# Patient Record
Sex: MALE | Race: WHITE | Employment: PART TIME | URBAN - METROPOLITAN AREA
[De-identification: names, ages, dates, MRNs, and addresses within clinical notes are randomized per-mention and may not be internally consistent; named-entity substitution may affect disease eponyms.]

---

## 2017-01-05 ENCOUNTER — TELEPHONE (OUTPATIENT)
Dept: FAMILY MEDICINE CLINIC | Facility: CLINIC | Age: 52
End: 2017-01-05

## 2017-01-11 ENCOUNTER — TELEPHONE (OUTPATIENT)
Dept: FAMILY MEDICINE CLINIC | Facility: CLINIC | Age: 52
End: 2017-01-11

## 2017-01-11 NOTE — TELEPHONE ENCOUNTER
Patient dropped off form \"Report to Attending Physicain\" that needs to be completed by Dr. Jose Bowen. When signed please fax to [phone]. Put form in doctors inbox.

## 2017-01-19 ENCOUNTER — TELEPHONE (OUTPATIENT)
Dept: FAMILY MEDICINE CLINIC | Facility: CLINIC | Age: 52
End: 2017-01-19

## 2017-01-19 NOTE — TELEPHONE ENCOUNTER
[facility] would like to discharge pt from home health services. All goals have been met. No need for skilled nurse anymore. Pt is ready to go back to work.

## 2017-01-26 ENCOUNTER — OFFICE VISIT (OUTPATIENT)
Dept: FAMILY MEDICINE CLINIC | Facility: CLINIC | Age: 52
End: 2017-01-26

## 2017-01-26 ENCOUNTER — TELEPHONE (OUTPATIENT)
Dept: FAMILY MEDICINE CLINIC | Facility: CLINIC | Age: 52
End: 2017-01-26

## 2017-01-26 VITALS
HEART RATE: 110 BPM | HEIGHT: 67 IN | RESPIRATION RATE: 18 BRPM | WEIGHT: 241 LBS | DIASTOLIC BLOOD PRESSURE: 90 MMHG | TEMPERATURE: 97 F | BODY MASS INDEX: 37.83 KG/M2 | SYSTOLIC BLOOD PRESSURE: 130 MMHG

## 2017-01-26 DIAGNOSIS — I87.8 VENOUS STASIS: ICD-10-CM

## 2017-01-26 DIAGNOSIS — L03.116 LEFT LEG CELLULITIS: Primary | ICD-10-CM

## 2017-01-26 PROCEDURE — 99214 OFFICE O/P EST MOD 30 MIN: CPT | Performed by: FAMILY MEDICINE

## 2017-01-26 RX ORDER — FUROSEMIDE 20 MG/1
TABLET ORAL
Qty: 60 TABLET | Refills: 0 | Status: SHIPPED | OUTPATIENT
Start: 2017-01-26 | End: 2019-03-29 | Stop reason: ALTCHOICE

## 2017-01-26 RX ORDER — POTASSIUM CHLORIDE 1500 MG/1
TABLET, FILM COATED, EXTENDED RELEASE ORAL
Qty: 30 TABLET | Refills: 0 | Status: SHIPPED | OUTPATIENT
Start: 2017-01-26 | End: 2019-03-29 | Stop reason: ALTCHOICE

## 2017-01-26 NOTE — TELEPHONE ENCOUNTER
Given verbal to [address] to change qty to 42 in reference to your med instructions today   Pls advise if otherwise. Thanks.      Meds & Refills for this Visit:                   Potassium Chloride ER 20 MEQ Oral Tab CR  30 tablet  0       Si tab PO

## 2017-01-26 NOTE — PROGRESS NOTES
[facility] Family Medicine Office Note  Chief Complaint:   Patient presents with:  Edema: f/u feet swelling      HPI:   This is a 46year old male coming in for follow-up of cellulitis.   Patient was seen recently by orthopedics who believed that HYDROcodone-acetaminophen  MG Oral Tab Take 2 tablets by mouth every 6 (six) hours as needed.  Disp: 30 tablet Rfl: 0      Counseling given: Not Answered       REVIEW OF SYSTEMS:   ROS:  CONSTITUTIONAL:  Denies any unusual weight gain/loss, fever, c stockings  -  Follow up with lymphedema clinic  -  Patient ok to return to work  - furosemide (LASIX) 20 MG Oral Tab; 1 tab PO daily x 2 weeks then 1 tab PO BID x 2 weeks  Dispense: 60 tablet; Refill: 0  - Comp Metabolic Panel (14) [E];  Future  - Potassium

## 2017-01-27 ENCOUNTER — TELEPHONE (OUTPATIENT)
Dept: FAMILY MEDICINE CLINIC | Facility: CLINIC | Age: 52
End: 2017-01-27

## 2017-01-27 NOTE — TELEPHONE ENCOUNTER
Note written/reviewed and signed by dr belcher and faxed to attention: [employer] as pt requested below. Copy placed in triage faxed info bin.

## 2017-01-27 NOTE — TELEPHONE ENCOUNTER
Please write him a note allowing him to return to work effective immediately. Only restriction I would say would be to allow for 1 or 2 extra 15 minute breaks through the day to allow for alternating sitting and standing. Please type and I will sign.

## 2017-01-27 NOTE — TELEPHONE ENCOUNTER
Pt was seen 1/26/17 by Dr. Jose Bowen. He needs a note for work to release him back to work and also if there are any restrictions for him. Vijay fax information to Chanel Middleton [phone] his HR Department. He is asking if this can be done today. Thank you.

## 2017-02-01 NOTE — TELEPHONE ENCOUNTER
Pt called  our office this morning for an additional request.  Pt needed his recent office visit note from 1/26/17 and doctor's note from 1/27/17 faxed to [address] Dept; re:  Claim # [id number]. Yasmani fax # [phone].   Request faxed successf

## 2018-05-15 ENCOUNTER — PATIENT OUTREACH (OUTPATIENT)
Dept: FAMILY MEDICINE CLINIC | Facility: CLINIC | Age: 53
End: 2018-05-15

## 2018-05-15 NOTE — PROGRESS NOTES
Please call pt to inquire if pt has had a colonoscopy in the last 10 years and if so who performed it (name and phone #). Please let Maur Roberson know so I can obtain the report.     If pt did not have a colonoscopy please advise them we will leave the FIT stoo

## 2018-05-15 NOTE — PROGRESS NOTES
Patient has not had a colonoscopy in the last 10 years.  Would like FIT stool card sent to home address:    [address] [place] [address]  [address] [place] [address]

## 2019-01-18 ENCOUNTER — PATIENT OUTREACH (OUTPATIENT)
Dept: FAMILY MEDICINE CLINIC | Facility: CLINIC | Age: 54
End: 2019-01-18

## 2019-01-19 NOTE — PROGRESS NOTES
Please call pt to inquire if pt has had a colonoscopy in the last 10 years and if so who performed it (name and phone #). Please let Nargis Burton know so I can obtain the report.     If pt did not have a colonoscopy please advise them we will leave the FIT stoo

## 2019-03-29 ENCOUNTER — HOSPITAL ENCOUNTER (OUTPATIENT)
Facility: HOSPITAL | Age: 54
Setting detail: OBSERVATION
Discharge: HOME | End: 2019-03-31
Attending: EMERGENCY MEDICINE | Admitting: INTERNAL MEDICINE
Payer: COMMERCIAL

## 2019-03-29 ENCOUNTER — APPOINTMENT (OUTPATIENT)
Dept: GENERAL RADIOLOGY | Facility: HOSPITAL | Age: 54
End: 2019-03-29
Attending: HOSPITALIST
Payer: COMMERCIAL

## 2019-03-29 DIAGNOSIS — L03.115 CELLULITIS OF RIGHT LEG: Primary | ICD-10-CM

## 2019-03-29 PROCEDURE — 71045 X-RAY EXAM CHEST 1 VIEW: CPT | Performed by: HOSPITALIST

## 2019-03-29 PROCEDURE — 99220 INITIAL OBSERVATION CARE,LEVL III: CPT | Performed by: HOSPITALIST

## 2019-03-29 RX ORDER — MORPHINE SULFATE 4 MG/ML
2 INJECTION, SOLUTION INTRAMUSCULAR; INTRAVENOUS EVERY 2 HOUR PRN
Status: DISCONTINUED | OUTPATIENT
Start: 2019-03-29 | End: 2019-03-31

## 2019-03-29 RX ORDER — ONDANSETRON 2 MG/ML
4 INJECTION INTRAMUSCULAR; INTRAVENOUS EVERY 6 HOURS PRN
Status: DISCONTINUED | OUTPATIENT
Start: 2019-03-29 | End: 2019-03-31

## 2019-03-29 RX ORDER — HYDROCODONE BITARTRATE AND ACETAMINOPHEN 5; 325 MG/1; MG/1
1 TABLET ORAL EVERY 4 HOURS PRN
Status: DISCONTINUED | OUTPATIENT
Start: 2019-03-29 | End: 2019-03-31

## 2019-03-29 RX ORDER — CEFAZOLIN SODIUM/WATER 2 G/20 ML
2 SYRINGE (ML) INTRAVENOUS EVERY 6 HOURS
Status: DISCONTINUED | OUTPATIENT
Start: 2019-03-29 | End: 2019-03-31

## 2019-03-29 RX ORDER — MORPHINE SULFATE 4 MG/ML
1 INJECTION, SOLUTION INTRAMUSCULAR; INTRAVENOUS EVERY 2 HOUR PRN
Status: DISCONTINUED | OUTPATIENT
Start: 2019-03-29 | End: 2019-03-31

## 2019-03-29 RX ORDER — METOCLOPRAMIDE HYDROCHLORIDE 5 MG/ML
10 INJECTION INTRAMUSCULAR; INTRAVENOUS EVERY 8 HOURS PRN
Status: DISCONTINUED | OUTPATIENT
Start: 2019-03-29 | End: 2019-03-31

## 2019-03-29 RX ORDER — ENOXAPARIN SODIUM 100 MG/ML
40 INJECTION SUBCUTANEOUS DAILY
Status: DISCONTINUED | OUTPATIENT
Start: 2019-03-29 | End: 2019-03-31

## 2019-03-29 RX ORDER — HYDROCODONE BITARTRATE AND ACETAMINOPHEN 5; 325 MG/1; MG/1
2 TABLET ORAL EVERY 4 HOURS PRN
Status: DISCONTINUED | OUTPATIENT
Start: 2019-03-29 | End: 2019-03-31

## 2019-03-29 RX ORDER — SODIUM CHLORIDE 9 MG/ML
125 INJECTION, SOLUTION INTRAVENOUS CONTINUOUS
Status: DISCONTINUED | OUTPATIENT
Start: 2019-03-29 | End: 2019-03-29

## 2019-03-29 RX ORDER — FUROSEMIDE 10 MG/ML
20 INJECTION INTRAMUSCULAR; INTRAVENOUS ONCE
Status: COMPLETED | OUTPATIENT
Start: 2019-03-29 | End: 2019-03-29

## 2019-03-29 RX ORDER — MORPHINE SULFATE 4 MG/ML
4 INJECTION, SOLUTION INTRAMUSCULAR; INTRAVENOUS EVERY 2 HOUR PRN
Status: DISCONTINUED | OUTPATIENT
Start: 2019-03-29 | End: 2019-03-31

## 2019-03-29 RX ORDER — ACETAMINOPHEN 325 MG/1
650 TABLET ORAL EVERY 4 HOURS PRN
Status: DISCONTINUED | OUTPATIENT
Start: 2019-03-29 | End: 2019-03-31

## 2019-03-29 NOTE — ED PROVIDER NOTES
Patient Seen in: [facility] Emergency Department    History   Patient presents with:  Cellulitis (integumentary, infectious)    Stated Complaint: right leg cellulitis    IVON Durham is a pleasant 59-year-old male presenting to the emergency depar of proportion to exam no subcutis emphysema or crepitance per       ED Course     Labs Reviewed   COMP METABOLIC PANEL (14) - Abnormal; Notable for the following components:       Result Value    BUN 22 (*)     BUN/CREA Ratio 22.2 (*)     Albumin 2.8 (*)

## 2019-03-29 NOTE — PROGRESS NOTES
03/29/19 1508   Clinical Encounter Type   Visited With Patient   Routine Visit (Responded to the consult)   Continue Visiting (Encouraged to call  as needed through his RN)   Gnosticist Encounters   Gnosticist Needs Prayer  (Per request, prayed an

## 2019-03-29 NOTE — CONSULTS
INFECTIOUS DISEASE CONSULTATION    Victorino Palacios Patient Status:  Observation    3/25/1965 MRN [id number]   [facility] 0SW-A Attending Melissa Mota MD   [facility], 10 mg, Intravenous, Q8H PRN  •  ceFAZolin (ANCEF) IVPB 1g/100ml in 0.9% NaCl minibag/add-van, 1 g, Intravenous, Q8H    No current facility-administered medications on file prior to encounter.    Current Outpatient Medications on File Prior insufficiency of leg     Decreased pulses in feet     Obesity     Cellulitis of left leg     Leukocytosis     Fever     Hyponatremia     Sepsis (HCC)     Constipation     left ankle sx  global exp 3/1/17     YUVAL (acute kidney injury) ([facility] [address]     Cellulitis o

## 2019-03-29 NOTE — H&P
[facility]  History and Physical     Raynold Bump Patient Status:  Emergency    3/25/1965 MRN [id number]   Location [facility] Attending Tatianna Santos MD   Hosp Day # 0  [facility],      Chief Com Pulse 83   Temp 98.1 °F (36.7 °C) (Temporal)   Resp 17   Ht 5' 7\" (1.702 m)   Wt 240 lb (108.9 kg)   SpO2 100%   BMI 37.59 kg/m²   General: No acute distress. Alert and oriented x 3. HEENT: Normocephalic atraumatic.   Respiratory: Crackles noted at base

## 2019-03-29 NOTE — PLAN OF CARE
NURSING ADMISSION NOTE      Patient admitted via Cart  Oriented to room. Safety precautions initiated. Bed in low position. Call light in reach. Admission navigator done and report given to rn.

## 2019-03-30 PROCEDURE — 99226 SUBSEQUENT OBSERVATION CARE: CPT | Performed by: HOSPITALIST

## 2019-03-30 RX ORDER — FUROSEMIDE 10 MG/ML
20 INJECTION INTRAMUSCULAR; INTRAVENOUS DAILY
Status: DISCONTINUED | OUTPATIENT
Start: 2019-03-30 | End: 2019-03-31

## 2019-03-30 RX ORDER — PENICILLIN V POTASSIUM 500 MG/1
TABLET ORAL
Qty: 60 TABLET | Refills: 3 | Status: SHIPPED | OUTPATIENT
Start: 2019-03-30 | End: 2019-06-13

## 2019-03-30 NOTE — PROGRESS NOTES
Alert and oriented x 4, v.s.s., cellulitis to bilateral lower extremities, right greater than left. Right leg is red, swollen peeling and weeping, medicated for c/o pain as per MAR.  Continues on antibiotics, ambulatory in room independently, no signs of di

## 2019-03-30 NOTE — PLAN OF CARE
SKIN/TISSUE INTEGRITY - ADULT    • Incision(s), wounds(s) or drain site(s) healing without S/S of infection Progressing              Assumed pt care at 0730. Aa/ox4. Breathing unlabored. Denies pain. Up ad zoya.

## 2019-03-30 NOTE — PROGRESS NOTES
[facility]  Progress Note     Leslie Wade Patient Status:  Observation    3/25/1965 MRN [id number]   [facility] 4NW-A Attending Prudence Marcano MD   Hosp Day # 0 PCP Cecelia Love DO     Chief Complaint: RLE Cellulitis Epic.    Medications:   • furosemide  20 mg Intravenous Daily   • enoxaparin  40 mg Subcutaneous Daily   • ceFAZolin  2 g Intravenous Q6H       ASSESSMENT / PLAN:     1. Acute RLE cellulitis, improving  2. Lymphedema  1. IV abx  2. Follow-up cultures  3.  I

## 2019-03-31 VITALS
SYSTOLIC BLOOD PRESSURE: 119 MMHG | RESPIRATION RATE: 16 BRPM | HEART RATE: 84 BPM | WEIGHT: 246.63 LBS | HEIGHT: 68 IN | BODY MASS INDEX: 37.38 KG/M2 | DIASTOLIC BLOOD PRESSURE: 61 MMHG | OXYGEN SATURATION: 95 % | TEMPERATURE: 98 F

## 2019-03-31 PROCEDURE — 99217 OBSERVATION CARE DISCHARGE: CPT | Performed by: HOSPITALIST

## 2019-03-31 RX ORDER — FUROSEMIDE 20 MG/1
20 TABLET ORAL 2 TIMES DAILY
Qty: 30 TABLET | Refills: 0 | Status: SHIPPED | OUTPATIENT
Start: 2019-03-31 | End: 2019-08-28

## 2019-03-31 NOTE — PROGRESS NOTES
[facility] [address]  [place], [address]  ?  03/31/19  ? Re: Navarro Palencia  ? To Whom It May Concern:    Navarro Palencia was admitted to [facility] from 3/29/2019 to 03/31/19.     Please excuse Navarro Palencia from [employer]

## 2019-03-31 NOTE — PROGRESS NOTES
[facility]                INFECTIOUS DISEASE PROGRESS NOTE    Katelynn Ucon Patient Status:  Observation    3/25/1965 MRN [id number]   [facility] 4NW-A Attending Job Dial, MD   Hosp Day # 0 PCP SILVER GARCIA, DO Stasis dermatitis of both legs     Cellulitis     Fungal dermatitis     Venous insufficiency of leg     Decreased pulses in feet     Obesity     Cellulitis of left leg     Leukocytosis     Fever     Hyponatremia     Sepsis (HCC)     Constipation     left a

## 2019-03-31 NOTE — DISCHARGE SUMMARY
[facility]  DISCHARGE SUMMARY     Geanie Cooks Patient Status:  Observation    3/25/1965 MRN [id number]   [facility] 4NW-A Attending Matilde Ascencio MD   Hosp Day # 0 PCP SILVER GARCIA DO     Date of Admission: 3/29/2019 Please  your prescriptions at the location directed by your doctor or nurse    Bring a paper prescription for each of these medications  · furosemide 20 MG Tabs  · penicillin v potassium 500 MG Tabs         [place]    Follow-up appointm

## 2019-03-31 NOTE — PLAN OF CARE
NURSING DISCHARGE NOTE    Discharged Home via Wheelchair. Accompanied by Support staff  Belongings Taken by patient/family. DC instructions and rx given and explained.  RX for lasix and PCN called into pt's pharmacy, [facility] in [place] per pt request. Pt r

## 2019-03-31 NOTE — PROGRESS NOTES
[facility]                INFECTIOUS DISEASE PROGRESS NOTE    Idalmis Flatness Patient Status:  Observation    3/25/1965 MRN [id number]   [facility] 4NW-A Attending Álvaro Cabrera MD   Hosp Day # 0 PCP SILVER GARCIA, DO Growth 1 Day N/A           Problem list reviewed:  Patient Active Problem List:     Stasis dermatitis of both legs     Cellulitis     Fungal dermatitis     Venous insufficiency of leg     Decreased pulses in feet     Obesity     Cellulitis of left leg

## 2019-04-03 ENCOUNTER — TELEPHONE (OUTPATIENT)
Dept: FAMILY MEDICINE CLINIC | Facility: CLINIC | Age: 54
End: 2019-04-03

## 2019-04-03 NOTE — TELEPHONE ENCOUNTER
Message   Received: 3 days ago   Message Contents   Damaso IbrahimDO  [address] [facility]             Please schedule TCM. Aleena Rosales.      Dr. Sera Casey    Previous Messages      ----- Message -----   From: Luciano Butt MD   Sent: 3/31/2019  10:13 AM

## 2019-04-05 ENCOUNTER — OFFICE VISIT (OUTPATIENT)
Dept: FAMILY MEDICINE CLINIC | Facility: CLINIC | Age: 54
End: 2019-04-05
Payer: COMMERCIAL

## 2019-04-05 VITALS
TEMPERATURE: 98 F | HEIGHT: 67 IN | WEIGHT: 249 LBS | DIASTOLIC BLOOD PRESSURE: 80 MMHG | HEART RATE: 100 BPM | SYSTOLIC BLOOD PRESSURE: 130 MMHG | BODY MASS INDEX: 39.08 KG/M2 | RESPIRATION RATE: 18 BRPM

## 2019-04-05 DIAGNOSIS — R60.9 PERIPHERAL EDEMA: ICD-10-CM

## 2019-04-05 DIAGNOSIS — L03.115 CELLULITIS OF RIGHT LEG: Primary | ICD-10-CM

## 2019-04-05 PROCEDURE — 99214 OFFICE O/P EST MOD 30 MIN: CPT | Performed by: FAMILY MEDICINE

## 2019-04-06 NOTE — PROGRESS NOTES
[facility] Office Note  Chief Complaint:   Patient presents with:  Swelling: hosp f/u swelling right leg      HPI:   This is a 47year old male coming in for recent hospitalization for right leg cellulitis.   Patient was recently d exertion or at rest  RESPIRATORY:  Denies shortness of breath, cough  GASTROINTESTINAL:  Denies any abdominal pain  NEUROLOGICAL:  Denies headache, dizziness, syncope, numbness or tingling in the extremities.   MUSCULOSKELETAL:  Denies muscle, back pain, olga to learning. Medical education done. Outcome: Patient verbalizes understanding. Patient is notified to call with any questions, complications, allergies, or worsening or changing symptoms.   Patient is to call with any side effects or complications from t

## 2019-05-29 NOTE — PROGRESS NOTES
Please call pt to inquire if pt has had a colonoscopy in the last 10 years and if so who performed it (name and phone #). Please let Migdalia Wu know so I can obtain the report.     If pt did not have a colonoscopy please advise them we will leave the FIT stoo

## 2019-06-05 NOTE — PROGRESS NOTES
lvm to an identified vm to call back regarding the colonoscopy outreach. \"unable tor each\" letter sent via [address]

## 2019-07-12 ENCOUNTER — APPOINTMENT (OUTPATIENT)
Dept: ULTRASOUND IMAGING | Facility: HOSPITAL | Age: 54
End: 2019-07-12
Attending: EMERGENCY MEDICINE
Payer: COMMERCIAL

## 2019-07-12 ENCOUNTER — HOSPITAL ENCOUNTER (OUTPATIENT)
Facility: HOSPITAL | Age: 54
Setting detail: OBSERVATION
Discharge: HOME | End: 2019-07-14
Attending: EMERGENCY MEDICINE | Admitting: HOSPITALIST
Payer: COMMERCIAL

## 2019-07-12 DIAGNOSIS — L03.115 CELLULITIS OF RIGHT LEG: Primary | ICD-10-CM

## 2019-07-12 LAB
ALBUMIN SERPL-MCNC: 3.2 G/DL (ref 3.4–5)
ALBUMIN/GLOB SERPL: 0.6 {RATIO} (ref 1–2)
ALP LIVER SERPL-CCNC: 111 U/L (ref 45–117)
ALT SERPL-CCNC: 49 U/L (ref 16–61)
ANION GAP SERPL CALC-SCNC: 6 MMOL/L (ref 0–18)
AST SERPL-CCNC: 42 U/L (ref 15–37)
BASOPHILS # BLD AUTO: 0.07 X10(3) UL (ref 0–0.2)
BASOPHILS NFR BLD AUTO: 1 %
BILIRUB SERPL-MCNC: 1.1 MG/DL (ref 0.1–2)
BUN BLD-MCNC: 21 MG/DL (ref 7–18)
BUN/CREAT SERPL: 23.6 (ref 10–20)
CALCIUM BLD-MCNC: 9.5 MG/DL (ref 8.5–10.1)
CHLORIDE SERPL-SCNC: 106 MMOL/L (ref 98–112)
CO2 SERPL-SCNC: 25 MMOL/L (ref 21–32)
CREAT BLD-MCNC: 0.89 MG/DL (ref 0.7–1.3)
DEPRECATED RDW RBC AUTO: 45.5 FL (ref 35.1–46.3)
EOSINOPHIL # BLD AUTO: 0.07 X10(3) UL (ref 0–0.7)
EOSINOPHIL NFR BLD AUTO: 1 %
ERYTHROCYTE [DISTWIDTH] IN BLOOD BY AUTOMATED COUNT: 13.3 % (ref 11–15)
GLOBULIN PLAS-MCNC: 5.6 G/DL (ref 2.8–4.4)
GLUCOSE BLD-MCNC: 99 MG/DL (ref 70–99)
HCT VFR BLD AUTO: 47 % (ref 39–53)
HGB BLD-MCNC: 16.1 G/DL (ref 13–17.5)
IMM GRANULOCYTES # BLD AUTO: 0.08 X10(3) UL (ref 0–1)
IMM GRANULOCYTES NFR BLD: 1.1 %
LYMPHOCYTES # BLD AUTO: 0.95 X10(3) UL (ref 1–4)
LYMPHOCYTES NFR BLD AUTO: 13.4 %
M PROTEIN MFR SERPL ELPH: 8.8 G/DL (ref 6.4–8.2)
MCH RBC QN AUTO: 32.3 PG (ref 26–34)
MCHC RBC AUTO-ENTMCNC: 34.3 G/DL (ref 31–37)
MCV RBC AUTO: 94.2 FL (ref 80–100)
MONOCYTES # BLD AUTO: 0.5 X10(3) UL (ref 0.1–1)
MONOCYTES NFR BLD AUTO: 7.1 %
NEUTROPHILS # BLD AUTO: 5.4 X10 (3) UL (ref 1.5–7.7)
NEUTROPHILS # BLD AUTO: 5.4 X10(3) UL (ref 1.5–7.7)
NEUTROPHILS NFR BLD AUTO: 76.4 %
OSMOLALITY SERPL CALC.SUM OF ELEC: 287 MOSM/KG (ref 275–295)
PLATELET # BLD AUTO: 250 10(3)UL (ref 150–450)
POTASSIUM SERPL-SCNC: 3.5 MMOL/L (ref 3.5–5.1)
RBC # BLD AUTO: 4.99 X10(6)UL (ref 4.3–5.7)
SODIUM SERPL-SCNC: 137 MMOL/L (ref 136–145)
WBC # BLD AUTO: 7.1 X10(3) UL (ref 4–11)

## 2019-07-12 PROCEDURE — 99223 1ST HOSP IP/OBS HIGH 75: CPT | Performed by: HOSPITALIST

## 2019-07-12 PROCEDURE — 93971 EXTREMITY STUDY: CPT | Performed by: EMERGENCY MEDICINE

## 2019-07-12 RX ORDER — ENOXAPARIN SODIUM 100 MG/ML
0.5 INJECTION SUBCUTANEOUS DAILY
Status: DISCONTINUED | OUTPATIENT
Start: 2019-07-12 | End: 2019-07-14

## 2019-07-12 RX ORDER — POTASSIUM CHLORIDE 20 MEQ/1
40 TABLET, EXTENDED RELEASE ORAL EVERY 4 HOURS
Status: COMPLETED | OUTPATIENT
Start: 2019-07-12 | End: 2019-07-12

## 2019-07-12 RX ORDER — ONDANSETRON 2 MG/ML
4 INJECTION INTRAMUSCULAR; INTRAVENOUS EVERY 6 HOURS PRN
Status: DISCONTINUED | OUTPATIENT
Start: 2019-07-12 | End: 2019-07-14

## 2019-07-12 RX ORDER — METOCLOPRAMIDE HYDROCHLORIDE 5 MG/ML
10 INJECTION INTRAMUSCULAR; INTRAVENOUS EVERY 8 HOURS PRN
Status: DISCONTINUED | OUTPATIENT
Start: 2019-07-12 | End: 2019-07-14

## 2019-07-12 RX ORDER — KETOROLAC TROMETHAMINE 30 MG/ML
30 INJECTION, SOLUTION INTRAMUSCULAR; INTRAVENOUS EVERY 6 HOURS PRN
Status: DISCONTINUED | OUTPATIENT
Start: 2019-07-12 | End: 2019-07-14

## 2019-07-12 RX ORDER — ASPIRIN 81 MG/1
81 TABLET, CHEWABLE ORAL DAILY
Status: DISCONTINUED | OUTPATIENT
Start: 2019-07-12 | End: 2019-07-14

## 2019-07-12 RX ORDER — POTASSIUM CHLORIDE 20 MEQ/1
40 TABLET, EXTENDED RELEASE ORAL EVERY 4 HOURS
Status: DISCONTINUED | OUTPATIENT
Start: 2019-07-12 | End: 2019-07-12

## 2019-07-12 NOTE — PLAN OF CARE
Patient received from ER via cart, alert and oriented x 4. Lungs clear, on room air, abdomen soft, bowel sounds present, passing flatus.  Voiding adequate amounts, urinal. Right and left lower extremity edema, 2+, right leg extremely red, warm to touch, are

## 2019-07-12 NOTE — PROGRESS NOTES
[facility] Progress Note:  Anticoagulation Weight Dose Adjustment for enoxaparin (LOVENOX)    King Curly is a 47year old male who has been prescribed enoxaparin (LOVENOX) for VTE prophylaxis.       Estimated Creatinine Clearance: 88.7 mL/min (base

## 2019-07-12 NOTE — CONSULTS
INFECTIOUS DISEASE CONSULT NOTE    Santiago Hernandez Patient Status:  Inpatient    3/25/1965 MRN [id number]   [facility] 4NW-A Attending Eugenio Carrillo DO   Hosp Day # 0 PCP Darrel KULKARNI Physical Exam:    General: No acute distress. Alert and oriented x 3. Vital signs: Blood pressure 131/68, pulse 81, temperature 98.1 °F (36.7 °C), temperature source Oral, resp. rate 18, height 5' 7\" (1.702 m), weight 256 lb 8 oz (116.3 kg), SpO2 98 %. PROCEDURE:  US VENOUS DOPPLER LEG RIGHT - DIAG [facility] ([id number])  COMPARISON:  None. INDICATIONS:  Cellulitis  TECHNIQUE:  Real time, grey scale, and duplex ultrasound was used to evaluate the lower extremity venous system.  B-mode two-dimensional images of

## 2019-07-12 NOTE — H&P
[facility]  History and Physical     Fransico Stewart Patient Status:  Inpatient    3/25/1965 MRN [id number]   [facility] 4NW-A Attending Farhan Andrade DO   Hosp Day # 0 [address]     Chief Complaint: Cellulitis kg)   SpO2 96%   BMI 36.02 kg/m²   General: No acute distress. Alert and oriented x 3. HEENT: Normocephalic, atraumatic. EOM-I. Anicteric. Neck: No lymphadenopathy. No JVD. No carotid bruits. Respiratory: Good inspiratory effort.   Clear to auscultation

## 2019-07-12 NOTE — PROGRESS NOTES
[facility] Pharmacy Note: Antimicrobial Weight Dose Adjustment for: ceftriaxone    Navarro Palencia is a 47year old male who has been prescribed ceftriaxone (ROCEPHIN) 1 g once.   CrCl is estimated creatinine clearance is 88.7 mL/min (based on SCr of 0.89 mg/dL)

## 2019-07-12 NOTE — ED PROVIDER NOTES
Patient Seen in: [facility] Emergency Department    History   Patient presents with:  Cellulitis (integumentary, infectious)    Stated Complaint: Cellulitis    HPI    49-year-old male with history of chronic swelling in the lower extremities present signs   Extremities: 3+ pitting edema to bilateral lower extremities. The right lower extremity has peeling skin with weeping of serous fluid.   There is erythema and warmth from the mid thigh to the toes that is circumferential and concerning for cellulit posterior tibial veins, and the contralateral common femoral vein. PATIENT STATED HISTORY: (As transcribed by Technologist)     FINDINGS:  EXTREMITY EXAMINED:  Right lower limb SAPHENOFEMORAL JUNCTION:  No reflux. THROMBI:  None visible.  COMPRESSION:  Nor

## 2019-07-13 LAB — POTASSIUM SERPL-SCNC: 4.1 MMOL/L (ref 3.5–5.1)

## 2019-07-13 PROCEDURE — 99232 SBSQ HOSP IP/OBS MODERATE 35: CPT | Performed by: HOSPITALIST

## 2019-07-13 RX ORDER — DIPHENHYDRAMINE HCL 25 MG
25 CAPSULE ORAL EVERY 6 HOURS PRN
Status: DISCONTINUED | OUTPATIENT
Start: 2019-07-13 | End: 2019-07-14

## 2019-07-13 RX ORDER — PENICILLIN V POTASSIUM 500 MG/1
TABLET ORAL
Qty: 90 TABLET | Refills: 11 | Status: SHIPPED | OUTPATIENT
Start: 2019-07-13 | End: 2019-08-14 | Stop reason: ALTCHOICE

## 2019-07-13 NOTE — PLAN OF CARE
Afebrile, swelling and redness to both lower extremities is improved, right leg remains bigger than the left leg, color is jeanne and skin peeling off from the right leg, both legs are shiny, pulses are present with doppler, good capillary refill noted, has

## 2019-07-13 NOTE — PROGRESS NOTES
[facility]  Progress Note     Brandy Read Patient Status:  Inpatient    3/25/1965 MRN [id number]   [facility] 4NW-A Attending Jennifer Silva, [address] [facility] Day # 1 PCP Pratik Gage DO     Chief Complaint: cellulitis   S: referred to TCC on discharge?: yes  Estimated date of discharge: ?24 hours   Discharge is dependent on: progress  At this point Mr. Avelino Man is expected to be discharge to: home     Ronnie Hopkins MD

## 2019-07-13 NOTE — PROGRESS NOTES
[facility]                INFECTIOUS DISEASE PROGRESS NOTE    David Gravely Patient Status:  Inpatient    3/25/1965 MRN [id number]   [facility] 4NW-A Attending Rikki Damon DO   Hosp Day # 1 PCP SILVER GARCIA DO     An Sepsis ([facility]     Constipation     left ankle sx  global exp 3/1/17     YUVAL (acute kidney injury) ([facility]     Cellulitis of left foot         Global exp 2-24-17 / LEFT FOOT / [facility]      Cellulitis of right leg     Lymphedema     Hydrocephalus ([facility]      ASSESS

## 2019-07-13 NOTE — PLAN OF CARE
Problem: SKIN/TISSUE INTEGRITY - ADULT  Goal: Incision(s), wounds(s) or drain site(s) healing without S/S of infection  Description  INTERVENTIONS:  - Assess and document risk factors for pressure ulcer development  - Assess and document skin integrity period  Description  INTERVENTIONS  - Monitor WBC  - Administer growth factors as ordered  - Implement neutropenic guidelines  Outcome: Progressing   AAOx4,both lower extremeties red,swollen,weeping,more on the right leg. IV antibiotic continued.

## 2019-07-14 VITALS
HEIGHT: 67 IN | RESPIRATION RATE: 18 BRPM | BODY MASS INDEX: 40.26 KG/M2 | SYSTOLIC BLOOD PRESSURE: 123 MMHG | HEART RATE: 83 BPM | WEIGHT: 256.5 LBS | OXYGEN SATURATION: 99 % | TEMPERATURE: 98 F | DIASTOLIC BLOOD PRESSURE: 85 MMHG

## 2019-07-14 PROCEDURE — 99239 HOSP IP/OBS DSCHRG MGMT >30: CPT | Performed by: HOSPITALIST

## 2019-07-14 NOTE — PLAN OF CARE
Problem: METABOLIC/FLUID AND ELECTROLYTES - ADULT  Goal: Electrolytes maintained within normal limits  Description  INTERVENTIONS:  - Monitor labs and rhythm and assess patient for signs and symptoms of electrolyte imbalances  - Administer electrolyte re behaviors that affect risk of falls.   - [place] fall precautions as indicated by assessment.  - Educate pt/family on patient safety including physical limitations  - Instruct pt to call for assistance with activity based on assessment  - Modify environme

## 2019-07-14 NOTE — PROGRESS NOTES
7/14/2019    Patient Name: Tyrone Heath      To Whom It May Concern: This letter has been written at the patient's request. The above patient was seen at [facility] for treatment of a medical condition from 7/12/2019 - 7/14/2019.   Please excus

## 2019-07-14 NOTE — PROGRESS NOTES
[facility]  Progress Note     King Curly Patient Status:  Inpatient    3/25/1965 MRN [id number]   [facility] 4NW-A Attending Maria De Jesus Tai, [address] [facility] Day # 2 PCP [address] [place] [address],      Chief Complaint: cellulitis   S:

## 2019-07-14 NOTE — PROGRESS NOTES
[facility]                INFECTIOUS DISEASE PROGRESS NOTE    Sony Ramos Patient Status:  Inpatient    3/25/1965 MRN [id number]   [facility] 4NW-A Attending Saman Infante DO   Hosp Day # 2 PCP SILVER GARCIA DO     An Leukocytosis     Fever     Hyponatremia     Sepsis ([facility] [address]     Constipation     left ankle sx  global exp 3/1/17     YUVAL (acute kidney injury) ([facility]     Cellulitis of left foot         Global exp 2-24-17 / LEFT FOOT / [facility]      Cellulitis of right leg     Lym

## 2019-07-14 NOTE — PLAN OF CARE
Pt discharged to home alert and oriented script was sent to pharmacy pt verbalizes a understanding in regards to d/c instructions.  Left unit with belongings

## 2019-07-15 ENCOUNTER — PATIENT OUTREACH (OUTPATIENT)
Dept: CASE MANAGEMENT | Age: 54
End: 2019-07-15

## 2019-07-15 DIAGNOSIS — Z02.9 ENCOUNTERS FOR UNSPECIFIED ADMINISTRATIVE PURPOSE: ICD-10-CM

## 2019-07-15 NOTE — PAYOR COMM NOTE
--------------  ADMISSION REVIEW     Payor: [address]  Subscriber #:  [id number]  Authorization Number: Pend    Admit date: 7/12/19  Admit time: 1252       Admitting Physician: Basilia Zhang DO  Primary Care Physician: ONESIMO Tejeda Negative for DVT. Nonvisualization of the proximal right calf veins. Otherwise unremarkable. MDM   Ultrasound ordered to rule out DVT. This appears consistent with a large cellulitis. Vancomycin ordered. Anticipate admission for IV antibiotics. patient       ID CONSULT  Reason for Consultation:     RLE cellulitis    ASSESSMENT/ PLAN:     1. RLE cellulitis - marked erythema in the setting of lymphedema. Usually infection due to strep sp  - cover with ancef.  Will use a higher dose since pt is obese None (Room air) — KD       ID -     Antibiotics: cefazolin#2    Improved on cefaozlin  Plan chronic supressive PCN VK after discharge  ----PCN 1g tid x 2 weeks, then 500mg bid  ----advised on recommendation to limit standing at work - will see Dr. Aleksandar Bradshaw

## 2019-07-15 NOTE — DISCHARGE SUMMARY
[facility]  DISCHARGE SUMMARY     Sony Rachel Patient Status:  Inpatient    3/25/1965 MRN [id number]   [facility] 4NW-A Attending No att. providers found   Hosp Day # 2 PCP SILVER GARCIA,      Date of Admission: 20 these medications      Instructions Prescription details   penicillin v potassium 500 MG Tabs  Commonly known as:  VEETID  Start taking on:  7/13/2019      Take 2 tablets (1,000 mg total) by mouth 3 (three) times daily for 15 days, THEN 1 tablet (500 mg to

## 2019-07-16 ENCOUNTER — OFFICE VISIT (OUTPATIENT)
Dept: FAMILY MEDICINE CLINIC | Facility: CLINIC | Age: 54
End: 2019-07-16
Payer: COMMERCIAL

## 2019-07-16 VITALS
TEMPERATURE: 98 F | HEIGHT: 67 IN | DIASTOLIC BLOOD PRESSURE: 78 MMHG | HEART RATE: 94 BPM | WEIGHT: 252 LBS | RESPIRATION RATE: 22 BRPM | BODY MASS INDEX: 39.55 KG/M2 | SYSTOLIC BLOOD PRESSURE: 126 MMHG

## 2019-07-16 DIAGNOSIS — I87.2 CHRONIC VENOUS STASIS DERMATITIS OF RIGHT LOWER EXTREMITY: ICD-10-CM

## 2019-07-16 DIAGNOSIS — L03.115 CELLULITIS OF RIGHT LEG: Primary | ICD-10-CM

## 2019-07-16 PROCEDURE — 99495 TRANSJ CARE MGMT MOD F2F 14D: CPT | Performed by: FAMILY MEDICINE

## 2019-07-16 NOTE — PROGRESS NOTES
HPI:    Elan Bullock is a 47year old male here today for hospital follow up.    He was discharged from Inpatient hospital, [facility] to Home   Admission Date: 7/12/19   Discharge Date: 7/14/19  Hospital Discharge Diagnoses (since 6/16/2019) Meds:    Current Outpatient Medications on File Prior to Visit:  penicillin v potassium 500 MG Oral Tab Take 2 tablets (1,000 mg total) by mouth 3 (three) times daily for 15 days, THEN 1 tablet (500 mg total) 2 (two) times daily.    furosemide 20 MG Oral Ta no r/r/w  CARDIO: RRR without murmur  GI: good BS's, no masses, HSM or tenderness  MUSCULOSKELETAL: back is not tender, FROM of the extremities  EXTREMITIES: no cyanosis, clubbing, + 2 pitting edema bilateral LE, + improving erythema of right leg, + excori

## 2019-07-17 ENCOUNTER — TELEPHONE (OUTPATIENT)
Dept: FAMILY MEDICINE CLINIC | Facility: CLINIC | Age: 54
End: 2019-07-17

## 2019-07-17 NOTE — TELEPHONE ENCOUNTER
LM for wound care to cb. See below. I also discussed with pt. He is aware we are awaiting cb but says he will try to schedule tomorrow if he remembers.

## 2019-07-17 NOTE — TELEPHONE ENCOUNTER
He has sloughing of skin and a few eschars. I was hoping wound care could take care of the venous stasis ulcerations.

## 2019-07-17 NOTE — TELEPHONE ENCOUNTER
S/w pt. I asked what he told scheduling when he called. He reports when he called he told wound care that he was in hosp recently with cellulitis, had \"some bad skin on leg peeling and scraping and was referred for wound care. \"  He was then told they f

## 2019-07-17 NOTE — TELEPHONE ENCOUNTER
He has wounds on the back of his leg that he likely cannot see. For the last time, please have wound care see patient one time and if I'm wrong, no big deal and send him on his way. I want him evaluated for venous stasis ulcers.

## 2019-07-17 NOTE — TELEPHONE ENCOUNTER
Karly Sotomayor from wound care called me back. I discussed below. She will relay to front staff tomorrow to make apt for pt.

## 2019-07-17 NOTE — TELEPHONE ENCOUNTER
Khushbu Duque now called back. Reports since pt denies any drainage or open wound perse' they cannot really see pt. They only see if open wound or drainage. Not sure how to proceed. Please advise thanks.

## 2019-07-17 NOTE — TELEPHONE ENCOUNTER
Pt states he tried to make an appt with wound care and they told him they think he should go to derm not wound care. He would like to know what Dr Luke Arguello thinks and who he should go to. Please advise. Thank you.

## 2019-07-17 NOTE — TELEPHONE ENCOUNTER
Sasha YORK called back. I discussed below. She reports front staff asked pt if he had an open wound and he said no. She reports pt's call for skin rashes and such and sometimes do not warrant actual wound care vs derm consult.   I explained below and advis

## 2019-07-18 NOTE — TELEPHONE ENCOUNTER
Alva Locke  from [address] called back. She was advised pt does need to be seen for wound care eval and treat for the right lower leg issues. Dr. Tiffanie Kate would like the patient evaluated for venous statsis ulcers.  Pt did not  think he needed to be

## 2019-07-24 ENCOUNTER — OFFICE VISIT (OUTPATIENT)
Dept: WOUND CARE | Facility: HOSPITAL | Age: 54
End: 2019-07-24
Attending: NURSE PRACTITIONER
Payer: COMMERCIAL

## 2019-08-14 ENCOUNTER — APPOINTMENT (OUTPATIENT)
Dept: LAB | Age: 54
End: 2019-08-14
Attending: FAMILY MEDICINE
Payer: COMMERCIAL

## 2019-08-14 ENCOUNTER — OFFICE VISIT (OUTPATIENT)
Dept: FAMILY MEDICINE CLINIC | Facility: CLINIC | Age: 54
End: 2019-08-14
Payer: COMMERCIAL

## 2019-08-14 VITALS
HEIGHT: 67 IN | HEART RATE: 88 BPM | RESPIRATION RATE: 18 BRPM | SYSTOLIC BLOOD PRESSURE: 128 MMHG | BODY MASS INDEX: 39.39 KG/M2 | DIASTOLIC BLOOD PRESSURE: 78 MMHG | WEIGHT: 251 LBS

## 2019-08-14 DIAGNOSIS — Z00.00 ROUTINE GENERAL MEDICAL EXAMINATION AT A HEALTH CARE FACILITY: Primary | ICD-10-CM

## 2019-08-14 DIAGNOSIS — Z12.11 COLON CANCER SCREENING: ICD-10-CM

## 2019-08-14 DIAGNOSIS — Z12.5 PROSTATE CANCER SCREENING: ICD-10-CM

## 2019-08-14 DIAGNOSIS — Z00.00 ROUTINE GENERAL MEDICAL EXAMINATION AT A HEALTH CARE FACILITY: ICD-10-CM

## 2019-08-14 LAB
BILIRUB UR QL STRIP.AUTO: NEGATIVE
CHOLEST SMN-MCNC: 183 MG/DL (ref ?–200)
COLOR UR AUTO: YELLOW
COMPLEXED PSA SERPL-MCNC: 2.21 NG/ML (ref ?–4)
GLUCOSE UR STRIP.AUTO-MCNC: NEGATIVE MG/DL
HDLC SERPL-MCNC: 48 MG/DL (ref 40–59)
KETONES UR STRIP.AUTO-MCNC: NEGATIVE MG/DL
LDLC SERPL CALC-MCNC: 100 MG/DL (ref ?–100)
LEUKOCYTE ESTERASE UR QL STRIP.AUTO: NEGATIVE
NITRITE UR QL STRIP.AUTO: NEGATIVE
NONHDLC SERPL-MCNC: 135 MG/DL (ref ?–130)
PH UR STRIP.AUTO: 8 [PH] (ref 4.5–8)
PROT UR STRIP.AUTO-MCNC: NEGATIVE MG/DL
RBC UR QL AUTO: NEGATIVE
SP GR UR STRIP.AUTO: 1.02 (ref 1–1.03)
TRIGL SERPL-MCNC: 173 MG/DL (ref 30–149)
TSI SER-ACNC: 1.57 MIU/ML (ref 0.36–3.74)
UROBILINOGEN UR STRIP.AUTO-MCNC: <2 MG/DL
VLDLC SERPL CALC-MCNC: 35 MG/DL (ref 0–30)

## 2019-08-14 PROCEDURE — 99396 PREV VISIT EST AGE 40-64: CPT | Performed by: FAMILY MEDICINE

## 2019-08-14 PROCEDURE — 81003 URINALYSIS AUTO W/O SCOPE: CPT | Performed by: FAMILY MEDICINE

## 2019-08-14 PROCEDURE — 84443 ASSAY THYROID STIM HORMONE: CPT | Performed by: FAMILY MEDICINE

## 2019-08-14 PROCEDURE — 80061 LIPID PANEL: CPT | Performed by: FAMILY MEDICINE

## 2019-08-14 PROCEDURE — 36415 COLL VENOUS BLD VENIPUNCTURE: CPT | Performed by: FAMILY MEDICINE

## 2019-08-14 PROCEDURE — 84153 ASSAY OF PSA TOTAL: CPT | Performed by: FAMILY MEDICINE

## 2019-08-14 NOTE — PROGRESS NOTES
Norman Albert is a 47year old male who presents for a complete physical exam.   HPI:   Pt complains of nothing today. Denies any recent illnesses or hospitalizations. He is overdue for his colonoscopy.   He has no family history of colon or prostate RBC 4.99 4.30 - 5.70 x10(6)uL    HGB 16.1 13.0 - 17.5 g/dL    HCT 47.0 39.0 - 53.0 %    .0 150.0 - 450.0 10(3)uL    MCV 94.2 80.0 - 100.0 fL    MCH 32.3 26.0 - 34.0 pg    MCHC 34.3 31.0 - 37.0 g/dL    RDW 13.3 11.0 - 15.0 %    RDW-SD 45.5 35.1 - 46. rest, denies palpitations  GI: denies abdominal pain,denies heartburn, denies n/v/d/c/blood in stool  : denies nocturia or changes in stream  MUSCULOSKELETAL: denies back pain  NEURO: denies headaches, denies LH/dizziness/syncope  PSYCHE: denies depressi understanding of these issues and agrees to the plan. The patient is asked to return in 6 months pending lab results.

## 2019-08-16 PROCEDURE — 82274 ASSAY TEST FOR BLOOD FECAL: CPT | Performed by: FAMILY MEDICINE

## 2019-08-26 ENCOUNTER — TELEPHONE (OUTPATIENT)
Dept: FAMILY MEDICINE CLINIC | Facility: CLINIC | Age: 54
End: 2019-08-26

## 2019-08-26 NOTE — TELEPHONE ENCOUNTER
Pt hasn't been taking the   furosemide 20 MG Oral Tab 30 tablet 0     He keeps forgetting to mention when med list is gone over at visits. He wants to know if Dr. Magi Hawthorne wants him to still take this med. If so please send to pharmacy.  [id number] - [place]

## 2019-08-27 NOTE — TELEPHONE ENCOUNTER
Call to pt's cell reaches identified voice mail. Left m req call back to triage nurse tomorrow for dr belcher's response to his call from yesterday. Provided ofc phone hours.

## 2019-08-28 RX ORDER — POTASSIUM CHLORIDE 750 MG/1
TABLET, FILM COATED, EXTENDED RELEASE ORAL
Qty: 90 TABLET | Refills: 0 | Status: SHIPPED | OUTPATIENT
Start: 2019-08-28 | End: 2019-10-08

## 2019-08-28 RX ORDER — FUROSEMIDE 20 MG/1
20 TABLET ORAL 2 TIMES DAILY
Qty: 60 TABLET | Refills: 1 | Status: SHIPPED | OUTPATIENT
Start: 2019-08-28 | End: 2019-10-22

## 2019-08-28 NOTE — TELEPHONE ENCOUNTER
Call back from pt-advised of dr belcher's instructions to use lasix (fursemide) as needed for lower extremity swelling. Pt sts only had orders for one month of lasix from March 2019-currently has NO lasix. Requests med order to [facility] in record.   Record shows

## 2019-08-28 NOTE — TELEPHONE ENCOUNTER
Record shows new lasix order-still 20 mg bid #60 w RFx1. Pt already advised to use as needed per dr belcher's instructions noted below from 8/26. New order also sent today for potassium chloride 10 mEQ bid prn only on days taking lasix.      Will need to rein

## 2019-08-28 NOTE — TELEPHONE ENCOUNTER
Call back from pt-advised of dr belcher's orders noted below for furosemide and potassium supplement. Reinforced-per dr belcher's earlier instructions-that both meds are only as needed if LE swelling noted.    Patient voices understanding/agrees with plan/no fu

## 2019-09-20 ENCOUNTER — OFFICE VISIT (OUTPATIENT)
Dept: SURGERY | Facility: CLINIC | Age: 54
End: 2019-09-20
Payer: COMMERCIAL

## 2019-09-20 VITALS
HEIGHT: 67 IN | HEART RATE: 71 BPM | WEIGHT: 252 LBS | TEMPERATURE: 98 F | DIASTOLIC BLOOD PRESSURE: 89 MMHG | SYSTOLIC BLOOD PRESSURE: 149 MMHG | BODY MASS INDEX: 39.55 KG/M2

## 2019-09-20 DIAGNOSIS — Z12.11 SCREENING FOR MALIGNANT NEOPLASM OF COLON: Primary | ICD-10-CM

## 2019-09-20 DIAGNOSIS — G91.9 HYDROCEPHALUS, UNSPECIFIED TYPE (HCC): ICD-10-CM

## 2019-09-20 PROCEDURE — S0285 CNSLT BEFORE SCREEN COLONOSC: HCPCS | Performed by: SURGERY

## 2019-09-20 NOTE — H&P
New Patient Visit Note       Active Problems      1. Screening for malignant neoplasm of colon    2. Hydrocephalus, unspecified type [facility])        Chief Complaint   Patient presents with:  Colonoscopy Screening: [facility] ref by Dr. Dread Chapa for cscope.  Pt. overall 10) 10 MEQ Oral Tab CR, 1 tab PO BID PRN - to be taken on days when taking lasix, Disp: 90 tablet, Rfl: 0  •  aspirin 81 MG Oral Tab, Take 81 mg by mouth daily. , Disp: , Rfl:       Review of Systems  The Review of Systems has been reviewed by me during tod cancer. Plan   · [facility]. · Procedure discussed with risks, benefits, alternatives. · Risks include but are not exclusive to infection, bleeding, perforation, and missed lesion.   · Bowel prep discuss

## 2019-09-25 ENCOUNTER — MED REC SCAN ONLY (OUTPATIENT)
Dept: FAMILY MEDICINE CLINIC | Facility: CLINIC | Age: 54
End: 2019-09-25

## 2019-10-08 RX ORDER — POTASSIUM CHLORIDE 750 MG/1
TABLET, FILM COATED, EXTENDED RELEASE ORAL
Qty: 90 TABLET | Refills: 0 | Status: SHIPPED | OUTPATIENT
Start: 2019-10-08 | End: 2019-11-20

## 2019-10-22 RX ORDER — FUROSEMIDE 20 MG/1
20 TABLET ORAL 2 TIMES DAILY
Qty: 60 TABLET | Refills: 0 | Status: SHIPPED | OUTPATIENT
Start: 2019-10-22 | End: 2020-01-15

## 2019-10-25 ENCOUNTER — PATIENT OUTREACH (OUTPATIENT)
Dept: SURGERY | Facility: CLINIC | Age: 54
End: 2019-10-25

## 2019-11-22 RX ORDER — POTASSIUM CHLORIDE 750 MG/1
TABLET, FILM COATED, EXTENDED RELEASE ORAL
Qty: 90 TABLET | Refills: 0 | Status: SHIPPED | OUTPATIENT
Start: 2019-11-22 | End: 2020-01-03

## 2020-01-03 RX ORDER — POTASSIUM CHLORIDE 750 MG/1
TABLET, FILM COATED, EXTENDED RELEASE ORAL
Qty: 90 TABLET | Refills: 0 | Status: SHIPPED | OUTPATIENT
Start: 2020-01-03 | End: 2020-03-12

## 2020-01-15 RX ORDER — FUROSEMIDE 20 MG/1
20 TABLET ORAL 2 TIMES DAILY
Qty: 180 TABLET | Refills: 0 | Status: SHIPPED | OUTPATIENT
Start: 2020-01-15 | End: 2020-03-17

## 2020-03-12 NOTE — TELEPHONE ENCOUNTER
Pt requesting 30 day refill of POTASSIUM CHLORIDE ER 10 MEQ Oral Tab CR    Would like this sent to the [facility] in chart.

## 2020-03-16 ENCOUNTER — OFFICE VISIT (OUTPATIENT)
Dept: FAMILY MEDICINE CLINIC | Facility: CLINIC | Age: 55
End: 2020-03-16
Payer: COMMERCIAL

## 2020-03-16 ENCOUNTER — APPOINTMENT (OUTPATIENT)
Dept: LAB | Age: 55
End: 2020-03-16
Attending: FAMILY MEDICINE
Payer: COMMERCIAL

## 2020-03-16 VITALS
TEMPERATURE: 100 F | HEART RATE: 81 BPM | SYSTOLIC BLOOD PRESSURE: 130 MMHG | DIASTOLIC BLOOD PRESSURE: 82 MMHG | HEIGHT: 67 IN | RESPIRATION RATE: 20 BRPM | OXYGEN SATURATION: 99 % | WEIGHT: 255.75 LBS | BODY MASS INDEX: 40.14 KG/M2

## 2020-03-16 DIAGNOSIS — I87.2 CHRONIC VENOUS STASIS DERMATITIS OF RIGHT LOWER EXTREMITY: Primary | ICD-10-CM

## 2020-03-16 DIAGNOSIS — I87.2 CHRONIC VENOUS STASIS DERMATITIS OF RIGHT LOWER EXTREMITY: ICD-10-CM

## 2020-03-16 LAB
ALBUMIN SERPL-MCNC: 3.2 G/DL (ref 3.4–5)
ALBUMIN/GLOB SERPL: 0.7 {RATIO} (ref 1–2)
ALP LIVER SERPL-CCNC: 62 U/L (ref 45–117)
ALT SERPL-CCNC: 27 U/L (ref 16–61)
ANION GAP SERPL CALC-SCNC: 3 MMOL/L (ref 0–18)
AST SERPL-CCNC: 22 U/L (ref 15–37)
BILIRUB SERPL-MCNC: 0.5 MG/DL (ref 0.1–2)
BUN BLD-MCNC: 18 MG/DL (ref 7–18)
BUN/CREAT SERPL: 18 (ref 10–20)
CALCIUM BLD-MCNC: 8.8 MG/DL (ref 8.5–10.1)
CHLORIDE SERPL-SCNC: 109 MMOL/L (ref 98–112)
CO2 SERPL-SCNC: 27 MMOL/L (ref 21–32)
CREAT BLD-MCNC: 1 MG/DL (ref 0.7–1.3)
GLOBULIN PLAS-MCNC: 4.6 G/DL (ref 2.8–4.4)
GLUCOSE BLD-MCNC: 91 MG/DL (ref 70–99)
M PROTEIN MFR SERPL ELPH: 7.8 G/DL (ref 6.4–8.2)
OSMOLALITY SERPL CALC.SUM OF ELEC: 289 MOSM/KG (ref 275–295)
PATIENT FASTING Y/N/NP: YES
POTASSIUM SERPL-SCNC: 4.1 MMOL/L (ref 3.5–5.1)
SODIUM SERPL-SCNC: 139 MMOL/L (ref 136–145)

## 2020-03-16 PROCEDURE — 80053 COMPREHEN METABOLIC PANEL: CPT | Performed by: FAMILY MEDICINE

## 2020-03-16 PROCEDURE — 99214 OFFICE O/P EST MOD 30 MIN: CPT | Performed by: FAMILY MEDICINE

## 2020-03-16 PROCEDURE — 36415 COLL VENOUS BLD VENIPUNCTURE: CPT | Performed by: FAMILY MEDICINE

## 2020-03-16 RX ORDER — CEPHALEXIN 500 MG/1
500 CAPSULE ORAL 3 TIMES DAILY
Qty: 30 CAPSULE | Refills: 0 | Status: SHIPPED | OUTPATIENT
Start: 2020-03-16 | End: 2020-08-27

## 2020-03-16 NOTE — PROGRESS NOTES
[address] [facility] Office Note  Chief Complaint:   Patient presents with:  Cellulitis: pt c/o swelling right foot, onset last night.    Cough: \"tickle in my throat\" temp today 99.9      HPI:   This is a 47year old male coming in for swe chest pain, chest pressure or chest discomfort. No palpitations, + edema.   Denies any dyspnea on exertion or at rest  RESPIRATORY:  Denies shortness of breath, cough  GASTROINTESTINAL:  Denies any abdominal pain, nausea, vomiting  NEUROLOGICAL:  Denies hea Prescriptions     Signed Prescriptions Disp Refills   • cephALEXin 500 MG Oral Cap 30 capsule 0     Sig: Take 1 capsule (500 mg total) by mouth 3 (three) times daily.        Health Maintenance:  Influenza Vaccine(1) due on 09/01/2019    Patient/Caregiver Ed

## 2020-03-17 DIAGNOSIS — E87.6 HYPOKALEMIA: Primary | ICD-10-CM

## 2020-03-17 DIAGNOSIS — R60.9 PERIPHERAL EDEMA: ICD-10-CM

## 2020-03-17 RX ORDER — FUROSEMIDE 20 MG/1
20 TABLET ORAL DAILY
COMMUNITY
Start: 2020-03-17 | End: 2020-04-23

## 2020-03-19 RX ORDER — POTASSIUM CHLORIDE 750 MG/1
10 TABLET, FILM COATED, EXTENDED RELEASE ORAL 2 TIMES DAILY
Qty: 180 TABLET | Refills: 0 | Status: SHIPPED | OUTPATIENT
Start: 2020-03-19 | End: 2020-04-23

## 2020-04-23 DIAGNOSIS — R60.9 PERIPHERAL EDEMA: ICD-10-CM

## 2020-04-23 RX ORDER — POTASSIUM CHLORIDE 750 MG/1
10 TABLET, FILM COATED, EXTENDED RELEASE ORAL 2 TIMES DAILY
Qty: 180 TABLET | Refills: 0 | Status: SHIPPED | OUTPATIENT
Start: 2020-04-23 | End: 2020-06-05

## 2020-04-23 RX ORDER — FUROSEMIDE 20 MG/1
20 TABLET ORAL DAILY
Qty: 60 TABLET | Refills: 0 | Status: SHIPPED | OUTPATIENT
Start: 2020-04-23 | End: 2020-05-28

## 2020-04-23 NOTE — TELEPHONE ENCOUNTER
Pt last seen 3/16/2020 sick visit, last CPE, 8/14/19, last refill Potassium 3/19/20 #30 no refill, SEE historical note for Furosemide.  Pt did not do BMP yet

## 2020-04-23 NOTE — TELEPHONE ENCOUNTER
Pt would like refill of   Potassium Chloride ER 10 MEQ Oral Tab CR  furosemide 20 MG Oral Tab sent to [address] [place] [address], [phone] thank you

## 2020-05-27 DIAGNOSIS — R60.9 PERIPHERAL EDEMA: ICD-10-CM

## 2020-05-27 NOTE — TELEPHONE ENCOUNTER
Pt given Furosemide for swelling in legs, pt given 20 mg daily, had 40 mg for 5 days then pt was to do BMP in one week, pt did not do blood work, pt has 3 pills left and wants to wait for  on Fri,

## 2020-05-28 RX ORDER — FUROSEMIDE 20 MG/1
TABLET ORAL
Qty: 30 TABLET | Refills: 0 | Status: SHIPPED | OUTPATIENT
Start: 2020-05-28 | End: 2020-06-05

## 2020-05-28 NOTE — TELEPHONE ENCOUNTER
He has to do labs. He cannot continue to take lasix without knowing if his kidneys are failing. I sent the refill but please ask to do the lab asap.

## 2020-06-05 ENCOUNTER — VIRTUAL PHONE E/M (OUTPATIENT)
Dept: FAMILY MEDICINE CLINIC | Facility: CLINIC | Age: 55
End: 2020-06-05
Payer: COMMERCIAL

## 2020-06-05 DIAGNOSIS — I87.2 CHRONIC VENOUS STASIS DERMATITIS OF RIGHT LOWER EXTREMITY: Primary | ICD-10-CM

## 2020-06-05 PROCEDURE — 99213 OFFICE O/P EST LOW 20 MIN: CPT | Performed by: FAMILY MEDICINE

## 2020-06-05 RX ORDER — POTASSIUM CHLORIDE 750 MG/1
10 TABLET, FILM COATED, EXTENDED RELEASE ORAL 2 TIMES DAILY
Qty: 180 TABLET | Refills: 0 | Status: SHIPPED | OUTPATIENT
Start: 2020-06-05 | End: 2021-04-16

## 2020-06-05 RX ORDER — FUROSEMIDE 20 MG/1
20 TABLET ORAL DAILY
Qty: 90 TABLET | Refills: 0 | Status: SHIPPED | OUTPATIENT
Start: 2020-06-05 | End: 2020-08-27

## 2020-06-07 NOTE — PROGRESS NOTES
Virtual/Telephone Check-In    Hannah Arevalo verbally consents to a Virtual/Telephone Check-In service on 06/07/20. Patient understands and accepts financial responsibility for any deductible, co-insurance and/or co-pays associated with this service.  Lilia Baptiste sentences  No increased work of breathing      Diagnosis:  1. Chronic venous stasis dermatitis of right lower extremity  - furosemide 20 MG Oral Tab; Take 1 tablet (20 mg total) by mouth daily. Taking one daily now  Dispense: 90 tablet;  Refill: 0  - [place]

## 2020-06-09 ENCOUNTER — TELEPHONE (OUTPATIENT)
Dept: FAMILY MEDICINE CLINIC | Facility: CLINIC | Age: 55
End: 2020-06-09

## 2020-06-09 NOTE — TELEPHONE ENCOUNTER
Pt requested refill of   furosemide (LASIX) 20 MG Oral Tab (Discontinued) 60 tablet      To Be sent to:[id number] - [address], [phone]. Thank you.

## 2020-07-17 RX ORDER — POTASSIUM CHLORIDE 750 MG/1
TABLET, EXTENDED RELEASE ORAL
Qty: 180 TABLET | Refills: 0 | OUTPATIENT
Start: 2020-07-17

## 2020-08-17 ENCOUNTER — TELEPHONE (OUTPATIENT)
Dept: FAMILY MEDICINE CLINIC | Facility: CLINIC | Age: 55
End: 2020-08-17

## 2020-08-17 NOTE — TELEPHONE ENCOUNTER
Pt had virtual visit 6/5/20 and meds were lowered. Pt noticed more swelling 8/14. Saturday 8/15 he went back to normal dose. The swelling has gone down. furosemide 20 MG Oral Tab    Potassium Chloride ER 10 MEQ Oral Tab CR    Please advise.  Thank you

## 2020-08-17 NOTE — TELEPHONE ENCOUNTER
Call back from pt-advised of dr belcher's comments/recommendations. Pt voices understanding. Offered to schedule follow up appt. Pt sts he does not drive and will need to try and get off of work. sts he will call back to schedule appt.

## 2020-08-27 ENCOUNTER — TELEPHONE (OUTPATIENT)
Dept: FAMILY MEDICINE CLINIC | Facility: CLINIC | Age: 55
End: 2020-08-27

## 2020-08-27 ENCOUNTER — OFFICE VISIT (OUTPATIENT)
Dept: FAMILY MEDICINE CLINIC | Facility: CLINIC | Age: 55
End: 2020-08-27
Payer: COMMERCIAL

## 2020-08-27 VITALS
HEIGHT: 67 IN | SYSTOLIC BLOOD PRESSURE: 118 MMHG | DIASTOLIC BLOOD PRESSURE: 72 MMHG | WEIGHT: 267 LBS | BODY MASS INDEX: 41.91 KG/M2 | HEART RATE: 80 BPM | RESPIRATION RATE: 20 BRPM | TEMPERATURE: 99 F

## 2020-08-27 DIAGNOSIS — I87.2 CHRONIC VENOUS STASIS DERMATITIS OF RIGHT LOWER EXTREMITY: ICD-10-CM

## 2020-08-27 DIAGNOSIS — I87.2 CHRONIC VENOUS STASIS DERMATITIS OF RIGHT LOWER EXTREMITY: Primary | ICD-10-CM

## 2020-08-27 PROCEDURE — 99214 OFFICE O/P EST MOD 30 MIN: CPT | Performed by: FAMILY MEDICINE

## 2020-08-27 PROCEDURE — 3074F SYST BP LT 130 MM HG: CPT | Performed by: FAMILY MEDICINE

## 2020-08-27 PROCEDURE — 3078F DIAST BP <80 MM HG: CPT | Performed by: FAMILY MEDICINE

## 2020-08-27 PROCEDURE — 3008F BODY MASS INDEX DOCD: CPT | Performed by: FAMILY MEDICINE

## 2020-08-27 RX ORDER — FUROSEMIDE 20 MG/1
20 TABLET ORAL 2 TIMES DAILY
Qty: 180 TABLET | Refills: 0 | Status: SHIPPED | OUTPATIENT
Start: 2020-08-27 | End: 2020-08-27

## 2020-08-27 RX ORDER — FUROSEMIDE 20 MG/1
20 TABLET ORAL 2 TIMES DAILY
Qty: 180 TABLET | Refills: 0 | OUTPATIENT
Start: 2020-08-27 | End: 2020-09-04

## 2020-08-27 NOTE — PROGRESS NOTES
[address] [facility] Office Note  Chief Complaint:   Patient presents with:   Follow - Up  Cellulitis: right leg      HPI:   This is a 54year old male coming in for swelling of the right foot that has worsened over the last few dayst.  He d cough  GASTROINTESTINAL:  Denies any abdominal pain, nausea, vomiting  NEUROLOGICAL:  Denies headache, dizziness, syncope, numbness or tingling in the extremities. MUSCULOSKELETAL:  Denies muscle, back pain, joint pain or stiffness.     EXAM:   /78 questions, complications, allergies, or worsening or changing symptoms. Patient is to call with any side effects or complications from the treatments as a result of today.      Problem List:  Patient Active Problem List:     Stasis dermatitis of both legs

## 2020-08-27 NOTE — TELEPHONE ENCOUNTER
Call from [facility]/pharmacy intern/[employer]-sts furosemide 20 mg order received from dr belcher today has conflicting sig. Sig states \"Take 1 tablet (20 mg total) by mouth 2 (two) times daily.  Taking one daily now\"  Review of today's dr belcher OV note shows pt cam

## 2020-09-03 DIAGNOSIS — I87.2 CHRONIC VENOUS STASIS DERMATITIS OF RIGHT LOWER EXTREMITY: ICD-10-CM

## 2020-09-04 RX ORDER — FUROSEMIDE 20 MG/1
TABLET ORAL
Qty: 90 TABLET | Refills: 0 | Status: SHIPPED | OUTPATIENT
Start: 2020-09-04 | End: 2021-04-16

## 2020-09-14 NOTE — TELEPHONE ENCOUNTER
Pt states he does not drive and can not get lab done this weekend, he is a  at a grocery store and working all weekend. Detail Level: Zone Hide Cerave Products: No Action 1: Continue Discontinue Regimen: Minocycline Start Regimen: Ximino 90mg ER once daily-[facility] [phone] Other Instructions: Instructed mom to monitor for smaller bumps on forehead, or chest and back. They are to call for diflucan if she develops them Initiate Treatment: Ximino 90mg ER 1 capsule daily, with foor Discontinue Regimen: Minocycline bid Continue Regimen: Triamcinolone cream-Apply topically to legs twice daily for 2 weeks, then twice weekly for maintenance Initiate Treatment: Triamcinolone cream apply twice daily for 2 weeks

## 2021-02-23 DIAGNOSIS — I87.2 CHRONIC VENOUS STASIS DERMATITIS OF RIGHT LOWER EXTREMITY: ICD-10-CM

## 2021-03-17 RX ORDER — FUROSEMIDE 20 MG/1
TABLET ORAL
Qty: 90 TABLET | Refills: 0 | OUTPATIENT
Start: 2021-03-17

## 2021-03-17 RX ORDER — POTASSIUM CHLORIDE 750 MG/1
10 TABLET, FILM COATED, EXTENDED RELEASE ORAL 2 TIMES DAILY
Qty: 180 TABLET | Refills: 0 | OUTPATIENT
Start: 2021-03-17

## 2021-04-16 ENCOUNTER — OFFICE VISIT (OUTPATIENT)
Dept: FAMILY MEDICINE CLINIC | Facility: CLINIC | Age: 56
End: 2021-04-16
Payer: COMMERCIAL

## 2021-04-16 VITALS
WEIGHT: 267 LBS | BODY MASS INDEX: 41.91 KG/M2 | DIASTOLIC BLOOD PRESSURE: 80 MMHG | HEART RATE: 96 BPM | HEIGHT: 67 IN | SYSTOLIC BLOOD PRESSURE: 138 MMHG | RESPIRATION RATE: 16 BRPM

## 2021-04-16 DIAGNOSIS — I87.2 CHRONIC VENOUS STASIS DERMATITIS OF RIGHT LOWER EXTREMITY: ICD-10-CM

## 2021-04-16 DIAGNOSIS — Z12.5 SCREENING FOR PROSTATE CANCER: ICD-10-CM

## 2021-04-16 DIAGNOSIS — Z00.00 ROUTINE GENERAL MEDICAL EXAMINATION AT A HEALTH CARE FACILITY: Primary | ICD-10-CM

## 2021-04-16 PROCEDURE — 99396 PREV VISIT EST AGE 40-64: CPT | Performed by: FAMILY MEDICINE

## 2021-04-16 PROCEDURE — 3079F DIAST BP 80-89 MM HG: CPT | Performed by: FAMILY MEDICINE

## 2021-04-16 PROCEDURE — 3075F SYST BP GE 130 - 139MM HG: CPT | Performed by: FAMILY MEDICINE

## 2021-04-16 PROCEDURE — 3008F BODY MASS INDEX DOCD: CPT | Performed by: FAMILY MEDICINE

## 2021-04-16 RX ORDER — FUROSEMIDE 20 MG/1
20 TABLET ORAL DAILY
Qty: 90 TABLET | Refills: 0 | Status: SHIPPED | OUTPATIENT
Start: 2021-04-16 | End: 2021-06-02

## 2021-04-16 RX ORDER — POTASSIUM CHLORIDE 750 MG/1
10 TABLET, FILM COATED, EXTENDED RELEASE ORAL 2 TIMES DAILY
Qty: 180 TABLET | Refills: 0 | Status: SHIPPED | OUTPATIENT
Start: 2021-04-16

## 2021-04-16 NOTE — PROGRESS NOTES
Sony Ramos is a 64year old male who presents for a complete physical exam.   HPI:   Pt complains of nothing today. Denies any recent illnesses or hospitalizations. Colonoscopy is up to date. He has no family history of colon or prostate cancer. Smoking status: Never Smoker      Smokeless tobacco: Never Used    Vaping Use      Vaping Use: Never used    Alcohol use: Yes      Alcohol/week: 0.0 standard drinks      Comment: OCCAS BEER    Drug use: No     Occ: [employer]. : single.  Children: n dorsalis pedal pulses bilaterally    ASSESSMENT AND PLAN:   Hannah Arevalo is a 64year old male who presents for a complete physical exam.    Pt's weight is Body mass index is 41.82 kg/m². , recommended low fat diet and aerobic exercise 30 minutes three

## 2021-06-01 ENCOUNTER — TELEPHONE (OUTPATIENT)
Dept: FAMILY MEDICINE CLINIC | Facility: CLINIC | Age: 56
End: 2021-06-01

## 2021-06-01 DIAGNOSIS — I87.2 CHRONIC VENOUS STASIS DERMATITIS OF RIGHT LOWER EXTREMITY: ICD-10-CM

## 2021-06-01 NOTE — TELEPHONE ENCOUNTER
LM for pt to cb. Dr Aruna Phan did send 90 4.16, his insurance may be only giving 30 at a time. Has he reached out to pharmacy for the remaining pills as they should hold on file for him?

## 2021-06-01 NOTE — TELEPHONE ENCOUNTER
Pt requesting refill of:    furosemide 20 MG Oral Tab    Pt states his last refill, he was not given 90 days. States he did accidentally throw out his last 5 pills.      Would like this sent to:  [facility] [address] [place] [address],

## 2021-06-02 RX ORDER — FUROSEMIDE 20 MG/1
20 TABLET ORAL DAILY
Qty: 90 TABLET | Refills: 0 | Status: SHIPPED | OUTPATIENT
Start: 2021-06-02 | End: 2021-09-01

## 2021-06-02 NOTE — TELEPHONE ENCOUNTER
Pt called back. Voiced frustration in process of obtaining refills from this pharmacy. sts he has issue every time. Reports he is out pills. He does admit to accidentally tossing his last 5 pills. I advised sometimes insurance gives 30 when  sends [address]  H

## 2021-08-28 DIAGNOSIS — I87.2 CHRONIC VENOUS STASIS DERMATITIS OF RIGHT LOWER EXTREMITY: ICD-10-CM

## 2021-08-31 NOTE — TELEPHONE ENCOUNTER
PT STATES HE CAN NOT COME IN RIGHT NOW HE HAS BEEN WORKING FOR 12 DAYS STRAIGHT AND DOES NOT KNOW WHEN HE CAN COME IN. HE ALSO DOES NOT DRIVE SO ITS HARD FOR HIM TO COME.  HE NEEDS MEDS BECAUSE HIS FEET SWELL UP THANK YOU

## 2021-09-01 RX ORDER — FUROSEMIDE 20 MG/1
20 TABLET ORAL DAILY
Qty: 90 TABLET | Refills: 0 | Status: SHIPPED | OUTPATIENT
Start: 2021-09-01 | End: 2021-11-26

## 2021-10-05 NOTE — ED INITIAL ASSESSMENT (HPI)
Patient to ED for BLE edema and right leg weeping since yesterday. Denies fevers. States has had cellulitis and edema in the past, but usually walke 1-1.5 miles to work daily.
[Negative] : Heme/Lymph

## 2021-11-26 DIAGNOSIS — I87.2 CHRONIC VENOUS STASIS DERMATITIS OF RIGHT LOWER EXTREMITY: ICD-10-CM

## 2021-11-26 RX ORDER — FUROSEMIDE 20 MG/1
TABLET ORAL
Qty: 30 TABLET | Refills: 0 | Status: SHIPPED | OUTPATIENT
Start: 2021-11-26

## 2022-01-13 ENCOUNTER — TELEMEDICINE (OUTPATIENT)
Dept: FAMILY MEDICINE CLINIC | Facility: CLINIC | Age: 57
End: 2022-01-13
Payer: COMMERCIAL

## 2022-01-13 DIAGNOSIS — J02.9 SORE THROAT: ICD-10-CM

## 2022-01-13 DIAGNOSIS — R09.89 RUNNY NOSE: Primary | ICD-10-CM

## 2022-01-13 PROCEDURE — 99213 OFFICE O/P EST LOW 20 MIN: CPT | Performed by: FAMILY MEDICINE

## 2022-01-13 NOTE — PROGRESS NOTES
Visit for Respiratory Illness - Potential COVID-19 Infection  Subjective    This visit is conducted using Telemedicine with live, interactive audio.     Chief Complaint:  Concern for respiratory illness (including COVID-19 and influenza)    HPI:   Rolando

## 2022-09-28 NOTE — LETTER
Date: 2/22/2024  Patient name: Luis M Estrada  YOB: 1965  Medical Record Number: [id number]  Primary Coverage: Payor: BCBS OUT OF STATE / Plan: BCBS OUT OF STATE PPO / Product Type: *No Product type* /   Secondary Coverage:   Insurance ID: [id number]  Patient Address: [address] [place] [address] [address] [facility] [address]  Telephone Information:   Home Phone [phone]   Mobile [phone]       Encounter Date: 2/22/2024  Provider: Nicholas Gould MD  Diagnosis:     ICD-10-CM   1. Open wound of left lower leg, subsequent encounter  S81.802D   2. Chronic venous insufficiency  I87.2   3. Leg swelling  M79.89   4. Class 3 severe obesity due to excess calories with serious comorbidity and body mass index (BMI) of 45.0 to 49.9 in adult ([facility])  E66.01    Z68.42         Wound 01/11/24 #1 Left lateral lower leg Leg Left;Lateral (Active)   Date First Assessed/Time First Assessed: 01/11/24 1406    Wound Number (Wound Clinic Only): #1 Left lateral lower leg  Primary Wound Type: Traumatic  Location: Leg  Wound Location Orientation: Left;Lateral      Assessments 1/11/2024  2:10 PM 2/22/2024  4:12 PM   Wound Image       Drainage Amount Large --   Drainage Description Yellow;Serous --   Treatments Compression --   Wound Length (cm) 10.6 cm --   Wound Width (cm) 9.5 cm --   Wound Surface Area (cm^2) 100.7 cm^2 --   Wound Depth (cm) 0.2 cm --   Wound Volume (cm^3) 20.14 cm^3 --   Margins Well-defined edges --   Non-staged Wound Description Full thickness --   Leslie-wound Assessment Edema;Hemosiderin staining --   Wound Granulation Tissue Firm;Pink --   Wound Bed Granulation (%) 40 % --   Wound Bed Slough (%) 60 % --   Wound Odor None --   Tunneling? No --   Undermining? No --   Sinus Tracts? No --       Inactive Orders   Date Order Priority Status Authorizing Provider   02/15/24 1154 Debridement Traumatic Left;Lateral Leg Routine Completed Nicholas Gould MD   01/18/24 1511 Debridement Traumatic Left;Lateral Leg Routine  Completed Nicholas Gould MD   01/11/24 1710 Debridement Traumatic Left;Lateral Leg Routine Completed Nicholas Gould MD       Compression Wrap 01/11/24 Leg Anterior;Left (Active)   Placement Date/Time: 01/11/24 1523   Location: Leg  Wound Location Orientation: Anterior;Left      Assessments 1/11/2024  2:11 PM 2/22/2024  3:48 PM   Response to Treatment Well tolerated Well tolerated   Compression Layers Multilayer Multilayer   Compression Product Type Coflex Coflex   Dressing Applied Yes Yes   Compression Wrap Location Toes to Knee Toes to Knee   Compression Wrap Status Clean;Dry;Intact Clean;Dry;Intact       No associated orders.             Wound Number: All wounds    Wound Cleaning and Dressings:  Showering directions: May shower with protection  Wound cleansing:  Cleanse with normal saline or wound cleanser  Wound cleaning frequency:  Monday and Thursday   Wound product: Other Cleanse wound with normal saline, apply zinc to periwound, honey alginate to wound bed (x3), covered with hydrofera transfer (x1), kerramax (x2), baby diaper, kerlix, and tape. Applied coflex 2 layer wrap to LLE & spandagrip (F) from toes to below the knee  Dressing change frequency:  Change dressing 2x per week  Enzymatic agent:  Honey    Anesthetic:  Anesthetic: 4% topical lidocaine     Compression Therapy:   Spandagrip and Coflex 2 layers    Miscellaneous/Additional Orders:  Miscellaneous orders: Home health care nurse for wound care    Care Summary:  Care Summary: Discussed Plan of Care at beside with patient. Patient verbally acknowledges understanding of all instructions and all questions were answered.        Follow Up:  Return in about 1 week (around 2/29/2024).      Additional Notes:  Home Health RN to assess patient once weekly for dressing changes     Patient will be seen in the clinic on Thursdays for provider visits    Patient is homebound and has issues making provider appointment visits as well as a lot of drainage from  wound bed and would benefit from RN seeing him twice weekly to control drainage    Patient does not drive and is unable to complete his own dressing changes at this time    Home Health RN to provided patient with extra dressing supplies if needed.    Additional home health DME: No           bilateral upper extremity Active ROM was WFL (within functional limits)/bilateral  lower extremity Active ROM was WFL (within functional limits)

## 2024-01-02 ENCOUNTER — HOSPITAL ENCOUNTER (INPATIENT)
Facility: HOSPITAL | Age: 59
LOS: 2 days | Discharge: HOME | End: 2024-01-05
Attending: EMERGENCY MEDICINE | Admitting: STUDENT IN AN ORGANIZED HEALTH CARE EDUCATION/TRAINING PROGRAM
Payer: COMMERCIAL

## 2024-01-02 DIAGNOSIS — T14.8XXA WOUND INFECTION: ICD-10-CM

## 2024-01-02 DIAGNOSIS — L03.116 LEFT LEG CELLULITIS: Primary | ICD-10-CM

## 2024-01-02 DIAGNOSIS — L08.9 WOUND INFECTION: ICD-10-CM

## 2024-01-02 LAB
ALBUMIN SERPL-MCNC: 3 G/DL (ref 3.4–5)
ALBUMIN/GLOB SERPL: 0.6 {RATIO} (ref 1–2)
ALP LIVER SERPL-CCNC: 85 U/L
ALT SERPL-CCNC: 22 U/L
ANION GAP SERPL CALC-SCNC: 6 MMOL/L (ref 0–18)
AST SERPL-CCNC: 46 U/L (ref 15–37)
BASOPHILS # BLD AUTO: 0.09 X10(3) UL (ref 0–0.2)
BASOPHILS NFR BLD AUTO: 0.4 %
BILIRUB SERPL-MCNC: 1.6 MG/DL (ref 0.1–2)
BUN BLD-MCNC: 18 MG/DL (ref 9–23)
CALCIUM BLD-MCNC: 9.2 MG/DL (ref 8.5–10.1)
CHLORIDE SERPL-SCNC: 104 MMOL/L (ref 98–112)
CO2 SERPL-SCNC: 26 MMOL/L (ref 21–32)
CREAT BLD-MCNC: 1.07 MG/DL
EGFRCR SERPLBLD CKD-EPI 2021: 80 ML/MIN/1.73M2 (ref 60–?)
EOSINOPHIL # BLD AUTO: 0 X10(3) UL (ref 0–0.7)
EOSINOPHIL NFR BLD AUTO: 0 %
ERYTHROCYTE [DISTWIDTH] IN BLOOD BY AUTOMATED COUNT: 14 %
FLUAV + FLUBV RNA SPEC NAA+PROBE: NEGATIVE
FLUAV + FLUBV RNA SPEC NAA+PROBE: NEGATIVE
GLOBULIN PLAS-MCNC: 4.9 G/DL (ref 2.8–4.4)
GLUCOSE BLD-MCNC: 92 MG/DL (ref 70–99)
HCT VFR BLD AUTO: 41.3 %
HGB BLD-MCNC: 14.1 G/DL
IMM GRANULOCYTES # BLD AUTO: 0.08 X10(3) UL (ref 0–1)
IMM GRANULOCYTES NFR BLD: 0.4 %
LACTATE SERPL-SCNC: 1.7 MMOL/L (ref 0.4–2)
LYMPHOCYTES # BLD AUTO: 0.61 X10(3) UL (ref 1–4)
LYMPHOCYTES NFR BLD AUTO: 2.8 %
MCH RBC QN AUTO: 31.1 PG (ref 26–34)
MCHC RBC AUTO-ENTMCNC: 34.1 G/DL (ref 31–37)
MCV RBC AUTO: 91 FL
MONOCYTES # BLD AUTO: 0.49 X10(3) UL (ref 0.1–1)
MONOCYTES NFR BLD AUTO: 2.2 %
NEUTROPHILS # BLD AUTO: 20.87 X10 (3) UL (ref 1.5–7.7)
NEUTROPHILS # BLD AUTO: 20.87 X10(3) UL (ref 1.5–7.7)
NEUTROPHILS NFR BLD AUTO: 94.2 %
OSMOLALITY SERPL CALC.SUM OF ELEC: 284 MOSM/KG (ref 275–295)
PLATELET # BLD AUTO: 379 10(3)UL (ref 150–450)
POTASSIUM SERPL-SCNC: 3.7 MMOL/L (ref 3.5–5.1)
PROCALCITONIN SERPL-MCNC: 32.59 NG/ML (ref ?–0.16)
PROT SERPL-MCNC: 7.9 G/DL (ref 6.4–8.2)
RBC # BLD AUTO: 4.54 X10(6)UL
RSV RNA SPEC NAA+PROBE: NEGATIVE
SARS-COV-2 RNA RESP QL NAA+PROBE: DETECTED
SODIUM SERPL-SCNC: 136 MMOL/L (ref 136–145)
WBC # BLD AUTO: 22.1 X10(3) UL (ref 4–11)

## 2024-01-02 PROCEDURE — 99222 1ST HOSP IP/OBS MODERATE 55: CPT | Performed by: STUDENT IN AN ORGANIZED HEALTH CARE EDUCATION/TRAINING PROGRAM

## 2024-01-02 RX ORDER — BENZONATATE 200 MG/1
200 CAPSULE ORAL 3 TIMES DAILY PRN
Status: DISCONTINUED | OUTPATIENT
Start: 2024-01-02 | End: 2024-01-05

## 2024-01-02 RX ORDER — MELATONIN
3 NIGHTLY PRN
Status: DISCONTINUED | OUTPATIENT
Start: 2024-01-02 | End: 2024-01-05

## 2024-01-02 RX ORDER — ECHINACEA PURPUREA EXTRACT 125 MG
1 TABLET ORAL
Status: DISCONTINUED | OUTPATIENT
Start: 2024-01-02 | End: 2024-01-05

## 2024-01-02 RX ORDER — CLINDAMYCIN PHOSPHATE 600 MG/50ML
600 INJECTION, SOLUTION INTRAVENOUS EVERY 8 HOURS
Status: DISCONTINUED | OUTPATIENT
Start: 2024-01-02 | End: 2024-01-03

## 2024-01-02 RX ORDER — ONDANSETRON 2 MG/ML
4 INJECTION INTRAMUSCULAR; INTRAVENOUS EVERY 6 HOURS PRN
Status: DISCONTINUED | OUTPATIENT
Start: 2024-01-02 | End: 2024-01-05

## 2024-01-02 RX ORDER — ENEMA 19; 7 G/133ML; G/133ML
1 ENEMA RECTAL ONCE AS NEEDED
Status: DISCONTINUED | OUTPATIENT
Start: 2024-01-02 | End: 2024-01-05

## 2024-01-02 RX ORDER — ACETAMINOPHEN 500 MG
500 TABLET ORAL EVERY 4 HOURS PRN
Status: DISCONTINUED | OUTPATIENT
Start: 2024-01-02 | End: 2024-01-05

## 2024-01-02 RX ORDER — PROCHLORPERAZINE EDISYLATE 5 MG/ML
5 INJECTION INTRAMUSCULAR; INTRAVENOUS EVERY 8 HOURS PRN
Status: DISCONTINUED | OUTPATIENT
Start: 2024-01-02 | End: 2024-01-05

## 2024-01-02 RX ORDER — SENNOSIDES 8.6 MG
17.2 TABLET ORAL NIGHTLY PRN
Status: DISCONTINUED | OUTPATIENT
Start: 2024-01-02 | End: 2024-01-05

## 2024-01-02 RX ORDER — POLYETHYLENE GLYCOL 3350 17 G/17G
17 POWDER, FOR SOLUTION ORAL DAILY PRN
Status: DISCONTINUED | OUTPATIENT
Start: 2024-01-02 | End: 2024-01-05

## 2024-01-02 RX ORDER — BISACODYL 10 MG
10 SUPPOSITORY, RECTAL RECTAL
Status: DISCONTINUED | OUTPATIENT
Start: 2024-01-02 | End: 2024-01-05

## 2024-01-02 RX ORDER — ENOXAPARIN SODIUM 100 MG/ML
0.5 INJECTION SUBCUTANEOUS DAILY
Status: DISCONTINUED | OUTPATIENT
Start: 2024-01-02 | End: 2024-01-05

## 2024-01-02 NOTE — ED PROVIDER NOTES
Patient Seen in: [facility] Emergency Department      History   No chief complaint on file.    Stated Complaint: leg lac    Subjective:   HPI    58-year-old male comes to the hospital complaint of having difficulty with a large wound to his left lateral leg.  He states 3 days ago he was struck by a shopping cart at the store creating a large laceration.  He says that he does not feel it is looking very good.  Does have history of having some chronic cellulitis of his lower extremities and is having some increased discomfort to the area.  Denies any fevers or chills.  Is denying any other complaints.    Objective:   Past Medical History:   Diagnosis Date    Hydrocephalus (HCC)     dx'd as an adult-no shunt              History reviewed. No pertinent surgical history.             Social History     Socioeconomic History    Marital status: Single   Tobacco Use    Smoking status: Never    Smokeless tobacco: Never   Vaping Use    Vaping Use: Never used   Substance and Sexual Activity    Alcohol use: Yes     Alcohol/week: 0.0 standard drinks of alcohol     Comment: OCCAS BEER    Drug use: No   Other Topics Concern    Caffeine Concern No     Comment: 2 COFFEE Q AM    Exercise Yes     Comment: WALKING OCCAS   Social History Narrative    ** Merged History Encounter **                   Review of Systems    Positive for stated complaint: leg lac  Other systems are as noted in HPI.  Constitutional and vital signs reviewed.      All other systems reviewed and negative except as noted above.    Physical Exam     ED Triage Vitals [01/02/24 0935]   /77   Pulse 87   Resp 18   Temp 98.1 °F (36.7 °C)   Temp src    SpO2 98 %   O2 Device None (Room air)       Current:/77   Pulse 87   Temp 98.1 °F (36.7 °C)   Resp 18   Ht 170.2 cm (5' 7\")   Wt 131.5 kg   SpO2 98%   BMI 45.42 kg/m²         Physical Exam    HEENT; NCAT, EOMI, throat clear, neck supple, no LAD, no JVD  Heart S1S2 RRR  lungs: CTAB  abd: Soft NT,  ND,  NABS without rebound or guarding  Ext bilateral lower extremity edema with large circular subcentimeter wound noted with surrounding erythema and some discharge noted    ED Course     Labs Reviewed   COMP METABOLIC PANEL (14) - Abnormal; Notable for the following components:       Result Value    AST 46 (*)     Albumin 3.0 (*)     Globulin  4.9 (*)     A/G Ratio 0.6 (*)     All other components within normal limits   CBC W/ DIFFERENTIAL - Abnormal; Notable for the following components:    WBC 22.1 (*)     Neutrophil Absolute Prelim 20.87 (*)     All other components within normal limits   LACTIC ACID, PLASMA - Normal   CBC WITH DIFFERENTIAL WITH PLATELET    Narrative:     The following orders were created for panel order CBC With Differential With Platelet.  Procedure                               Abnormality         Status                     ---------                               -----------         ------                     CBC W/ DIFFERENTIAL[[id number]]          Abnormal            Preliminary result           Please view results for these tests on the individual orders.   PROCALCITONIN   SCAN SLIDE   BLOOD CULTURE   BLOOD CULTURE          ED Course as of 01/02/24 1126  ------------------------------------------------------------  Time: 01/02 1116  Comment: While here the patient's WBC was 22.1 thousand.  Electrolytes were normal.  His glucose was 92.  His lactic acid level was negative.  Blood cultures x 2 were taken and Rocephin was given.  At this time the patient be admitted for further care for his wound infection was associated cellulitis     Medications   cefTRIAXone (Rocephin) 2 g in D5W 100 mL IVPB-ADD (has no administration in time range)              MDM      Differential diagnosis did include sepsis but not limited to such.  I do believe the patient is septic this time but patient does have a wound infection with surrounding cellulitis with a chronically edematous leg has had chronic  cellulitis in the past.  This time the patient be admitted for IV antibiotics and further management.  The hospitalist been notified.    This note was prepared using Dragon Medical voice recognition dictation software.  As a result errors may occur.  When identified to these areas have been corrected.  While every attempt is made to correct errors during dictation discrepancies may still exist.  Please contact if there are any errors.  Admission disposition: 1/2/2024 11:24 AM                                        Medical Decision Making      Disposition and Plan     Clinical Impression:  1. Left leg cellulitis    2. Wound infection         Disposition:  Admit  1/2/2024 11:24 am    Follow-up:  No follow-up provider specified.        Medications Prescribed:  Current Discharge Medication List                            Hospital Problems       Present on Admission  Date Reviewed: 1/13/2022            ICD-10-CM Noted POA    * (Principal) Left leg cellulitis L03.116 1/2/2024 Unknown

## 2024-01-02 NOTE — PLAN OF CARE
NURSING ADMISSION NOTE    Assumed care at 1330  Alert and oriented x4  RA. No tele. VSS  Admission navigator complete with pt  IV abx  Up 1 assist  Ble legs and feet red/swollen, with weeping open wound on left leg. Right leg redness worse. Marked in purple pen by MD and image uploaded to media  Pt updated on POC  Safety precautions in place    Patient admitted via Cart  Oriented to room.  Safety precautions initiated.  Bed in low position.  Call light in reach.

## 2024-01-02 NOTE — PROGRESS NOTES
[facility] Note:  Anticoagulation Weight Dose Adjustment for enoxaparin    Luis M Estrada is a 58 year old patient who has been prescribed enoxaparin 40 mg every 24 hours.      Estimated Creatinine Clearance: 70.4 mL/min (based on SCr of 1.07 mg/dL). Body mass index is 45.42 kg/m². Wt Readings from Last 1 Encounters:   01/02/24 131.5 kg (290 lb)        Per P[employer] approved dose adjustment protocol for weight and renal function, the dose will be adjusted to enoxaparin 70 mg every 24 hours.    Thank you,    Lillie Georges, PharmD  1/2/2024  1:34 PM

## 2024-01-02 NOTE — PROGRESS NOTES
Pharmacy Note:   Antimicrobial Dose Adjustment for: Ceftriaxone     Luis M Estrada has been prescribed Ceftriaxone 1 g IV x 1 dose for the ER.       CrCl cannot be calculated (Patient's most recent lab result is older than the maximum 7 days allowed.). Body mass index is 45.42 kg/m². Wt Readings from Last 1 Encounters:   01/02/24 131.5 kg (290 lb)        Per P[employer]T approved dose adjustment protocol for weight and renal function, the dose will be adjusted to Ceftriaxone 2 g IV x 1 dose for the ER.    Thank you,    Madelyn Booth, PharmD  1/2/2024  10:05 AM

## 2024-01-02 NOTE — H&P
[facility]     Luis M Estrada Patient Status:  Observation    3/25/1965 MRN [id number]   Location [facility] 0SW-A Attending Rica Pearson MD   Hosp Day # 0 PCP SILVER GARCIA,      Chief Complaint: Leg wound     Subjective:    History of Present Illness:     Luis M Estrada is a 58 year old male whop presents to the hospital after getting injured at grocery store- pt notes a cart hit his left leg few days ago and since then an ulcer has formed . Pt notes minimal pain. He denies fever, chills, malaise. Upon being examined both legs are erythematous and it is actually his RLE that is warm, swollen with erythema extending posteriorly and above the knee.     History/Other:    Past Medical History:  Past Medical History:   Diagnosis Date    Hydrocephalus (HCC)     dx'd as an adult-no shunt     Past Surgical History:   History reviewed. No pertinent surgical history.   Family History:   No family history on file.  Social History:    reports that he has never smoked. He has never used smokeless tobacco. He reports current alcohol use. He reports that he does not use drugs.     Allergies: No Known Allergies    Medications:    No current facility-administered medications on file prior to encounter.     Current Outpatient Medications on File Prior to Encounter   Medication Sig Dispense Refill    aspirin 81 MG Oral Tab Take 1 tablet (81 mg total) by mouth daily.         Review of Systems:   A comprehensive review of systems was completed.    Pertinent positives and negatives noted in the HPI.    Objective:   Physical Exam:    /86 (BP Location: Left arm)   Pulse 101   Temp 98.4 °F (36.9 °C) (Oral)   Resp 17   Ht 5' 7\" (1.702 m)   Wt 290 lb (131.5 kg)   SpO2 98%   BMI 45.42 kg/m²   General: No acute distress, Alert  Respiratory: No rhonchi, no wheezes  Cardiovascular: S1, S2. Regular rate and rhythm  Abdomen: Soft, Non-tender, non-distended, positive bowel  sounds  Neuro: No new focal deficits  Extremities: RLe erythema ,swelling, erythema posterior and extends to above knee , LLE erythema large LLE lateral wound with pus.     Results:    Labs:      Labs Last 24 Hours:    Recent Labs   Lab 01/02/24  1012   RBC 4.54   HGB 14.1   HCT 41.3   MCV 91.0   MCH 31.1   MCHC 34.1   RDW 14.0   NEPRELIM 20.87*   WBC 22.1*   .0       Recent Labs   Lab 01/02/24  1012   GLU 92   BUN 18   CREATSERUM 1.07   EGFRCR 80   CA 9.2   ALB 3.0*      K 3.7      CO2 26.0   ALKPHO 85   AST 46*   ALT 22   BILT 1.6   TP 7.9       No results found for: \"PT\", \"INR\"    No results for input(s): \"TROP\", \"TROPHS\", \"CK\" in the last 168 hours.    No results for input(s): \"TROP\", \"PBNP\" in the last 168 hours.    Recent Labs   Lab 01/02/24  1012   PCT 32.59*       Imaging: Imaging data reviewed in Epic.    Assessment & Plan:      #LLE cellulitis   IV clinda  ID eval  Pain control  Wound care  Doppler LE  # h/o hydrocephalus       Plan of care discussed with pt, RN     Rica Pearson MD    Supplementary Documentation:     The 21st Century Cures Act makes medical notes like these available to patients in the interest of transparency. Please be advised this is a medical document. Medical documents are intended to carry relevant information, facts as evident, and the clinical opinion of the practitioner. The medical note is intended as peer to peer communication and may appear blunt or direct. It is written in medical language and may contain abbreviations or verbiage that are unfamiliar.

## 2024-01-02 NOTE — ED INITIAL ASSESSMENT (HPI)
Patient states he was struck by a shopping cart 3-4 days ago while at work for [employer] in [place].Patient does not feel like wound is healing well.

## 2024-01-03 ENCOUNTER — APPOINTMENT (OUTPATIENT)
Dept: ULTRASOUND IMAGING | Facility: HOSPITAL | Age: 59
End: 2024-01-03
Attending: STUDENT IN AN ORGANIZED HEALTH CARE EDUCATION/TRAINING PROGRAM
Payer: COMMERCIAL

## 2024-01-03 ENCOUNTER — APPOINTMENT (OUTPATIENT)
Dept: GENERAL RADIOLOGY | Facility: HOSPITAL | Age: 59
End: 2024-01-03
Attending: INTERNAL MEDICINE
Payer: COMMERCIAL

## 2024-01-03 LAB
ANION GAP SERPL CALC-SCNC: 7 MMOL/L (ref 0–18)
BASOPHILS # BLD AUTO: 0.05 X10(3) UL (ref 0–0.2)
BASOPHILS NFR BLD AUTO: 0.4 %
BUN BLD-MCNC: 18 MG/DL (ref 9–23)
CALCIUM BLD-MCNC: 8.5 MG/DL (ref 8.5–10.1)
CHLORIDE SERPL-SCNC: 107 MMOL/L (ref 98–112)
CO2 SERPL-SCNC: 24 MMOL/L (ref 21–32)
CREAT BLD-MCNC: 0.88 MG/DL
EGFRCR SERPLBLD CKD-EPI 2021: 100 ML/MIN/1.73M2 (ref 60–?)
EOSINOPHIL # BLD AUTO: 0.04 X10(3) UL (ref 0–0.7)
EOSINOPHIL NFR BLD AUTO: 0.3 %
ERYTHROCYTE [DISTWIDTH] IN BLOOD BY AUTOMATED COUNT: 14.4 %
GLUCOSE BLD-MCNC: 108 MG/DL (ref 70–99)
HCT VFR BLD AUTO: 33.3 %
HGB BLD-MCNC: 11.2 G/DL
IMM GRANULOCYTES # BLD AUTO: 0.04 X10(3) UL (ref 0–1)
IMM GRANULOCYTES NFR BLD: 0.3 %
LYMPHOCYTES # BLD AUTO: 1.29 X10(3) UL (ref 1–4)
LYMPHOCYTES NFR BLD AUTO: 10.8 %
MCH RBC QN AUTO: 30.5 PG (ref 26–34)
MCHC RBC AUTO-ENTMCNC: 33.6 G/DL (ref 31–37)
MCV RBC AUTO: 90.7 FL
MONOCYTES # BLD AUTO: 0.65 X10(3) UL (ref 0.1–1)
MONOCYTES NFR BLD AUTO: 5.5 %
NEUTROPHILS # BLD AUTO: 9.85 X10 (3) UL (ref 1.5–7.7)
NEUTROPHILS # BLD AUTO: 9.85 X10(3) UL (ref 1.5–7.7)
NEUTROPHILS NFR BLD AUTO: 82.7 %
OSMOLALITY SERPL CALC.SUM OF ELEC: 288 MOSM/KG (ref 275–295)
PLATELET # BLD AUTO: 296 10(3)UL (ref 150–450)
POTASSIUM SERPL-SCNC: 3.5 MMOL/L (ref 3.5–5.1)
RBC # BLD AUTO: 3.67 X10(6)UL
SODIUM SERPL-SCNC: 138 MMOL/L (ref 136–145)
WBC # BLD AUTO: 11.9 X10(3) UL (ref 4–11)

## 2024-01-03 PROCEDURE — 99232 SBSQ HOSP IP/OBS MODERATE 35: CPT | Performed by: STUDENT IN AN ORGANIZED HEALTH CARE EDUCATION/TRAINING PROGRAM

## 2024-01-03 PROCEDURE — 71045 X-RAY EXAM CHEST 1 VIEW: CPT | Performed by: INTERNAL MEDICINE

## 2024-01-03 PROCEDURE — 93970 EXTREMITY STUDY: CPT | Performed by: STUDENT IN AN ORGANIZED HEALTH CARE EDUCATION/TRAINING PROGRAM

## 2024-01-03 RX ORDER — CEFAZOLIN SODIUM/WATER 2 G/20 ML
2 SYRINGE (ML) INTRAVENOUS EVERY 12 HOURS
Status: DISCONTINUED | OUTPATIENT
Start: 2024-01-03 | End: 2024-01-03 | Stop reason: DRUGHIGH

## 2024-01-03 RX ORDER — CEFAZOLIN SODIUM/WATER 2 G/20 ML
2 SYRINGE (ML) INTRAVENOUS EVERY 8 HOURS
Status: DISCONTINUED | OUTPATIENT
Start: 2024-01-03 | End: 2024-01-05
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
## 2024-01-03 NOTE — PROGRESS NOTES
[facility]     Hospitalist Progress Note     Luis M Estrada Patient Status:  Observation    3/25/1965 MRN [id number]   Location [facility] 0SW-A Attending Rica Pearson MD   Hosp Day # 0 PCP SILVER GARCIA DO     Chief Complaint: Cellulitis     Subjective:     Patient  coughign less today.     Objective:    Review of Systems:   A comprehensive review of systems was completed; pertinent positive and negatives stated in subjective.    Vital signs:  Temp:  [97.8 °F (36.6 °C)-99.3 °F (37.4 °C)] 97.8 °F (36.6 °C)  Pulse:  [] 91  Resp:  [16-18] 18  BP: (105-143)/(55-86) 116/56  SpO2:  [95 %-100 %] 95 %    Physical Exam:    General: No acute distress  Respiratory: no wheezes, no rhonchi  Cardiovascular: S1, S2, regular rate and rhythm  Abdomen: Soft, Non-tender, non-distended, positive bowel sounds  Neuro: No new focal deficits.   Extremities: Bilateral LE erythema, swelling, RLE is warm above knee with lacy erythema, LLE wound     Diagnostic Data:    Labs:  Recent Labs   Lab 24  1012 24  0607   WBC 22.1* 11.9*   HGB 14.1 11.2*   MCV 91.0 90.7   .0 296.0       Recent Labs   Lab 24  1012 24  0612   GLU 92 108*   BUN 18 18   CREATSERUM 1.07 0.88   CA 9.2 8.5   ALB 3.0*  --     138   K 3.7 3.5    107   CO2 26.0 24.0   ALKPHO 85  --    AST 46*  --    ALT 22  --    BILT 1.6  --    TP 7.9  --        Estimated Creatinine Clearance: 85.5 mL/min (based on SCr of 0.88 mg/dL).    No results for input(s): \"TROP\", \"TROPHS\", \"CK\" in the last 168 hours.    No results for input(s): \"PTP\", \"INR\" in the last 168 hours.               Microbiology    No results found for this visit on 24.      Imaging: Reviewed in Epic.    Medications:    ceFAZolin  2 g Intravenous Q8H    enoxaparin  0.5 mg/kg Subcutaneous Daily       Assessment & Plan:      #LLE cellulitis s/p trauma with shopping cart  IV clinda--> IV ancef per ID  Pain control  Wound care  Doppler LE  # h/o  hydrocephalus   #COVID +  Supportive  care       Rica Pearson MD    Supplementary Documentation:     Quality:  DVT Mechanical Prophylaxis:   SCDs,    DVT Pharmacologic Prophylaxis   Medication    enoxaparin (Lovenox) 80 MG/0.8ML SUBQ injection 70 mg                Code Status: Full Code  Cooper: No urinary catheter in place  Cooper Duration (in days):   Central line:    YAA:     Discharge is dependent on: clinical   At this point Mr. Estrada is expected to be discharge to: home     The 21st Century Cures Act makes medical notes like these available to patients in the interest of transparency. Please be advised this is a medical document. Medical documents are intended to carry relevant information, facts as evident, and the clinical opinion of the practitioner. The medical note is intended as peer to peer communication and may appear blunt or direct. It is written in medical language and may contain abbreviations or verbiage that are unfamiliar.

## 2024-01-03 NOTE — CONSULTS
[facility]  Inpatient Wound Care Contact Note    Luis M Estrada Patient Status:  Inpatient    3/25/1965 MRN [id number]   Location [facility] 0SW-A Attending Rica Pearson MD   Hosp Day # 0 PCP SILVER GARCIA, DO     Attempted to see patient for follow up wound care assessment/session.  Patient is currently unavailable secondary to gone for ultrasound. RN aware, will see in am.     Thank you,    Sabina Kirkpatrick, PT, MPT  [facility]  [address] [address]  [phone]

## 2024-01-03 NOTE — CONSULTS
[facility] Pharmacy Note:  Renal Adjustment for cefazolin (ANCEF)    Luis M Estrada is a 58 year old patient who has been prescribed cefazolin (ANCEF) 2 g every 12 hrs.  The estimated creatinine clearance is 85.5 mL/min (based on SCr of 0.88 mg/dL). The dose has been adjusted to cefazolin (ANCEF) 2 g every 8 hrs per hospital renal dose adjustment protocol for treatment of cellulitis.  Pharmacy will follow and adjust dose as warranted for additional renal function changes.    Thank you,    Molly Dolan, PharmD  1/3/2024  8:01 AM

## 2024-01-04 LAB — PLATELET # BLD AUTO: 293 10(3)UL (ref 150–450)

## 2024-01-04 PROCEDURE — 99232 SBSQ HOSP IP/OBS MODERATE 35: CPT | Performed by: STUDENT IN AN ORGANIZED HEALTH CARE EDUCATION/TRAINING PROGRAM

## 2024-01-04 RX ORDER — CEPHALEXIN 500 MG/1
500 CAPSULE ORAL 3 TIMES DAILY
Qty: 30 CAPSULE | Refills: 0 | Status: SHIPPED | OUTPATIENT
Start: 2024-01-04 | End: 2024-01-14

## 2024-01-04 NOTE — PLAN OF CARE
Received pt at 1930. Pt is A&O x 4; follows commands, in isolation d/t COVID. Pt is on RA, VSS, reg diet. Pt is continent of bowel and bladder. Pt's pain is confined to complaints throat pain from coughing; PRN medication brought to pt. Pt ambulates w/ 1 assist & walker.New IV access was required after pt dislodged catheter during repositioning from rt AC. 22# currently in rt hand and now infusing IVF & ABTS. Shift assessment performed, HS meds given. NOC safety plan in place; bed in low position, call light and personal items within reach, pt encouraged to call staff for assistance.    POC:  IVF & ABTs  ID to see  Wound care to see

## 2024-01-04 NOTE — CONSULTS
[facility]  Report of Inpatient Wound Care Consultation    Luis M Estrada Patient Status:  Inpatient    3/25/1965 MRN [id number]   Location [facility] 0SW-A Attending Rica Pearson MD   Hosp Day # 1 PCP SILVER GARCIA DO     Reason for Consultation:  L LE chronic wound    History of Present Illness:  Luis M Estrada is a a(n) 58 year old male.  Patient with multiple comorbidities, with present on admission skin breakdown described below.     SUBJECTIVE:  I have had this for some time, I have chronic lymphedema and wear compression stockings.       History:  Past Medical History:   Diagnosis Date    Hydrocephalus (HCC)     dx'd as an adult-no shunt     History reviewed. No pertinent surgical history.   reports that he has never smoked. He has never used smokeless tobacco. He reports current alcohol use. He reports that he does not use drugs.      Allergies:  @ALLERGY    Laboratory Data:    Recent Labs   Lab 24  1012 24  0607 24  0612 24  0750   WBC 22.1* 11.9*  --   --    HGB 14.1 11.2*  --   --    HCT 41.3 33.3*  --   --    .0 296.0  --  293.0   CREATSERUM 1.07  --  0.88  --    BUN 18  --  18  --    GLU 92  --  108*  --    CA 9.2  --  8.5  --    ALB 3.0*  --   --   --    TP 7.9  --   --   --          ASSESSMENT:  Wound 24 Tibial Left (Active)   Date First Assessed/Time First Assessed: 24 0915   Location: Tibial  Wound Location Orientation: Left      Assessments 2024  9:15 AM   Wound Image     Wound Length (cm) 113 cm   Wound Width (cm) 6.7 cm   Wound Surface Area (cm^2) 757.1 cm^2   Wound Depth (cm) 0.5 cm   Wound Volume (cm^3) 378.55 cm^3   Margins Epibole (Rolled edges)   Leslie-wound Assessment Edema   Wound Granulation Tissue Red   Wound Bed Granulation (%) 85 %   Wound Bed Epithelium (%) 0 %   Wound Bed Slough (%) 15 %   Wound Bed Eschar (%) 0 %   State of Healing Non-healing   Wound Odor None      Wound measures (11.3cm x 6.7cm x  0.5)      Wound Cleaning and Dressings:  Showering directions: May shower and/or cleanse wound with mild soap and water  Wound cleansing:  Cleanse with normal saline or wound cleanser  Wound cleaning frequency: Every 48 hours  Wound product: Non-adherent/adaptic, Dry gauze, ABD pad, Kerlix, and Paper tape  Dressing change frequency:  Change dressing every other day and/or as needed  Enzymatic agent:  Not applicable    Compression Therapy:   Ace wrap    Negative Pressure Wound Therapy:  NPWT: No negative pressure device therapy needed      Care Summary:  Care Summary: Discussed Plan of Care at beside with patient. Patient verbally acknowledges understanding of all instructions and all questions were answered.      Additional Notes:  Discharge planning in progress          Thank you for this consultation and for allowing me to participate in the care of your patient.  Please page me at #[phone] if you have any questions about this consultation and plan of care.     Time Spent 45 Minutes.    Thank you,  Sabina Kirkpatrick, PT, MPT  Wound Care Clinician  [facility]    1/4/2024  11:42 AM
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
## 2024-01-04 NOTE — PROGRESS NOTES
[facility]     Hospitalist Progress Note     Luis M Estrada Patient Status:  Observation    3/25/1965 MRN [id number]   Location [facility] 0SW-A Attending Rica Pearson MD   Hosp Day # 1 PCP SILVER GARCIA DO     Chief Complaint: Cellulitis     Subjective:     Patient  feels better, afebrile     Objective:    Review of Systems:   A comprehensive review of systems was completed; pertinent positive and negatives stated in subjective.    Vital signs:  Temp:  [98.2 °F (36.8 °C)-98.9 °F (37.2 °C)] 98.4 °F (36.9 °C)  Pulse:  [75-90] 75  Resp:  [18-22] 18  BP: (125-132)/(70-81) 125/77  SpO2:  [97 %-99 %] 97 %    Physical Exam:    General: No acute distress  Respiratory: no wheezes, no rhonchi  Cardiovascular: S1, S2, regular rate and rhythm  Abdomen: Soft, Non-tender, non-distended, positive bowel sounds  Neuro: No new focal deficits.   Extremities: Bilateral LE erythema, swelling, RLE is warm above knee with lacy erythema, LLE wound     Diagnostic Data:    Labs:  Recent Labs   Lab 24  1012 24  0607 24  0750   WBC 22.1* 11.9*  --    HGB 14.1 11.2*  --    MCV 91.0 90.7  --    .0 296.0 293.0       Recent Labs   Lab 24  1012 24  0612   GLU 92 108*   BUN 18 18   CREATSERUM 1.07 0.88   CA 9.2 8.5   ALB 3.0*  --     138   K 3.7 3.5    107   CO2 26.0 24.0   ALKPHO 85  --    AST 46*  --    ALT 22  --    BILT 1.6  --    TP 7.9  --        Estimated Creatinine Clearance: 85.5 mL/min (based on SCr of 0.88 mg/dL).    No results for input(s): \"TROP\", \"TROPHS\", \"CK\" in the last 168 hours.    No results for input(s): \"PTP\", \"INR\" in the last 168 hours.               Microbiology    Hospital Encounter on 24   1. Blood Culture     Status: None (Preliminary result)    Collection Time: 24 10:12 AM    Specimen: Blood,peripheral   Result Value Ref Range    Blood Culture Result No Growth 2 Days N/A         Imaging: Reviewed in Epic.    Medications:    ceFAZolin  2  g Intravenous Q8H    enoxaparin  0.5 mg/kg Subcutaneous Daily       Assessment & Plan:      #LLE cellulitis s/p trauma with shopping cart  IV clinda--> IV ancef per ID  Pain control  Wound care  Doppler LE NEg for VTW  # h/o hydrocephalus   #COVID +  Supportive  care       Rica Pearson MD    Supplementary Documentation:     Quality:  DVT Mechanical Prophylaxis:   SCDs,    DVT Pharmacologic Prophylaxis   Medication    enoxaparin (Lovenox) 80 MG/0.8ML SUBQ injection 70 mg                Code Status: Full Code  Cooper: No urinary catheter in place  Cooper Duration (in days):   Central line:    YAA:     Discharge is dependent on: clinical   At this point Mr. Estrada is expected to be discharge to: home     The 21st Century Cures Act makes medical notes like these available to patients in the interest of transparency. Please be advised this is a medical document. Medical documents are intended to carry relevant information, facts as evident, and the clinical opinion of the practitioner. The medical note is intended as peer to peer communication and may appear blunt or direct. It is written in medical language and may contain abbreviations or verbiage that are unfamiliar.

## 2024-01-04 NOTE — PLAN OF CARE
Patient alert and oriented x4.  On RA.  NSR on tele.  Covid isolation.  Denies pain.  IV ancef.  WC to see.  Resting comfortably in bed.

## 2024-01-04 NOTE — PAYOR COMM NOTE
--------------  ADMISSION REVIEW     Payor: PAM OUT OF STATE PPO  Subscriber #:  [id number]  Authorization Number: [id number]    ADMIT TO INPT STATUS 1/3/24  ADMIT TO OBSERVATION 1/2/24 1/2 Patient Seen in: [facility] Emergency Department    History     Stated Complaint: leg lac    58-year-old male comes to the hospital complaint of having difficulty with a large wound to his left lateral leg.  He states 3 days ago he was struck by a shopping cart at the store creating a large laceration.  He says that he does not feel it is looking very good.  Does have history of having some chronic cellulitis of his lower extremities and is having some increased discomfort to the area.  Denies any fevers or chills.  Is denying any other complaints.    Objective:   Past Medical History:   Diagnosis Date    Hydrocephalus (HCC)     dx'd as an adult-no shunt     History reviewed. No pertinent surgical history.    Physical Exam     ED Triage Vitals [01/02/24 0935]   /77   Pulse 87   Resp 18   Temp 98.1 °F (36.7 °C)   Temp src    SpO2 98 %   O2 Device None (Room air)     Current:/77   Pulse 87   Temp 98.1 °F (36.7 °C)   Resp 18   Ht 170.2 cm (5' 7\")   Wt 131.5 kg   SpO2 98%   BMI 45.42 kg/m²     Physical Exam    Ext bilateral lower extremity edema with large circular subcentimeter wound noted with surrounding erythema and some discharge noted    Labs Reviewed   COMP METABOLIC PANEL (14) - Abnormal; Notable for the following components:       Result Value    AST 46 (*)     Albumin 3.0 (*)     Globulin  4.9 (*)     A/G Ratio 0.6 (*)     All other components within normal limits   CBC W/ DIFFERENTIAL - Abnormal; Notable for the following components:    WBC 22.1 (*)     Neutrophil Absolute Prelim 20.87 (*)     All other components within normal limits   PROCALCITONIN   SCAN SLIDE   BLOOD CULTURE   BLOOD CULTURE     Time: 01/02 1116  Comment: While here the patient's WBC was 22.1 thousand.  Electrolytes were  normal.  His glucose was 92.  His lactic acid level was negative.  Blood cultures x 2 were taken and Rocephin was given.  At this time the patient be admitted for further care for his wound infection was associated cellulitis     MDM      Differential diagnosis did include sepsis but not limited to such.  I do believe the patient is septic this time but patient does have a wound infection with surrounding cellulitis with a chronically edematous leg has had chronic cellulitis in the past.  This time the patient be admitted for IV antibiotics and further management.  The hospitalist been notified.    Disposition and Plan     Clinical Impression:  1. Left leg cellulitis    2. Wound infection       Hospital Problems       Present on Admission  Date Reviewed: 1/13/2022            ICD-10-CM Noted POA    * (Principal) Left leg cellulitis L03.116 1/2/2024 Unknown             [facility]  History and Physical     Luis M Estrada is a 58 year old male whop presents to the hospital after getting injured at grocery store- pt notes a cart hit his left leg few days ago and since then an ulcer has formed . Pt notes minimal pain. He denies fever, chills, malaise. Upon being examined both legs are erythematous and it is actually his RLE that is warm, swollen with erythema extending posteriorly and above the knee.     Objective:   Physical Exam:    /86 (BP Location: Left arm)   Pulse 101   Temp 98.4 °F (36.9 °C) (Oral)   Resp 17   Ht 5' 7\" (1.702 m)   Wt 290 lb (131.5 kg)   SpO2 98%   BMI 45.42 kg/m²   General: No acute distress, Alert  Respiratory: No rhonchi, no wheezes  Cardiovascular: S1, S2. Regular rate and rhythm  Abdomen: Soft, Non-tender, non-distended, positive bowel sounds  Neuro: No new focal deficits  Extremities: RLe erythema ,swelling, erythema posterior and extends to above knee , LLE erythema large LLE lateral wound with pus.     Lab 01/02/24  1012   RBC 4.54   HGB 14.1   HCT 41.3   MCV 91.0    MCH 31.1   MCHC 34.1   RDW 14.0   NEPRELIM 20.87*   WBC 22.1*   .0     GLU 92   BUN 18   CREATSERUM 1.07   EGFRCR 80   CA 9.2   ALB 3.0*      K 3.7      CO2 26.0   ALKPHO 85   AST 46*   ALT 22   BILT 1.6   TP 7.9     PCT 32.59*     Assessment & Plan:      #LLE cellulitis   IV clinda  ID eval  Pain control  Wound care  Doppler LE  # h/o hydrocephalus       1/3:    ID:        Reason for Consultation:  Right leg ulceration  COVID     History of Present Illness:  Luis M Estrada is a a(n) 58 year old male presented to ED on 1/2 due to worsening ulceration of the right leg, after being struck on the leg by a grocery cart 2 weeks ago.   He also notes a cough that started around 12/31, but is improving.  He tested positive for COVID.  The last recorded COVID vaccine 2/25/21, almost 3 years ago.   He believes he had covid in 2020, while working at [employer], but was not tested.     General: No acute distress. Alert and oriented x 3.  Vital signs: Temp:  [97.8 °F (36.6 °C)-99.3 °F (37.4 °C)] 97.8 °F (36.6 °C)  Pulse:  [] 86  Resp:  [16-18] 18  BP: (105-143)/(55-86) 109/60  SpO2:  [95 %-100 %] 95 %  Body mass index is 45.42 kg/m².  HEENT: Moist mucous membranes. Extraocular muscles are intact.  Neck: No swelling, no masses  Respiratory: Non labored, symmetric exursion  Cardiovascular: No irregularities in rhythm  Abdomen: Soft, nontender, nondistended.    Musculoskeletal: large right leg ulceration, erythema surrounding skin       Lab 01/03/24  0607   RBC 3.67*   HGB 11.2*   HCT 33.3*   MCV 90.7   MCH 30.5   MCHC 33.6   RDW 14.4   NEPRELIM 9.85*   WBC 11.9*   .0      Lab 01/02/24  1012 01/03/24  0612   GLU 92 108*   BUN 18 18   CREATSERUM 1.07 0.88   CA 9.2 8.5   ALB 3.0*  --     138   K 3.7 3.5    107   CO2 26.0 24.0   ALKPHO 85  --    AST 46*  --    ALT 22  --    BILT 1.6  --    TP 7.9  --        ASSESSMENT/PLAN:  1. Left leg ulceration, large open wound  After being struck  grocery cart 2 weeks ago  ---underlying lymphedema, venous stasis  Vascular workup in progress  Continue cefazolin for cellulitis  Wound care     2. COVID  Last vaccinated almost 3 years ago  Symptom onset 3-4 days ago, mild, improving cough  Unlikely benefit for antivirals at this stage, based on current history but would have a low threshold for treatment if any new worsening symptoms      1/4:    Wound measures (11.3cm x 6.7cm x 0.5)     ID:      Antibiotics: Cefazolin #2     Subjective:  : comfortabe     Objective:  Temp:  [98.2 °F (36.8 °C)-98.9 °F (37.2 °C)] 98.4 °F (36.9 °C)  Pulse:  [75-90] 75  Resp:  [18-22] 18  BP: (125-132)/(70-81) 125/77  SpO2:  [97 %-99 %] 97 %     Integument: left leg dressed       ASSESSMENT/PLAN:  1. Left leg ulceration, large open wound  After being struck grocery cart 2 weeks ago  ---underlying lymphedema, venous stasis  Wound care following      HOSPITALIST:    Patient  feels better, afebrile       Extremities: Bilateral LE erythema, swelling, RLE is warm above knee with lacy erythema, LLE wound      Lab 01/02/24  1012 01/03/24  0607 01/04/24  0750   WBC 22.1* 11.9*  --    HGB 14.1 11.2*  --    MCV 91.0 90.7  --    .0 296.0 293.0        Medications:    ceFAZolin  2 g Intravenous Q8H    enoxaparin  0.5 mg/kg Subcutaneous Daily       Assessment & Plan:   #LLE cellulitis s/p trauma with shopping cart  IV clinda--> IV ancef per ID  Pain control  Wound care  Doppler LE NEg for VTW  # h/o hydrocephalus   #COVID +  Supportive  care         MEDICATIONS ADMINISTERED IN LAST 1 DAY:  benzocaine-menthol (Cepacol) lozenge 1 lozenge       Date Action Dose Route User    1/4/2024 1426 Given 1 lozenge Oral Lary Riley RN    1/3/2024 2038 Given 1 lozenge Oral Maddison Mc, JAYLEN          ceFAZolin (Ancef) 2 g in 20mL IV syringe premix       Date Action Dose Route User    1/4/2024 0842 Given 2 g Intravenous Lary Riley RN    1/4/2024 0030 Given 2 g Intravenous Maddison Mc, JAYLEN    1/3/2024  1719 Given 2 g Intravenous Kelsy Chacko RN          enoxaparin (Lovenox) 80 MG/0.8ML SUBQ injection 70 mg       Date Action Dose Route User    1/4/2024 1428 Given 70 mg Subcutaneous (Left Lower Abdomen) Lary Riley RN    1/3/2024 1719 Given 70 mg Subcutaneous (Left Lower Abdomen) Kelsy Chacko, JAYLEN          guaiFENesin (Robitussin) 100 MG/5 ML oral liquid 100 mg       Date Action Dose Route User    1/3/2024 2058 Given 100 mg Oral Maddison Mc RN

## 2024-01-05 VITALS
SYSTOLIC BLOOD PRESSURE: 128 MMHG | HEART RATE: 76 BPM | TEMPERATURE: 98 F | HEIGHT: 67 IN | DIASTOLIC BLOOD PRESSURE: 82 MMHG | BODY MASS INDEX: 45.52 KG/M2 | RESPIRATION RATE: 18 BRPM | OXYGEN SATURATION: 97 % | WEIGHT: 290 LBS

## 2024-01-05 PROCEDURE — 99239 HOSP IP/OBS DSCHRG MGMT >30: CPT | Performed by: STUDENT IN AN ORGANIZED HEALTH CARE EDUCATION/TRAINING PROGRAM

## 2024-01-05 NOTE — PLAN OF CARE
Assumed care for pt at 23:30 on 1/4    A&Ox4. Able to sleep. Denies pain. VSS on RA. Ancef infused. Voiding in urinal. Up with 1 and walker. Lovenox for VTE prophylaxis. LLE dressing c/d/I, changed by wound care 1/4, due next 1/6    Plan: Potential dc with PO Abx    Bed alarm on, call light in reach, covid isolation in place, able to make needs known.

## 2024-01-05 NOTE — PLAN OF CARE
Discharged home via wheelchair  Accompanied by support staff  Belongings taken by patient/family  PIV removed  Tele box removed & placed in the drawer  Discharge Navigator complete  Discharge instructions reviwed with patient at bedside  All questions and concerns adressed at this time         Problem: RESPIRATORY - ADULT  Goal: Achieves optimal ventilation and oxygenation  Description: INTERVENTIONS:  - Assess for changes in respiratory status  - Assess for changes in mentation and behavior  - Position to facilitate oxygenation and minimize respiratory effort  - Oxygen supplementation based on oxygen saturation or ABGs  - Provide Smoking Cessation handout, if applicable  - Encourage broncho-pulmonary hygiene including cough, deep breathe, Incentive Spirometry  - Assess the need for suctioning and perform as needed  - Assess and instruct to report SOB or any respiratory difficulty  - Respiratory Therapy support as indicated  - Manage/alleviate anxiety  - Monitor for signs/symptoms of CO2 retention  Outcome: Progressing     Problem: SKIN/TISSUE INTEGRITY - ADULT  Goal: Skin integrity remains intact  Description: INTERVENTIONS  - Assess and document risk factors for pressure ulcer development  - Assess and document skin integrity  - Monitor for areas of redness and/or skin breakdown  - Initiate interventions, skin care algorithm/standards of care as needed  Outcome: Progressing

## 2024-01-05 NOTE — DISCHARGE SUMMARY
[facility]PITAL[facility]  DISCHARGE SUMMARY     Luis M Estrada Patient Status:  Inpatient    3/25/1965 MRN [id number]   Location [facility] 0SW-A Attending Rica Pearson MD   Hosp Day # 2 PCP SILVER GARCIA DO     Date of Admission: 2024  Date of Discharge:   24    Discharge Disposition: Home or Self Care    Discharge Diagnosis:  #LLE cellulitis s/p trauma with shopping cart  IV clinda--> IV ancef per ID  Pain control  Wound care  Doppler LE NEg for VTW  # h/o hydrocephalus   #COVID +  Supportive  care    History of Present Illness:   Luis M Estrada is a 58 year old male whop presents to the hospital after getting injured at grocery store- pt notes a cart hit his left leg few days ago and since then an ulcer has formed . Pt notes minimal pain. He denies fever, chills, malaise. Upon being examined both legs are erythematous and it is actually his RLE that is warm, swollen with erythema extending posteriorly and above the knee.      Brief Synopsis:   Pt admitted treated with IV Ancef for cellulitis with clinical improvement. Incidentally found to have COVID. DC home in stable condition.     Lace+ Score: 29  59-90 High Risk  29-58 Medium Risk  0-28   Low Risk  Patient was referred to the [facility].    TCM Follow-Up Recommendation:  LACE > 58: High Risk of readmission after discharge from the hospital.      Procedures during hospitalization:   Doppler, no DVT    Incidental or significant findings and recommendations (brief descriptions):  COVID     Lab/Test results pending at Discharge:   no    Consultants:  ID     Discharge Medication List:     Discharge Medications        START taking these medications        Instructions Prescription details   cephalexin 500 MG Caps  Commonly known as: Keflex      Take 1 capsule (500 mg total) by mouth 3 (three) times daily for 10 days.   Stop taking on: 2024  Quantity: 30 capsule  Refills: 0            ASK your doctor about  these medications        Instructions Prescription details   aspirin 81 MG Tabs      Take 1 tablet (81 mg total) by mouth daily.   Refills: 0               Where to Get Your Medications        These medications were sent to [id number] - [place] - [address] [phone], [phone] [address] [place] [address]      Phone: [phone]   cephalexin 500 MG Caps         [place] n[place]     Follow-up appointment:   No follow-up provider specified.  Appointments for Next 30 Days 2024 - 2024      None            Vital signs:  Temp:  [97 °F (36.1 °C)-98.4 °F (36.9 °C)] 97.5 °F (36.4 °C)  Pulse:  [74-81] 76  Resp:  [16-18] 18  BP: (113-134)/(70-82) 128/82  SpO2:  [97 %-98 %] 97 %    Physical Exam:    General: No acute distress   Lungs: clear to auscultation  Cardiovascular: S1, S2  Abdomen: Soft      -----------------------------------------------------------------------------------------------  PATIENT DISCHARGE INSTRUCTIONS: See electronic chart    Rica Pearson MD    Total time spent on discharge plannin minutes     The  [employer] Cures Act makes medical notes like these available to patients in the interest of transparency. Please be advised this is a medical document. Medical documents are intended to carry relevant information, facts as evident, and the clinical opinion of the practitioner. The medical note is intended as peer to peer communication and may appear blunt or direct. It is written in medical language and may contain abbreviations or verbiage that are unfamiliar.

## 2024-01-08 ENCOUNTER — PATIENT OUTREACH (OUTPATIENT)
Dept: CASE MANAGEMENT | Age: 59
End: 2024-01-08

## 2024-01-08 DIAGNOSIS — L03.116 CELLULITIS OF LEFT FOOT: ICD-10-CM

## 2024-01-08 DIAGNOSIS — Z02.9 ENCOUNTERS FOR UNSPECIFIED ADMINISTRATIVE PURPOSE: Primary | ICD-10-CM

## 2024-01-08 NOTE — PROGRESS NOTES
Initial Post Discharge Follow Up   Discharge Date: 1/5/24  Contact Date: 1/8/2024    Consent Verification:  Assessment Completed With: Patient  HIPAA Verified?  Yes    Discharge Dx:   LLE cellulitis s/p trauma with shopping cart   COVID +     General:   How have you been since your discharge from the hospital? Pt reports everything is good so far. Pt stated he has been up and moving around more, taking his time. He makes sure to rest as needed. Pt denies n/v/d, fevers, CP, SOB, dizziness, weakness, redness to the leg, wound increasing in size, s/s of worsening cellulitis, confusion and pain. Pt report the site to the LLE looks a little better. Pt feels the cough is improving as well.  Pt lives alone but he does have friends that help him if needed. Pt has a friend that drives him to appointments as he is not driving right now. Pt is eating and drinking well. Reviewed proper hydration and completing abx.   Do you have any pain since discharge?  No    How well was your pain managed while in the hospital?   On a scale of 1-5   1- Very Poor and 5- Very well   Very Well  When you were leaving the hospital were your discharge instructions reviewed with you? Yes  How well were your discharge instructions explained to you?   On a scale of 1-5   1- Very Poor and 5- Very well   Very Well  Do you have any questions about your discharge instructions?  No  Before leaving the hospital was your diagnoses explained to you? Yes  Do you have any questions about your diagnoses? No  Are you able to perform normal daily activities of living as you have prior to your hospital stay (dressing, bathing, ambulating to the bathroom, etc)? no  If No, What are some barriers or concerns?  Pt is taking his time.   (NCM) Was patient given a different diet per AVS? no    Medications: Reviewed medication list with the patient. Medications are up to date.   Current Outpatient Medications   Medication Sig Dispense Refill    cephalexin 500 MG Oral Cap  Take 1 capsule (500 mg total) by mouth 3 (three) times daily for 10 days. 30 capsule 0    aspirin 81 MG Oral Tab Take 1 tablet (81 mg total) by mouth daily.       Were there any changes to your current medication(s) noted on the AVS? Yes  If so, were these medication changes discussed with you prior to leaving the hospital? Yes  If a new medication was prescribed:    Was the new medication's purpose & side effects reviewed? Yes  Do you have any questions about your new medication? No  Did you  your discharge medications when you left the hospital? Yes  Let's go over your medications together to make sure we are not missing anything. Medications Reviewed  Are there any reasons that keep you from taking your medication as prescribed? No  Are you having any concerns with constipation? No  Did patient receive their flu shot (Sept-March)? Unknown     Discharge medications reviewed/discussed/and reconciled against outpatient medications with patient.  Any changes or updates to medications sent to PCP.  Patient Acknowledged     Referrals/orders at D/C:  Referrals/orders placed at D/C? no  DME ordered at D/C? No    Discharge orders, AVS reviewed and discussed with patient. Any changes or updates to orders sent to PCP.  Patient Acknowledged      SDOH:     Transportation Needs: No Transportation Needs (1/8/2024)    Transportation Needs     Lack of Transportation: No       Financial Resource Strain: Low Risk  (1/8/2024)    Financial Resource Strain     Difficulty of Paying Living Expenses: Not very hard     Med Affordability: No       Follow up appointments:  Reviewed recommended follow up appointment. See below. Pt denies any barriers to keeping/getting to HFU appts.     Your appointments       Date & Time Appointment Department (Center)    Terry 10, 2024 10:30 AM [facility] Follow Up with Nader Ibrahim DO [facility], [address] [place] [address] ([facility] [address])               [id number] [place] [id number]  [phone]            TCC  Was TCC ordered: Yes  Was TCC scheduled: No, Explain: Pt prefers to see PCP   If yes: []  Advised patient to bring all medications and blood glucose meter/supplies if applicable.    PCP (If no TCC appointment)  Does patient already have a PCP appointment scheduled? Yes  NCM Confirmed PCP office TCM appointment with patient    Specialist    Does the patient have any other follow up appointment(s) needing to be scheduled? No    Is there any reason as to why you cannot make your appointment(s)?  No     Needs post D/C:   Now that you are home, are there any needs or concerns you need addressed before your next visit with your PCP?  (DME, meds, questions, etc.): No    Interventions by NCM:   All d/c instructions reviewed with the pt. Reviewed when to call MD vs when to call 911 or go the ED.  Discussed s/s of cellulitis and COVID. Educated pt on the importance of taking all meds as prescribed as well as close f/u with PCP/specialists. Pt verbalized understanding and will contact the office with any further questions or concerns. Pt is aware he is to wear a mask for 5 days past the 5 days of isolation when around others.       Overall Rating:   How would you rate the care you received while in the hospital? good    CCM referral placed:    Not Applicable

## 2024-01-10 ENCOUNTER — OFFICE VISIT (OUTPATIENT)
Dept: FAMILY MEDICINE CLINIC | Facility: CLINIC | Age: 59
End: 2024-01-10
Payer: COMMERCIAL

## 2024-01-10 VITALS
DIASTOLIC BLOOD PRESSURE: 80 MMHG | TEMPERATURE: 97 F | HEART RATE: 76 BPM | BODY MASS INDEX: 45.52 KG/M2 | RESPIRATION RATE: 16 BRPM | WEIGHT: 290 LBS | OXYGEN SATURATION: 98 % | SYSTOLIC BLOOD PRESSURE: 100 MMHG | HEIGHT: 67 IN

## 2024-01-10 DIAGNOSIS — L03.116 LEFT LEG CELLULITIS: Primary | ICD-10-CM

## 2024-01-10 PROCEDURE — 3008F BODY MASS INDEX DOCD: CPT | Performed by: FAMILY MEDICINE

## 2024-01-10 PROCEDURE — 3079F DIAST BP 80-89 MM HG: CPT | Performed by: FAMILY MEDICINE

## 2024-01-10 PROCEDURE — 99495 TRANSJ CARE MGMT MOD F2F 14D: CPT | Performed by: FAMILY MEDICINE

## 2024-01-10 PROCEDURE — 3074F SYST BP LT 130 MM HG: CPT | Performed by: FAMILY MEDICINE

## 2024-01-10 NOTE — PROGRESS NOTES
Subjective:   Luis M Estrada is a 58 year old male who presents for hospital follow up.   He was discharged from [facility] to Home or Self Care  Admission Date: 1/2/24   Discharge Date: 1/5/24  Hospital Discharge Diagnosis: Left leg cellulitis    Interactive contact within 2 business days post discharge first initiated on Date: 1/8/2024    During the visit, the following was completed:  Obtained and reviewed discharge summary, continuity of care documents, and Hospitalist notes  Reviewed Labs (CBC, CMP)    HPI: This is a 58-year-old gentleman who presents for hospital follow-up for recently being diagnosed with left leg cellulitis.  Patient was hit by a shopping cart while at work prior to ER visit.  He developed a wound that started to look infected and became more painful so he went to the ER.  He was diagnosed with left leg cellulitis.  He was given IV Ancef and then eventually discharged home on 10 days of Keflex 500 mg 3 times a day.  States he is feeling much better.  He still has a significant wound on the left lower leg.  He was told that he would be called by wound care to set up an appointment but he has not received a phone call yet.  Denies any fever.  Denies any worsening pain.    History/Other:   Current Medications:  Medication Reconciliation:  I am aware of an inpatient discharge within the last 30 days.  The discharge medication list has been reconciled with the patient's current medication list and reviewed by me.  See medication list for additions of new medication, and changes to current doses of medications and discontinued medications.  Outpatient Medications Marked as Taking for the 1/10/24 encounter (Office Visit) with Nader Ibrahim, DO   Medication Sig    cephalexin 500 MG Oral Cap Take 1 capsule (500 mg total) by mouth 3 (three) times daily for 10 days.    aspirin 81 MG Oral Tab Take 1 tablet (81 mg total) by mouth daily.       Review of Systems:  GENERAL: weight stable, energy  stable, no sweating  SKIN: denies any unusual skin lesions  EYES: denies blurred vision or double vision  HEENT: denies nasal congestion, sinus pain or ST  LUNGS: denies shortness of breath with exertion  CARDIOVASCULAR: denies chest pain on exertion or palpitations  GI: denies abdominal pain, denies heartburn, denies diarrhea  MUSCULOSKELETAL: denies pain, normal range of motion of extremities  NEURO: denies headaches, denies dizziness, denies weakness  PSYCHE: denies depression or anxiety  HEMATOLOGIC: denies hx of anemia, or bruising, denies bleeding  ENDOCRINE: denies thyroid history  ALL/ASTHMA: denies hx of allergy or asthma    Objective:   No LMP for male patient.  Estimated body mass index is 45.42 kg/m² as calculated from the following:    Height as of this encounter: 5' 7\" (1.702 m).    Weight as of this encounter: 290 lb (131.5 kg).   /80   Pulse 76   Temp 97 °F (36.1 °C) (Temporal)   Resp 16   Ht 5' 7\" (1.702 m)   Wt 290 lb (131.5 kg)   SpO2 98%   BMI 45.42 kg/m²    GENERAL: well developed, well nourished, in no apparent distress  SKIN: + large open wound with granulation tissue and rolled edging noted on left lateral leg  HEENT: atraumatic, normocephalic, ears and throat are clear  EYES: PERRLA, EOMI, conjunctiva are clear  NECK: supple, no adenopathy  LUNGS: clear to auscultation,no r/r/w  CARDIO: RRR without murmur  GI: good BS's, no masses, HSM or tenderness  MUSCULOSKELETAL: back is not tender, FROM of the extremities  EXTREMITIES: no cyanosis, clubbing, + 2 pitting edema BLE  NEURO: Oriented times three, cranial nerves are intact, motor and sensory are grossly intact    Assessment & Plan:   1. Left leg cellulitis (Primary)  -  healing but wound remains open  -  some debridement done in office  -  refer to wound care  -  finish 10 day course of keflex  -  further recs per wound care      No follow-ups on file.

## 2024-01-11 ENCOUNTER — OFFICE VISIT (OUTPATIENT)
Dept: WOUND CARE | Facility: HOSPITAL | Age: 59
End: 2024-01-11
Attending: FAMILY MEDICINE
Payer: COMMERCIAL

## 2024-01-11 VITALS
RESPIRATION RATE: 16 BRPM | TEMPERATURE: 99 F | HEART RATE: 78 BPM | SYSTOLIC BLOOD PRESSURE: 126 MMHG | DIASTOLIC BLOOD PRESSURE: 84 MMHG

## 2024-01-11 DIAGNOSIS — S81.802A OPEN WOUND OF LEFT LOWER LEG, INITIAL ENCOUNTER: Primary | ICD-10-CM

## 2024-01-11 DIAGNOSIS — I87.2 CHRONIC VENOUS INSUFFICIENCY: ICD-10-CM

## 2024-01-11 DIAGNOSIS — I87.2 STASIS DERMATITIS OF BOTH LEGS: ICD-10-CM

## 2024-01-11 DIAGNOSIS — L03.116 CELLULITIS OF LEFT LOWER EXTREMITY: ICD-10-CM

## 2024-01-11 DIAGNOSIS — S81.801A OPEN WOUND OF RIGHT LOWER LEG, INITIAL ENCOUNTER: ICD-10-CM

## 2024-01-11 PROCEDURE — 97597 DBRDMT OPN WND 1ST 20 CM/<: CPT | Performed by: FAMILY MEDICINE

## 2024-01-11 PROCEDURE — 99214 OFFICE O/P EST MOD 30 MIN: CPT | Performed by: FAMILY MEDICINE

## 2024-01-11 PROCEDURE — 97598 DBRDMT OPN WND ADDL 20CM/<: CPT | Performed by: FAMILY MEDICINE

## 2024-01-11 NOTE — PROGRESS NOTES
Patient ID: Luis M Estrada is a 58 year old male.    Debridement Traumatic Left;Lateral Leg   Wound 01/11/24 #1 Left lateral lower leg Leg Left;Lateral    Performed by: Nicholas Gould MD  Authorized by: Nicholas Gould MD      Consent   Consent obtained? verbal  Consent given by: patient  Risks discussed? procedural risks discussed    Debridement Details  Performed by: physician  Debridement type: conservative sharp  Pain control: lidocaine 2%  Pain control administration type: topical    Pre-debridement measurements  Length (cm): 10.6  Width (cm): 9.5  Depth (cm): 0.2  Surface Area (cm^2): 100.7    Post-debridement measurements  Length (cm): 10.7  Width (cm): 9.6  Depth (cm): 0.2  Percent debrided: 100%  Surface Area (cm^2): 102.72  Area Debrided (cm^2): 102.72  Volume (cm^3): 20.54    Tissue and other material debrided: subcutaneous tissue  Devitalized tissue debrided: biofilm and slough  Instrument(s) utilized: curette  Bleeding: none  Hemostasis obtained with: not applicable  Response to treatment: procedure was tolerated well

## 2024-01-11 NOTE — PATIENT INSTRUCTIONS
PATIENT INSTRUCTIONS      FOR Luis M RODRIGUEZ Voss , RICK 3/25/1965    DATE OF SERVICE: 2024      Managing your edema:    Avoid prolonged standing in one place. It is better to have your calf muscles moving to pump fluid out of the legs.  Elevate leg(s) above the level of the heart when sitting or as much as possible.  Take you diuretics as directed by your physician. Do not skip doses or change doses unless instructed to do so by your physician.  Decrease salt intake, follow recommended 2 grams daily.  Do not get leg(s) with compression wrap wet. If wraps are too tight as indicated by pain, numbness/tingling or discoloration of toes remove wrap completely and call the wound center @ [phone].       The treatment plan has been discussed at length between you and your provider. Follow all instructions carefully, it is very important. If you do not follow all instructions you are at risk of your wound not healing, infection, possible loss of limb and even loss of life.    COMPRESSION WRAP PATIENT CARE INSTRUCTIONS  DO NOT get compression wrap wet. When showering, use a shower boot.   Please contact wound clinic and if not able to reach, then go to emergency room if ANY of the following occur:   Tingling or numbness in the foot or toes on leg with compression wrap.  Severe pain that cannot be relieved with elevation and any medication instructed per your doctor.  A fever of 100.4°F (38°C) or higher.  Swelling, coldness, or blue-gray discoloration of the toes.  If the compression wrap feels too tight or too loose.  If the compression wrap is damaged or has rough edges that hurt.  If the compression wrap gets wet, you must cut wrap off.   Drainage from compression wrap that smells different than usual.      Nurse visit on Monday 1/15/24    Follow up with Dr. Gould 1 WEEK

## 2024-01-11 NOTE — PROGRESS NOTES
Chief Complaint   Patient presents with    Wound Care     Patient is here for an initial consult. He presents with a traumatic wound to his left lower leg from earlier this month. He does have some intermittent pain to the wound that he rates at 4/10. He did have IV antibiotics while inpatient for covid as well as an infection.        HPI:     58-year-old new patient here for evaluation of wound on the left lateral leg and also the right anterior leg.  He states he had a had bumped his leg into a shopping cart around [employer] at work at [employer].  Someone had accidentally bumped the card into his leg.  He had developed a blister there which led to an open wound.  He was hospitalized for this recently and discharged here for follow-up.  He does have compression garments that he uses at home for chronic venous insufficiency.  Patient is not diabetic.    Lab Results   Component Value Date    BUN 18 01/03/2024    CREATSERUM 0.88 01/03/2024    ALB 3.0 (L) 01/02/2024    TP 7.9 01/02/2024    A1C 5.2 12/12/2016         Current Outpatient Medications:     cephalexin 500 MG Oral Cap, Take 1 capsule (500 mg total) by mouth 3 (three) times daily for 10 days., Disp: 30 capsule, Rfl: 0    aspirin 81 MG Oral Tab, Take 1 tablet (81 mg total) by mouth daily., Disp: , Rfl:     No Known Allergies         REVIEW OF SYSTEMS:     Review of Systems (ROS)  This information was obtained from the patient, chart    A comprehensive 10 point review of systems was completed.  Pertinent positives and negatives noted in the the HPI.     Past medical, surgical, family and social history updated where appropriate.    PHYSICAL EXAM:   /84   Pulse 78   Temp 98.7 °F (37.1 °C)   Resp 16    Estimated body mass index is 45.42 kg/m² as calculated from the following:    Height as of 1/10/24: 67\".    Weight as of 1/10/24: 290 lb (131.5 kg).   Vital signs reviewed.Appears stated age, well groomed.      Awake, alert, in no acute distress  HENT:   normocephalic, atraumatic, external ear canals clear bilaterally, tympanic membranes appear opaque, non-bulging, non-erythematous, nasal turbinates are non-inflamed and not swollen, oropharynx without erythema, swelling, or exudate, gingiva and lips without any apparent lesions.   Cardiac:  S1 S2 regular rate and rhythm, no murmur, gallop, or rub  Respiratory:  Bilaterally clear to auscultation, no chest tenderness to palpation, no wheezing, no rhonchi, no rales, air entry is adequate, no accessory respiratory muscle use, no chest asymmetry, normal excursion.  GI:  bowel sound positive in all four quadrants, abdomen is soft, non-tender, non-distended, no hepatosplenomegaly, no abnormal aortic pulsation.     Calf  Point of Measurement - Left Calf: 37  Point of Measurement - Right Calf: 37  Left Calf from:: Heel  Calf Left cm:: 49.5  Right Calf from:: Heel  Right Calf cm:: 48.5    Ankle  Point of Measurement - Left Ankle: 10  Point of Measurement - Right Ankle: 10  Left Ankle from:: Heel  Left Ankle cm:: 31     Right Ankle from:: Heel  Right Ankle cm:: 31.5       Foot           Left Heel to Knee: 41           Right Heel to Knee: 41    Wound 01/11/24 #1 Left lateral lower leg Leg Left;Lateral (Active)   Date First Assessed/Time First Assessed: 01/11/24 1406    Wound Number (Wound Clinic Only): #1 Left lateral lower leg  Primary Wound Type: Traumatic  Location: Leg  Wound Location Orientation: Left;Lateral      Assessments 1/11/2024  2:10 PM   Wound Image     Drainage Amount Large   Drainage Description Yellow;Serous   Treatments Compression   Wound Length (cm) 10.6 cm   Wound Width (cm) 9.5 cm   Wound Surface Area (cm^2) 100.7 cm^2   Wound Depth (cm) 0.2 cm   Wound Volume (cm^3) 20.14 cm^3   Margins Well-defined edges   Non-staged Wound Description Full thickness   Leslie-wound Assessment Edema;Hemosiderin staining   Wound Granulation Tissue Firm;Pink   Wound Bed Granulation (%) 40 %   Wound Bed Slough (%) 60 %   Wound  Odor None   Tunneling? No   Undermining? No   Sinus Tracts? No       No associated orders.       Wound 01/11/24 #2 Right anterior lower leg Leg Right;Lower;Anterior (Active)   Date First Assessed/Time First Assessed: 01/11/24 1406    Wound Number (Wound Clinic Only): #2 Right anterior lower leg  Primary Wound Type: Venous Ulcer  Location: Leg  Wound Location Orientation: Right;Lower;Anterior      Assessments 1/11/2024  2:11 PM   Wound Image     Drainage Amount Unable to assess   Treatments Compression   Wound Length (cm) 0.5 cm   Wound Width (cm) 0.5 cm   Wound Surface Area (cm^2) 0.25 cm^2   Wound Depth (cm) 0.1 cm   Wound Volume (cm^3) 0.025 cm^3   Margins Well-defined edges   Non-staged Wound Description Full thickness   Leslie-wound Assessment Edema;Hemosiderin staining;Dry   Wound Granulation Tissue Firm;Pink   Wound Bed Granulation (%) 100 %   Wound Odor None   Tunneling? No   Undermining? No   Sinus Tracts? No       No associated orders.       Compression Wrap 01/11/24 Leg Anterior;Left (Active)   Placement Date/Time: 01/11/24 1523   Location: Leg  Wound Location Orientation: Anterior;Left      Assessments 1/11/2024  2:11 PM   Response to Treatment Well tolerated   Compression Layers Multilayer   Compression Product Type Coflex   Dressing Applied Yes   Compression Wrap Location Toes to Knee   Compression Wrap Status Clean;Dry;Intact       No associated orders.       Compression Wrap 01/11/24 Right (Active)   Placement Date/Time: 01/11/24 1523   Wound Location Orientation: Right      Assessments 1/11/2024  2:11 PM   Response to Treatment Well tolerated   Compression Layers Multilayer   Compression Product Type Coflex   Dressing Applied Yes   Compression Wrap Location Toes to Knee   Compression Wrap Status Clean;Dry;Intact       No associated orders.          ASSESSMENT AND PLAN:      1. Open wound of left lower leg, initial encounter  - Aerobic Bacterial Culture    2. Open wound of right lower leg, initial  encounter    3. Cellulitis of left lower extremity    4. Chronic venous insufficiency    5. Stasis dermatitis of both legs    There is a large wound with a mixture of slough and granulation on the left lateral leg.  Well-circumscribed.  He does have Doppler monophasic to biphasic pulses on the left lower extremity.  He does have moderate edema to both lower extremities there is a very small wound about 1 to 2 mm on the left anterior leg.  Will apply Hydrofera Blue ready and Coflex 2 layer compression wrap to the right leg as he does have positive Doppler pulses in that leg monophasic to biphasic as well.  To the left leg wound this was debrided with a curette selective debridement was performed.  Patient tolerated well topical lidocaine used.  Some of the slough was removed.  Will start with Enluxtra dressing to the leg wound and Kerramax and Coflex 2 layer compression wrap.  We did discuss symptoms to watch out for with compression garment at great length and written instructions given.    He should have a nurse visit in 3 to 4 days and follow-up with me in 1 week.  Also encouraged weight reduction which can be very helpful.  Will also order Velcro compression garments for both lower extremities.    Risks, benefits, and alternatives of current treatment plan discussed in detail.  Questions and concerns addressed. Red flags to RTC or ED reviewed.  Patient (or parent) agrees to plan.      Return in about 4 days (around 1/15/2024) for nurse visit .    Also refer to patient instructions.     I spent a total of 50 minutes with this patient's care.  This time included preparing to see the patient (eg, review notes and recent diagnostics), seeing the patient, performing a medically appropriate examination and/or evaluation, counseling and educating the patient, and documenting in the record.          Nicholas Gould M.D.   [facility]   1/11/2024    Patient Instructions       PATIENT  INSTRUCTIONS      FOR RICK Blandon 3/25/1965    DATE OF SERVICE: 2024      Managing your edema:    Avoid prolonged standing in one place. It is better to have your calf muscles moving to pump fluid out of the legs.  Elevate leg(s) above the level of the heart when sitting or as much as possible.  Take you diuretics as directed by your physician. Do not skip doses or change doses unless instructed to do so by your physician.  Decrease salt intake, follow recommended 2 grams daily.  Do not get leg(s) with compression wrap wet. If wraps are too tight as indicated by pain, numbness/tingling or discoloration of toes remove wrap completely and call the wound center @ [phone].       The treatment plan has been discussed at length between you and your provider. Follow all instructions carefully, it is very important. If you do not follow all instructions you are at risk of your wound not healing, infection, possible loss of limb and even loss of life.    COMPRESSION WRAP PATIENT CARE INSTRUCTIONS  DO NOT get compression wrap wet. When showering, use a shower boot.   Please contact wound clinic and if not able to reach, then go to emergency room if ANY of the following occur:   Tingling or numbness in the foot or toes on leg with compression wrap.  Severe pain that cannot be relieved with elevation and any medication instructed per your doctor.  A fever of 100.4°F (38°C) or higher.  Swelling, coldness, or blue-gray discoloration of the toes.  If the compression wrap feels too tight or too loose.  If the compression wrap is damaged or has rough edges that hurt.  If the compression wrap gets wet, you must cut wrap off.   Drainage from compression wrap that smells different than usual.      Nurse visit on Monday 1/15/24    Follow up with Dr. Gould 1 WEEK                      MISCELLANEOUS INSTRUCTIONS  Supplement with a daily multivitamin   Low salt diet  Intense blood sugar control - Goal Blood sugar  below 180 at all times recommended.  Increase protein intake / consider protein supplements - see below  Elevate extremities at all times when sitting / laying down.  No tobacco use     DIETARY MODIFICATIONS TO HELP WITH WOUND HEALING:          Please follow any restrictions to diet given by your other doctors     Protein: meats, beans, eggs, milk and yogurt particularly Greek yogurt), tofu, soy nuts, soy protein products     Vitamin C: citrus fruits and juices, strawberries, tomatoes, tomato juice, peppers, baked potatoes, spinach, broccoli, cauliflower, Kersey sprouts, cabbage     Vitamin A: dark greens, leafy vegetables, orange or yellow vegetables, cantaloupe, fortified dairy products, liver, fortified cereals     Zinc: fortified cereals, red meats, seafood     Consider Bob by [employer]qu[employer] (These are essential branch chain amino acids that help with tissue building and wound healing) and take 2 packets/day. You can order online at [employer] or [employer]     ADDITIONAL REMINDERS:     The treatment plan has been discussed at length with you and your provider. Follow all instructions carefully, it is very important. If you do not follow all instructions, you are at risk of your wound not healing, infection, possible loss of limb and even loss of life.  Please call the clinic at [phone] during regular business hours ( 7:30 AM - 5:30 PM) if you notice increased redness, warmth, pain or pus like drainage or start running a fever greater than 100.3.    For after hour emergencies, please call your primary physician or go to the nearest emergency room.  If your blood pressure is elevated, follow up with your primary care doctor and/or cardiologist as soon as possible.     FOR LEG SWELLING,  LOWER EXTREMITY WOUNDS AND IF USING COMPRESSION:   Managing your edema:    Avoid prolonged standing in one place. It is better to have your calf muscles moving to pump fluid out of the legs.  Elevate leg(s) above the level of the  heart when sitting or as much as possible.  Take you diuretics as directed by your physician. Do not skip doses or change doses unless instructed to do so by your physician.  Decrease salt intake, follow recommended 2 grams daily.  Do not get leg(s) with compression wrap wet. If wraps are too tight as indicated by pain, numbness/tingling or discoloration of toes remove wrap completely and call the wound center @ [phone].       The treatment plan has been discussed at length between you and your provider. Follow all instructions carefully, it is very important. If you do not follow all instructions you are at risk of your wound not healing, infection, possible loss of limb and even loss of life.    COMPRESSION WRAP PATIENT CARE INSTRUCTIONS  DO NOT get compression wrap wet. When showering, use a shower boot.   Please contact wound clinic and if not able to reach, then go to emergency room if ANY of the following occur:   Tingling or numbness in the foot or toes on leg with compression wrap.  Severe pain that cannot be relieved with elevation and any medication instructed per your doctor.  A fever of 100.4°F (38°C) or higher.  Swelling, coldness, or blue-gray discoloration of the toes.  If the compression wrap feels too tight or too loose.  If the compression wrap is damaged or has rough edges that hurt.  If the compression wrap gets wet, you must cut wrap off.   Drainage from compression wrap that smells different than usual.

## 2024-01-11 NOTE — PROGRESS NOTES
.Weekly Wound Education Note    Teaching Provided To: Patient  Training Topics: Discharge instructions;Cleasing and general instructions;Dressing;Edema control;Compression  Training Method: Demonstration;Explain/Verbal  Training Response: Reinforcement needed        Notes: Pt here for inital consult to lower leg wounds. Pt states he got hit by a shopping cart. Provider debrided wound at visit, silver nitrate applied to anterior right lower leg. Provider recommending hydrofera ready to right anterior lower leg, left lower lateral leg wound - enluxtra (x2) with back off, covered with kerramax, conforming gauze and tape. Wrapped RLE and LLE in coflex 2 layer wrap. Pt to follow up on Monday for nurse visit and Thursday for provider visit. RN to place order for bilateral lower leg compression garments at todays visit. Educated patient on wrap care, monitoring toes/feet, dont get wrap wet. Provided hand out information on care for wraps, patient states understanding.

## 2024-01-15 ENCOUNTER — OFFICE VISIT (OUTPATIENT)
Dept: WOUND CARE | Facility: HOSPITAL | Age: 59
End: 2024-01-15
Attending: FAMILY MEDICINE
Payer: COMMERCIAL

## 2024-01-15 VITALS
HEART RATE: 82 BPM | TEMPERATURE: 99 F | SYSTOLIC BLOOD PRESSURE: 135 MMHG | DIASTOLIC BLOOD PRESSURE: 73 MMHG | RESPIRATION RATE: 16 BRPM

## 2024-01-15 DIAGNOSIS — S81.801A OPEN WOUND OF RIGHT LOWER LEG, INITIAL ENCOUNTER: ICD-10-CM

## 2024-01-15 DIAGNOSIS — S81.802A OPEN WOUND OF LEFT LOWER LEG, INITIAL ENCOUNTER: Primary | ICD-10-CM

## 2024-01-15 PROCEDURE — 29581 APPL MULTLAYER CMPRN SYS LEG: CPT

## 2024-01-15 NOTE — PROGRESS NOTES
Chief Complaint   Patient presents with    Wound Care     Here for RN visit. Wraps tolerated well. Denies pain or concerns at this time. Currently on oral abx, tolerating well.            Current Outpatient Medications:     aspirin 81 MG Oral Tab, Take 1 tablet (81 mg total) by mouth daily., Disp: , Rfl:     No Known Allergies       HISTORY:     Past medical, surgical, family and social history updated where appropriate.    PHYSICAL EXAM:   /73   Pulse 82   Temp 99 °F (37.2 °C)   Resp 16        Vital signs reviewed.      Calf  Point of Measurement - Left Calf: 32  Point of Measurement - Right Calf: 32  Left Calf from:: Heel  Calf Left cm:: 44.1  Right Calf from:: Heel  Right Calf cm:: 44    Ankle  Point of Measurement - Left Ankle: 10  Point of Measurement - Right Ankle: 10  Left Ankle from:: Heel  Left Ankle cm:: 30     Right Ankle from:: Heel  Right Ankle cm:: 29       Wound 01/11/24 #1 Left lateral lower leg Leg Left;Lateral (Active)   Date First Assessed/Time First Assessed: 01/11/24 1406    Wound Number (Wound Clinic Only): #1 Left lateral lower leg  Primary Wound Type: Traumatic  Location: Leg  Wound Location Orientation: Left;Lateral      Assessments 1/11/2024  2:10 PM 1/15/2024  3:53 PM   Wound Image       Drainage Amount Large Copious   Drainage Description Yellow;Serous Tan;Serous   Treatments Compression Compression;Vashe   Wound Length (cm) 10.6 cm 10.7 cm   Wound Width (cm) 9.5 cm 8.5 cm   Wound Surface Area (cm^2) 100.7 cm^2 90.95 cm^2   Wound Depth (cm) 0.2 cm 0.1 cm   Wound Volume (cm^3) 20.14 cm^3 9.095 cm^3   Wound Healing % -- 55   Margins Well-defined edges Well-defined edges   Non-staged Wound Description Full thickness Full thickness   Leslie-wound Assessment Edema;Hemosiderin staining Edema;Hemosiderin staining   Wound Granulation Tissue Firm;Pink Pink;Spongy   Wound Bed Granulation (%) 40 % 20 %   Wound Bed Epithelium (%) -- 5 %   Wound Bed Slough (%) 60 % 75 %   Wound Odor None None    Tunneling? No No   Undermining? No No   Sinus Tracts? No No       Inactive Orders   Date Order Priority Status Authorizing Provider   01/11/24 1710 Debridement Traumatic Left;Lateral Leg Routine Completed Nicholas Gould MD       Wound 01/11/24 #2 Right anterior lower leg Leg Right;Lower;Anterior (Active)   Date First Assessed/Time First Assessed: 01/11/24 1406    Wound Number (Wound Clinic Only): #2 Right anterior lower leg  Primary Wound Type: Venous Ulcer  Location: Leg  Wound Location Orientation: Right;Lower;Anterior      Assessments 1/11/2024  2:11 PM 1/15/2024  3:52 PM   Wound Image       Drainage Amount Unable to assess None   Treatments Compression Compression   Wound Length (cm) 0.5 cm 0.1 cm   Wound Width (cm) 0.5 cm 0.1 cm   Wound Surface Area (cm^2) 0.25 cm^2 0.01 cm^2   Wound Depth (cm) 0.1 cm 0 cm   Wound Volume (cm^3) 0.025 cm^3 0 cm^3   Wound Healing % -- 100   Margins Well-defined edges Well-defined edges   Non-staged Wound Description Full thickness Full thickness   Leslie-wound Assessment Edema;Hemosiderin staining;Dry Edema;Hemosiderin staining;Dry   Wound Granulation Tissue Firm;Pink --   Wound Bed Granulation (%) 100 % --   Wound Odor None None   Shape -- scabbed   Tunneling? No No   Undermining? No No   Sinus Tracts? No No       No associated orders.       Compression Wrap 01/11/24 Leg Anterior;Left (Active)   Placement Date/Time: 01/11/24 1523   Location: Leg  Wound Location Orientation: Anterior;Left      Assessments 1/11/2024  2:11 PM 1/15/2024  4:25 PM   Response to Treatment Well tolerated Well tolerated   Compression Layers Multilayer Multilayer   Compression Product Type Coflex Coflex;Tubigrip/Spanda   Dressing Applied Yes Yes   Compression Wrap Location Toes to Knee Toes to Knee   Compression Wrap Status Clean;Dry;Intact Clean;Dry;Intact       No associated orders.       Compression Wrap 01/11/24 Right (Active)   Placement Date/Time: 01/11/24 1523   Wound Location Orientation: Right       Assessments 1/11/2024  2:11 PM 1/15/2024  4:25 PM   Response to Treatment Well tolerated Well tolerated   Compression Layers Multilayer Multilayer   Compression Product Type Coflex Coflex   Dressing Applied Yes Yes   Compression Wrap Location Toes to Knee Toes to Knee   Compression Wrap Status Clean;Dry;Intact Clean;Dry;Intact       No associated orders.          ASSESSMENT AND PLAN:        Risks, benefits, and alternatives of current treatment plan discussed in detail.  Questions and concerns addressed. Red flags to RTC or ED reviewed.  Patient (or parent) agrees to plan.      No follow-ups on file.  Weekly Wound Education Note    Teaching Provided To: Patient  Training Topics: Cleasing and general instructions;Compression;Discharge instructions;Dressing;Edema control  Training Method: Explain/Verbal  Training Response: Patient responds and understands;Reinforcement needed        Notes: Here for RN visit.    Here for RN visit. Stable. Edema measurements slightly decreased. Right leg: scabbed over, no drainage - coflex 2 applied. Left leg: honey alginate, kerramax, conforming gauze, tape, coflex 2 and spandagrip E. Pt states he is scheduled to see the provider on Thursday this week but is unsure if he will be able to make it due to transportation issues. Provided with ABD pads, gauze, roll, and spandagrip E x2. Educated on the the dressing change IF he were unable to come later this week. Advised to call to confirm appointment.       Tamica ZAFAR RN   1/15/2024  4:27 PM

## 2024-01-15 NOTE — PAYOR COMM NOTE
--------------  CONTINUED STAY REVIEW    Payor: [facility] OUT OF STATE PPO  Subscriber #:  [id number]  Authorization Number: [id number]    Admit date: 1/3/24  Admit time: 12:46 PM    Admitting Physician: Rica Pearson MD  Attending Physician:  Kaye Gonzalez MD  Primary Care Physician: Nader Ibrahim DO    REVIEW DOCUMENTATION:                    MEDICATIONS ADMINISTERED IN LAST 1 DAY:  AdminDate of Admission: 1/2/2024  Date of Discharge:   1/5/24     Discharge Disposition: Home or Self Care     Discharge Diagnosis:  #LLE cellulitis s/p trauma with shopping cart  IV clinda--> IV ancef per ID  Pain control  Wound care  Doppler LE NEg for VTW  # h/o hydrocephalus   #COVID +  Supportive  care     History of Present Illness:   Luis M Estrada is a 58 year old male whop presents to the hospital after getting injured at grocery store- pt notes a cart hit his left leg few days ago and since then an ulcer has formed . Pt notes minimal pain. He denies fever, chills, malaise. Upon being examined both legs are erythematous and it is actually his RLE that is warm, swollen with erythema extending posteriorly and above the knee.       Brief Synopsis:   Pt admitted treated with IV Ancef for cellulitis with clinical improvement. Incidentally found to have COVID. DC home in stable condition.            Vitals (last day) before discharge       Date/Time Temp Pulse Resp BP SpO2 Weight O2 Device O2 Flow Rate (L/min) [facility]    01/05/24 1115 97.5 °F (36.4 °C) 76 18 128/82 97 % -- None (Room air) -- [place]    01/05/24 0416 97.8 °F (36.6 °C) 74 18 134/82 97 % -- None (Room air) -- [place]    01/04/24 2102 97 °F (36.1 °C) 81 16 113/70 98 % -- None (Room air) -- [place]    01/04/24 1200 98.4 °F (36.9 °C) 75 18 125/77 97 % -- None (Room air) -- [place]    01/04/24 0509 98.2 °F (36.8 °C) 79 18 132/81 97 % -- None (Room air) -- [place]

## 2024-01-16 ENCOUNTER — TELEPHONE (OUTPATIENT)
Dept: FAMILY MEDICINE CLINIC | Facility: CLINIC | Age: 59
End: 2024-01-16

## 2024-01-18 ENCOUNTER — OFFICE VISIT (OUTPATIENT)
Dept: WOUND CARE | Facility: HOSPITAL | Age: 59
End: 2024-01-18
Attending: FAMILY MEDICINE
Payer: COMMERCIAL

## 2024-01-18 VITALS
DIASTOLIC BLOOD PRESSURE: 75 MMHG | TEMPERATURE: 99 F | RESPIRATION RATE: 16 BRPM | HEART RATE: 80 BPM | SYSTOLIC BLOOD PRESSURE: 135 MMHG

## 2024-01-18 DIAGNOSIS — S81.801D OPEN WOUND OF RIGHT LOWER LEG, SUBSEQUENT ENCOUNTER: ICD-10-CM

## 2024-01-18 DIAGNOSIS — S81.802D OPEN WOUND OF LEFT LOWER LEG, SUBSEQUENT ENCOUNTER: Primary | ICD-10-CM

## 2024-01-18 DIAGNOSIS — I87.2 CHRONIC VENOUS INSUFFICIENCY: ICD-10-CM

## 2024-01-18 DIAGNOSIS — L03.116 CELLULITIS OF LEFT LOWER EXTREMITY: ICD-10-CM

## 2024-01-18 DIAGNOSIS — I87.2 STASIS DERMATITIS OF BOTH LEGS: ICD-10-CM

## 2024-01-18 PROCEDURE — 97597 DBRDMT OPN WND 1ST 20 CM/<: CPT | Performed by: FAMILY MEDICINE

## 2024-01-18 PROCEDURE — 97598 DBRDMT OPN WND ADDL 20CM/<: CPT | Performed by: FAMILY MEDICINE

## 2024-01-18 PROCEDURE — 99214 OFFICE O/P EST MOD 30 MIN: CPT | Performed by: FAMILY MEDICINE

## 2024-01-18 NOTE — PATIENT INSTRUCTIONS
PATIENT INSTRUCTIONS      FOR Luis M Lomasd ,  3/25/1965    DATE OF SERVICE: 2024      Apply Enluxtra to the left lateral leg wound  Kerramax  Coflex 2 layer compression wrap to left lower extremity Wear your Velcro compression wrap    To the right leg and you may remove at bedtime when leg is elevated    Follow-up with Dr. Gould in 1 week      Managing your edema:    Avoid prolonged standing in one place. It is better to have your calf muscles moving to pump fluid out of the legs.  Elevate leg(s) above the level of the heart when sitting or as much as possible.  Take you diuretics as directed by your physician. Do not skip doses or change doses unless instructed to do so by your physician.  Decrease salt intake, follow recommended 2 grams daily.  Do not get leg(s) with compression wrap wet. If wraps are too tight as indicated by pain, numbness/tingling or discoloration of toes remove wrap completely and call the wound center @ [phone].       The treatment plan has been discussed at length between you and your provider. Follow all instructions carefully, it is very important. If you do not follow all instructions you are at risk of your wound not healing, infection, possible loss of limb and even loss of life.    COMPRESSION WRAP PATIENT CARE INSTRUCTIONS  DO NOT get compression wrap wet. When showering, use a shower boot.   Please contact wound clinic and if not able to reach, then go to emergency room if ANY of the following occur:   Tingling or numbness in the foot or toes on leg with compression wrap.  Severe pain that cannot be relieved with elevation and any medication instructed per your doctor.  A fever of 100.4°F (38°C) or higher.  Swelling, coldness, or blue-gray discoloration of the toes.  If the compression wrap feels too tight or too loose.  If the compression wrap is damaged or has rough edges that hurt.  If the compression wrap gets wet, you must cut wrap off.   Drainage from  compression wrap that smells different than usual.      Nurse visit on Monday 1/15/24    Follow up with Dr. Gould 1 WEEK

## 2024-01-18 NOTE — PROGRESS NOTES
Chief Complaint   Patient presents with    Wound Care     Patient is here for a wound care follow up. He denies any current pain to the wound.        HPI:     Patient is here for follow-up of bilateral leg wounds.  He is doing fine and tolerating compression wrap that was applied last visit.  He did have a nurse visit and Enluxtra was switched to honey alginate dressing.  He also received his Velcro compression wraps in the mail for both legs and has brought them to the clinic.    Lab Results   Component Value Date    BUN 18 01/03/2024    CREATSERUM 0.88 01/03/2024    ALB 3.0 (L) 01/02/2024    TP 7.9 01/02/2024    A1C 5.2 12/12/2016         Current Outpatient Medications:     aspirin 81 MG Oral Tab, Take 1 tablet (81 mg total) by mouth daily., Disp: , Rfl:     No Known Allergies         REVIEW OF SYSTEMS:     Review of Systems (ROS)  This information was obtained from the patient, chart    A comprehensive 10 point review of systems was completed.  Pertinent positives and negatives noted in the the HPI.     Past medical, surgical, family and social history updated where appropriate.    PHYSICAL EXAM:   /75   Pulse 80   Temp 98.7 °F (37.1 °C)   Resp 16    Estimated body mass index is 45.42 kg/m² as calculated from the following:    Height as of 1/10/24: 67\".    Weight as of 1/10/24: 290 lb (131.5 kg).   Vital signs reviewed.Appears stated age, well groomed.      Awake, alert, in no acute distress  HENT:  normocephalic, atraumatic, external ear canals clear bilaterally, tympanic membranes appear opaque, non-bulging, non-erythematous, nasal turbinates are non-inflamed and not swollen, oropharynx without erythema, swelling, or exudate, gingiva and lips without any apparent lesions.   Cardiac:  S1 S2 regular rate and rhythm, no murmur, gallop, or rub  Respiratory:  Bilaterally clear to auscultation, no chest tenderness to palpation, no wheezing, no rhonchi, no rales, air entry is adequate, no accessory  respiratory muscle use, no chest asymmetry, normal excursion.  GI:  bowel sound positive in all four quadrants, abdomen is soft, non-tender, non-distended, no hepatosplenomegaly, no abnormal aortic pulsation.     Calf  Point of Measurement - Left Calf: 32  Point of Measurement - Right Calf: 32  Left Calf from:: Heel  Calf Left cm:: 41.9  Right Calf from:: Heel  Right Calf cm:: 41.4    Ankle  Point of Measurement - Left Ankle: 10  Point of Measurement - Right Ankle: 10  Left Ankle from:: Heel  Left Ankle cm:: 30     Right Ankle from:: Heel  Right Ankle cm:: 28.8       Foot                            Wound 01/11/24 #1 Left lateral lower leg Leg Left;Lateral (Active)   Date First Assessed/Time First Assessed: 01/11/24 1406    Wound Number (Wound Clinic Only): #1 Left lateral lower leg  Primary Wound Type: Traumatic  Location: Leg  Wound Location Orientation: Left;Lateral      Assessments 1/11/2024  2:10 PM 1/18/2024  2:27 PM   Wound Image       Drainage Amount Large --   Drainage Description Yellow;Serous --   Treatments Compression --   Wound Length (cm) 10.6 cm 10.9 cm   Wound Width (cm) 9.5 cm 8.5 cm   Wound Surface Area (cm^2) 100.7 cm^2 92.65 cm^2   Wound Depth (cm) 0.2 cm 0.1 cm   Wound Volume (cm^3) 20.14 cm^3 9.265 cm^3   Wound Healing % -- 54   Margins Well-defined edges --   Non-staged Wound Description Full thickness --   Leslie-wound Assessment Edema;Hemosiderin staining --   Wound Granulation Tissue Firm;Pink --   Wound Bed Granulation (%) 40 % --   Wound Bed Slough (%) 60 % --   Wound Odor None --   Tunneling? No --   Undermining? No --   Sinus Tracts? No --       Inactive Orders   Date Order Priority Status Authorizing Provider   01/18/24 1511 Debridement Traumatic Left;Lateral Leg Routine Completed Nicholas Gould MD   01/11/24 1710 Debridement Traumatic Left;Lateral Leg Routine Completed Nicholas Gould MD       Wound 01/11/24 #2 Right anterior lower leg Leg Right;Lower;Anterior (Active)   Date First  Assessed/Time First Assessed: 01/11/24 1406    Wound Number (Wound Clinic Only): #2 Right anterior lower leg  Primary Wound Type: Venous Ulcer  Location: Leg  Wound Location Orientation: Right;Lower;Anterior  Wound Outcome: Healed      Assessments 1/11/2024  2:11 PM 1/18/2024  2:28 PM   Wound Image       Drainage Amount Unable to assess None   Treatments Compression --   Wound Length (cm) 0.5 cm 0 cm   Wound Width (cm) 0.5 cm 0 cm   Wound Surface Area (cm^2) 0.25 cm^2 0 cm^2   Wound Depth (cm) 0.1 cm 0 cm   Wound Volume (cm^3) 0.025 cm^3 0 cm^3   Wound Healing % -- 100   Margins Well-defined edges Flat and Intact   Non-staged Wound Description Full thickness Full thickness   Leslie-wound Assessment Edema;Hemosiderin staining;Dry Edema;Hemosiderin staining   Wound Granulation Tissue Firm;Pink --   Wound Bed Granulation (%) 100 % --   Wound Bed Epithelium (%) -- 100 %   Wound Odor None None   Tunneling? No No   Undermining? No No   Sinus Tracts? No No       No associated orders.       Compression Wrap 01/11/24 Leg Anterior;Left (Active)   Placement Date/Time: 01/11/24 1523   Location: Leg  Wound Location Orientation: Anterior;Left      Assessments 1/11/2024  2:11 PM 1/15/2024  4:25 PM   Response to Treatment Well tolerated Well tolerated   Compression Layers Multilayer Multilayer   Compression Product Type Coflex Coflex;Tubigrip/Spanda   Dressing Applied Yes Yes   Compression Wrap Location Toes to Knee Toes to Knee   Compression Wrap Status Clean;Dry;Intact Clean;Dry;Intact       No associated orders.          ASSESSMENT AND PLAN:      1. Open wound of left lower leg, subsequent encounter    2. Open wound of right lower leg, subsequent encounter    3. Cellulitis of left lower extremity    4. Chronic venous insufficiency    5. Stasis dermatitis of both legs    Clinically the left lateral leg wound is doing better and there is less slough noted in the wound base.  More granulation tissue noted the wound was debrided with  a curette and some of the slough was removed.  Selective debridement was done.  Silver nitrate was used to control bleeding and achieve hemostasis in various areas at the edges of the wound.  Will apply Enluxtra to the wound base and remove packing and apply Kerramax over that.  Continue Coflex 2 layer compression wrap to left lower extremity.  Compression wrap and symptoms to watch out for were discussed.  Written instructions given.  Patient will follow-up now in 1 week.        The right anterior leg wound seems to be completely healed now no further wound opening noted no drainage.  We did apply a Velcro compression garment and instructed patient on how to do that on the right lower extremity.  He should wear that in the daytime and remove at bedtime when leg is elevated.      Risks, benefits, and alternatives of current treatment plan discussed in detail.  Questions and concerns addressed. Red flags to RTC or ED reviewed.  Patient (or parent) agrees to plan.      No follow-ups on file.    Also refer to patient instructions.     I spent a total of 30 minutes with this patient's care.  This time included preparing to see the patient (eg, review notes and recent diagnostics), seeing the patient, performing a medically appropriate examination and/or evaluation, counseling and educating the patient, and documenting in the record.          Nicholas Gould M.D.   [facility]   2024    Patient Instructions       PATIENT INSTRUCTIONS      FOR RICK Blandon 3/25/1965    DATE OF SERVICE: 2024      Apply Enluxtra to the left lateral leg wound  Kerramax  Coflex 2 layer compression wrap to left lower extremity Wear your Velcro compression wrap    To the right leg and you may remove at bedtime when leg is elevated    Follow-up with Dr. Gould in 1 week      Managing your edema:    Avoid prolonged standing in one place. It is better to have your calf muscles moving to pump fluid out  of the legs.  Elevate leg(s) above the level of the heart when sitting or as much as possible.  Take you diuretics as directed by your physician. Do not skip doses or change doses unless instructed to do so by your physician.  Decrease salt intake, follow recommended 2 grams daily.  Do not get leg(s) with compression wrap wet. If wraps are too tight as indicated by pain, numbness/tingling or discoloration of toes remove wrap completely and call the wound center @ [phone].       The treatment plan has been discussed at length between you and your provider. Follow all instructions carefully, it is very important. If you do not follow all instructions you are at risk of your wound not healing, infection, possible loss of limb and even loss of life.    COMPRESSION WRAP PATIENT CARE INSTRUCTIONS  DO NOT get compression wrap wet. When showering, use a shower boot.   Please contact wound clinic and if not able to reach, then go to emergency room if ANY of the following occur:   Tingling or numbness in the foot or toes on leg with compression wrap.  Severe pain that cannot be relieved with elevation and any medication instructed per your doctor.  A fever of 100.4°F (38°C) or higher.  Swelling, coldness, or blue-gray discoloration of the toes.  If the compression wrap feels too tight or too loose.  If the compression wrap is damaged or has rough edges that hurt.  If the compression wrap gets wet, you must cut wrap off.   Drainage from compression wrap that smells different than usual.      Nurse visit on Monday 1/15/24    Follow up with Dr. Gould 1 WEEK                    MISCELLANEOUS INSTRUCTIONS  Supplement with a daily multivitamin   Low salt diet  Intense blood sugar control - Goal Blood sugar below 180 at all times recommended.  Increase protein intake / consider protein supplements - see below  Elevate extremities at all times when sitting / laying down.  No tobacco use     DIETARY MODIFICATIONS TO HELP WITH  WOUND HEALING:          Please follow any restrictions to diet given by your other doctors     Protein: meats, beans, eggs, milk and yogurt particularly Greek yogurt), tofu, soy nuts, soy protein products     Vitamin C: citrus fruits and juices, strawberries, tomatoes, tomato juice, peppers, baked potatoes, spinach, broccoli, cauliflower, Boyers sprouts, cabbage     Vitamin A: dark greens, leafy vegetables, orange or yellow vegetables, cantaloupe, fortified dairy products, liver, fortified cereals     Zinc: fortified cereals, red meats, seafood     Consider Bob [employer] (These are essential branch chain amino acids that help with tissue building and wound healing) and take 2 packets/day. You can order online at [employer] or [email or web address]     ADDITIONAL REMINDERS:     The treatment plan has been discussed at length with you and your provider. Follow all instructions carefully, it is very important. If you do not follow all instructions, you are at risk of your wound not healing, infection, possible loss of limb and even loss of life.  Please call the clinic at [phone] during regular business hours ( 7:30 AM - 5:30 PM) if you notice increased redness, warmth, pain or pus like drainage or start running a fever greater than 100.3.    For after hour emergencies, please call your primary physician or go to the nearest emergency room.  If your blood pressure is elevated, follow up with your primary care doctor and/or cardiologist as soon as possible.     FOR LEG SWELLING,  LOWER EXTREMITY WOUNDS AND IF USING COMPRESSION:   Managing your edema:    Avoid prolonged standing in one place. It is better to have your calf muscles moving to pump fluid out of the legs.  Elevate leg(s) above the level of the heart when sitting or as much as possible.  Take you diuretics as directed by your physician. Do not skip doses or change doses unless instructed to do so by your physician.  Decrease salt intake, follow recommended 2 grams  daily.  Do not get leg(s) with compression wrap wet. If wraps are too tight as indicated by pain, numbness/tingling or discoloration of toes remove wrap completely and call the wound center @ [phone].       The treatment plan has been discussed at length between you and your provider. Follow all instructions carefully, it is very important. If you do not follow all instructions you are at risk of your wound not healing, infection, possible loss of limb and even loss of life.    COMPRESSION WRAP PATIENT CARE INSTRUCTIONS  DO NOT get compression wrap wet. When showering, use a shower boot.   Please contact wound clinic and if not able to reach, then go to emergency room if ANY of the following occur:   Tingling or numbness in the foot or toes on leg with compression wrap.  Severe pain that cannot be relieved with elevation and any medication instructed per your doctor.  A fever of 100.4°F (38°C) or higher.  Swelling, coldness, or blue-gray discoloration of the toes.  If the compression wrap feels too tight or too loose.  If the compression wrap is damaged or has rough edges that hurt.  If the compression wrap gets wet, you must cut wrap off.   Drainage from compression wrap that smells different than usual.

## 2024-01-18 NOTE — PROGRESS NOTES
Patient ID: Luis M Estrada is a 58 year old male.    Debridement Traumatic Left;Lateral Leg   Wound 01/11/24 #1 Left lateral lower leg Leg Left;Lateral    Performed by: Nicholas Gould MD  Authorized by: Nicholas Gould MD      Consent   Consent obtained? verbal  Consent given by: patient  Risks discussed? procedural risks discussed    Debridement Details  Performed by: physician  Debridement type: conservative sharp  Pain control: lidocaine 4%  Pain control administration type: topical    Pre-debridement measurements  Length (cm): 10.8  Width (cm): 8.4  Depth (cm): 0.1  Surface Area (cm^2): 90.72    Post-debridement measurements  Length (cm): 10.9  Width (cm): 8.5  Depth (cm): 0.1  Percent debrided: 90%  Surface Area (cm^2): 92.65  Area Debrided (cm^2): 83.39  Volume (cm^3): 9.27    Tissue and other material debrided: subcutaneous tissue  Devitalized tissue debrided: biofilm and slough  Instrument(s) utilized: curette  Bleeding: medium  Hemostasis obtained with: silver nitrate and pressure  Response to treatment: procedure was tolerated well

## 2024-01-18 NOTE — PROGRESS NOTES
.Weekly Wound Education Note    Teaching Provided To: Patient  Training Topics: Discharge instructions;Cleasing and general instructions;Edema control;Dressing;Compression  Training Method: Demonstration;Explain/Verbal  Training Response: Reinforcement needed        Notes: Pt here for follow up wound care visit to right and left lower leg wounds. Per provider right lower leg wound is healed. Left lower leg wound debrided at visit. Treatment changed back to enluxtra with back off (x2), kerramax, conforming gauze, tape. Applied coflex 2 layer wrap to LLE. Transitioned patient into compression garment to RLE. Pt educated on how to care for wraps and provided print out paperwork about wrap care. Pt to follow up with provider in 1 week.

## 2024-01-19 ENCOUNTER — TELEPHONE (OUTPATIENT)
Dept: FAMILY MEDICINE CLINIC | Facility: CLINIC | Age: 59
End: 2024-01-19

## 2024-01-19 NOTE — TELEPHONE ENCOUNTER
Pt called back. Pt returned to work 1/16/24. Pt was cleared by Dr. Gould.  OV notes from 1/2 - 1/16/24 between ED visit, Dr. Ibrahim, and Dr. Gould were printed and faxed to [employer]

## 2024-01-19 NOTE — TELEPHONE ENCOUNTER
LVM letting Pt know that we received a fax from [employer] claims requesting OV notes and to cb office to let us know if Pt is back to work or if he has a date to go back to work.

## 2024-01-22 ENCOUNTER — TELEPHONE (OUTPATIENT)
Dept: WOUND CARE | Facility: HOSPITAL | Age: 59
End: 2024-01-22

## 2024-01-22 NOTE — TELEPHONE ENCOUNTER
RN received information from the  desk stating patient has canceled his appointment this week. RN placed call to patient to inform him that since he is unable to make his appointment this week that he needs to remove all of his wraps on Thursday. Pt states understanding. RN informed patient to remove all wraps and place on new dressing that were provided to him at his previous visit, pt states he has dressings at home. RN informed patient to also apply tubigrips to BLE for edema management apply in the morning and remove at night. Pt states understanding.

## 2024-01-25 ENCOUNTER — APPOINTMENT (OUTPATIENT)
Dept: WOUND CARE | Facility: HOSPITAL | Age: 59
End: 2024-01-25
Attending: FAMILY MEDICINE
Payer: COMMERCIAL

## 2024-01-31 ENCOUNTER — TELEPHONE (OUTPATIENT)
Dept: WOUND CARE | Facility: HOSPITAL | Age: 59
End: 2024-01-31

## 2024-02-15 ENCOUNTER — OFFICE VISIT (OUTPATIENT)
Dept: WOUND CARE | Facility: HOSPITAL | Age: 59
End: 2024-02-15
Attending: FAMILY MEDICINE
Payer: COMMERCIAL

## 2024-02-15 VITALS
SYSTOLIC BLOOD PRESSURE: 142 MMHG | HEART RATE: 73 BPM | TEMPERATURE: 98 F | RESPIRATION RATE: 16 BRPM | DIASTOLIC BLOOD PRESSURE: 89 MMHG

## 2024-02-15 DIAGNOSIS — M79.89 LEG SWELLING: ICD-10-CM

## 2024-02-15 DIAGNOSIS — R03.0 ELEVATED BLOOD PRESSURE READING: ICD-10-CM

## 2024-02-15 DIAGNOSIS — I87.2 CHRONIC VENOUS INSUFFICIENCY: ICD-10-CM

## 2024-02-15 DIAGNOSIS — E66.01 CLASS 3 SEVERE OBESITY DUE TO EXCESS CALORIES WITH SERIOUS COMORBIDITY AND BODY MASS INDEX (BMI) OF 45.0 TO 49.9 IN ADULT (HCC): ICD-10-CM

## 2024-02-15 DIAGNOSIS — S81.802D OPEN WOUND OF LEFT LOWER LEG, SUBSEQUENT ENCOUNTER: Primary | ICD-10-CM

## 2024-02-15 PROCEDURE — 99214 OFFICE O/P EST MOD 30 MIN: CPT | Performed by: FAMILY MEDICINE

## 2024-02-15 PROCEDURE — 97597 DBRDMT OPN WND 1ST 20 CM/<: CPT | Performed by: FAMILY MEDICINE

## 2024-02-15 PROCEDURE — 97598 DBRDMT OPN WND ADDL 20CM/<: CPT | Performed by: FAMILY MEDICINE

## 2024-02-15 NOTE — PROGRESS NOTES
.Weekly Wound Education Note    Teaching Provided To: Patient  Training Topics: Edema control;Cleasing and general instructions;Discharge instructions;Dressing;Compression  Training Method: Demonstration;Explain/Verbal  Training Response: Reinforcement needed        Notes: Pt here for follow up wound care visit to left lower leg. Pt arrived with visit with bordered foam dressing and his own compression stocking. Last provider visit was 1/18/24. Edema measurement increased.  Provider recommending patient to use honey alginate to wound bed covered with kerramax, baby diaper, conforming gauze, tape. Applied coflex 2 layer wrap to LLE. Pt to follow up with provider in 1 week. RN provided patient with print out information on how to care for wraps, reeducated patient he states understanding. Pt does have issues with transportation so RN did provide him with extra dressing of eluxtra, ABD, kerlix in case he is unable to make his upcoming appointment. Educated pt to elevate legs above the level of the heart.

## 2024-02-15 NOTE — PATIENT INSTRUCTIONS
PATIENT INSTRUCTIONS      FOR Luis M Estrada ,  3/25/1965    DATE OF SERVICE: 2/15/2024      Apply Honey Alginate to wound base  Kerramax  Coflex 2 layer compression wrap to left lower extremity Wear your Velcro compression wrap    To the right leg and you may remove at bedtime when leg is elevated    Follow-up with Dr. Gould in 1 week, if you cannot make the appt, please let us know and you must cut the wrap off after 7 days.      Managing your edema:    Avoid prolonged standing in one place. It is better to have your calf muscles moving to pump fluid out of the legs.  Elevate leg(s) above the level of the heart when sitting or as much as possible.  Take you diuretics as directed by your physician. Do not skip doses or change doses unless instructed to do so by your physician.  Decrease salt intake, follow recommended 2 grams daily.  Do not get leg(s) with compression wrap wet. If wraps are too tight as indicated by pain, numbness/tingling or discoloration of toes remove wrap completely and call the wound center @ [phone].       The treatment plan has been discussed at length between you and your provider. Follow all instructions carefully, it is very important. If you do not follow all instructions you are at risk of your wound not healing, infection, possible loss of limb and even loss of life.    COMPRESSION WRAP PATIENT CARE INSTRUCTIONS  DO NOT get compression wrap wet. When showering, use a shower boot.   Please contact wound clinic and if not able to reach, then go to emergency room if ANY of the following occur:   Tingling or numbness in the foot or toes on leg with compression wrap.  Severe pain that cannot be relieved with elevation and any medication instructed per your doctor.  A fever of 100.4°F (38°C) or higher.  Swelling, coldness, or blue-gray discoloration of the toes.  If the compression wrap feels too tight or too loose.  If the compression wrap is damaged or has rough edges that  hurt.  If the compression wrap gets wet, you must cut wrap off.   Drainage from compression wrap that smells different than usual.      Nurse visit on Monday 1/15/24    Follow up with Dr. Gould 1 WEEK

## 2024-02-15 NOTE — PROGRESS NOTES
Patient ID: Luis M Estrada is a 58 year old male.    Debridement Traumatic Left;Lateral Leg   Wound 01/11/24 #1 Left lateral lower leg Leg Left;Lateral    Performed by: Nicholas Gould MD  Authorized by: Nicholas Gould MD      Consent   Consent obtained? verbal  Consent given by: patient  Risks discussed? procedural risks discussed    Debridement Details  Performed by: physician  Debridement type: conservative sharp  Pain control: lidocaine 2%  Pain control administration type: topical    Pre-debridement measurements  Length (cm): 10.2  Width (cm): 9  Depth (cm): 0.1  Surface Area (cm^2): 91.8    Post-debridement measurements  Length (cm): 10.3  Width (cm): 9.1  Depth (cm): 0.1  Percent debrided: 100%  Surface Area (cm^2): 93.73  Area Debrided (cm^2): 93.73  Volume (cm^3): 9.37    Tissue and other material debrided: subcutaneous tissue  Devitalized tissue debrided: biofilm and slough  Instrument(s) utilized: curette  Bleeding: small  Hemostasis obtained with: not applicable  Response to treatment: procedure was tolerated well

## 2024-02-15 NOTE — PROGRESS NOTES
Chief Complaint   Patient presents with    Wound Care     Patient is here for a wound care follow up. He denies any pain or new wound concerns.       HPI:     Patient here for follow-up of left lateral leg wound.  He has not been seen here over the past 3 to 4 weeks due to transportation issues.  He did have Enluxtra and Coflex 2 layer wrap applied about 4 weeks ago and he had removed it after 1 week.    Lab Results   Component Value Date    BUN 18 01/03/2024    CREATSERUM 0.88 01/03/2024    ALB 3.0 (L) 01/02/2024    TP 7.9 01/02/2024    A1C 5.2 12/12/2016         Current Outpatient Medications:     aspirin 81 MG Oral Tab, Take 1 tablet (81 mg total) by mouth daily., Disp: , Rfl:     No Known Allergies         REVIEW OF SYSTEMS:     Review of Systems (ROS)  This information was obtained from the patient, chart    A comprehensive 10 point review of systems was completed.  Pertinent positives and negatives noted in the the HPI.     Past medical, surgical, family and social history updated where appropriate.    PHYSICAL EXAM:   /89   Pulse 73   Temp 98.4 °F (36.9 °C)   Resp 16    Estimated body mass index is 45.42 kg/m² as calculated from the following:    Height as of 1/10/24: 67\".    Weight as of 1/10/24: 290 lb (131.5 kg).   Vital signs reviewed.Appears stated age, well groomed.      Awake, alert, in no acute distress  HENT:  normocephalic, atraumatic, external ear canals clear bilaterally, tympanic membranes appear opaque, non-bulging, non-erythematous, nasal turbinates are non-inflamed and not swollen, oropharynx without erythema, swelling, or exudate, gingiva and lips without any apparent lesions.   Cardiac:  S1 S2 regular rate and rhythm, no murmur, gallop, or rub  Respiratory:  Bilaterally clear to auscultation, no chest tenderness to palpation, no wheezing, no rhonchi, no rales, air entry is adequate, no accessory respiratory muscle use, no chest asymmetry, normal excursion.  GI:  bowel sound positive  in all four quadrants, abdomen is soft, non-tender, non-distended, no hepatosplenomegaly, no abnormal aortic pulsation.     Calf  Point of Measurement - Left Calf: 32     Left Calf from:: Heel  Calf Left cm:: 48.2          Ankle  Point of Measurement - Left Ankle: 10     Left Ankle from:: Heel  Left Ankle cm:: 32                Foot                            Wound 01/11/24 #1 Left lateral lower leg Leg Left;Lateral (Active)   Date First Assessed/Time First Assessed: 01/11/24 1406    Wound Number (Wound Clinic Only): #1 Left lateral lower leg  Primary Wound Type: Traumatic  Location: Leg  Wound Location Orientation: Left;Lateral      Assessments 1/11/2024  2:10 PM 2/15/2024 11:02 AM   Wound Image       Drainage Amount Large --   Drainage Description Yellow;Serous --   Treatments Compression --   Wound Length (cm) 10.6 cm 10.3 cm   Wound Width (cm) 9.5 cm 9.1 cm   Wound Surface Area (cm^2) 100.7 cm^2 93.73 cm^2   Wound Depth (cm) 0.2 cm 0.1 cm   Wound Volume (cm^3) 20.14 cm^3 9.373 cm^3   Wound Healing % -- 53   Margins Well-defined edges --   Non-staged Wound Description Full thickness --   Leslie-wound Assessment Edema;Hemosiderin staining --   Wound Granulation Tissue Firm;Pink --   Wound Bed Granulation (%) 40 % --   Wound Bed Slough (%) 60 % --   Wound Odor None --   Tunneling? No --   Undermining? No --   Sinus Tracts? No --       Inactive Orders   Date Order Priority Status Authorizing Provider   02/15/24 1154 Debridement Traumatic Left;Lateral Leg Routine Completed Nicholas Gould MD   01/18/24 1511 Debridement Traumatic Left;Lateral Leg Routine Completed Nicholas Gould MD   01/11/24 1710 Debridement Traumatic Left;Lateral Leg Routine Completed Nicholas Gould MD       Compression Wrap 01/11/24 Leg Anterior;Left (Active)   Placement Date/Time: 01/11/24 1523   Location: Leg  Wound Location Orientation: Anterior;Left      Assessments 1/11/2024  2:11 PM 2/15/2024 11:01 AM   Response to Treatment Well tolerated  Well tolerated   Compression Layers Multilayer Multilayer   Compression Product Type Coflex Coflex   Dressing Applied Yes Yes   Compression Wrap Location Toes to Knee Toes to Knee   Compression Wrap Status Clean;Dry;Intact Clean;Dry;Intact       No associated orders.          ASSESSMENT AND PLAN:      1. Open wound of left lower leg, subsequent encounter    2. Chronic venous insufficiency    3. Leg swelling    4. Class 3 severe obesity due to excess calories with serious comorbidity and body mass index (BMI) of 45.0 to 49.9 in adult (HCC)    5. Elevated blood pressure reading    Importance of keeping follow-up appointments are discussed.  He does have transportation issues.  Someone brings him here to the appointment.  Wound was selectively debrided with a curette today see note.  He does have chronic leg swelling and this is increased and will need to be controlled better.  Will use honey alginate into the wound base and cover with Coflex 2 layer compression wrap once again.  Patient tolerated well in the past.  We did discuss compression wrap in details and symptoms to watch out for.  He did discuss a weight reduction as well.    Weight reduction can be achieved by proper diet and exercise as well as following recommendations of primary care doctor and/or weight loss specialist.  This can be very helpful for controlling swelling in the legs and wound healing.    Blood pressure is borderline high and will need to be monitored and rechecked.  Risks, benefits, and alternatives of current treatment plan discussed in detail.  Questions and concerns addressed. Red flags to RTC or ED reviewed.  Patient (or parent) agrees to plan.      Return in about 1 week (around 2/22/2024).    Also refer to patient instructions.     I spent a total of 30 minutes with this patient's care.  This time included preparing to see the patient (eg, review notes and recent diagnostics), seeing the patient, performing a medically appropriate  examination and/or evaluation, counseling and educating the patient, and documenting in the record.          Nicholas Gould M.D.   [facility]   2/15/2024    Patient Instructions       PATIENT INSTRUCTIONS      FOR Luis M Estrada RICK 3/25/1965    DATE OF SERVICE: 2/15/2024      Apply Honey Alginate to wound base  Kerramax  Coflex 2 layer compression wrap to left lower extremity Wear your Velcro compression wrap    To the right leg and you may remove at bedtime when leg is elevated    Follow-up with Dr. Gould in 1 week, if you cannot make the appt, please let us know and you must cut the wrap off after 7 days.      Managing your edema:    Avoid prolonged standing in one place. It is better to have your calf muscles moving to pump fluid out of the legs.  Elevate leg(s) above the level of the heart when sitting or as much as possible.  Take you diuretics as directed by your physician. Do not skip doses or change doses unless instructed to do so by your physician.  Decrease salt intake, follow recommended 2 grams daily.  Do not get leg(s) with compression wrap wet. If wraps are too tight as indicated by pain, numbness/tingling or discoloration of toes remove wrap completely and call the wound center @ [phone].       The treatment plan has been discussed at length between you and your provider. Follow all instructions carefully, it is very important. If you do not follow all instructions you are at risk of your wound not healing, infection, possible loss of limb and even loss of life.    COMPRESSION WRAP PATIENT CARE INSTRUCTIONS  DO NOT get compression wrap wet. When showering, use a shower boot.   Please contact wound clinic and if not able to reach, then go to emergency room if ANY of the following occur:   Tingling or numbness in the foot or toes on leg with compression wrap.  Severe pain that cannot be relieved with elevation and any medication instructed per your doctor.  A fever  of 100.4°F (38°C) or higher.  Swelling, coldness, or blue-gray discoloration of the toes.  If the compression wrap feels too tight or too loose.  If the compression wrap is damaged or has rough edges that hurt.  If the compression wrap gets wet, you must cut wrap off.   Drainage from compression wrap that smells different than usual.      Nurse visit on Monday 1/15/24    Follow up with Dr. Gould 1 WEEK                  MISCELLANEOUS INSTRUCTIONS  Supplement with a daily multivitamin   Low salt diet  Intense blood sugar control - Goal Blood sugar below 180 at all times recommended.  Increase protein intake / consider protein supplements - see below  Elevate extremities at all times when sitting / laying down.  No tobacco use     DIETARY MODIFICATIONS TO HELP WITH WOUND HEALING:          Please follow any restrictions to diet given by your other doctors     Protein: meats, beans, eggs, milk and yogurt particularly Greek yogurt), tofu, soy nuts, soy protein products     Vitamin C: citrus fruits and juices, strawberries, tomatoes, tomato juice, peppers, baked potatoes, spinach, broccoli, cauliflower, Stewart sprouts, cabbage     Vitamin A: dark greens, leafy vegetables, orange or yellow vegetables, cantaloupe, fortified dairy products, liver, fortified cereals     Zinc: fortified cereals, red meats, seafood     Consider Bob by [employer] (These are essential branch chain amino acids that help with tissue building and wound healing) and take 2 packets/day. You can order online at [employer] or [employer]     ADDITIONAL REMINDERS:     The treatment plan has been discussed at length with you and your provider. Follow all instructions carefully, it is very important. If you do not follow all instructions, you are at risk of your wound not healing, infection, possible loss of limb and even loss of life.  Please call the clinic at [phone] during regular business hours ( 7:30 AM - 5:30 PM) if you notice increased redness,  warmth, pain or pus like drainage or start running a fever greater than 100.3.    For after hour emergencies, please call your primary physician or go to the nearest emergency room.  If your blood pressure is elevated, follow up with your primary care doctor and/or cardiologist as soon as possible.     FOR LEG SWELLING,  LOWER EXTREMITY WOUNDS AND IF USING COMPRESSION:   Managing your edema:    Avoid prolonged standing in one place. It is better to have your calf muscles moving to pump fluid out of the legs.  Elevate leg(s) above the level of the heart when sitting or as much as possible.  Take you diuretics as directed by your physician. Do not skip doses or change doses unless instructed to do so by your physician.  Decrease salt intake, follow recommended 2 grams daily.  Do not get leg(s) with compression wrap wet. If wraps are too tight as indicated by pain, numbness/tingling or discoloration of toes remove wrap completely and call the wound center @ [phone].       The treatment plan has been discussed at length between you and your provider. Follow all instructions carefully, it is very important. If you do not follow all instructions you are at risk of your wound not healing, infection, possible loss of limb and even loss of life.    COMPRESSION WRAP PATIENT CARE INSTRUCTIONS  DO NOT get compression wrap wet. When showering, use a shower boot.   Please contact wound clinic and if not able to reach, then go to emergency room if ANY of the following occur:   Tingling or numbness in the foot or toes on leg with compression wrap.  Severe pain that cannot be relieved with elevation and any medication instructed per your doctor.  A fever of 100.4°F (38°C) or higher.  Swelling, coldness, or blue-gray discoloration of the toes.  If the compression wrap feels too tight or too loose.  If the compression wrap is damaged or has rough edges that hurt.  If the compression wrap gets wet, you must cut wrap off.    Drainage from compression wrap that smells different than usual.

## 2024-02-22 ENCOUNTER — OFFICE VISIT (OUTPATIENT)
Dept: WOUND CARE | Facility: HOSPITAL | Age: 59
End: 2024-02-22
Attending: FAMILY MEDICINE
Payer: COMMERCIAL

## 2024-02-22 VITALS
TEMPERATURE: 99 F | RESPIRATION RATE: 16 BRPM | SYSTOLIC BLOOD PRESSURE: 146 MMHG | DIASTOLIC BLOOD PRESSURE: 85 MMHG | HEART RATE: 83 BPM

## 2024-02-22 DIAGNOSIS — M79.89 LEG SWELLING: ICD-10-CM

## 2024-02-22 DIAGNOSIS — S81.802D OPEN WOUND OF LEFT LOWER LEG, SUBSEQUENT ENCOUNTER: Primary | ICD-10-CM

## 2024-02-22 DIAGNOSIS — E66.01 CLASS 3 SEVERE OBESITY DUE TO EXCESS CALORIES WITH SERIOUS COMORBIDITY AND BODY MASS INDEX (BMI) OF 45.0 TO 49.9 IN ADULT (HCC): ICD-10-CM

## 2024-02-22 DIAGNOSIS — I87.2 CHRONIC VENOUS INSUFFICIENCY: ICD-10-CM

## 2024-02-22 PROCEDURE — 99215 OFFICE O/P EST HI 40 MIN: CPT | Performed by: FAMILY MEDICINE

## 2024-02-22 NOTE — PATIENT INSTRUCTIONS
PATIENT INSTRUCTIONS      FOR Luis M RODRIGUEZ RICK Estrada 3/25/1965    DATE OF SERVICE: 2024      Home Health to do dressing changes on  and  except when seen in wound clinic.     Apply Honey Alginate to wound base  Kerramax  Coflex 2 layer compression wrap to left lower extremity Wear your Velcro compression wrap    To the right leg and you may remove at bedtime when leg is elevated    Nurse visit in 1 week in wound clinic    Dr. Gould in 2 weeks.       Managing your edema:    Avoid prolonged standing in one place. It is better to have your calf muscles moving to pump fluid out of the legs.  Elevate leg(s) above the level of the heart when sitting or as much as possible.  Take you diuretics as directed by your physician. Do not skip doses or change doses unless instructed to do so by your physician.  Decrease salt intake, follow recommended 2 grams daily.  Do not get leg(s) with compression wrap wet. If wraps are too tight as indicated by pain, numbness/tingling or discoloration of toes remove wrap completely and call the wound center @ [phone].       The treatment plan has been discussed at length between you and your provider. Follow all instructions carefully, it is very important. If you do not follow all instructions you are at risk of your wound not healing, infection, possible loss of limb and even loss of life.    COMPRESSION WRAP PATIENT CARE INSTRUCTIONS  DO NOT get compression wrap wet. When showering, use a shower boot.   Please contact wound clinic and if not able to reach, then go to emergency room if ANY of the following occur:   Tingling or numbness in the foot or toes on leg with compression wrap.  Severe pain that cannot be relieved with elevation and any medication instructed per your doctor.  A fever of 100.4°F (38°C) or higher.  Swelling, coldness, or blue-gray discoloration of the toes.  If the compression wrap feels too tight or too loose.  If the compression wrap  is damaged or has rough edges that hurt.  If the compression wrap gets wet, you must cut wrap off.   Drainage from compression wrap that smells different than usual.      Nurse visit on Monday 1/15/24    Follow up with Dr. Gould 1 WEEK

## 2024-02-22 NOTE — PROGRESS NOTES
.Weekly Wound Education Note    Teaching Provided To: Patient  Training Topics: Discharge instructions;Cleasing and general instructions;Dressing;Edema control;Compression;Home health  Training Method: Demonstration;Explain/Verbal  Training Response: Reinforcement needed        Notes: Pt here for follow up wound care visit to left lower leg wound. Edema measurement decreased, wound measurment increased. Provider debrided wound at visit. Treatment to continue with honey alginate (x3) to wound bed , covered with hydrofera transfer (x1), kerramax (x2), baby diaper (x1), kerlix, tape. Applied coflex 2 layer wrap to LLE with spandagrip (F) from toes to below the knee. Patient is scheduled for a nurse visit in 1 week- Thursday. Patient has a very hard time with getting transportation to the clinic- he does not drive at all and relies on other people to bring him to his appointment. RN is going to try and place orders to a home health company to help patient with dressing changes.

## 2024-02-22 NOTE — PROGRESS NOTES
Chief Complaint   Patient presents with    Wound Care     Follow-up for wound to left leg. Denies pain or concerns at this time. Wrap tolerated well.        HPI:     Patient here for follow-up of left lateral leg wound.  He is doing fine but his dressings were saturated when he removed them today.  He denies any increased pain or drainage from the wound denies any fever or chills.    Lab Results   Component Value Date    BUN 18 01/03/2024    CREATSERUM 0.88 01/03/2024    ALB 3.0 (L) 01/02/2024    TP 7.9 01/02/2024    A1C 5.2 12/12/2016         Current Outpatient Medications:     aspirin 81 MG Oral Tab, Take 1 tablet (81 mg total) by mouth daily., Disp: , Rfl:     No Known Allergies         REVIEW OF SYSTEMS:     Review of Systems (ROS)  This information was obtained from the patient, chart    A comprehensive 10 point review of systems was completed.  Pertinent positives and negatives noted in the the HPI.     Past medical, surgical, family and social history updated where appropriate.    PHYSICAL EXAM:   /85   Pulse 83   Temp 98.7 °F (37.1 °C)   Resp 16    Estimated body mass index is 45.42 kg/m² as calculated from the following:    Height as of 1/10/24: 67\".    Weight as of 1/10/24: 290 lb (131.5 kg).   Vital signs reviewed.Appears stated age, well groomed.      Awake, alert, in no acute distress  HENT:  normocephalic, atraumatic, external ear canals clear bilaterally, tympanic membranes appear opaque, non-bulging, non-erythematous, nasal turbinates are non-inflamed and not swollen, oropharynx without erythema, swelling, or exudate, gingiva and lips without any apparent lesions.   Cardiac:  S1 S2 regular rate and rhythm, no murmur, gallop, or rub  Respiratory:  Bilaterally clear to auscultation, no chest tenderness to palpation, no wheezing, no rhonchi, no rales, air entry is adequate, no accessory respiratory muscle use, no chest asymmetry, normal excursion.  GI:  bowel sound positive in all four  quadrants, abdomen is soft, non-tender, non-distended, no hepatosplenomegaly, no abnormal aortic pulsation.     Calf  Point of Measurement - Left Calf: 32     Left Calf from:: Heel  Calf Left cm:: 45.3          Ankle  Point of Measurement - Left Ankle: 10     Left Ankle from:: Heel  Left Ankle cm:: 30.8                Foot                            Wound 01/11/24 #1 Left lateral lower leg Leg Left;Lateral (Active)   Date First Assessed/Time First Assessed: 01/11/24 1406    Wound Number (Wound Clinic Only): #1 Left lateral lower leg  Primary Wound Type: Traumatic  Location: Leg  Wound Location Orientation: Left;Lateral      Assessments 1/11/2024  2:10 PM 2/22/2024  4:12 PM   Wound Image       Drainage Amount Large --   Drainage Description Yellow;Serous --   Treatments Compression --   Wound Length (cm) 10.6 cm --   Wound Width (cm) 9.5 cm --   Wound Surface Area (cm^2) 100.7 cm^2 --   Wound Depth (cm) 0.2 cm --   Wound Volume (cm^3) 20.14 cm^3 --   Margins Well-defined edges --   Non-staged Wound Description Full thickness --   Leslie-wound Assessment Edema;Hemosiderin staining --   Wound Granulation Tissue Firm;Pink --   Wound Bed Granulation (%) 40 % --   Wound Bed Slough (%) 60 % --   Wound Odor None --   Tunneling? No --   Undermining? No --   Sinus Tracts? No --       Inactive Orders   Date Order Priority Status Authorizing Provider   02/15/24 1154 Debridement Traumatic Left;Lateral Leg Routine Completed Nicholas Gould MD   01/18/24 1511 Debridement Traumatic Left;Lateral Leg Routine Completed Nicholas Gould MD   01/11/24 1710 Debridement Traumatic Left;Lateral Leg Routine Completed Nicholas Gould MD       Compression Wrap 01/11/24 Leg Anterior;Left (Active)   Placement Date/Time: 01/11/24 1523   Location: Leg  Wound Location Orientation: Anterior;Left      Assessments 1/11/2024  2:11 PM 2/15/2024 11:01 AM   Response to Treatment Well tolerated Well tolerated   Compression Layers Multilayer Multilayer    Compression Product Type Coflex Coflex   Dressing Applied Yes Yes   Compression Wrap Location Toes to Knee Toes to Knee   Compression Wrap Status Clean;Dry;Intact Clean;Dry;Intact       No associated orders.          ASSESSMENT AND PLAN:      1. Open wound of left lower leg, subsequent encounter    2. Chronic venous insufficiency    3. Leg swelling    4. Class 3 severe obesity due to excess calories with serious comorbidity and body mass index (BMI) of 45.0 to 49.9 in adult (HCC)    Patient is homebound as he does not drive at all he states.  He only goes out when is necessary.  Will order home health for him as he should have dressing changed twice a week due to the amount of drainage.  It is responding well however and we will continue honey alginate Kerramax and Coflex 2 layer.    Home health to change the dressings on Monday and  except when seen in wound clinic.  He should have a nurse visit here in 1 week and follow-up with me in 2 weeks.      Risks, benefits, and alternatives of current treatment plan discussed in detail.  Questions and concerns addressed. Red flags to RTC or ED reviewed.  Patient (or parent) agrees to plan.      No follow-ups on file.    Also refer to patient instructions.     I spent a total of 40 minutes with this patient's care.  This time included preparing to see the patient (eg, review notes and recent diagnostics), seeing the patient, performing a medically appropriate examination and/or evaluation, counseling and educating the patient, and documenting in the record.          Nicholas Gould M.D.   [facility]   2024    Patient Instructions       PATIENT INSTRUCTIONS      FOR RICK Blandon 3/25/1965    DATE OF SERVICE: 2024      Home Health to do dressing changes on  and  except when seen in wound clinic.     Apply Honey Alginate to wound base  Kerramax  Coflex 2 layer compression wrap to left lower extremity Wear  your Velcro compression wrap    To the right leg and you may remove at bedtime when leg is elevated    Nurse visit in 1 week in wound clinic    Dr. Gould in 2 weeks.       Managing your edema:    Avoid prolonged standing in one place. It is better to have your calf muscles moving to pump fluid out of the legs.  Elevate leg(s) above the level of the heart when sitting or as much as possible.  Take you diuretics as directed by your physician. Do not skip doses or change doses unless instructed to do so by your physician.  Decrease salt intake, follow recommended 2 grams daily.  Do not get leg(s) with compression wrap wet. If wraps are too tight as indicated by pain, numbness/tingling or discoloration of toes remove wrap completely and call the wound center @ [phone].       The treatment plan has been discussed at length between you and your provider. Follow all instructions carefully, it is very important. If you do not follow all instructions you are at risk of your wound not healing, infection, possible loss of limb and even loss of life.    COMPRESSION WRAP PATIENT CARE INSTRUCTIONS  DO NOT get compression wrap wet. When showering, use a shower boot.   Please contact wound clinic and if not able to reach, then go to emergency room if ANY of the following occur:   Tingling or numbness in the foot or toes on leg with compression wrap.  Severe pain that cannot be relieved with elevation and any medication instructed per your doctor.  A fever of 100.4°F (38°C) or higher.  Swelling, coldness, or blue-gray discoloration of the toes.  If the compression wrap feels too tight or too loose.  If the compression wrap is damaged or has rough edges that hurt.  If the compression wrap gets wet, you must cut wrap off.   Drainage from compression wrap that smells different than usual.      Nurse visit on Monday 1/15/24    Follow up with Dr. Gould 1 WEEK                MISCELLANEOUS INSTRUCTIONS  Supplement with a daily  multivitamin   Low salt diet  Intense blood sugar control - Goal Blood sugar below 180 at all times recommended.  Increase protein intake / consider protein supplements - see below  Elevate extremities at all times when sitting / laying down.  No tobacco use     DIETARY MODIFICATIONS TO HELP WITH WOUND HEALING:          Please follow any restrictions to diet given by your other doctors     Protein: meats, beans, eggs, milk and yogurt particularly Greek yogurt), tofu, soy nuts, soy protein products     Vitamin C: citrus fruits and juices, strawberries, tomatoes, tomato juice, peppers, baked potatoes, spinach, broccoli, cauliflower, [place] sprouts, cabbage     Vitamin A: dark greens, leafy vegetables, orange or yellow vegetables, cantaloupe, fortified dairy products, liver, fortified cereals     Zinc: fortified cereals, red meats, seafood     Consider Bob by wripl (These are essential branch chain amino acids that help with tissue building and wound healing) and take 2 packets/day. You can order online at [facility] or [facility]     ADDITIONAL REMINDERS:     The treatment plan has been discussed at length with you and your provider. Follow all instructions carefully, it is very important. If you do not follow all instructions, you are at risk of your wound not healing, infection, possible loss of limb and even loss of life.  Please call the clinic at [phone] during regular business hours ( 7:30 AM - 5:30 PM) if you notice increased redness, warmth, pain or pus like drainage or start running a fever greater than 100.3.    For after hour emergencies, please call your primary physician or go to the nearest emergency room.  If your blood pressure is elevated, follow up with your primary care doctor and/or cardiologist as soon as possible.     FOR LEG SWELLING,  LOWER EXTREMITY WOUNDS AND IF USING COMPRESSION:   Managing your edema:    Avoid prolonged standing in one place. It is better to have your calf muscles  moving to pump fluid out of the legs.  Elevate leg(s) above the level of the heart when sitting or as much as possible.  Take you diuretics as directed by your physician. Do not skip doses or change doses unless instructed to do so by your physician.  Decrease salt intake, follow recommended 2 grams daily.  Do not get leg(s) with compression wrap wet. If wraps are too tight as indicated by pain, numbness/tingling or discoloration of toes remove wrap completely and call the wound center @ [phone].       The treatment plan has been discussed at length between you and your provider. Follow all instructions carefully, it is very important. If you do not follow all instructions you are at risk of your wound not healing, infection, possible loss of limb and even loss of life.    COMPRESSION WRAP PATIENT CARE INSTRUCTIONS  DO NOT get compression wrap wet. When showering, use a shower boot.   Please contact wound clinic and if not able to reach, then go to emergency room if ANY of the following occur:   Tingling or numbness in the foot or toes on leg with compression wrap.  Severe pain that cannot be relieved with elevation and any medication instructed per your doctor.  A fever of 100.4°F (38°C) or higher.  Swelling, coldness, or blue-gray discoloration of the toes.  If the compression wrap feels too tight or too loose.  If the compression wrap is damaged or has rough edges that hurt.  If the compression wrap gets wet, you must cut wrap off.   Drainage from compression wrap that smells different than usual.

## 2024-02-29 ENCOUNTER — TELEPHONE (OUTPATIENT)
Dept: WOUND CARE | Facility: HOSPITAL | Age: 59
End: 2024-02-29

## 2024-02-29 NOTE — TELEPHONE ENCOUNTER
RN called patient to speak about dressings as patient never called to be scheduled for a nurse visit this week. Patient is currently in a coflex 2 layer wrap , RN called patient and informed patient to remove entire wrap to LLE and place new dressing on wound at this time. Pt had leftover dressing supplied to him at previous visits. Pt states he can do this today. Pt did want to express deep concerns about the [employer] company that was sent to his house ([employer]). Pt expressed concerns about the staff, stating \"staff did not bring any dressing supply to visit, staff just keep assessing and checking patients right leg- which is not even the leg with the wound, patient states he would not even want his worst enemy to receive this type of care, patient also informed RN that [employer] company did not change any dressings.\"   Patient stated \" I would call the police if this micaela showed up again\" RN allowed patient to express concerns, provided active listening. RN informed patient that she will place a call to the  [employer] and cancel there services. Patient states thanks. RN expressed to patient to call as soon as he gets his schedule to set up an appointment with the clinic, pt states understanding.

## 2024-03-19 ENCOUNTER — OFFICE VISIT (OUTPATIENT)
Dept: WOUND CARE | Facility: HOSPITAL | Age: 59
End: 2024-03-19
Attending: FAMILY MEDICINE
Payer: COMMERCIAL

## 2024-03-19 VITALS
RESPIRATION RATE: 16 BRPM | SYSTOLIC BLOOD PRESSURE: 123 MMHG | TEMPERATURE: 99 F | DIASTOLIC BLOOD PRESSURE: 83 MMHG | HEART RATE: 76 BPM

## 2024-03-19 DIAGNOSIS — S81.802D OPEN WOUND OF LEFT LOWER LEG, SUBSEQUENT ENCOUNTER: Primary | ICD-10-CM

## 2024-03-19 PROCEDURE — 99213 OFFICE O/P EST LOW 20 MIN: CPT

## 2024-03-19 NOTE — PROGRESS NOTES
Chief Complaint   Patient presents with    Wound Care     RN visit for wound to left leg. No pain at this time, but some times it feels like pins and needles. Changes dressing daily. Arrive with foam bordered dressing and silk tape today - no compression stocking.            Current Outpatient Medications:     aspirin 81 MG Oral Tab, Take 1 tablet (81 mg total) by mouth daily., Disp: , Rfl:     No Known Allergies       HISTORY:     Past medical, surgical, family and social history updated where appropriate.    PHYSICAL EXAM:   /83   Pulse 76   Temp 99.2 °F (37.3 °C)   Resp 16        Vital signs reviewed.      Calf  Point of Measurement - Left Calf: 32     Left Calf from:: Heel  Calf Left cm:: 50.5          Ankle  Point of Measurement - Left Ankle: 10     Left Ankle from:: Heel  Left Ankle cm:: 32                Wound 01/11/24 #1 Left lateral lower leg Leg Left;Lateral (Active)   Date First Assessed/Time First Assessed: 01/11/24 1406    Wound Number (Wound Clinic Only): #1 Left lateral lower leg  Primary Wound Type: Traumatic  Location: Leg  Wound Location Orientation: Left;Lateral      Assessments 1/11/2024  2:10 PM 3/19/2024  3:14 PM   Wound Image       Drainage Amount Large Large   Drainage Description Yellow;Serous Yellow;Serous   Treatments Compression Compression   Wound Length (cm) 10.6 cm 10.5 cm   Wound Width (cm) 9.5 cm 10 cm   Wound Surface Area (cm^2) 100.7 cm^2 105 cm^2   Wound Depth (cm) 0.2 cm 0.2 cm   Wound Volume (cm^3) 20.14 cm^3 21 cm^3   Wound Healing % -- -4   Margins Well-defined edges Well-defined edges;Epibole (Rolled edges)   Non-staged Wound Description Full thickness Full thickness   Leslie-wound Assessment Edema;Hemosiderin staining Edema;Hemosiderin staining;Moist   Wound Granulation Tissue Firm;Pink --   Wound Bed Granulation (%) 40 % 30 %   Wound Bed Epithelium (%) -- 10 %   Wound Bed Slough (%) 60 % 60 %   Wound Odor None Mild   Tunneling? No No   Undermining? No No   Sinus  Tracts? No No       Inactive Orders   Date Order Priority Status Authorizing Provider   02/15/24 1154 Debridement Traumatic Left;Lateral Leg Routine Completed Nicholas Gould MD   01/18/24 1511 Debridement Traumatic Left;Lateral Leg Routine Completed Nicholas Gould MD   01/11/24 1710 Debridement Traumatic Left;Lateral Leg Routine Completed Nicholas Gould MD          ASSESSMENT AND PLAN:        Risks, benefits, and alternatives of current treatment plan discussed in detail.  Questions and concerns addressed. Red flags to RTC or ED reviewed.  Patient (or parent) agrees to plan.      No follow-ups on file.  Weekly Wound Education Note    Teaching Provided To: Patient  Training Topics: Discharge instructions;Cleasing and general instructions;Dressing;Compression;Edema control  Training Method: Demonstration;Explain/Verbal  Training Response: Reinforcement needed        Notes: Pt here for nurse visit to left lateral lower leg wound. Pt arrived with bordered foam dressing to leg, informed staff he has been changing the faom daily at this time. Edema measurement increased, wound measurement increased. RN applied zinc, honey gel, honey gauze, kerramax (sample), kerlix, tape. Spandagrip (F) Doubled and applied to LLE at visit today. RN educated and demostrated to patient with a \"dummy leg\" how to apply compression wraps that RN had ordered for patient in the past ([employer]). Patient states understanding on how to use wrap. Educated and demonstrated to patient how to apply dressings. Provided patient with extra supplies (from sample cabinet). RN to place order for supplies for patient from [facility]. Pt to call clinic with his next avalible date so that  can set him up for an appointment.        Shawn HOWE RN   3/19/2024  3:52 PM

## 2024-03-26 ENCOUNTER — MED REC SCAN ONLY (OUTPATIENT)
Dept: FAMILY MEDICINE CLINIC | Facility: CLINIC | Age: 59
End: 2024-03-26

## 2024-04-04 ENCOUNTER — OFFICE VISIT (OUTPATIENT)
Dept: WOUND CARE | Facility: HOSPITAL | Age: 59
End: 2024-04-04
Attending: FAMILY MEDICINE
Payer: COMMERCIAL

## 2024-04-04 VITALS
HEART RATE: 73 BPM | SYSTOLIC BLOOD PRESSURE: 158 MMHG | DIASTOLIC BLOOD PRESSURE: 87 MMHG | RESPIRATION RATE: 18 BRPM | TEMPERATURE: 98 F

## 2024-04-04 DIAGNOSIS — M79.89 LEG SWELLING: ICD-10-CM

## 2024-04-04 DIAGNOSIS — R03.0 ELEVATED BLOOD PRESSURE READING: ICD-10-CM

## 2024-04-04 DIAGNOSIS — I87.2 CHRONIC VENOUS INSUFFICIENCY: ICD-10-CM

## 2024-04-04 DIAGNOSIS — E66.01 CLASS 3 SEVERE OBESITY DUE TO EXCESS CALORIES WITH SERIOUS COMORBIDITY AND BODY MASS INDEX (BMI) OF 45.0 TO 49.9 IN ADULT (HCC): ICD-10-CM

## 2024-04-04 DIAGNOSIS — S81.802D OPEN WOUND OF LEFT LOWER LEG, SUBSEQUENT ENCOUNTER: Primary | ICD-10-CM

## 2024-04-04 PROCEDURE — 97598 DBRDMT OPN WND ADDL 20CM/<: CPT | Performed by: FAMILY MEDICINE

## 2024-04-04 PROCEDURE — 97597 DBRDMT OPN WND 1ST 20 CM/<: CPT | Performed by: FAMILY MEDICINE

## 2024-04-04 NOTE — PROGRESS NOTES
Weekly Wound Education Note    Teaching Provided To: Patient  Training Topics: Dressing;Cleasing and general instructions;Discharge instructions;Edema control;Compression  Training Method: Demonstration;Explain/Verbal  Training Response: Reinforcement needed        Notes: Pt here for follow up wound care visit to left lateral lower leg wound. Edema measurement calf slighty increased, ankle decreased. Wound measurement increased. Provider debrided wound at visit. Treatment changed to honey alginate to wound bed covered with kerrmax, baby diaper, kerlix, tape. Applied coflex 2 layer wrap to LLE. Educated patient on importance to continue seeing wound clinic weekly. Spoke with patient about scheduled visit, transportation issues- recommended and provider information about [employer] bus transportation. Pt states undertsanding. RN and provider educated patient on wrap care ( not getting wet), to remove entire wrap if wrap gets wet. Pt states he has left over honey dressing at home currently , RN states if he cancels appointment next week or for any reason needs to remove the wrap he needs to place honey gel and foam bandage and spandagrip. Spandagrip (F) provided at visits today. Set up future appointment for patient nurse visit in 1 and 2 weeks and provider visit in 3 weeks.

## 2024-04-04 NOTE — PATIENT INSTRUCTIONS
PATIENT INSTRUCTIONS      FOR Luis M RODRIGUEZ Alex ,  3/25/1965    DATE OF SERVICE: 2024      Apply Honey Alginate to wound base  Kerramax  Coflex 2 layer compression wrap to left lower extremity Wear your Velcro compression wrap    To the right leg and you may remove at bedtime when leg is elevated    Nurse visit in  and     Dr. Gould in 3 weeks.       Managing your edema:    Avoid prolonged standing in one place. It is better to have your calf muscles moving to pump fluid out of the legs.  Elevate leg(s) above the level of the heart when sitting or as much as possible.  Take you diuretics as directed by your physician. Do not skip doses or change doses unless instructed to do so by your physician.  Decrease salt intake, follow recommended 2 grams daily.  Do not get leg(s) with compression wrap wet. If wraps are too tight as indicated by pain, numbness/tingling or discoloration of toes remove wrap completely and call the wound center @ [phone].       The treatment plan has been discussed at length between you and your provider. Follow all instructions carefully, it is very important. If you do not follow all instructions you are at risk of your wound not healing, infection, possible loss of limb and even loss of life.    COMPRESSION WRAP PATIENT CARE INSTRUCTIONS  DO NOT get compression wrap wet. When showering, use a shower boot.   Please contact wound clinic and if not able to reach, then go to emergency room if ANY of the following occur:   Tingling or numbness in the foot or toes on leg with compression wrap.  Severe pain that cannot be relieved with elevation and any medication instructed per your doctor.  A fever of 100.4°F (38°C) or higher.  Swelling, coldness, or blue-gray discoloration of the toes.  If the compression wrap feels too tight or too loose.  If the compression wrap is damaged or has rough edges that hurt.  If the compression wrap gets wet, you must cut wrap off.   Drainage  from compression wrap that smells different than usual.      Nurse visit on Monday 1/15/24    Follow up with Dr. Gould 1 WEEK

## 2024-04-04 NOTE — PROGRESS NOTES
Patient ID: Luis M Estrada is a 59 year old male.    Debridement Traumatic Left;Lateral Leg   Wound 01/11/24 #1 Left lateral lower leg Leg Left;Lateral    Performed by: Nicholas Gould MD  Authorized by: Nicholas Gould MD      Consent   Consent obtained? verbal    Debridement Details  Performed by: physician  Debridement type: conservative sharp    Pre-debridement measurements  Length (cm): 11.2  Width (cm): 10  Depth (cm): 0.2  Surface Area (cm^2): 112    Post-debridement measurements  Length (cm): 11.3  Width (cm): 10  Depth (cm): 0.2  Percent debrided: 75%  Surface Area (cm^2): 113  Area Debrided (cm^2): 84.75  Volume (cm^3): 22.6    Devitalized tissue debrided: biofilm and slough  Instrument(s) utilized: curette  Bleeding: small  Hemostasis obtained with: not applicable  Response to treatment: procedure was tolerated well

## 2024-04-04 NOTE — PROGRESS NOTES
Chief Complaint   Patient presents with    Wound Care     Patient is here for follow up and states he has no new concerns. He arrives with medihoney patches and gauze to his wound.        HPI:     Patient here for follow-up of left lateral leg wound.  He did not come in for nurse visit 1 week ago and thus had taken his wrap off and was using honey gauze.  He does have several different types of compression garments at home but has not been using them.  He does have transportation issues.    Lab Results   Component Value Date    BUN 18 01/03/2024    CREATSERUM 0.88 01/03/2024    ALB 3.0 (L) 01/02/2024    TP 7.9 01/02/2024    A1C 5.2 12/12/2016         Current Outpatient Medications:     aspirin 81 MG Oral Tab, Take 1 tablet (81 mg total) by mouth daily., Disp: , Rfl:     No Known Allergies         REVIEW OF SYSTEMS:     Review of Systems (ROS)  This information was obtained from the patient, chart    A comprehensive 10 point review of systems was completed.  Pertinent positives and negatives noted in the the HPI.     Past medical, surgical, family and social history updated where appropriate.    PHYSICAL EXAM:   /87   Pulse 73   Temp 98.2 °F (36.8 °C)   Resp 18    Estimated body mass index is 45.42 kg/m² as calculated from the following:    Height as of 1/10/24: 67\".    Weight as of 1/10/24: 290 lb (131.5 kg).   Vital signs reviewed.Appears stated age, well groomed.      Awake, alert, in no acute distress  HENT:  normocephalic, atraumatic, external ear canals clear bilaterally, tympanic membranes appear opaque, non-bulging, non-erythematous, nasal turbinates are non-inflamed and not swollen, oropharynx without erythema, swelling, or exudate, gingiva and lips without any apparent lesions.   Cardiac:  S1 S2 regular rate and rhythm, no murmur, gallop, or rub  Respiratory:  Bilaterally clear to auscultation, no chest tenderness to palpation, no wheezing, no rhonchi, no rales, air entry is adequate, no accessory  respiratory muscle use, no chest asymmetry, normal excursion.  GI:  bowel sound positive in all four quadrants, abdomen is soft, non-tender, non-distended, no hepatosplenomegaly, no abnormal aortic pulsation.     Calf  Point of Measurement - Left Calf: 37     Left Calf from:: Heel  Calf Left cm:: 51.8          Ankle  Point of Measurement - Left Ankle: 10     Left Ankle from:: Heel  Left Ankle cm:: 31.5                Foot                            Wound 01/11/24 #1 Left lateral lower leg Leg Left;Lateral (Active)   Date First Assessed/Time First Assessed: 01/11/24 1406    Wound Number (Wound Clinic Only): #1 Left lateral lower leg  Primary Wound Type: Traumatic  Location: Leg  Wound Location Orientation: Left;Lateral      Assessments 1/11/2024  2:10 PM 4/4/2024  9:01 AM   Wound Image       Drainage Amount Large --   Drainage Description Yellow;Serous --   Treatments Compression --   Wound Length (cm) 10.6 cm --   Wound Width (cm) 9.5 cm --   Wound Surface Area (cm^2) 100.7 cm^2 --   Wound Depth (cm) 0.2 cm --   Wound Volume (cm^3) 20.14 cm^3 --   Margins Well-defined edges --   Non-staged Wound Description Full thickness --   Leslie-wound Assessment Edema;Hemosiderin staining --   Wound Granulation Tissue Firm;Pink --   Wound Bed Granulation (%) 40 % --   Wound Bed Slough (%) 60 % --   Wound Odor None --   Tunneling? No --   Undermining? No --   Sinus Tracts? No --       Inactive Orders   Date Order Priority Status Authorizing Provider   02/15/24 1154 Debridement Traumatic Left;Lateral Leg Routine Completed Nicholas Gould MD   01/18/24 1511 Debridement Traumatic Left;Lateral Leg Routine Completed Nicholas Gould MD   01/11/24 1710 Debridement Traumatic Left;Lateral Leg Routine Completed Nicholas Gould MD          ASSESSMENT AND PLAN:      1. Open wound of left lower leg, subsequent encounter    2. Chronic venous insufficiency    3. Leg swelling    4. Class 3 severe obesity due to excess calories with serious  comorbidity and body mass index (BMI) of 45.0 to 49.9 in adult (HCC)    5. Elevated blood pressure reading    Had a long discussion with patient regarding nature of his wound and healing.  He does require compression which is stronger such as with the compression wrap in order for proper wound healing.  I told him without the compression wrap it will may take him a very long time to heal much longer than with a compression wrap.  I strongly advised that he come here once a week to have compression wrap done and change.  He will try to work out transportation issues and try to come in once a week he states.  He does not want to have the wound not heal for a long time.  Will continue with honey alginate, Kerramax, Coflex 2 layer compression wrap to left lower extremity.  Will have a nurse visit on 4/11 and 4/18 and follow-up with me in 3 weeks.  We did give information on ride service as well.  He previously had home health and had fired them and thus we did not initiate a new home health order for him.    I did discuss obesity and proper diet and exercise recommended.      Blood pressure is also elevated however he is asymptomatic.  He is not on blood pressure medication but should follow-up with his primary care doctor regarding that.      Risks, benefits, and alternatives of current treatment plan discussed in detail.  Questions and concerns addressed. Red flags to RTC or ED reviewed.  Patient (or parent) agrees to plan.      No follow-ups on file.    Also refer to patient instructions.     I spent a total of 30 minutes with this patient's care.  This time included preparing to see the patient (eg, review notes and recent diagnostics), seeing the patient, performing a medically appropriate examination and/or evaluation, counseling and educating the patient, and documenting in the record.          Nicholas Gould M.D.   [facility]   4/4/2024    Patient Instructions       PATIENT  INSTRUCTIONS      FOR Luis M Estrada , RICK 3/25/1965    DATE OF SERVICE: 2024      Apply Honey Alginate to wound base  Kerramax  Coflex 2 layer compression wrap to left lower extremity Wear your Velcro compression wrap    To the right leg and you may remove at bedtime when leg is elevated    Nurse visit in  and     Dr. Gould in 3 weeks.       Managing your edema:    Avoid prolonged standing in one place. It is better to have your calf muscles moving to pump fluid out of the legs.  Elevate leg(s) above the level of the heart when sitting or as much as possible.  Take you diuretics as directed by your physician. Do not skip doses or change doses unless instructed to do so by your physician.  Decrease salt intake, follow recommended 2 grams daily.  Do not get leg(s) with compression wrap wet. If wraps are too tight as indicated by pain, numbness/tingling or discoloration of toes remove wrap completely and call the wound center @ [phone].       The treatment plan has been discussed at length between you and your provider. Follow all instructions carefully, it is very important. If you do not follow all instructions you are at risk of your wound not healing, infection, possible loss of limb and even loss of life.    COMPRESSION WRAP PATIENT CARE INSTRUCTIONS  DO NOT get compression wrap wet. When showering, use a shower boot.   Please contact wound clinic and if not able to reach, then go to emergency room if ANY of the following occur:   Tingling or numbness in the foot or toes on leg with compression wrap.  Severe pain that cannot be relieved with elevation and any medication instructed per your doctor.  A fever of 100.4°F (38°C) or higher.  Swelling, coldness, or blue-gray discoloration of the toes.  If the compression wrap feels too tight or too loose.  If the compression wrap is damaged or has rough edges that hurt.  If the compression wrap gets wet, you must cut wrap off.   Drainage from  compression wrap that smells different than usual.      Nurse visit on Monday 1/15/24    Follow up with Dr. Gould 1 WEEK              MISCELLANEOUS INSTRUCTIONS  Supplement with a daily multivitamin   Low salt diet  Intense blood sugar control - Goal Blood sugar below 180 at all times recommended.  Increase protein intake / consider protein supplements - see below  Elevate extremities at all times when sitting / laying down.  No tobacco use     DIETARY MODIFICATIONS TO HELP WITH WOUND HEALING:          Please follow any restrictions to diet given by your other doctors     Protein: meats, beans, eggs, milk and yogurt particularly Greek yogurt), tofu, soy nuts, soy protein products     Vitamin C: citrus fruits and juices, strawberries, tomatoes, tomato juice, peppers, baked potatoes, spinach, broccoli, cauliflower, [place] sprouts, cabbage     Vitamin A: dark greens, leafy vegetables, orange or yellow vegetables, cantaloupe, fortified dairy products, liver, fortified cereals     Zinc: fortified cereals, red meats, seafood     Consider Bob [employer] (These are essential branch chain amino acids that help with tissue building and wound healing) and take 2 packets/day. You can order online at [employer] or [email or web address]     ADDITIONAL REMINDERS:     The treatment plan has been discussed at length with you and your provider. Follow all instructions carefully, it is very important. If you do not follow all instructions, you are at risk of your wound not healing, infection, possible loss of limb and even loss of life.  Please call the clinic at [phone] during regular business hours ( 7:30 AM - 5:30 PM) if you notice increased redness, warmth, pain or pus like drainage or start running a fever greater than 100.3.    For after hour emergencies, please call your primary physician or go to the nearest emergency room.  If your blood pressure is elevated, follow up with your primary care doctor and/or cardiologist as soon as  possible.     FOR LEG SWELLING,  LOWER EXTREMITY WOUNDS AND IF USING COMPRESSION:   Managing your edema:    Avoid prolonged standing in one place. It is better to have your calf muscles moving to pump fluid out of the legs.  Elevate leg(s) above the level of the heart when sitting or as much as possible.  Take you diuretics as directed by your physician. Do not skip doses or change doses unless instructed to do so by your physician.  Decrease salt intake, follow recommended 2 grams daily.  Do not get leg(s) with compression wrap wet. If wraps are too tight as indicated by pain, numbness/tingling or discoloration of toes remove wrap completely and call the wound center @ [phone].       The treatment plan has been discussed at length between you and your provider. Follow all instructions carefully, it is very important. If you do not follow all instructions you are at risk of your wound not healing, infection, possible loss of limb and even loss of life.    COMPRESSION WRAP PATIENT CARE INSTRUCTIONS  DO NOT get compression wrap wet. When showering, use a shower boot.   Please contact wound clinic and if not able to reach, then go to emergency room if ANY of the following occur:   Tingling or numbness in the foot or toes on leg with compression wrap.  Severe pain that cannot be relieved with elevation and any medication instructed per your doctor.  A fever of 100.4°F (38°C) or higher.  Swelling, coldness, or blue-gray discoloration of the toes.  If the compression wrap feels too tight or too loose.  If the compression wrap is damaged or has rough edges that hurt.  If the compression wrap gets wet, you must cut wrap off.   Drainage from compression wrap that smells different than usual.

## 2024-04-08 ENCOUNTER — APPOINTMENT (OUTPATIENT)
Dept: WOUND CARE | Facility: HOSPITAL | Age: 59
End: 2024-04-08
Attending: FAMILY MEDICINE
Payer: COMMERCIAL

## 2024-04-09 ENCOUNTER — OFFICE VISIT (OUTPATIENT)
Dept: WOUND CARE | Facility: HOSPITAL | Age: 59
End: 2024-04-09
Attending: FAMILY MEDICINE
Payer: COMMERCIAL

## 2024-04-09 VITALS
RESPIRATION RATE: 16 BRPM | SYSTOLIC BLOOD PRESSURE: 133 MMHG | TEMPERATURE: 98 F | DIASTOLIC BLOOD PRESSURE: 81 MMHG | HEART RATE: 89 BPM

## 2024-04-09 DIAGNOSIS — S81.802D LEG WOUND, LEFT, SUBSEQUENT ENCOUNTER: Primary | ICD-10-CM

## 2024-04-09 PROCEDURE — 99214 OFFICE O/P EST MOD 30 MIN: CPT

## 2024-04-09 PROCEDURE — 87070 CULTURE OTHR SPECIMN AEROBIC: CPT

## 2024-04-09 PROCEDURE — 87077 CULTURE AEROBIC IDENTIFY: CPT

## 2024-04-09 PROCEDURE — 29581 APPL MULTLAYER CMPRN SYS LEG: CPT

## 2024-04-09 NOTE — PROGRESS NOTES
Luis M Estrada is an 59 year old male.    Chief Complaint   Patient presents with    Wound Care     Patient is here for a nurse visit for a left leg wound.       /81   Pulse 89   Temp 98.3 °F (36.8 °C)   Resp 16     Wound 01/11/24 #1 Left lateral lower leg Leg Left;Lateral (Active)   Date First Assessed/Time First Assessed: 01/11/24 1406    Wound Number (Wound Clinic Only): #1 Left lateral lower leg  Primary Wound Type: Traumatic  Location: Leg  Wound Location Orientation: Left;Lateral      Assessments 1/11/2024  2:10 PM 4/9/2024  3:56 PM   Wound Image       Drainage Amount Large Copious   Drainage Description Yellow;Serous Tan;Purulent   Treatments Compression Compression   Wound Length (cm) 10.6 cm 11.5 cm   Wound Width (cm) 9.5 cm 10 cm   Wound Surface Area (cm^2) 100.7 cm^2 115 cm^2   Wound Depth (cm) 0.2 cm 0.2 cm   Wound Volume (cm^3) 20.14 cm^3 23 cm^3   Wound Healing % -- -14   Margins Well-defined edges Well-defined edges;Epibole (Rolled edges)   Non-staged Wound Description Full thickness Full thickness   Leslie-wound Assessment Edema;Hemosiderin staining Edema;Hemosiderin staining;Moist;Maceration   Wound Granulation Tissue Firm;Pink Pink;Spongy   Wound Bed Granulation (%) 40 % 45 %   Wound Bed Epithelium (%) -- 10 %   Wound Bed Slough (%) 60 % 45 %   Wound Odor None Strong   Tunneling? No --   Undermining? No --   Sinus Tracts? No --       Inactive Orders   Date Order Priority Status Authorizing Provider   04/04/24 0915 Debridement Traumatic Left;Lateral Leg Routine Completed Nicholas Gould MD   02/15/24 1154 Debridement Traumatic Left;Lateral Leg Routine Completed Nciholas Gould MD   01/18/24 1511 Debridement Traumatic Left;Lateral Leg Routine Completed Nicholas Gould MD   01/11/24 1710 Debridement Traumatic Left;Lateral Leg Routine Completed Nicholas Gould MD       Compression Wrap 04/04/24 Leg Anterior;Left (Active)   Placement Date/Time: 04/04/24 0915   Location: Leg  Wound Location  Orientation: Anterior;Left      Assessments 4/4/2024  8:47 AM 4/9/2024  4:01 PM   Response to Treatment Well tolerated Well tolerated   Compression Layers Multilayer Multilayer   Compression Product Type Coflex Coflex   Dressing Applied Yes Yes   Compression Wrap Location Toes to Knee Toes to Knee   Compression Wrap Status Clean;Dry;Intact Clean;Dry       No associated orders.       Weekly Wound Education Note    Teaching Provided To: Patient  Training Topics: Discharge instructions;Dressing;Edema control;Compression;Test/procedures  Training Method: Explain/Verbal  Training Response: Patient responds and understands           Patient is here for a nurse visit.  Wound presents with strong odor, culture obtained and VASHE soak for 5 minutes.  Vital signs stable, patient denies nausea, chills, diarrhea or fever.  Dressing changed to silver alginate, kerramax, baby diaper.  Coflex 2 layer wrap.  Patient will return on Thursday for a provider visit.        No follow-ups on file.    Jerrica MCMANUS RN

## 2024-04-11 ENCOUNTER — OFFICE VISIT (OUTPATIENT)
Dept: WOUND CARE | Facility: HOSPITAL | Age: 59
End: 2024-04-11
Attending: FAMILY MEDICINE
Payer: COMMERCIAL

## 2024-04-11 VITALS
DIASTOLIC BLOOD PRESSURE: 83 MMHG | HEART RATE: 85 BPM | TEMPERATURE: 98 F | SYSTOLIC BLOOD PRESSURE: 131 MMHG | RESPIRATION RATE: 17 BRPM

## 2024-04-11 DIAGNOSIS — S81.802D OPEN WOUND OF LEFT LOWER LEG, SUBSEQUENT ENCOUNTER: Primary | ICD-10-CM

## 2024-04-11 DIAGNOSIS — I87.2 CHRONIC VENOUS INSUFFICIENCY: ICD-10-CM

## 2024-04-11 DIAGNOSIS — E66.01 CLASS 3 SEVERE OBESITY DUE TO EXCESS CALORIES WITH SERIOUS COMORBIDITY AND BODY MASS INDEX (BMI) OF 45.0 TO 49.9 IN ADULT (HCC): ICD-10-CM

## 2024-04-11 PROCEDURE — 97598 DBRDMT OPN WND ADDL 20CM/<: CPT | Performed by: FAMILY MEDICINE

## 2024-04-11 PROCEDURE — 97597 DBRDMT OPN WND 1ST 20 CM/<: CPT | Performed by: FAMILY MEDICINE

## 2024-04-11 NOTE — PATIENT INSTRUCTIONS
PATIENT INSTRUCTIONS      FOR Luis M RODRIGUEZ Natalie ,  3/25/1965    DATE OF SERVICE: 2024    Triad to periwound  Silver Alginate to wound base  Kerramax  Coflex 2 layer compression wrap to left lower extremity Wear your Velcro compression wrap    To the right leg and you may remove at bedtime when leg is elevated    Nurse visit in on     Dr. Gould in 2 weeks      Managing your edema:    Avoid prolonged standing in one place. It is better to have your calf muscles moving to pump fluid out of the legs.  Elevate leg(s) above the level of the heart when sitting or as much as possible.  Take you diuretics as directed by your physician. Do not skip doses or change doses unless instructed to do so by your physician.  Decrease salt intake, follow recommended 2 grams daily.  Do not get leg(s) with compression wrap wet. If wraps are too tight as indicated by pain, numbness/tingling or discoloration of toes remove wrap completely and call the wound center @ [phone].       The treatment plan has been discussed at length between you and your provider. Follow all instructions carefully, it is very important. If you do not follow all instructions you are at risk of your wound not healing, infection, possible loss of limb and even loss of life.    COMPRESSION WRAP PATIENT CARE INSTRUCTIONS  DO NOT get compression wrap wet. When showering, use a shower boot.   Please contact wound clinic and if not able to reach, then go to emergency room if ANY of the following occur:   Tingling or numbness in the foot or toes on leg with compression wrap.  Severe pain that cannot be relieved with elevation and any medication instructed per your doctor.  A fever of 100.4°F (38°C) or higher.  Swelling, coldness, or blue-gray discoloration of the toes.  If the compression wrap feels too tight or too loose.  If the compression wrap is damaged or has rough edges that hurt.  If the compression wrap gets wet, you must cut wrap off.    Drainage from compression wrap that smells different than usual.      Nurse visit on Monday 1/15/24    Follow up with Dr. Gould 1 WEEK

## 2024-04-11 NOTE — PROGRESS NOTES
Weekly Wound Education Note    Teaching Provided To: Patient  Training Topics: Edema control;Dressing;Discharge instructions;Compression  Training Method: Demonstration;Explain/Verbal;Written  Training Response: Patient responds and understands        Notes: Left lateral leg: cleanse with saline or wound cleanser, apply Triad to periwound  Apply Silver Alginate to wound base  cover with Kerramax X2 and baby diaper X2.   Coflex 2 layer compression wrap applied. Will consider using Enluxtra next week.

## 2024-04-11 NOTE — PROGRESS NOTES
Chief Complaint   Patient presents with    Wound Care     Patient is here for nurse visit and has no new concerns. He arrives with silver alginate, kerramax and baby diaper to his left lower leg.       HPI:     Patient here for follow-up of left lateral leg wound.  He is doing well and has no new complaints.  Tolerating compression wrap.    Lab Results   Component Value Date    BUN 18 01/03/2024    CREATSERUM 0.88 01/03/2024    ALB 3.0 (L) 01/02/2024    TP 7.9 01/02/2024    A1C 5.2 12/12/2016         Current Outpatient Medications:     aspirin 81 MG Oral Tab, Take 1 tablet (81 mg total) by mouth daily., Disp: , Rfl:     No Known Allergies         REVIEW OF SYSTEMS:     Review of Systems (ROS)  This information was obtained from the patient, chart    A comprehensive 10 point review of systems was completed.  Pertinent positives and negatives noted in the the HPI.     Past medical, surgical, family and social history updated where appropriate.    PHYSICAL EXAM:   /83   Pulse 85   Temp 98.2 °F (36.8 °C)   Resp 17    Estimated body mass index is 45.42 kg/m² as calculated from the following:    Height as of 1/10/24: 67\".    Weight as of 1/10/24: 290 lb (131.5 kg).   Vital signs reviewed.Appears stated age, well groomed.      Awake, alert, in no acute distress  HENT:  normocephalic, atraumatic, external ear canals clear bilaterally, tympanic membranes appear opaque, non-bulging, non-erythematous, nasal turbinates are non-inflamed and not swollen, oropharynx without erythema, swelling, or exudate, gingiva and lips without any apparent lesions.   Cardiac:  S1 S2 regular rate and rhythm, no murmur, gallop, or rub  Respiratory:  Bilaterally clear to auscultation, no chest tenderness to palpation, no wheezing, no rhonchi, no rales, air entry is adequate, no accessory respiratory muscle use, no chest asymmetry, normal excursion.  GI:  bowel sound positive in all four quadrants, abdomen is soft, non-tender,  non-distended, no hepatosplenomegaly, no abnormal aortic pulsation.     Calf  Point of Measurement - Left Calf: 37     Left Calf from:: Heel  Calf Left cm:: 46          Ankle  Point of Measurement - Left Ankle: 10     Left Ankle from:: Heel  Left Ankle cm:: 30.7                Foot                            Wound 01/11/24 #1 Left lateral lower leg (Active)   Date First Assessed/Time First Assessed: 01/11/24 1406    Wound Number (Wound Clinic Only): #1 Left lateral lower leg  Primary Wound Type: Traumatic      Assessments 1/11/2024  2:10 PM 4/11/2024 10:01 AM   Wound Image       Drainage Amount Large --   Drainage Description Yellow;Serous --   Treatments Compression --   Wound Length (cm) 10.6 cm --   Wound Width (cm) 9.5 cm --   Wound Surface Area (cm^2) 100.7 cm^2 --   Wound Depth (cm) 0.2 cm --   Wound Volume (cm^3) 20.14 cm^3 --   Margins Well-defined edges --   Non-staged Wound Description Full thickness --   Leslie-wound Assessment Edema;Hemosiderin staining --   Wound Granulation Tissue Firm;Pink --   Wound Bed Granulation (%) 40 % --   Wound Bed Slough (%) 60 % --   Wound Odor None --   Tunneling? No --   Undermining? No --   Sinus Tracts? No --       Inactive Orders   Date Order Priority Status Authorizing Provider   04/11/24 0959 Debridement Traumatic Routine Completed Nicholas Gould MD   04/04/24 0915 Debridement Traumatic Left;Lateral Leg Routine Completed Nicholas Gould MD   02/15/24 1154 Debridement Traumatic Left;Lateral Leg Routine Completed Nicholas Gould MD   01/18/24 1511 Debridement Traumatic Left;Lateral Leg Routine Completed Nicholas Gould MD   01/11/24 1710 Debridement Traumatic Left;Lateral Leg Routine Completed Nicholas Gould MD       Compression Wrap 04/04/24 Leg Anterior;Left (Active)   Placement Date/Time: 04/04/24 0915   Location: Leg  Wound Location Orientation: Anterior;Left      Assessments 4/4/2024  8:47 AM 4/11/2024 10:02 AM   Response to Treatment Well tolerated Well  tolerated   Compression Layers Multilayer Multilayer   Compression Product Type Coflex Coflex   Dressing Applied Yes Yes   Compression Wrap Location Toes to Knee Toes to Knee   Compression Wrap Status Clean;Dry;Intact Clean;Dry       No associated orders.          ASSESSMENT AND PLAN:      1. Open wound of left lower leg, subsequent encounter    2. Chronic venous insufficiency    3. Class 3 severe obesity due to excess calories with serious comorbidity and body mass index (BMI) of 45.0 to 49.9 in adult (HCC)    Selective debridement was done on the wound to remove surface slough.  Slight bleeding noted controlled with gauze pressure.  Will continue silver alginate, Kerramax, Coflex 2 layer compression wrap and patient will have follow-up nurse visit in 1 week.  The wound does require other dressings for further debridement and may switch back to Enluxtra and Kerramax with the backing taken off the Enluxtra at next nurse visit.    Patient then will follow-up with me in 2 weeks.      Risks, benefits, and alternatives of current treatment plan discussed in detail.  Questions and concerns addressed. Red flags to RTC or ED reviewed.  Patient (or parent) agrees to plan.      No follow-ups on file.    Also refer to patient instructions.     I spent a total of 30 minutes with this patient's care.  This time included preparing to see the patient (eg, review notes and recent diagnostics), seeing the patient, performing a medically appropriate examination and/or evaluation, counseling and educating the patient, and documenting in the record.          Nicholas Gould M.D.   [facility]   2024    Patient Instructions       PATIENT INSTRUCTIONS      FOR RICK Blandon 3/25/1965    DATE OF SERVICE: 2024    Triad to periwound  Silver Alginate to wound base  Kerramax  Coflex 2 layer compression wrap to left lower extremity Wear your Velcro compression wrap    To the right leg and you may  remove at bedtime when leg is elevated    Nurse visit in on 4/18    Dr. Gould in 2 weeks      Managing your edema:    Avoid prolonged standing in one place. It is better to have your calf muscles moving to pump fluid out of the legs.  Elevate leg(s) above the level of the heart when sitting or as much as possible.  Take you diuretics as directed by your physician. Do not skip doses or change doses unless instructed to do so by your physician.  Decrease salt intake, follow recommended 2 grams daily.  Do not get leg(s) with compression wrap wet. If wraps are too tight as indicated by pain, numbness/tingling or discoloration of toes remove wrap completely and call the wound center @ [phone].       The treatment plan has been discussed at length between you and your provider. Follow all instructions carefully, it is very important. If you do not follow all instructions you are at risk of your wound not healing, infection, possible loss of limb and even loss of life.    COMPRESSION WRAP PATIENT CARE INSTRUCTIONS  DO NOT get compression wrap wet. When showering, use a shower boot.   Please contact wound clinic and if not able to reach, then go to emergency room if ANY of the following occur:   Tingling or numbness in the foot or toes on leg with compression wrap.  Severe pain that cannot be relieved with elevation and any medication instructed per your doctor.  A fever of 100.4°F (38°C) or higher.  Swelling, coldness, or blue-gray discoloration of the toes.  If the compression wrap feels too tight or too loose.  If the compression wrap is damaged or has rough edges that hurt.  If the compression wrap gets wet, you must cut wrap off.   Drainage from compression wrap that smells different than usual.      Nurse visit on Monday 1/15/24    Follow up with Dr. Gould 1 WEEK            MISCELLANEOUS INSTRUCTIONS  Supplement with a daily multivitamin   Low salt diet  Intense blood sugar control - Goal Blood sugar below 180  at all times recommended.  Increase protein intake / consider protein supplements - see below  Elevate extremities at all times when sitting / laying down.  No tobacco use     DIETARY MODIFICATIONS TO HELP WITH WOUND HEALING:          Please follow any restrictions to diet given by your other doctors     Protein: meats, beans, eggs, milk and yogurt particularly Greek yogurt), tofu, soy nuts, soy protein products     Vitamin C: citrus fruits and juices, strawberries, tomatoes, tomato juice, peppers, baked potatoes, spinach, broccoli, cauliflower, [place] sprouts, cabbage     Vitamin A: dark greens, leafy vegetables, orange or yellow vegetables, cantaloupe, fortified dairy products, liver, fortified cereals     Zinc: fortified cereals, red meats, seafood     Consider Bob by [employer] (These are essential branch chain amino acids that help with tissue building and wound healing) and take 2 packets/day. You can order online at [employer] or [employer]     ADDITIONAL REMINDERS:     The treatment plan has been discussed at length with you and your provider. Follow all instructions carefully, it is very important. If you do not follow all instructions, you are at risk of your wound not healing, infection, possible loss of limb and even loss of life.  Please call the clinic at [phone] during regular business hours ( 7:30 AM - 5:30 PM) if you notice increased redness, warmth, pain or pus like drainage or start running a fever greater than 100.3.    For after hour emergencies, please call your primary physician or go to the nearest emergency room.  If your blood pressure is elevated, follow up with your primary care doctor and/or cardiologist as soon as possible.     FOR LEG SWELLING,  LOWER EXTREMITY WOUNDS AND IF USING COMPRESSION:   Managing your edema:    Avoid prolonged standing in one place. It is better to have your calf muscles moving to pump fluid out of the legs.  Elevate leg(s) above the level of the heart when  sitting or as much as possible.  Take you diuretics as directed by your physician. Do not skip doses or change doses unless instructed to do so by your physician.  Decrease salt intake, follow recommended 2 grams daily.  Do not get leg(s) with compression wrap wet. If wraps are too tight as indicated by pain, numbness/tingling or discoloration of toes remove wrap completely and call the wound center @ [phone].       The treatment plan has been discussed at length between you and your provider. Follow all instructions carefully, it is very important. If you do not follow all instructions you are at risk of your wound not healing, infection, possible loss of limb and even loss of life.    COMPRESSION WRAP PATIENT CARE INSTRUCTIONS  DO NOT get compression wrap wet. When showering, use a shower boot.   Please contact wound clinic and if not able to reach, then go to emergency room if ANY of the following occur:   Tingling or numbness in the foot or toes on leg with compression wrap.  Severe pain that cannot be relieved with elevation and any medication instructed per your doctor.  A fever of 100.4°F (38°C) or higher.  Swelling, coldness, or blue-gray discoloration of the toes.  If the compression wrap feels too tight or too loose.  If the compression wrap is damaged or has rough edges that hurt.  If the compression wrap gets wet, you must cut wrap off.   Drainage from compression wrap that smells different than usual.

## 2024-04-11 NOTE — PROGRESS NOTES
Patient ID: Luis M Estrada is a 59 year old male.    Debridement Traumatic   Wound 01/11/24 #1 Left lateral lower leg    Performed by: Nicholas Gould MD  Authorized by: Nicholas Gould MD      Consent   Consent obtained? verbal  Consent given by: patient  Risks discussed? procedural risks discussed  Time out called at 4/11/2024 9:59 AM  Immediately prior to the procedure a time out was called and the performing provider verified the correct patient, procedure, equipment, support staff, and site/side marked as required.    Debridement Details  Performed by: physician  Debridement type: conservative sharp  Pain control: lidocaine 4%  Pain control administration type: topical    Pre-debridement measurements  Length (cm): 11  Width (cm): 10  Depth (cm): 0.1  Surface Area (cm^2): 110    Post-debridement measurements  Length (cm): 11  Width (cm): 10  Depth (cm): 0.1  Percent debrided: 90%  Surface Area (cm^2): 110  Area Debrided (cm^2): 99  Volume (cm^3): 11    Devitalized tissue debrided: biofilm, fibrin and necrotic debris  Instrument(s) utilized: curette  Bleeding: small  Hemostasis obtained with: not applicable  Procedural pain (0-10): 0  Post-procedural pain: 0   Response to treatment: procedure was tolerated well

## 2024-04-15 ENCOUNTER — TELEPHONE (OUTPATIENT)
Dept: WOUND CARE | Facility: HOSPITAL | Age: 59
End: 2024-04-15

## 2024-04-15 ENCOUNTER — APPOINTMENT (OUTPATIENT)
Dept: WOUND CARE | Facility: HOSPITAL | Age: 59
End: 2024-04-15
Attending: FAMILY MEDICINE
Payer: COMMERCIAL

## 2024-04-15 NOTE — TELEPHONE ENCOUNTER
Patient missed his scheduled RN visit today at 4:15pm. RN attempted to call pt, LVM on identifiable VM - pt to remove wraps and dressings, cleanse wound/leg, apply dressing and compression garment. Unless he is able to come in for an RN visit tomorrow 4/16/24 at 11:15pm - advised to return call and confirm appointment.  PRS has also attempted to call and LVM with appointment times for this week.

## 2024-04-16 ENCOUNTER — APPOINTMENT (OUTPATIENT)
Dept: WOUND CARE | Facility: HOSPITAL | Age: 59
End: 2024-04-16
Attending: FAMILY MEDICINE
Payer: COMMERCIAL

## 2024-04-16 NOTE — TELEPHONE ENCOUNTER
Patient missed his appointment yesterday and today for nurse visits. Spoke to the patient and advised him to remove his wrap and put his dressing and stocking on.

## 2024-04-18 ENCOUNTER — OFFICE VISIT (OUTPATIENT)
Dept: WOUND CARE | Facility: HOSPITAL | Age: 59
End: 2024-04-18
Attending: FAMILY MEDICINE
Payer: COMMERCIAL

## 2024-04-18 VITALS
SYSTOLIC BLOOD PRESSURE: 121 MMHG | RESPIRATION RATE: 16 BRPM | DIASTOLIC BLOOD PRESSURE: 77 MMHG | HEART RATE: 80 BPM | TEMPERATURE: 98 F

## 2024-04-18 DIAGNOSIS — S81.802D OPEN WOUND OF LEFT LOWER LEG, SUBSEQUENT ENCOUNTER: Primary | ICD-10-CM

## 2024-04-18 DIAGNOSIS — I87.2 CHRONIC VENOUS INSUFFICIENCY: ICD-10-CM

## 2024-04-18 PROCEDURE — 29581 APPL MULTLAYER CMPRN SYS LEG: CPT

## 2024-04-18 NOTE — PROGRESS NOTES
Chief Complaint   Patient presents with    Wound Care     Patients is here for a nurse visit. Patients stated no pain on the wound. He took his wrap off on Monday due to transportation issue            Current Outpatient Medications:     aspirin 81 MG Oral Tab, Take 1 tablet (81 mg total) by mouth daily., Disp: , Rfl:     No Known Allergies       HISTORY:     Past medical, surgical, family and social history updated where appropriate.    PHYSICAL EXAM:   /77   Pulse 80   Temp 98.4 °F (36.9 °C)   Resp 16        Vital signs reviewed.      Calf  Point of Measurement - Left Calf: 37     Left Calf from:: Heel  Calf Left cm:: 49.5          Ankle  Point of Measurement - Left Ankle: 10     Left Ankle from:: Heel  Left Ankle cm:: 30.8                Wound 01/11/24 #1 Left lateral lower leg (Active)   Date First Assessed/Time First Assessed: 01/11/24 1406    Wound Number (Wound Clinic Only): #1 Left lateral lower leg  Primary Wound Type: Traumatic      Assessments 1/11/2024  2:10 PM 4/18/2024  9:08 AM   Wound Image       Drainage Amount Large Moderate   Drainage Description Yellow;Serous Serosanguineous   Treatments Compression Compression   Wound Length (cm) 10.6 cm 11.1 cm   Wound Width (cm) 9.5 cm 9.3 cm   Wound Surface Area (cm^2) 100.7 cm^2 103.23 cm^2   Wound Depth (cm) 0.2 cm 0.1 cm   Wound Volume (cm^3) 20.14 cm^3 10.323 cm^3   Wound Healing % -- 49   Margins Well-defined edges Well-defined edges   Non-staged Wound Description Full thickness Full thickness   Henri-wound Assessment Edema;Hemosiderin staining Edema;Hemosiderin staining   Wound Granulation Tissue Firm;Pink Red;Spongy   Wound Bed Granulation (%) 40 % 80 %   Wound Bed Epithelium (%) -- 20 %   Wound Bed Slough (%) 60 % --   Wound Odor None None   Shape -- blister henri wound   Tunneling? No --   Undermining? No --   Sinus Tracts? No --       Inactive Orders   Date Order Priority Status Authorizing Provider   04/11/24 0959 Debridement Traumatic  Routine Completed Nicholas Gould MD   04/04/24 0915 Debridement Traumatic Left;Lateral Leg Routine Completed Nicholas Gould MD   02/15/24 1154 Debridement Traumatic Left;Lateral Leg Routine Completed Nicholas Gould MD   01/18/24 1511 Debridement Traumatic Left;Lateral Leg Routine Completed Nicholas Gould MD   01/11/24 1710 Debridement Traumatic Left;Lateral Leg Routine Completed Nicholas Gould MD       Compression Wrap 04/04/24 Leg Anterior;Left (Active)   Placement Date/Time: 04/04/24 0915   Location: Leg  Wound Location Orientation: Anterior;Left      Assessments 4/4/2024  8:47 AM 4/18/2024  9:08 AM   Response to Treatment Well tolerated Well tolerated   Compression Layers Multilayer Multilayer   Compression Product Type Coflex Coflex   Dressing Applied Yes Yes   Compression Wrap Location Toes to Knee Toes to Knee   Compression Wrap Status Clean;Dry;Intact Clean;Dry       No associated orders.          ASSESSMENT AND PLAN:        Risks, benefits, and alternatives of current treatment plan discussed in detail.  Questions and concerns addressed. Red flags to RTC or ED reviewed.  Patient (or parent) agrees to plan.      No follow-ups on file.  Weekly Wound Education Note    Teaching Provided To: Patient  Training Topics: Discharge instructions;Cleasing and general instructions;Compression;Dressing;Edema control  Training Method: Demonstration;Explain/Verbal  Training Response: Reinforcement needed        Notes: Pt here for follow up wound care visit to left lower leg wound. Patients last visit was 4/11/2024. Pt states he removed his wrap on Monday per staff recommendations and applied dressing. Edema measurement increased, wound measurement decreased. Pt arrived with no spandagrip to LLE. Continue with zinc to periwound, silver alginate, kerramax (x1), baby diaper (x1), ABD pad (x2), kerlix, tape. Applied coflex 2 layer wrap to LLE. Pt to follow up for nurse visit on Monday and provider visit on Thursday.  RN educated patient if he is unable to come to monday nurse visit he needs to remove wrap and apply dressing and compression ( RN provided patient with 1 enluxtra dressing and 6x6 bordered foam dressing as well as spandagrip (F) at visit). Pt states understanding, RN reminded patient he has a visit on Monday and Thursday next week.        Shawn HOWE RN   4/18/2024  9:18 AM

## 2024-04-22 ENCOUNTER — OFFICE VISIT (OUTPATIENT)
Dept: WOUND CARE | Facility: HOSPITAL | Age: 59
End: 2024-04-22
Attending: FAMILY MEDICINE
Payer: COMMERCIAL

## 2024-04-22 VITALS
DIASTOLIC BLOOD PRESSURE: 76 MMHG | SYSTOLIC BLOOD PRESSURE: 146 MMHG | TEMPERATURE: 98 F | HEART RATE: 85 BPM | RESPIRATION RATE: 16 BRPM

## 2024-04-22 DIAGNOSIS — S81.802D OPEN WOUND OF LEFT LOWER LEG, SUBSEQUENT ENCOUNTER: Primary | ICD-10-CM

## 2024-04-22 PROCEDURE — 29581 APPL MULTLAYER CMPRN SYS LEG: CPT

## 2024-04-22 NOTE — PROGRESS NOTES
Weekly Wound Education Note    Teaching Provided To: Patient  Training Topics: Discharge instructions;Edema control;Compression;Signs and symptoms of infection  Training Method: Demonstration;Explain/Verbal;Written  Training Response: Patient responds and understands        Notes: Pt tolerating compression wrap well, wound is improving per measurements. Continue triad to periwound, silver alginate, kerramax #2, diaper, Coflex 2 layer. Reviewed elevation, s./s of acute infection and to seek immediate medical attention if concerns arise.

## 2024-04-22 NOTE — PROGRESS NOTES
Chief Complaint   Patient presents with    Nurse Visit     Pt here for nurse visit assessment. He arrives with compression wrap to left leg which he is tolerating well           Current Outpatient Medications:     aspirin 81 MG Oral Tab, Take 1 tablet (81 mg total) by mouth daily., Disp: , Rfl:     No Known Allergies       HISTORY:     Past medical, surgical, family and social history updated where appropriate.    PHYSICAL EXAM:   /76   Pulse 85   Temp 98.3 °F (36.8 °C)   Resp 16        Vital signs reviewed.      Calf  Point of Measurement - Left Calf: 37     Left Calf from:: Heel  Calf Left cm:: 47          Ankle  Point of Measurement - Left Ankle: 10     Left Ankle from:: Heel  Left Ankle cm:: 30.6                Wound 01/11/24 #1 Left lateral lower leg (Active)   Date First Assessed/Time First Assessed: 01/11/24 1406    Wound Number (Wound Clinic Only): #1 Left lateral lower leg  Primary Wound Type: Traumatic      Assessments 1/11/2024  2:10 PM 4/22/2024  7:36 AM   Wound Image       Drainage Amount Large Large   Drainage Description Yellow;Serous Serosanguineous   Treatments Compression Compression   Wound Length (cm) 10.6 cm 11 cm   Wound Width (cm) 9.5 cm 8.9 cm   Wound Surface Area (cm^2) 100.7 cm^2 97.9 cm^2   Wound Depth (cm) 0.2 cm 0.1 cm   Wound Volume (cm^3) 20.14 cm^3 9.79 cm^3   Wound Healing % -- 51   Margins Well-defined edges Well-defined edges   Non-staged Wound Description Full thickness Full thickness   Leslie-wound Assessment Edema;Hemosiderin staining Edema;Hemosiderin staining   Wound Granulation Tissue Firm;Pink Red;Spongy   Wound Bed Granulation (%) 40 % 30 %   Wound Bed Epithelium (%) -- 30 %   Wound Bed Slough (%) 60 % 40 %   Wound Odor None None   Tunneling? No No   Undermining? No No   Sinus Tracts? No No       Inactive Orders   Date Order Priority Status Authorizing Provider   04/11/24 0959 Debridement Traumatic Routine Completed Nicholas Gould MD   04/04/24 0984 Debridement  Traumatic Left;Lateral Leg Routine Completed Nicholas Gould MD   02/15/24 1154 Debridement Traumatic Left;Lateral Leg Routine Completed Nicholas Gould MD   01/18/24 1511 Debridement Traumatic Left;Lateral Leg Routine Completed Nicholas Gould MD   01/11/24 1710 Debridement Traumatic Left;Lateral Leg Routine Completed Nicholas Gould MD       Compression Wrap 04/04/24 Leg Anterior;Left (Active)   Placement Date/Time: 04/04/24 0915   Location: Leg  Wound Location Orientation: Anterior;Left      Assessments 4/4/2024  8:47 AM 4/22/2024  7:40 AM   Response to Treatment Well tolerated Well tolerated   Compression Layers Multilayer 2   Compression Product Type Coflex Coflex   Dressing Applied Yes Yes   Compression Wrap Location Toes to Knee --   Compression Wrap Status Clean;Dry;Intact Clean;Dry       No associated orders.          ASSESSMENT AND PLAN:        Risks, benefits, and alternatives of current treatment plan discussed in detail.  Questions and concerns addressed. Red flags to RTC or ED reviewed.  Patient (or parent) agrees to plan.      No follow-ups on file.  Weekly Wound Education Note    Teaching Provided To: Patient  Training Topics: Discharge instructions;Edema control;Compression;Signs and symptoms of infection  Training Method: Demonstration;Explain/Verbal;Written  Training Response: Patient responds and understands        Notes: Pt tolerating compression wrap well, wound is improving per measurements. Continue triad to periwound, silver alginate, kerramax #2, diaper, Coflex 2 layer. Reviewed elevation, s./s of acute infection and to seek immediate medical attention if concerns arise.               Jacqueline GRACE RN   4/22/2024  7:46 AM

## 2024-04-25 ENCOUNTER — OFFICE VISIT (OUTPATIENT)
Dept: WOUND CARE | Facility: HOSPITAL | Age: 59
End: 2024-04-25
Attending: FAMILY MEDICINE
Payer: COMMERCIAL

## 2024-04-25 VITALS
HEART RATE: 79 BPM | SYSTOLIC BLOOD PRESSURE: 146 MMHG | TEMPERATURE: 99 F | RESPIRATION RATE: 16 BRPM | DIASTOLIC BLOOD PRESSURE: 84 MMHG

## 2024-04-25 DIAGNOSIS — S81.802D OPEN WOUND OF LEFT LOWER LEG, SUBSEQUENT ENCOUNTER: Primary | ICD-10-CM

## 2024-04-25 DIAGNOSIS — I87.2 CHRONIC VENOUS INSUFFICIENCY: ICD-10-CM

## 2024-04-25 DIAGNOSIS — E66.01 CLASS 3 SEVERE OBESITY DUE TO EXCESS CALORIES WITH SERIOUS COMORBIDITY AND BODY MASS INDEX (BMI) OF 45.0 TO 49.9 IN ADULT (HCC): ICD-10-CM

## 2024-04-25 PROCEDURE — 99215 OFFICE O/P EST HI 40 MIN: CPT | Performed by: FAMILY MEDICINE

## 2024-04-25 RX ORDER — AMOXICILLIN AND CLAVULANATE POTASSIUM 875; 125 MG/1; MG/1
1 TABLET, FILM COATED ORAL 2 TIMES DAILY
Qty: 14 TABLET | Refills: 0 | Status: SHIPPED | OUTPATIENT
Start: 2024-04-25 | End: 2024-05-02

## 2024-04-25 NOTE — PROGRESS NOTES
Chief Complaint   Patient presents with    Wound Care     Patient is here for follow up and has no new concerns. He arrives with zinc and silver alginate and coflex to his left lower leg.       HPI:     Patient here for follow-up of left lateral leg wound.  He is tolerating compression wrap without any difficulty.  He has no new complaints today.  He denies any increased pain or odor or fever or chills.    Lab Results   Component Value Date    BUN 18 01/03/2024    CREATSERUM 0.88 01/03/2024    ALB 3.0 (L) 01/02/2024    TP 7.9 01/02/2024    A1C 5.2 12/12/2016         Current Outpatient Medications:     amoxicillin clavulanate 875-125 MG Oral Tab, Take 1 tablet by mouth 2 (two) times daily for 7 days. TAKE WITH FOOD, Disp: 14 tablet, Rfl: 0    aspirin 81 MG Oral Tab, Take 1 tablet (81 mg total) by mouth daily., Disp: , Rfl:     No Known Allergies         REVIEW OF SYSTEMS:     Review of Systems (ROS)  This information was obtained from the patient, chart    A comprehensive 10 point review of systems was completed.  Pertinent positives and negatives noted in the the HPI.     Past medical, surgical, family and social history updated where appropriate.    PHYSICAL EXAM:   /84   Pulse 79   Temp 98.7 °F (37.1 °C)   Resp 16    Estimated body mass index is 45.42 kg/m² as calculated from the following:    Height as of 1/10/24: 67\".    Weight as of 1/10/24: 290 lb (131.5 kg).   Vital signs reviewed.Appears stated age, well groomed.      Awake, alert, in no acute distress  HENT:  normocephalic, atraumatic, external ear canals clear bilaterally, tympanic membranes appear opaque, non-bulging, non-erythematous, nasal turbinates are non-inflamed and not swollen, oropharynx without erythema, swelling, or exudate, gingiva and lips without any apparent lesions.   Cardiac:  S1 S2 regular rate and rhythm, no murmur, gallop, or rub  Respiratory:  Bilaterally clear to auscultation, no chest tenderness to palpation, no wheezing,  no rhonchi, no rales, air entry is adequate, no accessory respiratory muscle use, no chest asymmetry, normal excursion.  GI:  bowel sound positive in all four quadrants, abdomen is soft, non-tender, non-distended, no hepatosplenomegaly, no abnormal aortic pulsation.     Calf  Point of Measurement - Left Calf: 37     Left Calf from:: Heel  Calf Left cm:: 45.5          Ankle  Point of Measurement - Left Ankle: 10     Left Ankle from:: Heel  Left Ankle cm:: 29.5                Foot                            Wound 01/11/24 #1 Left lateral lower leg (Active)   Date First Assessed/Time First Assessed: 01/11/24 1406    Wound Number (Wound Clinic Only): #1 Left lateral lower leg  Primary Wound Type: Traumatic      Assessments 1/11/2024  2:10 PM 4/25/2024  9:34 AM   Wound Image       Drainage Amount Large --   Drainage Description Yellow;Serous --   Treatments Compression --   Wound Length (cm) 10.6 cm --   Wound Width (cm) 9.5 cm --   Wound Surface Area (cm^2) 100.7 cm^2 --   Wound Depth (cm) 0.2 cm --   Wound Volume (cm^3) 20.14 cm^3 --   Margins Well-defined edges --   Non-staged Wound Description Full thickness --   Leslie-wound Assessment Edema;Hemosiderin staining --   Wound Granulation Tissue Firm;Pink --   Wound Bed Granulation (%) 40 % --   Wound Bed Slough (%) 60 % --   Wound Odor None --   Tunneling? No --   Undermining? No --   Sinus Tracts? No --       Inactive Orders   Date Order Priority Status Authorizing Provider   04/11/24 0959 Debridement Traumatic Routine Completed Nicholas Gould MD   04/04/24 0915 Debridement Traumatic Left;Lateral Leg Routine Completed Nicholas Gould MD   02/15/24 1154 Debridement Traumatic Left;Lateral Leg Routine Completed Nicholas Gould MD   01/18/24 1511 Debridement Traumatic Left;Lateral Leg Routine Completed Nicholas Gould MD   01/11/24 1710 Debridement Traumatic Left;Lateral Leg Routine Completed Nicholas Gould MD       Compression Wrap 04/04/24 Leg Anterior;Left  (Active)   Placement Date/Time: 04/04/24 0915   Location: Leg  Wound Location Orientation: Anterior;Left      Assessments 4/4/2024  8:47 AM 4/25/2024  9:14 AM   Response to Treatment Well tolerated Well tolerated   Compression Layers Multilayer Multilayer   Compression Product Type Coflex Coflex   Dressing Applied Yes Yes   Compression Wrap Location Toes to Knee Toes to Knee   Compression Wrap Status Clean;Dry;Intact Clean;Dry       No associated orders.          ASSESSMENT AND PLAN:      1. Open wound of left lower leg, subsequent encounter    2. Chronic venous insufficiency    3. Class 3 severe obesity due to excess calories with serious comorbidity and body mass index (BMI) of 45.0 to 49.9 in adult (HCC)    Clinically the wound is improving and we will continue with silver alginate to the wound.  We did use Vashe soaked gauze to the wound base as he had previous culture done around 4/9/2024 which showed 1+ gram-negative ollie but no Pseudomonas.  Because he still has copious drainage will cover for possible infection.  Thus we will use Vashe soaked gauze to the wound, will give empiric Augmentin 875 mg twice a day for 7 days.  Reevaluation in 1 week.  If he has any worsening symptoms to seek medical attention through the ER.      Risks, benefits, and alternatives of current treatment plan discussed in detail.  Questions and concerns addressed. Red flags to RTC or ED reviewed.  Patient (or parent) agrees to plan.      No follow-ups on file.    Also refer to patient instructions.     I spent a total of 40 minutes with this patient's care.  This time included preparing to see the patient (eg, review notes and recent diagnostics), seeing the patient, performing a medically appropriate examination and/or evaluation, counseling and educating the patient, and documenting in the record.          Nicholas Gould M.D.   [facility]   4/25/2024    Patient Instructions       PATIENT  INSTRUCTIONS      FOR Luis M Estrada , RICK 3/25/1965    DATE OF SERVICE: 2024      Triad to periwound  Silver Alginate to wound base  Angelo  Coflex 2 layer compression wrap to left lower extremity     Dr. Gould in 1 week      Managing your edema:    Avoid prolonged standing in one place. It is better to have your calf muscles moving to pump fluid out of the legs.  Elevate leg(s) above the level of the heart when sitting or as much as possible.  Take you diuretics as directed by your physician. Do not skip doses or change doses unless instructed to do so by your physician.  Decrease salt intake, follow recommended 2 grams daily.  Do not get leg(s) with compression wrap wet. If wraps are too tight as indicated by pain, numbness/tingling or discoloration of toes remove wrap completely and call the wound center @ [phone].       The treatment plan has been discussed at length between you and your provider. Follow all instructions carefully, it is very important. If you do not follow all instructions you are at risk of your wound not healing, infection, possible loss of limb and even loss of life.    COMPRESSION WRAP PATIENT CARE INSTRUCTIONS  DO NOT get compression wrap wet. When showering, use a shower boot.   Please contact wound clinic and if not able to reach, then go to emergency room if ANY of the following occur:   Tingling or numbness in the foot or toes on leg with compression wrap.  Severe pain that cannot be relieved with elevation and any medication instructed per your doctor.  A fever of 100.4°F (38°C) or higher.  Swelling, coldness, or blue-gray discoloration of the toes.  If the compression wrap feels too tight or too loose.  If the compression wrap is damaged or has rough edges that hurt.  If the compression wrap gets wet, you must cut wrap off.   Drainage from compression wrap that smells different than usual.      Nurse visit on Monday 1/15/24    Follow up with Dr. Gould 1  WEEK          MISCELLANEOUS INSTRUCTIONS  Supplement with a daily multivitamin   Low salt diet  Intense blood sugar control - Goal Blood sugar below 180 at all times recommended.  Increase protein intake / consider protein supplements - see below  Elevate extremities at all times when sitting / laying down.  No tobacco use     DIETARY MODIFICATIONS TO HELP WITH WOUND HEALING:          Please follow any restrictions to diet given by your other doctors     Protein: meats, beans, eggs, milk and yogurt particularly Greek yogurt), tofu, soy nuts, soy protein products     Vitamin C: citrus fruits and juices, strawberries, tomatoes, tomato juice, peppers, baked potatoes, spinach, broccoli, cauliflower, [place] sprouts, cabbage     Vitamin A: dark greens, leafy vegetables, orange or yellow vegetables, cantaloupe, fortified dairy products, liver, fortified cereals     Zinc: fortified cereals, red meats, seafood     Consider Bob by [employer] (These are essential branch chain amino acids that help with tissue building and wound healing) and take 2 packets/day. You can order online at [employer] or [employer]     ADDITIONAL REMINDERS:     The treatment plan has been discussed at length with you and your provider. Follow all instructions carefully, it is very important. If you do not follow all instructions, you are at risk of your wound not healing, infection, possible loss of limb and even loss of life.  Please call the clinic at [phone] during regular business hours ( 7:30 AM - 5:30 PM) if you notice increased redness, warmth, pain or pus like drainage or start running a fever greater than 100.3.    For after hour emergencies, please call your primary physician or go to the nearest emergency room.  If your blood pressure is elevated, follow up with your primary care doctor and/or cardiologist as soon as possible.     FOR LEG SWELLING,  LOWER EXTREMITY WOUNDS AND IF USING COMPRESSION:   Managing your edema:    Avoid  prolonged standing in one place. It is better to have your calf muscles moving to pump fluid out of the legs.  Elevate leg(s) above the level of the heart when sitting or as much as possible.  Take you diuretics as directed by your physician. Do not skip doses or change doses unless instructed to do so by your physician.  Decrease salt intake, follow recommended 2 grams daily.  Do not get leg(s) with compression wrap wet. If wraps are too tight as indicated by pain, numbness/tingling or discoloration of toes remove wrap completely and call the wound center @ [phone].       The treatment plan has been discussed at length between you and your provider. Follow all instructions carefully, it is very important. If you do not follow all instructions you are at risk of your wound not healing, infection, possible loss of limb and even loss of life.    COMPRESSION WRAP PATIENT CARE INSTRUCTIONS  DO NOT get compression wrap wet. When showering, use a shower boot.   Please contact wound clinic and if not able to reach, then go to emergency room if ANY of the following occur:   Tingling or numbness in the foot or toes on leg with compression wrap.  Severe pain that cannot be relieved with elevation and any medication instructed per your doctor.  A fever of 100.4°F (38°C) or higher.  Swelling, coldness, or blue-gray discoloration of the toes.  If the compression wrap feels too tight or too loose.  If the compression wrap is damaged or has rough edges that hurt.  If the compression wrap gets wet, you must cut wrap off.   Drainage from compression wrap that smells different than usual.

## 2024-04-25 NOTE — PATIENT INSTRUCTIONS
PATIENT INSTRUCTIONS      FOR Luis M RODRIGUEZ [place] , RICK 3/25/1965    DATE OF SERVICE: 2024      Triad to periwound  Silver Alginate to wound base  Angelo  Coflex 2 layer compression wrap to left lower extremity     Dr. Gould in 1 week      Managing your edema:    Avoid prolonged standing in one place. It is better to have your calf muscles moving to pump fluid out of the legs.  Elevate leg(s) above the level of the heart when sitting or as much as possible.  Take you diuretics as directed by your physician. Do not skip doses or change doses unless instructed to do so by your physician.  Decrease salt intake, follow recommended 2 grams daily.  Do not get leg(s) with compression wrap wet. If wraps are too tight as indicated by pain, numbness/tingling or discoloration of toes remove wrap completely and call the wound center @ [phone].       The treatment plan has been discussed at length between you and your provider. Follow all instructions carefully, it is very important. If you do not follow all instructions you are at risk of your wound not healing, infection, possible loss of limb and even loss of life.    COMPRESSION WRAP PATIENT CARE INSTRUCTIONS  DO NOT get compression wrap wet. When showering, use a shower boot.   Please contact wound clinic and if not able to reach, then go to emergency room if ANY of the following occur:   Tingling or numbness in the foot or toes on leg with compression wrap.  Severe pain that cannot be relieved with elevation and any medication instructed per your doctor.  A fever of 100.4°F (38°C) or higher.  Swelling, coldness, or blue-gray discoloration of the toes.  If the compression wrap feels too tight or too loose.  If the compression wrap is damaged or has rough edges that hurt.  If the compression wrap gets wet, you must cut wrap off.   Drainage from compression wrap that smells different than usual.      Nurse visit on Monday 1/15/24    Follow up with Dr. Gould 1  WEEK

## 2024-04-25 NOTE — PROGRESS NOTES
Weekly Wound Education Note    Teaching Provided To: Patient  Training Topics: Discharge instructions;Cleasing and general instructions;Dressing;Compression;Edema control  Training Method: Demonstration;Explain/Verbal  Training Response: Reinforcement needed        Notes: Pt here for follow up wound care visit to left lateral leg wound. Wound measurement decreased, edema measurement decreased. Provider debrided wound at visit today. Provider to place order for oral ABT at visit. Treatment to continue with zinc to periwound, silver alginate, kerramax (x2), baby diaper, kerlix, tape. Wrapped LLE in coflex 2 layer wrap. Pt to follow up with provider in 1 week.

## 2024-04-29 ENCOUNTER — OFFICE VISIT (OUTPATIENT)
Dept: WOUND CARE | Facility: HOSPITAL | Age: 59
End: 2024-04-29
Attending: FAMILY MEDICINE
Payer: COMMERCIAL

## 2024-04-29 VITALS
TEMPERATURE: 99 F | DIASTOLIC BLOOD PRESSURE: 92 MMHG | RESPIRATION RATE: 16 BRPM | SYSTOLIC BLOOD PRESSURE: 144 MMHG | HEART RATE: 71 BPM

## 2024-04-29 DIAGNOSIS — I87.2 CHRONIC VENOUS INSUFFICIENCY: ICD-10-CM

## 2024-04-29 DIAGNOSIS — S81.802D LEG WOUND, LEFT, SUBSEQUENT ENCOUNTER: ICD-10-CM

## 2024-04-29 DIAGNOSIS — E66.01 CLASS 3 SEVERE OBESITY DUE TO EXCESS CALORIES WITH SERIOUS COMORBIDITY AND BODY MASS INDEX (BMI) OF 45.0 TO 49.9 IN ADULT (HCC): ICD-10-CM

## 2024-04-29 DIAGNOSIS — S81.802D OPEN WOUND OF LEFT LOWER LEG, SUBSEQUENT ENCOUNTER: Primary | ICD-10-CM

## 2024-04-29 PROCEDURE — 29581 APPL MULTLAYER CMPRN SYS LEG: CPT

## 2024-04-29 NOTE — PROGRESS NOTES
Chief Complaint   Patient presents with    Wound Care     RN visit, wound to left leg. Denies pain or concerns at this time. Wraps tolerated well. Currently taking oral abx.            Current Outpatient Medications:     amoxicillin clavulanate 875-125 MG Oral Tab, Take 1 tablet by mouth 2 (two) times daily for 7 days. TAKE WITH FOOD, Disp: 14 tablet, Rfl: 0    aspirin 81 MG Oral Tab, Take 1 tablet (81 mg total) by mouth daily., Disp: , Rfl:     No Known Allergies       HISTORY:     Past medical, surgical, family and social history updated where appropriate.    PHYSICAL EXAM:   BP (!) 144/92   Pulse 71   Temp 98.6 °F (37 °C)   Resp 16        Vital signs reviewed.      Calf  Point of Measurement - Left Calf: 37     Left Calf from:: Heel  Calf Left cm:: 44.7          Ankle  Point of Measurement - Left Ankle: 10     Left Ankle from:: Heel  Left Ankle cm:: 30.4                Wound 01/11/24 #1 Left lateral lower leg (Active)   Date First Assessed/Time First Assessed: 01/11/24 1406    Wound Number (Wound Clinic Only): #1 Left lateral lower leg  Primary Wound Type: Traumatic      Assessments 1/11/2024  2:10 PM 4/29/2024  7:56 AM   Wound Image       Drainage Amount Large Large   Drainage Description Yellow;Serous Serosanguineous   Treatments Compression Compression   Wound Length (cm) 10.6 cm 10.7 cm   Wound Width (cm) 9.5 cm 8.5 cm   Wound Surface Area (cm^2) 100.7 cm^2 90.95 cm^2   Wound Depth (cm) 0.2 cm 0.1 cm   Wound Volume (cm^3) 20.14 cm^3 9.095 cm^3   Wound Healing % -- 55   Margins Well-defined edges Well-defined edges   Non-staged Wound Description Full thickness Full thickness   Henri-wound Assessment Edema;Hemosiderin staining Edema;Hemosiderin staining;Moist   Wound Granulation Tissue Firm;Pink Red;Spongy   Wound Bed Granulation (%) 40 % 20 %   Wound Bed Epithelium (%) -- 20 %   Wound Bed Slough (%) 60 % 60 %   Wound Odor None None   Shape -- blister henri wound   Tunneling? No No   Undermining? No No   Sinus  Tracts? No No       Inactive Orders   Date Order Priority Status Authorizing Provider   04/11/24 0959 Debridement Traumatic Routine Completed Nicholas Gould MD   04/04/24 0915 Debridement Traumatic Left;Lateral Leg Routine Completed Nicholas Gould MD   02/15/24 1154 Debridement Traumatic Left;Lateral Leg Routine Completed Nicholas Gould MD   01/18/24 1511 Debridement Traumatic Left;Lateral Leg Routine Completed Nicholas Gould MD   01/11/24 1710 Debridement Traumatic Left;Lateral Leg Routine Completed Nicholas Gould MD       Compression Wrap 04/04/24 Leg Anterior;Left (Active)   Placement Date/Time: 04/04/24 0915   Location: Leg  Wound Location Orientation: Anterior;Left      Assessments 4/4/2024  8:47 AM 4/29/2024  8:01 AM   Response to Treatment Well tolerated Well tolerated   Compression Layers Multilayer Multilayer   Compression Product Type Coflex Coflex   Dressing Applied Yes Yes   Compression Wrap Location Toes to Knee Toes to Knee   Compression Wrap Status Clean;Dry;Intact Clean;Dry       No associated orders.          ASSESSMENT AND PLAN:        Risks, benefits, and alternatives of current treatment plan discussed in detail.  Questions and concerns addressed. Red flags to RTC or ED reviewed.  Patient (or parent) agrees to plan.      No follow-ups on file.  Weekly Wound Education Note    Teaching Provided To: Patient  Training Topics: Cleasing and general instructions;Compression;Discharge instructions;Dressing;Edema control  Training Method: Explain/Verbal  Training Response: Patient responds and understands;Reinforcement needed        Notes: Here for RN visit.    Here for RN visit, wound stable. Wraps tolerated well, edema measurements stable. Silver alginate, triad paste to periwound, kerramax, conforming gauze, tape, coflex 2. Scheduled to see provider this Thursday.       Tamica ZAFAR RN   4/29/2024  8:07 AM

## 2024-05-02 ENCOUNTER — OFFICE VISIT (OUTPATIENT)
Dept: WOUND CARE | Facility: HOSPITAL | Age: 59
End: 2024-05-02
Attending: FAMILY MEDICINE
Payer: COMMERCIAL

## 2024-05-02 VITALS
RESPIRATION RATE: 16 BRPM | SYSTOLIC BLOOD PRESSURE: 141 MMHG | TEMPERATURE: 99 F | DIASTOLIC BLOOD PRESSURE: 79 MMHG | HEART RATE: 70 BPM

## 2024-05-02 DIAGNOSIS — S81.802D OPEN WOUND OF LEFT LOWER LEG, SUBSEQUENT ENCOUNTER: Primary | ICD-10-CM

## 2024-05-02 DIAGNOSIS — I87.2 CHRONIC VENOUS INSUFFICIENCY: ICD-10-CM

## 2024-05-02 DIAGNOSIS — E66.01 CLASS 3 SEVERE OBESITY DUE TO EXCESS CALORIES WITH SERIOUS COMORBIDITY AND BODY MASS INDEX (BMI) OF 45.0 TO 49.9 IN ADULT (HCC): ICD-10-CM

## 2024-05-02 PROCEDURE — 97597 DBRDMT OPN WND 1ST 20 CM/<: CPT | Performed by: FAMILY MEDICINE

## 2024-05-02 PROCEDURE — 17250 CHEM CAUT OF GRANLTJ TISSUE: CPT | Performed by: FAMILY MEDICINE

## 2024-05-02 PROCEDURE — 97598 DBRDMT OPN WND ADDL 20CM/<: CPT | Performed by: FAMILY MEDICINE

## 2024-05-02 NOTE — PATIENT INSTRUCTIONS
PATIENT INSTRUCTIONS      FOR Luis M RODRIGUEZ [place] ,  3/25/1965    DATE OF SERVICE: 2024      Triad to periwound  Silver Alginate to wound base  [place]  Coflex 2 layer compression wrap to left lower extremity     Dr. Gould in 1 week      Managing your edema:    Avoid prolonged standing in one place. It is better to have your calf muscles moving to pump fluid out of the legs.  Elevate leg(s) above the level of the heart when sitting or as much as possible.  Take you diuretics as directed by your physician. Do not skip doses or change doses unless instructed to do so by your physician.  Decrease salt intake, follow recommended 2 grams daily.  Do not get leg(s) with compression wrap wet. If wraps are too tight as indicated by pain, numbness/tingling or discoloration of toes remove wrap completely and call the wound center @ [phone].       The treatment plan has been discussed at length between you and your provider. Follow all instructions carefully, it is very important. If you do not follow all instructions you are at risk of your wound not healing, infection, possible loss of limb and even loss of life.    COMPRESSION WRAP PATIENT CARE INSTRUCTIONS  DO NOT get compression wrap wet. When showering, use a shower boot.   Please contact wound clinic and if not able to reach, then go to emergency room if ANY of the following occur:   Tingling or numbness in the foot or toes on leg with compression wrap.  Severe pain that cannot be relieved with elevation and any medication instructed per your doctor.  A fever of 100.4°F (38°C) or higher.  Swelling, coldness, or blue-gray discoloration of the toes.  If the compression wrap feels too tight or too loose.  If the compression wrap is damaged or has rough edges that hurt.  If the compression wrap gets wet, you must cut wrap off.   Drainage from compression wrap that smells different than usual.      Nurse visit on Monday 1/15/24    Follow up with Dr. Gould 1  WEEK

## 2024-05-02 NOTE — PROGRESS NOTES
Patient ID: Luis M Estrada is a 59 year old male.    Debridement Traumatic   Wound 01/11/24 #1 Left lateral lower leg    Performed by: Nicholas Gould MD  Authorized by: Nicholas Gould MD      Consent   Consent obtained? verbal  Consent given by: patient  Risks discussed? procedural risks discussed    Debridement Details  Performed by: physician  Debridement type: conservative sharp  Pain control: lidocaine 4%  Pain control administration type: topical    Pre-debridement measurements  Length (cm): 10.4  Width (cm): 8.2  Depth (cm): 0.1  Surface Area (cm^2): 85.28    Post-debridement measurements  Length (cm): 10.4  Width (cm): 8.2  Depth (cm): 0.1  Percent debrided: 70%  Surface Area (cm^2): 85.28  Area Debrided (cm^2): 59.7  Volume (cm^3): 8.53    Tissue and other material debrided: subcutaneous tissue  Devitalized tissue debrided: slough  Instrument(s) utilized: curette  Bleeding: medium  Hemostasis obtained with: silver nitrate  Response to treatment: procedure was tolerated well

## 2024-05-02 NOTE — PROGRESS NOTES
Weekly Wound Education Note    Teaching Provided To: Patient  Training Topics: Cleasing and general instructions;Discharge instructions;Dressing;Edema control;Compression  Training Method: Demonstration;Explain/Verbal  Training Response: Reinforcement needed        Notes: Pt here for follow up wound care visit to left lateral lower leg wound. Edema measurement slightly increased in calf and decreased in ankle. Wound measurment decreased. Provider debrided wound at visit, silver nitrate applied to wound bed. Continue with silver alginate, kerramax, kerlix, tape. Applied coflex 2 layer wrap to LLE. Pt to follow up with provider in 1 week.

## 2024-05-03 NOTE — PROGRESS NOTES
Chief Complaint   Patient presents with    Wound Care     Patient is here for a wound care follow up. He denies any pain to the wound.        HPI:     Patient here for follow-up of left lateral leg wound.  He is doing well and has no new complaints.  Tolerating compression wrap.    Lab Results   Component Value Date    BUN 18 01/03/2024    CREATSERUM 0.88 01/03/2024    ALB 3.0 (L) 01/02/2024    TP 7.9 01/02/2024    A1C 5.2 12/12/2016         Current Outpatient Medications:     aspirin 81 MG Oral Tab, Take 1 tablet (81 mg total) by mouth daily., Disp: , Rfl:     No Known Allergies         REVIEW OF SYSTEMS:     Review of Systems (ROS)  This information was obtained from the patient, chart    A comprehensive 10 point review of systems was completed.  Pertinent positives and negatives noted in the the HPI.     Past medical, surgical, family and social history updated where appropriate.    PHYSICAL EXAM:   /79   Pulse 70   Temp 98.6 °F (37 °C)   Resp 16    Estimated body mass index is 45.42 kg/m² as calculated from the following:    Height as of 1/10/24: 67\".    Weight as of 1/10/24: 290 lb.   Vital signs reviewed.Appears stated age, well groomed.      Awake, alert, in no acute distress  HENT:  normocephalic, atraumatic, external ear canals clear bilaterally, tympanic membranes appear opaque, non-bulging, non-erythematous, nasal turbinates are non-inflamed and not swollen, oropharynx without erythema, swelling, or exudate, gingiva and lips without any apparent lesions.   Cardiac:  S1 S2 regular rate and rhythm, no murmur, gallop, or rub  Respiratory:  Bilaterally clear to auscultation, no chest tenderness to palpation, no wheezing, no rhonchi, no rales, air entry is adequate, no accessory respiratory muscle use, no chest asymmetry, normal excursion.  GI:  bowel sound positive in all four quadrants, abdomen is soft, non-tender, non-distended, no hepatosplenomegaly, no abnormal aortic pulsation.     Calf  Point  of Measurement - Left Calf: 37     Left Calf from:: Heel  Calf Left cm:: 49          Ankle  Point of Measurement - Left Ankle: 10     Left Ankle from:: Heel  Left Ankle cm:: 28.5                Foot                            Wound 01/11/24 #1 Left lateral lower leg (Active)   Date First Assessed/Time First Assessed: 01/11/24 1406    Wound Number (Wound Clinic Only): #1 Left lateral lower leg  Primary Wound Type: Traumatic      Assessments 1/11/2024  2:10 PM 5/2/2024  2:37 PM   Wound Image       Drainage Amount Large --   Drainage Description Yellow;Serous --   Treatments Compression --   Wound Length (cm) 10.6 cm 10.4 cm   Wound Width (cm) 9.5 cm 8.2 cm   Wound Surface Area (cm^2) 100.7 cm^2 85.28 cm^2   Wound Depth (cm) 0.2 cm 0.1 cm   Wound Volume (cm^3) 20.14 cm^3 8.528 cm^3   Wound Healing % -- 58   Margins Well-defined edges --   Non-staged Wound Description Full thickness --   Leslie-wound Assessment Edema;Hemosiderin staining --   Wound Granulation Tissue Firm;Pink --   Wound Bed Granulation (%) 40 % --   Wound Bed Slough (%) 60 % --   Wound Odor None --   Tunneling? No --   Undermining? No --   Sinus Tracts? No --       Inactive Orders   Date Order Priority Status Authorizing Provider   05/02/24 1520 Debridement Traumatic Routine Completed Nicholas Gould MD   04/11/24 0959 Debridement Traumatic Routine Completed Nicholas Gould MD   04/04/24 0915 Debridement Traumatic Left;Lateral Leg Routine Completed Nicholas Gould MD   02/15/24 1154 Debridement Traumatic Left;Lateral Leg Routine Completed Nicholas Gould MD   01/18/24 1511 Debridement Traumatic Left;Lateral Leg Routine Completed Nicholas Gould MD   01/11/24 1710 Debridement Traumatic Left;Lateral Leg Routine Completed Nicholas Gould MD       Compression Wrap 04/04/24 Leg Anterior;Left (Active)   Placement Date/Time: 04/04/24 0915   Location: Leg  Wound Location Orientation: Anterior;Left      Assessments 4/4/2024  8:47 AM 5/2/2024  2:36 PM    Response to Treatment Well tolerated Well tolerated   Compression Layers Multilayer Multilayer   Compression Product Type Coflex Coflex   Dressing Applied Yes Yes   Compression Wrap Location Toes to Knee Toes to Knee   Compression Wrap Status Clean;Dry;Intact --       No associated orders.          ASSESSMENT AND PLAN:      1. Open wound of left lower leg, subsequent encounter    2. Chronic venous insufficiency    3. Class 3 severe obesity due to excess calories with serious comorbidity and body mass index (BMI) of 45.0 to 49.9 in adult (HCC)    Silver nitrate was applied to areas of hypergranulation on the wound.  Clinically the wound does still continue to show improvement and there is more islands of epithelium noted.  The wound was debrided selectively with a curette in its entirety and there was some bleeding controlled with gauze pressure.  Slough was removed and debridement was done down to granulation tissue.  See debridement note.  Will continue with silver alginate to the wound base and cover with Kerramax and Coflex 2 layer compression wrap.  This seems to be working well.  He will follow-up once a week and next appointment will be in 1 week.      Risks, benefits, and alternatives of current treatment plan discussed in detail.  Questions and concerns addressed. Red flags to RTC or ED reviewed.  Patient (or parent) agrees to plan.      Return in about 1 week (around 2024).    Also refer to patient instructions.     I spent a total of 40 minutes with this patient's care.  This time included preparing to see the patient (eg, review notes and recent diagnostics), seeing the patient, performing a medically appropriate examination and/or evaluation, counseling and educating the patient, and documenting in the record.          Nicholas Gould M.D.   [facility]   5/3/2024    Patient Instructions       PATIENT INSTRUCTIONS      FOR Luis M Estrada , RICK 3/25/1965    DATE OF  SERVICE: 5/2/2024      Triad to periwound  Silver Alginate to wound base  Dalileeroy  Coflex 2 layer compression wrap to left lower extremity     Dr. Gould in 1 week      Managing your edema:    Avoid prolonged standing in one place. It is better to have your calf muscles moving to pump fluid out of the legs.  Elevate leg(s) above the level of the heart when sitting or as much as possible.  Take you diuretics as directed by your physician. Do not skip doses or change doses unless instructed to do so by your physician.  Decrease salt intake, follow recommended 2 grams daily.  Do not get leg(s) with compression wrap wet. If wraps are too tight as indicated by pain, numbness/tingling or discoloration of toes remove wrap completely and call the wound center @ [phone].       The treatment plan has been discussed at length between you and your provider. Follow all instructions carefully, it is very important. If you do not follow all instructions you are at risk of your wound not healing, infection, possible loss of limb and even loss of life.    COMPRESSION WRAP PATIENT CARE INSTRUCTIONS  DO NOT get compression wrap wet. When showering, use a shower boot.   Please contact wound clinic and if not able to reach, then go to emergency room if ANY of the following occur:   Tingling or numbness in the foot or toes on leg with compression wrap.  Severe pain that cannot be relieved with elevation and any medication instructed per your doctor.  A fever of 100.4°F (38°C) or higher.  Swelling, coldness, or blue-gray discoloration of the toes.  If the compression wrap feels too tight or too loose.  If the compression wrap is damaged or has rough edges that hurt.  If the compression wrap gets wet, you must cut wrap off.   Drainage from compression wrap that smells different than usual.      Nurse visit on Monday 1/15/24    Follow up with Dr. Gould 1 WEEK        MISCELLANEOUS INSTRUCTIONS  Supplement with a daily multivitamin    Low salt diet  Intense blood sugar control - Goal Blood sugar below 180 at all times recommended.  Increase protein intake / consider protein supplements - see below  Elevate extremities at all times when sitting / laying down.  No tobacco use     DIETARY MODIFICATIONS TO HELP WITH WOUND HEALING:          Please follow any restrictions to diet given by your other doctors     Protein: meats, beans, eggs, milk and yogurt particularly Greek yogurt), tofu, soy nuts, soy protein products     Vitamin C: citrus fruits and juices, strawberries, tomatoes, tomato juice, peppers, baked potatoes, spinach, broccoli, cauliflower, [place] sprouts, cabbage     Vitamin A: dark greens, leafy vegetables, orange or yellow vegetables, cantaloupe, fortified dairy products, liver, fortified cereals     Zinc: fortified cereals, red meats, seafood     Consider Bob by [employer] (These are essential branch chain amino acids that help with tissue building and wound healing) and take 2 packets/day. You can order online at [employer] or [employer]     ADDITIONAL REMINDERS:     The treatment plan has been discussed at length with you and your provider. Follow all instructions carefully, it is very important. If you do not follow all instructions, you are at risk of your wound not healing, infection, possible loss of limb and even loss of life.  Please call the clinic at [phone] during regular business hours ( 7:30 AM - 5:30 PM) if you notice increased redness, warmth, pain or pus like drainage or start running a fever greater than 100.3.    For after hour emergencies, please call your primary physician or go to the nearest emergency room.  If your blood pressure is elevated, follow up with your primary care doctor and/or cardiologist as soon as possible.     FOR LEG SWELLING,  LOWER EXTREMITY WOUNDS AND IF USING COMPRESSION:   Managing your edema:    Avoid prolonged standing in one place. It is better to have your calf muscles moving to pump  fluid out of the legs.  Elevate leg(s) above the level of the heart when sitting or as much as possible.  Take you diuretics as directed by your physician. Do not skip doses or change doses unless instructed to do so by your physician.  Decrease salt intake, follow recommended 2 grams daily.  Do not get leg(s) with compression wrap wet. If wraps are too tight as indicated by pain, numbness/tingling or discoloration of toes remove wrap completely and call the wound center @ [phone].       The treatment plan has been discussed at length between you and your provider. Follow all instructions carefully, it is very important. If you do not follow all instructions you are at risk of your wound not healing, infection, possible loss of limb and even loss of life.    COMPRESSION WRAP PATIENT CARE INSTRUCTIONS  DO NOT get compression wrap wet. When showering, use a shower boot.   Please contact wound clinic and if not able to reach, then go to emergency room if ANY of the following occur:   Tingling or numbness in the foot or toes on leg with compression wrap.  Severe pain that cannot be relieved with elevation and any medication instructed per your doctor.  A fever of 100.4°F (38°C) or higher.  Swelling, coldness, or blue-gray discoloration of the toes.  If the compression wrap feels too tight or too loose.  If the compression wrap is damaged or has rough edges that hurt.  If the compression wrap gets wet, you must cut wrap off.   Drainage from compression wrap that smells different than usual.

## 2024-05-09 ENCOUNTER — OFFICE VISIT (OUTPATIENT)
Dept: WOUND CARE | Facility: HOSPITAL | Age: 59
End: 2024-05-09
Attending: FAMILY MEDICINE
Payer: COMMERCIAL

## 2024-05-09 VITALS
RESPIRATION RATE: 16 BRPM | TEMPERATURE: 98 F | HEART RATE: 72 BPM | SYSTOLIC BLOOD PRESSURE: 137 MMHG | DIASTOLIC BLOOD PRESSURE: 82 MMHG

## 2024-05-09 DIAGNOSIS — I87.2 CHRONIC VENOUS INSUFFICIENCY: ICD-10-CM

## 2024-05-09 DIAGNOSIS — S81.802D OPEN WOUND OF LEFT LOWER LEG, SUBSEQUENT ENCOUNTER: Primary | ICD-10-CM

## 2024-05-09 DIAGNOSIS — E66.01 CLASS 3 SEVERE OBESITY DUE TO EXCESS CALORIES WITH SERIOUS COMORBIDITY AND BODY MASS INDEX (BMI) OF 45.0 TO 49.9 IN ADULT (HCC): ICD-10-CM

## 2024-05-09 PROCEDURE — 99215 OFFICE O/P EST HI 40 MIN: CPT | Performed by: FAMILY MEDICINE

## 2024-05-09 NOTE — PROGRESS NOTES
Weekly Wound Education Note    Teaching Provided To: Patient  Training Topics: Discharge instructions;Compression;Cleasing and general instructions;Edema control;Dressing  Training Method: Demonstration;Explain/Verbal  Training Response: Reinforcement needed        Notes: Pt here for follow up wound care visit to left lower leg wound. Edema measurement decreased in calf and increased slightly in ankle. Wound measurement decreased. Provider cultured wound at visit due to odor. Treatment to continue with silver alginate, kerramax, kerlix, tape. Wrapped LLE in colfex 2 layer wrap. Pt to follow up with provider in 1 week.

## 2024-05-09 NOTE — PATIENT INSTRUCTIONS
PATIENT INSTRUCTIONS      FOR Luis M RODRIGUEZ Natalie ,  3/25/1965    DATE OF SERVICE: 2024    IF YOU HAVE INCREASED PAIN OR FEVER, GO TO EMERGENCY ROOM   Triad to periwound  Silver Alginate to wound base  Daliramax  Coflex 2 layer compression wrap to left lower extremity     Dr. Gould in 1 week      Managing your edema:    Avoid prolonged standing in one place. It is better to have your calf muscles moving to pump fluid out of the legs.  Elevate leg(s) above the level of the heart when sitting or as much as possible.  Take you diuretics as directed by your physician. Do not skip doses or change doses unless instructed to do so by your physician.  Decrease salt intake, follow recommended 2 grams daily.  Do not get leg(s) with compression wrap wet. If wraps are too tight as indicated by pain, numbness/tingling or discoloration of toes remove wrap completely and call the wound center @ [phone].       The treatment plan has been discussed at length between you and your provider. Follow all instructions carefully, it is very important. If you do not follow all instructions you are at risk of your wound not healing, infection, possible loss of limb and even loss of life.    COMPRESSION WRAP PATIENT CARE INSTRUCTIONS  DO NOT get compression wrap wet. When showering, use a shower boot.   Please contact wound clinic and if not able to reach, then go to emergency room if ANY of the following occur:   Tingling or numbness in the foot or toes on leg with compression wrap.  Severe pain that cannot be relieved with elevation and any medication instructed per your doctor.  A fever of 100.4°F (38°C) or higher.  Swelling, coldness, or blue-gray discoloration of the toes.  If the compression wrap feels too tight or too loose.  If the compression wrap is damaged or has rough edges that hurt.  If the compression wrap gets wet, you must cut wrap off.   Drainage from compression wrap that smells different than  usual.      Nurse visit on Monday 1/15/24    Follow up with Dr. Gould 1 WEEK

## 2024-05-09 NOTE — PROGRESS NOTES
Chief Complaint   Patient presents with    Wound Care     Patients is here for a follow up. Patients stated no issue with the wrap and wound        HPI:     Patient here for follow-up of left lateral leg wound.  He is doing well and has no new complaints.  Denies any increased pain or drainage and denies any fever or chills.    Lab Results   Component Value Date    BUN 18 01/03/2024    CREATSERUM 0.88 01/03/2024    ALB 3.0 (L) 01/02/2024    TP 7.9 01/02/2024    A1C 5.2 12/12/2016         Current Outpatient Medications:     aspirin 81 MG Oral Tab, Take 1 tablet (81 mg total) by mouth daily., Disp: , Rfl:     No Known Allergies         REVIEW OF SYSTEMS:     Review of Systems (ROS)  This information was obtained from the patient, chart    A comprehensive 10 point review of systems was completed.  Pertinent positives and negatives noted in the the HPI.     Past medical, surgical, family and social history updated where appropriate.    PHYSICAL EXAM:   /82   Pulse 72   Temp 97.8 °F (36.6 °C)   Resp 16    Estimated body mass index is 45.42 kg/m² as calculated from the following:    Height as of 1/10/24: 67\".    Weight as of 1/10/24: 290 lb (131.5 kg).   Vital signs reviewed.Appears stated age, well groomed.      Awake, alert, in no acute distress  HENT:  normocephalic, atraumatic, external ear canals clear bilaterally, tympanic membranes appear opaque, non-bulging, non-erythematous, nasal turbinates are non-inflamed and not swollen, oropharynx without erythema, swelling, or exudate, gingiva and lips without any apparent lesions.   Cardiac:  S1 S2 regular rate and rhythm, no murmur, gallop, or rub  Respiratory:  Bilaterally clear to auscultation, no chest tenderness to palpation, no wheezing, no rhonchi, no rales, air entry is adequate, no accessory respiratory muscle use, no chest asymmetry, normal excursion.  GI:  bowel sound positive in all four quadrants, abdomen is soft, non-tender, non-distended, no  hepatosplenomegaly, no abnormal aortic pulsation.     Calf  Point of Measurement - Left Calf: 37     Left Calf from:: Heel  Calf Left cm:: 45.8          Ankle  Point of Measurement - Left Ankle: 10     Left Ankle from:: Heel  Left Ankle cm:: 30                Foot                            Wound 01/11/24 #1 Left lateral lower leg (Active)   Date First Assessed/Time First Assessed: 01/11/24 1406    Wound Number (Wound Clinic Only): #1 Left lateral lower leg  Primary Wound Type: Traumatic      Assessments 1/11/2024  2:10 PM 5/9/2024  8:46 AM   Wound Image       Drainage Amount Large Large   Drainage Description Yellow;Serous Serosanguineous   Treatments Compression Compression   Wound Length (cm) 10.6 cm 10.2 cm   Wound Width (cm) 9.5 cm 8.1 cm   Wound Surface Area (cm^2) 100.7 cm^2 82.62 cm^2   Wound Depth (cm) 0.2 cm 0.1 cm   Wound Volume (cm^3) 20.14 cm^3 8.262 cm^3   Wound Healing % -- 59   Margins Well-defined edges Well-defined edges   Non-staged Wound Description Full thickness Full thickness   Leslie-wound Assessment Edema;Hemosiderin staining Edema;Hemosiderin staining;Moist;Maceration   Wound Granulation Tissue Firm;Pink Spongy;Pink;Red   Wound Bed Granulation (%) 40 % 40 %   Wound Bed Epithelium (%) -- 40 %   Wound Bed Slough (%) 60 % 20 %   Wound Odor None Mild   Tunneling? No --   Undermining? No --   Sinus Tracts? No --       Inactive Orders   Date Order Priority Status Authorizing Provider   05/02/24 1520 Debridement Traumatic Routine Completed Nicholas Gould MD   04/11/24 0959 Debridement Traumatic Routine Completed Nicholas Gould MD   04/04/24 0915 Debridement Traumatic Left;Lateral Leg Routine Completed Nicholas Gould MD   02/15/24 1154 Debridement Traumatic Left;Lateral Leg Routine Completed Nicholas Gould MD   01/18/24 1511 Debridement Traumatic Left;Lateral Leg Routine Completed Nicholas Gould MD   01/11/24 1710 Debridement Traumatic Left;Lateral Leg Routine Completed Nicholas Gould MD        Compression Wrap 04/04/24 Leg Anterior;Left (Active)   Placement Date/Time: 04/04/24 0915   Location: Leg  Wound Location Orientation: Anterior;Left      Assessments 4/4/2024  8:47 AM 5/9/2024  8:46 AM   Response to Treatment Well tolerated Well tolerated   Compression Layers Multilayer Multilayer   Compression Product Type Coflex Coflex   Dressing Applied Yes Yes   Compression Wrap Location Toes to Knee Toes to Knee   Compression Wrap Status Clean;Dry;Intact Clean;Dry       No associated orders.          ASSESSMENT AND PLAN:      1. Open wound of left lower leg, subsequent encounter  - Aerobic Bacterial Culture    2. Chronic venous insufficiency  - Aerobic Bacterial Culture    3. Class 3 severe obesity due to excess calories with serious comorbidity and body mass index (BMI) of 45.0 to 49.9 in adult (HCC)  - Aerobic Bacterial Culture    Clinically the wound appears to continue to improve there is good islands of epithelialization noted that are increasing in the wound base.  There is no periwound erythema or increased drainage.  There is odor to the wound could be related to the silver alginate however a wound culture was done on the wound.  If positive will treat accordingly.  Continue silver alginate for now and Triad paste to the periwound.  Kerramax and Coflex 2 layer compression wrap.  He is tolerating this very well.  He is using a shower boot when he showers.      Risks, benefits, and alternatives of current treatment plan discussed in detail.  Questions and concerns addressed. Red flags to RTC or ED reviewed.  Patient (or parent) agrees to plan.      Return in about 1 week (around 5/16/2024).    Also refer to patient instructions.     I spent a total of 40 minutes with this patient's care.  This time included preparing to see the patient (eg, review notes and recent diagnostics), seeing the patient, performing a medically appropriate examination and/or evaluation, counseling and educating the patient,  and documenting in the record.          Nicholas Gould M.D.   [facility]   2024    Patient Instructions       PATIENT INSTRUCTIONS      FOR Luis M RODRIGUEZ Lambert , DOB 3/25/1965    DATE OF SERVICE: 2024    IF YOU HAVE INCREASED PAIN OR FEVER, GO TO EMERGENCY ROOM   Triad to periwound  Silver Alginate to wound base  Kerramax  Coflex 2 layer compression wrap to left lower extremity     Dr. Gould in 1 week      Managing your edema:    Avoid prolonged standing in one place. It is better to have your calf muscles moving to pump fluid out of the legs.  Elevate leg(s) above the level of the heart when sitting or as much as possible.  Take you diuretics as directed by your physician. Do not skip doses or change doses unless instructed to do so by your physician.  Decrease salt intake, follow recommended 2 grams daily.  Do not get leg(s) with compression wrap wet. If wraps are too tight as indicated by pain, numbness/tingling or discoloration of toes remove wrap completely and call the wound center @ [phone].       The treatment plan has been discussed at length between you and your provider. Follow all instructions carefully, it is very important. If you do not follow all instructions you are at risk of your wound not healing, infection, possible loss of limb and even loss of life.    COMPRESSION WRAP PATIENT CARE INSTRUCTIONS  DO NOT get compression wrap wet. When showering, use a shower boot.   Please contact wound clinic and if not able to reach, then go to emergency room if ANY of the following occur:   Tingling or numbness in the foot or toes on leg with compression wrap.  Severe pain that cannot be relieved with elevation and any medication instructed per your doctor.  A fever of 100.4°F (38°C) or higher.  Swelling, coldness, or blue-gray discoloration of the toes.  If the compression wrap feels too tight or too loose.  If the compression wrap is damaged or has rough edges that  hurt.  If the compression wrap gets wet, you must cut wrap off.   Drainage from compression wrap that smells different than usual.      Nurse visit on Monday 1/15/24    Follow up with Dr. Gould 1 WEEK      MISCELLANEOUS INSTRUCTIONS  Supplement with a daily multivitamin   Low salt diet  Intense blood sugar control - Goal Blood sugar below 180 at all times recommended.  Increase protein intake / consider protein supplements - see below  Elevate extremities at all times when sitting / laying down.  No tobacco use     DIETARY MODIFICATIONS TO HELP WITH WOUND HEALING:          Please follow any restrictions to diet given by your other doctors     Protein: meats, beans, eggs, milk and yogurt particularly Greek yogurt), tofu, soy nuts, soy protein products     Vitamin C: citrus fruits and juices, strawberries, tomatoes, tomato juice, peppers, baked potatoes, spinach, broccoli, cauliflower, [place] sprouts, cabbage     Vitamin A: dark greens, leafy vegetables, orange or yellow vegetables, cantaloupe, fortified dairy products, liver, fortified cereals     Zinc: fortified cereals, red meats, seafood     Consider Bob by [employer] (These are essential branch chain amino acids that help with tissue building and wound healing) and take 2 packets/day. You can order online at [employer] or [employer]     ADDITIONAL REMINDERS:     The treatment plan has been discussed at length with you and your provider. Follow all instructions carefully, it is very important. If you do not follow all instructions, you are at risk of your wound not healing, infection, possible loss of limb and even loss of life.  Please call the clinic at [phone] during regular business hours ( 7:30 AM - 5:30 PM) if you notice increased redness, warmth, pain or pus like drainage or start running a fever greater than 100.3.    For after hour emergencies, please call your primary physician or go to the nearest emergency room.  If your blood pressure is elevated,  follow up with your primary care doctor and/or cardiologist as soon as possible.     FOR LEG SWELLING,  LOWER EXTREMITY WOUNDS AND IF USING COMPRESSION:   Managing your edema:    Avoid prolonged standing in one place. It is better to have your calf muscles moving to pump fluid out of the legs.  Elevate leg(s) above the level of the heart when sitting or as much as possible.  Take you diuretics as directed by your physician. Do not skip doses or change doses unless instructed to do so by your physician.  Decrease salt intake, follow recommended 2 grams daily.  Do not get leg(s) with compression wrap wet. If wraps are too tight as indicated by pain, numbness/tingling or discoloration of toes remove wrap completely and call the wound center @ [phone].       The treatment plan has been discussed at length between you and your provider. Follow all instructions carefully, it is very important. If you do not follow all instructions you are at risk of your wound not healing, infection, possible loss of limb and even loss of life.    COMPRESSION WRAP PATIENT CARE INSTRUCTIONS  DO NOT get compression wrap wet. When showering, use a shower boot.   Please contact wound clinic and if not able to reach, then go to emergency room if ANY of the following occur:   Tingling or numbness in the foot or toes on leg with compression wrap.  Severe pain that cannot be relieved with elevation and any medication instructed per your doctor.  A fever of 100.4°F (38°C) or higher.  Swelling, coldness, or blue-gray discoloration of the toes.  If the compression wrap feels too tight or too loose.  If the compression wrap is damaged or has rough edges that hurt.  If the compression wrap gets wet, you must cut wrap off.   Drainage from compression wrap that smells different than usual.

## 2024-05-16 ENCOUNTER — OFFICE VISIT (OUTPATIENT)
Dept: WOUND CARE | Facility: HOSPITAL | Age: 59
End: 2024-05-16
Attending: FAMILY MEDICINE

## 2024-05-16 VITALS
TEMPERATURE: 98 F | SYSTOLIC BLOOD PRESSURE: 133 MMHG | HEART RATE: 67 BPM | RESPIRATION RATE: 16 BRPM | DIASTOLIC BLOOD PRESSURE: 84 MMHG

## 2024-05-16 DIAGNOSIS — I87.2 CHRONIC VENOUS INSUFFICIENCY: ICD-10-CM

## 2024-05-16 DIAGNOSIS — E66.01 CLASS 3 SEVERE OBESITY DUE TO EXCESS CALORIES WITH SERIOUS COMORBIDITY AND BODY MASS INDEX (BMI) OF 45.0 TO 49.9 IN ADULT (HCC): ICD-10-CM

## 2024-05-16 DIAGNOSIS — S81.802D OPEN WOUND OF LEFT LOWER LEG, SUBSEQUENT ENCOUNTER: Primary | ICD-10-CM

## 2024-05-16 PROCEDURE — 97597 DBRDMT OPN WND 1ST 20 CM/<: CPT | Performed by: FAMILY MEDICINE

## 2024-05-16 PROCEDURE — 97598 DBRDMT OPN WND ADDL 20CM/<: CPT | Performed by: FAMILY MEDICINE

## 2024-05-16 PROCEDURE — 99214 OFFICE O/P EST MOD 30 MIN: CPT | Performed by: FAMILY MEDICINE

## 2024-05-16 NOTE — PROGRESS NOTES
Patient ID: Luis M Estrada is a 59 year old male.    Debridement Traumatic   Wound 01/11/24 #1 Left lateral lower leg    Performed by: Nicholas Gould MD  Authorized by: Nicholas Gould MD      Consent   Consent obtained? verbal  Consent given by: patient  Risks discussed? procedural risks discussed    Debridement Details  Performed by: physician  Debridement type: conservative sharp  Pain control: lidocaine 4%  Pain control administration type: topical    Pre-debridement measurements  Length (cm): 9.5  Width (cm): 8  Depth (cm): 0.1  Surface Area (cm^2): 76    Post-debridement measurements  Length (cm): 9.6  Width (cm): 8  Depth (cm): 0.1  Percent debrided: 60%  Surface Area (cm^2): 76.8  Area Debrided (cm^2): 46.08  Volume (cm^3): 7.68    Tissue and other material debrided: subcutaneous tissue  Devitalized tissue debrided: biofilm and slough  Instrument(s) utilized: forceps and curette  Bleeding: small  Hemostasis obtained with: pressure  Response to treatment: procedure was tolerated well

## 2024-05-16 NOTE — PROGRESS NOTES
Weekly Wound Education Note    Teaching Provided To: Patient  Training Topics: Dressing;Cleasing and general instructions;Discharge instructions  Training Method: Explain/Verbal;Demonstration  Training Response: Reinforcement needed        Notes: Pt here for follow up wound care visit to left lateral lower leg. Edema measurement increased in calf, decreased in ankle. Wound measurement decreased. Patient had culture completed on 5/9/24 , provide prescribed oral ABT at that time - stop date 5/23/24. Provider debrided wound at visit. Continue with silver alginate, kerramax, kerlix, tape. Wrapped LLE in coflex 2 layer wrap. Pt to follow up in 1 week for nurse visit and provider visit in 2 weeks.

## 2024-05-16 NOTE — PROGRESS NOTES
Chief Complaint   Patient presents with    Wound Care     Patient arrives for a wound care follow up appointment. Patient arrives with a Coflex 2 compression wrap to the left leg and a Spandagrip compression to the right leg. Patient has no pain in the wound. There is silver alginate and kerramax to the wound. There wrap did slid down a bit on the top, resulting in more swelling to the calf.        HPI:     Patient here for follow-up of left lateral leg wound.  He is doing well and tolerating compression wrap.  He has no new complaints.    Lab Results   Component Value Date    BUN 18 01/03/2024    CREATSERUM 0.88 01/03/2024    ALB 3.0 (L) 01/02/2024    TP 7.9 01/02/2024    A1C 5.2 12/12/2016         Current Outpatient Medications:     levoFLOXacin (LEVAQUIN) 500 MG Oral Tab, Take 1 tablet (500 mg total) by mouth daily for 10 days., Disp: 10 tablet, Rfl: 0    aspirin 81 MG Oral Tab, Take 1 tablet (81 mg total) by mouth daily., Disp: , Rfl:     No Known Allergies         REVIEW OF SYSTEMS:     Review of Systems (ROS)  This information was obtained from the patient, chart    A comprehensive 10 point review of systems was completed.  Pertinent positives and negatives noted in the the HPI.     Past medical, surgical, family and social history updated where appropriate.    PHYSICAL EXAM:   /84   Pulse 67   Temp 97.7 °F (36.5 °C)   Resp 16    Estimated body mass index is 45.42 kg/m² as calculated from the following:    Height as of 1/10/24: 67\".    Weight as of 1/10/24: 290 lb (131.5 kg).   Vital signs reviewed.Appears stated age, well groomed.      Awake, alert, in no acute distress  HENT:  normocephalic, atraumatic, external ear canals clear bilaterally, tympanic membranes appear opaque, non-bulging, non-erythematous, nasal turbinates are non-inflamed and not swollen, oropharynx without erythema, swelling, or exudate, gingiva and lips without any apparent lesions.   Cardiac:  S1 S2 regular rate and rhythm, no  murmur, gallop, or rub  Respiratory:  Bilaterally clear to auscultation, no chest tenderness to palpation, no wheezing, no rhonchi, no rales, air entry is adequate, no accessory respiratory muscle use, no chest asymmetry, normal excursion.  GI:  bowel sound positive in all four quadrants, abdomen is soft, non-tender, non-distended, no hepatosplenomegaly, no abnormal aortic pulsation.     Calf  Point of Measurement - Left Calf: 37     Left Calf from:: Heel  Calf Left cm:: 50.7          Ankle  Point of Measurement - Left Ankle: 10     Left Ankle from:: Heel  Left Ankle cm:: 28.8                Foot                            Wound 01/11/24 #1 Left lateral lower leg (Active)   Date First Assessed/Time First Assessed: 01/11/24 1406    Wound Number (Wound Clinic Only): #1 Left lateral lower leg  Primary Wound Type: Traumatic      Assessments 1/11/2024  2:10 PM 5/16/2024  8:41 AM   Wound Image       Drainage Amount Large Moderate   Drainage Description Yellow;Serous Serosanguineous   Treatments Compression --   Wound Length (cm) 10.6 cm 9.5 cm   Wound Width (cm) 9.5 cm 8 cm   Wound Surface Area (cm^2) 100.7 cm^2 76 cm^2   Wound Depth (cm) 0.2 cm 0.1 cm   Wound Volume (cm^3) 20.14 cm^3 7.6 cm^3   Wound Healing % -- 62   Margins Well-defined edges Well-defined edges   Non-staged Wound Description Full thickness Full thickness   Leslie-wound Assessment Edema;Hemosiderin staining Edema;Hemosiderin staining;Moist;Maceration   Wound Granulation Tissue Firm;Pink Red;Spongy   Wound Bed Granulation (%) 40 % 30 %   Wound Bed Epithelium (%) -- 40 %   Wound Bed Slough (%) 60 % 30 %   Wound Odor None Mild   Tunneling? No No   Undermining? No No   Sinus Tracts? No No       Inactive Orders   Date Order Priority Status Authorizing Provider   05/02/24 1520 Debridement Traumatic Routine Completed Nicholas Gould MD   04/11/24 0959 Debridement Traumatic Routine Completed Nicholas Gould MD   04/04/24 0915 Debridement Traumatic Left;Lateral  Leg Routine Completed Nicholas Gould MD   02/15/24 1154 Debridement Traumatic Left;Lateral Leg Routine Completed Nicholas Gould MD   01/18/24 1511 Debridement Traumatic Left;Lateral Leg Routine Completed Nicholas Gould MD   01/11/24 1710 Debridement Traumatic Left;Lateral Leg Routine Completed Nicholas Gould MD       Compression Wrap 04/04/24 Leg Anterior;Left (Active)   Placement Date/Time: 04/04/24 0915   Location: Leg  Wound Location Orientation: Anterior;Left      Assessments 4/4/2024  8:47 AM 5/9/2024  8:46 AM   Response to Treatment Well tolerated Well tolerated   Compression Layers Multilayer Multilayer   Compression Product Type Coflex Coflex   Dressing Applied Yes Yes   Compression Wrap Location Toes to Knee Toes to Knee   Compression Wrap Status Clean;Dry;Intact Clean;Dry       No associated orders.          ASSESSMENT AND PLAN:      1. Open wound of left lower leg, subsequent encounter    2. Chronic venous insufficiency    3. Class 3 severe obesity due to excess calories with serious comorbidity and body mass index (BMI) of 45.0 to 49.9 in adult (HCC)    Clinically the wound continues to show improvement with good epithelial islands forming and less open wound noted.  A wound debridement selective was done see debridement note.  He is currently taking Levaquin and should finish the entire course.  The wound does not appear to be infected at this time.  Will continue with silver alginate and Kerramax and Coflex 2 layer compression wrap.  Will have a nurse visit in 1 week and follow-up with me in 2 weeks.    Patient was counseled at length regarding proper eating habits along with exercise to help lose weight. Risks of excessive weight discussed as well including high blood pressure, joint problems, and diabetes. Pt understands.     Risks, benefits, and alternatives of current treatment plan discussed in detail.  Questions and concerns addressed. Red flags to RTC or ED reviewed.  Patient (or parent)  agrees to plan.      No follow-ups on file.    Also refer to patient instructions.     I spent a total of 40 minutes with this patient's care.  This time included preparing to see the patient (eg, review notes and recent diagnostics), seeing the patient, performing a medically appropriate examination and/or evaluation, counseling and educating the patient, and documenting in the record.          Nicholas Gould M.D.   [facility]   5/16/2024    There are no Patient Instructions on file for this visit.  MISCELLANEOUS INSTRUCTIONS  Supplement with a daily multivitamin   Low salt diet  Intense blood sugar control - Goal Blood sugar below 180 at all times recommended.  Increase protein intake / consider protein supplements - see below  Elevate extremities at all times when sitting / laying down.  No tobacco use     DIETARY MODIFICATIONS TO HELP WITH WOUND HEALING:          Please follow any restrictions to diet given by your other doctors     Protein: meats, beans, eggs, milk and yogurt particularly Greek yogurt), tofu, soy nuts, soy protein products     Vitamin C: citrus fruits and juices, strawberries, tomatoes, tomato juice, peppers, baked potatoes, spinach, broccoli, cauliflower, [place] sprouts, cabbage     Vitamin A: dark greens, leafy vegetables, orange or yellow vegetables, cantaloupe, fortified dairy products, liver, fortified cereals     Zinc: fortified cereals, red meats, seafood     Consider Bob by [email or web address] (These are essential branch chain amino acids that help with tissue building and wound healing) and take 2 packets/day. You can order online at [employer] or [email or web address]     ADDITIONAL REMINDERS:     The treatment plan has been discussed at length with you and your provider. Follow all instructions carefully, it is very important. If you do not follow all instructions, you are at risk of your wound not healing, infection, possible loss of limb and even loss of life.  Please call the  clinic at [phone] during regular business hours ( 7:30 AM - 5:30 PM) if you notice increased redness, warmth, pain or pus like drainage or start running a fever greater than 100.3.    For after hour emergencies, please call your primary physician or go to the nearest emergency room.  If your blood pressure is elevated, follow up with your primary care doctor and/or cardiologist as soon as possible.     FOR LEG SWELLING,  LOWER EXTREMITY WOUNDS AND IF USING COMPRESSION:   Managing your edema:    Avoid prolonged standing in one place. It is better to have your calf muscles moving to pump fluid out of the legs.  Elevate leg(s) above the level of the heart when sitting or as much as possible.  Take you diuretics as directed by your physician. Do not skip doses or change doses unless instructed to do so by your physician.  Decrease salt intake, follow recommended 2 grams daily.  Do not get leg(s) with compression wrap wet. If wraps are too tight as indicated by pain, numbness/tingling or discoloration of toes remove wrap completely and call the wound center @ [phone].       The treatment plan has been discussed at length between you and your provider. Follow all instructions carefully, it is very important. If you do not follow all instructions you are at risk of your wound not healing, infection, possible loss of limb and even loss of life.    COMPRESSION WRAP PATIENT CARE INSTRUCTIONS  DO NOT get compression wrap wet. When showering, use a shower boot.   Please contact wound clinic and if not able to reach, then go to emergency room if ANY of the following occur:   Tingling or numbness in the foot or toes on leg with compression wrap.  Severe pain that cannot be relieved with elevation and any medication instructed per your doctor.  A fever of 100.4°F (38°C) or higher.  Swelling, coldness, or blue-gray discoloration of the toes.  If the compression wrap feels too tight or too loose.  If the compression wrap  is damaged or has rough edges that hurt.  If the compression wrap gets wet, you must cut wrap off.   Drainage from compression wrap that smells different than usual.

## 2024-05-16 NOTE — PATIENT INSTRUCTIONS
PATIENT INSTRUCTIONS      FOR Luis M RODRIGUEZ Natalie ,  3/25/1965    DATE OF SERVICE: 2024    IF YOU HAVE INCREASED PAIN OR FEVER, GO TO EMERGENCY ROOM   Triad to periwound  Silver Alginate to wound base  Kerramax  Coflex 2 layer compression wrap to left lower extremity     Nurse visit on Monday in 1 week    Follow up with Dr. Gould 2 WEEKS      Managing your edema:    Avoid prolonged standing in one place. It is better to have your calf muscles moving to pump fluid out of the legs.  Elevate leg(s) above the level of the heart when sitting or as much as possible.  Take you diuretics as directed by your physician. Do not skip doses or change doses unless instructed to do so by your physician.  Decrease salt intake, follow recommended 2 grams daily.  Do not get leg(s) with compression wrap wet. If wraps are too tight as indicated by pain, numbness/tingling or discoloration of toes remove wrap completely and call the wound center @ [phone].       The treatment plan has been discussed at length between you and your provider. Follow all instructions carefully, it is very important. If you do not follow all instructions you are at risk of your wound not healing, infection, possible loss of limb and even loss of life.    COMPRESSION WRAP PATIENT CARE INSTRUCTIONS  DO NOT get compression wrap wet. When showering, use a shower boot.   Please contact wound clinic and if not able to reach, then go to emergency room if ANY of the following occur:   Tingling or numbness in the foot or toes on leg with compression wrap.  Severe pain that cannot be relieved with elevation and any medication instructed per your doctor.  A fever of 100.4°F (38°C) or higher.  Swelling, coldness, or blue-gray discoloration of the toes.  If the compression wrap feels too tight or too loose.  If the compression wrap is damaged or has rough edges that hurt.  If the compression wrap gets wet, you must cut wrap off.   Drainage from compression  wrap that smells different than usual.

## 2024-05-23 ENCOUNTER — OFFICE VISIT (OUTPATIENT)
Dept: WOUND CARE | Facility: HOSPITAL | Age: 59
End: 2024-05-23
Attending: FAMILY MEDICINE

## 2024-05-23 ENCOUNTER — APPOINTMENT (OUTPATIENT)
Dept: WOUND CARE | Facility: HOSPITAL | Age: 59
End: 2024-05-23
Attending: FAMILY MEDICINE

## 2024-05-23 VITALS
RESPIRATION RATE: 16 BRPM | DIASTOLIC BLOOD PRESSURE: 82 MMHG | SYSTOLIC BLOOD PRESSURE: 147 MMHG | TEMPERATURE: 98 F | HEART RATE: 72 BPM

## 2024-05-23 DIAGNOSIS — S81.802D OPEN WOUND OF LEFT LOWER LEG, SUBSEQUENT ENCOUNTER: Primary | ICD-10-CM

## 2024-05-23 DIAGNOSIS — I87.2 CHRONIC VENOUS INSUFFICIENCY: ICD-10-CM

## 2024-05-23 DIAGNOSIS — M79.89 LEG SWELLING: ICD-10-CM

## 2024-05-23 PROCEDURE — 29581 APPL MULTLAYER CMPRN SYS LEG: CPT

## 2024-05-23 NOTE — PROGRESS NOTES
Chief Complaint   Patient presents with    Wound Care     Patient is here for a wound care follow up. He denies any pain or new wound concerns.           Current Outpatient Medications:     levoFLOXacin (LEVAQUIN) 500 MG Oral Tab, Take 1 tablet (500 mg total) by mouth daily for 10 days., Disp: 10 tablet, Rfl: 0    aspirin 81 MG Oral Tab, Take 1 tablet (81 mg total) by mouth daily., Disp: , Rfl:     No Known Allergies       HISTORY:     Past medical, surgical, family and social history updated where appropriate.    PHYSICAL EXAM:   /82   Pulse 72   Temp 98.1 °F (36.7 °C)   Resp 16        Vital signs reviewed.      Calf  Point of Measurement - Left Calf: 37     Left Calf from:: Heel  Calf Left cm:: 49          Ankle  Point of Measurement - Left Ankle: 10     Left Ankle from:: Heel  Left Ankle cm:: 30                Wound 01/11/24 #1 Left lateral lower leg (Active)   Date First Assessed/Time First Assessed: 01/11/24 1406    Wound Number (Wound Clinic Only): #1 Left lateral lower leg  Primary Wound Type: Traumatic      Assessments 1/11/2024  2:10 PM 5/23/2024  8:50 AM   Wound Image       Drainage Amount Large Moderate   Drainage Description Yellow;Serous Serosanguineous   Treatments Compression Compression   Wound Length (cm) 10.6 cm 8.8 cm   Wound Width (cm) 9.5 cm 7.4 cm   Wound Surface Area (cm^2) 100.7 cm^2 65.12 cm^2   Wound Depth (cm) 0.2 cm 0.1 cm   Wound Volume (cm^3) 20.14 cm^3 6.512 cm^3   Wound Healing % -- 68   Margins Well-defined edges Well-defined edges   Non-staged Wound Description Full thickness Full thickness   Leslie-wound Assessment Edema;Hemosiderin staining Edema;Hemosiderin staining;Dry   Wound Granulation Tissue Firm;Pink Spongy;Pink   Wound Bed Granulation (%) 40 % 20 %   Wound Bed Epithelium (%) -- 75 %   Wound Bed Slough (%) 60 % 5 %   Wound Odor None None   Tunneling? No No   Undermining? No No   Sinus Tracts? No No       Inactive Orders   Date Order Priority Status Authorizing  Provider   05/16/24 0901 Debridement Traumatic Routine Completed Nicholas Gould MD   05/02/24 1520 Debridement Traumatic Routine Completed Nicholas Gould MD   04/11/24 0959 Debridement Traumatic Routine Completed Nicholas Gould MD   04/04/24 0915 Debridement Traumatic Left;Lateral Leg Routine Completed Nicholas Gould MD   02/15/24 1154 Debridement Traumatic Left;Lateral Leg Routine Completed Nicholas Gould MD   01/18/24 1511 Debridement Traumatic Left;Lateral Leg Routine Completed Nicholas Gould MD   01/11/24 1710 Debridement Traumatic Left;Lateral Leg Routine Completed Nicholas Gould MD       Compression Wrap 04/04/24 Leg Anterior;Left (Active)   Placement Date/Time: 04/04/24 0915   Location: Leg  Wound Location Orientation: Anterior;Left      Assessments 4/4/2024  8:47 AM 5/23/2024  9:42 AM   Response to Treatment Well tolerated Well tolerated   Compression Layers Multilayer Multilayer   Compression Product Type Coflex Coflex   Dressing Applied Yes Yes   Compression Wrap Location Toes to Knee Toes to Knee   Compression Wrap Status Clean;Dry;Intact Clean;Dry;Intact       No associated orders.          ASSESSMENT AND PLAN:        Risks, benefits, and alternatives of current treatment plan discussed in detail.  Questions and concerns addressed. Red flags to RTC or ED reviewed.  Patient (or parent) agrees to plan.      No follow-ups on file.  Weekly Wound Education Note    Teaching Provided To: Patient  Training Topics: Cleasing and general instructions;Compression;Discharge instructions;Dressing;Edema control  Training Method: Explain/Verbal  Training Response: Patient responds and understands;Reinforcement needed        Notes: Here for RN visit.    Here for RN visit, wound stable. Triad paste to periwound, silver alginate, kerramax, conforming gauze, tape, coflex 2. Pt scheduled with provider next week.     Tamica ZAFAR RN   5/23/2024  9:46 AM

## 2024-05-30 ENCOUNTER — OFFICE VISIT (OUTPATIENT)
Dept: WOUND CARE | Facility: HOSPITAL | Age: 59
End: 2024-05-30
Attending: FAMILY MEDICINE

## 2024-05-30 ENCOUNTER — APPOINTMENT (OUTPATIENT)
Dept: WOUND CARE | Facility: HOSPITAL | Age: 59
End: 2024-05-30
Attending: FAMILY MEDICINE

## 2024-05-30 VITALS
RESPIRATION RATE: 16 BRPM | DIASTOLIC BLOOD PRESSURE: 83 MMHG | TEMPERATURE: 98 F | SYSTOLIC BLOOD PRESSURE: 144 MMHG | HEART RATE: 73 BPM

## 2024-05-30 DIAGNOSIS — E66.01 CLASS 3 SEVERE OBESITY DUE TO EXCESS CALORIES WITH SERIOUS COMORBIDITY AND BODY MASS INDEX (BMI) OF 45.0 TO 49.9 IN ADULT (HCC): ICD-10-CM

## 2024-05-30 DIAGNOSIS — M79.89 LEG SWELLING: ICD-10-CM

## 2024-05-30 DIAGNOSIS — S81.802D OPEN WOUND OF LEFT LOWER LEG, SUBSEQUENT ENCOUNTER: Primary | ICD-10-CM

## 2024-05-30 DIAGNOSIS — I87.2 CHRONIC VENOUS INSUFFICIENCY: ICD-10-CM

## 2024-05-30 PROCEDURE — 17250 CHEM CAUT OF GRANLTJ TISSUE: CPT | Performed by: FAMILY MEDICINE

## 2024-05-30 PROCEDURE — 99215 OFFICE O/P EST HI 40 MIN: CPT | Performed by: FAMILY MEDICINE

## 2024-05-30 NOTE — PROGRESS NOTES
Chief Complaint   Patient presents with    Wound Care     Patient is here for a wound care follow up. He denies any pain to the wound,        HPI:     Patient here for follow-up of left lateral leg wound.  He is doing well and tolerating compression wrap.  He has no new complaints.    Lab Results   Component Value Date    BUN 18 01/03/2024    CREATSERUM 0.88 01/03/2024    ALB 3.0 (L) 01/02/2024    TP 7.9 01/02/2024    A1C 5.2 12/12/2016         Current Outpatient Medications:     aspirin 81 MG Oral Tab, Take 1 tablet (81 mg total) by mouth daily., Disp: , Rfl:     No Known Allergies         REVIEW OF SYSTEMS:     Review of Systems (ROS)  This information was obtained from the patient, chart    A comprehensive 10 point review of systems was completed.  Pertinent positives and negatives noted in the the HPI.     Past medical, surgical, family and social history updated where appropriate.    PHYSICAL EXAM:   /83   Pulse 73   Temp 98 °F (36.7 °C)   Resp 16    Estimated body mass index is 45.42 kg/m² as calculated from the following:    Height as of 1/10/24: 67\".    Weight as of 1/10/24: 290 lb (131.5 kg).   Vital signs reviewed.Appears stated age, well groomed.      Awake, alert, in no acute distress  HENT:  normocephalic, atraumatic, external ear canals clear bilaterally, tympanic membranes appear opaque, non-bulging, non-erythematous, nasal turbinates are non-inflamed and not swollen, oropharynx without erythema, swelling, or exudate, gingiva and lips without any apparent lesions.   Cardiac:  S1 S2 regular rate and rhythm, no murmur, gallop, or rub  Respiratory:  Bilaterally clear to auscultation, no chest tenderness to palpation, no wheezing, no rhonchi, no rales, air entry is adequate, no accessory respiratory muscle use, no chest asymmetry, normal excursion.  GI:  bowel sound positive in all four quadrants, abdomen is soft, non-tender, non-distended, no hepatosplenomegaly, no abnormal aortic pulsation.      Calf  Point of Measurement - Left Calf: 37     Left Calf from:: Heel  Calf Left cm:: 49          Ankle  Point of Measurement - Left Ankle: 10     Left Ankle from:: Heel  Left Ankle cm:: 30.6                Foot                            Wound 01/11/24 #1 Left lateral lower leg (Active)   Date First Assessed/Time First Assessed: 01/11/24 1406    Wound Number (Wound Clinic Only): #1 Left lateral lower leg  Primary Wound Type: Traumatic      Assessments 1/11/2024  2:10 PM 5/30/2024  8:54 AM   Wound Image        Drainage Amount Large --   Drainage Description Yellow;Serous --   Treatments Compression --   Wound Length (cm) 10.6 cm --   Wound Width (cm) 9.5 cm --   Wound Surface Area (cm^2) 100.7 cm^2 --   Wound Depth (cm) 0.2 cm --   Wound Volume (cm^3) 20.14 cm^3 --   Margins Well-defined edges --   Non-staged Wound Description Full thickness --   Leslie-wound Assessment Edema;Hemosiderin staining --   Wound Granulation Tissue Firm;Pink --   Wound Bed Granulation (%) 40 % --   Wound Bed Slough (%) 60 % --   Wound Odor None --   Tunneling? No --   Undermining? No --   Sinus Tracts? No --       Inactive Orders   Date Order Priority Status Authorizing Provider   05/16/24 0901 Debridement Traumatic Routine Completed Nicholas Gould MD   05/02/24 1520 Debridement Traumatic Routine Completed Nicholas Gould MD   04/11/24 0959 Debridement Traumatic Routine Completed Nicholas Gould MD   04/04/24 0915 Debridement Traumatic Left;Lateral Leg Routine Completed Nicholas Gould MD   02/15/24 1154 Debridement Traumatic Left;Lateral Leg Routine Completed Nicholas Gould MD   01/18/24 1511 Debridement Traumatic Left;Lateral Leg Routine Completed Nicholas Gould MD   01/11/24 1710 Debridement Traumatic Left;Lateral Leg Routine Completed Nicholas Gould MD       Compression Wrap 04/04/24 Leg Anterior;Left (Active)   Placement Date/Time: 04/04/24 0915   Location: Leg  Wound Location Orientation: Anterior;Left      Assessments  2024  8:47 AM 2024  8:46 AM   Response to Treatment Well tolerated Well tolerated   Compression Layers Multilayer Multilayer   Compression Product Type Coflex Coflex   Dressing Applied Yes Yes   Compression Wrap Location Toes to Knee Toes to Knee   Compression Wrap Status Clean;Dry;Intact Clean;Dry;Intact       No associated orders.          ASSESSMENT AND PLAN:      1. Open wound of left lower leg, subsequent encounter    2. Chronic venous insufficiency    3. Leg swelling    4. Class 3 severe obesity due to excess calories with serious comorbidity and body mass index (BMI) of 45.0 to 49.9 in adult (HCC)    Clinically the wound is doing much better there is good epithelialization noted of the wound.  The remaining hypergranular areas were treated with silver nitrate.  There was some dead peeling skin which was carefully taken off with curette and tweezers and scissors.  Patient is doing well overall.  Will continue with silver alginate Kerramax and Coflex 2 layer compression wrap.  Patient will follow-up with me in 1 week.      Risks, benefits, and alternatives of current treatment plan discussed in detail.  Questions and concerns addressed. Red flags to RTC or ED reviewed.  Patient (or parent) agrees to plan.      Return in about 1 week (around 2024).    Also refer to patient instructions.     I spent a total of 40 minutes with this patient's care.  This time included preparing to see the patient (eg, review notes and recent diagnostics), seeing the patient, performing a medically appropriate examination and/or evaluation, counseling and educating the patient, and documenting in the record.          Nicholas Gould M.D.   [facility]   2024    Patient Instructions       PATIENT INSTRUCTIONS      FOR RICK Blandon 3/25/1965    DATE OF SERVICE: 2024    IF YOU HAVE INCREASED PAIN OR FEVER, GO TO EMERGENCY ROOM   Triad to periwound  Silver Alginate to wound  base  Kerramax  Coflex 2 layer compression wrap to left lower extremity       Follow up with Dr. Gould 1 week      Managing your edema:    Avoid prolonged standing in one place. It is better to have your calf muscles moving to pump fluid out of the legs.  Elevate leg(s) above the level of the heart when sitting or as much as possible.  Take you diuretics as directed by your physician. Do not skip doses or change doses unless instructed to do so by your physician.  Decrease salt intake, follow recommended 2 grams daily.  Do not get leg(s) with compression wrap wet. If wraps are too tight as indicated by pain, numbness/tingling or discoloration of toes remove wrap completely and call the wound center @ [phone].       The treatment plan has been discussed at length between you and your provider. Follow all instructions carefully, it is very important. If you do not follow all instructions you are at risk of your wound not healing, infection, possible loss of limb and even loss of life.    COMPRESSION WRAP PATIENT CARE INSTRUCTIONS  DO NOT get compression wrap wet. When showering, use a shower boot.   Please contact wound clinic and if not able to reach, then go to emergency room if ANY of the following occur:   Tingling or numbness in the foot or toes on leg with compression wrap.  Severe pain that cannot be relieved with elevation and any medication instructed per your doctor.  A fever of 100.4°F (38°C) or higher.  Swelling, coldness, or blue-gray discoloration of the toes.  If the compression wrap feels too tight or too loose.  If the compression wrap is damaged or has rough edges that hurt.  If the compression wrap gets wet, you must cut wrap off.   Drainage from compression wrap that smells different than usual.        MISCELLANEOUS INSTRUCTIONS  Supplement with a daily multivitamin   Low salt diet  Intense blood sugar control - Goal Blood sugar below 180 at all times recommended.  Increase protein intake /  consider protein supplements - see below  Elevate extremities at all times when sitting / laying down.  No tobacco use     DIETARY MODIFICATIONS TO HELP WITH WOUND HEALING:          Please follow any restrictions to diet given by your other doctors     Protein: meats, beans, eggs, milk and yogurt particularly Greek yogurt), tofu, soy nuts, soy protein products     Vitamin C: citrus fruits and juices, strawberries, tomatoes, tomato juice, peppers, baked potatoes, spinach, broccoli, cauliflower, [place] sprouts, cabbage     Vitamin A: dark greens, leafy vegetables, orange or yellow vegetables, cantaloupe, fortified dairy products, liver, fortified cereals     Zinc: fortified cereals, red meats, seafood     Consider Bob by [employer] (These are essential branch chain amino acids that help with tissue building and wound healing) and take 2 packets/day. You can order online at [employer] or [employer]     ADDITIONAL REMINDERS:     The treatment plan has been discussed at length with you and your provider. Follow all instructions carefully, it is very important. If you do not follow all instructions, you are at risk of your wound not healing, infection, possible loss of limb and even loss of life.  Please call the clinic at [phone] during regular business hours ( 7:30 AM - 5:30 PM) if you notice increased redness, warmth, pain or pus like drainage or start running a fever greater than 100.3.    For after hour emergencies, please call your primary physician or go to the nearest emergency room.  If your blood pressure is elevated, follow up with your primary care doctor and/or cardiologist as soon as possible.     FOR LEG SWELLING,  LOWER EXTREMITY WOUNDS AND IF USING COMPRESSION:   Managing your edema:    Avoid prolonged standing in one place. It is better to have your calf muscles moving to pump fluid out of the legs.  Elevate leg(s) above the level of the heart when sitting or as much as possible.  Take you diuretics as  directed by your physician. Do not skip doses or change doses unless instructed to do so by your physician.  Decrease salt intake, follow recommended 2 grams daily.  Do not get leg(s) with compression wrap wet. If wraps are too tight as indicated by pain, numbness/tingling or discoloration of toes remove wrap completely and call the wound center @ [phone].       The treatment plan has been discussed at length between you and your provider. Follow all instructions carefully, it is very important. If you do not follow all instructions you are at risk of your wound not healing, infection, possible loss of limb and even loss of life.    COMPRESSION WRAP PATIENT CARE INSTRUCTIONS  DO NOT get compression wrap wet. When showering, use a shower boot.   Please contact wound clinic and if not able to reach, then go to emergency room if ANY of the following occur:   Tingling or numbness in the foot or toes on leg with compression wrap.  Severe pain that cannot be relieved with elevation and any medication instructed per your doctor.  A fever of 100.4°F (38°C) or higher.  Swelling, coldness, or blue-gray discoloration of the toes.  If the compression wrap feels too tight or too loose.  If the compression wrap is damaged or has rough edges that hurt.  If the compression wrap gets wet, you must cut wrap off.   Drainage from compression wrap that smells different than usual.

## 2024-05-30 NOTE — PROGRESS NOTES
Weekly Wound Education Note    Teaching Provided To: Patient  Training Topics: Discharge instructions;Cleasing and general instructions;Dressing;Edema control;Compression  Training Method: Demonstration;Explain/Verbal  Training Response: Reinforcement needed        Notes: Pt here for follow up wound care visit to left lateral lower leg wound. Edema measurement stable, wound measurement decreased. Provider silver nitrated wound. Treatment to continue with silver alginate, kerramax, kerlix, tape. Wrapped LLE in coflex 2 layer wrap. Pt to follow up with provider in 1 week.

## 2024-05-30 NOTE — PATIENT INSTRUCTIONS
PATIENT INSTRUCTIONS      FOR Luis M RODRIGUEZ Natalie ,  3/25/1965    DATE OF SERVICE: 2024    IF YOU HAVE INCREASED PAIN OR FEVER, GO TO EMERGENCY ROOM   Triad to periwound  Silver Alginate to wound base  Kerramax  Coflex 2 layer compression wrap to left lower extremity       Follow up with Dr. Gould 1 week      Managing your edema:    Avoid prolonged standing in one place. It is better to have your calf muscles moving to pump fluid out of the legs.  Elevate leg(s) above the level of the heart when sitting or as much as possible.  Take you diuretics as directed by your physician. Do not skip doses or change doses unless instructed to do so by your physician.  Decrease salt intake, follow recommended 2 grams daily.  Do not get leg(s) with compression wrap wet. If wraps are too tight as indicated by pain, numbness/tingling or discoloration of toes remove wrap completely and call the wound center @ [phone].       The treatment plan has been discussed at length between you and your provider. Follow all instructions carefully, it is very important. If you do not follow all instructions you are at risk of your wound not healing, infection, possible loss of limb and even loss of life.    COMPRESSION WRAP PATIENT CARE INSTRUCTIONS  DO NOT get compression wrap wet. When showering, use a shower boot.   Please contact wound clinic and if not able to reach, then go to emergency room if ANY of the following occur:   Tingling or numbness in the foot or toes on leg with compression wrap.  Severe pain that cannot be relieved with elevation and any medication instructed per your doctor.  A fever of 100.4°F (38°C) or higher.  Swelling, coldness, or blue-gray discoloration of the toes.  If the compression wrap feels too tight or too loose.  If the compression wrap is damaged or has rough edges that hurt.  If the compression wrap gets wet, you must cut wrap off.   Drainage from compression wrap that smells different than  usual.

## 2024-06-06 ENCOUNTER — OFFICE VISIT (OUTPATIENT)
Dept: WOUND CARE | Facility: HOSPITAL | Age: 59
End: 2024-06-06
Attending: FAMILY MEDICINE
Payer: COMMERCIAL

## 2024-06-06 VITALS
DIASTOLIC BLOOD PRESSURE: 80 MMHG | TEMPERATURE: 98 F | SYSTOLIC BLOOD PRESSURE: 139 MMHG | RESPIRATION RATE: 16 BRPM | HEART RATE: 61 BPM

## 2024-06-06 DIAGNOSIS — S81.802D OPEN WOUND OF LEFT LOWER LEG, SUBSEQUENT ENCOUNTER: Primary | ICD-10-CM

## 2024-06-06 DIAGNOSIS — E66.01 CLASS 3 SEVERE OBESITY DUE TO EXCESS CALORIES WITH SERIOUS COMORBIDITY AND BODY MASS INDEX (BMI) OF 45.0 TO 49.9 IN ADULT (HCC): ICD-10-CM

## 2024-06-06 DIAGNOSIS — M79.89 LEG SWELLING: ICD-10-CM

## 2024-06-06 DIAGNOSIS — I87.2 CHRONIC VENOUS INSUFFICIENCY: ICD-10-CM

## 2024-06-06 PROCEDURE — 99214 OFFICE O/P EST MOD 30 MIN: CPT | Performed by: FAMILY MEDICINE

## 2024-06-06 PROCEDURE — 97597 DBRDMT OPN WND 1ST 20 CM/<: CPT | Performed by: FAMILY MEDICINE

## 2024-06-06 NOTE — PROGRESS NOTES
Chief Complaint   Patient presents with    Wound Care     Pt here for follow up wound care visit to left lateral leg. Pt denies any concerns or issues, pt denies any pain in wound at this time.        HPI:     Patient here for follow-up of left lateral leg wound.  She is doing well and tolerating compression wrap.  He thinks the wound is improving.  He has no new complaints today.    Lab Results   Component Value Date    BUN 18 01/03/2024    CREATSERUM 0.88 01/03/2024    ALB 3.0 (L) 01/02/2024    TP 7.9 01/02/2024    A1C 5.2 12/12/2016         Current Outpatient Medications:     aspirin 81 MG Oral Tab, Take 1 tablet (81 mg total) by mouth daily., Disp: , Rfl:     No Known Allergies         REVIEW OF SYSTEMS:     Review of Systems (ROS)  This information was obtained from the patient, chart    A comprehensive 10 point review of systems was completed.  Pertinent positives and negatives noted in the the HPI.     Past medical, surgical, family and social history updated where appropriate.    PHYSICAL EXAM:   /80   Pulse 61   Temp 97.9 °F (36.6 °C)   Resp 16    Estimated body mass index is 45.42 kg/m² as calculated from the following:    Height as of 1/10/24: 67\".    Weight as of 1/10/24: 290 lb (131.5 kg).   Vital signs reviewed.Appears stated age, well groomed.      Awake, alert, in no acute distress  HENT:  normocephalic, atraumatic, external ear canals clear bilaterally, tympanic membranes appear opaque, non-bulging, non-erythematous, nasal turbinates are non-inflamed and not swollen, oropharynx without erythema, swelling, or exudate, gingiva and lips without any apparent lesions.   Cardiac:  S1 S2 regular rate and rhythm, no murmur, gallop, or rub  Respiratory:  Bilaterally clear to auscultation, no chest tenderness to palpation, no wheezing, no rhonchi, no rales, air entry is adequate, no accessory respiratory muscle use, no chest asymmetry, normal excursion.  GI:  bowel sound positive in all four  quadrants, abdomen is soft, non-tender, non-distended, no hepatosplenomegaly, no abnormal aortic pulsation.     Calf  Point of Measurement - Left Calf: 37     Left Calf from:: Heel  Calf Left cm:: 43.3          Ankle  Point of Measurement - Left Ankle: 10     Left Ankle from:: Heel  Left Ankle cm:: 29.5                Foot                            Wound 01/11/24 #1 Left lateral lower leg (Active)   Date First Assessed/Time First Assessed: 01/11/24 1406    Wound Number (Wound Clinic Only): #1 Left lateral lower leg  Primary Wound Type: Traumatic      Assessments 1/11/2024  2:10 PM 6/6/2024  8:33 AM   Wound Image        Drainage Amount Large --   Drainage Description Yellow;Serous --   Treatments Compression --   Wound Length (cm) 10.6 cm 5.5 cm   Wound Width (cm) 9.5 cm 2 cm   Wound Surface Area (cm^2) 100.7 cm^2 11 cm^2   Wound Depth (cm) 0.2 cm 0.1 cm   Wound Volume (cm^3) 20.14 cm^3 1.1 cm^3   Wound Healing % -- 95   Margins Well-defined edges --   Non-staged Wound Description Full thickness --   Leslie-wound Assessment Edema;Hemosiderin staining --   Wound Granulation Tissue Firm;Pink --   Wound Bed Granulation (%) 40 % --   Wound Bed Slough (%) 60 % --   Wound Odor None --   Tunneling? No --   Undermining? No --   Sinus Tracts? No --       Active Orders   Date Order Priority Status Authorizing Provider   06/06/24 0913 Debridement Traumatic Routine Active Nicholas Gould MD       Inactive Orders   Date Order Priority Status Authorizing Provider   05/16/24 0901 Debridement Traumatic Routine Completed Nicholas Gould MD   05/02/24 1520 Debridement Traumatic Routine Completed Nicholas Gould MD   04/11/24 0959 Debridement Traumatic Routine Completed Nicholas Gould MD   04/04/24 0915 Debridement Traumatic Left;Lateral Leg Routine Completed Nicholas Gould MD   02/15/24 1154 Debridement Traumatic Left;Lateral Leg Routine Completed Nicholas Gould MD   01/18/24 1511 Debridement Traumatic Left;Lateral Leg Routine  Completed Nicholas Gould MD   01/11/24 1710 Debridement Traumatic Left;Lateral Leg Routine Completed Nicholas Gould MD       Compression Wrap 04/04/24 Leg Anterior;Left (Active)   Placement Date/Time: 04/04/24 0915   Location: Leg  Wound Location Orientation: Anterior;Left      Assessments 4/4/2024  8:47 AM 6/6/2024  8:32 AM   Response to Treatment Well tolerated Well tolerated   Compression Layers Multilayer Multilayer   Compression Product Type Coflex Coflex   Dressing Applied Yes Yes   Compression Wrap Location Toes to Knee Toes to Knee   Compression Wrap Status Clean;Dry;Intact Clean;Dry;Intact       No associated orders.          ASSESSMENT AND PLAN:      1. Open wound of left lower leg, subsequent encounter    2. Chronic venous insufficiency    3. Leg swelling    4. Class 3 severe obesity due to excess calories with serious comorbidity and body mass index (BMI) of 45.0 to 49.9 in adult (HCC)    A selective debridement was done to the entire wound and there was scab formation which was removed.  There was areas of bleeding which was controlled with silver nitrate.  Patient tolerated well.  Wound is significantly improved from previous visit.  Will switch to Hydrofera Blue transfer, Kerramax, Coflex 2 layer compression wrap.    Did discuss obesity and the need for proper diet and exercise and weight reduction.    Control of chronic venous insufficiency I discussed with long-term compression garment.      Risks, benefits, and alternatives of current treatment plan discussed in detail.  Questions and concerns addressed. Red flags to RTC or ED reviewed.  Patient (or parent) agrees to plan.      Return in about 1 week (around 6/13/2024).    Also refer to patient instructions.     I spent a total of 30 minutes with this patient's care.  This time included preparing to see the patient (eg, review notes and recent diagnostics), seeing the patient, performing a medically appropriate examination and/or evaluation,  counseling and educating the patient, and documenting in the record.          Nicholas Gould M.D.   [facility]   2024    Patient Instructions       PATIENT INSTRUCTIONS      FOR Luis M Estrada RICK 3/25/1965    DATE OF SERVICE: 2024      Hydrofera Blue Transfer  Kerramax  Coflex 2 layer compression wrap to left lower extremity       Follow up with Dr. Gould 1 week      Managing your edema:    Avoid prolonged standing in one place. It is better to have your calf muscles moving to pump fluid out of the legs.  Elevate leg(s) above the level of the heart when sitting or as much as possible.  Take you diuretics as directed by your physician. Do not skip doses or change doses unless instructed to do so by your physician.  Decrease salt intake, follow recommended 2 grams daily.  Do not get leg(s) with compression wrap wet. If wraps are too tight as indicated by pain, numbness/tingling or discoloration of toes remove wrap completely and call the wound center @ [phone].       The treatment plan has been discussed at length between you and your provider. Follow all instructions carefully, it is very important. If you do not follow all instructions you are at risk of your wound not healing, infection, possible loss of limb and even loss of life.    COMPRESSION WRAP PATIENT CARE INSTRUCTIONS  DO NOT get compression wrap wet. When showering, use a shower boot.   Please contact wound clinic and if not able to reach, then go to emergency room if ANY of the following occur:   Tingling or numbness in the foot or toes on leg with compression wrap.  Severe pain that cannot be relieved with elevation and any medication instructed per your doctor.  A fever of 100.4°F (38°C) or higher.  Swelling, coldness, or blue-gray discoloration of the toes.  If the compression wrap feels too tight or too loose.  If the compression wrap is damaged or has rough edges that hurt.  If the compression wrap gets  wet, you must cut wrap off.   Drainage from compression wrap that smells different than usual.      MISCELLANEOUS INSTRUCTIONS  Supplement with a daily multivitamin   Low salt diet  Intense blood sugar control - Goal Blood sugar below 180 at all times recommended.  Increase protein intake / consider protein supplements - see below  Elevate extremities at all times when sitting / laying down.  No tobacco use     DIETARY MODIFICATIONS TO HELP WITH WOUND HEALING:          Please follow any restrictions to diet given by your other doctors     Protein: meats, beans, eggs, milk and yogurt particularly Greek yogurt), tofu, soy nuts, soy protein products     Vitamin C: citrus fruits and juices, strawberries, tomatoes, tomato juice, peppers, baked potatoes, spinach, broccoli, cauliflower, [place] sprouts, cabbage     Vitamin A: dark greens, leafy vegetables, orange or yellow vegetables, cantaloupe, fortified dairy products, liver, fortified cereals     Zinc: fortified cereals, red meats, seafood     Consider Bob [employer] (These are essential branch chain amino acids that help with tissue building and wound healing) and take 2 packets/day. You can order online at [employer] or [employer]     ADDITIONAL REMINDERS:     The treatment plan has been discussed at length with you and your provider. Follow all instructions carefully, it is very important. If you do not follow all instructions, you are at risk of your wound not healing, infection, possible loss of limb and even loss of life.  Please call the clinic at [phone] during regular business hours ( 7:30 AM - 5:30 PM) if you notice increased redness, warmth, pain or pus like drainage or start running a fever greater than 100.3.    For after hour emergencies, please call your primary physician or go to the nearest emergency room.  If your blood pressure is elevated, follow up with your primary care doctor and/or cardiologist as soon as possible.     FOR LEG SWELLING,   LOWER EXTREMITY WOUNDS AND IF USING COMPRESSION:   Managing your edema:    Avoid prolonged standing in one place. It is better to have your calf muscles moving to pump fluid out of the legs.  Elevate leg(s) above the level of the heart when sitting or as much as possible.  Take you diuretics as directed by your physician. Do not skip doses or change doses unless instructed to do so by your physician.  Decrease salt intake, follow recommended 2 grams daily.  Do not get leg(s) with compression wrap wet. If wraps are too tight as indicated by pain, numbness/tingling or discoloration of toes remove wrap completely and call the wound center @ [phone].       The treatment plan has been discussed at length between you and your provider. Follow all instructions carefully, it is very important. If you do not follow all instructions you are at risk of your wound not healing, infection, possible loss of limb and even loss of life.    COMPRESSION WRAP PATIENT CARE INSTRUCTIONS  DO NOT get compression wrap wet. When showering, use a shower boot.   Please contact wound clinic and if not able to reach, then go to emergency room if ANY of the following occur:   Tingling or numbness in the foot or toes on leg with compression wrap.  Severe pain that cannot be relieved with elevation and any medication instructed per your doctor.  A fever of 100.4°F (38°C) or higher.  Swelling, coldness, or blue-gray discoloration of the toes.  If the compression wrap feels too tight or too loose.  If the compression wrap is damaged or has rough edges that hurt.  If the compression wrap gets wet, you must cut wrap off.   Drainage from compression wrap that smells different than usual.

## 2024-06-06 NOTE — PATIENT INSTRUCTIONS
PATIENT INSTRUCTIONS      FOR Luis M Lomasd ,  3/25/1965    DATE OF SERVICE: 2024      Hydrofera Blue Transfer  Kerramax  Coflex 2 layer compression wrap to left lower extremity       Follow up with Dr. Gould 1 week      Managing your edema:    Avoid prolonged standing in one place. It is better to have your calf muscles moving to pump fluid out of the legs.  Elevate leg(s) above the level of the heart when sitting or as much as possible.  Take you diuretics as directed by your physician. Do not skip doses or change doses unless instructed to do so by your physician.  Decrease salt intake, follow recommended 2 grams daily.  Do not get leg(s) with compression wrap wet. If wraps are too tight as indicated by pain, numbness/tingling or discoloration of toes remove wrap completely and call the wound center @ [phone].       The treatment plan has been discussed at length between you and your provider. Follow all instructions carefully, it is very important. If you do not follow all instructions you are at risk of your wound not healing, infection, possible loss of limb and even loss of life.    COMPRESSION WRAP PATIENT CARE INSTRUCTIONS  DO NOT get compression wrap wet. When showering, use a shower boot.   Please contact wound clinic and if not able to reach, then go to emergency room if ANY of the following occur:   Tingling or numbness in the foot or toes on leg with compression wrap.  Severe pain that cannot be relieved with elevation and any medication instructed per your doctor.  A fever of 100.4°F (38°C) or higher.  Swelling, coldness, or blue-gray discoloration of the toes.  If the compression wrap feels too tight or too loose.  If the compression wrap is damaged or has rough edges that hurt.  If the compression wrap gets wet, you must cut wrap off.   Drainage from compression wrap that smells different than usual.

## 2024-06-06 NOTE — PROGRESS NOTES
Patient ID: Luis M Estrada is a 59 year old male.    Debridement Traumatic   Wound 01/11/24 #1 Left lateral lower leg    Performed by: Nicholas Gould MD  Authorized by: iNcholas Gould MD      Consent   Consent obtained? verbal  Consent given by: patient  Risks discussed? procedural risks discussed    Debridement Details  Performed by: physician  Debridement type: conservative sharp  Pain control: lidocaine 4%  Pain control administration type: topical    Pre-debridement measurements  Length (cm): 5.5  Width (cm): 2  Depth (cm): 0.1  Surface Area (cm^2): 11    Post-debridement measurements  Length (cm): 5.5  Width (cm): 2  Depth (cm): 0.1  Percent debrided: 50%  Surface Area (cm^2): 11  Area Debrided (cm^2): 5.5  Volume (cm^3): 1.1    Tissue and other material debrided: subcutaneous tissue  Devitalized tissue debrided: biofilm and slough  Instrument(s) utilized: curette, scissors and forceps  Bleeding: small  Hemostasis obtained with: silver nitrate  Response to treatment: procedure was tolerated well

## 2024-06-06 NOTE — PROGRESS NOTES
Weekly Wound Education Note    Teaching Provided To: Patient  Training Topics: Discharge instructions;Cleasing and general instructions;Dressing;Edema control;Compression  Training Method: Demonstration;Explain/Verbal  Training Response: Reinforcement needed        Notes: Pt here for follow up wound care visit to left lateral lower leg. Edema measurement decreased, wound measurement decreased. Provider debrided wound at visit. Silver nitrated applied to wound bed, treatment changed to hydrofera transfer, ABD pad, kerlix, tape. Wrapped LLE in coflex 2 layer wrap. Pt to follow up with provider in 1 week.

## 2024-06-13 ENCOUNTER — OFFICE VISIT (OUTPATIENT)
Dept: WOUND CARE | Facility: HOSPITAL | Age: 59
End: 2024-06-13
Attending: FAMILY MEDICINE
Payer: COMMERCIAL

## 2024-06-13 VITALS
HEART RATE: 67 BPM | DIASTOLIC BLOOD PRESSURE: 86 MMHG | RESPIRATION RATE: 16 BRPM | SYSTOLIC BLOOD PRESSURE: 144 MMHG | TEMPERATURE: 98 F

## 2024-06-13 DIAGNOSIS — S81.802D OPEN WOUND OF LEFT LOWER LEG, SUBSEQUENT ENCOUNTER: Primary | ICD-10-CM

## 2024-06-13 DIAGNOSIS — E66.01 CLASS 3 SEVERE OBESITY DUE TO EXCESS CALORIES WITH SERIOUS COMORBIDITY AND BODY MASS INDEX (BMI) OF 45.0 TO 49.9 IN ADULT (HCC): ICD-10-CM

## 2024-06-13 DIAGNOSIS — M79.89 LEG SWELLING: ICD-10-CM

## 2024-06-13 DIAGNOSIS — I87.2 CHRONIC VENOUS INSUFFICIENCY: ICD-10-CM

## 2024-06-13 PROCEDURE — 17250 CHEM CAUT OF GRANLTJ TISSUE: CPT | Performed by: FAMILY MEDICINE

## 2024-06-13 PROCEDURE — 99215 OFFICE O/P EST HI 40 MIN: CPT | Performed by: FAMILY MEDICINE

## 2024-06-13 NOTE — PATIENT INSTRUCTIONS
PATIENT INSTRUCTIONS      FOR Luis M Lomasd ,  3/25/1965    DATE OF SERVICE: 2024      Hydrofera Blue Transfer  Kerramax  Coflex 2 layer compression wrap to left lower extremity       Follow up with Dr. Gould 1 week      Managing your edema:    Avoid prolonged standing in one place. It is better to have your calf muscles moving to pump fluid out of the legs.  Elevate leg(s) above the level of the heart when sitting or as much as possible.  Take you diuretics as directed by your physician. Do not skip doses or change doses unless instructed to do so by your physician.  Decrease salt intake, follow recommended 2 grams daily.  Do not get leg(s) with compression wrap wet. If wraps are too tight as indicated by pain, numbness/tingling or discoloration of toes remove wrap completely and call the wound center @ [phone].       The treatment plan has been discussed at length between you and your provider. Follow all instructions carefully, it is very important. If you do not follow all instructions you are at risk of your wound not healing, infection, possible loss of limb and even loss of life.    COMPRESSION WRAP PATIENT CARE INSTRUCTIONS  DO NOT get compression wrap wet. When showering, use a shower boot.   Please contact wound clinic and if not able to reach, then go to emergency room if ANY of the following occur:   Tingling or numbness in the foot or toes on leg with compression wrap.  Severe pain that cannot be relieved with elevation and any medication instructed per your doctor.  A fever of 100.4°F (38°C) or higher.  Swelling, coldness, or blue-gray discoloration of the toes.  If the compression wrap feels too tight or too loose.  If the compression wrap is damaged or has rough edges that hurt.  If the compression wrap gets wet, you must cut wrap off.   Drainage from compression wrap that smells different than usual.

## 2024-06-13 NOTE — PROGRESS NOTES
Weekly Wound Education Note    Teaching Provided To: Patient  Training Topics: Dressing;Cleasing and general instructions;Discharge instructions;Compression;Edema control  Training Method: Demonstration;Explain/Verbal  Training Response: Reinforcement needed        Notes: Pt here for follow up wound care visit to left lateral lower leg wound. Edema measurement decreased, wound measurement decreased. Provider applied silver nitrate to wound bed. Provider recommending patient to continue treatment, adaptic, hydrofera transfer, ABD pad, kerlix, tape. Wrapped LLE in coflex 2 layer wrap. Pt to follow up with provider in 1 week.

## 2024-06-13 NOTE — PROGRESS NOTES
Chief Complaint   Patient presents with    Wound Care     Patient arrives for a wound care follow up appointment. Patient arrives with an unna wrap to the left leg. Patient states he has no pain. The wrap stayed up well. There is kerlex holding on the dressing to the wound; ABD and transfer were put on the wound itself.        HPI:     Patient here for follow-up of left lateral leg wound.  He is tolerating compression wrap and has no new complaints.  He thinks wound is improving.    Lab Results   Component Value Date    BUN 18 01/03/2024    CREATSERUM 0.88 01/03/2024    ALB 3.0 (L) 01/02/2024    TP 7.9 01/02/2024    A1C 5.2 12/12/2016         Current Outpatient Medications:     aspirin 81 MG Oral Tab, Take 1 tablet (81 mg total) by mouth daily., Disp: , Rfl:     No Known Allergies         REVIEW OF SYSTEMS:     Review of Systems (ROS)  This information was obtained from the patient, chart    A comprehensive 10 point review of systems was completed.  Pertinent positives and negatives noted in the the HPI.     Past medical, surgical, family and social history updated where appropriate.    PHYSICAL EXAM:   /86   Pulse 67   Temp 98 °F (36.7 °C)   Resp 16    Estimated body mass index is 45.42 kg/m² as calculated from the following:    Height as of 1/10/24: 67\".    Weight as of 1/10/24: 290 lb (131.5 kg).   Vital signs reviewed.Appears stated age, well groomed.      Awake, alert, in no acute distress  HENT:  normocephalic, atraumatic, external ear canals clear bilaterally, tympanic membranes appear opaque, non-bulging, non-erythematous, nasal turbinates are non-inflamed and not swollen, oropharynx without erythema, swelling, or exudate, gingiva and lips without any apparent lesions.   Cardiac:  S1 S2 regular rate and rhythm, no murmur, gallop, or rub  Respiratory:  Bilaterally clear to auscultation, no chest tenderness to palpation, no wheezing, no rhonchi, no rales, air entry is adequate, no accessory  respiratory muscle use, no chest asymmetry, normal excursion.  GI:  bowel sound positive in all four quadrants, abdomen is soft, non-tender, non-distended, no hepatosplenomegaly, no abnormal aortic pulsation.     Calf  Point of Measurement - Left Calf: 37     Left Calf from:: Heel  Calf Left cm:: 42.7          Ankle  Point of Measurement - Left Ankle: 10     Left Ankle from:: Heel  Left Ankle cm:: 29.3                Foot                            Wound 01/11/24 #1 Left lateral lower leg (Active)   Date First Assessed/Time First Assessed: 01/11/24 1406    Wound Number (Wound Clinic Only): #1 Left lateral lower leg  Primary Wound Type: Traumatic      Assessments 1/11/2024  2:10 PM 6/13/2024  8:55 AM   Wound Image       Drainage Amount Large --   Drainage Description Yellow;Serous --   Treatments Compression --   Wound Length (cm) 10.6 cm --   Wound Width (cm) 9.5 cm --   Wound Surface Area (cm^2) 100.7 cm^2 --   Wound Depth (cm) 0.2 cm --   Wound Volume (cm^3) 20.14 cm^3 --   Margins Well-defined edges --   Non-staged Wound Description Full thickness --   Leslie-wound Assessment Edema;Hemosiderin staining --   Wound Granulation Tissue Firm;Pink --   Wound Bed Granulation (%) 40 % --   Wound Bed Slough (%) 60 % --   Wound Odor None --   Tunneling? No --   Undermining? No --   Sinus Tracts? No --       Inactive Orders   Date Order Priority Status Authorizing Provider   06/06/24 0913 Debridement Traumatic Routine Completed Nicholas Gould MD   05/16/24 0901 Debridement Traumatic Routine Completed Nicholas Gould MD   05/02/24 1520 Debridement Traumatic Routine Completed Nicholas Gould MD   04/11/24 0959 Debridement Traumatic Routine Completed Nicholas Gould MD   04/04/24 0915 Debridement Traumatic Left;Lateral Leg Routine Completed Nicholas Gould MD   02/15/24 1154 Debridement Traumatic Left;Lateral Leg Routine Completed Nicholas Gould MD   01/18/24 1511 Debridement Traumatic Left;Lateral Leg Routine Completed  Nicholas Gould MD   24 1710 Debridement Traumatic Left;Lateral Leg Routine Completed Nicholas Gould MD       Compression Wrap 24 Leg Anterior;Left (Active)   Placement Date/Time: 2415   Location: Leg  Wound Location Orientation: Anterior;Left      Assessments 2024  8:47 AM 2024  8:36 AM   Response to Treatment Well tolerated Well tolerated   Compression Layers Multilayer Multilayer   Compression Product Type Coflex Coflex   Dressing Applied Yes Yes   Compression Wrap Location Toes to Knee Toes to Knee   Compression Wrap Status Clean;Dry;Intact Clean;Dry;Intact       No associated orders.          ASSESSMENT AND PLAN:      1. Open wound of left lower leg, subsequent encounter    2. Chronic venous insufficiency    3. Leg swelling    4. Class 3 severe obesity due to excess calories with serious comorbidity and body mass index (BMI) of 45.0 to 49.9 in adult (HCC)    Clinically the wound is improving nicely.  Hypergranulating areas were treated with silver nitrate.  Will continue with Hydrofera Blue transfer, ABD pad, Coflex 2 layer compression wrap to left lower extremity.  Continue elevation of the leg and no prolonged standing.      Risks, benefits, and alternatives of current treatment plan discussed in detail.  Questions and concerns addressed. Red flags to RTC or ED reviewed.  Patient (or parent) agrees to plan.      Return in about 1 week (around 2024).    Also refer to patient instructions.     I spent a total of 40 minutes with this patient's care.  This time included preparing to see the patient (eg, review notes and recent diagnostics), seeing the patient, performing a medically appropriate examination and/or evaluation, counseling and educating the patient, and documenting in the record.          Nicholas Gould M.D.   [facility]   2024    Patient Instructions       PATIENT INSTRUCTIONS      FOR RICK Blandon 3/25/1965    DATE OF  SERVICE: 6/13/2024      Hydrofera Blue Transfer  Kerramax  Coflex 2 layer compression wrap to left lower extremity       Follow up with Dr. Gould 1 week      Managing your edema:    Avoid prolonged standing in one place. It is better to have your calf muscles moving to pump fluid out of the legs.  Elevate leg(s) above the level of the heart when sitting or as much as possible.  Take you diuretics as directed by your physician. Do not skip doses or change doses unless instructed to do so by your physician.  Decrease salt intake, follow recommended 2 grams daily.  Do not get leg(s) with compression wrap wet. If wraps are too tight as indicated by pain, numbness/tingling or discoloration of toes remove wrap completely and call the wound center @ [phone].       The treatment plan has been discussed at length between you and your provider. Follow all instructions carefully, it is very important. If you do not follow all instructions you are at risk of your wound not healing, infection, possible loss of limb and even loss of life.    COMPRESSION WRAP PATIENT CARE INSTRUCTIONS  DO NOT get compression wrap wet. When showering, use a shower boot.   Please contact wound clinic and if not able to reach, then go to emergency room if ANY of the following occur:   Tingling or numbness in the foot or toes on leg with compression wrap.  Severe pain that cannot be relieved with elevation and any medication instructed per your doctor.  A fever of 100.4°F (38°C) or higher.  Swelling, coldness, or blue-gray discoloration of the toes.  If the compression wrap feels too tight or too loose.  If the compression wrap is damaged or has rough edges that hurt.  If the compression wrap gets wet, you must cut wrap off.   Drainage from compression wrap that smells different than usual.    MISCELLANEOUS INSTRUCTIONS  Supplement with a daily multivitamin   Low salt diet  Intense blood sugar control - Goal Blood sugar below 180 at all times  recommended.  Increase protein intake / consider protein supplements - see below  Elevate extremities at all times when sitting / laying down.  No tobacco use     DIETARY MODIFICATIONS TO HELP WITH WOUND HEALING:          Please follow any restrictions to diet given by your other doctors     Protein: meats, beans, eggs, milk and yogurt particularly Greek yogurt), tofu, soy nuts, soy protein products     Vitamin C: citrus fruits and juices, strawberries, tomatoes, tomato juice, peppers, baked potatoes, spinach, broccoli, cauliflower, [place] sprouts, cabbage     Vitamin A: dark greens, leafy vegetables, orange or yellow vegetables, cantaloupe, fortified dairy products, liver, fortified cereals     Zinc: fortified cereals, red meats, seafood     Consider Bob by [employer] (These are essential branch chain amino acids that help with tissue building and wound healing) and take 2 packets/day. You can order online at [employer] or [employer]     ADDITIONAL REMINDERS:     The treatment plan has been discussed at length with you and your provider. Follow all instructions carefully, it is very important. If you do not follow all instructions, you are at risk of your wound not healing, infection, possible loss of limb and even loss of life.  Please call the clinic at [phone] during regular business hours ( 7:30 AM - 5:30 PM) if you notice increased redness, warmth, pain or pus like drainage or start running a fever greater than 100.3.    For after hour emergencies, please call your primary physician or go to the nearest emergency room.  If your blood pressure is elevated, follow up with your primary care doctor and/or cardiologist as soon as possible.     FOR LEG SWELLING,  LOWER EXTREMITY WOUNDS AND IF USING COMPRESSION:   Managing your edema:    Avoid prolonged standing in one place. It is better to have your calf muscles moving to pump fluid out of the legs.  Elevate leg(s) above the level of the heart when sitting or as  much as possible.  Take you diuretics as directed by your physician. Do not skip doses or change doses unless instructed to do so by your physician.  Decrease salt intake, follow recommended 2 grams daily.  Do not get leg(s) with compression wrap wet. If wraps are too tight as indicated by pain, numbness/tingling or discoloration of toes remove wrap completely and call the wound center @ [phone].       The treatment plan has been discussed at length between you and your provider. Follow all instructions carefully, it is very important. If you do not follow all instructions you are at risk of your wound not healing, infection, possible loss of limb and even loss of life.    COMPRESSION WRAP PATIENT CARE INSTRUCTIONS  DO NOT get compression wrap wet. When showering, use a shower boot.   Please contact wound clinic and if not able to reach, then go to emergency room if ANY of the following occur:   Tingling or numbness in the foot or toes on leg with compression wrap.  Severe pain that cannot be relieved with elevation and any medication instructed per your doctor.  A fever of 100.4°F (38°C) or higher.  Swelling, coldness, or blue-gray discoloration of the toes.  If the compression wrap feels too tight or too loose.  If the compression wrap is damaged or has rough edges that hurt.  If the compression wrap gets wet, you must cut wrap off.   Drainage from compression wrap that smells different than usual.

## 2024-06-20 ENCOUNTER — OFFICE VISIT (OUTPATIENT)
Dept: WOUND CARE | Facility: HOSPITAL | Age: 59
End: 2024-06-20
Attending: FAMILY MEDICINE

## 2024-06-20 ENCOUNTER — HOSPITAL ENCOUNTER (INPATIENT)
Facility: HOSPITAL | Age: 59
LOS: 4 days | Discharge: HOME | End: 2024-06-24
Attending: EMERGENCY MEDICINE | Admitting: STUDENT IN AN ORGANIZED HEALTH CARE EDUCATION/TRAINING PROGRAM
Payer: COMMERCIAL

## 2024-06-20 ENCOUNTER — APPOINTMENT (OUTPATIENT)
Dept: CT IMAGING | Facility: HOSPITAL | Age: 59
End: 2024-06-20
Attending: INTERNAL MEDICINE
Payer: COMMERCIAL

## 2024-06-20 ENCOUNTER — APPOINTMENT (OUTPATIENT)
Dept: ULTRASOUND IMAGING | Facility: HOSPITAL | Age: 59
End: 2024-06-20
Attending: EMERGENCY MEDICINE
Payer: COMMERCIAL

## 2024-06-20 VITALS
DIASTOLIC BLOOD PRESSURE: 79 MMHG | HEART RATE: 76 BPM | RESPIRATION RATE: 16 BRPM | TEMPERATURE: 98 F | SYSTOLIC BLOOD PRESSURE: 134 MMHG

## 2024-06-20 DIAGNOSIS — L03.115 CELLULITIS OF RIGHT LEG: Primary | ICD-10-CM

## 2024-06-20 DIAGNOSIS — I87.333 CHRONIC VENOUS HYPERTENSION (IDIOPATHIC) WITH ULCER AND INFLAMMATION OF BILATERAL LOWER EXTREMITY (HCC): ICD-10-CM

## 2024-06-20 DIAGNOSIS — E66.01 CLASS 3 SEVERE OBESITY DUE TO EXCESS CALORIES WITH SERIOUS COMORBIDITY AND BODY MASS INDEX (BMI) OF 45.0 TO 49.9 IN ADULT (HCC): ICD-10-CM

## 2024-06-20 DIAGNOSIS — L03.115 CELLULITIS OF RIGHT LOWER EXTREMITY: Primary | ICD-10-CM

## 2024-06-20 LAB
ALBUMIN SERPL-MCNC: 3.5 G/DL (ref 3.4–5)
ALBUMIN/GLOB SERPL: 0.7 {RATIO} (ref 1–2)
ALP LIVER SERPL-CCNC: 87 U/L
ALT SERPL-CCNC: 19 U/L
ANION GAP SERPL CALC-SCNC: 7 MMOL/L (ref 0–18)
AST SERPL-CCNC: 17 U/L (ref 15–37)
BASOPHILS # BLD AUTO: 0.06 X10(3) UL (ref 0–0.2)
BASOPHILS NFR BLD AUTO: 0.4 %
BILIRUB SERPL-MCNC: 1.2 MG/DL (ref 0.1–2)
BUN BLD-MCNC: 23 MG/DL (ref 9–23)
CALCIUM BLD-MCNC: 8.8 MG/DL (ref 8.5–10.1)
CHLORIDE SERPL-SCNC: 102 MMOL/L (ref 98–112)
CO2 SERPL-SCNC: 25 MMOL/L (ref 21–32)
CREAT BLD-MCNC: 1.02 MG/DL
EGFRCR SERPLBLD CKD-EPI 2021: 85 ML/MIN/1.73M2 (ref 60–?)
EOSINOPHIL # BLD AUTO: 0.02 X10(3) UL (ref 0–0.7)
EOSINOPHIL NFR BLD AUTO: 0.1 %
ERYTHROCYTE [DISTWIDTH] IN BLOOD BY AUTOMATED COUNT: 14.3 %
GLOBULIN PLAS-MCNC: 4.9 G/DL (ref 2.8–4.4)
GLUCOSE BLD-MCNC: 104 MG/DL (ref 70–99)
HCT VFR BLD AUTO: 45.5 %
HGB BLD-MCNC: 15.5 G/DL
IMM GRANULOCYTES # BLD AUTO: 0.04 X10(3) UL (ref 0–1)
IMM GRANULOCYTES NFR BLD: 0.3 %
LACTATE SERPL-SCNC: 1.3 MMOL/L (ref 0.4–2)
LYMPHOCYTES # BLD AUTO: 1.12 X10(3) UL (ref 1–4)
LYMPHOCYTES NFR BLD AUTO: 7.8 %
MCH RBC QN AUTO: 31.6 PG (ref 26–34)
MCHC RBC AUTO-ENTMCNC: 34.1 G/DL (ref 31–37)
MCV RBC AUTO: 92.9 FL
MONOCYTES # BLD AUTO: 0.47 X10(3) UL (ref 0.1–1)
MONOCYTES NFR BLD AUTO: 3.3 %
NEUTROPHILS # BLD AUTO: 12.61 X10 (3) UL (ref 1.5–7.7)
NEUTROPHILS # BLD AUTO: 12.61 X10(3) UL (ref 1.5–7.7)
NEUTROPHILS NFR BLD AUTO: 88.1 %
OSMOLALITY SERPL CALC.SUM OF ELEC: 282 MOSM/KG (ref 275–295)
PLATELET # BLD AUTO: 255 10(3)UL (ref 150–450)
POTASSIUM SERPL-SCNC: 3.6 MMOL/L (ref 3.5–5.1)
PROT SERPL-MCNC: 8.4 G/DL (ref 6.4–8.2)
RBC # BLD AUTO: 4.9 X10(6)UL
SODIUM SERPL-SCNC: 134 MMOL/L (ref 136–145)
WBC # BLD AUTO: 14.3 X10(3) UL (ref 4–11)

## 2024-06-20 PROCEDURE — 93971 EXTREMITY STUDY: CPT | Performed by: EMERGENCY MEDICINE

## 2024-06-20 PROCEDURE — 73701 CT LOWER EXTREMITY W/DYE: CPT | Performed by: INTERNAL MEDICINE

## 2024-06-20 PROCEDURE — 99215 OFFICE O/P EST HI 40 MIN: CPT | Performed by: FAMILY MEDICINE

## 2024-06-20 PROCEDURE — 99222 1ST HOSP IP/OBS MODERATE 55: CPT | Performed by: STUDENT IN AN ORGANIZED HEALTH CARE EDUCATION/TRAINING PROGRAM

## 2024-06-20 RX ORDER — IBUPROFEN 400 MG/1
400 TABLET ORAL EVERY 4 HOURS PRN
Status: DISCONTINUED | OUTPATIENT
Start: 2024-06-20 | End: 2024-06-24

## 2024-06-20 RX ORDER — ACETAMINOPHEN 500 MG
1000 TABLET ORAL EVERY 8 HOURS PRN
Status: DISCONTINUED | OUTPATIENT
Start: 2024-06-20 | End: 2024-06-24

## 2024-06-20 RX ORDER — BISACODYL 10 MG
10 SUPPOSITORY, RECTAL RECTAL
Status: DISCONTINUED | OUTPATIENT
Start: 2024-06-20 | End: 2024-06-24

## 2024-06-20 RX ORDER — SENNOSIDES 8.6 MG
17.2 TABLET ORAL NIGHTLY PRN
Status: DISCONTINUED | OUTPATIENT
Start: 2024-06-20 | End: 2024-06-24

## 2024-06-20 RX ORDER — ONDANSETRON 2 MG/ML
4 INJECTION INTRAMUSCULAR; INTRAVENOUS EVERY 6 HOURS PRN
Status: DISCONTINUED | OUTPATIENT
Start: 2024-06-20 | End: 2024-06-24

## 2024-06-20 RX ORDER — POLYETHYLENE GLYCOL 3350 17 G/17G
17 POWDER, FOR SOLUTION ORAL DAILY PRN
Status: DISCONTINUED | OUTPATIENT
Start: 2024-06-20 | End: 2024-06-24

## 2024-06-20 RX ORDER — SODIUM CHLORIDE 9 MG/ML
1000 INJECTION, SOLUTION INTRAVENOUS ONCE
Status: COMPLETED | OUTPATIENT
Start: 2024-06-20 | End: 2024-06-20

## 2024-06-20 RX ORDER — ECHINACEA PURPUREA EXTRACT 125 MG
1 TABLET ORAL
Status: DISCONTINUED | OUTPATIENT
Start: 2024-06-20 | End: 2024-06-24

## 2024-06-20 RX ORDER — MELATONIN
3 NIGHTLY PRN
Status: DISCONTINUED | OUTPATIENT
Start: 2024-06-20 | End: 2024-06-21

## 2024-06-20 RX ORDER — ENEMA 19; 7 G/133ML; G/133ML
1 ENEMA RECTAL ONCE AS NEEDED
Status: DISCONTINUED | OUTPATIENT
Start: 2024-06-20 | End: 2024-06-24

## 2024-06-20 RX ORDER — ENOXAPARIN SODIUM 100 MG/ML
40 INJECTION SUBCUTANEOUS DAILY
Status: DISCONTINUED | OUTPATIENT
Start: 2024-06-20 | End: 2024-06-21

## 2024-06-20 RX ORDER — IBUPROFEN 600 MG/1
600 TABLET ORAL EVERY 4 HOURS PRN
Status: DISCONTINUED | OUTPATIENT
Start: 2024-06-20 | End: 2024-06-24

## 2024-06-20 RX ORDER — PROCHLORPERAZINE EDISYLATE 5 MG/ML
5 INJECTION INTRAMUSCULAR; INTRAVENOUS EVERY 8 HOURS PRN
Status: DISCONTINUED | OUTPATIENT
Start: 2024-06-20 | End: 2024-06-24

## 2024-06-20 RX ORDER — IBUPROFEN 200 MG
200 TABLET ORAL EVERY 4 HOURS PRN
Status: DISCONTINUED | OUTPATIENT
Start: 2024-06-20 | End: 2024-06-24

## 2024-06-20 RX ORDER — VANCOMYCIN 2 GRAM/500 ML IN 0.9 % SODIUM CHLORIDE INTRAVENOUS
12.5 EVERY 12 HOURS
Status: DISCONTINUED | OUTPATIENT
Start: 2024-06-21 | End: 2024-06-22

## 2024-06-20 NOTE — H&P
[facility]     Luis M Estrada Patient Status:  Emergency    3/25/1965 MRN [id number]   Location [facility] EMERGENCY DEPART[facility] Attending Tram Boyle DO   Hosp Day # 0 PCP SILVER GARCIA DO     Chief Complaint: Right leg cellulitis     Subjective:    History of Present Illness:     Luis M Estrada is a 59 year old male with h/o hydrocephalus. Pt was at wound care clinic for regular follow up of his chronic LLE wound 2/2 shopping cart trauma. The patient reports that at his visit his R leg was noted ot be red- he denies pain, trauma, fever, chills, malaise.    History/Other:    Past Medical History:  Past Medical History:    Cellulitis    to left leg, seeking treatment at wound care for months    Hydrocephalus (HCC)    dx'd as an adult-no shunt     Past Surgical History:   History reviewed. No pertinent surgical history.   Family History:   No family history on file.  Social History:    reports that he has never smoked. He has never used smokeless tobacco. He reports current alcohol use. He reports that he does not use drugs.     Allergies: No Known Allergies    Medications:    No current facility-administered medications on file prior to encounter.     Current Outpatient Medications on File Prior to Encounter   Medication Sig Dispense Refill    [] levoFLOXacin (LEVAQUIN) 500 MG Oral Tab Take 1 tablet (500 mg total) by mouth daily for 10 days. 10 tablet 0    [] amoxicillin clavulanate 875-125 MG Oral Tab Take 1 tablet by mouth 2 (two) times daily for 7 days. TAKE WITH FOOD 14 tablet 0    aspirin 81 MG Oral Tab Take 1 tablet (81 mg total) by mouth daily. (Patient not taking: Reported on 2024)         Review of Systems:   A comprehensive review of systems was completed.    Pertinent positives and negatives noted in the HPI.    Objective:   Physical Exam:    /88   Pulse 90   Temp 97.5 °F (36.4 °C) (Temporal)   Resp 18   Ht 5' 7\" (1.702 m)    Wt (!) 320 lb (145.2 kg)   SpO2 99%   BMI 50.12 kg/m²   General: No acute distress, Alert  Respiratory: No rhonchi, no wheezes  Cardiovascular: S1, S2. Regular rate and rhythm  Abdomen: Soft, Non-tender, non-distended, positive bowel sounds  Neuro: No new focal deficits  Extremities: RLE erythema blanching- to r mid thigh with actively draining wound in anterior shin.    Results:    Labs:      Labs Last 24 Hours:    Recent Labs   Lab 06/20/24  1114   RBC 4.90   HGB 15.5   HCT 45.5   MCV 92.9   MCH 31.6   MCHC 34.1   RDW 14.3   NEPRELIM 12.61*   WBC 14.3*   .0       Recent Labs   Lab 06/20/24  1114   *   BUN 23   CREATSERUM 1.02   EGFRCR 85   CA 8.8   ALB 3.5   *   K 3.6      CO2 25.0   ALKPHO 87   AST 17   ALT 19   BILT 1.2   TP 8.4*       No results found for: \"PT\", \"INR\"    No results for input(s): \"TROP\", \"TROPHS\", \"CK\" in the last 168 hours.    No results for input(s): \"TROP\", \"PBNP\" in the last 168 hours.    No results for input(s): \"PCT\" in the last 168 hours.    Imaging: Imaging data reviewed in Epic.    Assessment & Plan:      #RLE cellulitis h/o MRSA  ID on Cs  Vanco  No DVT on Doppler  #h/o hydrocephalus  #LLE chronic wound         Plan of care discussed with pt    Rica Pearson MD    Supplementary Documentation:     The 21st Century Cures Act makes medical notes like these available to patients in the interest of transparency. Please be advised this is a medical document. Medical documents are intended to carry relevant information, facts as evident, and the clinical opinion of the practitioner. The medical note is intended as peer to peer communication and may appear blunt or direct. It is written in medical language and may contain abbreviations or verbiage that are unfamiliar.

## 2024-06-20 NOTE — ED INITIAL ASSESSMENT (HPI)
Pt to ER via wheelchair, states has been seen for left side cellulitis at wound care for months. Today went to appointment and was sent r/t  leg swelling, redness to rt leg. Denies any fever or chills. Denies numbness or tingling. Pt denies any pain whatsoever.

## 2024-06-20 NOTE — PROGRESS NOTES
Chief Complaint   Patient presents with    Wound Recheck     Pt here for follow up to left leg wound. Arrives with coflex 2 layer wrap rolled down slightly from knee. He has redness, warmth and new wound to right leg, no dressing in place - compression stocking removed to assess.        HPI:     Patient here for follow-up of left lateral leg wound.  He is tolerating compression wrap and doing well.  He states that he has a new wound on the front part of his right leg but is not sure how it happened or when it happened.    Lab Results   Component Value Date    BUN 18 01/03/2024    CREATSERUM 0.88 01/03/2024    ALB 3.0 (L) 01/02/2024    TP 7.9 01/02/2024    A1C 5.2 12/12/2016         Current Outpatient Medications:     aspirin 81 MG Oral Tab, Take 1 tablet (81 mg total) by mouth daily., Disp: , Rfl:     No Known Allergies         REVIEW OF SYSTEMS:     Review of Systems (ROS)  This information was obtained from the patient, chart    A comprehensive 10 point review of systems was completed.  Pertinent positives and negatives noted in the the HPI.     Past medical, surgical, family and social history updated where appropriate.    PHYSICAL EXAM:   /79   Pulse 76   Temp 98.2 °F (36.8 °C)   Resp 16    Estimated body mass index is 45.42 kg/m² as calculated from the following:    Height as of 1/10/24: 67\".    Weight as of 1/10/24: 290 lb.   Vital signs reviewed.Appears stated age, well groomed.      Awake, alert, in no acute distress  HENT:  normocephalic, atraumatic, external ear canals clear bilaterally, tympanic membranes appear opaque, non-bulging, non-erythematous, nasal turbinates are non-inflamed and not swollen, oropharynx without erythema, swelling, or exudate, gingiva and lips without any apparent lesions.   Cardiac:  S1 S2 regular rate and rhythm, no murmur, gallop, or rub  Respiratory:  Bilaterally clear to auscultation, no chest tenderness to palpation, no wheezing, no rhonchi, no rales, air entry is  adequate, no accessory respiratory muscle use, no chest asymmetry, normal excursion.  GI:  bowel sound positive in all four quadrants, abdomen is soft, non-tender, non-distended, no hepatosplenomegaly, no abnormal aortic pulsation.     Right lower extremity: Severe inflammation/erythema and moderate swelling of the entire right lower extremity from the toes to the distal thigh circumferential, open wound anterior right leg draining clear fluid copious    Calf  Point of Measurement - Left Calf: 37  Point of Measurement - Right Calf: 37  Left Calf from:: Heel  Calf Left cm:: 52  Right Calf from:: Heel  Right Calf cm:: 54    Ankle  Point of Measurement - Left Ankle: 10  Point of Measurement - Right Ankle: 10  Left Ankle from:: Heel  Left Ankle cm:: 31.2     Right Ankle from:: Heel  Right Ankle cm:: 33.3       Foot                            Wound 01/11/24 #1 Leg Left (Active)   Date First Assessed/Time First Assessed: 01/11/24 1406    Wound Number (Wound Clinic Only): #1  Primary Wound Type: Traumatic  Location: Leg  Wound Location Orientation: Left      Assessments 1/11/2024  2:10 PM 6/20/2024  8:35 AM   Wound Image       Drainage Amount Large Large   Drainage Description Yellow;Serous Serosanguineous   Treatments Compression Compression   Wound Length (cm) 10.6 cm 4.5 cm   Wound Width (cm) 9.5 cm 1.7 cm   Wound Surface Area (cm^2) 100.7 cm^2 7.65 cm^2   Wound Depth (cm) 0.2 cm 0.1 cm   Wound Volume (cm^3) 20.14 cm^3 0.765 cm^3   Wound Healing % -- 96   Margins Well-defined edges Well-defined edges   Non-staged Wound Description Full thickness Full thickness   Leslie-wound Assessment Edema;Hemosiderin staining Edema;Hemosiderin staining;Dry   Wound Granulation Tissue Firm;Pink Red;Pink;Firm   Wound Bed Granulation (%) 40 % 25 %   Wound Bed Epithelium (%) -- 50 %   Wound Bed Slough (%) 60 % 25 %   Wound Odor None None   Tunneling? No No   Undermining? No No   Sinus Tracts? No No       Inactive Orders   Date Order  Priority Status Authorizing Provider   06/06/24 0913 Debridement Traumatic Routine Completed Nicholas Gould MD   05/16/24 0901 Debridement Traumatic Routine Completed Nicholas Gould MD   05/02/24 1520 Debridement Traumatic Routine Completed Nicholas Gould MD   04/11/24 0959 Debridement Traumatic Routine Completed Nicholas Gould MD   04/04/24 0915 Debridement Traumatic Left;Lateral Leg Routine Completed Nicholas Gould MD   02/15/24 1154 Debridement Traumatic Left;Lateral Leg Routine Completed Nicholas Gould MD   01/18/24 1511 Debridement Traumatic Left;Lateral Leg Routine Completed Nicholas Gould MD   01/11/24 1710 Debridement Traumatic Left;Lateral Leg Routine Completed Nicholas Gould MD       Wound 06/20/24 #2 Leg Right (Active)   Date First Assessed/Time First Assessed: 06/20/24 0832    Wound Number (Wound Clinic Only): #2  Primary Wound Type: Venous Ulcer  Location: Leg  Wound Location Orientation: Right      Assessments 6/20/2024  8:34 AM   Wound Image     Drainage Amount Copious   Drainage Description Clear   Wound Length (cm) 0.8 cm   Wound Width (cm) 1.2 cm   Wound Surface Area (cm^2) 0.96 cm^2   Wound Depth (cm) 0.1 cm   Wound Volume (cm^3) 0.096 cm^3   Margins Well-defined edges   Non-staged Wound Description Full thickness   Leslie-wound Assessment Hemosiderin staining;Blanchable erythema;Edema;Moist   Wound Granulation Tissue Pink;Spongy   Wound Bed Granulation (%) 50 %   Wound Bed Slough (%) 50 %   Wound Odor None       No associated orders.          ASSESSMENT AND PLAN:      1. Cellulitis of right lower extremity    2. Chronic venous hypertension (idiopathic) with ulcer and inflammation of bilateral lower extremity (HCC)    3. Class 3 severe obesity due to excess calories with serious comorbidity and body mass index (BMI) of 45.0 to 49.9 in adult ([facility])    As I walked into the exam room patient had a right anterior leg wound with copious drainage clear fluid however the right lower  extremity all the way above the knee is severely erythematous and edematous all the way to the toes.  Patient has severe cellulitis of right lower extremity with open wound of right anterior leg.  I told the patient that he needs to go to the ER and will likely require admission to the hospital for IV antibiotic and further treatment of this infection.  I told the patient that I am very concerned about severe infection of his right leg and this can be life-threatening and can be fatal.  Patient voiced understanding of this.  We will transport him to the emergency room from the clinic for further evaluation and management.    The left leg wound is doing well and silver nitrate applied to it it is getting smaller.  Will continue with Hydrofera Blue transfer and ABD and spandagrip for now.      Risks, benefits, and alternatives of current treatment plan discussed in detail.  Questions and concerns addressed. Red flags to RTC or ED reviewed.  Patient (or parent) agrees to plan.      No follow-ups on file.    Also refer to patient instructions.     I spent a total of 40 minutes with this patient's care.  This time included preparing to see the patient (eg, review notes and recent diagnostics), seeing the patient, performing a medically appropriate examination and/or evaluation, counseling and educating the patient, and documenting in the record.          Nicholas Gould M.D.   [facility]   6/20/2024    There are no Patient Instructions on file for this visit.  MISCELLANEOUS INSTRUCTIONS  Supplement with a daily multivitamin   Low salt diet  Intense blood sugar control - Goal Blood sugar below 180 at all times recommended.  Increase protein intake / consider protein supplements - see below  Elevate extremities at all times when sitting / laying down.  No tobacco use     DIETARY MODIFICATIONS TO HELP WITH WOUND HEALING:          Please follow any restrictions to diet given by your other doctors      Protein: meats, beans, eggs, milk and yogurt particularly Greek yogurt), tofu, soy nuts, soy protein products     Vitamin C: citrus fruits and juices, strawberries, tomatoes, tomato juice, peppers, baked potatoes, spinach, broccoli, cauliflower, [place] sprouts, cabbage     Vitamin A: dark greens, leafy vegetables, orange or yellow vegetables, cantaloupe, fortified dairy products, liver, fortified cereals     Zinc: fortified cereals, red meats, seafood     Consider Bob by [employer] (These are essential branch chain amino acids that help with tissue building and wound healing) and take 2 packets/day. You can order online at [employer] or [employer]     ADDITIONAL REMINDERS:     The treatment plan has been discussed at length with you and your provider. Follow all instructions carefully, it is very important. If you do not follow all instructions, you are at risk of your wound not healing, infection, possible loss of limb and even loss of life.  Please call the clinic at [phone] during regular business hours ( 7:30 AM - 5:30 PM) if you notice increased redness, warmth, pain or pus like drainage or start running a fever greater than 100.3.    For after hour emergencies, please call your primary physician or go to the nearest emergency room.  If your blood pressure is elevated, follow up with your primary care doctor and/or cardiologist as soon as possible.     FOR LEG SWELLING,  LOWER EXTREMITY WOUNDS AND IF USING COMPRESSION:   Managing your edema:    Avoid prolonged standing in one place. It is better to have your calf muscles moving to pump fluid out of the legs.  Elevate leg(s) above the level of the heart when sitting or as much as possible.  Take you diuretics as directed by your physician. Do not skip doses or change doses unless instructed to do so by your physician.  Decrease salt intake, follow recommended 2 grams daily.  Do not get leg(s) with compression wrap wet. If wraps are too tight as indicated by  pain, numbness/tingling or discoloration of toes remove wrap completely and call the wound center @ [phone].       The treatment plan has been discussed at length between you and your provider. Follow all instructions carefully, it is very important. If you do not follow all instructions you are at risk of your wound not healing, infection, possible loss of limb and even loss of life.    COMPRESSION WRAP PATIENT CARE INSTRUCTIONS  DO NOT get compression wrap wet. When showering, use a shower boot.   Please contact wound clinic and if not able to reach, then go to emergency room if ANY of the following occur:   Tingling or numbness in the foot or toes on leg with compression wrap.  Severe pain that cannot be relieved with elevation and any medication instructed per your doctor.  A fever of 100.4°F (38°C) or higher.  Swelling, coldness, or blue-gray discoloration of the toes.  If the compression wrap feels too tight or too loose.  If the compression wrap is damaged or has rough edges that hurt.  If the compression wrap gets wet, you must cut wrap off.   Drainage from compression wrap that smells different than usual.

## 2024-06-20 NOTE — ED QUICK NOTES
Orders for admission, patient is aware of plan and ready to go upstairs. Any questions, please call ED RN Gerardo at extension [phone].     Patient Covid vaccination status: Fully vaccinated     COVID Test Ordered in ED: None    COVID Suspicion at Admission: N/A    Running Infusions: Vancomycin 200 mL / hr  NS 20 mL / hr     Mental Status/LOC at time of transport: A&O x 4    Other pertinent information:   CIWA score: N/A   NIH score:  N/A

## 2024-06-20 NOTE — PROGRESS NOTES
Pt to Ortho/spine for RLE cellulitis. Leg with pitting edema, warmth, and redness extending to the upper thigh. Lovenox for DVT proph. Regular diet. Up to bathroom SBA. On vanco IV abx for cellulitis. ID following.

## 2024-06-20 NOTE — CONSULTS
INFECTIOUS DISEASE CONSULTATION    Luis M Estrada Patient Status:  Emergency    3/25/1965 MRN [id number]   Location [facility] EMERGENCY DEPARTMENT Attending Tram Boyle DO   Hosp Day # 0 PCP SILVER GARCIA DO       Requested by Dr. Boyle    Reason for Consultation:  Right leg cellulitis    History of Present Illness:  Luis M Estrada is a a(n) 59 year old male admitted today from wound clinic for right leg cellulitis.  He reports right leg swelling redness, over the last coupled days.  No fever at home.  He was in wound clinic today for follow up left leg ulceration and was found to have new onset right leg swelling, edema, weeping, diffuse erythema, that is extending to thigh, with clear margins.  He had been given levaquin x 10 days completed  for left leg, culture from site grew GNR and MRSA at that time.       History:  Past Medical History:    Cellulitis    to left leg, seeking treatment at wound care for months    Hydrocephalus (HCC)    dx'd as an adult-no shunt     History reviewed. No pertinent surgical history.  No family history on file.   reports that he has never smoked. He has never used smokeless tobacco. He reports current alcohol use. He reports that he does not use drugs.      Allergies:  No Known Allergies    Medications:    Current Facility-Administered Medications:     vancomycin (Vancocin) 2.25 g in sodium chloride 0.9% 500 mL IVPB premix, 2,250 mg, Intravenous, Once    sodium chloride 0.9% infusion 1,000 mL, 1,000 mL, Intravenous, Once  No current facility-administered medications on file prior to encounter.     Current Outpatient Medications on File Prior to Encounter   Medication Sig Dispense Refill    [] levoFLOXacin (LEVAQUIN) 500 MG Oral Tab Take 1 tablet (500 mg total) by mouth daily for 10 days. 10 tablet 0    [] amoxicillin clavulanate 875-125 MG Oral Tab Take 1 tablet by mouth 2 (two) times  daily for 7 days. TAKE WITH FOOD 14 tablet 0    aspirin 81 MG Oral Tab Take 1 tablet (81 mg total) by mouth daily. (Patient not taking: Reported on 6/20/2024)         Review of Systems:    A comprehensive 10 point review of systems was completed.  Pertinent positives and negatives noted in the the HPI.      Physical Exam:    General: No acute distress. Alert and oriented x 3.  Vital signs: Temp:  [97.5 °F (36.4 °C)-98.2 °F (36.8 °C)] 97.5 °F (36.4 °C)  Pulse:  [76-90] 90  Resp:  [16-18] 18  BP: (134-149)/(79-88) 149/88  SpO2:  [99 %] 99 %  Body mass index is 50.12 kg/m².  HEENT: Moist mucous membranes. Extraocular muscles are intact.  Neck: No swelling, no masses  Respiratory: Non labored, symmetric exursion  Cardiovascular: No irregularities in rhythm  Abdomen: Soft, nontender, nondistended.    Musculoskeletal: right leg edema, weeping, weeping emenating from superficial wound shin, erythema extending to thigh with clear margins.     Laboratory Data:  Laboratory data reviewed      Recent Labs   Lab 06/20/24  1114   RBC 4.90   HGB 15.5   HCT 45.5   MCV 92.9   MCH 31.6   MCHC 34.1   RDW 14.3   NEPRELIM 12.61*   WBC 14.3*   .0       Recent Labs   Lab 06/20/24  1114   *   BUN 23   CREATSERUM 1.02   CA 8.8   ALB 3.5   *   K 3.6      CO2 25.0   ALKPHO 87   AST 17   ALT 19   BILT 1.2   TP 8.4*              Established Problem list:  Patient Active Problem List   Diagnosis    Stasis dermatitis of both legs    Cellulitis    Fungal dermatitis    Chronic venous insufficiency    Decreased pulses in feet    Obesity    Cellulitis of left leg    Leukocytosis    Fever    Hyponatremia    Sepsis (HCC)    Constipation    left ankle sx  global exp 3/1/17    YUVAL (acute kidney injury) (HCC)    Cellulitis of left foot         Global exp 2-24-17 / LEFT FOOT / [facility]     Cellulitis of right leg    Lymphedema    Hydrocephalus (HCC)    Screening for malignant neoplasm of colon    Left leg cellulitis    Wound infection        ASSESSMENT/PLAN:  1. Right leg cellulitis  Was sent from wound clinic (there for left leg wound) due to onset of swelling, redness and weeping  -no fevers  -wbc elevated 14    -could be a superficial strep cellulitis, but also consider mrsa infection with focal abscess    Can check CT ro focal abscess  Continue vancomycin pending micro updates          Tigre Short MD, MD [facility]  [phone]
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
## 2024-06-20 NOTE — PROGRESS NOTES
Weekly Wound Education Note    Teaching Provided To: Patient  Training Topics: Discharge instructions;Cleasing and general instructions;Edema control;Dressing;Compression  Training Method: Explain/Verbal;Demonstration  Training Response: Reinforcement needed        Notes: Pt here for follow up wound care visit to left lateral leg wound. Edema measurement increased, left lateral leg wound measurement decreased. New wound to right lower leg. Right lower leg is severly edematous and red from foot to mid upper thigh. Provider is recommending patient to go to ER at this time for further work up. Provider applied silver nitrate to left lateral leg wound, provider recommending patient to continue with hydrofera transfer, ABD pad, kerlix and spandagrip (F) to LLE. Staff assisted patient down to ER at visit today. Pt to call clinic and follow up post discharge.

## 2024-06-21 LAB
ANION GAP SERPL CALC-SCNC: 5 MMOL/L (ref 0–18)
BASOPHILS # BLD AUTO: 0.06 X10(3) UL (ref 0–0.2)
BASOPHILS NFR BLD AUTO: 0.7 %
BUN BLD-MCNC: 14 MG/DL (ref 9–23)
CALCIUM BLD-MCNC: 8.5 MG/DL (ref 8.5–10.1)
CHLORIDE SERPL-SCNC: 111 MMOL/L (ref 98–112)
CO2 SERPL-SCNC: 24 MMOL/L (ref 21–32)
CREAT BLD-MCNC: 0.83 MG/DL
EGFRCR SERPLBLD CKD-EPI 2021: 101 ML/MIN/1.73M2 (ref 60–?)
EOSINOPHIL # BLD AUTO: 0.06 X10(3) UL (ref 0–0.7)
EOSINOPHIL NFR BLD AUTO: 0.7 %
ERYTHROCYTE [DISTWIDTH] IN BLOOD BY AUTOMATED COUNT: 14.4 %
GLUCOSE BLD-MCNC: 97 MG/DL (ref 70–99)
HCT VFR BLD AUTO: 39.9 %
HGB BLD-MCNC: 13.6 G/DL
IMM GRANULOCYTES # BLD AUTO: 0.03 X10(3) UL (ref 0–1)
IMM GRANULOCYTES NFR BLD: 0.4 %
LYMPHOCYTES # BLD AUTO: 1.16 X10(3) UL (ref 1–4)
LYMPHOCYTES NFR BLD AUTO: 14.2 %
MCH RBC QN AUTO: 31.3 PG (ref 26–34)
MCHC RBC AUTO-ENTMCNC: 34.1 G/DL (ref 31–37)
MCV RBC AUTO: 91.9 FL
MONOCYTES # BLD AUTO: 0.48 X10(3) UL (ref 0.1–1)
MONOCYTES NFR BLD AUTO: 5.9 %
NEUTROPHILS # BLD AUTO: 6.4 X10 (3) UL (ref 1.5–7.7)
NEUTROPHILS # BLD AUTO: 6.4 X10(3) UL (ref 1.5–7.7)
NEUTROPHILS NFR BLD AUTO: 78.1 %
OSMOLALITY SERPL CALC.SUM OF ELEC: 290 MOSM/KG (ref 275–295)
PLATELET # BLD AUTO: 233 10(3)UL (ref 150–450)
POTASSIUM SERPL-SCNC: 4.2 MMOL/L (ref 3.5–5.1)
RBC # BLD AUTO: 4.34 X10(6)UL
SODIUM SERPL-SCNC: 140 MMOL/L (ref 136–145)
WBC # BLD AUTO: 8.2 X10(3) UL (ref 4–11)

## 2024-06-21 PROCEDURE — 99232 SBSQ HOSP IP/OBS MODERATE 35: CPT | Performed by: STUDENT IN AN ORGANIZED HEALTH CARE EDUCATION/TRAINING PROGRAM

## 2024-06-21 RX ORDER — MELATONIN
6 NIGHTLY PRN
Status: DISCONTINUED | OUTPATIENT
Start: 2024-06-21 | End: 2024-06-24

## 2024-06-21 RX ORDER — ENOXAPARIN SODIUM 100 MG/ML
0.5 INJECTION SUBCUTANEOUS EVERY 12 HOURS SCHEDULED
Status: DISCONTINUED | OUTPATIENT
Start: 2024-06-21 | End: 2024-06-23
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
## 2024-06-21 NOTE — PROGRESS NOTES
[facility]   part of [facility]     Hospitalist Progress Note     Luis M Estrada Patient Status:  Inpatient    3/25/1965 MRN [id number]   Location [facility] 3SW-A Attending Rica Pearson MD   Hosp Day # 1 PCP SILVER GARCIA DO     Chief Complaint: Cellulitis     Subjective:     Patient  afebrile, minimal pain     Objective:    Review of Systems:   A comprehensive review of systems was completed; pertinent positive and negatives stated in subjective.    Vital signs:  Temp:  [98 °F (36.7 °C)-98.5 °F (36.9 °C)] 98 °F (36.7 °C)  Pulse:  [75-85] 78  Resp:  [16-18] 18  BP: (116-150)/(65-79) 130/75  SpO2:  [96 %-100 %] 98 %    Physical Exam:    General: No acute distress  Respiratory: No wheezes, no rhonchi  Cardiovascular: S1, S2, regular rate and rhythm  Abdomen: Soft, Non-tender, non-distended, positive bowel sounds  Neuro: No new focal deficits.   Extremities:  RLE erythema to thigh, draining wound still present    Diagnostic Data:    Labs:  Recent Labs   Lab 24  1114 24  0547   WBC 14.3* 8.2   HGB 15.5 13.6   MCV 92.9 91.9   .0 233.0       Recent Labs   Lab 24  1114 24  0547   * 97   BUN 23 14   CREATSERUM 1.02 0.83   CA 8.8 8.5   ALB 3.5  --    * 140   K 3.6 4.2    111   CO2 25.0 24.0   ALKPHO 87  --    AST 17  --    ALT 19  --    BILT 1.2  --    TP 8.4*  --        Estimated Creatinine Clearance: 89.6 mL/min (based on SCr of 0.83 mg/dL).    No results for input(s): \"TROP\", \"TROPHS\", \"CK\" in the last 168 hours.    No results for input(s): \"PTP\", \"INR\" in the last 168 hours.               Microbiology    Hospital Encounter on 24   1. Aerobic Bacterial Culture     Status: Abnormal (Preliminary result)    Collection Time: 24 11:18 AM    Specimen: Leg, right; Other   Result Value Ref Range    Aerobic Culture Result 3+ growth Staphylococcus aureus (A) N/A    Aerobic Smear No WBCs seen N/A    Aerobic Smear No organisms seen N/A          Imaging: Reviewed in Epic.    Medications:    enoxaparin  0.5 mg/kg Subcutaneous 2 times per day    vancomycin  12.5 mg/kg Intravenous Q12H       Assessment & Plan:      #RLE cellulitis  No DVT ON US, no Abscess on ct  ID on cs  IV Vanco   # h/o hydrocephalus       Rica Pearson MD    Supplementary Documentation:     Quality:  DVT Mechanical Prophylaxis:        DVT Pharmacologic Prophylaxis   Medication    enoxaparin (Lovenox) 80 MG/0.8ML SUBQ injection 80 mg                Code Status: Full Code  Cooper: No urinary catheter in place  Cooper Duration (in days):   Central line:    YAA:     Discharge is dependent on: clinical   At this point Mr. Estrada is expected to be discharge to: home    The 21st Century Cures Act makes medical notes like these available to patients in the interest of transparency. Please be advised this is a medical document. Medical documents are intended to carry relevant information, facts as evident, and the clinical opinion of the practitioner. The medical note is intended as peer to peer communication and may appear blunt or direct. It is written in medical language and may contain abbreviations or verbiage that are unfamiliar.

## 2024-06-21 NOTE — PROGRESS NOTES
INFECTIOUS DISEASE  PROGRESS NOTE            [facility]    Luis M Estrada Patient Status:  Inpatient    3/25/1965 MRN [id number]   Location [facility] 3SW-A Attending Rica Pearson MD   Hosp Day # 1 PCP SILVER GARCIA, DO     Antibiotics: Vanc #1    Subjective:  : resting comfortable    Objective:  Temp:  [98 °F (36.7 °C)-98.5 °F (36.9 °C)] 98 °F (36.7 °C)  Pulse:  [75-85] 78  Resp:  [16-18] 18  BP: (116-150)/(65-79) 130/75  SpO2:  [96 %-100 %] 98 %    General: awake alert  Vital signs:Temp:  [98 °F (36.7 °C)-98.5 °F (36.9 °C)] 98 °F (36.7 °C)  Pulse:  [75-85] 78  Resp:  [16-18] 18  BP: (116-150)/(65-79) 130/75  SpO2:  [96 %-100 %] 98 %  HEENT: Moist mucous membranes. Extraocular muscles are intact.  Neck: supple no masses  Respiratory: Non labored, symmetric excursion, normal respirations  Cardiovascular: no irregularities in rhythm  Abdomen: Soft, nontender, nondistended.   Musculoskeletal:right leg swelling, redness fading  Ulceration anterior shin serous dc  Left leg dressing in place  Labs:     COVID-19 Lab Results    COVID-19  Lab Results   Component Value Date    COVID19 Detected (A) 2024       Pro-Calcitonin  No results for input(s): \"PCT\" in the last 168 hours.    Cardiac  No results for input(s): \"TROP\", \"PBNP\" in the last 168 hours.    Creatinine Kinase  No results for input(s): \"CK\" in the last 168 hours.    Inflammatory Markers  No results for input(s): \"CRP\", \"LEYDA\", \"LDH\", \"DDIMER\" in the last 168 hours.    Recent Labs   Lab 24  0547   RBC 4.34   HGB 13.6   HCT 39.9   MCV 91.9   MCH 31.3   MCHC 34.1   RDW 14.4   NEPRELIM 6.40   WBC 8.2   .0         Recent Labs   Lab 24  1114 24  0547   * 97   BUN 23 14   CREATSERUM 1.02 0.83   CA 8.8 8.5   ALB 3.5  --    * 140   K 3.6 4.2    111   CO2 25.0 24.0   ALKPHO 87  --    AST 17  --    ALT 19  --    BILT 1.2  --    TP 8.4*  --        Vancomycin Trough (ug/mL)   Date Value   2016  6.8 (L)     Microbiology    Hospital Encounter on 06/20/24   1. Aerobic Bacterial Culture     Status: Abnormal (Preliminary result)    Collection Time: 06/20/24 11:18 AM    Specimen: Leg, right; Other   Result Value Ref Range    Aerobic Culture Result 3+ growth Staphylococcus aureus (A) N/A    Aerobic Smear No WBCs seen N/A    Aerobic Smear No organisms seen N/A           Problem list reviewed:  Patient Active Problem List   Diagnosis    Stasis dermatitis of both legs    Cellulitis    Fungal dermatitis    Chronic venous insufficiency    Decreased pulses in feet    Obesity    Cellulitis of left leg    Leukocytosis    Fever    Hyponatremia    Sepsis (HCC)    Constipation    left ankle sx  global exp 3/1/17    YUVAL (acute kidney injury) ([facility])    Cellulitis of left foot         Global exp 2-24-17 / LEFT FOOT / [facility]     Cellulitis of right leg    Lymphedema    Hydrocephalus ([facility])    Screening for malignant neoplasm of colon    Left leg cellulitis    Wound infection             ASSESSMENT/PLAN:  1. Right leg cellulitis  -admitted 6/20 for swelling, redness, weeping from shin wound, leukocytosis  Was started on vancomycin due to MRSA colonization in other leg    Erythema fading, wbc normalized  Culture from site of drainage Staph aureus S pending  CT shows no focal abscess      -continue IV vancomycin  -possible transition to PO antibiotic for discharged when improved, in the next 2-3 days         Tigre Short MD, MD  [facility]  [phone]

## 2024-06-21 NOTE — PLAN OF CARE
Patient A & O x4. VSS, on RA. Denies pain at this time. RLE red/swollen/weeping. IV abx. Up standby assist. Voiding freely. Safety measures in place. Instructed to use call light.

## 2024-06-21 NOTE — PLAN OF CARE
Assumed patient care @1500    Alert and oriented x 4. VSS; on room air. Patient denies need for pain medication. Voiding without difficulty. Tolerating regular diet. Up ad zoya. On IV vanco. Wound consult to see on Monday. POC discussed with patient. Safety precautions in place. Call light within reach.

## 2024-06-21 NOTE — PROGRESS NOTES
[facility] Pharmacy Dosing Service      Initial Pharmacokinetic Consult for Vancomycin Dosing     Luis M Estrada is a 59 year old male who is being initiated on vancomycin therapy for cellulitis.  Pharmacy has been asked to dose vancomycin by Dr. Short.  The initial treatment and monitoring approach will be steady state AUC strategy.        Weight and Temperature:    Wt Readings from Last 1 Encounters:   24 (!) 154.2 kg (340 lb)        Temp Readings from Last 1 Encounters:   24 98.3 °F (36.8 °C) (Oral)      Labs:   Recent Labs   Lab 24  1114   CREATSERUM 1.02      Estimated Creatinine Clearance: 72.9 mL/min (based on SCr of 1.02 mg/dL).     Recent Labs   Lab 24  1114   WBC 14.3*          The Pharmacokinetic Target is:     to 600 mg-h/L and trough <=15 mg/L    Renal Dosing Considerations:    None     Assessment/Plan:   Initial/Loading dose: Has received 2250 mg IV (15 mg/kg, capped at 2250 mg) x 1 loading dose.      Maintenance dose: Pharmacy will dose vancomycin at 2000 mg IV every 12 hours    Monitorin) Plan for vancomycin peak and trough to be obtained at steady state    2) Pharmacy will order SCr as clinically indicated to assess renal function.    3) Pharmacy will monitor for toxicity and efficacy, adjust vancomycin dose and/or frequency, and order vancomycin levels as appropriate per the Pharmacy and Therapeutics Committee approved protocol until discontinuation of the medication.       We appreciate the opportunity to assist in the care of this patient.     Oly Guadarrama, PharmD  2024  9:14 PM  [facility] Extension: [phone]

## 2024-06-22 LAB
CREAT BLD-MCNC: 0.83 MG/DL
EGFRCR SERPLBLD CKD-EPI 2021: 101 ML/MIN/1.73M2 (ref 60–?)
VANCOMYCIN PEAK SERPL-MCNC: 45 UG/ML (ref 30–50)
VANCOMYCIN TROUGH SERPL-MCNC: 22.2 UG/ML (ref 10–20)

## 2024-06-22 PROCEDURE — 99232 SBSQ HOSP IP/OBS MODERATE 35: CPT | Performed by: STUDENT IN AN ORGANIZED HEALTH CARE EDUCATION/TRAINING PROGRAM

## 2024-06-22 RX ORDER — DIPHENHYDRAMINE HYDROCHLORIDE 12.5 MG/5ML
12.5 SOLUTION ORAL ONCE
Status: COMPLETED | OUTPATIENT
Start: 2024-06-22 | End: 2024-06-22

## 2024-06-22 RX ORDER — VANCOMYCIN HYDROCHLORIDE
1250 EVERY 12 HOURS
Status: DISCONTINUED | OUTPATIENT
Start: 2024-06-23 | End: 2024-06-24

## 2024-06-22 NOTE — PROGRESS NOTES
[facility]   part of [facility]     Hospitalist Progress Note     Luis M Estrada Patient Status:  Inpatient    3/25/1965 MRN [id number]   Location [facility] 3SW-A Attending Rica Pearson MD   Hosp Day # 2 PCP SILVER GARCIA DO     Chief Complaint: Cellulitis     Subjective:     Patient doing well.    Objective:    Review of Systems:   A comprehensive review of systems was completed; pertinent positive and negatives stated in subjective.    Vital signs:  Temp:  [97.7 °F (36.5 °C)-98.8 °F (37.1 °C)] 97.7 °F (36.5 °C)  Pulse:  [79-83] 82  Resp:  [18-20] 20  BP: (122-143)/(72-74) 143/72  SpO2:  [95 %-98 %] 98 %    Physical Exam:    General: No acute distress  Respiratory: No wheezes, no rhonchi  Cardiovascular: S1, S2, regular rate and rhythm  Abdomen: Soft, Non-tender, non-distended, positive bowel sounds  Neuro: No new focal deficits.   Extremities:   improved RLE erythema to thigh, draining wound still present    Diagnostic Data:    Labs:  Recent Labs   Lab 24  1114 24  0547   WBC 14.3* 8.2   HGB 15.5 13.6   MCV 92.9 91.9   .0 233.0       Recent Labs   Lab 24  1114 24  0547 24  0433   * 97  --    BUN 23 14  --    CREATSERUM 1.02 0.83 0.83   CA 8.8 8.5  --    ALB 3.5  --   --    * 140  --    K 3.6 4.2  --     111  --    CO2 25.0 24.0  --    ALKPHO 87  --   --    AST 17  --   --    ALT 19  --   --    BILT 1.2  --   --    TP 8.4*  --   --        Estimated Creatinine Clearance: 89.6 mL/min (based on SCr of 0.83 mg/dL).    No results for input(s): \"TROP\", \"TROPHS\", \"CK\" in the last 168 hours.    No results for input(s): \"PTP\", \"INR\" in the last 168 hours.               [facility] Encounter on 24   1. Blood Culture     Status: None (Preliminary result)    Collection Time: 24 11:24 AM    Specimen: Blood,peripheral   Result Value Ref Range    Blood Culture Result No Growth 1 Day N/A   2. Aerobic Bacterial Culture      Status: Abnormal (Preliminary result)    Collection Time: 06/20/24 11:18 AM    Specimen: Leg, right; Other   Result Value Ref Range    Aerobic Culture Result 3+ growth Staphylococcus aureus (A) N/A    Aerobic Culture Result 1+ growth Pseudomonas aeruginosa (A) N/A    Aerobic Smear No WBCs seen N/A    Aerobic Smear No organisms seen N/A         Imaging: Reviewed in Epic.    Medications:    cefepime  2 g Intravenous Q8H    enoxaparin  0.5 mg/kg Subcutaneous 2 times per day    vancomycin  12.5 mg/kg Intravenous Q12H       Assessment & Plan:      #RLE cellulitis  No DVT ON US, no Abscess on ct  ID on cs  IV Vanco , cefepime  # h/o hydrocephalus       Rica Pearson MD    Supplementary Documentation:     Quality:  DVT Mechanical Prophylaxis:        DVT Pharmacologic Prophylaxis   Medication    enoxaparin (Lovenox) 80 MG/0.8ML SUBQ injection 80 mg                Code Status: Full Code  Cooper: No urinary catheter in place  Cooper Duration (in days):   Central line:    YAA:     Discharge is dependent on: clinical   At this point Mr. Estrada is expected to be discharge to: home    The 21st Century Cures Act makes medical notes like these available to patients in the interest of transparency. Please be advised this is a medical document. Medical documents are intended to carry relevant information, facts as evident, and the clinical opinion of the practitioner. The medical note is intended as peer to peer communication and may appear blunt or direct. It is written in medical language and may contain abbreviations or verbiage that are unfamiliar.

## 2024-06-22 NOTE — PLAN OF CARE
A&Ox4. VSS. On room air. . Ankle pumps encouraged. Tolerating diet. Last BM 6/21. Voiding. Denies pain. Compression sleeve to LLE C/D/I. RLE thomas. IV abx, cx pending. Ambulating with standby assist. Awaiting wound care team eval. Patient updated and in agreement with plan of care. Safety precautions in place. Instructed patient to call for assistance, call light within reach.

## 2024-06-22 NOTE — PROGRESS NOTES
INFECTIOUS DISEASE  PROGRESS NOTE            [facility]    Luis M Estrada Patient Status:  Inpatient    3/25/1965 MRN [id number]   [facility] 3SW-A Attending Rica Pearson MD   Hosp Day # 2 PCP SILVER GARCIA, DO     Antibiotics: Vanc #3    Subjective:  Afebrile. Blood cultures negative x 1. Patient reports that the redness and swelling and pain are better. Culture from leg growing staph aureus and pseudomonas aeruginosa. Patient tolerating vancomycin without apparent ADRs.    Objective:  Temp:  [97.7 °F (36.5 °C)-98.8 °F (37.1 °C)] 97.7 °F (36.5 °C)  Pulse:  [79-83] 82  Resp:  [18-20] 20  BP: (122-143)/(72-74) 143/72  SpO2:  [95 %-98 %] 98 %    General: awake alert  Vital signs:Temp:  [97.7 °F (36.5 °C)-98.8 °F (37.1 °C)] 97.7 °F (36.5 °C)  Pulse:  [79-83] 82  Resp:  [18-20] 20  BP: (122-143)/(72-74) 143/72  SpO2:  [95 %-98 %] 98 %  HEENT: Moist mucous membranes. Extraocular muscles are intact.  Neck: supple no masses  Respiratory: Non labored, symmetric excursion, normal respirations  Cardiovascular: no irregularities in rhythm  Abdomen: Soft, nontender, nondistended.   Musculoskeletal:right leg swelling, redness fading  Ulceration anterior shin serous dc, + warmth, see photo from today  Left leg dressing in place      Labs:     COVID-19 Lab Results    COVID-19  Lab Results   Component Value Date    COVID19 Detected (A) 2024       Pro-Calcitonin  No results for input(s): \"PCT\" in the last 168 hours.    Cardiac  No results for input(s): \"TROP\", \"PBNP\" in the last 168 hours.    Creatinine Kinase  No results for input(s): \"CK\" in the last 168 hours.    Inflammatory Markers  No results for input(s): \"CRP\", \"LEYDA\", \"LDH\", \"DDIMER\" in the last 168 hours.    Recent Labs   Lab 24  0547   RBC 4.34   HGB 13.6   HCT 39.9   MCV 91.9   MCH 31.3   MCHC 34.1   RDW 14.4   NEPRELIM 6.40   WBC 8.2   .0         Recent Labs   Lab 24  1114 24  0547 24  0433   GLU  104* 97  --    BUN 23 14  --    CREATSERUM 1.02 0.83 0.83   CA 8.8 8.5  --    ALB 3.5  --   --    * 140  --    K 3.6 4.2  --     111  --    CO2 25.0 24.0  --    ALKPHO 87  --   --    AST 17  --   --    ALT 19  --   --    BILT 1.2  --   --    TP 8.4*  --   --        Vancomycin Trough (ug/mL)   Date Value   12/12/2016 6.8 (L)     Microbiology    Hospital Encounter on 06/20/24   1. Blood Culture     Status: None (Preliminary result)    Collection Time: 06/20/24 11:24 AM    Specimen: Blood,peripheral   Result Value Ref Range    Blood Culture Result No Growth 1 Day N/A   2. Aerobic Bacterial Culture     Status: Abnormal (Preliminary result)    Collection Time: 06/20/24 11:18 AM    Specimen: Leg, right; Other   Result Value Ref Range    Aerobic Culture Result 3+ growth Staphylococcus aureus (A) N/A    Aerobic Culture Result 1+ growth Pseudomonas aeruginosa (A) N/A    Aerobic Smear No WBCs seen N/A    Aerobic Smear No organisms seen N/A           Problem list reviewed:  Patient Active Problem List   Diagnosis    Stasis dermatitis of both legs    Cellulitis    Fungal dermatitis    Chronic venous insufficiency    Decreased pulses in feet    Obesity    Cellulitis of left leg    Leukocytosis    Fever    Hyponatremia    Sepsis (HCC)    Constipation    left ankle sx  global exp 3/1/17    YUVAL (acute kidney injury) ([facility])    Cellulitis of left foot         Global exp 2-24-17 / LEFT FOOT / [facility]     Cellulitis of right leg    Lymphedema    Hydrocephalus ([facility])    Screening for malignant neoplasm of colon    Left leg cellulitis    Wound infection             ASSESSMENT/PLAN:  1. Right leg cellulitis  -admitted 6/20 for swelling, redness, weeping from shin wound, leukocytosis  Was started on vancomycin due to MRSA colonization in other leg    Erythema fading, wbc normalized  Still very edematous and warm on exam this morning   Culture from site of drainage Staph aureus S pending and pseudomonas aeruginosa S pending   CT shows  no focal abscess    -continue IV vancomycin  -start cefepime for pseudomonas in culture  -possible transition to PO antibiotic for discharged when improved, in the next 2-3 days       [facility]

## 2024-06-22 NOTE — PROGRESS NOTES
Assumed patient care at 1530. Patient A&Ox4, VSS on RA. Wound to RLE intact and open to air. Spandagrip to LLE intact. Dressing noted to LLE wound. Plan to continue IV antibiotics and DC home when medically cleared. Plan of care reviewed with patient. Patient demonstrates understanding.

## 2024-06-22 NOTE — PROGRESS NOTES
[facility] Pharmacy Dosing Service      Follow Up Pharmacokinetic Consult for Vancomycin Dosing     Luis M Estrada is a 59 year old male who is receiving vancomycin therapy for cellulitis. Patient is on day 3 of vancomycin and is currently receiving 2000 mg IV every 12 hours. The current treatment and monitoring approach is steady state AUC strategy.        Weight and Temperature:    Wt Readings from Last 1 Encounters:   24 (!) 154.2 kg (340 lb)         Temp Readings from Last 1 Encounters:   24 97.7 °F (36.5 °C) (Oral)      Labs:   Recent Labs   Lab 24  1114 24  0547 24  0433   CREATSERUM 1.02 0.83 0.83      Estimated Creatinine Clearance: 89.6 mL/min (based on SCr of 0.83 mg/dL).     Recent Labs   Lab 24  1114 24  0547   WBC 14.3* 8.2        Vancomycin Levels:  Lab Results   Component Value Date/Time    VANCT 22.2 (H) 2024 12:03 PM    VANCP 45.0 2024 04:34 AM       Corresponding 24 h-AUC:  828 mg-h/L     The Pharmacokinetic Target is:     to 600 mg-h/L and trough <=15 mg/L    Renal Dosing Considerations:    None     Assessment/Plan:   Maintenance Regimen: hold dose given AUC >600 and Tr=22.2. with next dose, Adjust vancomycin to 1250 mg IV every 12 hours.  The predicted AUC and trough with this new regimen are 518 mg-h/L and 12.5 mg/L, respectively    Monitorin) Plan for vancomycin peak and trough to be obtained at steady state    2) Pharmacy will order SCr as clinically indicated to assess renal function.    3) Pharmacy will monitor for toxicity and efficacy, adjust vancomycin dose and/or frequency, and order vancomycin levels as appropriate per the Pharmacy and Therapeutics Committee approved protocol until discontinuation of the medication.       We appreciate the opportunity to assist in the care of this patient.     Gosia RENTERIA RPH  2024  3:15 PM  [facility] Extension: [phone]

## 2024-06-22 NOTE — SPIRITUAL CARE NOTE
Spiritual Care Visit Note    Patient Name: Luis M Estrada Date of Spiritual Care Visit: 24   : 3/25/1965 Primary Dx: Cellulitis of right leg       Referred By:      Spiritual Care Taxonomy:    Intended Effects: Establish rapport and connectedness    Methods: Collaborate with care team member;Offer support    Interventions: Acknowledge current situation;Active listening;Ask guided questions;Ask guided questions about deirdre;Ask questions to bring forth feelings;Identify supportive relationship(s);Invite someone to reminisce;Prayer for healing    Visit Type/Summary:  Offered an empathic presence for Luis M to feel comfortable in expressing his thoughts/concerns.   Patient has been thru life experiences related to grief/loss that he expressed.  Listened as he spoke of important relationships in his life and how they have been affected by circumstances.  Luis M is grateful for a number of friends that are very supportive and also family.  He spoke of a close connection with God/his deirdre.     - Spiritual Care: Responded to a request for spiritual care and assessed the patient for spiritual care needs. Consulted with RN prior to visit. Offered empathic listening and emotional support. Provided information regarding how to contact Spiritual Care and left a Spiritual Care information card. Provided support for Patient's spiritual/Rastafarian requests.  remains available for follow up.    Spiritual Care support can be requested via an Epic consult. For urgent/immediate needs, please contact the On Call  at: Jamie: ext [phone]

## 2024-06-23 LAB
CREAT BLD-MCNC: 1 MG/DL
EGFRCR SERPLBLD CKD-EPI 2021: 87 ML/MIN/1.73M2 (ref 60–?)
UFH PPP CHRO-ACNC: 0.62 IU/ML

## 2024-06-23 PROCEDURE — 99232 SBSQ HOSP IP/OBS MODERATE 35: CPT | Performed by: STUDENT IN AN ORGANIZED HEALTH CARE EDUCATION/TRAINING PROGRAM

## 2024-06-23 RX ORDER — ENOXAPARIN SODIUM 100 MG/ML
60 INJECTION SUBCUTANEOUS EVERY 12 HOURS SCHEDULED
Status: DISCONTINUED | OUTPATIENT
Start: 2024-06-23 | End: 2024-06-24

## 2024-06-23 NOTE — PLAN OF CARE
Patient A & O x4. VSS, on RA. Denies pain at this time. RLE red/swollen/weeping. IV abx. Up standby assist. C/o itching, benadryl given. Voiding freely. Safety measures in place. Instructed to use call light.

## 2024-06-23 NOTE — PLAN OF CARE
A&Ox4. VSS. On room air. . Ankle pumps encouraged. Tolerating diet. Last BM 6/22. Voiding. Denies pain. Compression sleeve to LLE C/D/I. RLE thomas. IV abx. Ambulating with standby assist. Awaiting wound care team eval. Patient updated and in agreement with plan of care. Safety precautions in place. Instructed patient to call for assistance, call light within reach

## 2024-06-23 NOTE — PROGRESS NOTES
[facility]   part of [facility]     Hospitalist Progress Note     Luis M Estrada Patient Status:  Inpatient    3/25/1965 MRN [id number]   Location [facility] 3SW-A Attending Rica Pearson MD   Hosp Day # 3 PCP SILVER GARCIA DO     Chief Complaint: Cellulitis     Subjective:     Patient afebrile    Objective:    Review of Systems:   A comprehensive review of systems was completed; pertinent positive and negatives stated in subjective.    Vital signs:  Temp:  [97.7 °F (36.5 °C)-98.1 °F (36.7 °C)] 98 °F (36.7 °C)  Pulse:  [66-97] 72  Resp:  [20-22] 20  BP: (126-136)/(71-87) 126/87  SpO2:  [95 %-99 %] 95 %    Physical Exam:    General: No acute distress  Respiratory: No wheezes, no rhonchi  Cardiovascular: S1, S2, regular rate and rhythm  Abdomen: Soft, Non-tender, non-distended, positive bowel sounds  Neuro: No new focal deficits.   Extremities:   improved RLE erythema to thigh, draining wound still present    Diagnostic Data:    Labs:  Recent Labs   Lab 24  1114 24  0547   WBC 14.3* 8.2   HGB 15.5 13.6   MCV 92.9 91.9   .0 233.0       Recent Labs   Lab 24  1114 24  0547 24  0433 24  0437   * 97  --   --    BUN 23 14  --   --    CREATSERUM 1.02 0.83 0.83 1.00   CA 8.8 8.5  --   --    ALB 3.5  --   --   --    * 140  --   --    K 3.6 4.2  --   --     111  --   --    CO2 25.0 24.0  --   --    ALKPHO 87  --   --   --    AST 17  --   --   --    ALT 19  --   --   --    BILT 1.2  --   --   --    TP 8.4*  --   --   --        Estimated Creatinine Clearance: 74.4 mL/min (based on SCr of 1 mg/dL).    No results for input(s): \"TROP\", \"TROPHS\", \"CK\" in the last 168 hours.    No results for input(s): \"PTP\", \"INR\" in the last 168 hours.               Microbiology    Hospital Encounter on 24   1. Blood Culture     Status: None (Preliminary result)    Collection Time: 24 11:24 AM    Specimen: Blood,peripheral   Result Value Ref Range     Blood Culture Result No Growth 2 Days N/A   2. Aerobic Bacterial Culture     Status: Abnormal    Collection Time: 06/20/24 11:18 AM    Specimen: Leg, right; Other   Result Value Ref Range    Aerobic Culture Result 3+ growth Staphylococcus aureus (A) N/A    Aerobic Culture Result 1+ growth Pseudomonas aeruginosa (A) N/A    Aerobic Smear No WBCs seen N/A    Aerobic Smear No organisms seen N/A       Susceptibility    Staphylococcus aureus -  (no method available)     Cefazolin  Sensitive      Clindamycin <=0.25 Sensitive      Erythromycin <=0.25 Sensitive      Gentamicin <=0.5 Sensitive      Levofloxacin 0.25 Sensitive      Oxacillin <=0.25 Sensitive      Trimethoprim/Sulfa <=10 Sensitive      Vancomycin 1 Sensitive     Pseudomonas aeruginosa -  (no method available)     Cefepime <=2 Sensitive      Ceftazidime 2 Sensitive      Ciprofloxacin <=1 Sensitive      Meropenem <=1 Sensitive      Levofloxacin 0.5 Sensitive      Piperacillin + Tazobactam <=4 Sensitive      Tobramycin 2 Sensitive          Imaging: Reviewed in Epic.    Medications:    cefepime  2 g Intravenous Q8H    vancomycin  1,250 mg Intravenous Q12H    enoxaparin  0.5 mg/kg Subcutaneous 2 times per day       Assessment & Plan:      #RLE cellulitis  No DVT ON US, no Abscess on ct  ID on cs  IV Vanco , cefepime  # h/o hydrocephalus       Rica Pearson MD    Supplementary Documentation:     Quality:  DVT Mechanical Prophylaxis:        DVT Pharmacologic Prophylaxis   Medication    enoxaparin (Lovenox) 80 MG/0.8ML SUBQ injection 80 mg                Code Status: Full Code  Cooper: No urinary catheter in place  Cooper Duration (in days):   Central line:    YAA:     Discharge is dependent on: clinical   At this point Mr. Estrada is expected to be discharge to: home    The 21st Century Cures Act makes medical notes like these available to patients in the interest of transparency. Please be advised this is a medical document. Medical documents are intended to carry  relevant information, facts as evident, and the clinical opinion of the practitioner. The medical note is intended as peer to peer communication and may appear blunt or direct. It is written in medical language and may contain abbreviations or verbiage that are unfamiliar.

## 2024-06-24 VITALS
WEIGHT: 315 LBS | SYSTOLIC BLOOD PRESSURE: 132 MMHG | RESPIRATION RATE: 17 BRPM | DIASTOLIC BLOOD PRESSURE: 85 MMHG | TEMPERATURE: 98 F | HEIGHT: 67 IN | HEART RATE: 75 BPM | BODY MASS INDEX: 49.44 KG/M2 | OXYGEN SATURATION: 94 %

## 2024-06-24 LAB
CREAT BLD-MCNC: 0.67 MG/DL
EGFRCR SERPLBLD CKD-EPI 2021: 108 ML/MIN/1.73M2 (ref 60–?)

## 2024-06-24 PROCEDURE — 99239 HOSP IP/OBS DSCHRG MGMT >30: CPT | Performed by: STUDENT IN AN ORGANIZED HEALTH CARE EDUCATION/TRAINING PROGRAM

## 2024-06-24 RX ORDER — CEPHALEXIN 500 MG/1
500 CAPSULE ORAL 3 TIMES DAILY
Qty: 45 CAPSULE | Refills: 0 | Status: SHIPPED | OUTPATIENT
Start: 2024-06-24 | End: 2024-07-09

## 2024-06-24 NOTE — DISCHARGE INSTRUCTIONS
Wound care to left leg  Cleanse with normal saline daily or as needed  Apply xeroform, ABD pads and spandagrip  Change daily or as needed      Wound care to right leg  Cleanse with normal saline daily or as needed  Apply honey gel, ABD pad, kerlix, and spandagrip

## 2024-06-24 NOTE — PLAN OF CARE
Pt A&Ox4, VSS on RA. Pt denies pain at this time. Dressing to LLE C/D/I; RLE open to air and weeping. Up SBA with the rolling walker, voiding via urinal. Plan to continue IV abx and woundcare to see. Call light in reach, safety measures in place.

## 2024-06-24 NOTE — CONSULTS
[facility]  Report of Inpatient Wound Care Consultation    Luis M Estrada Patient Status:  Inpatient    3/25/1965 MRN [id number]   Location [facility] 3SW-A Attending Rica Pearson MD   Hosp Day # 4 PCP SILVER GARCIA,      Reason for Consultation:  LLE wound. RLE cellulitis/weeping.     History of Present Illness:  Luis M Estrada is a a(n) 59 year old male. Patient seen with preceptor wound care Physical Therapy.  States he follows at the clinic with Dr Gould, and that he was hospitalized [place] to cellulitis. Patient with multiple comorbidities, with skin breakdown described below.       History:  Past Medical History:    Cellulitis    to left leg, seeking treatment at wound care for months    Hydrocephalus (HCC)    dx'd as an adult-no shunt     History reviewed. No pertinent surgical history.   reports that he has never smoked. He has never used smokeless tobacco. He reports current alcohol use. He reports that he does not use drugs.      Allergies:  @ALLERGY    Laboratory Data:    Recent Labs   Lab 24  1114 24  0547 24  0433 24  0437 24  0517   WBC 14.3* 8.2  --   --   --    HGB 15.5 13.6  --   --   --    HCT 45.5 39.9  --   --   --    .0 233.0  --   --   --    CREATSERUM 1.02 0.83 0.83 1.00 0.67*   BUN 23 14  --   --   --    * 97  --   --   --    CA 8.8 8.5  --   --   --    ALB 3.5  --   --   --   --    TP 8.4*  --   --   --   --          ASSESSMENT:  Wound 24 #1 Leg Left (Active)   Date First Assessed/Time First Assessed: 24 1406    Wound Number (Wound Clinic Only): #1  Primary Wound Type: Traumatic  Location: Leg  Wound Location Orientation: Left      Assessments 2024 10:29 AM   Wound Image     Site Assessment Red   Drainage Amount Moderate   Drainage Description Serosanguineous   Dressing Xeroform;ABD Pad;Kerlix roll   Dressing Changed Changed   Dressing Status Clean;Dry;Intact   Wound Length (cm) 3.9 cm   Wound Width (cm) 1  cm   Wound Surface Area (cm^2) 3.9 cm^2   Wound Depth (cm) 0.1 cm   Wound Volume (cm^3) 0.39 cm^3   Wound Healing % 98   Margins Well-defined edges   Non-staged Wound Description Full thickness   Leslie-wound Assessment Blanchable erythema;Edema;Hemosiderin staining   Wound Granulation Tissue Red;Firm   Wound Bed Granulation (%) 90 %   Wound Bed Slough (%) 10 %   Wound Odor None   Tunneling? No   Undermining? No   Sinus Tracts? No       Wound 06/20/24 #2 Leg Right (Active)   Date First Assessed/Time First Assessed: 06/20/24 0832    Wound Number (Wound Clinic Only): #2  Primary Wound Type: Venous Ulcer  Location: Leg  Wound Location Orientation: Right      Assessments 6/24/2024 10:27 AM   Wound Image     Site Assessment Yellow   Drainage Amount Moderate   Drainage Description Serous   Treatments Honey Gel   Dressing ABD Pad;Kerlix roll   Dressing Changed Changed   Dressing Status Clean;Dry;Intact   Wound Length (cm) 0.7 cm   Wound Width (cm) 1.1 cm   Wound Surface Area (cm^2) 0.77 cm^2   Wound Depth (cm) 0.1 cm   Wound Volume (cm^3) 0.077 cm^3   Wound Healing % 20   Margins Well-defined edges   Non-staged Wound Description Full thickness   Leslie-wound Assessment Edema;Blanchable erythema;Hemosiderin staining   Wound Bed Granulation (%) 10 %   Wound Bed Slough (%) 90 %   Wound Odor None   Tunneling? No   Undermining? No   Sinus Tracts? No     Capillary refill: <3  Pedal pulses: palpable  Edema : +2 = Moderate pitting, indentation subsides rapidly    Wound Cleaning and Dressings:  Right leg  Wound cleansing:  normal saline  Wound cleaning frequency: Daily and PRN  Wound product: Honey gel, abd, kerlix, spanda  G  Dressing change frequency:  Change dressing daily and/or PRN    Wound Cleaning and Dressings:  Left leg leg  Wound cleansing:  normal saline  Wound cleaning frequency: Daily and PRN  Wound product: xeroform, abd, kerlix, spanda  G  Dressing change frequency:  Change dressing daily and/or PRN      If  patient is Diabetic: want to make sure blood sugars are within a controled range for wound healing     Protein intake: depending on providers recommendations and patients kidney functions - if kidneys are good then recommend patient to increase protein intake (Boost, Bob, Ensure, Premiere Protein)       Recommendations: follow up at the clinic upon discharge    Thank you for this consultation and for allowing me to participate in the care of your patient.  Please call [phone] if you have any questions about this consultation and plan of care.     Time Spent 30 Minutes.    Thank you,  Kelby Salamanca RN  Wound/Ostomy/Continence nurse    6/24/2024  11:21 AM

## 2024-06-24 NOTE — PROGRESS NOTES
[facility]   part of [facility]     Hospitalist Progress Note     Luis M Estrada Patient Status:  Inpatient    3/25/1965 MRN [id number]   Location [place] 3SW-A Attending Rica Pearson MD   Hosp Day # 4 PCP SILVER GARCIA DO     Chief Complaint: Cellulitis     Subjective:     Patient afebrile, overall no pain.    Objective:    Review of Systems:   A comprehensive review of systems was completed; pertinent positive and negatives stated in subjective.    Vital signs:  Temp:  [97.8 °F (36.6 °C)-98.4 °F (36.9 °C)] 97.8 °F (36.6 °C)  Pulse:  [69-75] 72  Resp:  [16-20] 16  BP: (132-143)/(78-90) 132/87  SpO2:  [90 %-98 %] 90 %    Physical Exam:    General: No acute distress  Respiratory: No wheezes, no rhonchi  Cardiovascular: S1, S2, regular rate and rhythm  Abdomen: Soft, Non-tender, non-distended, positive bowel sounds  Neuro: No new focal deficits.   Extremities:   improved RLE erythema to thigh, draining wound still present    Diagnostic Data:    Labs:  Recent Labs   Lab 24  1114 24  0547   WBC 14.3* 8.2   HGB 15.5 13.6   MCV 92.9 91.9   .0 233.0       Recent Labs   Lab 24  1114 24  0547 24  0433 24  0437 24  0517   * 97  --   --   --    BUN 23 14  --   --   --    CREATSERUM 1.02 0.83 0.83 1.00 0.67*   CA 8.8 8.5  --   --   --    ALB 3.5  --   --   --   --    * 140  --   --   --    K 3.6 4.2  --   --   --     111  --   --   --    CO2 25.0 24.0  --   --   --    ALKPHO 87  --   --   --   --    AST 17  --   --   --   --    ALT 19  --   --   --   --    BILT 1.2  --   --   --   --    TP 8.4*  --   --   --   --        Estimated Creatinine Clearance: 111 mL/min (A) (based on SCr of 0.67 mg/dL (L)).    No results for input(s): \"TROP\", \"TROPHS\", \"CK\" in the last 168 hours.    No results for input(s): \"PTP\", \"INR\" in the last 168 hours.               Microbiology    Hospital Encounter on 24   1. Blood Culture     Status: None  (Preliminary result)    Collection Time: 06/20/24 11:24 AM    Specimen: Blood,peripheral   Result Value Ref Range    Blood Culture Result No Growth 3 Days N/A   2. Aerobic Bacterial Culture     Status: Abnormal    Collection Time: 06/20/24 11:18 AM    Specimen: Leg, right; Other   Result Value Ref Range    Aerobic Culture Result 3+ growth Staphylococcus aureus (A) N/A    Aerobic Culture Result 1+ growth Pseudomonas aeruginosa (A) N/A    Aerobic Smear No WBCs seen N/A    Aerobic Smear No organisms seen N/A       Susceptibility    Staphylococcus aureus -  (no method available)     Cefazolin  Sensitive      Clindamycin <=0.25 Sensitive      Erythromycin <=0.25 Sensitive      Gentamicin <=0.5 Sensitive      Levofloxacin 0.25 Sensitive      Oxacillin <=0.25 Sensitive      Trimethoprim/Sulfa <=10 Sensitive      Vancomycin 1 Sensitive     Pseudomonas aeruginosa -  (no method available)     Cefepime <=2 Sensitive      Ceftazidime 2 Sensitive      Ciprofloxacin <=1 Sensitive      Meropenem <=1 Sensitive      Levofloxacin 0.5 Sensitive      Piperacillin + Tazobactam <=4 Sensitive      Tobramycin 2 Sensitive          Imaging: Reviewed in Epic.    Medications:    ceFAZolin  2 g Intravenous Q8H    enoxaparin  60 mg Subcutaneous 2 times per day       Assessment & Plan:      #RLE cellulitis  No DVT ON US, no Abscess on ct  ID on cs  IV Vanco , cefepime  Wound care  # h/o hydrocephalus       Rica Pearson MD    Supplementary Documentation:     Quality:  DVT Mechanical Prophylaxis:        DVT Pharmacologic Prophylaxis   Medication    enoxaparin (Lovenox) 60 MG/0.6ML SUBQ injection 60 mg                Code Status: Full Code  Cooper: No urinary catheter in place  Cooper Duration (in days):   Central line:    YAA: 6/24/2024    Discharge is dependent on: clinical   At this point Mr. Estrada is expected to be discharge to: home    The 21st Century Cures Act makes medical notes like these available to patients in the interest of  transparency. Please be advised this is a medical document. Medical documents are intended to carry relevant information, facts as evident, and the clinical opinion of the practitioner. The medical note is intended as peer to peer communication and may appear blunt or direct. It is written in medical language and may contain abbreviations or verbiage that are unfamiliar.

## 2024-06-24 NOTE — PLAN OF CARE
Patient A&Ox4, VSS on RA, . Patient reports itchiness and pain to LUE, right hand, and right thigh. Redness and hives noted. ID and hosp made aware. No new orders at this time. Denies any SOB or difficulty breathing. Voiding freely. LMB 6/22. IV abx changed to IV ancef. Plan to see wound care today and DC on PO abx per ID. Plan of care reviewed with patient. Patient demonstrates understanding.

## 2024-06-24 NOTE — PROGRESS NOTES
Discharge instructions including medications, follow-up appointments, and self care expectations reviewed with patient. PIV removed. All questions answered at this time. Patient demonstrates understanding.

## 2024-06-24 NOTE — PROGRESS NOTES
INFECTIOUS DISEASE  PROGRESS NOTE            [facility]    Luis M Estrada Patient Status:  Inpatient    3/25/1965 MRN [id number]   [facility] 3SW-A Attending Rica Pearson MD   Hosp Day # 4 PCP SILVER GARCIA, DO     Antibiotics: Vanc #4-dc  Cefazolin #0    Subjective:  improved    Objective:  Temp:  [97.8 °F (36.6 °C)-98.4 °F (36.9 °C)] 97.8 °F (36.6 °C)  Pulse:  [69-75] 72  Resp:  [16-20] 16  BP: (132-143)/(78-90) 132/87  SpO2:  [90 %-98 %] 90 %    General: awake alert  Vital signs:Temp:  [97.8 °F (36.6 °C)-98.4 °F (36.9 °C)] 97.8 °F (36.6 °C)  Pulse:  [69-75] 72  Resp:  [16-20] 16  BP: (132-143)/(78-90) 132/87  SpO2:  [90 %-98 %] 90 %  HEENT: Moist mucous membranes. Extraocular muscles are intact.  Neck: supple no masses  Respiratory: Non labored, symmetric excursion, normal respirations  Cardiovascular: no irregularities in rhythm  Abdomen: Soft, nontender, nondistended.   Musculoskeletal:compression wrap  Pictures reviewed    COVID-19 Lab Results    COVID-19  Lab Results   Component Value Date    COVID19 Detected (A) 2024       Pro-Calcitonin  No results for input(s): \"PCT\" in the last 168 hours.    Cardiac  No results for input(s): \"TROP\", \"PBNP\" in the last 168 hours.    Creatinine Kinase  No results for input(s): \"CK\" in the last 168 hours.    Inflammatory Markers  No results for input(s): \"CRP\", \"LEYDA\", \"LDH\", \"DDIMER\" in the last 168 hours.    Recent Labs   Lab 24  0547   RBC 4.34   HGB 13.6   HCT 39.9   MCV 91.9   MCH 31.3   MCHC 34.1   RDW 14.4   NEPRELIM 6.40   WBC 8.2   .0         Recent Labs   Lab 24  1114 24  0547 24  0433 24  0437 24  0517   * 97  --   --   --    BUN 23 14  --   --   --    CREATSERUM 1.02 0.83 0.83 1.00 0.67*   CA 8.8 8.5  --   --   --    ALB 3.5  --   --   --   --    * 140  --   --   --    K 3.6 4.2  --   --   --     111  --   --   --    CO2 25.0 24.0  --   --   --    ALKPHO 87  --    --   --   --    AST 17  --   --   --   --    ALT 19  --   --   --   --    BILT 1.2  --   --   --   --    TP 8.4*  --   --   --   --        Vancomycin Trough (ug/mL)   Date Value   06/22/2024 22.2 (H)     Microbiology    Hospital Encounter on 06/20/24   1. Blood Culture     Status: None (Preliminary result)    Collection Time: 06/20/24 11:24 AM    Specimen: Blood,peripheral   Result Value Ref Range    Blood Culture Result No Growth 3 Days N/A   2. Aerobic Bacterial Culture     Status: Abnormal    Collection Time: 06/20/24 11:18 AM    Specimen: Leg, right; Other   Result Value Ref Range    Aerobic Culture Result 3+ growth Staphylococcus aureus (A) N/A    Aerobic Culture Result 1+ growth Pseudomonas aeruginosa (A) N/A    Aerobic Smear No WBCs seen N/A    Aerobic Smear No organisms seen N/A       Susceptibility    Staphylococcus aureus -  (no method available)     Cefazolin  Sensitive      Clindamycin <=0.25 Sensitive      Erythromycin <=0.25 Sensitive      Gentamicin <=0.5 Sensitive      Levofloxacin 0.25 Sensitive      Oxacillin <=0.25 Sensitive      Trimethoprim/Sulfa <=10 Sensitive      Vancomycin 1 Sensitive     Pseudomonas aeruginosa -  (no method available)     Cefepime <=2 Sensitive      Ceftazidime 2 Sensitive      Ciprofloxacin <=1 Sensitive      Meropenem <=1 Sensitive      Levofloxacin 0.5 Sensitive      Piperacillin + Tazobactam <=4 Sensitive      Tobramycin 2 Sensitive            Problem list reviewed:  Patient Active Problem List   Diagnosis    Stasis dermatitis of both legs    Cellulitis    Fungal dermatitis    Chronic venous insufficiency    Decreased pulses in feet    Obesity    Cellulitis of left leg    Leukocytosis    Fever    Hyponatremia    Sepsis (HCC)    Constipation    left ankle sx  global exp 3/1/17    YUVAL (acute kidney injury) ([facility])    Cellulitis of left foot         Global exp 2-24-17 / LEFT FOOT / [facility]     Cellulitis of right leg    Lymphedema    Hydrocephalus ([facility])    Screening for  malignant neoplasm of colon    Left leg cellulitis    Wound infection             ASSESSMENT/PLAN:  1. Right leg cellulitis  -admitted 6/20 for swelling, redness, weeping from shin wound, leukocytosis  Was started on vancomycin due to MRSA colonization in other leg    Culture from wound MSSA and PSA (cw surface colonization)    Cefepime DC  Vancomycin replaced with cefazolin today  DC planning, po keflex        Tigre Short MD, MD [facility]  [phone]

## 2024-06-25 ENCOUNTER — TELEPHONE (OUTPATIENT)
Dept: FAMILY MEDICINE CLINIC | Facility: CLINIC | Age: 59
End: 2024-06-25

## 2024-06-25 ENCOUNTER — PATIENT OUTREACH (OUTPATIENT)
Dept: CASE MANAGEMENT | Age: 59
End: 2024-06-25

## 2024-06-25 DIAGNOSIS — L03.115 CELLULITIS OF RIGHT LEG: Primary | ICD-10-CM

## 2024-06-25 DIAGNOSIS — Z02.9 ENCOUNTERS FOR ADMINISTRATIVE PURPOSE: ICD-10-CM

## 2024-06-25 NOTE — PAYOR COMM NOTE
--------------  CONTINUED STAY REVIEW    Payor: [employer] PPO  Subscriber #:  [id number]  Authorization Number: [id number]    Admit date: 6/20/24  Admit time:  2:35 PM    6/22 Infectious Disease    Antibiotics: Vanc #3     Subjective:  Afebrile. Blood cultures negative x 1. Patient reports that the redness and swelling and pain are better. Culture from leg growing staph aureus and pseudomonas aeruginosa. Patient tolerating vancomycin without apparent ADRs.     Objective:  Temp:  [97.7 °F (36.5 °C)-98.8 °F (37.1 °C)] 97.7 °F (36.5 °C)  Pulse:  [79-83] 82  Resp:  [18-20] 20  BP: (122-143)/(72-74) 143/72  SpO2:  [95 %-98 %] 98 %     General: awake alert  Vital signs:Temp:  [97.7 °F (36.5 °C)-98.8 °F (37.1 °C)] 97.7 °F (36.5 °C)  Pulse:  [79-83] 82  Resp:  [18-20] 20  BP: (122-143)/(72-74) 143/72  SpO2:  [95 %-98 %] 98 %  HEENT: Moist mucous membranes. Extraocular muscles are intact.  Neck: supple no masses  Respiratory: Non labored, symmetric excursion, normal respirations  Cardiovascular: no irregularities in rhythm  Abdomen: Soft, nontender, nondistended.   Musculoskeletal:right leg swelling, redness fading  Ulceration anterior shin serous dc, + warmth, see photo from today  Left leg dressing in place   Labs:      COVID-19 Lab Results     COVID-19        Lab Results   Component Value Date     COVID19 Detected (A) 01/02/2024          Recent Labs   Lab 06/21/24  0547   RBC 4.34   HGB 13.6   HCT 39.9   MCV 91.9   MCH 31.3   MCHC 34.1   RDW 14.4   NEPRELIM 6.40   WBC 8.2   .0                  Recent Labs   Lab 06/20/24  1114 06/21/24  0547 06/22/24  0433   * 97  --    BUN 23 14  --    CREATSERUM 1.02 0.83 0.83   CA 8.8 8.5  --    ALB 3.5  --   --    * 140  --    K 3.6 4.2  --     111  --    CO2 25.0 24.0  --    ALKPHO 87  --   --    AST 17  --   --    ALT 19  --   --    BILT 1.2  --   --    TP 8.4*  --   --              Vancomycin Trough (ug/mL)   Date Value   12/12/2016 6.8 (L)       Microbiology           Hospital Encounter on 06/20/24   1. Blood Culture     Status: None (Preliminary result)     Collection Time: 06/20/24 11:24 AM     Specimen: Blood,peripheral   Result Value Ref Range     Blood Culture Result No Growth 1 Day N/A   2. Aerobic Bacterial Culture     Status: Abnormal (Preliminary result)     Collection Time: 06/20/24 11:18 AM     Specimen: Leg, right; Other   Result Value Ref Range     Aerobic Culture Result 3+ growth Staphylococcus aureus (A) N/A     Aerobic Culture Result 1+ growth Pseudomonas aeruginosa (A) N/A     Aerobic Smear No WBCs seen N/A     Aerobic Smear No organisms seen N/A               Problem list reviewed:      Patient Active Problem List   Diagnosis    Stasis dermatitis of both legs    Cellulitis    Fungal dermatitis    Chronic venous insufficiency    Decreased pulses in feet    Obesity    Cellulitis of left leg    Leukocytosis    Fever    Hyponatremia    Sepsis (HCC)    Constipation    left ankle sx  global exp 3/1/17    YUVAL (acute kidney injury) ([facility])    Cellulitis of left foot         Global exp 2-24-17 / LEFT FOOT / [facility]     Cellulitis of right leg    Lymphedema    Hydrocephalus ([facility])    Screening for malignant neoplasm of colon    Left leg cellulitis    Wound infection                  ASSESSMENT/PLAN:  1. Right leg cellulitis  -admitted 6/20 for swelling, redness, weeping from shin wound, leukocytosis  Was started on vancomycin due to MRSA colonization in other leg     Erythema fading, wbc normalized  Still very edematous and warm on exam this morning   Culture from site of drainage Staph aureus S pending and pseudomonas aeruginosa S pending   CT shows no focal abscess     -continue IV vancomycin  -start cefepime for pseudomonas in culture  -possible transition to PO antibiotic for discharged when improved, in the next 2-3 days         [facility]                 Attestation signed by Gideon Winston MD at  6/22/2024  9:45 PM     Patient examined and assessed. Agree with above.                   Cosigned by: Gideon Winston MD at 6/22/2024  9:45 PM       6/23    Chief Complaint: Cellulitis         Subjective:  Patient afebrile           Objective:  Review of Systems:   A comprehensive review of systems was completed; pertinent positive and negatives stated in subjective.     Vital signs:  Temp:  [97.7 °F (36.5 °C)-98.1 °F (36.7 °C)] 98 °F (36.7 °C)  Pulse:  [66-97] 72  Resp:  [20-22] 20  BP: (126-136)/(71-87) 126/87  SpO2:  [95 %-99 %] 95 %     Physical Exam:    General: No acute distress  Respiratory: No wheezes, no rhonchi  Cardiovascular: S1, S2, regular rate and rhythm  Abdomen: Soft, Non-tender, non-distended, positive bowel sounds  Neuro: No new focal deficits.   Extremities:   improved RLE erythema to thigh, draining wound still present     Diagnostic Data:    Labs:       Recent Labs   Lab 06/20/24  1114 06/21/24  0547   WBC 14.3* 8.2   HGB 15.5 13.6   MCV 92.9 91.9   .0 233.0                Recent Labs   Lab 06/20/24  1114 06/21/24  0547 06/22/24  0433 06/23/24  0437   * 97  --   --    BUN 23 14  --   --    CREATSERUM 1.02 0.83 0.83 1.00   CA 8.8 8.5  --   --    ALB 3.5  --   --   --    * 140  --   --    K 3.6 4.2  --   --     111  --   --    CO2 25.0 24.0  --   --    ALKPHO 87  --   --   --    AST 17  --   --   --    ALT 19  --   --   --    BILT 1.2  --   --   --    TP 8.4*  --   --   --          Estimated Creatinine Clearance: 74.4 mL/min (based on SCr of 1 mg/dL).     No results for input(s): \"TROP\", \"TROPHS\", \"CK\" in the last 168 hours.     No results for input(s): \"PTP\", \"INR\" in the last 168 hours.                 Microbiology             Hospital Encounter on 06/20/24   1. Blood Culture     Status: None (Preliminary result)     Collection Time: 06/20/24 11:24 AM     Specimen: Blood,peripheral   Result Value Ref Range     Blood Culture Result No Growth 2 Days N/A   2. Aerobic  Bacterial Culture     Status: Abnormal     Collection Time: 06/20/24 11:18 AM     Specimen: Leg, right; Other   Result Value Ref Range     Aerobic Culture Result 3+ growth Staphylococcus aureus (A) N/A     Aerobic Culture Result 1+ growth Pseudomonas aeruginosa (A) N/A     Aerobic Smear No WBCs seen N/A     Aerobic Smear No organisms seen N/A       Susceptibility     Staphylococcus aureus -  (no method available)       Cefazolin   Sensitive         Clindamycin <=0.25 Sensitive         Erythromycin <=0.25 Sensitive         Gentamicin <=0.5 Sensitive         Levofloxacin 0.25 Sensitive         Oxacillin <=0.25 Sensitive         Trimethoprim/Sulfa <=10 Sensitive         Vancomycin 1 Sensitive       Pseudomonas aeruginosa -  (no method available)       Cefepime <=2 Sensitive         Ceftazidime 2 Sensitive         Ciprofloxacin <=1 Sensitive         Meropenem <=1 Sensitive         Levofloxacin 0.5 Sensitive         Piperacillin + Tazobactam <=4 Sensitive         Tobramycin 2 Sensitive              Imaging: Reviewed in Epic.     Medications:   Scheduled Medications    cefepime  2 g Intravenous Q8H    vancomycin  1,250 mg Intravenous Q12H    enoxaparin  0.5 mg/kg Subcutaneous 2 times per day                  Assessment & Plan:  #RLE cellulitis  No DVT ON US, no Abscess on ct  ID on cs  IV Vanco , cefepime  # h/o hydrocephalus         Rica Paerson MD           Supplementary Documentation:  Quality:  DVT Mechanical Prophylaxis:            DVT Pharmacologic Prophylaxis   Medication    enoxaparin (Lovenox) 80 MG/0.8ML SUBQ injection 80 mg                 Code Status: Full Code  Cooper: No urinary catheter in place  Cooper Duration (in days):   Central line:    YAA:      Discharge is dependent on: clinical   At this point Mr. Estrada is expected to be discharge to: home                 Electronically signed by Rica Pearson MD at 6/23/2024 11:22 AM

## 2024-06-25 NOTE — TELEPHONE ENCOUNTER
JEYSON, Spoke to patient for TCM today.  Patient states that he is following up with wound care and did not feel a follow up with PCP was needed at this time but will call back if anything changes.  TCM appointment recommended by 7/8/24 as patient is a Moderate risk for readmission.      BOOK BY DATE (last date for TCM): 7/8/24

## 2024-06-25 NOTE — PROGRESS NOTES
Initial Post Discharge Follow Up   Discharge Date: 6/24/24  Contact Date: 6/25/2024    Consent Verification:  Assessment Completed With: Patient  HIPAA Verified?  Yes    Discharge Dx:   Cellulitis of right leg     General:   How have you been since your discharge from the hospital?  I'm doing okay. Right leg looking a lot better. I'm taking the antibiotics.   Do you have any pain since discharge?  No    When you were leaving the hospital were your discharge instructions reviewed with you? Yes  How well were your discharge instructions explained to you?   On a scale of 1-5   1- Very Poor and 5- Very well   Very Well  Do you have any questions about your discharge instructions?  No  Before leaving the hospital was your diagnoses explained to you? Yes  Do you have any questions about your diagnoses? No  Are you able to perform normal daily activities of living as you have prior to your hospital stay (dressing, bathing, ambulating to the bathroom, etc)? yes  (NCM) Was patient given a different diet per AVS? no    Medications:   Current Outpatient Medications   Medication Sig Dispense Refill    cephalexin (KEFLEX) 500 MG Oral Cap Take 1 capsule (500 mg total) by mouth 3 (three) times daily for 15 days. 45 capsule 0    aspirin 81 MG Oral Tab Take 1 tablet (81 mg total) by mouth daily. (Patient not taking: Reported on 6/20/2024)       Were there any changes to your current medication(s) noted on the AVS? Yes  If so, were these medication changes discussed with you prior to leaving the hospital? Yes  If a new medication was prescribed:    Was the new medication's purpose & side effects reviewed? Yes  Do you have any questions about your new medication? No  Did you  your discharge medications when you left the hospital? Yes  Let's go over your medications together to make sure we are not missing anything. Medications Reviewed  Are there any reasons that keep you from taking your medication as prescribed? No  Are you  having any concerns with constipation? No      Discharge medications reviewed/discussed/and reconciled against outpatient medications with patient.  Any changes or updates to medications sent to PCP.  Patient Acknowledged     Referrals/orders at D/C:  Referrals/orders placed at D/C? no    DME ordered at D/C? No      Discharge orders, AVS reviewed and discussed with patient. Any changes or updates to orders sent to PCP.  Patient Acknowledged      SDOH:   Transportation Needs: No Transportation Needs (6/20/2024)    Transportation Needs     Lack of Transportation: No     Car Seat: Not on file     Financial Resource Strain: Low Risk  (1/8/2024)    Financial Resource Strain     Difficulty of Paying Living Expenses: Not very hard     Med Affordability: No       Follow up appointments:      Your appointments       Date & Time Appointment Department ([place])    Jun 27, 2024 9:00 AM CDT Wound Care Follow up with Nicholas Gould MD [facility] ([facility])        Jul 11, 2024 9:00 AM CDT Wound Care Follow up with Nicholas Gould MD [facility] ([facility])        Jul 18, 2024 9:00 AM CDT Wound Care Follow up with Nicholas Gould MD [facility] ([facility])        Jul 25, 2024 9:00 AM CDT Wound Care Follow up with Nicholas Gould MD [facility] ([facility])              [facility]  [address] [address]  [phone]            TCC  Was TCC ordered: No      PCP (If no TCC appointment)  Does patient already have a PCP appointment scheduled? No  NCM Attempted to schedule PCP office TCM appointment with patient   If no appointment scheduled: Explain-pt states that he is doing fine and following up with wound care, NCM sent TE to PCP office.     Specialist    Does  the patient have any other follow up appointment(s) needing to be scheduled? No, pt has wound care scheduled already for 6/27      Is there any reason as to why you cannot make your appointment(s)?  No     Needs post D/C:   Now that you are home, are there any needs or concerns you need addressed before your next visit with your PCP?  (DME, meds, questions, etc.): No    Interventions by NCM:   NCM reviewed discharge instructions and when to seek medical attention with the patient. He states that he is doing good. NCM instructed on s/s of infection; he v/u and states that his right leg is looking a lot better. He denies having any pain. He denies having any fever, n/v/c/d, sob, pain, lightheadedness, HA Or any new or worsening symptoms. Med review completed. He denied having any questions or concerns at this time.       [facility] referral placed:    No    BOOK BY DATE: 7/8/24

## 2024-06-25 NOTE — DISCHARGE SUMMARY
[facility]     Luis M Estrada Patient Status:  Inpatient    3/25/1965 MRN [id number]   Location [facility] 3SW-A Attending No att. providers found   Hosp Day # 4 PCP SILVER GARCIA DO     Date of Admission: 2024  Date of Discharge:  2024     Discharge Disposition: Home or Self Care    Discharge Diagnosis:  #RLE cellulitis  No DVT ON US, no Abscess on ct  ID on cs  IV Vanco , cefepime  Wound care  # h/o hydrocephalus     History of Present Illness:   Luis M Estrada is a 59 year old male with h/o hydrocephalus. Pt was at wound care clinic for regular follow up of his chronic LLE wound 2/2 shopping cart trauma. The patient reports that at his visit his R leg was noted ot be red- he denies pain, trauma, fever, chills, malaise.     Brief Synopsis:   Pt was admitted for RLE celluitis per cx treated with vanco cefepime pending CX results- MSSA and PSA ( colonization)- then pt was changed to ancef DC on PO keflex.    Lace+ Score: 54  59-90 High Risk  29-58 Medium Risk  0-28   Low Risk       TCM Follow-Up Recommendation:  LACE > 58: High Risk of readmission after discharge from the hospital.      Procedures during hospitalization:   Doppler RLE no DVT     Incidental or significant findings and recommendations (brief descriptions):  no    Lab/Test results pending at Discharge:   no    Consultants:  ID    Discharge Medication List:     Discharge Medications        START taking these medications        Instructions Prescription details   cephalexin 500 MG Caps  Commonly known as: Keflex      Take 1 capsule (500 mg total) by mouth 3 (three) times daily for 15 days.   Stop taking on: 2024  Quantity: 45 capsule  Refills: 0            STOP taking these medications      amoxicillin clavulanate 875-125 MG Tabs  Commonly known as: Augmentin        levoFLOXacin 500 MG Tabs  Commonly known as: Levaquin               ASK your doctor about these medications        Instructions  Prescription details   aspirin 81 MG Tabs      Take 1 tablet (81 mg total) by mouth daily.   Refills: 0               Where to Get Your Medications        These medications were sent to [id number] - [place], [place] - [address] [phone], [phone]  [address] [place] [address]      Phone: [phone]   cephalexin 500 MG Caps         [place] n[place]    Follow-up appointment:   Nader Ibrahim DO  [address] [place] [address]  [facility] [address]  [phone]    Schedule an appointment as soon as possible for a visit in 1 week(s)      Appointments for Next 30 Days 6/25/2024 - 7/25/2024        Date Arrival Time Visit Type Length Department Provider     6/27/2024  9:00 AM   FOLLOW UP [4295] 30 min [facility] Nicholas Gould MD    Patient Instructions:         Location Instructions:     Your appointment is scheduled at [facility]. The address is&nbsp; [address] [place] [address]. To reach Registration, park in [facility]. Go through the entrance doors located on the ground floor. Veer left past the Information Desk and proceed to the Wound Care Center.  Masks are optional for all patients and visitors, unless otherwise indicated.               7/11/2024  9:00 AM   FOLLOW UP [0465] 30 min [facility] Nicholas Gould MD    Patient Instructions:         Location Instructions:     Your appointment is scheduled at [facility]. The address is&nbsp; [address] [place] [address]. To reach Registration, park in [facility]. Go through the entrance doors located on the ground floor. Veer left past the Information Desk and proceed to the Wound Care Center.  Masks are optional for all patients and visitors, unless otherwise indicated.               7/18/2024  9:00 AM   FOLLOW UP [6524] 30 min [facility] Nicholas Gould MD    Patient Instructions:         Location Instructions:     Your  appointment is scheduled at [facility]. The address is&nbsp; [address] [place] [address]. To reach Registration, park in the [facility]. Go through the entrance doors located on the ground floor. Veer left past the Information Desk and proceed to the Wound Care Center.  Masks are optional for all patients and visitors, unless otherwise indicated.               2024  9:00 AM  WC FOLLOW UP [5168] 30 min [facility] Nicholas Gould MD    Patient Instructions:         Location Instructions:     Your appointment is scheduled at [facility]. The address is&nbsp; [address] [facility]. To reach Registration, park in [facility]. Go through the entrance doors located on the ground floor. Veer left past the Information Desk and proceed to the Wound Care Center.  Masks are optional for all patients and visitors, unless otherwise indicated.                      Vital signs:       Physical Exam:    General: No acute distress   Lungs: clear to auscultation  Cardiovascular: S1, S2  Abdomen: Soft      -----------------------------------------------------------------------------------------------  PATIENT DISCHARGE INSTRUCTIONS: See electronic chart    Rica Pearson MD    Total time spent on discharge plannin minutes     The  [employer] Cures Act makes medical notes like these available to patients in the interest of transparency. Please be advised this is a medical document. Medical documents are intended to carry relevant information, facts as evident, and the clinical opinion of the practitioner. The medical note is intended as peer to peer communication and may appear blunt or direct. It is written in medical language and may contain abbreviations or verbiage that are unfamiliar.

## 2024-06-27 ENCOUNTER — OFFICE VISIT (OUTPATIENT)
Dept: WOUND CARE | Facility: HOSPITAL | Age: 59
End: 2024-06-27
Attending: FAMILY MEDICINE

## 2024-06-27 ENCOUNTER — APPOINTMENT (OUTPATIENT)
Dept: WOUND CARE | Facility: HOSPITAL | Age: 59
End: 2024-06-27
Attending: FAMILY MEDICINE

## 2024-06-27 VITALS
HEART RATE: 79 BPM | TEMPERATURE: 98 F | DIASTOLIC BLOOD PRESSURE: 83 MMHG | RESPIRATION RATE: 16 BRPM | SYSTOLIC BLOOD PRESSURE: 134 MMHG

## 2024-06-27 DIAGNOSIS — E66.01 CLASS 3 SEVERE OBESITY DUE TO EXCESS CALORIES WITH SERIOUS COMORBIDITY AND BODY MASS INDEX (BMI) OF 45.0 TO 49.9 IN ADULT (HCC): ICD-10-CM

## 2024-06-27 DIAGNOSIS — L03.115 CELLULITIS OF RIGHT LOWER EXTREMITY: ICD-10-CM

## 2024-06-27 DIAGNOSIS — I87.333 CHRONIC VENOUS HYPERTENSION (IDIOPATHIC) WITH ULCER AND INFLAMMATION OF BILATERAL LOWER EXTREMITY (HCC): Primary | ICD-10-CM

## 2024-06-27 PROCEDURE — 99215 OFFICE O/P EST HI 40 MIN: CPT | Performed by: FAMILY MEDICINE

## 2024-06-27 PROCEDURE — 17250 CHEM CAUT OF GRANLTJ TISSUE: CPT | Performed by: FAMILY MEDICINE

## 2024-06-27 NOTE — PATIENT INSTRUCTIONS
PATIENT INSTRUCTIONS      FOR Luis M RODRIGUEZ [place] , [place] 3/25/1965    DATE OF SERVICE: 2024      Hydrofera Blue Transfer  Kerramax  Coflex 2 layer compression wrap to BOTH lower extremities      Follow up with nurse visit next  and Dr. Gould     Cut the compression wraps off after 7 days if you cannot come in for the nurse visit.       Managing your edema:    Avoid prolonged standing in one place. It is better to have your calf muscles moving to pump fluid out of the legs.  Elevate leg(s) above the level of the heart when sitting or as much as possible.  Take you diuretics as directed by your physician. Do not skip doses or change doses unless instructed to do so by your physician.  Decrease salt intake, follow recommended 2 grams daily.  Do not get leg(s) with compression wrap wet. If wraps are too tight as indicated by pain, numbness/tingling or discoloration of toes remove wrap completely and call the wound center @ [phone].       The treatment plan has been discussed at length between you and your provider. Follow all instructions carefully, it is very important. If you do not follow all instructions you are at risk of your wound not healing, infection, possible loss of limb and even loss of life.    COMPRESSION WRAP PATIENT CARE INSTRUCTIONS  DO NOT get compression wrap wet. When showering, use a shower boot.   Please contact wound clinic and if not able to reach, then go to emergency room if ANY of the following occur:   Tingling or numbness in the foot or toes on leg with compression wrap.  Severe pain that cannot be relieved with elevation and any medication instructed per your doctor.  A fever of 100.4°F (38°C) or higher.  Swelling, coldness, or blue-gray discoloration of the toes.  If the compression wrap feels too tight or too loose.  If the compression wrap is damaged or has rough edges that hurt.  If the compression wrap gets wet, you must cut wrap off.   Drainage from  compression wrap that smells different than usual.

## 2024-06-27 NOTE — PROGRESS NOTES
Weekly Wound Education Note    Teaching Provided To: Patient  Training Topics: Discharge instructions;Dressing;Edema control;Cleasing and general instructions;Compression  Training Method: Demonstration;Explain/Verbal  Training Response: Reinforcement needed        Notes: Pt here for follow up wound care visit to left lateral lower leg wound, Patient was discharged from hospital two days ago ( ER 6/20/24-6/24/24) and is currently taking Cephalexin (stop date 7/9/24) . Patient states he has no pain. Patient arrives with Spandagrip compression, regular socks and shoes on. Edema measurement decreased, both wound measurements decreased. Provider debrided left lateral leg wound at visit. Treatment changed to hydrofera ready to right lower leg wrapped in coflex 2 layer wrap, and left lateral lower leg wound hydrofera transfer, ABD pad, wrapped RLE in coflex 2 layer wrap. Pt to follow up for nure visit on Tuesday and provider visit in 2 weeks. Educated patient if he is unable to comet to nurse visit to remove wrap in 1 week on July 4th. Educated patient how to apply dressing at home and to wear compression stocking daily if wrap has to be removed. Pt states understanding. RN provided pt with extra (2) spandagrip (F) for BLE.

## 2024-06-27 NOTE — PROGRESS NOTES
Chief Complaint   Patient presents with    Wound Care     Patient arrives for a wound care follow up appointment. Patient was discharged from hospital two days ago and is currently taking Cephalexin. Patient states he has no pain. Patient arrives with Spandagrip compression, regular socks and shoes on.        HPI:     Patient here for follow-up of left lateral leg wound and cellulitis of the right lower extremity.  Since last visit he was hospitalized and treated for cellulitis of the right lower extremity.  He is doing much better with that.  There is no redness at all on the right lower extremity now.  It was severe at last visit.  He did receive IV antibiotics and now is on oral cephalexin.  Denies any fever or chills.  Tolerating compression wrap to the left lower extremity and the wounds are getting better.    Lab Results   Component Value Date    BUN 14 06/21/2024    CREATSERUM 0.67 (L) 06/24/2024    ALB 3.5 06/20/2024    TP 8.4 (H) 06/20/2024    A1C 5.2 12/12/2016         Current Outpatient Medications:     cephalexin (KEFLEX) 500 MG Oral Cap, Take 1 capsule (500 mg total) by mouth 3 (three) times daily for 15 days., Disp: 45 capsule, Rfl: 0    aspirin 81 MG Oral Tab, Take 1 tablet (81 mg total) by mouth daily., Disp: , Rfl:     No Known Allergies         REVIEW OF SYSTEMS:     Review of Systems (ROS)  This information was obtained from the patient, chart    A comprehensive 10 point review of systems was completed.  Pertinent positives and negatives noted in the the HPI.     Past medical, surgical, family and social history updated where appropriate.    PHYSICAL EXAM:   /83   Pulse 79   Temp 98.2 °F (36.8 °C)   Resp 16    Estimated body mass index is 53.25 kg/m² as calculated from the following:    Height as of 6/20/24: 67\".    Weight as of 6/20/24: 340 lb (154.2 kg).   Vital signs reviewed.Appears stated age, well groomed.      Awake, alert, in no acute distress  HENT:  normocephalic, atraumatic,  external ear canals clear bilaterally, tympanic membranes appear opaque, non-bulging, non-erythematous, nasal turbinates are non-inflamed and not swollen, oropharynx without erythema, swelling, or exudate, gingiva and lips without any apparent lesions.   Cardiac:  S1 S2 regular rate and rhythm, no murmur, gallop, or rub  Respiratory:  Bilaterally clear to auscultation, no chest tenderness to palpation, no wheezing, no rhonchi, no rales, air entry is adequate, no accessory respiratory muscle use, no chest asymmetry, normal excursion.  GI:  bowel sound positive in all four quadrants, abdomen is soft, non-tender, non-distended, no hepatosplenomegaly, no abnormal aortic pulsation.     Calf  Point of Measurement - Left Calf: 37  Point of Measurement - Right Calf: 37  Left Calf from:: Heel  Calf Left cm:: 46.5  Right Calf from:: Heel  Right Calf cm:: 49    Ankle  Point of Measurement - Left Ankle: 10  Point of Measurement - Right Ankle: 10  Left Ankle from:: Heel  Left Ankle cm:: 30.1     Right Ankle from:: Heel  Right Ankle cm:: 31       Foot                            Wound 01/11/24 #1 Leg Left (Active)   Date First Assessed/Time First Assessed: 01/11/24 1406    Wound Number (Wound Clinic Only): #1  Primary Wound Type: Traumatic  Location: Leg  Wound Location Orientation: Left      Assessments 1/11/2024  2:10 PM 6/27/2024  8:47 AM   Wound Image       Drainage Amount Large Small   Drainage Description Yellow;Serous Serosanguineous   Treatments Compression --   Wound Length (cm) 10.6 cm 3.4 cm   Wound Width (cm) 9.5 cm 1 cm   Wound Surface Area (cm^2) 100.7 cm^2 3.4 cm^2   Wound Depth (cm) 0.2 cm 0.1 cm   Wound Volume (cm^3) 20.14 cm^3 0.34 cm^3   Wound Healing % -- 98   Margins Well-defined edges Well-defined edges   Non-staged Wound Description Full thickness Full thickness   Leslie-wound Assessment Edema;Hemosiderin staining Edema;Hemosiderin staining   Wound Granulation Tissue Firm;Pink Red;Spongy   Wound Bed  Granulation (%) 40 % 50 %   Wound Bed Epithelium (%) -- 40 %   Wound Bed Slough (%) 60 % 10 %   Wound Odor None None   Shape -- bridge   Tunneling? No --   Undermining? No --   Sinus Tracts? No --       Inactive Orders   Date Order Priority Status Authorizing Provider   06/24/24 1127 Wound care Routine Discontinued Rica Pearson MD   06/06/24 0913 Debridement Traumatic Routine Completed Nicholas Gould MD   05/16/24 0901 Debridement Traumatic Routine Completed Nicholas Gould MD   05/02/24 1520 Debridement Traumatic Routine Completed Nicholas Gould MD   04/11/24 0959 Debridement Traumatic Routine Completed Nicholas Gould MD   04/04/24 0915 Debridement Traumatic Left;Lateral Leg Routine Completed Nicholas Gould MD   02/15/24 1154 Debridement Traumatic Left;Lateral Leg Routine Completed Nicholas Gould MD   01/18/24 1511 Debridement Traumatic Left;Lateral Leg Routine Completed Nicholas Gould MD   01/11/24 1710 Debridement Traumatic Left;Lateral Leg Routine Completed Nicholas Gould MD       Wound 06/20/24 #2 Leg Right (Active)   Date First Assessed/Time First Assessed: 06/20/24 0832    Wound Number (Wound Clinic Only): #2  Primary Wound Type: Venous Ulcer  Location: Leg  Wound Location Orientation: Right      Assessments 6/20/2024  8:34 AM 6/27/2024  8:49 AM   Wound Image       Drainage Amount Copious Small   Drainage Description Clear Yellow;Serous   Wound Length (cm) 0.8 cm 0.6 cm   Wound Width (cm) 1.2 cm 0.6 cm   Wound Surface Area (cm^2) 0.96 cm^2 0.36 cm^2   Wound Depth (cm) 0.1 cm 0.1 cm   Wound Volume (cm^3) 0.096 cm^3 0.036 cm^3   Wound Healing % -- 63   Margins Well-defined edges Well-defined edges   Non-staged Wound Description Full thickness Full thickness   Leslie-wound Assessment Hemosiderin staining;Blanchable erythema;Edema;Moist Edema;Hemosiderin staining   Wound Granulation Tissue Pink;Spongy Pale Grey;Pink;Spongy   Wound Bed Granulation (%) 50 % 100 %   Wound Bed Slough (%) 50 % --    Wound Odor None None       Inactive Orders   Date Order Priority Status Authorizing Provider   06/24/24 1127 Wound care Routine Discontinued Rica Pearson MD   06/21/24 0997 Consult to Wound Ostomy Routine Completed Rica Pearson MD     - Reason for Consult:    Wound Care     - Wound Care Reason for Consult:    pt sees wound clinic for LLE wound. Now with RLE cellulitis/weeping.          ASSESSMENT AND PLAN:      1. Chronic venous hypertension (idiopathic) with ulcer and inflammation of bilateral lower extremity (HCC)    2. Cellulitis of right lower extremity    3. Class 3 severe obesity due to excess calories with serious comorbidity and body mass index (BMI) of 45.0 to 49.9 in adult (HCC)    There is a very small wound on the right anterior leg which has some epithelium over it and is somewhat macerated.  Will apply Hydrofera Blue dressing.  To the left lateral leg wound there was some hypergranulation treated with silver nitrate.  Will continue with Hydrofera Blue dressing and Coflex 2 layer compression wrap to the left lower extremity.  Will also institute Coflex 2 layer compression wrap to the right lower extremity.  Patient to have a nurse visit next week as directed and follow-up with me in 2 weeks.  Patient informed of importance of not leaving the wrap on for more than 7 days and that it should be cut off if it is longer than that.  Patient voiced understanding.  Risks, benefits, and alternatives of current treatment plan discussed in detail.  Questions and concerns addressed. Red flags to RTC or ED reviewed.  Patient (or parent) agrees to plan.      No follow-ups on file.    Also refer to patient instructions.     I spent a total of 60 minutes with this patient's care.  This time included preparing to see the patient (eg, review notes and recent diagnostics), seeing the patient, performing a medically appropriate examination and/or evaluation, counseling and educating the patient, and documenting in  the record.          Nicholas Gould M.D.   [facility]   6/27/2024    There are no Patient Instructions on file for this visit.  MISCELLANEOUS INSTRUCTIONS  Supplement with a daily multivitamin   Low salt diet  Intense blood sugar control - Goal Blood sugar below 180 at all times recommended.  Increase protein intake / consider protein supplements - see below  Elevate extremities at all times when sitting / laying down.  No tobacco use     DIETARY MODIFICATIONS TO HELP WITH WOUND HEALING:          Please follow any restrictions to diet given by your other doctors     Protein: meats, beans, eggs, milk and yogurt particularly Greek yogurt), tofu, soy nuts, soy protein products     Vitamin C: citrus fruits and juices, strawberries, tomatoes, tomato juice, peppers, baked potatoes, spinach, broccoli, cauliflower, [place] sprouts, cabbage     Vitamin A: dark greens, leafy vegetables, orange or yellow vegetables, cantaloupe, fortified dairy products, liver, fortified cereals     Zinc: fortified cereals, red meats, seafood     Consider Bob by [employer] (These are essential branch chain amino acids that help with tissue building and wound healing) and take 2 packets/day. You can order online at [employer] or [employer]     ADDITIONAL REMINDERS:     The treatment plan has been discussed at length with you and your provider. Follow all instructions carefully, it is very important. If you do not follow all instructions, you are at risk of your wound not healing, infection, possible loss of limb and even loss of life.  Please call the clinic at [phone] during regular business hours ( 7:30 AM - 5:30 PM) if you notice increased redness, warmth, pain or pus like drainage or start running a fever greater than 100.3.    For after hour emergencies, please call your primary physician or go to the nearest emergency room.  If your blood pressure is elevated, follow up with your primary care doctor and/or  cardiologist as soon as possible.     FOR LEG SWELLING,  LOWER EXTREMITY WOUNDS AND IF USING COMPRESSION:   Managing your edema:    Avoid prolonged standing in one place. It is better to have your calf muscles moving to pump fluid out of the legs.  Elevate leg(s) above the level of the heart when sitting or as much as possible.  Take you diuretics as directed by your physician. Do not skip doses or change doses unless instructed to do so by your physician.  Decrease salt intake, follow recommended 2 grams daily.  Do not get leg(s) with compression wrap wet. If wraps are too tight as indicated by pain, numbness/tingling or discoloration of toes remove wrap completely and call the wound center @ [phone].       The treatment plan has been discussed at length between you and your provider. Follow all instructions carefully, it is very important. If you do not follow all instructions you are at risk of your wound not healing, infection, possible loss of limb and even loss of life.    COMPRESSION WRAP PATIENT CARE INSTRUCTIONS  DO NOT get compression wrap wet. When showering, use a shower boot.   Please contact wound clinic and if not able to reach, then go to emergency room if ANY of the following occur:   Tingling or numbness in the foot or toes on leg with compression wrap.  Severe pain that cannot be relieved with elevation and any medication instructed per your doctor.  A fever of 100.4°F (38°C) or higher.  Swelling, coldness, or blue-gray discoloration of the toes.  If the compression wrap feels too tight or too loose.  If the compression wrap is damaged or has rough edges that hurt.  If the compression wrap gets wet, you must cut wrap off.   Drainage from compression wrap that smells different than usual.

## 2024-07-02 ENCOUNTER — OFFICE VISIT (OUTPATIENT)
Dept: WOUND CARE | Facility: HOSPITAL | Age: 59
End: 2024-07-02
Attending: FAMILY MEDICINE
Payer: COMMERCIAL

## 2024-07-02 VITALS
HEART RATE: 74 BPM | DIASTOLIC BLOOD PRESSURE: 87 MMHG | RESPIRATION RATE: 16 BRPM | SYSTOLIC BLOOD PRESSURE: 142 MMHG | TEMPERATURE: 98 F

## 2024-07-02 DIAGNOSIS — L03.115 CELLULITIS OF RIGHT LOWER EXTREMITY: ICD-10-CM

## 2024-07-02 DIAGNOSIS — I87.333 CHRONIC VENOUS HYPERTENSION (IDIOPATHIC) WITH ULCER AND INFLAMMATION OF BILATERAL LOWER EXTREMITY (HCC): Primary | ICD-10-CM

## 2024-07-02 DIAGNOSIS — S81.802D OPEN WOUND OF LEFT LOWER LEG, SUBSEQUENT ENCOUNTER: ICD-10-CM

## 2024-07-02 PROCEDURE — 29581 APPL MULTLAYER CMPRN SYS LEG: CPT

## 2024-07-02 NOTE — PROGRESS NOTES
Chief Complaint   Patient presents with    Wound Care     Patient is here for a RN visit. He denies any pain or new wound concerns.           Current Outpatient Medications:     cephalexin (KEFLEX) 500 MG Oral Cap, Take 1 capsule (500 mg total) by mouth 3 (three) times daily for 15 days., Disp: 45 capsule, Rfl: 0    aspirin 81 MG Oral Tab, Take 1 tablet (81 mg total) by mouth daily., Disp: , Rfl:     No Known Allergies       HISTORY:     Past medical, surgical, family and social history updated where appropriate.    PHYSICAL EXAM:   /87   Pulse 74   Temp 98.3 °F (36.8 °C)   Resp 16        Vital signs reviewed.      Calf  Point of Measurement - Left Calf: 37  Point of Measurement - Right Calf: 37  Left Calf from:: Heel  Calf Left cm:: 47  Right Calf from:: Heel  Right Calf cm:: 46    Ankle  Point of Measurement - Left Ankle: 10  Point of Measurement - Right Ankle: 10  Left Ankle from:: Heel  Left Ankle cm:: 28.6     Right Ankle from:: Heel  Right Ankle cm:: 30.3       Wound 01/11/24 #1 Leg Left (Active)   Date First Assessed/Time First Assessed: 01/11/24 1406    Wound Number (Wound Clinic Only): #1  Primary Wound Type: Traumatic  Location: Leg  Wound Location Orientation: Left      Assessments 1/11/2024  2:10 PM 7/2/2024  9:18 AM   Wound Image       Drainage Amount Large Small   Drainage Description Yellow;Serous Serosanguineous   Treatments Compression Compression   Wound Length (cm) 10.6 cm 3 cm   Wound Width (cm) 9.5 cm 0.6 cm   Wound Surface Area (cm^2) 100.7 cm^2 1.8 cm^2   Wound Depth (cm) 0.2 cm 0.1 cm   Wound Volume (cm^3) 20.14 cm^3 0.18 cm^3   Wound Healing % -- 99   Margins Well-defined edges --   Non-staged Wound Description Full thickness Full thickness   Leslie-wound Assessment Edema;Hemosiderin staining Edema;Hemosiderin staining;Dry   Wound Granulation Tissue Firm;Pink Firm;Pink;Red   Wound Bed Granulation (%) 40 % 50 %   Wound Bed Epithelium (%) -- 50 %   Wound Bed Slough (%) 60 % --   Wound  Odor None None   Shape -- bridged   Tunneling? No No   Undermining? No No   Sinus Tracts? No No       Inactive Orders   Date Order Priority Status Authorizing Provider   06/24/24 1127 Wound care Routine Discontinued Rica Pearson MD   06/06/24 0913 Debridement Traumatic Routine Completed Nicholas Gould MD   05/16/24 0901 Debridement Traumatic Routine Completed Nicholas Gould MD   05/02/24 1520 Debridement Traumatic Routine Completed Nicholas Gould MD   04/11/24 0959 Debridement Traumatic Routine Completed Nicholas Gould MD   04/04/24 0915 Debridement Traumatic Left;Lateral Leg Routine Completed Nicholas Gould MD   02/15/24 1154 Debridement Traumatic Left;Lateral Leg Routine Completed Nicholas Gould MD   01/18/24 1511 Debridement Traumatic Left;Lateral Leg Routine Completed Nicholas Gould MD   01/11/24 1710 Debridement Traumatic Left;Lateral Leg Routine Completed Nicholas Gould MD       Wound 06/20/24 #2 Leg Right (Active)   Date First Assessed/Time First Assessed: 06/20/24 0832    Wound Number (Wound Clinic Only): #2  Primary Wound Type: Venous Ulcer  Location: Leg  Wound Location Orientation: Right      Assessments 6/20/2024  8:34 AM 7/2/2024  9:19 AM   Wound Image       Drainage Amount Copious Moderate   Drainage Description Clear Yellow;Serous   Treatments -- Compression   Wound Length (cm) 0.8 cm 0.4 cm   Wound Width (cm) 1.2 cm 0.5 cm   Wound Surface Area (cm^2) 0.96 cm^2 0.2 cm^2   Wound Depth (cm) 0.1 cm 0.1 cm   Wound Volume (cm^3) 0.096 cm^3 0.02 cm^3   Wound Healing % -- 79   Margins Well-defined edges Well-defined edges   Non-staged Wound Description Full thickness Full thickness   Leslie-wound Assessment Hemosiderin staining;Blanchable erythema;Edema;Moist Edema;Hemosiderin staining   Wound Granulation Tissue Pink;Spongy Firm;Pink   Wound Bed Granulation (%) 50 % 50 %   Wound Bed Slough (%) 50 % 50 %   Wound Odor None None   Tunneling? -- No   Undermining? -- No   Sinus Tracts? -- No        Inactive Orders   Date Order Priority Status Authorizing Provider   06/24/24 1127 Wound care Routine Discontinued Rica Pearson MD   06/21/24 0942 Consult to Wound Ostomy Routine Completed Rica Pearson MD     - Reason for Consult:    Wound Care     - Wound Care Reason for Consult:    pt sees wound clinic for LLE wound. Now with RLE cellulitis/weeping.       Compression Wrap 06/27/24 Leg Anterior;Left (Active)   Placement Date/Time: 06/27/24 1000   Location: Leg  Wound Location Orientation: Anterior;Left      Assessments 6/27/2024  8:47 AM 7/2/2024  9:18 AM   Response to Treatment Well tolerated Well tolerated   Compression Layers Multilayer 2   Compression Product Type Coflex Coflex   Dressing Applied Yes Yes   Compression Wrap Location Toes to Knee Toes to Knee   Compression Wrap Status Clean;Dry;Intact Clean;Dry;Intact       No associated orders.       Compression Wrap 06/27/24 Leg Anterior;Right (Active)   Placement Date/Time: 06/27/24 1000   Location: Leg  Wound Location Orientation: Anterior;Right      Assessments 6/27/2024  8:47 AM 7/2/2024  9:18 AM   Response to Treatment Well tolerated Well tolerated   Compression Layers Multilayer 2   Compression Product Type Coflex Coflex   Dressing Applied Yes Yes   Compression Wrap Location Toes to Knee Toes to Knee   Compression Wrap Status Clean;Dry;Intact Clean;Dry;Intact       No associated orders.          ASSESSMENT AND PLAN:        Risks, benefits, and alternatives of current treatment plan discussed in detail.  Questions and concerns addressed. Red flags to RTC or ED reviewed.  Patient (or parent) agrees to plan.      No follow-ups on file.  Weekly Wound Education Note    Teaching Provided To: Patient  Training Topics: Discharge instructions;Cleasing and general instructions;Dressing;Edema control;Compression  Training Method: Explain/Verbal;Demonstration  Training Response: Reinforcement needed        Notes: Pt here for nurse visit to right lower leg and  left lateral lower leg wounds. Edema measurement decreased, both wound measurements decreased. Left lateral leg treatment changed to folded xeroform (due to periwound/wound being dry), gauze dressing, right lower leg changed to hydrofera transfer, gauze (drainage increased with only hydrofera ready). Wrapped BLE in coflex 2 layer wraps. Pt to follow up with provider on Thursday 7/11/24.               Shawn HOWE RN   7/2/2024  9:32 AM

## 2024-07-11 ENCOUNTER — OFFICE VISIT (OUTPATIENT)
Dept: WOUND CARE | Facility: HOSPITAL | Age: 59
End: 2024-07-11
Attending: FAMILY MEDICINE
Payer: COMMERCIAL

## 2024-07-11 VITALS
HEART RATE: 76 BPM | TEMPERATURE: 98 F | RESPIRATION RATE: 16 BRPM | DIASTOLIC BLOOD PRESSURE: 82 MMHG | SYSTOLIC BLOOD PRESSURE: 127 MMHG

## 2024-07-11 DIAGNOSIS — I87.333 CHRONIC VENOUS HYPERTENSION (IDIOPATHIC) WITH ULCER AND INFLAMMATION OF BILATERAL LOWER EXTREMITY (HCC): Primary | ICD-10-CM

## 2024-07-11 DIAGNOSIS — E66.01 CLASS 3 SEVERE OBESITY DUE TO EXCESS CALORIES WITH SERIOUS COMORBIDITY AND BODY MASS INDEX (BMI) OF 45.0 TO 49.9 IN ADULT (HCC): ICD-10-CM

## 2024-07-11 PROCEDURE — 17250 CHEM CAUT OF GRANLTJ TISSUE: CPT | Performed by: FAMILY MEDICINE

## 2024-07-11 PROCEDURE — 99215 OFFICE O/P EST HI 40 MIN: CPT | Performed by: FAMILY MEDICINE

## 2024-07-11 NOTE — PATIENT INSTRUCTIONS
PATIENT INSTRUCTIONS      FOR Luis M Lomasd ,  3/25/1965    DATE OF SERVICE: 2024      Hydrofera Blue Transfer  Kerramax  Coflex 2 layer compression wrap to BOTH lower extremities    Follow up with Dr. Gould in 1 week      Managing your edema:    Avoid prolonged standing in one place. It is better to have your calf muscles moving to pump fluid out of the legs.  Elevate leg(s) above the level of the heart when sitting or as much as possible.  Take you diuretics as directed by your physician. Do not skip doses or change doses unless instructed to do so by your physician.  Decrease salt intake, follow recommended 2 grams daily.  Do not get leg(s) with compression wrap wet. If wraps are too tight as indicated by pain, numbness/tingling or discoloration of toes remove wrap completely and call the wound center @ [phone].       The treatment plan has been discussed at length between you and your provider. Follow all instructions carefully, it is very important. If you do not follow all instructions you are at risk of your wound not healing, infection, possible loss of limb and even loss of life.    COMPRESSION WRAP PATIENT CARE INSTRUCTIONS  DO NOT get compression wrap wet. When showering, use a shower boot.   Please contact wound clinic and if not able to reach, then go to emergency room if ANY of the following occur:   Tingling or numbness in the foot or toes on leg with compression wrap.  Severe pain that cannot be relieved with elevation and any medication instructed per your doctor.  A fever of 100.4°F (38°C) or higher.  Swelling, coldness, or blue-gray discoloration of the toes.  If the compression wrap feels too tight or too loose.  If the compression wrap is damaged or has rough edges that hurt.  If the compression wrap gets wet, you must cut wrap off.   Drainage from compression wrap that smells different than usual.

## 2024-07-11 NOTE — PROGRESS NOTES
Chief Complaint   Patient presents with    Wound Care     Patient arrives for a wound care follow up appointment. Patient states he is having moderate pain. The wraps fell, and particularly the left wrap fell. There is a new wound on the left. He arrives in bilateral Coflex 2.        HPI:     Patient here for follow-up of left lateral leg wound and right anterior leg wound.  He is doing well with those however the wrap on the left leg had slid down somewhat and had developed a wound at the edge of the wrap on the back part of his left leg.    Lab Results   Component Value Date    BUN 14 06/21/2024    CREATSERUM 0.67 (L) 06/24/2024    ALB 3.5 06/20/2024    TP 8.4 (H) 06/20/2024    A1C 5.2 12/12/2016         Current Outpatient Medications:     aspirin 81 MG Oral Tab, Take 1 tablet (81 mg total) by mouth daily., Disp: , Rfl:     No Known Allergies         REVIEW OF SYSTEMS:     Review of Systems (ROS)  This information was obtained from the patient, chart    A comprehensive 10 point review of systems was completed.  Pertinent positives and negatives noted in the the HPI.     Past medical, surgical, family and social history updated where appropriate.    PHYSICAL EXAM:   /82   Pulse 76   Temp 98 °F (36.7 °C)   Resp 16    Estimated body mass index is 53.25 kg/m² as calculated from the following:    Height as of 6/20/24: 67\".    Weight as of 6/20/24: 340 lb (154.2 kg).   Vital signs reviewed.Appears stated age, well groomed.        Calf  Point of Measurement - Left Calf: 37  Point of Measurement - Right Calf: 37  Left Calf from:: Heel  Calf Left cm:: 41.5  Right Calf from:: Heel  Right Calf cm:: 48.3    Ankle  Point of Measurement - Left Ankle: 10  Point of Measurement - Right Ankle: 10  Left Ankle from:: Heel  Left Ankle cm:: 29.7     Right Ankle from:: Heel  Right Ankle cm:: 30.6       Foot                            Wound 01/11/24 #1 Leg Left (Active)   Date First Assessed/Time First Assessed: 01/11/24 2720     Wound Number (Wound Clinic Only): #1  Primary Wound Type: Traumatic  Location: Leg  Wound Location Orientation: Left      Assessments 1/11/2024  2:10 PM 7/11/2024  8:56 AM   Wound Image       Drainage Amount Large Small   Drainage Description Yellow;Serous Serosanguineous   Treatments Compression --   Wound Length (cm) 10.6 cm 0.8 cm   Wound Width (cm) 9.5 cm 0.7 cm   Wound Surface Area (cm^2) 100.7 cm^2 0.56 cm^2   Wound Depth (cm) 0.2 cm 0.1 cm   Wound Volume (cm^3) 20.14 cm^3 0.056 cm^3   Wound Healing % -- 100   Margins Well-defined edges Well-defined edges   Non-staged Wound Description Full thickness Full thickness   Leslie-wound Assessment Edema;Hemosiderin staining Edema;Hemosiderin staining;Moist   Wound Granulation Tissue Firm;Pink Firm;Pink;Red   Wound Bed Granulation (%) 40 % 100 %   Wound Bed Slough (%) 60 % --   Wound Odor None None   Shape -- bridged   Tunneling? No No   Undermining? No No   Sinus Tracts? No No       Inactive Orders   Date Order Priority Status Authorizing Provider   06/24/24 1127 Wound care Routine Discontinued Rica Pearson MD   06/06/24 0913 Debridement Traumatic Routine Completed Nicholas Gould MD   05/16/24 0901 Debridement Traumatic Routine Completed Nicholas Gould MD   05/02/24 1520 Debridement Traumatic Routine Completed Nicholas Gould MD   04/11/24 0959 Debridement Traumatic Routine Completed Nicholas Gould MD   04/04/24 0915 Debridement Traumatic Left;Lateral Leg Routine Completed Nicholas Gould MD   02/15/24 1154 Debridement Traumatic Left;Lateral Leg Routine Completed Nicholas Gould MD   01/18/24 1511 Debridement Traumatic Left;Lateral Leg Routine Completed Nicholas Gould MD   01/11/24 1710 Debridement Traumatic Left;Lateral Leg Routine Completed Nicholas Gould MD       Wound 06/20/24 #2 Leg Right (Active)   Date First Assessed/Time First Assessed: 06/20/24 0832    Wound Number (Wound Clinic Only): #2  Primary Wound Type: Venous Ulcer  Location: Leg  Wound  Location Orientation: Right      Assessments 6/20/2024  8:34 AM 7/11/2024  8:58 AM   Wound Image       Drainage Amount Copious Scant   Drainage Description Clear Yellow;Serous   Wound Length (cm) 0.8 cm 0.1 cm   Wound Width (cm) 1.2 cm 0.1 cm   Wound Surface Area (cm^2) 0.96 cm^2 0.01 cm^2   Wound Depth (cm) 0.1 cm 0.1 cm   Wound Volume (cm^3) 0.096 cm^3 0.001 cm^3   Wound Healing % -- 99   Margins Well-defined edges Well-defined edges   Non-staged Wound Description Full thickness Full thickness   Leslie-wound Assessment Hemosiderin staining;Blanchable erythema;Edema;Moist Edema;Hemosiderin staining   Wound Granulation Tissue Pink;Spongy Firm;Pink   Wound Bed Granulation (%) 50 % 100 %   Wound Bed Slough (%) 50 % --   Wound Odor None None   Tunneling? -- No   Undermining? -- No   Sinus Tracts? -- No       Inactive Orders   Date Order Priority Status Authorizing Provider   06/24/24 1127 Wound care Routine Discontinued Rica Pearson MD   06/21/24 0942 Consult to Wound Ostomy Routine Completed Rica Pearson MD     - Reason for Consult:    Wound Care     - Wound Care Reason for Consult:    pt sees wound clinic for LLE wound. Now with RLE cellulitis/weeping.       Compression Wrap 06/27/24 Leg Anterior;Left (Active)   Placement Date/Time: 06/27/24 1000   Location: Leg  Wound Location Orientation: Anterior;Left      Assessments 6/27/2024  8:47 AM 7/2/2024  9:18 AM   Response to Treatment Well tolerated Well tolerated   Compression Layers Multilayer 2   Compression Product Type Coflex Coflex   Dressing Applied Yes Yes   Compression Wrap Location Toes to Knee Toes to Knee   Compression Wrap Status Clean;Dry;Intact Clean;Dry;Intact       No associated orders.       Compression Wrap 06/27/24 Leg Anterior;Right (Active)   Placement Date/Time: 06/27/24 1000   Location: Leg  Wound Location Orientation: Anterior;Right      Assessments 6/27/2024  8:47 AM 7/2/2024  9:18 AM   Response to Treatment Well tolerated Well tolerated    Compression Layers Multilayer 2   Compression Product Type Coflex Coflex   Dressing Applied Yes Yes   Compression Wrap Location Toes to Knee Toes to Knee   Compression Wrap Status Clean;Dry;Intact Clean;Dry;Intact       No associated orders.       Wound 07/11/24 #3 Left Posterior Leg Superior Leg Posterior;Right (Active)   Date First Assessed/Time First Assessed: 07/11/24 0852    Wound Number (Wound Clinic Only): #3 Left Posterior Leg Superior  Primary Wound Type: Pressure Injury  Location: Leg  Wound Location Orientation: Posterior;Right      Assessments 7/11/2024  8:59 AM   Wound Image     Drainage Amount Moderate   Drainage Description Serosanguineous   Wound Length (cm) 1.7 cm   Wound Width (cm) 9.4 cm   Wound Surface Area (cm^2) 15.98 cm^2   Wound Depth (cm) 0.1 cm   Wound Volume (cm^3) 1.598 cm^3   Margins Well-defined edges   Non-staged Wound Description Full thickness   Leslie-wound Assessment Moist;Edema   Wound Granulation Tissue Pink;Red;Firm   Wound Bed Granulation (%) 80 %   Wound Bed Epithelium (%) 10 %   Wound Bed Slough (%) 10 %   Wound Odor None   Tunneling? No   Undermining? No   Sinus Tracts? No   Pressure Injury Stage Stage 2       No associated orders.          ASSESSMENT AND PLAN:      1. Chronic venous hypertension (idiopathic) with ulcer and inflammation of bilateral lower extremity (HCC)    2. Class 3 severe obesity due to excess calories with serious comorbidity and body mass index (BMI) of 45.0 to 49.9 in adult (HCC)    There is a large wound on the back part of the left leg which appears to be superficial and due to irritation from the edge of the wrap.  The wound on the right anterior leg was treated with silver nitrate as well as the wound on the left lateral leg which have significantly improved.  Will apply Hydrofera Blue transfer to all 3 wounds and gauze dressing and continue with Coflex 2 layer compression to both lower extremities, will secure a wrap on the left leg with Coban on  the top part to hold in place.  Follow-up in 1 week.      Risks, benefits, and alternatives of current treatment plan discussed in detail.  Questions and concerns addressed. Red flags to RTC or ED reviewed.  Patient (or parent) agrees to plan.      No follow-ups on file.    Also refer to patient instructions.     I spent a total of 40 minutes with this patient's care.  This time included preparing to see the patient (eg, review notes and recent diagnostics), seeing the patient, performing a medically appropriate examination and/or evaluation, counseling and educating the patient, and documenting in the record.          Nicholas Gould M.D.   [facility]   2024    Patient Instructions       PATIENT INSTRUCTIONS      FOR Luis M Estrada RICK 3/25/1965    DATE OF SERVICE: 2024      Hydrofera Blue Transfer  Kerramax  Coflex 2 layer compression wrap to BOTH lower extremities    Follow up with Dr. Gould in 1 week      Managing your edema:    Avoid prolonged standing in one place. It is better to have your calf muscles moving to pump fluid out of the legs.  Elevate leg(s) above the level of the heart when sitting or as much as possible.  Take you diuretics as directed by your physician. Do not skip doses or change doses unless instructed to do so by your physician.  Decrease salt intake, follow recommended 2 grams daily.  Do not get leg(s) with compression wrap wet. If wraps are too tight as indicated by pain, numbness/tingling or discoloration of toes remove wrap completely and call the wound center @ [phone].       The treatment plan has been discussed at length between you and your provider. Follow all instructions carefully, it is very important. If you do not follow all instructions you are at risk of your wound not healing, infection, possible loss of limb and even loss of life.    COMPRESSION WRAP PATIENT CARE INSTRUCTIONS  DO NOT get compression wrap wet. When showering,  use a shower boot.   Please contact wound clinic and if not able to reach, then go to emergency room if ANY of the following occur:   Tingling or numbness in the foot or toes on leg with compression wrap.  Severe pain that cannot be relieved with elevation and any medication instructed per your doctor.  A fever of 100.4°F (38°C) or higher.  Swelling, coldness, or blue-gray discoloration of the toes.  If the compression wrap feels too tight or too loose.  If the compression wrap is damaged or has rough edges that hurt.  If the compression wrap gets wet, you must cut wrap off.   Drainage from compression wrap that smells different than usual.  MISCELLANEOUS INSTRUCTIONS  Supplement with a daily multivitamin   Low salt diet  Intense blood sugar control - Goal Blood sugar below 180 at all times recommended.  Increase protein intake / consider protein supplements - see below  Elevate extremities at all times when sitting / laying down.  No tobacco use     DIETARY MODIFICATIONS TO HELP WITH WOUND HEALING:          Please follow any restrictions to diet given by your other doctors     Protein: meats, beans, eggs, milk and yogurt particularly Greek yogurt), tofu, soy nuts, soy protein products     Vitamin C: citrus fruits and juices, strawberries, tomatoes, tomato juice, peppers, baked potatoes, spinach, broccoli, cauliflower, [place] sprouts, cabbage     Vitamin A: dark greens, leafy vegetables, orange or yellow vegetables, cantaloupe, fortified dairy products, liver, fortified cereals     Zinc: fortified cereals, red meats, seafood     Consider Bob by [employer] (These are essential branch chain amino acids that help with tissue building and wound healing) and take 2 packets/day. You can order online at [employer] or [employer]     ADDITIONAL REMINDERS:     The treatment plan has been discussed at length with you and your provider. Follow all instructions carefully, it is very important. If you do not follow all instructions,  you are at risk of your wound not healing, infection, possible loss of limb and even loss of life.  Please call the clinic at [phone] during regular business hours ( 7:30 AM - 5:30 PM) if you notice increased redness, warmth, pain or pus like drainage or start running a fever greater than 100.3.    For after hour emergencies, please call your primary physician or go to the nearest emergency room.  If your blood pressure is elevated, follow up with your primary care doctor and/or cardiologist as soon as possible.     FOR LEG SWELLING,  LOWER EXTREMITY WOUNDS AND IF USING COMPRESSION:   Managing your edema:    Avoid prolonged standing in one place. It is better to have your calf muscles moving to pump fluid out of the legs.  Elevate leg(s) above the level of the heart when sitting or as much as possible.  Take you diuretics as directed by your physician. Do not skip doses or change doses unless instructed to do so by your physician.  Decrease salt intake, follow recommended 2 grams daily.  Do not get leg(s) with compression wrap wet. If wraps are too tight as indicated by pain, numbness/tingling or discoloration of toes remove wrap completely and call the wound center @ [phone].       The treatment plan has been discussed at length between you and your provider. Follow all instructions carefully, it is very important. If you do not follow all instructions you are at risk of your wound not healing, infection, possible loss of limb and even loss of life.    COMPRESSION WRAP PATIENT CARE INSTRUCTIONS  DO NOT get compression wrap wet. When showering, use a shower boot.   Please contact wound clinic and if not able to reach, then go to emergency room if ANY of the following occur:   Tingling or numbness in the foot or toes on leg with compression wrap.  Severe pain that cannot be relieved with elevation and any medication instructed per your doctor.  A fever of 100.4°F (38°C) or higher.  Swelling, coldness, or  blue-gray discoloration of the toes.  If the compression wrap feels too tight or too loose.  If the compression wrap is damaged or has rough edges that hurt.  If the compression wrap gets wet, you must cut wrap off.   Drainage from compression wrap that smells different than usual.

## 2024-07-11 NOTE — PROGRESS NOTES
Weekly Wound Education Note    Teaching Provided To: Patient  Training Topics: Cleasing and general instructions;Compression;Discharge instructions;Dressing;Edema control  Training Method: Explain/Verbal  Training Response: Patient responds and understands;Reinforcement needed        Notes: New wound to left leg, wraps has slid down - wrap were not remove and patient did not call clinic. Silver nitrate used today on right leg and lateral left leg wounds. Left posterior leg wound: hydrofera transfer, kerramax, conforming gauze, tape. Right leg and left lateral leg: Hydrofera ready. Coflex 2 BLE, rosidal was used on left leg. Reminded to call clinic if wrap slid down or cause pain. Ordered compression garments for both legs.

## 2024-07-16 ENCOUNTER — HOSPITAL ENCOUNTER (INPATIENT)
Facility: HOSPITAL | Age: 59
LOS: 4 days | Discharge: HOME HEALTH | End: 2024-07-20
Attending: STUDENT IN AN ORGANIZED HEALTH CARE EDUCATION/TRAINING PROGRAM | Admitting: STUDENT IN AN ORGANIZED HEALTH CARE EDUCATION/TRAINING PROGRAM
Payer: COMMERCIAL

## 2024-07-16 ENCOUNTER — APPOINTMENT (OUTPATIENT)
Dept: CT IMAGING | Facility: HOSPITAL | Age: 59
End: 2024-07-16
Attending: STUDENT IN AN ORGANIZED HEALTH CARE EDUCATION/TRAINING PROGRAM
Payer: COMMERCIAL

## 2024-07-16 DIAGNOSIS — L03.116 CELLULITIS OF LEFT LOWER EXTREMITY: Primary | ICD-10-CM

## 2024-07-16 LAB
ALBUMIN SERPL-MCNC: 4.1 G/DL (ref 3.2–4.8)
ALBUMIN/GLOB SERPL: 1.1 {RATIO} (ref 1–2)
ALP LIVER SERPL-CCNC: 79 U/L
ALT SERPL-CCNC: 12 U/L
ANION GAP SERPL CALC-SCNC: 9 MMOL/L (ref 0–18)
AST SERPL-CCNC: 22 U/L (ref ?–34)
BASOPHILS # BLD AUTO: 0.08 X10(3) UL (ref 0–0.2)
BASOPHILS NFR BLD AUTO: 0.8 %
BILIRUB SERPL-MCNC: 1.1 MG/DL (ref 0.3–1.2)
BUN BLD-MCNC: 17 MG/DL (ref 9–23)
CALCIUM BLD-MCNC: 9.1 MG/DL (ref 8.7–10.4)
CHLORIDE SERPL-SCNC: 107 MMOL/L (ref 98–112)
CO2 SERPL-SCNC: 22 MMOL/L (ref 21–32)
CREAT BLD-MCNC: 1.02 MG/DL
EGFRCR SERPLBLD CKD-EPI 2021: 85 ML/MIN/1.73M2 (ref 60–?)
EOSINOPHIL # BLD AUTO: 0.02 X10(3) UL (ref 0–0.7)
EOSINOPHIL NFR BLD AUTO: 0.2 %
ERYTHROCYTE [DISTWIDTH] IN BLOOD BY AUTOMATED COUNT: 13.8 %
GLOBULIN PLAS-MCNC: 3.9 G/DL (ref 2.8–4.4)
GLUCOSE BLD-MCNC: 113 MG/DL (ref 70–99)
HCT VFR BLD AUTO: 43.9 %
HGB BLD-MCNC: 15.1 G/DL
IMM GRANULOCYTES # BLD AUTO: 0.07 X10(3) UL (ref 0–1)
IMM GRANULOCYTES NFR BLD: 0.7 %
LACTATE SERPL-SCNC: 2 MMOL/L (ref 0.5–2)
LYMPHOCYTES # BLD AUTO: 0.24 X10(3) UL (ref 1–4)
LYMPHOCYTES NFR BLD AUTO: 2.4 %
MCH RBC QN AUTO: 31.9 PG (ref 26–34)
MCHC RBC AUTO-ENTMCNC: 34.4 G/DL (ref 31–37)
MCV RBC AUTO: 92.8 FL
MONOCYTES # BLD AUTO: 0.29 X10(3) UL (ref 0.1–1)
MONOCYTES NFR BLD AUTO: 2.9 %
NEUTROPHILS # BLD AUTO: 9.39 X10 (3) UL (ref 1.5–7.7)
NEUTROPHILS # BLD AUTO: 9.39 X10(3) UL (ref 1.5–7.7)
NEUTROPHILS NFR BLD AUTO: 93 %
OSMOLALITY SERPL CALC.SUM OF ELEC: 288 MOSM/KG (ref 275–295)
PLATELET # BLD AUTO: 251 10(3)UL (ref 150–450)
POTASSIUM SERPL-SCNC: 3.7 MMOL/L (ref 3.5–5.1)
PROT SERPL-MCNC: 8 G/DL (ref 5.7–8.2)
RBC # BLD AUTO: 4.73 X10(6)UL
SARS-COV-2 RNA RESP QL NAA+PROBE: NOT DETECTED
SODIUM SERPL-SCNC: 138 MMOL/L (ref 136–145)
WBC # BLD AUTO: 10.1 X10(3) UL (ref 4–11)

## 2024-07-16 PROCEDURE — 73700 CT LOWER EXTREMITY W/O DYE: CPT | Performed by: STUDENT IN AN ORGANIZED HEALTH CARE EDUCATION/TRAINING PROGRAM

## 2024-07-16 PROCEDURE — 99223 1ST HOSP IP/OBS HIGH 75: CPT | Performed by: STUDENT IN AN ORGANIZED HEALTH CARE EDUCATION/TRAINING PROGRAM

## 2024-07-16 RX ORDER — CLINDAMYCIN PHOSPHATE 900 MG/50ML
900 INJECTION, SOLUTION INTRAVENOUS ONCE
Status: COMPLETED | OUTPATIENT
Start: 2024-07-16 | End: 2024-07-16

## 2024-07-16 RX ORDER — BISACODYL 10 MG
10 SUPPOSITORY, RECTAL RECTAL
Status: DISCONTINUED | OUTPATIENT
Start: 2024-07-16 | End: 2024-07-20

## 2024-07-16 RX ORDER — TRAMADOL HYDROCHLORIDE 50 MG/1
50 TABLET ORAL EVERY 6 HOURS PRN
Status: DISCONTINUED | OUTPATIENT
Start: 2024-07-16 | End: 2024-07-20

## 2024-07-16 RX ORDER — MELATONIN
3 NIGHTLY PRN
Status: DISCONTINUED | OUTPATIENT
Start: 2024-07-16 | End: 2024-07-20

## 2024-07-16 RX ORDER — BENZONATATE 100 MG/1
200 CAPSULE ORAL 3 TIMES DAILY PRN
Status: DISCONTINUED | OUTPATIENT
Start: 2024-07-16 | End: 2024-07-20

## 2024-07-16 RX ORDER — PROCHLORPERAZINE EDISYLATE 5 MG/ML
5 INJECTION INTRAMUSCULAR; INTRAVENOUS EVERY 8 HOURS PRN
Status: DISCONTINUED | OUTPATIENT
Start: 2024-07-16 | End: 2024-07-20

## 2024-07-16 RX ORDER — ENOXAPARIN SODIUM 100 MG/ML
0.5 INJECTION SUBCUTANEOUS EVERY 12 HOURS
Status: DISCONTINUED | OUTPATIENT
Start: 2024-07-17 | End: 2024-07-20

## 2024-07-16 RX ORDER — ECHINACEA PURPUREA EXTRACT 125 MG
1 TABLET ORAL
Status: DISCONTINUED | OUTPATIENT
Start: 2024-07-16 | End: 2024-07-20

## 2024-07-16 RX ORDER — SODIUM CHLORIDE 9 MG/ML
INJECTION, SOLUTION INTRAVENOUS CONTINUOUS
Status: DISCONTINUED | OUTPATIENT
Start: 2024-07-16 | End: 2024-07-17

## 2024-07-16 RX ORDER — ONDANSETRON 2 MG/ML
4 INJECTION INTRAMUSCULAR; INTRAVENOUS EVERY 6 HOURS PRN
Status: DISCONTINUED | OUTPATIENT
Start: 2024-07-16 | End: 2024-07-20

## 2024-07-16 RX ORDER — VANCOMYCIN HYDROCHLORIDE
1500 EVERY 12 HOURS
Status: DISCONTINUED | OUTPATIENT
Start: 2024-07-17 | End: 2024-07-17

## 2024-07-16 RX ORDER — POLYETHYLENE GLYCOL 3350 17 G/17G
17 POWDER, FOR SOLUTION ORAL DAILY PRN
Status: DISCONTINUED | OUTPATIENT
Start: 2024-07-16 | End: 2024-07-20

## 2024-07-16 RX ORDER — ACETAMINOPHEN 500 MG
1000 TABLET ORAL EVERY 8 HOURS PRN
Status: DISCONTINUED | OUTPATIENT
Start: 2024-07-16 | End: 2024-07-20

## 2024-07-16 RX ORDER — SENNOSIDES 8.6 MG
17.2 TABLET ORAL NIGHTLY PRN
Status: DISCONTINUED | OUTPATIENT
Start: 2024-07-16 | End: 2024-07-20

## 2024-07-16 RX ORDER — KETOROLAC TROMETHAMINE 30 MG/ML
30 INJECTION, SOLUTION INTRAMUSCULAR; INTRAVENOUS EVERY 6 HOURS PRN
Status: ACTIVE | OUTPATIENT
Start: 2024-07-16 | End: 2024-07-18

## 2024-07-16 RX ORDER — ACETAMINOPHEN 500 MG
1000 TABLET ORAL ONCE
Status: COMPLETED | OUTPATIENT
Start: 2024-07-16 | End: 2024-07-16

## 2024-07-16 RX ORDER — ENEMA 19; 7 G/133ML; G/133ML
1 ENEMA RECTAL ONCE AS NEEDED
Status: DISCONTINUED | OUTPATIENT
Start: 2024-07-16 | End: 2024-07-20

## 2024-07-16 RX ORDER — ASPIRIN 81 MG/1
81 TABLET, CHEWABLE ORAL DAILY
Status: DISCONTINUED | OUTPATIENT
Start: 2024-07-16 | End: 2024-07-20

## 2024-07-16 NOTE — ED INITIAL ASSESSMENT (HPI)
PT PRESENTS TO ED WITH WOUND TO TO LEFT LEG BEING TREATED BY WOUND CARE, PT IS HAVING WORSENING PAIN.  BOTH OF PT'S LEGS ARE WRAPPED BY WOUND CARE CLINIC LAST THURSDAY.

## 2024-07-16 NOTE — H&P
[facility]  [facility]     Luis M Estrada Patient Status:  Emergency    3/25/1965 MRN [id number]   [facility] EMERGENCY DEPARTMENT Attending Rand Irwin MD   Hosp Day # 0 PCP SILVER GARCIA DO     Chief Complaint: Pain in leg     Subjective:    History of Present Illness:     Luis M Estrada is a 59 year old male with  h/o hydrocephalus. Pt was recently in the hospital from - for RLE cellulitis. Pt comes to the hospital as his left leg today began to hurt, pt has had chills. He notes LLE worsening  pain this AM. Pt febrile in .6.     History/Other:    Past Medical History:  Past Medical History:    Cellulitis    to left leg, seeking treatment at wound care for months    Hydrocephalus (HCC)    dx'd as an adult-no shunt     Past Surgical History:   History reviewed. No pertinent surgical history.   Family History:   No family history on file.  Social History:    reports that he has never smoked. He has never used smokeless tobacco. He reports current alcohol use. He reports that he does not use drugs.     Allergies: No Known Allergies    Medications:    Current Facility-Administered Medications on File Prior to Encounter   Medication Dose Route Frequency Provider Last Rate Last Admin    [COMPLETED] ceFEPIme (Maxpime) 2 g in sodium chloride 0.9% 100 mL IVPB-MBP  2 g Intravenous Q8H Tigre Short MD 25 mL/hr at 24 2 g at 24    [COMPLETED] diphenhydrAMINE (Benadryl) 12.5 MG/5ML oral liquid 12.5 mg  12.5 mg Oral Once Feliz Hatch MD   12.5 mg at 24    [COMPLETED] vancomycin (Vancocin) 2.25 g in sodium chloride 0.9% 500 mL IVPB premix  2,250 mg Intravenous Once Tram Boyle,  mL/hr at 24 1216 2,250 mg at 24 1216    [COMPLETED] sodium chloride 0.9% infusion 1,000 mL  1,000 mL Intravenous Once Tram Boyle, DO 20 mL/hr at 24 1238 1,000 mL at 24 1238    [COMPLETED] iopamidol 76%  (ISOVUE-370) injection for power injector  100 mL Intravenous ONCE PRN Rica Pearson MD   100 mL at 24     Current Outpatient Medications on File Prior to Encounter   Medication Sig Dispense Refill    [] cephalexin (KEFLEX) 500 MG Oral Cap Take 1 capsule (500 mg total) by mouth 3 (three) times daily for 15 days. 45 capsule 0    aspirin 81 MG Oral Tab Take 1 tablet (81 mg total) by mouth daily.         Review of Systems:   A comprehensive review of systems was completed.    Pertinent positives and negatives noted in the HPI.    Objective:   Physical Exam:    /71   Pulse 118   Temp (!) 102.6 °F (39.2 °C) (Oral)   Resp 21   Ht 5' 7\" (1.702 m)   Wt (!) 330 lb (149.7 kg)   SpO2 97%   BMI 51.69 kg/m²   General: No acute distress, Alert  Respiratory: No rhonchi, no wheezes  Cardiovascular: S1, S2. Regular rate and rhythm  Abdomen: Soft, Non-tender, non-distended, positive bowel sounds  Neuro: No new focal deficits  Extremities: LLE erythema, warmth foul smelling. Wound     Results:    Labs:      Labs Last 24 Hours:    Recent Labs   Lab 24  1801   RBC 4.73   HGB 15.1   HCT 43.9   MCV 92.8   MCH 31.9   MCHC 34.4   RDW 13.8   NEPRELIM 9.39*   WBC 10.1   .0       Recent Labs   Lab 24  1801   *   BUN 17   CREATSERUM 1.02   EGFRCR 85   CA 9.1   ALB 4.1      K 3.7      CO2 22.0   ALKPHO 79   AST 22   ALT 12   BILT 1.1   TP 8.0       No results found for: \"PT\", \"INR\"    No results for input(s): \"TROP\", \"TROPHS\", \"CK\" in the last 168 hours.    No results for input(s): \"TROP\", \"PBNP\" in the last 168 hours.    No results for input(s): \"PCT\" in the last 168 hours.    Imaging: Imaging data reviewed in Epic.    Assessment & Plan:      #LLE cellulitis with sepsis siwth hypotension, fever, tachycardia   IVF  Lactic acid   IV Abx  CT pending  ID on Cs  Wound care  #Hydrocephalus       Plan of care discussed with pt, Dr Dc Pearson MD    Supplementary  Documentation:     The 21st Century Cures Act makes medical notes like these available to patients in the interest of transparency. Please be advised this is a medical document. Medical documents are intended to carry relevant information, facts as evident, and the clinical opinion of the practitioner. The medical note is intended as peer to peer communication and may appear blunt or direct. It is written in medical language and may contain abbreviations or verbiage that are unfamiliar.

## 2024-07-17 ENCOUNTER — APPOINTMENT (OUTPATIENT)
Dept: CV DIAGNOSTICS | Facility: HOSPITAL | Age: 59
End: 2024-07-17
Attending: HOSPITALIST
Payer: COMMERCIAL

## 2024-07-17 LAB
ANION GAP SERPL CALC-SCNC: 7 MMOL/L (ref 0–18)
BASOPHILS # BLD AUTO: 0.06 X10(3) UL (ref 0–0.2)
BASOPHILS NFR BLD AUTO: 0.4 %
BUN BLD-MCNC: 18 MG/DL (ref 9–23)
CALCIUM BLD-MCNC: 8 MG/DL (ref 8.7–10.4)
CHLORIDE SERPL-SCNC: 109 MMOL/L (ref 98–112)
CO2 SERPL-SCNC: 21 MMOL/L (ref 21–32)
CREAT BLD-MCNC: 0.86 MG/DL
EGFRCR SERPLBLD CKD-EPI 2021: 100 ML/MIN/1.73M2 (ref 60–?)
EOSINOPHIL # BLD AUTO: 0.04 X10(3) UL (ref 0–0.7)
EOSINOPHIL NFR BLD AUTO: 0.3 %
ERYTHROCYTE [DISTWIDTH] IN BLOOD BY AUTOMATED COUNT: 14.2 %
GLUCOSE BLD-MCNC: 103 MG/DL (ref 70–99)
HCT VFR BLD AUTO: 34.5 %
HGB BLD-MCNC: 12.3 G/DL
IMM GRANULOCYTES # BLD AUTO: 0.2 X10(3) UL (ref 0–1)
IMM GRANULOCYTES NFR BLD: 1.3 %
LYMPHOCYTES # BLD AUTO: 0.62 X10(3) UL (ref 1–4)
LYMPHOCYTES NFR BLD AUTO: 4.1 %
MAGNESIUM SERPL-MCNC: 1.6 MG/DL (ref 1.6–2.6)
MCH RBC QN AUTO: 31.9 PG (ref 26–34)
MCHC RBC AUTO-ENTMCNC: 35.7 G/DL (ref 31–37)
MCV RBC AUTO: 89.6 FL
MONOCYTES # BLD AUTO: 0.53 X10(3) UL (ref 0.1–1)
MONOCYTES NFR BLD AUTO: 3.5 %
NEUTROPHILS # BLD AUTO: 13.78 X10 (3) UL (ref 1.5–7.7)
NEUTROPHILS # BLD AUTO: 13.78 X10(3) UL (ref 1.5–7.7)
NEUTROPHILS NFR BLD AUTO: 90.4 %
OSMOLALITY SERPL CALC.SUM OF ELEC: 286 MOSM/KG (ref 275–295)
PLATELET # BLD AUTO: 197 10(3)UL (ref 150–450)
POTASSIUM SERPL-SCNC: 3.4 MMOL/L (ref 3.5–5.1)
RBC # BLD AUTO: 3.85 X10(6)UL
SODIUM SERPL-SCNC: 137 MMOL/L (ref 136–145)
WBC # BLD AUTO: 15.2 X10(3) UL (ref 4–11)

## 2024-07-17 PROCEDURE — 93306 TTE W/DOPPLER COMPLETE: CPT | Performed by: HOSPITALIST

## 2024-07-17 PROCEDURE — 99232 SBSQ HOSP IP/OBS MODERATE 35: CPT | Performed by: HOSPITALIST

## 2024-07-17 RX ORDER — POTASSIUM CHLORIDE 20 MEQ/1
40 TABLET, EXTENDED RELEASE ORAL ONCE
Status: COMPLETED | OUTPATIENT
Start: 2024-07-17 | End: 2024-07-17

## 2024-07-17 RX ORDER — CLOTRIMAZOLE 10 MG/1
1 LOZENGE ORAL
Status: DISCONTINUED | OUTPATIENT
Start: 2024-07-17 | End: 2024-07-20

## 2024-07-17 RX ORDER — MAGNESIUM OXIDE 400 MG/1
400 TABLET ORAL ONCE
Status: COMPLETED | OUTPATIENT
Start: 2024-07-17 | End: 2024-07-17

## 2024-07-17 NOTE — CONSULTS
[facility]  Report of Inpatient Wound Care Consultation    Luis M Estrada Patient Status:  Inpatient    3/25/1965 MRN [id number]   Location [facility] 3NW-A Attending Catrina Portillo DO   Hosp Day # 1 PCP SILVER GARCIA DO     Reason for Consultation:  ERASMO YORK    History of Present Illness:  Luis M Estrada is a a(n) 59 year old male. Patient with multiple comorbidities.    SUBJECTIVE:  No complaints, reports no problems.        History:  Past Medical History:    Cellulitis    to left leg, seeking treatment at wound care for months    Hydrocephalus (HCC)    dx'd as an adult-no shunt     History reviewed. No pertinent surgical history.   reports that he has never smoked. He has never used smokeless tobacco. He reports current alcohol use. He reports that he does not use drugs.      Allergies:  @ALLERGY    Laboratory Data:    Recent Labs   Lab 24  1801 24  0611   WBC 10.1 15.2*   HGB 15.1 12.3*   HCT 43.9 34.5*   .0 197.0   CREATSERUM 1.02 0.86   BUN 17 18   * 103*   CA 9.1 8.0*   ALB 4.1  --    TP 8.0  --          ASSESSMENT:  Wound 24 #1 Leg Left (Active)   Date First Assessed/Time First Assessed: 24 1406    Wound Number (Wound Clinic Only): #1  Primary Wound Type: Traumatic  Location: Leg  Wound Location Orientation: Left      Assessments 2024 10:20 AM   Wound Image          Wound 24 #2 Leg Right (Active)   Date First Assessed/Time First Assessed: 24 0832    Wound Number (Wound Clinic Only): #2  Primary Wound Type: Venous Ulcer  Location: Leg  Wound Location Orientation: Right      Assessments 2024 10:20 AM   Wound Image                    No open wounds, dry scab to both L lateral lower leg and R anterior lower leg, +dorsal pedal pulse via doppler appreciated to both.   Foam placed for protection and added ace wrap to ERASMO YORK.  RN aware of the above.   Recommend and explained to patient to apply compression stocking (that he has at home) when  he is discharged to home.     Care Summary:  Care Summary: Discussed Plan of Care at beside with patient. Patient verbally acknowledges understanding of all instructions and all questions were answered.      Additional Notes:  Discharge planning in progress.           Thank you for this consultation and for allowing me to participate in the care of your patient.      Time Spent 45 Minutes.    Thank you,  Sabina Kirkpatrick, PT, MPT  Wound Care Clinician  [facility]  7/17/2024

## 2024-07-17 NOTE — OCCUPATIONAL THERAPY NOTE
OCCUPATIONAL THERAPY EVALUATION - INPATIENT    Room Number: 333/333-A  Evaluation Date: 7/17/2024     Type of Evaluation: Initial  Presenting Problem: LLE cellulitis with sepsis, hypotension    Physician Order: IP Consult to Occupational Therapy  Reason for Therapy:  ADL/IADL Dysfunction and Discharge Planning      OCCUPATIONAL THERAPY ASSESSMENT   Patient is a 59 year old male admitted on 7/16/2024 with Presenting Problem: LLE cellulitis with sepsis, hypotension. Co-Morbidities : BLE lymphedema (reports L worse than R), YUVAL, hydrocephalus  Patient is currently functioning near baseline with  ADLs and functional transfers .  Prior to admission, patient's baseline is independent.  Patient met all OT goals at Modified Independent level.  Patient reports no further questions/concerns at this time.     No further skilled OT needs.      WEIGHT BEARING RESTRICTION  Weight Bearing Restriction: None                Recommendations for nursing staff:   Transfers: 1-person  Toileting location: Toilet    EVALUATION SESSION:  Patient at start of session: chair    FUNCTIONAL TRANSFER ASSESSMENT  Sit to Stand: Chair  Chair: Modified Independent  Toilet Transfer: Modified Independent (standard height, light use of grab bar, reports vanity adjacent at home)    BED MOBILITY  Sit to Supine (OT): Modified Independent  Scooting: Modified Independent    BALANCE ASSESSMENT     FUNCTIONAL ADL ASSESSMENT  Grooming Standing: Modified Independent  LB Dressing Seated: Modified Independent (sufficient reach with increased time but without AE, bringing LE to bed level, reports same technique at home)  LB Dressing Standing: Modified Independent  Toileting Seated: Modified Independent  Toileting Standing: Modified Independent      ACTIVITY TOLERANCE: WFL                         O2 SATURATIONS       COGNITION  Overall Cognitive Status:  WFL - within functional limits  COGNITION ASSESSMENTS       Upper Extremity:   ROM: within functional limits    Strength: is within functional limits     EDUCATION PROVIDED  Patient: Role of Occupational Therapy; Functional Transfer Techniques; Fall Prevention; Compensatory ADL Techniques  Patient's Response to Education: Verbalized Understanding    Equipment used: RW  Demonstrates functional use    Therapist comments: Patient motivated and participatory. Educated on safety precautions and incorporation into ADLs and transfers, followed with good return demo. Performed all ADLs and functional transfers safely and independently. Patient reports sister and brother-in-law are able to check on him and assist as needed. SOB noted with activity, but patient denies difficulty breathing, SPO2 remained >95% on room air. Patient reports no further questions or concerns regarding discharge home to ADLs.     Patient End of Session: In bed;Call light within reach;Needs met;All patient questions and concerns addressed    OCCUPATIONAL PROFILE    HOME SITUATION  Type of Home: Condo  Home Layout: One level;Elevator  Lives With: Alone    Toilet and Equipment: Standard height toilet  Shower/Tub and Equipment: Tub-shower combo;Grab bar       Occupation/Status:  at [employer]     Drives: No  Patient Regularly Uses: Glasses    Prior Level of Function: Patient reports independent in all I/ADL and functional mobility without device prior to admission. Only assistance is a  once a month for heavy cleaning. Typically walks 1.5-2 miles each way to his job at [employer], which is where he also does his grocery shopping when there. Enjoys spending time with nieces and nephews, and now great nieces and nephews.    SUBJECTIVE  \"I usually walk to work, but sometimes one of my regular customers will see me and they'll give me a ride the rest of the way!\"    PAIN ASSESSMENT  Ratin  Location: denies       OBJECTIVE  Precautions: None  Fall Risk: Standard fall risk    WEIGHT BEARING RESTRICTION  Weight Bearing Restriction: None                 AM-PAC ‘6-Clicks’ Inpatient Daily Activity Short Form  -   Putting on and taking off regular lower body clothing?: None  -   Bathing (including washing, rinsing, drying)?: None  -   Toileting, which includes using toilet, bedpan or urinal? : None  -   Putting on and taking off regular upper body clothing?: None  -   Taking care of personal grooming such as brushing teeth?: None  -   Eating meals?: None    AM-PAC Score:  Score: 24  Approx Degree of Impairment: 0%  Standardized Score (AM-PAC Scale): 57.54      ADDITIONAL TESTS     NEUROLOGICAL FINDINGS        PLAN   Patient has been evaluated and presents with no skilled Occupational Therapy needs at this time.  Patient discharged from Occupational Therapy services.  Please re-order if a new functional limitation presents during this admission.      Patient Evaluation Complexity Level:   Occupational Profile/Medical History LOW - Brief history including review of medical or therapy records    Specific performance deficits impacting engagement in ADL/IADL LOW  1 - 3 performance deficits    Client Assessment/Performance Deficits LOW - No comorbidities nor modifications of tasks    Clinical Decision Making LOW - Analysis of occupational profile, problem-focused assessments, limited treatment options    Overall Complexity LOW     OT Session Time: 25 minutes  Therapeutic Activity: 10 minutes

## 2024-07-17 NOTE — CONSULTS
[facility]   part of [facility] ID CONSULT NOTE    Luis M Estrada Patient Status:  Inpatient    3/25/1965 MRN [id number]   Location [facility] 3NW-A Attending Catrina Portillo DO   Hosp Day # 1 PCP SILVER GRACIA DO       Reason for Consultation:  LLE cellulitis    History of Present Illness:  Luis M Estrada is a 59 year old male with a history of hydrocephalus and cellulitis.     Patient was hospitalized - for RLE cellulitis- states he completed abx and has not been on abx since that time. He now presents with left lower extremity pain, erythema and chills that started just prior to admission. Denies any known fevers prior to admission. Denies any new trauma to the leg.     Patient has multiple wounds to the LLE that he states are from a shopping cart hitting him 2024. He reports the wounds continue to improve. 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 He states he typically wears compression and elevates his legs on his breaks.     Denies any SOB, cough, abdominal pain, dysuria, nausea, vomiting or diarrhea.     Temp of 102.6 in the ED. Wbc 15.2K.     History:  Past Medical History:    Cellulitis    to left leg, seeking treatment at wound care for months    Hydrocephalus (HCC)    dx'd as an adult-no shunt     History reviewed. No pertinent surgical history.  History reviewed. No pertinent family history.   reports that he has never smoked. He has never used smokeless tobacco. He reports current alcohol use. He reports that he does not use drugs.    Allergies:  No Known Allergies    Medications:    Current Facility-Administered Medications:     magnesium oxide (Mag-Ox) tab 400 mg, 400 mg, Oral, Once    potassium chloride (Klor-Con M20) tab 40 mEq, 40 mEq, Oral, Once    sodium chloride 0.9% infusion, , Intravenous, Continuous    enoxaparin (Lovenox) 80 MG/0.8ML SUBQ injection 70 mg, 0.5 mg/kg, Subcutaneous, Q12H     acetaminophen (Tylenol Extra Strength) tab 1,000 mg, 1,000 mg, Oral, Q8H PRN    melatonin tab 3 mg, 3 mg, Oral, Nightly PRN    polyethylene glycol (PEG 3350) (Miralax) 17 g oral packet 17 g, 17 g, Oral, Daily PRN    sennosides (Senokot) tab 17.2 mg, 17.2 mg, Oral, Nightly PRN    bisacodyl (Dulcolax) 10 MG rectal suppository 10 mg, 10 mg, Rectal, Daily PRN    fleet enema (Fleet) 7-19 GM/118ML rectal enema 133 mL, 1 enema, Rectal, Once PRN    ondansetron (Zofran) 4 MG/2ML injection 4 mg, 4 mg, Intravenous, Q6H PRN    prochlorperazine (Compazine) 10 MG/2ML injection 5 mg, 5 mg, Intravenous, Q8H PRN    benzonatate (Tessalon) cap 200 mg, 200 mg, Oral, TID PRN    glycerin-hypromellose- (Artificial Tears) 0.2-0.2-1 % ophthalmic solution 1 drop, 1 drop, Both Eyes, QID PRN    sodium chloride (Saline Mist) 0.65 % nasal solution 1 spray, 1 spray, Each Nare, Q3H PRN    piperacillin-tazobactam (Zosyn) 3.375 g in dextrose 5% 100 mL IVPB-ADDV, 3.375 g, Intravenous, Q8H    ketorolac (Toradol) 30 MG/ML injection 30 mg, 30 mg, Intravenous, Q6H PRN    traMADol (Ultram) tab 50 mg, 50 mg, Oral, Q6H PRN    aspirin chewable tab 81 mg, 81 mg, Oral, Daily    vancomycin (Vancocin) 1.5 g in sodium chloride 0.9% 250mL IVPB premix, 1,500 mg, Intravenous, Q12H    Review of Systems:  CONSTITUTIONAL:  No weight loss  HEENT:  Eyes:  No visual loss. Ears, Nose, Throat:  No hearing loss  SKIN:  No rash or itching.  CARDIOVASCULAR:  No chest pain  RESPIRATORY:  No shortness of breath, cough   GASTROINTESTINAL:  No nausea, vomiting or diarrhea. No abdominal pain  GENITOURINARY:  No Burning on urination.   NEUROLOGICAL:  No headache  MUSCULOSKELETAL: + LLE pain  HEMATOLOGIC:  No bleeding  ALLERGIES:  No history of asthma    Physical Exam:  Vital signs: Blood pressure (!) 130/104, pulse 106, temperature 99.5 °F (37.5 °C), temperature source Oral, resp. rate 16, height 170.2 cm (5' 7\"), weight (!) 311 lb (141.1 kg), SpO2 95%.    General: Alert,  oriented, NAD, on room air.   HEENT: Moist mucous membranes.   Neck: No lymphadenopathy.  Supple.  Cardiovascular: RRR  Respiratory: Clear to auscultation bilaterally.  No wheezes. No rhonchi.  Abdomen: Soft, nontender, nondistended.   Musculoskeletal: Bilateral LE edema. Movement of all extremities  Integument: Erythema and warmth to LLE, extending from foot to just above the knee. See pictures below.                       Laboratory Data:  Recent Labs   Lab 07/17/24  0611   RBC 3.85*   HGB 12.3*   HCT 34.5*   MCV 89.6   MCH 31.9   MCHC 35.7   RDW 14.2   NEPRELIM 13.78*   WBC 15.2*   .0     Recent Labs   Lab 07/16/24  1801 07/17/24  0611   * 103*   BUN 17 18   CREATSERUM 1.02 0.86   CA 9.1 8.0*   ALB 4.1  --     137   K 3.7 3.4*    109   CO2 22.0 21.0   ALKPHO 79  --    AST 22  --    ALT 12  --    BILT 1.1  --    TP 8.0  --        Microbiology: Reviewed in EMR    Radiology: Reviewed.    PROCEDURE:  CT TIBIA/FIBULA LEFT (CPT=73700)     COMPARISON:  [facility] , CT, CT TIBIA/FIBULA RIGHT(CONTRAST ONLY) (CPT=73701), 6/20/2024, 7:43 PM.     INDICATIONS:  evaluate for necrotizing fasciitis     TECHNIQUE:  Multi-planar CT images were created without intravenous contrast. Shaded surface renderings are generated on an independent CT scanner workstation.  Dose reduction techniques were used. Dose information is transmitted to the ACR (American  College of Radiology) NRDR (National Radiology Data Registry) which includes the Dose Index Registry     3-D RENDERING:  Not applicable     PATIENT STATED HISTORY:(As transcribed by Technologist)  Worsening wound to left left near calf.      FINDINGS:    BONES:  There is diffuse osteopenia noted.  There is joint space narrowing and marginal osteophyte formation in the ankle and the knee.  There are enthesophytes at dorsal and plantar aspect of the calcaneus.  SOFT TISSUES:  Extensive venous varicosities are noted throughout the lower extremity.  There is diffuse  subcutaneous stranding noted.  This could represent cellulitis or hydrostatic edema related to venous insufficiency or congestive heart failure.    There is diffuse skin thickening noted.  Within the limits of a noncontrast study the skeletal muscles and deeper fascial planes appear otherwise normal.  There is no evidence of abscess.  There is no specific evidence of necrotizing fasciitis.  EFFUSION:  None visible.  OTHER:  Negative.     Impression:    CONCLUSION:    1. Diffuse subcutaneous stranding and skin thickening are noted.  There are extensive venous varicosities noted.  Findings could represent cellulitis.  Hydrostatic edema as a result of venous insufficiency or congestive heart failure could have this  appearance as well.  2. Deeper skeletal muscles and fascial planes are otherwise normal.  There is no specific evidence of myositis or fasciitis.  3. There is osteoarthritis in the knee and ankle.  There is no evidence of septic arthritis or osteomyelitis.      LOCATION:  [facility]     Dictated by (CST): Gideon Azar MD on 7/16/2024 at 9:56 PM      Finalized by (CST): Gideon Azar MD on 7/16/2024 at 9:59 PM    ASSESSMENT:  LLE cellulitis  H/o of recent RLE cellulitis  CT LLE w/o OM, myositis or fasciitis   Fevers with leukocytosis, assume d/t above. No other obvious source of infection at this time.  Chronic wounds to LLE, s/p trauma 1/2024. Improving. Wounds do not appear acutely infected.    PLAN:  - continue IV Zosyn and IV vanco for now but hopefully can narrow down soon   - follow blood cultures  - follow temps and wbc  - noted plans for echo  - follow LLE clinically; need edema control- elevate leg as able  - reviewed labs, micro, imaging reports, available old records    Discussed case with RN, wound care and patient.    Thank you for allowing us to participate in the care of this patient. Please do not hesitate to call if you have any questions.   We will continue to follow with you and will make  further recommendations based on his progress.    [facility]  [phone]  7/17/2024    ID ATTENDING ADDENDUM     Pt seen an examined independently. Chart reviewed. Agree with above. Note has been reviewed by me and modified as needed.  Exam and Impression/ Recs as noted above.  Seems to have chronic edema, with superimposed cellulitis on the LLE. Edema may be related to venous insuf based on CT findings. No evidence of deep infection. Can dc vanco and zosyn and cover with ancef, as will cover the most common agents of cellulitis which are strep sp. Cont leg elevation and compression as able  Will follow  D/w staff and with pt  More than 50% of clinical time and 100% of the clinical decision making performed by me.    Olivia Curry MD

## 2024-07-17 NOTE — PROGRESS NOTES
A&Ox4. VSS. RA. .   Productive cough, prn and scheduled medications ordered  GI: Abdomen soft, nondistended. Passing gas.  Denies nausea.  : Voids.  Pain controlled with PRN pain medications  Up with one assist and walker  Diet: Regular  IVF running per order. IV ancef   All appropriate safety measures in place. Patient updated on plan of care. All questions and concerns addressed.

## 2024-07-17 NOTE — CM/SW NOTE
Department  notified of request for HHC, aidin referrals started. Assigned CM/SW to follow up with pt/family on further discharge planning.     Kassandra Resendiz, DSC

## 2024-07-17 NOTE — ED PROVIDER NOTES
Patient Seen in: [facility] Emergency Department      History     Chief Complaint   Patient presents with    Wound Care    Pain     Stated Complaint: worsening wound to leg, pain    Subjective:   HPI    The patient is a 58 y/o M hx of chronic bilateral wounds presenting to the ER with severe left lower extremity pain with associated fever.  Patient states he has been seeing wound clinic for chronic lower extremity wounds secondary to shopping cart trauma.  Patient had cellulitis before but he states he has never had as much pain as he developed over the last 24 hours of his left lower extremity.  Patient also febrile to 101 here.    Objective:   Past Medical History:    Cellulitis    to left leg, seeking treatment at wound care for months    Hydrocephalus (HCC)    dx'd as an adult-no shunt              History reviewed. No pertinent surgical history.             Social History     Socioeconomic History    Marital status: Single   Tobacco Use    Smoking status: Never    Smokeless tobacco: Never   Vaping Use    Vaping status: Never Used   Substance and Sexual Activity    Alcohol use: Yes     Comment: 2 a week    Drug use: No   Other Topics Concern    Caffeine Concern No     Comment: 2 COFFEE Q AM    Exercise Yes     Comment: WALKING OCCAS   Social History Narrative    ** Merged History Encounter **          Social Determinants of Health     Financial Resource Strain: Low Risk  (1/8/2024)    Financial Resource Strain     Difficulty of Paying Living Expenses: Not very hard     Med Affordability: No   Food Insecurity: No Food Insecurity (7/17/2024)    Food Insecurity     Food Insecurity: Never true   Transportation Needs: No Transportation Needs (7/17/2024)    Transportation Needs     Lack of Transportation: No   Housing Stability: Low Risk  (7/17/2024)    Housing Stability     Housing Instability: No              Review of Systems    Positive for stated Chief Complaint: Wound Care and Pain    Other systems are as  noted in HPI.  Constitutional and vital signs reviewed.      All other systems reviewed and negative except as noted above.    Physical Exam     ED Triage Vitals [07/16/24 1736]   BP (!) 85/50   Pulse (!) 126   Resp 24   Temp (!) 102.6 °F (39.2 °C)   Temp src Oral   SpO2 96 %   O2 Device None (Room air)       Current Vitals:   Vital Signs  BP: 139/73  Pulse: 87  Resp: 16  Temp: 98.4 °F (36.9 °C)  Temp src: Oral  MAP (mmHg): 89    Oxygen Therapy  SpO2: 98 %  O2 Device: None (Room air)  Pulse Oximetry Type: Continuous  Oximetry Probe Site Changed: Yes  Pulse Ox Probe Location: Right hand            Physical Exam  Vitals and nursing note reviewed.   Constitutional:       General: He is not in acute distress.     Appearance: Normal appearance.   HENT:      Head: Normocephalic.      Nose: Nose normal.      Mouth/Throat:      Mouth: Mucous membranes are moist.   Eyes:      Extraocular Movements: Extraocular movements intact.      Pupils: Pupils are equal, round, and reactive to light.   Cardiovascular:      Rate and Rhythm: Normal rate and regular rhythm.      Pulses: Normal pulses.   Pulmonary:      Effort: Pulmonary effort is normal.   Abdominal:      General: Abdomen is flat. Bowel sounds are normal. There is no distension.      Palpations: Abdomen is soft.      Tenderness: There is no abdominal tenderness. There is no right CVA tenderness, left CVA tenderness, guarding or rebound.      Hernia: No hernia is present.   Musculoskeletal:         General: No swelling or tenderness. Normal range of motion.      Cervical back: Normal range of motion.      Right lower leg: Edema present.      Left lower leg: Edema present.   Skin:     General: Skin is warm and dry.      Comments: Left lower extremity is erythematous and warm to the touch and foul-smelling   Neurological:      Mental Status: He is alert and oriented to person, place, and time. Mental status is at baseline.   Psychiatric:         Mood and Affect: Mood normal.                ED Course     Labs Reviewed   COMP METABOLIC PANEL (14) - Abnormal; Notable for the following components:       Result Value    Glucose 113 (*)     All other components within normal limits   BASIC METABOLIC PANEL (8) - Abnormal; Notable for the following components:    Glucose 103 (*)     Potassium 3.4 (*)     Calcium, Total 8.0 (*)     All other components within normal limits   CBC W/ DIFFERENTIAL - Abnormal; Notable for the following components:    Neutrophil Absolute Prelim 9.39 (*)     Neutrophil Absolute 9.39 (*)     Lymphocyte Absolute 0.24 (*)     All other components within normal limits   CBC W/ DIFFERENTIAL - Abnormal; Notable for the following components:    WBC 15.2 (*)     RBC 3.85 (*)     HGB 12.3 (*)     HCT 34.5 (*)     Neutrophil Absolute Prelim 13.78 (*)     Neutrophil Absolute 13.78 (*)     Lymphocyte Absolute 0.62 (*)     All other components within normal limits   LACTIC ACID, PLASMA - Normal   MAGNESIUM - Normal   RAPID SARS-COV-2 BY PCR - Normal   CBC WITH DIFFERENTIAL WITH PLATELET    Narrative:     The following orders were created for panel order CBC With Differential With Platelet.  Procedure                               Abnormality         Status                     ---------                               -----------         ------                     CBC W/ DIFFERENTIAL[[id number]]          Abnormal            Final result                 Please view results for these tests on the individual orders.   CBC WITH DIFFERENTIAL WITH PLATELET    Narrative:     The following orders were created for panel order CBC With Differential With Platelet.  Procedure                               Abnormality         Status                     ---------                               -----------         ------                     CBC W/ DIFFERENTIAL[[id number]]          Abnormal            Final result                 Please view results for these tests on the individual orders.   MAGNESIUM    POTASSIUM   HEPARIN ANTI-XA   CBC WITH DIFFERENTIAL WITH PLATELET   COMP METABOLIC PANEL (14)   RAINBOW DRAW LAVENDER   RAINBOW DRAW LIGHT GREEN   RAINBOW DRAW BLUE   BLOOD CULTURE    Narrative:     Anaerobic Bottle Volume - 6 mL  Aerobic Bottle Volume - 8 mL   BLOOD CULTURE    Narrative:     Aerobic Bottle Volume - 16 mL  Anaerobic Bottle Volume - 14 mL          MDM      Differential for the patient's presentation includes cellulitis, necrotizing soft tissue skin infection which is a severe life threat, sepsis    Patient febrile tachycardic with a source of infection being his left lower extremity therefore started on IV fluids.  Patient was not given 30 cc/kg of IV fluids because that would lead to 4 L of IV fluids seems excessive therefore his ideal body weight was used.  Patient empirically antibiosis with vancomycin and cefepime.  I did add on clindamycin after speaking with our infectious disease doctors.    Patient's white blood cell count actually normal as well as his lactic acid level.  [facility] who recommended a CT scan of the left lower extremity to rule out necrotizing fasciitis as well which was ultimately negative.  Patient's vitals appear to improve as well as his pain after he received morphine.  Admission disposition: 7/18/2024  5:46 AM               [facility]   part of [facility]      Sepsis Reassessment Note    /82   Pulse 101   Temp (!) 102.6 °F (39.2 °C) (Oral)   Resp 17   Ht 170.2 cm (5' 7\")   Wt (!) 149.7 kg   SpO2 97%   BMI 51.69 kg/m²      I completed the sepsis reassessment at 1900    Cardiac:  Regularity: Regular  Rate: Normal  Heart Sounds: S1,S2    Lungs:   Right: Clear  Left: Clear    Peripheral Pulses:  Radial: Right 2+ or Left 2+      Capillary Refill:  <3 Secs    Skin:  Temp/Moisture: Warm and Dry  Color: Normal      Rand Irwin MD  7/16/2024  7:47 PM            Medical Decision Making      Disposition and Plan     Clinical  Impression:  1. Cellulitis of left lower extremity         Disposition:  Admit  7/18/2024  5:46 am    Follow-up:  No follow-up provider specified.        Medications Prescribed:  Current Discharge Medication List                            Hospital Problems       Present on Admission  Date Reviewed: 1/10/2024            ICD-10-CM Noted POA    Cellulitis L03.90 7/20/2012 Unknown    Sepsis without acute organ dysfunction (HCC) A41.9 Unknown Yes

## 2024-07-17 NOTE — CM/SW NOTE
Pt is a 58 yo male admitted for cellulitis of the left lower leg.  Pt currently on IV ABX but expect will change to PO ABX before dc.  Pt lives alone.  Wound care seeing pt.  PT saw pt:  Anticipated therapy need: Home with Home Healthcare.  HHO written.  DSC to send HH referrals.  SW to f/u for HH list and pt's HH choice.     07/17/24 1500   CM/SW Referral Data   Referral Source Social Work (self-referral)   Reason for Referral Discharge planning   Informant Clinical Staff Member   Readmission Assessment   Pt's living situation prior to admission? Home alone   Was full assessment completed by SW/CM on prior admission? No (comment)   Was the recommended discharge plan achieved? Yes   Was pt. discharged w/out services? Yes   ----D/C services: Reason(s) why patient was discharged w/out services?   (none needed)   Patient Info   Patient's Home Environment Condo/Apt no elevator   Patient lives with Alone   Discharge Needs   Anticipated D/C needs Home health care   Choice of Post-Acute Provider   Informed patient of right to choose their preferred provider Yes

## 2024-07-17 NOTE — ED QUICK NOTES
Orders for admission, patient is aware of plan and ready to go upstairs. Any questions, please call ED RN Claudio YORK at extension [phone].     Patient Covid vaccination status: Fully vaccinated     COVID Test Ordered in ED: Rapid SARS-CoV-2 by PCR    COVID Suspicion at Admission: N/A    Running Infusions:  Vancomycin    Mental Status/LOC at time of transport: a/o x 4    Other pertinent information:   CIWA score: N/A   NIH score:  N/A

## 2024-07-17 NOTE — PROGRESS NOTES
enoxaparin adjusted per P&T protocol based on weight AND renal function  Estimated Creatinine Clearance: 72.9 mL/min (based on SCr of 1.02 mg/dL).   Body mass index is 51.69 kg/m².     *NOTE: If patient has BMI < 40 kg/m2 and CrCl < 30 mL/min, use Ivent Type: Renal Dose Adjustment; Subtype: Enoxaparin- Renal Adjustment   Agent Adjustment (BMI > 40 kg/m2)    CrCl > 30 mL/min CrCl < 30 mL/min Hemodialysis   Enoxaparin 0.5 mg/kg q12h 0.5 mg/kg once daily Heparin 5000 units subcutaneously q8h   Heparin 7500 units q8h* 5000 units q8h* 5000 units q8h*   * NOTE: if q12h is ordered, keep frequency at q12h    Plan check anti-Xa level following 4th dose (7/18 pm).    Thank you,    Sirena Pierson, [facility]. [phone]

## 2024-07-17 NOTE — PROGRESS NOTES
[facility] Pharmacy Note:  Antibiotic Adjustment for piperacillin/tazobactam (ZOSYN)    Luis M Estrada is a 59 year old patient who has been prescribed piperacillin/tazobactam (ZOSYN) 3.375 gm every 8 hrs.  The estimated creatinine clearance is 86.5 mL/min (based on SCr of 0.86 mg/dL). The dose has been adjusted to piperacillin/tazobactam (ZOSYN) 4.5 gm every 8 hrs per hospital antibiotic dose adjustment protocol for treatment of cellulitis due to recent history of pseudomonas infection. Pharmacy will follow and adjust dose as warranted for additional renal function changes.    Thank you,    Sirena Pierson [facility]  7/17/2024  9:52 AM

## 2024-07-17 NOTE — PROGRESS NOTES
[facility]   part of [facility]     Hospitalist Progress Note     Luis M Estrada Patient Status:  Inpatient    3/25/1965 MRN [id number]   Location [facility] 3NW-A Attending Catrina Portillo DO   Hosp Day # 1 PCP SILVER GARCIA DO     Chief Complaint: LLE pain    Subjective:     Patient states pain improving and also swelling improving. No n/v.     Objective:    Review of Systems:   A comprehensive review of systems was completed; pertinent positive and negatives stated in subjective.    Vital signs:  Temp:  [98.6 °F (37 °C)-102.6 °F (39.2 °C)] 99.5 °F (37.5 °C)  Pulse:  [] 106  Resp:  [16-36] 16  BP: ()/() 130/104  SpO2:  [95 %-100 %] 95 %    Physical Exam:    General: No acute distress  Respiratory: No wheezes, no rhonchi  Cardiovascular: S1, S2, regular rate and rhythm  Abdomen: Soft, Non-tender, non-distended, positive bowel sounds  Neuro: No new focal deficits.   Extremities: bilateral LE edema, L>R    Diagnostic Data:    Labs:  Recent Labs   Lab 24  1801 24  0611   WBC 10.1 15.2*   HGB 15.1 12.3*   MCV 92.8 89.6   .0 197.0       Recent Labs   Lab 24  1801 24  0611   * 103*   BUN 17 18   CREATSERUM 1.02 0.86   CA 9.1 8.0*   ALB 4.1  --     137   K 3.7 3.4*    109   CO2 22.0 21.0   ALKPHO 79  --    AST 22  --    ALT 12  --    BILT 1.1  --    TP 8.0  --        Estimated Creatinine Clearance: 86.5 mL/min (based on SCr of 0.86 mg/dL).    No results for input(s): \"TROP\", \"TROPHS\", \"CK\" in the last 168 hours.    No results for input(s): \"PTP\", \"INR\" in the last 168 hours.               Microbiology    No results found for this visit on 24.      Imaging: Reviewed in Epic.    Medications:    enoxaparin  0.5 mg/kg Subcutaneous Q12H    piperacillin-tazobactam  3.375 g Intravenous Q8H    aspirin  81 mg Oral Daily    vancomycin  1,500 mg Intravenous Q12H       Assessment & Plan:      #LLE cellulitis   #Chronic venous insufficieny    -iv zosyn/vanco (7/16-)  -s/p ivf  -Bcx in process (7/16)  -CT left tib/fib (7/16): reviewed  -given chronic LE edema, no echo since 2016, get echo  -ID to see  -wound care    #Hydrocephalus     #Morbid obesity  -Body mass index is 48.71 kg/m².           Catrina Portillo,     Supplementary Documentation:     Quality:  DVT Mechanical Prophylaxis:        DVT Pharmacologic Prophylaxis   Medication    enoxaparin (Lovenox) 80 MG/0.8ML SUBQ injection 70 mg      DVT Pharmacologic prophylaxis: Aspirin 162 mg         Code Status: Full Code  Cooper: No urinary catheter in place  Cooper Duration (in days):   Central line:    YAA:     Discharge is dependent on:   At this point Mr. Estrada is expected to be discharge to:     The 21st Century Cures Act makes medical notes like these available to patients in the interest of transparency. Please be advised this is a medical document. Medical documents are intended to carry relevant information, facts as evident, and the clinical opinion of the practitioner. The medical note is intended as peer to peer communication and may appear blunt or direct. It is written in medical language and may contain abbreviations or verbiage that are unfamiliar.
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
## 2024-07-17 NOTE — PROGRESS NOTES
[facility] Pharmacy Dosing Service      Initial Pharmacokinetic Consult for Vancomycin Dosing     Luis M Estrada is a 59 year old male who is being initiated on vancomycin therapy for cellulitis.  Pharmacy has been asked to dose vancomycin by Dr. Pearson.  The initial treatment and monitoring approach will be steady state AUC strategy.        Weight and Temperature:    Wt Readings from Last 1 Encounters:   24 (!) 141.2 kg (311 lb 4.6 oz)        Temp Readings from Last 1 Encounters:   24 98.9 °F (37.2 °C) (Oral)      Labs:   Recent Labs   Lab 24  1801   CREATSERUM 1.02      Estimated Creatinine Clearance: 72.9 mL/min (based on SCr of 1.02 mg/dL).     Recent Labs   Lab 24  1801   WBC 10.1          The Pharmacokinetic Target is:       to 600 mg-h/L and trough <=15 mg/L    Renal Dosing Considerations:      None     Assessment/Plan:     Initial/Loading dose: Has received 2250 mg IV (25 mg/kg, capped at 2250 mg) x 1 loading dose.      Maintenance dose: Pharmacy will dose vancomycin at 1500 mg IV every 12 hours.    Monitorin) Plan for vancomycin peak and trough to be obtained at steady state.    2) Pharmacy will order SCr as clinically indicated to assess renal function.    3) Pharmacy will monitor for toxicity and efficacy, adjust vancomycin dose and/or frequency, and order vancomycin levels as appropriate per the Pharmacy and Therapeutics Committee approved protocol until discontinuation of the medication.       We appreciate the opportunity to assist in the care of this patient.     Debbie Mckeon PharmD  2024  11:50 PM  [facility] Extension: [phone]

## 2024-07-17 NOTE — PLAN OF CARE
Pt is alert and oriented x4.  VSS.  Maintaining o2 sats on r/a.  Congested cough noted, see MAR for intervention.  Lovenox for dvt prophylaxis.  Episode of incontinence/urinal spilling?  Pain is manageable per pt, declining intervention at this time.  IVF and abx as ordered.  Pt updated w/ poc.  All questions and concerns addressed at this time.

## 2024-07-18 ENCOUNTER — APPOINTMENT (OUTPATIENT)
Dept: WOUND CARE | Facility: HOSPITAL | Age: 59
End: 2024-07-18
Attending: FAMILY MEDICINE
Payer: COMMERCIAL

## 2024-07-18 LAB
ALBUMIN SERPL-MCNC: 3.3 G/DL (ref 3.2–4.8)
ALBUMIN/GLOB SERPL: 1 {RATIO} (ref 1–2)
ALP LIVER SERPL-CCNC: 63 U/L
ALT SERPL-CCNC: 11 U/L
ANION GAP SERPL CALC-SCNC: 8 MMOL/L (ref 0–18)
AST SERPL-CCNC: 28 U/L (ref ?–34)
BASOPHILS # BLD AUTO: 0.06 X10(3) UL (ref 0–0.2)
BASOPHILS NFR BLD AUTO: 0.7 %
BILIRUB SERPL-MCNC: 1.2 MG/DL (ref 0.3–1.2)
BUN BLD-MCNC: 14 MG/DL (ref 9–23)
CALCIUM BLD-MCNC: 8.2 MG/DL (ref 8.7–10.4)
CHLORIDE SERPL-SCNC: 108 MMOL/L (ref 98–112)
CO2 SERPL-SCNC: 20 MMOL/L (ref 21–32)
CREAT BLD-MCNC: 0.71 MG/DL
EGFRCR SERPLBLD CKD-EPI 2021: 106 ML/MIN/1.73M2 (ref 60–?)
EOSINOPHIL # BLD AUTO: 0.12 X10(3) UL (ref 0–0.7)
EOSINOPHIL NFR BLD AUTO: 1.3 %
ERYTHROCYTE [DISTWIDTH] IN BLOOD BY AUTOMATED COUNT: 14.1 %
GLOBULIN PLAS-MCNC: 3.3 G/DL (ref 2.8–4.4)
GLUCOSE BLD-MCNC: 86 MG/DL (ref 70–99)
HCT VFR BLD AUTO: 34.3 %
HGB BLD-MCNC: 12.1 G/DL
IMM GRANULOCYTES # BLD AUTO: 0.03 X10(3) UL (ref 0–1)
IMM GRANULOCYTES NFR BLD: 0.3 %
LYMPHOCYTES # BLD AUTO: 1.1 X10(3) UL (ref 1–4)
LYMPHOCYTES NFR BLD AUTO: 12.3 %
MAGNESIUM SERPL-MCNC: 1.9 MG/DL (ref 1.6–2.6)
MCH RBC QN AUTO: 32.2 PG (ref 26–34)
MCHC RBC AUTO-ENTMCNC: 35.3 G/DL (ref 31–37)
MCV RBC AUTO: 91.2 FL
MONOCYTES # BLD AUTO: 0.75 X10(3) UL (ref 0.1–1)
MONOCYTES NFR BLD AUTO: 8.4 %
NEUTROPHILS # BLD AUTO: 6.9 X10 (3) UL (ref 1.5–7.7)
NEUTROPHILS # BLD AUTO: 6.9 X10(3) UL (ref 1.5–7.7)
NEUTROPHILS NFR BLD AUTO: 77 %
OSMOLALITY SERPL CALC.SUM OF ELEC: 282 MOSM/KG (ref 275–295)
PLATELET # BLD AUTO: 166 10(3)UL (ref 150–450)
POTASSIUM SERPL-SCNC: 3.5 MMOL/L (ref 3.5–5.1)
POTASSIUM SERPL-SCNC: 3.5 MMOL/L (ref 3.5–5.1)
PROT SERPL-MCNC: 6.6 G/DL (ref 5.7–8.2)
RBC # BLD AUTO: 3.76 X10(6)UL
SODIUM SERPL-SCNC: 136 MMOL/L (ref 136–145)
UFH PPP CHRO-ACNC: 0.33 IU/ML
WBC # BLD AUTO: 9 X10(3) UL (ref 4–11)

## 2024-07-18 PROCEDURE — 99232 SBSQ HOSP IP/OBS MODERATE 35: CPT | Performed by: HOSPITALIST

## 2024-07-18 RX ORDER — FUROSEMIDE 10 MG/ML
40 INJECTION INTRAMUSCULAR; INTRAVENOUS ONCE
Status: COMPLETED | OUTPATIENT
Start: 2024-07-18 | End: 2024-07-18

## 2024-07-18 NOTE — PLAN OF CARE
Pt A&Ox4. VSS on RA. Afebrile. IV fluids infusing as ordered. IV ancef q8h. Ace wrap dsg to bilateral lower extremities dry and intact. Denies pain at this time. Up min assist, voiding in bathroom. Call light within reach. Will continue to monitor.
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
## 2024-07-18 NOTE — PROGRESS NOTES
[facility]   part of Merged with [facility]     Hospitalist Progress Note     Luis M Estrada Patient Status:  Inpatient    3/25/1965 MRN [id number]   Location [facility] 3NW-A Attending Catrina Portillo DO   Hosp Day # 2 PCP SILVER GARCIA DO     Chief Complaint: LLE pain    Subjective:     Pain improving, pt did have fever last night. No n/v.     Objective:    Review of Systems:   A comprehensive review of systems was completed; pertinent positive and negatives stated in subjective.    Vital signs:  Temp:  [98.4 °F (36.9 °C)-100.6 °F (38.1 °C)] 98.4 °F (36.9 °C)  Pulse:  [61-89] 87  Resp:  [16] 16  BP: (109-139)/(57-73) 139/73  SpO2:  [90 %-98 %] 98 %    Physical Exam:    General: No acute distress  Respiratory: No wheezes, no rhonchi  Cardiovascular: S1, S2, regular rate and rhythm  Abdomen: Soft, Non-tender, non-distended, positive bowel sounds  Neuro: No new focal deficits.   Extremities: bilateral LE edema, L>R    Diagnostic Data:    Labs:  Recent Labs   Lab 24  1801 24  0611 24  0639   WBC 10.1 15.2* 9.0   HGB 15.1 12.3* 12.1*   MCV 92.8 89.6 91.2   .0 197.0 166.0       Recent Labs   Lab 24  1801 24  0611 24  0639   * 103* 86   BUN 17 18 14   CREATSERUM 1.02 0.86 0.71   CA 9.1 8.0* 8.2*   ALB 4.1  --  3.3    137 136   K 3.7 3.4* 3.5  3.5    109 108   CO2 22.0 21.0 20.0*   ALKPHO 79  --  63   AST 22  --  28   ALT 12  --  11   BILT 1.1  --  1.2   TP 8.0  --  6.6       Estimated Creatinine Clearance: 104.7 mL/min (based on SCr of 0.71 mg/dL).    No results for input(s): \"TROP\", \"TROPHS\", \"CK\" in the last 168 hours.    No results for input(s): \"PTP\", \"INR\" in the last 168 hours.               Microbiology    Hospital Encounter on 24   1. Blood Culture     Status: None (Preliminary result)    Collection Time: 24  6:03 PM    Specimen: Blood,peripheral   Result Value Ref Range    Blood Culture Result No Growth 1 Day N/A          Imaging: Reviewed in Epic.    Medications:    clotrimazole  1 lozenge Oral Q4H While awake    ceFAZolin  2 g Intravenous Q8H    enoxaparin  0.5 mg/kg Subcutaneous Q12H    aspirin  81 mg Oral Daily       Assessment & Plan:      #LLE cellulitis   #Chronic venous insufficieny   #Febrile illness  -tmax 100.6 on 7/17 at 2100  -iv ancef (7/17-)  iv zosyn/vanco (7/16-7/17)  -s/p ivf; iv diuretic today  -Bcx in process (7/16)  -CT left tib/fib (7/16): reviewed  -given chronic LE edema, no echo since 2016, echo (7/17) reviewed, intact LVEF  -ID following, eval noted  -wound care    #Hydrocephalus     #Morbid obesity  -Body mass index is 48.71 kg/m².           Catrina Portillo DO    Supplementary Documentation:     Quality:  DVT Mechanical Prophylaxis:        DVT Pharmacologic Prophylaxis   Medication    enoxaparin (Lovenox) 80 MG/0.8ML SUBQ injection 70 mg      DVT Pharmacologic prophylaxis: Aspirin 162 mg         Code Status: Full Code  Cooper: No urinary catheter in place  Cooper Duration (in days):   Central line:    YAA: 7/19/2024    Discharge is dependent on:   At this point Mr. Estrada is expected to be discharge to:     The 21st Century Cures Act makes medical notes like these available to patients in the interest of transparency. Please be advised this is a medical document. Medical documents are intended to carry relevant information, facts as evident, and the clinical opinion of the practitioner. The medical note is intended as peer to peer communication and may appear blunt or direct. It is written in medical language and may contain abbreviations or verbiage that are unfamiliar.

## 2024-07-18 NOTE — PLAN OF CARE
Pt is alert and oriented x4.  VSS.  Maintaining o2 sats on r/a.  Lovenox for dvt prophylaxis.  Congested cough noted, some relief w/ lozenge.  Pt is voiding.  No BM this shift.  Some mild c/o leg pain, declined intervention at this time.  BLE wrapped w/ foam and ace bandages by wound care today.  IV abx as ordered.  Pt updated w/ poc.  All questions and concerns addressed at this time.

## 2024-07-18 NOTE — PROGRESS NOTES
[facility]   part of [facility] ID PROGRESS NOTE    Luis M Estrada Patient Status:  Inpatient    3/25/1965 MRN [id number]   Location [facility] 3NW-A Attending Catrina Portillo,    Hosp Day # 2 PCP SILVER GARCIA,      Abx: IV Ancef, s/p IV vanco/zosyn    Subjective: Patient seen and examined today. Minimal pain to his LLE today. Temps of 100.6 overnight. On room air.     Allergies:  No Known Allergies    Medications:    Current Facility-Administered Medications:     clotrimazole (Mycelex) lozenge 10 mg, 1 lozenge, Oral, Q4H While awake    ceFAZolin (Ancef) 2g in 10mL IV syringe premix, 2 g, Intravenous, Q8H    enoxaparin (Lovenox) 80 MG/0.8ML SUBQ injection 70 mg, 0.5 mg/kg, Subcutaneous, Q12H    acetaminophen (Tylenol Extra Strength) tab 1,000 mg, 1,000 mg, Oral, Q8H PRN    melatonin tab 3 mg, 3 mg, Oral, Nightly PRN    polyethylene glycol (PEG 3350) (Miralax) 17 g oral packet 17 g, 17 g, Oral, Daily PRN    sennosides (Senokot) tab 17.2 mg, 17.2 mg, Oral, Nightly PRN    bisacodyl (Dulcolax) 10 MG rectal suppository 10 mg, 10 mg, Rectal, Daily PRN    fleet enema (Fleet) 7-19 GM/118ML rectal enema 133 mL, 1 enema, Rectal, Once PRN    ondansetron (Zofran) 4 MG/2ML injection 4 mg, 4 mg, Intravenous, Q6H PRN    prochlorperazine (Compazine) 10 MG/2ML injection 5 mg, 5 mg, Intravenous, Q8H PRN    benzonatate (Tessalon) cap 200 mg, 200 mg, Oral, TID PRN    glycerin-hypromellose- (Artificial Tears) 0.2-0.2-1 % ophthalmic solution 1 drop, 1 drop, Both Eyes, QID PRN    sodium chloride (Saline Mist) 0.65 % nasal solution 1 spray, 1 spray, Each Nare, Q3H PRN    ketorolac (Toradol) 30 MG/ML injection 30 mg, 30 mg, Intravenous, Q6H PRN    traMADol (Ultram) tab 50 mg, 50 mg, Oral, Q6H PRN    aspirin chewable tab 81 mg, 81 mg, Oral, Daily    Review of Systems:  Completed. See pertinent positives and negatives above.     Physical Exam:  Vital signs: Blood pressure 131/73, pulse 80, temperature  98.5 °F (36.9 °C), temperature source Oral, resp. rate 17, height 170.2 cm (5' 7\"), weight (!) 311 lb (141.1 kg), SpO2 95%.    General: Alert, oriented, NAD, on room air.   HEENT: Moist mucous membranes.   Neck: No lymphadenopathy.  Supple.  Cardiovascular: RRR  Respiratory: Clear to auscultation bilaterally.  No wheezes. No rhonchi.  Abdomen: Soft, nontender, nondistended.   Musculoskeletal: Bilateral LE edema but improved. Movement of all extremities  Integument: Erythema and warmth to LLE. Less edema. Erythema receding- does not go as high on the leg now.     Laboratory Data:  Recent Labs   Lab 07/18/24  0639   RBC 3.76*   HGB 12.1*   HCT 34.3*   MCV 91.2   MCH 32.2   MCHC 35.3   RDW 14.1   NEPRELIM 6.90   WBC 9.0   .0     Recent Labs   Lab 07/16/24  1801 07/17/24  0611 07/18/24  0639   * 103* 86   BUN 17 18 14   CREATSERUM 1.02 0.86 0.71   CA 9.1 8.0* 8.2*   ALB 4.1  --  3.3    137 136   K 3.7 3.4* 3.5  3.5    109 108   CO2 22.0 21.0 20.0*   ALKPHO 79  --  63   AST 22  --  28   ALT 12  --  11   BILT 1.1  --  1.2   TP 8.0  --  6.6       Microbiology: Reviewed in EMR    Radiology: Reviewed.    PROCEDURE:  CT TIBIA/FIBULA LEFT (CPT=73700)     COMPARISON:  JUAN JOSÉ , CT, CT TIBIA/FIBULA RIGHT(CONTRAST ONLY) (CPT=73701), 6/20/2024, 7:43 PM.     INDICATIONS:  evaluate for necrotizing fasciitis     TECHNIQUE:  Multi-planar CT images were created without intravenous contrast. Shaded surface renderings are generated on an independent CT scanner workstation.  Dose reduction techniques were used. Dose information is transmitted to the ACR (American  College of Radiology) NRDR (National Radiology Data Registry) which includes the Dose Index Registry     3-D RENDERING:  Not applicable     PATIENT STATED HISTORY:(As transcribed by Technologist)  Worsening wound to left left near calf.      FINDINGS:    BONES:  There is diffuse osteopenia noted.  There is joint space narrowing and marginal osteophyte  formation in the ankle and the knee.  There are enthesophytes at dorsal and plantar aspect of the calcaneus.  SOFT TISSUES:  Extensive venous varicosities are noted throughout the lower extremity.  There is diffuse subcutaneous stranding noted.  This could represent cellulitis or hydrostatic edema related to venous insufficiency or congestive heart failure.    There is diffuse skin thickening noted.  Within the limits of a noncontrast study the skeletal muscles and deeper fascial planes appear otherwise normal.  There is no evidence of abscess.  There is no specific evidence of necrotizing fasciitis.  EFFUSION:  None visible.  OTHER:  Negative.     Impression:    CONCLUSION:    1. Diffuse subcutaneous stranding and skin thickening are noted.  There are extensive venous varicosities noted.  Findings could represent cellulitis.  Hydrostatic edema as a result of venous insufficiency or congestive heart failure could have this  appearance as well.  2. Deeper skeletal muscles and fascial planes are otherwise normal.  There is no specific evidence of myositis or fasciitis.  3. There is osteoarthritis in the knee and ankle.  There is no evidence of septic arthritis or osteomyelitis.      LOCATION:  [facility]     Dictated by (CST): Gideon Azar MD on 7/16/2024 at 9:56 PM      Finalized by (CST): Gideon Azar MD on 7/16/2024 at 9:59 PM    ASSESSMENT:  LLE cellulitis  H/o of recent RLE cellulitis  CT LLE w/o OM, myositis or fasciitis   Fevers with leukocytosis, assume d/t above. No other obvious source of infection at this time. Fevers improving, WBC now wnl.   Chronic wounds to LLE, s/p trauma 1/2024. Improving. Wounds do not appear acutely infected.    PLAN:  - continue IV Ancef  - follow blood cultures- ngtd  - follow temps and wbc  - follow LLE clinically; need edema control- elevate leg as able/continue compression. Plans for diuresis today per hospitalist    Discussed case with RN, patient and Dr. Portillo.    Milena  [facility]  [phone]    ID ATTENDING ADDENDUM     Pt seen an examined independently. Chart reviewed. Agree with above. Note has been reviewed by me and modified as needed.  Exam and Impression/ Recs as noted above.  Will follow  D/w staff and with pt  More than 50% of clinical time and 100% of the clinical decision making performed by me.    Olivia Curry MD

## 2024-07-19 ENCOUNTER — APPOINTMENT (OUTPATIENT)
Dept: GENERAL RADIOLOGY | Facility: HOSPITAL | Age: 59
End: 2024-07-19
Attending: HOSPITALIST
Payer: COMMERCIAL

## 2024-07-19 LAB
ANION GAP SERPL CALC-SCNC: 6 MMOL/L (ref 0–18)
BASOPHILS # BLD AUTO: 0.06 X10(3) UL (ref 0–0.2)
BASOPHILS NFR BLD AUTO: 1.1 %
BUN BLD-MCNC: 14 MG/DL (ref 9–23)
CALCIUM BLD-MCNC: 8.7 MG/DL (ref 8.7–10.4)
CHLORIDE SERPL-SCNC: 108 MMOL/L (ref 98–112)
CO2 SERPL-SCNC: 24 MMOL/L (ref 21–32)
CREAT BLD-MCNC: 0.61 MG/DL
EGFRCR SERPLBLD CKD-EPI 2021: 111 ML/MIN/1.73M2 (ref 60–?)
EOSINOPHIL # BLD AUTO: 0.15 X10(3) UL (ref 0–0.7)
EOSINOPHIL NFR BLD AUTO: 2.7 %
ERYTHROCYTE [DISTWIDTH] IN BLOOD BY AUTOMATED COUNT: 13.7 %
GLUCOSE BLD-MCNC: 91 MG/DL (ref 70–99)
HCT VFR BLD AUTO: 36.7 %
HGB BLD-MCNC: 12.5 G/DL
IMM GRANULOCYTES # BLD AUTO: 0.03 X10(3) UL (ref 0–1)
IMM GRANULOCYTES NFR BLD: 0.5 %
LYMPHOCYTES # BLD AUTO: 1.11 X10(3) UL (ref 1–4)
LYMPHOCYTES NFR BLD AUTO: 19.9 %
MAGNESIUM SERPL-MCNC: 1.8 MG/DL (ref 1.6–2.6)
MCH RBC QN AUTO: 31.6 PG (ref 26–34)
MCHC RBC AUTO-ENTMCNC: 34.1 G/DL (ref 31–37)
MCV RBC AUTO: 92.7 FL
MONOCYTES # BLD AUTO: 0.55 X10(3) UL (ref 0.1–1)
MONOCYTES NFR BLD AUTO: 9.9 %
NEUTROPHILS # BLD AUTO: 3.67 X10 (3) UL (ref 1.5–7.7)
NEUTROPHILS # BLD AUTO: 3.67 X10(3) UL (ref 1.5–7.7)
NEUTROPHILS NFR BLD AUTO: 65.9 %
OSMOLALITY SERPL CALC.SUM OF ELEC: 286 MOSM/KG (ref 275–295)
PLATELET # BLD AUTO: 205 10(3)UL (ref 150–450)
POTASSIUM SERPL-SCNC: 3.3 MMOL/L (ref 3.5–5.1)
RBC # BLD AUTO: 3.96 X10(6)UL
SODIUM SERPL-SCNC: 138 MMOL/L (ref 136–145)
WBC # BLD AUTO: 5.6 X10(3) UL (ref 4–11)

## 2024-07-19 PROCEDURE — 99232 SBSQ HOSP IP/OBS MODERATE 35: CPT | Performed by: HOSPITALIST

## 2024-07-19 PROCEDURE — 71045 X-RAY EXAM CHEST 1 VIEW: CPT | Performed by: HOSPITALIST

## 2024-07-19 RX ORDER — POTASSIUM CHLORIDE 20 MEQ/1
40 TABLET, EXTENDED RELEASE ORAL EVERY 4 HOURS
Status: COMPLETED | OUTPATIENT
Start: 2024-07-19 | End: 2024-07-19

## 2024-07-19 RX ORDER — CEFADROXIL 500 MG/1
1 CAPSULE ORAL 2 TIMES DAILY
Qty: 56 CAPSULE | Refills: 0 | Status: SHIPPED | OUTPATIENT
Start: 2024-07-19 | End: 2024-07-26

## 2024-07-19 RX ORDER — MAGNESIUM OXIDE 400 MG/1
400 TABLET ORAL ONCE
Status: COMPLETED | OUTPATIENT
Start: 2024-07-19 | End: 2024-07-19

## 2024-07-19 RX ORDER — FUROSEMIDE 10 MG/ML
40 INJECTION INTRAMUSCULAR; INTRAVENOUS ONCE
Status: COMPLETED | OUTPATIENT
Start: 2024-07-19 | End: 2024-07-19

## 2024-07-19 NOTE — PLAN OF CARE
Pt a&o x 4.  Friendly & cooperative w/ care  Up w/ SBA.   Pt noted w/ generalized weakness  Denies c/o pain  Tolerating regular diet. Appetite good.  Denies N&V  Ancef continued as ordered  Generalized edematous.  Per hospitalist,   CXR & Lasix x 1 dose today  Voiding per urinal  Plan of care:  BMP in am & anticipate discharge

## 2024-07-19 NOTE — PROGRESS NOTES
[facility]   part of Merged with [facility]     Hospitalist Progress Note     Luis M Estrada Patient Status:  Inpatient    3/25/1965 MRN [id number]   Location [facility] 3NW-A Attending Catrina Portillo DO   Hosp Day # 3 PCP SILVER GARCIA DO     Chief Complaint: LLE pain    Subjective:     Pain cont to improve, attempting to keep legs elevated as much as tolerating. Had \"peed a lot\" overnight.     Objective:    Review of Systems:   A comprehensive review of systems was completed; pertinent positive and negatives stated in subjective.    Vital signs:  Temp:  [98.2 °F (36.8 °C)-98.5 °F (36.9 °C)] 98.2 °F (36.8 °C)  Pulse:  [80-89] 83  Resp:  [17-24] 24  BP: (128-143)/(73-83) 128/83  SpO2:  [94 %-96 %] 94 %    Physical Exam:    General: No acute distress  Respiratory: No wheezes, no rhonchi  Cardiovascular: S1, S2, regular rate and rhythm  Abdomen: Soft, Non-tender, non-distended, positive bowel sounds  Neuro: No new focal deficits.   Extremities: bilateral LE edema, L>R    Diagnostic Data:    Labs:  Recent Labs   Lab 24  1801 24  0611 24  0639 24  0725   WBC 10.1 15.2* 9.0 5.6   HGB 15.1 12.3* 12.1* 12.5*   MCV 92.8 89.6 91.2 92.7   .0 197.0 166.0 205.0       Recent Labs   Lab 24  1801 24  0611 24  0639 24  0725   * 103* 86 91   BUN 17 18 14 14   CREATSERUM 1.02 0.86 0.71 0.61*   CA 9.1 8.0* 8.2* 8.7   ALB 4.1  --  3.3  --     137 136 138   K 3.7 3.4* 3.5  3.5 3.3*    109 108 108   CO2 22.0 21.0 20.0* 24.0   ALKPHO 79  --  63  --    AST 22  --  28  --    ALT 12  --  11  --    BILT 1.1  --  1.2  --    TP 8.0  --  6.6  --        Estimated Creatinine Clearance: 121.9 mL/min (A) (based on SCr of 0.61 mg/dL (L)).    No results for input(s): \"TROP\", \"TROPHS\", \"CK\" in the last 168 hours.    No results for input(s): \"PTP\", \"INR\" in the last 168 hours.               Microbiology    Hospital Encounter on 24   1. Blood Culture     Status:  None (Preliminary result)    Collection Time: 07/16/24  6:03 PM    Specimen: Blood,peripheral   Result Value Ref Range    Blood Culture Result No Growth 2 Days N/A         Imaging: Reviewed in Epic.    Medications:    magnesium oxide  400 mg Oral Once    potassium chloride  40 mEq Oral q4h    furosemide  40 mg Intravenous Once    clotrimazole  1 lozenge Oral Q4H While awake    ceFAZolin  2 g Intravenous Q8H    enoxaparin  0.5 mg/kg Subcutaneous Q12H    aspirin  81 mg Oral Daily       Assessment & Plan:      #Dispo  -plan for dc tomorrow     #LLE cellulitis   #Chronic venous insufficieny   #Febrile illness  -tmax 100.6 on 7/17 at 2100; afebrile since  -iv ancef (7/17-)  iv zosyn/vanco (7/16-7/17)  -s/p ivf; iv diuretic again today, s/p x1 dose on 7/18 w/ 3000cc urine output  -Bcx in process (7/16)  -CT left tib/fib (7/16): reviewed  -given chronic LE edema, no echo since 2016, echo (7/17) reviewed, intact LVEF  -ID following, eval noted  -wound care    #Hydrocephalus     #Morbid obesity  -Body mass index is 48.71 kg/m².      Catrina Portillo,     Supplementary Documentation:     Quality:  DVT Mechanical Prophylaxis:        DVT Pharmacologic Prophylaxis   Medication    enoxaparin (Lovenox) 80 MG/0.8ML SUBQ injection 70 mg      DVT Pharmacologic prophylaxis: Aspirin 162 mg         Code Status: Full Code  Cooper: No urinary catheter in place  Cooper Duration (in days):   Central line:    YAA: 7/19/2024    Discharge is dependent on:   At this point Mr. Estrada is expected to be discharge to:     The 21st Century Cures Act makes medical notes like these available to patients in the interest of transparency. Please be advised this is a medical document. Medical documents are intended to carry relevant information, facts as evident, and the clinical opinion of the practitioner. The medical note is intended as peer to peer communication and may appear blunt or direct. It is written in medical language and may contain  abbreviations or verbiage that are unfamiliar.

## 2024-07-19 NOTE — PROGRESS NOTES
Pharmacy Progress Note:  Anticoagulation Dose Adjustment     Luis M Estrada is a 59 year old male on enoxaparin for VTE prophylaxis.  Anti-factor Xa levels are being monitored due to the patient's high risk status of obesity.    Relevant labs:    Body mass index is 48.71 kg/m².    Wt Readings from Last 1 Encounters:   07/17/24 (!) 141.1 kg (311 lb)       Lab Results   Component Value Date    CREATSERUM 0.71 07/18/2024       Heparin Anti Xa Assay   Date Value Ref Range Status   07/18/2024 0.33 IU/mL Final       Anti Xa level being assessed:  0.33 units/mL (Peak drawn 4 hours after dose).  Goal Peak Anti-Xa level:  (Enoxaparin prophylaxis: 0.2-0.4 unit/mL).  Goal Trough Anti-Xa level: </=0.5 unit/mL  (when applicable)    Based on the above, enoxaparin dose will continue at 70 mg every 12 hours . Next Anti-factor Xa Peak drawn 4 hours after dosewill be ordered on 7/25.    Thank you,  Deidre Garcia, PharmD  7/18/2024 8:03 PM

## 2024-07-19 NOTE — CDS QUERY
CLINICAL DOCUMENTATION CLARIFICATION FORM  Dear Doctor Bandar:  .   PLEASE (X)  ALL THAT APPLY TO THE DIAGNOSIS OF:     SEPSIS  _________________________________________________________  SELECTION BY PROVIDER ONLY  (  )   Ruled In    POA: [  ] Yes  [  ] No  ( x )   Ruled Out  (  )  Other:________________________         Clinical Indicators:  7/16 ED provider note: Differential for the patient's presentation includes cellulitis, necrotizing soft tissue skin infection which is a severe life threat, sepsis  7/16 H&P- LLE cellulitis with sepsis with hypotension/fever/tachycardia  WBC 15.2  7/19 Progrss note: Fevers with leukocytosis . No other obvious source of infection. WNC wnl.  Blood cultures NGTD            Risk Factors:  LLE Cellulitis  Chronic wounds to LLE    Treatments:  Blood cultures- NGTD  IV Fluids  IV antibiotics- Ancef  ID Consult      If you have any questions, please contact Clinical : Daysi Gant RN CDS  at [email or web address]  Thank You!  THIS FORM IS A PERMANENT PART OF THE MEDICAL RECORD

## 2024-07-19 NOTE — PROGRESS NOTES
[facility]   part of [facility] ID PROGRESS NOTE    Luis M Estrada Patient Status:  Inpatient    3/25/1965 MRN [id number]   Location [facility] 3NW-A Attending Catrina Portillo,    Hosp Day # 3 PCP SILVER GARCIA,      Abx: IV Ancef, s/p IV vanco/zosyn    Subjective: Patient seen and examined today. Feeling better. Less edema/erythema to his leg. Afebrile > 24 hours. Denies any new symptoms.     Allergies:  No Known Allergies    Medications:    Current Facility-Administered Medications:     potassium chloride (Klor-Con M20) tab 40 mEq, 40 mEq, Oral, q4h    clotrimazole (Mycelex) lozenge 10 mg, 1 lozenge, Oral, Q4H While awake    ceFAZolin (Ancef) 2g in 10mL IV syringe premix, 2 g, Intravenous, Q8H    enoxaparin (Lovenox) 80 MG/0.8ML SUBQ injection 70 mg, 0.5 mg/kg, Subcutaneous, Q12H    acetaminophen (Tylenol Extra Strength) tab 1,000 mg, 1,000 mg, Oral, Q8H PRN    melatonin tab 3 mg, 3 mg, Oral, Nightly PRN    polyethylene glycol (PEG 3350) (Miralax) 17 g oral packet 17 g, 17 g, Oral, Daily PRN    sennosides (Senokot) tab 17.2 mg, 17.2 mg, Oral, Nightly PRN    bisacodyl (Dulcolax) 10 MG rectal suppository 10 mg, 10 mg, Rectal, Daily PRN    fleet enema (Fleet) 7-19 GM/118ML rectal enema 133 mL, 1 enema, Rectal, Once PRN    ondansetron (Zofran) 4 MG/2ML injection 4 mg, 4 mg, Intravenous, Q6H PRN    prochlorperazine (Compazine) 10 MG/2ML injection 5 mg, 5 mg, Intravenous, Q8H PRN    benzonatate (Tessalon) cap 200 mg, 200 mg, Oral, TID PRN    glycerin-hypromellose- (Artificial Tears) 0.2-0.2-1 % ophthalmic solution 1 drop, 1 drop, Both Eyes, QID PRN    sodium chloride (Saline Mist) 0.65 % nasal solution 1 spray, 1 spray, Each Nare, Q3H PRN    traMADol (Ultram) tab 50 mg, 50 mg, Oral, Q6H PRN    aspirin chewable tab 81 mg, 81 mg, Oral, Daily    Review of Systems:  Completed. See pertinent positives and negatives above.     Physical Exam:  Vital signs: Blood pressure 138/83, pulse  78, temperature 98.7 °F (37.1 °C), temperature source Oral, resp. rate 16, height 170.2 cm (5' 7\"), weight (!) 311 lb (141.1 kg), SpO2 91%.    General: Alert, oriented, NAD, on room air.   HEENT: Moist mucous membranes.   Neck: No lymphadenopathy.  Supple.  Cardiovascular: RRR  Respiratory: Clear to auscultation bilaterally.  No wheezes. No rhonchi.  Abdomen: Soft, nontender, nondistended.   Musculoskeletal: Bilateral LE edema but improved. Movement of all extremities  Integument: Erythema and warmth to LLE. Less edema. Erythema improved further from the previous day.     Laboratory Data:  Recent Labs   Lab 07/19/24  0725   RBC 3.96*   HGB 12.5*   HCT 36.7*   MCV 92.7   MCH 31.6   MCHC 34.1   RDW 13.7   NEPRELIM 3.67   WBC 5.6   .0     Recent Labs   Lab 07/16/24  1801 07/17/24  0611 07/18/24  0639 07/19/24  0725   * 103* 86 91   BUN 17 18 14 14   CREATSERUM 1.02 0.86 0.71 0.61*   CA 9.1 8.0* 8.2* 8.7   ALB 4.1  --  3.3  --     137 136 138   K 3.7 3.4* 3.5  3.5 3.3*    109 108 108   CO2 22.0 21.0 20.0* 24.0   ALKPHO 79  --  63  --    AST 22  --  28  --    ALT 12  --  11  --    BILT 1.1  --  1.2  --    TP 8.0  --  6.6  --        Microbiology: Reviewed in EMR    Radiology: Reviewed.    PROCEDURE:  CT TIBIA/FIBULA LEFT (CPT=73700)     COMPARISON:  [facility] , CT, CT TIBIA/FIBULA RIGHT(CONTRAST ONLY) (CPT=73701), 6/20/2024, 7:43 PM.     INDICATIONS:  evaluate for necrotizing fasciitis     TECHNIQUE:  Multi-planar CT images were created without intravenous contrast. Shaded surface renderings are generated on an independent CT scanner workstation.  Dose reduction techniques were used. Dose information is transmitted to the ACR (American  College of Radiology) NRDR (National Radiology Data Registry) which includes the Dose Index Registry     3-D RENDERING:  Not applicable     PATIENT STATED HISTORY:(As transcribed by Technologist)  Worsening wound to left left near calf.      FINDINGS:    BONES:   There is diffuse osteopenia noted.  There is joint space narrowing and marginal osteophyte formation in the ankle and the knee.  There are enthesophytes at dorsal and plantar aspect of the calcaneus.  SOFT TISSUES:  Extensive venous varicosities are noted throughout the lower extremity.  There is diffuse subcutaneous stranding noted.  This could represent cellulitis or hydrostatic edema related to venous insufficiency or congestive heart failure.    There is diffuse skin thickening noted.  Within the limits of a noncontrast study the skeletal muscles and deeper fascial planes appear otherwise normal.  There is no evidence of abscess.  There is no specific evidence of necrotizing fasciitis.  EFFUSION:  None visible.  OTHER:  Negative.     Impression:    CONCLUSION:    1. Diffuse subcutaneous stranding and skin thickening are noted.  There are extensive venous varicosities noted.  Findings could represent cellulitis.  Hydrostatic edema as a result of venous insufficiency or congestive heart failure could have this  appearance as well.  2. Deeper skeletal muscles and fascial planes are otherwise normal.  There is no specific evidence of myositis or fasciitis.  3. There is osteoarthritis in the knee and ankle.  There is no evidence of septic arthritis or osteomyelitis.      LOCATION:  [place]     Dictated by (CST): Gideon Azar MD on 7/16/2024 at 9:56 PM      Finalized by (CST): Gideon Azar MD on 7/16/2024 at 9:59 PM    ASSESSMENT:  LLE cellulitis, improving.  H/o of recent RLE cellulitis  CT LLE w/o OM, myositis or fasciitis   Fevers with leukocytosis, assume d/t above. No other obvious source of infection at this time. No further fevers. WBC now wnl.   Chronic wounds to LLE, s/p trauma 1/2024. Improving. Wounds do not appear acutely infected.    PLAN:  - continue IV Ancef  - follow blood cultures- ngtd  - follow temps and wbc  - follow LLE clinically; elevate legs- continue compression with ace.   - ok for dc from  ID standpoint with po cefadroxil x 14 days, when ok with other providers on the case.     Discussed case with RN, patient and Dr. Curry.    BRYAN Mccauley   [facility]  [phone]    ID ATTENDING ADDENDUM     Pt seen an examined independently. Chart reviewed. Agree with above. Note has been reviewed by me and modified as needed.  Exam and Impression/ Recs as noted above.  D/w staff and with pt  More than 50% of clinical time and 100% of the clinical decision making performed by me.    Olivia Curry MD

## 2024-07-19 NOTE — PROGRESS NOTES
A/Ox4, RA, VSS, SL.  IVF and IV abx per order.  Tolerating regular diet.  Ace wrap to BLE c/d/I.  No complaints of pain or nausea.  Voiding via urinal at bedside.  Pt updated on plan of care, call light and belongings within reach, questions and concerns addressed.

## 2024-07-19 NOTE — CM/SW NOTE
CM reviewed home health referral in [facility] system; no accepting agencies at this time.  Referral reopened and sent to additional agencies for review, will provide patient with HH agency choice list when available.      Mell Ivan RN Case Manager [phone]

## 2024-07-19 NOTE — DISCHARGE INSTRUCTIONS
Sometimes managing your health at home requires assistance.  The [facility] team has recognized your preference to use [facility] Phone: [phone]. A representative from the home health agency will contact you or your family to schedule your first visit.          Foam placed for protection and added ace wrap to ERASMO SOSA  RN aware of the above.   Recommend and explained to patient to apply compression stocking (that he has at home) when he is discharged to home.

## 2024-07-20 VITALS
DIASTOLIC BLOOD PRESSURE: 81 MMHG | TEMPERATURE: 98 F | HEIGHT: 67 IN | HEART RATE: 71 BPM | BODY MASS INDEX: 48.81 KG/M2 | OXYGEN SATURATION: 95 % | RESPIRATION RATE: 18 BRPM | WEIGHT: 311 LBS | SYSTOLIC BLOOD PRESSURE: 127 MMHG

## 2024-07-20 LAB
ANION GAP SERPL CALC-SCNC: 7 MMOL/L (ref 0–18)
BUN BLD-MCNC: 16 MG/DL (ref 9–23)
CALCIUM BLD-MCNC: 9.3 MG/DL (ref 8.7–10.4)
CHLORIDE SERPL-SCNC: 109 MMOL/L (ref 98–112)
CO2 SERPL-SCNC: 25 MMOL/L (ref 21–32)
CREAT BLD-MCNC: 0.74 MG/DL
EGFRCR SERPLBLD CKD-EPI 2021: 104 ML/MIN/1.73M2 (ref 60–?)
GLUCOSE BLD-MCNC: 88 MG/DL (ref 70–99)
MAGNESIUM SERPL-MCNC: 2 MG/DL (ref 1.6–2.6)
OSMOLALITY SERPL CALC.SUM OF ELEC: 293 MOSM/KG (ref 275–295)
POTASSIUM SERPL-SCNC: 3.9 MMOL/L (ref 3.5–5.1)
SODIUM SERPL-SCNC: 141 MMOL/L (ref 136–145)

## 2024-07-20 PROCEDURE — 99239 HOSP IP/OBS DSCHRG MGMT >30: CPT | Performed by: HOSPITALIST

## 2024-07-20 NOTE — PLAN OF CARE
Pt vitals stable, A&O x4. Pt denied chest pain and shortness of breath. Strong cough noted. Tolerating diet without nausea. Denied pain at this time. Redness and edema to bilateral legs noted. Bed locked in low position, call light in reach, rounding provided.

## 2024-07-20 NOTE — CM/SW NOTE
07/20/24 1000   Discharge disposition   Expected discharge disposition Home-Health   Post Acute Care Provider [facility]  ([facility])   Discharge transportation Private car     SW completed chart review. Per RN anticipating DC today. Reviewed HH referral, only one accepting agency at this time, [facility] . Reserved in Aidin, and updated of DC today. AVS updated.    SW/CM to remain available for dc planning, and/or additional need for support.    NEIL Phelps  Discharge Planner  [phone]

## 2024-07-20 NOTE — DISCHARGE SUMMARY
[facility] HOSPITAL[facility]  DISCHARGE SUMMARY     Luis M Estrada Patient Status:  Inpatient    3/25/1965 MRN [id number]   Location [facility] 3NW-A Attending No att. providers found   Hosp Day # 4 PCP SILVER GARCIA DO     Date of Admission: 2024  Date of Discharge:  2024     Discharge Disposition: Home Health Care Services    Discharge Diagnosis:  #LLE cellulitis   #Chronic venous insufficieny   #Febrile illness  -tmax 100.6 on  at 2100; afebrile since  -iv ancef (-)  iv zosyn/vanco (-)  -s/p ivf; iv diuretic again today, s/p x1 dose on  w/ 3000cc urine output  -Bcx in process ()  -CT left tib/fib (): reviewed  -given chronic LE edema, no echo since , echo () reviewed, intact LVEF  -ID following, eval noted  -wound care     #Hydrocephalus     #Morbid obesity  -Body mass index is 48.71 kg/m²    History of Present Illness: Luis M Estrada is a 59 year old male with  h/o hydrocephalus. Pt was recently in the hospital from - for RLE cellulitis. Pt comes to the hospital as his left leg today began to hurt, pt has had chills. He notes LLE worsening  pain this AM. Pt febrile in .6.     Brief Synopsis: Patient admitted for left lower extremity cellulitis.  Seen by infectious disease in consultation.  CT LLE Did not show any osteomyelitis. Sx improved w/ iv ancef, transition to po abx on dc. Pt given x2 iv diuretics w/ improvement in overall LE edema / chronic lymphedema. Pt encouraged to elevated legs as much as possible but works as . Echo done to evaluate LE edema. No HF noted.         Lace+ Score: 54  59-90 High Risk  29-58 Medium Risk  0-28   Low Risk  Patient was referred to the [facility].    TCM Follow-Up Recommendation:  LACE 29-58: Moderate Risk of readmission after discharge from the hospital.      Procedures during hospitalization:       Incidental or significant findings and recommendations (brief  descriptions):      Lab/Test results pending at Discharge:       Consultants:  ID    Discharge Medication List:     Discharge Medications        START taking these medications        Instructions Prescription details   cefadroxil 500 MG Caps  Commonly known as: DURICEF      Take 2 capsules (1,000 mg total) by mouth 2 (two) times daily for 14 days.   Stop taking on: August 2, 2024  Quantity: 56 capsule  Refills: 0            CONTINUE taking these medications        Instructions Prescription details   aspirin 81 MG Tabs      Take 1 tablet (81 mg total) by mouth daily.   Refills: 0               Where to Get Your Medications        These medications were sent to [id number] - [place], [place] - [phone], [phone]  [address], [place] [address]      Phone: [phone]   cefadroxil 500 MG Caps         [facility] reviewed:     Follow-up appointment:   [facility]  [address] [place] [address]  [facility] [address] [phone]  Schedule an appointment as soon as possible for a visit      Nader Ibrahim DO  [address] [place] [address] [address]  [facility] [id number] [phone]    Schedule an appointment as soon as possible for a visit      Appointments for Next 30 Days 7/20/2024 - 8/19/2024        Date Arrival Time Visit Type Length Department Provider     7/25/2024  9:00 AM   NURSE ONLY [2510] 30 min [facility] WOUND CARE NURSE    Patient Instructions:         Location Instructions:     Your appointment is scheduled at [facility]. The address is&nbsp; [address]. To reach Registration, park in the [place] Parking Garage. Go through the entrance doors located on the ground floor. Veer left past the Information Desk and proceed to the Wound Care Center.  Masks are optional for all patients and visitors, unless otherwise indicated.               8/1/2024  9:00 AM   FOLLOW UP [7492] 15 min [facility] Nicholas Gould MD     Patient Instructions:         Location Instructions:     Your appointment is scheduled at [facility]. The address is&nbsp; [address]. To reach Registration, park in the [place] Parking Garage. Go through the entrance doors located on the ground floor. Veer left past the Information Desk and proceed to the Wound Care Center.  Masks are optional for all patients and visitors, unless otherwise indicated.                      Vital signs:  Temp:  [98 °F (36.7 °C)-98.4 °F (36.9 °C)] 98 °F (36.7 °C)  Pulse:  [71-79] 71  Resp:  [18-20] 18  BP: (127-132)/(78-81) 127/81  SpO2:  [95 %] 95 %    Physical Exam:    General: No acute distress   Lungs: clear to auscultation  Cardiovascular: S1, S2  Abdomen: Soft      -----------------------------------------------------------------------------------------------  PATIENT DISCHARGE INSTRUCTIONS: See electronic chart    Catrina Portillo DO    Total time spent on discharge plannin minutes     The  [employer] Cures Act makes medical notes like these available to patients in the interest of transparency. Please be advised this is a medical document. Medical documents are intended to carry relevant information, facts as evident, and the clinical opinion of the practitioner. The medical note is intended as peer to peer communication and may appear blunt or direct. It is written in medical language and may contain abbreviations or verbiage that are unfamiliar.

## 2024-07-20 NOTE — PROGRESS NOTES
NURSING DISCHARGE NOTE    Discharged Home via Wheelchair.  Accompanied by Friend  Belongings Taken by patient/family.    Patient given discharge instructions. Instructed to  prescription oral antibiotic from pharmacy. Instructed to make follow-up appointment with PCP in 1 week. Patient verbalized understanding. IV removed. All questions and concern addressed at this time.

## 2024-07-22 ENCOUNTER — TELEPHONE (OUTPATIENT)
Dept: FAMILY MEDICINE CLINIC | Facility: CLINIC | Age: 59
End: 2024-07-22

## 2024-07-22 ENCOUNTER — PATIENT OUTREACH (OUTPATIENT)
Dept: CASE MANAGEMENT | Age: 59
End: 2024-07-22

## 2024-07-22 DIAGNOSIS — Z02.9 ENCOUNTERS FOR UNSPECIFIED ADMINISTRATIVE PURPOSE: Primary | ICD-10-CM

## 2024-07-22 NOTE — TELEPHONE ENCOUNTER
Just an FYI-     Spoke to patient for TCM today.  Patient does not have an appointment scheduled at this time. Pt declined an appt with Dr. Ibrahim at this time. Pt stated he would like to go the appt with Wound care on Thursday, then check his work schedule and then call the office for an appt.      TCM appointment recommended by 8/3/24 as patient is a Moderate risk for readmission.  Please advise.    BOOK BY DATE (last date for TCM): 8/3/24    Thank you!

## 2024-07-22 NOTE — PROGRESS NOTES
Initial Post Discharge Follow Up   Discharge Date: 7/20/24  Contact Date: 7/22/2024    Consent Verification:  Assessment Completed With: Patient  HIPAA Verified?  Yes    Discharge Dx:   LLE cellulitis   Chronic venous insufficieny   Febrile illness    General:   How have you been since your discharge from the hospital? Pt reports he is feeling pretty good. Pt has an appt Thurs with wound care. Pt reports the swelling and redness has resolved to LLE. Pt is ambulating well. Pt denies pain and fever/chills. Pt denies concerns with RLE at this time as well. Pt has ace bandages to BLE at this time, denies weeping. Pt is following wound care for BLE. Pt wants to go to wound care and figure out work schedule and then call PCP for an appt. Pt is eating and drinking, denies n/v/d. Pt denies chest pain/pressure, shortness of breath, dizziness, weakness and confusion.  Pt has no concerns at this time.   Do you have any pain since discharge?  No    How well was your pain managed while in the hospital?   On a scale of 1-5   1- Very Poor and 5- Very well   Very Well  When you were leaving the hospital were your discharge instructions reviewed with you? Yes  How well were your discharge instructions explained to you?   On a scale of 1-5   1- Very Poor and 5- Very well   Very Well  Do you have any questions about your discharge instructions?  No  Before leaving the hospital was your diagnoses explained to you? Yes  Do you have any questions about your diagnoses? No  Are you able to perform normal daily activities of living as you have prior to your hospital stay (dressing, bathing, ambulating to the bathroom, etc)? yes  (NCM) Was patient given a different diet per AVS? no    Medications: Reviewed medication list with the patient. Medications are up to date.   Current Outpatient Medications   Medication Sig Dispense Refill    cefadroxil 500 MG Oral Cap Take 2 capsules (1,000 mg total) by mouth 2 (two) times daily for 14 days. 56  capsule 0    aspirin 81 MG Oral Tab Take 1 tablet (81 mg total) by mouth daily.       Were there any changes to your current medication(s) noted on the AVS? Yes  If so, were these medication changes discussed with you prior to leaving the hospital? Yes  If a new medication was prescribed:    Was the new medication's purpose & side effects reviewed? Yes  Do you have any questions about your new medication? No  Did you  your discharge medications when you left the hospital? Yes  Let's go over your medications together to make sure we are not missing anything. Medications Reviewed  Are there any reasons that keep you from taking your medication as prescribed? No  Are you having any concerns with constipation? No    Discharge medications reviewed/discussed/and reconciled against outpatient medications with patient.  Any changes or updates to medications sent to PCP.  Patient Acknowledged     Referrals/orders at D/C:  Referrals/orders placed at D/C? yes  What services:   Home health   (If HH was ordered) Has HH been set up?  No- Pt stated he s/w HHC and declined services to start. He feels he does not need them.    If Yes: With Whom: [facility]   Except for Home Health Services mentioned above, have you scheduled these other services? Not Applicable    DME ordered at D/C? No      Discharge orders, AVS reviewed and discussed with patient. Any changes or updates to orders sent to PCP.  Patient Acknowledged      SDOH:     Transportation Needs: No Transportation Needs (7/22/2024)    Transportation Needs     Lack of Transportation: No     Car Seat: Not on file     Financial Resource Strain: Low Risk  (7/22/2024)    Financial Resource Strain     Difficulty of Paying Living Expenses: Not very hard     Med Affordability: No             Follow up appointments:  Reviewed recommended follow up appointments. Pt denies any barriers to keeping/getting to U appts. Pt has friends that drive her to the store/work if needed.      Your appointments       Date & Time Appointment Department (Center)    Jul 25, 2024 9:00 AM CDT Wound Care Nurse Visit  with WOUND CARE NURSE [facility] ([facility])        Aug 01, 2024 9:00 AM CDT Wound Care Follow up with Nicholas Gould MD [facility] ([facility])              [facility]  [facility]  [address] [facility] [address]  [phone]            TCC  Was TCC ordered: Yes  Was TCC scheduled: No, Explain : pt declined at this time, prefers to see PCP office  If yes: []  Advised patient to bring all medications and blood glucose meter/supplies if applicable.    PCP (If no TCC appointment)  Does patient already have a PCP appointment scheduled? No  NCM Attempted to schedule PCP office TCM appointment with patient   If no appointment scheduled: Explain: Pt declined, prefers to call back at a later time to schedule. TE to PCP re: update for appt/declined [facility]     Specialist    Does the patient have any other follow up appointment(s) needing to be scheduled? Yes  If yes: NCM reviewed upcoming specialist appointment with patient: Yes  Does the patient need assistance scheduling appointment(s): No- pt has an appt with wound care of 7/25/24    Is there any reason as to why you cannot make your appointment(s)?  No     Needs post D/C:   Now that you are home, are there any needs or concerns you need addressed before your next visit with your PCP?  (DME, meds, questions, etc.): No    Interventions by NCM:   All d/c instructions reviewed with the pt. Reviewed when to call MD vs when to call 911 or go the ED.Discussed s/s of worsening cellulitis. Educated pt on the importance of taking all meds as prescribed as well as close f/u with PCP/specialists. Pt verbalized understanding and will contact the office with any further questions or concerns.       Overall  Rating:   How would you rate the care you received while in the hospital? excellent    CCM referral placed:    Not Applicable    BOOK BY DATE: 8/3/24

## 2024-07-25 ENCOUNTER — APPOINTMENT (OUTPATIENT)
Dept: ULTRASOUND IMAGING | Facility: HOSPITAL | Age: 59
End: 2024-07-25
Attending: PHYSICIAN ASSISTANT
Payer: COMMERCIAL

## 2024-07-25 ENCOUNTER — APPOINTMENT (OUTPATIENT)
Dept: WOUND CARE | Facility: HOSPITAL | Age: 59
End: 2024-07-25
Attending: FAMILY MEDICINE
Payer: COMMERCIAL

## 2024-07-25 ENCOUNTER — HOSPITAL ENCOUNTER (OUTPATIENT)
Facility: HOSPITAL | Age: 59
Setting detail: OBSERVATION
Discharge: HOME | End: 2024-07-26
Attending: EMERGENCY MEDICINE | Admitting: INTERNAL MEDICINE
Payer: COMMERCIAL

## 2024-07-25 VITALS
DIASTOLIC BLOOD PRESSURE: 66 MMHG | RESPIRATION RATE: 16 BRPM | TEMPERATURE: 98 F | HEART RATE: 90 BPM | SYSTOLIC BLOOD PRESSURE: 115 MMHG

## 2024-07-25 DIAGNOSIS — L03.116 CELLULITIS OF LEFT LOWER EXTREMITY: Primary | ICD-10-CM

## 2024-07-25 DIAGNOSIS — I87.333 CHRONIC VENOUS HYPERTENSION (IDIOPATHIC) WITH ULCER AND INFLAMMATION OF BILATERAL LOWER EXTREMITY (HCC): Primary | ICD-10-CM

## 2024-07-25 DIAGNOSIS — S81.802D OPEN WOUND OF LEFT LOWER LEG, SUBSEQUENT ENCOUNTER: ICD-10-CM

## 2024-07-25 LAB
ALBUMIN SERPL-MCNC: 3.9 G/DL (ref 3.2–4.8)
ALBUMIN/GLOB SERPL: 1 {RATIO} (ref 1–2)
ALP LIVER SERPL-CCNC: 68 U/L
ALT SERPL-CCNC: 14 U/L
ANION GAP SERPL CALC-SCNC: 6 MMOL/L (ref 0–18)
AST SERPL-CCNC: 34 U/L (ref ?–34)
BASOPHILS # BLD AUTO: 0.12 X10(3) UL (ref 0–0.2)
BASOPHILS NFR BLD AUTO: 0.6 %
BILIRUB SERPL-MCNC: 1 MG/DL (ref 0.3–1.2)
BUN BLD-MCNC: 21 MG/DL (ref 9–23)
CALCIUM BLD-MCNC: 9.1 MG/DL (ref 8.7–10.4)
CHLORIDE SERPL-SCNC: 101 MMOL/L (ref 98–112)
CO2 SERPL-SCNC: 26 MMOL/L (ref 21–32)
CREAT BLD-MCNC: 0.96 MG/DL
EGFRCR SERPLBLD CKD-EPI 2021: 91 ML/MIN/1.73M2 (ref 60–?)
EOSINOPHIL # BLD AUTO: 0 X10(3) UL (ref 0–0.7)
EOSINOPHIL NFR BLD AUTO: 0 %
ERYTHROCYTE [DISTWIDTH] IN BLOOD BY AUTOMATED COUNT: 13.7 %
GLOBULIN PLAS-MCNC: 4.1 G/DL (ref 2.8–4.4)
GLUCOSE BLD-MCNC: 97 MG/DL (ref 70–99)
HCT VFR BLD AUTO: 41.3 %
HGB BLD-MCNC: 14.1 G/DL
IMM GRANULOCYTES # BLD AUTO: 0.11 X10(3) UL (ref 0–1)
IMM GRANULOCYTES NFR BLD: 0.6 %
LACTATE SERPL-SCNC: 0.9 MMOL/L (ref 0.5–2)
LYMPHOCYTES # BLD AUTO: 0.9 X10(3) UL (ref 1–4)
LYMPHOCYTES NFR BLD AUTO: 4.7 %
MCH RBC QN AUTO: 31.8 PG (ref 26–34)
MCHC RBC AUTO-ENTMCNC: 34.1 G/DL (ref 31–37)
MCV RBC AUTO: 93.2 FL
MONOCYTES # BLD AUTO: 0.72 X10(3) UL (ref 0.1–1)
MONOCYTES NFR BLD AUTO: 3.7 %
NEUTROPHILS # BLD AUTO: 17.46 X10 (3) UL (ref 1.5–7.7)
NEUTROPHILS # BLD AUTO: 17.46 X10(3) UL (ref 1.5–7.7)
NEUTROPHILS NFR BLD AUTO: 90.4 %
OSMOLALITY SERPL CALC.SUM OF ELEC: 279 MOSM/KG (ref 275–295)
PLATELET # BLD AUTO: 325 10(3)UL (ref 150–450)
POTASSIUM SERPL-SCNC: 4.1 MMOL/L (ref 3.5–5.1)
PROT SERPL-MCNC: 8 G/DL (ref 5.7–8.2)
RBC # BLD AUTO: 4.43 X10(6)UL
SODIUM SERPL-SCNC: 133 MMOL/L (ref 136–145)
WBC # BLD AUTO: 19.3 X10(3) UL (ref 4–11)

## 2024-07-25 PROCEDURE — 93971 EXTREMITY STUDY: CPT | Performed by: PHYSICIAN ASSISTANT

## 2024-07-25 PROCEDURE — 99223 1ST HOSP IP/OBS HIGH 75: CPT | Performed by: INTERNAL MEDICINE

## 2024-07-25 PROCEDURE — 99213 OFFICE O/P EST LOW 20 MIN: CPT

## 2024-07-25 RX ORDER — ONDANSETRON 2 MG/ML
4 INJECTION INTRAMUSCULAR; INTRAVENOUS EVERY 6 HOURS PRN
Status: DISCONTINUED | OUTPATIENT
Start: 2024-07-25 | End: 2024-07-26

## 2024-07-25 RX ORDER — PROCHLORPERAZINE EDISYLATE 5 MG/ML
5 INJECTION INTRAMUSCULAR; INTRAVENOUS EVERY 8 HOURS PRN
Status: DISCONTINUED | OUTPATIENT
Start: 2024-07-25 | End: 2024-07-26

## 2024-07-25 RX ORDER — HYDROCODONE BITARTRATE AND ACETAMINOPHEN 5; 325 MG/1; MG/1
1 TABLET ORAL ONCE
Status: COMPLETED | OUTPATIENT
Start: 2024-07-25 | End: 2024-07-25

## 2024-07-25 RX ORDER — HYDROCODONE BITARTRATE AND ACETAMINOPHEN 5; 325 MG/1; MG/1
1 TABLET ORAL EVERY 4 HOURS PRN
Status: DISCONTINUED | OUTPATIENT
Start: 2024-07-25 | End: 2024-07-26

## 2024-07-25 RX ORDER — ACETAMINOPHEN 325 MG/1
650 TABLET ORAL EVERY 4 HOURS PRN
Status: DISCONTINUED | OUTPATIENT
Start: 2024-07-25 | End: 2024-07-26

## 2024-07-25 RX ORDER — SENNOSIDES 8.6 MG
17.2 TABLET ORAL NIGHTLY PRN
Status: DISCONTINUED | OUTPATIENT
Start: 2024-07-25 | End: 2024-07-26

## 2024-07-25 RX ORDER — HEPARIN SODIUM 5000 [USP'U]/ML
5000 INJECTION, SOLUTION INTRAVENOUS; SUBCUTANEOUS EVERY 8 HOURS SCHEDULED
Status: DISCONTINUED | OUTPATIENT
Start: 2024-07-25 | End: 2024-07-26

## 2024-07-25 RX ORDER — HYDROCODONE BITARTRATE AND ACETAMINOPHEN 5; 325 MG/1; MG/1
2 TABLET ORAL EVERY 4 HOURS PRN
Status: DISCONTINUED | OUTPATIENT
Start: 2024-07-25 | End: 2024-07-26

## 2024-07-25 RX ORDER — BISACODYL 10 MG
10 SUPPOSITORY, RECTAL RECTAL
Status: DISCONTINUED | OUTPATIENT
Start: 2024-07-25 | End: 2024-07-26

## 2024-07-25 RX ORDER — ASPIRIN 81 MG/1
81 TABLET ORAL DAILY
Status: DISCONTINUED | OUTPATIENT
Start: 2024-07-25 | End: 2024-07-26

## 2024-07-25 RX ORDER — SODIUM CHLORIDE 9 MG/ML
1000 INJECTION, SOLUTION INTRAVENOUS ONCE
Status: COMPLETED | OUTPATIENT
Start: 2024-07-25 | End: 2024-07-25

## 2024-07-25 RX ORDER — POLYETHYLENE GLYCOL 3350 17 G/17G
17 POWDER, FOR SOLUTION ORAL DAILY PRN
Status: DISCONTINUED | OUTPATIENT
Start: 2024-07-25 | End: 2024-07-26

## 2024-07-25 RX ORDER — ENEMA 19; 7 G/133ML; G/133ML
1 ENEMA RECTAL ONCE AS NEEDED
Status: DISCONTINUED | OUTPATIENT
Start: 2024-07-25 | End: 2024-07-26

## 2024-07-25 NOTE — ED QUICK NOTES
Orders for admission, patient is aware of plan and ready to go upstairs. Any questions, please call ED RN Marie at extension [phone].     Patient Covid vaccination status: Fully vaccinated     COVID Test Ordered in ED: None    COVID Suspicion at Admission: N/A    Running Infusions: 0.9 NaCl at 125ml/hr    Mental Status/LOC at time of transport: a/ox4    Other pertinent information: none  CIWA score: N/A   NIH score:  N/A

## 2024-07-25 NOTE — ED QUICK NOTES
Rounding Completed    Plan of Care reviewed. Waiting for transport.  Elimination needs assessed.    Bed is locked and in lowest position. Call light within reach.

## 2024-07-25 NOTE — PROGRESS NOTES
Chief Complaint   Patient presents with    Wound Care     Patient is here for a RN visit. He presents with only Ace wraps on his legs. He states that they were put on by wound care while in house. His pain is currently 5/10.            Current Outpatient Medications:     cefadroxil 500 MG Oral Cap, Take 2 capsules (1,000 mg total) by mouth 2 (two) times daily for 14 days., Disp: 56 capsule, Rfl: 0    aspirin 81 MG Oral Tab, Take 1 tablet (81 mg total) by mouth daily., Disp: , Rfl:     No Known Allergies       HISTORY:     Past medical, surgical, family and social history updated where appropriate.    PHYSICAL EXAM:   /66   Pulse 90   Temp 98.3 °F (36.8 °C)   Resp 16        Vital signs reviewed.      Calf  Point of Measurement - Left Calf: 37  Point of Measurement - Right Calf: 37  Left Calf from:: Heel  Calf Left cm:: 55  Right Calf from:: Heel  Right Calf cm:: 49.6    Ankle  Point of Measurement - Left Ankle: 10  Point of Measurement - Right Ankle: 10  Left Ankle from:: Heel  Left Ankle cm:: 34.3     Right Ankle from:: Heel  Right Ankle cm:: 30.2       Wound 01/11/24 #1 Leg Left (Active)   Date First Assessed/Time First Assessed: 01/11/24 1406    Wound Number (Wound Clinic Only): #1  Primary Wound Type: Traumatic  Location: Leg  Wound Location Orientation: Left      Assessments 1/11/2024  2:10 PM 7/25/2024  9:02 AM   Wound Image       Drainage Amount Large None   Drainage Description Yellow;Serous --   Treatments Compression --   Wound Length (cm) 10.6 cm 0.4 cm   Wound Width (cm) 9.5 cm 0.3 cm   Wound Surface Area (cm^2) 100.7 cm^2 0.12 cm^2   Wound Depth (cm) 0.2 cm 0.1 cm   Wound Volume (cm^3) 20.14 cm^3 0.012 cm^3   Wound Healing % -- 100   Margins Well-defined edges Well-defined edges   Non-staged Wound Description Full thickness Full thickness   Leslie-wound Assessment Edema;Hemosiderin staining Edema;Hemosiderin staining;Dry   Wound Granulation Tissue Firm;Pink Firm;Pink;Pale Grey   Wound Bed  Granulation (%) 40 % 100 %   Wound Bed Slough (%) 60 % --   Wound Odor None None   Tunneling? No No   Undermining? No No   Sinus Tracts? No No       Inactive Orders   Date Order Priority Status Authorizing Provider   07/16/24 2327 Consult to Wound Ostomy Routine Completed Rica Pearson MD     - Reason for Consult:    Wound Care     - Wound Care Reason for Consult:    worsening   06/24/24 1127 Wound care Routine Discontinued Rica Pearson MD   06/06/24 0913 Debridement Traumatic Routine Completed Nicholas Gould MD   05/16/24 0901 Debridement Traumatic Routine Completed Nicholas Gould MD   05/02/24 1520 Debridement Traumatic Routine Completed Nicholas Gould MD   04/11/24 0959 Debridement Traumatic Routine Completed Nicholas Gould MD   04/04/24 0915 Debridement Traumatic Left;Lateral Leg Routine Completed Nicholas Gould MD   02/15/24 1154 Debridement Traumatic Left;Lateral Leg Routine Completed Nicholas Gould MD   01/18/24 1511 Debridement Traumatic Left;Lateral Leg Routine Completed Nicholas Gould MD   01/11/24 1710 Debridement Traumatic Left;Lateral Leg Routine Completed Nicholas Gould MD       Wound 06/20/24 #2 Leg Right (Active)   Date First Assessed/Time First Assessed: 06/20/24 0832    Wound Number (Wound Clinic Only): #2  Primary Wound Type: Venous Ulcer  Location: Leg  Wound Location Orientation: Right      Assessments 6/20/2024  8:34 AM 7/25/2024  8:59 AM   Wound Image       Drainage Amount Copious None   Drainage Description Clear --   Treatments -- Compression   Wound Length (cm) 0.8 cm 0.3 cm   Wound Width (cm) 1.2 cm 0.5 cm   Wound Surface Area (cm^2) 0.96 cm^2 0.15 cm^2   Wound Depth (cm) 0.1 cm 0 cm   Wound Volume (cm^3) 0.096 cm^3 0 cm^3   Wound Healing % -- 100   Margins Well-defined edges Well-defined edges   Non-staged Wound Description Full thickness Full thickness   Leslie-wound Assessment Hemosiderin staining;Blanchable erythema;Edema;Moist Edema;Hemosiderin staining;Dry   Wound  Granulation Tissue Pink;Spongy --   Wound Bed Granulation (%) 50 % --   Wound Bed Slough (%) 50 % --   Wound Odor None None   Shape -- Scabbed, unable to view wound bed.   Tunneling? -- No   Undermining? -- No   Sinus Tracts? -- No       Inactive Orders   Date Order Priority Status Authorizing Provider   06/24/24 1127 Wound care Routine Discontinued Rica Pearson MD   06/21/24 0942 Consult to Wound Ostomy Routine Completed Rica Pearson MD     - Reason for Consult:    Wound Care     - Wound Care Reason for Consult:    pt sees wound clinic for LLE wound. Now with RLE cellulitis/weeping.       Wound 07/11/24 #3 Left Posterior Leg Superior Leg Posterior;Right (Active)   Date First Assessed/Time First Assessed: 07/11/24 0852    Wound Number (Wound Clinic Only): #3 Left Posterior Leg Superior  Primary Wound Type: Pressure Injury  Location: Leg  Wound Location Orientation: Posterior;Right      Assessments 7/11/2024  8:59 AM 7/25/2024  9:02 AM   Wound Image       Drainage Amount Moderate Unable to assess   Drainage Description Serosanguineous --   Treatments Compression --   Wound Length (cm) 1.7 cm 0.6 cm   Wound Width (cm) 9.4 cm 5.4 cm   Wound Surface Area (cm^2) 15.98 cm^2 3.24 cm^2   Wound Depth (cm) 0.1 cm 0.1 cm   Wound Volume (cm^3) 1.598 cm^3 0.324 cm^3   Wound Healing % -- 80   Margins Well-defined edges Well-defined edges   Non-staged Wound Description Full thickness Full thickness   Leslie-wound Assessment Moist;Edema Edema;Induration;Non-blanchable erythema   Wound Granulation Tissue Pink;Red;Firm Firm;Pink;Pale Grey   Wound Bed Granulation (%) 80 % 85 %   Wound Bed Epithelium (%) 10 % 5 %   Wound Bed Slough (%) 10 % 10 %   Wound Odor None None   Tunneling? No No   Undermining? No No   Sinus Tracts? No No   Pressure Injury Stage Stage 2 Stage 2       No associated orders.          ASSESSMENT AND PLAN:        Risks, benefits, and alternatives of current treatment plan discussed in detail.  Questions and  concerns addressed. Red flags to RTC or ED reviewed.  Patient (or parent) agrees to plan.      Return in about 1 week (around 8/1/2024).  Weekly Wound Education Note    Teaching Provided To: Patient  Training Topics: Discharge instructions;Cleasing and general instructions;Compression;Edema control;Dressing  Training Method: Demonstration;Explain/Verbal  Training Response: Reinforcement needed        Notes: Patient is here for nurse visit to left lower leg wound, left posterior leg and right lower leg. He presents with only Ace wraps, He states that they were put on by wound care while in house. His pain is currently 5/10.  Edema measurement increased. Patient recently in hospital from 7/16/24-7/20/24. Last clinic visit was 7/11/24. Per hospital notes patient has home health- [facility]. Left Posterior lower leg wound measurement decreased, right lower leg wound measurement increased, left lower leg wound measurement decreased. Pt presented to clinic today with increased reddnes, increased pain and tenderness and increased swelling to left lower leg. RN recommending patient to go to ER at this time to have further workup completed to left lower leg. Right lower leg no dressing applied at this time due to 100% scabbed and no drainage. Applied spandagrip (E) to RLE Pt states understanding and states he will go to ER. Wrapped LLE in ABD pads, kerlix, tape. RN called and informed ED of patients signs and symptoms.               Shawn HOWE RN   7/25/2024  9:27 AM

## 2024-07-25 NOTE — H&P
[facility]     Luis M Estrada Patient Status:  Emergency    3/25/1965 MRN [id number]   Location [facility] EMERGENCY DEPARTMENT Attending Tiffanie Merrill DO   Hosp Day # 0 PCP SILVER GARCIA DO     Chief Complaint: wound    Subjective:    History of Present Illness:     Luis M Estrada is a 59 year old male with PMhx of cellulitis, hydrocephalus presents from wound care clinic. Pt was just discharged for cellulitis last week. He has been on cefodroxil and was seen at wound care clinic today. Staff noticed it was more swollen and red and advised he come in for evaluation. He denies any F/C. He denies any CP or SOB. No N/V/D/C or abd pain.       History/Other:    Past Medical History:  Past Medical History:    Cellulitis    to left leg, seeking treatment at wound care for months    Hydrocephalus (HCC)    dx'd as an adult-no shunt     Past Surgical History:   History reviewed. No pertinent surgical history.   Family History:   History reviewed. No pertinent family history.  Social History:    reports that he has never smoked. He has never used smokeless tobacco. He reports current alcohol use. He reports that he does not use drugs.     Allergies: No Known Allergies    Medications:    Current Facility-Administered Medications on File Prior to Encounter   Medication Dose Route Frequency Provider Last Rate Last Admin    [COMPLETED] magnesium oxide (Mag-Ox) tab 400 mg  400 mg Oral Once Catrina Portillo, DO   400 mg at 24 0953    [COMPLETED] potassium chloride (Klor-Con M20) tab 40 mEq  40 mEq Oral q4h Catrina Portillo DO   40 mEq at 24 1357    [COMPLETED] furosemide (Lasix) 10 mg/mL injection 40 mg  40 mg Intravenous Once Catrina Portillo DO   40 mg at 24 0955    [COMPLETED] furosemide (Lasix) 10 mg/mL injection 40 mg  40 mg Intravenous Once Catrina Portillo DO   40 mg at 24 0922    [COMPLETED] magnesium oxide (Mag-Ox) tab 400 mg  400 mg Oral Once Rica Pearson  MD   400 mg at 24 0844    [COMPLETED] potassium chloride (Klor-Con M20) tab 40 mEq  40 mEq Oral Once Rica Pearson MD   40 mEq at 24 0844    [COMPLETED] Perflutren Lipid Microsphere (DEFINITY) 6.52 MG/ML injection 1.5 mL  1.5 mL Intravenous ONCE PRN Catrina Portillo DO   1.5 mL at 24 1007    [COMPLETED] acetaminophen (Tylenol Extra Strength) tab 1,000 mg  1,000 mg Oral Once Rand Irwin MD   1,000 mg at 24 1831    [] sodium chloride 0.9 % IV bolus 1,983 mL  30 mL/kg (Ideal) Intravenous Continuous Rand Irwin MD   Stopped at 24    [COMPLETED] vancomycin (Vancocin) 2.25 g in sodium chloride 0.9% 500 mL IVPB premix  25 mg/kg Intravenous Once Rand Irwin  mL/hr at 24 1950 2,250 mg at 24 1950    [COMPLETED] ceFEPIme (Maxpime) 2 g in sodium chloride 0.9% 100 mL IVPB-MBP  2 g Intravenous Once Rand Irwin MD   Stopped at 24 192    [] ketorolac (Toradol) 30 MG/ML injection 30 mg  30 mg Intravenous Q6H PRN Rica Pearson MD        [COMPLETED] clindamycin phosphate in NaCl 0.9% (Cleocin) 900 mg/50mL IVPB premix 900 mg  900 mg Intravenous Once Rand Irwin MD   900 mg at 24    [COMPLETED] ceFEPIme (Maxpime) 2 g in sodium chloride 0.9% 100 mL IVPB-MBP  2 g Intravenous Q8H Tigre Short MD 25 mL/hr at 24 2 g at 24    [COMPLETED] diphenhydrAMINE (Benadryl) 12.5 MG/5ML oral liquid 12.5 mg  12.5 mg Oral Once Feliz Hatch MD   12.5 mg at 24    [COMPLETED] vancomycin (Vancocin) 2.25 g in sodium chloride 0.9% 500 mL IVPB premix  2,250 mg Intravenous Once [place], Tram,  mL/hr at 24 1216 2,250 mg at 24 1216    [COMPLETED] sodium chloride 0.9% infusion 1,000 mL  1,000 mL Intravenous Once [place], DO 20 mL/hr at 24 1238 1,000 mL at 24 1238    [COMPLETED] iopamidol 76% (ISOVUE-370) injection for power injector  100 mL Intravenous ONCE PRN Rica Pearson MD   100 mL at  06/20/24 2003     Current Outpatient Medications on File Prior to Encounter   Medication Sig Dispense Refill    cefadroxil 500 MG Oral Cap Take 2 capsules (1,000 mg total) by mouth 2 (two) times daily for 14 days. 56 capsule 0    aspirin 81 MG Oral Tab Take 1 tablet (81 mg total) by mouth daily.         Review of Systems:   A comprehensive review of systems was completed.    Pertinent positives and negatives noted in the HPI.    Objective:   Physical Exam:    /61 (BP Location: Left arm)   Pulse 74   Temp 98.2 °F (36.8 °C) (Oral)   Resp 16   Ht 170.2 cm (5' 7\")   Wt 253 lb (114.8 kg)   SpO2 97%   BMI 39.63 kg/m²   General: No acute distress, Alert  Respiratory: No rhonchi, no wheezes  Cardiovascular: S1, S2. Regular rate and rhythm  Abdomen: Soft, Non-tender, non-distended, positive bowel sounds  Neuro: No new focal deficits  Extremities: No edema, redness and swelling ot her L leg      Results:    Labs:      Labs Last 24 Hours:    Recent Labs   Lab 07/19/24  0725 07/25/24  0955   RBC 3.96* 4.43   HGB 12.5* 14.1   HCT 36.7* 41.3   MCV 92.7 93.2   MCH 31.6 31.8   MCHC 34.1 34.1   RDW 13.7 13.7   NEPRELIM 3.67 17.46*   WBC 5.6 19.3*   .0 325.0       Recent Labs   Lab 07/19/24  0725 07/20/24  0601 07/25/24  0956   GLU 91 88 97   BUN 14 16 21   CREATSERUM 0.61* 0.74 0.96   EGFRCR 111 104 91   CA 8.7 9.3 9.1   ALB  --   --  3.9    141 133*   K 3.3* 3.9 4.1    109 101   CO2 24.0 25.0 26.0   ALKPHO  --   --  68   AST  --   --  34*   ALT  --   --  14   BILT  --   --  1.0   TP  --   --  8.0       No results found for: \"PT\", \"INR\"    No results for input(s): \"TROP\", \"TROPHS\", \"CK\" in the last 168 hours.    No results for input(s): \"TROP\", \"PBNP\" in the last 168 hours.    No results for input(s): \"PCT\" in the last 168 hours.    Imaging: Imaging data reviewed in Epic.    Assessment & Plan:      #Lower Leg Cellulitis  #Chronic Venous Insufficiency    Continue empiric abx  ID to [facility]  cultures were MRSA/PSD  Pain control  LA normal  Monitor CBC given WBC elevation  Check US, no DVT    #Hx of Hydrocephalus    #Morbid Obesity  BMI 50      Plan of care discussed with patient, ED Physician     Dawit Malin MD    Supplementary Documentation:     The 21st Century Cures Act makes medical notes like these available to patients in the interest of transparency. Please be advised this is a medical document. Medical documents are intended to carry relevant information, facts as evident, and the clinical opinion of the practitioner. The medical note is intended as peer to peer communication and may appear blunt or direct. It is written in medical language and may contain abbreviations or verbiage that are unfamiliar.

## 2024-07-25 NOTE — ED INITIAL ASSESSMENT (HPI)
Pt sent from wound care for cellulitis to left lower leg. Pt currently on oral abx.  Redness, swelling and pain continue.

## 2024-07-25 NOTE — CM/SW NOTE
Sw reached out to [facility].  Pt was admitted to [facility] from 7/16-20 and set up with [facility] RN for wound care.  Pt went to wound clinic today and still had on ace wraps from his previous hospital stay.  [facility] states they called pt to set up home RN visit for 7/20 and pt told them that he was going back to work on Wednesday and knew how to manage wounds on  his own.    Susanne Padilla, KENZIEW  /Discharge Planner

## 2024-07-25 NOTE — ED PROVIDER NOTES
Patient Seen in: [facility] Emergency Department      History     Chief Complaint   Patient presents with    Cellulitis     Stated Complaint: Left leg wound swelling, warmth, and pain from wound clinic    Subjective:   HPI    59-year-old male presents to ED with complaint of increased redness and swelling of his left leg sent from wound clinic.  He was just admitted 7/16 - 7/20 for left leg cellulitis, chronic venous insufficiency, he was on Ancef and then transitioned to cefadroxil as an outpatient.  7/25 no growth to date on blood cultures.  He had a CT of the leg that was negative for osteomyelitis nor necrotizing fasciitis on 7/16.  He denies fevers, chills, increased drainage.    Objective:   Past Medical History:    Cellulitis    to left leg, seeking treatment at wound care for months    Hydrocephalus (HCC)    dx'd as an adult-no shunt              History reviewed. No pertinent surgical history.             Social History     Socioeconomic History    Marital status: Single   Tobacco Use    Smoking status: Never    Smokeless tobacco: Never   Vaping Use    Vaping status: Never Used   Substance and Sexual Activity    Alcohol use: Yes     Comment: 2 a week    Drug use: No   Other Topics Concern    Caffeine Concern No     Comment: 2 COFFEE Q AM    Exercise Yes     Comment: WALKING OCCAS   Social History Narrative    ** Merged History Encounter **          Social Determinants of Health     Financial Resource Strain: Low Risk  (7/22/2024)    Financial Resource Strain     Difficulty of Paying Living Expenses: Not very hard     Med Affordability: No   Food Insecurity: No Food Insecurity (7/25/2024)    Food Insecurity     Food Insecurity: Never true   Transportation Needs: No Transportation Needs (7/25/2024)    Transportation Needs     Lack of Transportation: No   Housing Stability: Low Risk  (7/25/2024)    Housing Stability     Housing Instability: No              Review of Systems    Positive for stated Chief  Complaint: Cellulitis    Other systems are as noted in HPI.  Constitutional and vital signs reviewed.      All other systems reviewed and negative except as noted above.    Physical Exam     ED Triage Vitals [07/25/24 0953]   /75   Pulse 77   Resp 16   Temp 98.7 °F (37.1 °C)   Temp src Oral   SpO2 99 %   O2 Device None (Room air)       Current Vitals:   Vital Signs  BP: 110/67  Pulse: 87  Resp: 24  Temp: 97.7 °F (36.5 °C)  Temp src: Oral  MAP (mmHg): 77    Oxygen Therapy  SpO2: 98 %  O2 Device: None (Room air)  Pulse Oximetry Type: Intermittent  Oximetry Probe Site Changed: No  Pulse Ox Probe Location: Right hand            Physical Exam          Vital signs reviewed.  Nursing note reviewed.  Constitutional: Alert, no acute distress  Head: Normocephalic, atraumatic  Mouth: Moist  Eyes: Extraocular muscles intact, pupils equal  Cardiovascular: Regular rate and rhythm  Pulmonary: Effort normal, breath sounds normal  Abdomen: Soft, nontender nondistended  Skin: See photos above, per patient increased redness and swelling to left leg.  Warmth noted on palpation.  Posterior calf wound looks about baseline compared to images from 7/17/2024  Musculoskeletal range of motion grossly normal all extremities  Neuro: Alert, at baseline, no focal neuro deficit.  Moves all extremities against gravity  Psych: Mood normal          ED Course     Labs Reviewed   COMP METABOLIC PANEL (14) - Abnormal; Notable for the following components:       Result Value    Sodium 133 (*)     AST 34 (*)     All other components within normal limits   CBC W/ DIFFERENTIAL - Abnormal; Notable for the following components:    WBC 19.3 (*)     Neutrophil Absolute Prelim 17.46 (*)     Neutrophil Absolute 17.46 (*)     Lymphocyte Absolute 0.90 (*)     All other components within normal limits   CBC W/ DIFFERENTIAL - Abnormal; Notable for the following components:    RBC 3.82 (*)     HGB 12.1 (*)     HCT 35.2 (*)     All other components within normal  limits   LACTIC ACID, PLASMA - Normal   CBC WITH DIFFERENTIAL WITH PLATELET    Narrative:     The following orders were created for panel order CBC With Differential With Platelet.  Procedure                               Abnormality         Status                     ---------                               -----------         ------                     CBC W/ DIFFERENTIAL[[id number]]          Abnormal            Final result                 Please view results for these tests on the individual orders.   CBC WITH DIFFERENTIAL WITH PLATELET    Narrative:     The following orders were created for panel order CBC With Differential With Platelet.  Procedure                               Abnormality         Status                     ---------                               -----------         ------                     CBC W/ DIFFERENTIAL[[id number]]          Abnormal            Final result                 Please view results for these tests on the individual orders.   BASIC METABOLIC PANEL (8)   BLOOD CULTURE   BLOOD CULTURE             XR CHEST AP PORTABLE  (CPT=71045)    Result Date: 7/19/2024  PROCEDURE:  XR CHEST AP PORTABLE  (CPT=71045)  TECHNIQUE:  AP chest radiograph was obtained.  COMPARISON:  [facility] , XR, XR CHEST AP PORTABLE  (CPT=71045), 1/03/2024, 7:54 AM.  INDICATIONS:  cough  PATIENT STATED HISTORY: (As transcribed by Technologist)  Patient states he has had an ongoing cough.    FINDINGS:  There are scattered reticular densities in both lungs which are similar to previous study and most likely chronic postinflammatory scar.  Cardiac size is within normal limits.  Mediastinum and jf are unremarkable.  Chest wall structures are unremarkable.            CONCLUSION:  Reticular densities in both lungs are similar to previous study and most likely chronic postinflammatory scarring.   LOCATION:  [facility]      Dictated by (CST): Gideon Azar MD on 7/19/2024 at 10:51 AM     Finalized by (CST): Gideon Azar MD on  2024 at 10:52 AM       CARD ECHO 2D DOPPLER CONTRAST (CPT=93306)    Result Date: 2024  Transthoracic Echocardiogram Name:Luis M Estrada Date: 2024 :  1965 Ht:  (67in)  BP: 93 / 54 MRN:  [id number]     Age:  59years    Wt:  (311lb) HR: 83bpm Loc:  [facility]       Gndr: M          BSA: 2.44m^2 Sonographer: Caroline ROBERTS Ordering:    Catrina Portillo Consulting:  Olivia Curry ---------------------------------------------------------------------------- History/Indications:  LE Edema. ---------------------------------------------------------------------------- Procedure information:  A transthoracic complete 2D study was performed. Additional evaluation included M-mode, complete spectral Doppler, and color Doppler.  Patient status:  Inpatient.  Location:  [place] [address]. Comparison was made to the study of 2016.    This was a routine study. Transthoracic echocardiography for ventricular function evaluation and assessment of valvular function. Image quality was suboptimal. The study was technically limited due to poor acoustic window availability and body habitus. Intravenous contrast (Definity) was administered to evaluate left ventricular function and opacify the LV. ECG rhythm:   Normal sinus  Study completion:  There were no complications. ---------------------------------------------------------------------------- Conclusions: Left ventricle: The cavity size was normal. Wall thickness was normal. Systolic function was normal. The estimated ejection fraction was 60-65%, by visual assessment. No diagnostic evidence for regional wall motion abnormalities. Left ventricular diastolic function parameters were normal for the patient's age. Impressions:  This study is compared with previous dated 16: No significant change since prior study. * ---------------------------------------------------------------------------- * Findings: Left ventricle:  The cavity size was normal. Wall thickness was normal.  Systolic function was normal. The estimated ejection fraction was 60-65%, by visual assessment. No diagnostic evidence for diffuse regional wall motion abnormalities. No diagnostic evidence for regional wall motion abnormalities. Left ventricular diastolic function parameters were normal for the patient's age. Ventricular septum:   Thickness was normal. Left atrium:  The atrium was normal in size. Right ventricle:  The cavity size was normal. Systolic function was normal. Right atrium:  The atrium was normal in size. Mitral valve:  The valve was structurally normal. Leaflet separation was normal.  Doppler:  Transvalvular velocity was within the normal range. There was no evidence for stenosis. There was no significant regurgitation. Aortic valve:  The valve was structurally normal. The valve was trileaflet. Cusp separation was normal.  Doppler:  Transvalvular velocity was within the normal range. There was no evidence for stenosis. There was no significant regurgitation. Tricuspid valve:  The valve is structurally normal. Leaflet separation was normal.  Doppler:  Transvalvular velocity was within the normal range. There was no evidence for stenosis. There was no significant regurgitation. Pulmonic valve:   The valve is structurally normal. Cusp separation was normal.  Doppler:  Transvalvular velocity was within the normal range. There was no evidence for stenosis. There was no significant regurgitation. Pericardium:   There was no pericardial effusion. Aorta: Aortic root: The aortic root was normal-sized. Ascending aorta: The ascending aorta was normal. Pulmonary arteries: The main pulmonary artery was normal-sized.  Systolic pressure could not be accurately estimated. Systemic veins: Inferior vena cava: The IVC was normally collapsible and normal-sized. Atrial septum:   Agitated saline contrast study showed no atrial level shunt, at baseline or with provocation.  ---------------------------------------------------------------------------- Measurements  Left ventricle        Value         Ref       11/23/2016  IVS thickness,        0.9    cm     0.6 - 1.0 0.7  ED, PLAX  LV ID, ED, PLAX       5.1    cm     4.2 - 5.8 ----------  LV ID, ES, PLAX       3.5    cm     2.5 - 4.0 3.6  LV PW thickness,      0.9    cm     0.6 - 1.0 ----------  ED, PLAX  IVS/LV PW ratio,      1.01          --------- ----------  ED, PLAX  LV PW/LV ID           0.18          --------- ----------  ratio, ED, PLAX  LV ejection           59     %      52 - 72   57  fraction  Stroke                39     ml/m^2 --------- ----------  volume/bsa, 2D  LV e', lateral        16.9   cm/sec >=10.0    ----------  LV E/e', lateral      5             <=13      ----------  LV e', medial         11.0   cm/sec >=7.0     ----------  LV E/e', medial       7             --------- ----------  LV e', average        14.0   cm/sec --------- ----------  LV E/e', average      5             <=14      ----------  LVOT                  Value         Ref       11/23/2016  LVOT ID               2.4    cm     --------- ----------  LVOT peak             1.18   m/sec  --------- ----------  velocity, S  LVOT VTI, S           21.1   cm     --------- ----------  LVOT peak             6      mm Hg  --------- ----------  gradient, S  LVOT mean             4      mm Hg  --------- ----------  gradient, S  Stroke volume         95     ml     --------- ----------  (SV), LVOT DP  Stroke index          39     ml/m^2 --------- ----------  (SV/bsa), LVOT DP  Aortic root           Value         Ref       11/23/2016  Aortic root ID,       3.8    cm     3.0 - 4.5 ----------  ED  Ascending aorta       Value         Ref       11/23/2016  Ascending aorta       3.5    cm     2.2 - 3.8 ----------  ID, A-P, ED  Left atrium           Value         Ref       11/23/2016  LA ID, A-P, ES        3.7    cm     3.0 - 4.0 ----------  LA volume, ES,    (H) 60     ml      18 - 58   ----------  1-p A4C  LA/aortic root        0.97          --------- ----------  ratio  Mitral valve          Value         Ref       11/23/2016  Mitral E-wave         0.76   m/sec  --------- 0.73  peak velocity  Mitral A-wave         1      m/sec  --------- 0.96  peak velocity  Mitral peak           2      mm Hg  --------- 2  gradient, D  Mitral E/A ratio,     0.8           --------- ----------  peak  Pulmonary artery      Value         Ref       11/23/2016  PA pressure, S,       23     mm Hg  --------- ----------  DP  Tricuspid valve       Value         Ref       11/23/2016  Tricuspid regurg      2.23   m/sec  <=2.8     ----------  peak velocity  Tricuspid peak        20     mm Hg  --------- ----------  RV-RA gradient  Systemic veins        Value         Ref       11/23/2016  Estimated CVP         3      mm Hg  --------- 5  Right ventricle       Value         Ref       11/23/2016  RV free wall LS,      -13.50 %      --------- ----------  2D  TAPSE, 2D             2.60   cm     >=1.70    ----------  RV pressure, S,       23     mm Hg  --------- ----------  DP  RV s', lateral        16.8   cm/sec >=9.5     ---------- Legend: (L)  and  (H)  brittani values outside specified reference range. ---------------------------------------------------------------------------- Prepared and electronically signed by Cony Stewart 07/17/2024 10:28     CT TIBIA/FIBULA LEFT (CPT=73700)    Result Date: 7/16/2024  PROCEDURE:  CT TIBIA/FIBULA LEFT (CPT=73700)  COMPARISON:  [facility] , CT, CT TIBIA/FIBULA RIGHT(CONTRAST ONLY) (CPT=73701), 6/20/2024, 7:43 PM.  INDICATIONS:  evaluate for necrotizing fasciitis  TECHNIQUE:  Multi-planar CT images were created without intravenous contrast. Shaded surface renderings are generated on an independent CT scanner workstation.  Dose reduction techniques were used. Dose information is transmitted to the ACR (American College of Radiology) NRDR (National Radiology Data Registry) which includes the  Dose Index Registry  3-D RENDERING:  Not applicable  PATIENT STATED HISTORY:(As transcribed by Technologist)  Worsening wound to left left near calf.    FINDINGS:  BONES:  There is diffuse osteopenia noted.  There is joint space narrowing and marginal osteophyte formation in the ankle and the knee.  There are enthesophytes at dorsal and plantar aspect of the calcaneus. SOFT TISSUES:  Extensive venous varicosities are noted throughout the lower extremity.  There is diffuse subcutaneous stranding noted.  This could represent cellulitis or hydrostatic edema related to venous insufficiency or congestive heart failure.  There is diffuse skin thickening noted.  Within the limits of a noncontrast study the skeletal muscles and deeper fascial planes appear otherwise normal.  There is no evidence of abscess.  There is no specific evidence of necrotizing fasciitis. EFFUSION:  None visible. OTHER:  Negative.            CONCLUSION:  1. Diffuse subcutaneous stranding and skin thickening are noted.  There are extensive venous varicosities noted.  Findings could represent cellulitis.  Hydrostatic edema as a result of venous insufficiency or congestive heart failure could have this appearance as well. 2. Deeper skeletal muscles and fascial planes are otherwise normal.  There is no specific evidence of myositis or fasciitis. 3. There is osteoarthritis in the knee and ankle.  There is no evidence of septic arthritis or osteomyelitis.    LOCATION:  [facility]   Dictated by (CST): Gideon Azar MD on 7/16/2024 at 9:56 PM     Finalized by (CST): Gideon Azar MD on 7/16/2024 at 9:59 PM               [facility]      Medical Decision Making:    Differential diagnosis before testing includes worsening cellulitis, DVT potential life threatening diagnosis which is a medical condition that poses a threat to life/function.     Comorbidities that add complexity to management: See HPI    I reviewed prior external ED notes including from admission note 7/16  - 7/20 \"Patient admitted for left lower extremity cellulitis.  Seen by infectious disease in consultation.  CT LLE Did not show any osteomyelitis. Sx improved w/ iv ancef, transition to po abx on dc. Pt given x2 iv diuretics w/ improvement in overall LE edema / chronic lymphedema. Pt encouraged to elevated legs as much as possible but works as . Echo done to evaluate LE edema. No HF noted. \"    Discussions of management with: Hospitalist    I personally reviewed the ultrasound and my independent interpretation includes no DVT.    Shared decision making:   Admission disposition: 7/25/2024 11:44 AM         WBC 19, prior WBC 5.6 on 7/19  Ultrasound neg  Will consult ID, discussed with Dr. Short, agrees with plan to admit for recurrent LLE cellulitis, will consider CT leg if needed in house, no emergent need at this time. Recommended IV Ancef.                              Medical Decision Making      Disposition and Plan     Clinical Impression:  1. Cellulitis of left lower extremity         Disposition:  Admit  7/25/2024 11:44 am    Follow-up:  [facility]  [address]  [facility] [address] [phone]  Schedule an appointment as soon as possible for a visit      [facility]  [address] [facility] [address] [facility] [id number] [phone]  Schedule an appointment as soon as possible for a visit in 1 week(s)  lymphedema management    Tigre Short MD  [address]  [facility] [address]  [facility] [address]  [phone]    Schedule an appointment as soon as possible for a visit on 8/14/2024            Medications Prescribed:  Current Discharge Medication List        START taking these medications    Details   penicillin v potassium 500 MG Oral Tab Take 2 tablets (1,000 mg total) by mouth 3 (three) times daily for 10 days, THEN 1 tablet (500 mg total) 2 (two) times a day.  Qty: 180 tablet, Refills: 0                               Hospital Problems       Present on  Admission  Date Reviewed: 1/10/2024            ICD-10-CM Noted POA    * (Principal) Cellulitis of left lower extremity L03.116 7/25/2024 Unknown    Morbid obesity (HCC) E66.01 7/20/2012 Yes

## 2024-07-25 NOTE — ED QUICK NOTES
Rounding Completed    Plan of Care reviewed. Waiting for US.  Elimination needs assessed.  Provided blankets.    Bed is locked and in lowest position. Call light within reach.

## 2024-07-25 NOTE — PLAN OF CARE
Isolation initiated  Dressings to BLE left intact from wound clinic  Pictures in chart from wound clinic  Glasses at bedside  Tele applied  Dr. Short and Dea david for consult
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
## 2024-07-26 VITALS
TEMPERATURE: 98 F | RESPIRATION RATE: 14 BRPM | HEART RATE: 77 BPM | HEIGHT: 67 IN | WEIGHT: 253 LBS | OXYGEN SATURATION: 96 % | SYSTOLIC BLOOD PRESSURE: 125 MMHG | DIASTOLIC BLOOD PRESSURE: 74 MMHG | BODY MASS INDEX: 39.71 KG/M2

## 2024-07-26 LAB
ANION GAP SERPL CALC-SCNC: 3 MMOL/L (ref 0–18)
BASOPHILS # BLD AUTO: 0.07 X10(3) UL (ref 0–0.2)
BASOPHILS NFR BLD AUTO: 0.8 %
BUN BLD-MCNC: 14 MG/DL (ref 9–23)
CALCIUM BLD-MCNC: 8.9 MG/DL (ref 8.7–10.4)
CHLORIDE SERPL-SCNC: 107 MMOL/L (ref 98–112)
CO2 SERPL-SCNC: 27 MMOL/L (ref 21–32)
CREAT BLD-MCNC: 0.83 MG/DL
EGFRCR SERPLBLD CKD-EPI 2021: 101 ML/MIN/1.73M2 (ref 60–?)
EOSINOPHIL # BLD AUTO: 0.06 X10(3) UL (ref 0–0.7)
EOSINOPHIL NFR BLD AUTO: 0.7 %
ERYTHROCYTE [DISTWIDTH] IN BLOOD BY AUTOMATED COUNT: 13.8 %
GLUCOSE BLD-MCNC: 114 MG/DL (ref 70–99)
HCT VFR BLD AUTO: 35.2 %
HGB BLD-MCNC: 12.1 G/DL
IMM GRANULOCYTES # BLD AUTO: 0.06 X10(3) UL (ref 0–1)
IMM GRANULOCYTES NFR BLD: 0.7 %
LYMPHOCYTES # BLD AUTO: 1.23 X10(3) UL (ref 1–4)
LYMPHOCYTES NFR BLD AUTO: 14 %
MCH RBC QN AUTO: 31.7 PG (ref 26–34)
MCHC RBC AUTO-ENTMCNC: 34.4 G/DL (ref 31–37)
MCV RBC AUTO: 92.1 FL
MONOCYTES # BLD AUTO: 0.55 X10(3) UL (ref 0.1–1)
MONOCYTES NFR BLD AUTO: 6.3 %
NEUTROPHILS # BLD AUTO: 6.83 X10 (3) UL (ref 1.5–7.7)
NEUTROPHILS # BLD AUTO: 6.83 X10(3) UL (ref 1.5–7.7)
NEUTROPHILS NFR BLD AUTO: 77.5 %
OSMOLALITY SERPL CALC.SUM OF ELEC: 285 MOSM/KG (ref 275–295)
PLATELET # BLD AUTO: 279 10(3)UL (ref 150–450)
POTASSIUM SERPL-SCNC: 3.8 MMOL/L (ref 3.5–5.1)
RBC # BLD AUTO: 3.82 X10(6)UL
SODIUM SERPL-SCNC: 137 MMOL/L (ref 136–145)
WBC # BLD AUTO: 8.8 X10(3) UL (ref 4–11)

## 2024-07-26 PROCEDURE — 99239 HOSP IP/OBS DSCHRG MGMT >30: CPT | Performed by: INTERNAL MEDICINE

## 2024-07-26 RX ORDER — PENICILLIN V POTASSIUM 500 MG/1
TABLET ORAL
Qty: 180 TABLET | Refills: 0 | Status: SHIPPED | OUTPATIENT
Start: 2024-07-26 | End: 2024-10-04

## 2024-07-26 NOTE — PROGRESS NOTES
[facility]   part of [facility] ID PROGRESS NOTE    Luis M Estrada Patient Status:  Observation    3/25/1965 MRN [id number]   Location [facility] 0SW-A Attending Dawit Malin MD   Hosp Day # 0 PCP SILVER GARCIA,      Abx: IV Ancef D#1    Subjective: Patient seen and examined today. Leg feels better, less erythema. Afebrile.     Allergies:  No Known Allergies    Medications:    Current Facility-Administered Medications:     aspirin DR tab 81 mg, 81 mg, Oral, Daily    heparin (Porcine) 5000 UNIT/ML injection 5,000 Units, 5,000 Units, Subcutaneous, Q8H AGUSTIN    acetaminophen (Tylenol) tab 650 mg, 650 mg, Oral, Q4H PRN **OR** HYDROcodone-acetaminophen (Norco) 5-325 MG per tab 1 tablet, 1 tablet, Oral, Q4H PRN **OR** HYDROcodone-acetaminophen (Norco) 5-325 MG per tab 2 tablet, 2 tablet, Oral, Q4H PRN    ondansetron (Zofran) 4 MG/2ML injection 4 mg, 4 mg, Intravenous, Q6H PRN    prochlorperazine (Compazine) 10 MG/2ML injection 5 mg, 5 mg, Intravenous, Q8H PRN    polyethylene glycol (PEG 3350) (Miralax) 17 g oral packet 17 g, 17 g, Oral, Daily PRN    sennosides (Senokot) tab 17.2 mg, 17.2 mg, Oral, Nightly PRN    bisacodyl (Dulcolax) 10 MG rectal suppository 10 mg, 10 mg, Rectal, Daily PRN    fleet enema (Fleet) 7-19 GM/118ML rectal enema 133 mL, 1 enema, Rectal, Once PRN    ceFAZolin (Ancef) 2g in 10mL IV syringe premix, 2 g, Intravenous, Q8H    Review of Systems:  Completed. See pertinent positives and negatives above.     Physical Exam:  Vital signs: Blood pressure 110/67, pulse 87, temperature 97.7 °F (36.5 °C), temperature source Oral, resp. rate 24, height 170.2 cm (5' 7\"), weight 253 lb (114.8 kg), SpO2 98%.    General: Alert, oriented, NAD, on room air.   HEENT: Moist mucous membranes.   Neck: No lymphadenopathy.  Supple.  Cardiovascular: RRR  Respiratory: Clear to auscultation bilaterally.  No wheezes. No rhonchi.  Abdomen: Soft, nontender, nondistended.   Musculoskeletal:  LLE edema  Integument: LLE edema with erythema. Open wound to posterior, medial leg without any purulent drainage or signs of infection. Erythema has improved from the previous day.     Laboratory Data:  Recent Labs   Lab 07/25/24  0955   RBC 4.43   HGB 14.1   HCT 41.3   MCV 93.2   MCH 31.8   MCHC 34.1   RDW 13.7   NEPRELIM 17.46*   WBC 19.3*   .0     Recent Labs   Lab 07/20/24  0601 07/25/24  0956   GLU 88 97   BUN 16 21   CREATSERUM 0.74 0.96   CA 9.3 9.1   ALB  --  3.9    133*   K 3.9 4.1    101   CO2 25.0 26.0   ALKPHO  --  68   AST  --  34*   ALT  --  14   BILT  --  1.0   TP  --  8.0       Microbiology: Reviewed in EMR    Radiology: Reviewed.    PROCEDURE:  US VENOUS DOPPLER LEG LEFT - DIAG IMG (CPT=93971)     COMPARISON:  [facility], US VENOUS DOPPLER LEG LEFT - DIAG IMG (CPT=93971), 11/21/2016, 6:02 PM.     INDICATIONS:  Left leg wound swelling, warmth, and pain from wound clinic     TECHNIQUE:  Real time, grey scale, and duplex ultrasound was used to evaluate the lower extremity venous system. B-mode two-dimensional images of the vascular structures, Doppler spectral analysis, and color flow.  Doppler imaging were performed.  The  following veins were imaged:  Common, deep, and superficial femoral, popliteal, sapheno-femoral junction, posterior tibial veins, and the contralateral common femoral vein.     PATIENT STATED HISTORY: (As transcribed by Technologist)       FINDINGS:    EXTREMITY EXAMINED:  Left lower  SAPHENOFEMORAL JUNCTION:  No reflux.  THROMBI:  None visible.  The technologist notes a somewhat limited evaluation of the left proximal posterior tibial vein due to overlying dressings.  COMPRESSION:  Normal compressibility, phasicity, and augmentation.  OTHER:  Negative.     Impression:    CONCLUSION:  Within the visualized left lower extremity there is no evidence of DVT.     LOCATION:  [facility]     Dictated by (CST): Sal Ambriz MD on 7/25/2024 at 11:56 AM      Finalized by (CST):  Sal Ambriz MD on 7/25/2024 at 11:56 AM      ASSESSMENT:  LLE cellulitis- improving  Recently hospitalized 7/16-7/20 for LLE cellulitis. CT LLE w/o OM, myositis or fasciitis. He improved on Ancef and was dc with cefadroxil. Patient reports worsening erythema/swelling yesterday, after returning back to work a few days prior (on his feet at work)  Venous doppler negative for DVT  Leukocytosis, assume d/t above. No other obvious source of infection at this time.   Chronic wounds to LLE, s/p trauma 1/2024. Improving. Wound does not appear acutely infected.  H/o Hydrocephalus    PLAN:  - continue IV Ancef--> will plan to transition to po abx on dc  - follow blood cultures  - follow temps and wbc--> am CBC pending  - follow LLE clinically- improving; elevate legs as able; would start compression with ace bandage if able to tolerate.  - wound care as per wound care team    Discussed case with RN, patient and Dr. Short.     [facility]  [phone]

## 2024-07-26 NOTE — PLAN OF CARE
Assumed care at 1930.  Alert. See flowsheet for further assessment.  Room air. SR. VSS.  General diet.  BL leg wounds -- see documentation.  Activity as tolerated.

## 2024-07-26 NOTE — DISCHARGE SUMMARY
[facility]PITAL[facility]  DISCHARGE SUMMARY     Luis M Estrada Patient Status:  Observation    3/25/1965 MRN [id number]   Location [facility] 0SW-A Attending Dawit Malin MD   Hosp Day # 0 PCP SILVER GARCIA DO     Date of Admission: 2024  Date of Discharge:   2024      Discharge Disposition: Home Health Care Services    Discharge Diagnosis:  LLE cellulitis  Leukocytosis  Chronic wounds  Hx of hydrocephalus    History of Present Illness: Luis M Estrada is a 59 year old male with PMhx of cellulitis, hydrocephalus presents from wound care clinic. Pt was just discharged for cellulitis last week. He has been on cefodroxil and was seen at wound care clinic today. Staff noticed it was more swollen and red and advised he come in for evaluation. He denies any F/C. He denies any CP or SOB. No N/V/D/C or abd pain.     Brief Synopsis: Pt was admitted and treated with IV abx. He was seen by ID and improved with treatment. He will transition to oral abx on DC prior to DC.     Lace+ Score: 55  59-90 High Risk  29-58 Medium Risk  0-28   Low Risk  Patient was referred to the [facility].    TCM Follow-Up Recommendation:  LACE 29-58: Moderate Risk of readmission after discharge from the hospital.      Procedures during hospitalization:   none    Incidental or significant findings and recommendations (brief descriptions):  none    Lab/Test results pending at Discharge:   none    Consultants:  ID    Discharge Medication List:     Discharge Medications        START taking these medications        Instructions Prescription details   penicillin v potassium 500 MG Tabs  Commonly known as: Veetid  Start taking on: 2024      Take 2 tablets (1,000 mg total) by mouth 3 (three) times daily for 10 days, THEN 1 tablet (500 mg total) 2 (two) times a day.   Stop taking on: 2024  Quantity: 180 tablet  Refills: 0            CONTINUE taking these medications        Instructions  Prescription details   aspirin 81 MG Tabs      Take 1 tablet (81 mg total) by mouth daily.   Refills: 0            STOP taking these medications      cefadroxil 500 MG Caps  Commonly known as: DURICEF                  Where to Get Your Medications        These [id number] [place] [facility] [address] [phone], [phone]  [address] [place] [place] [address]      Phone: [phone]   penicillin v potassium 500 MG Tabs         [place]    Follow-up appointment:   [facility]  [address]  [facility] [address] [phone]  Schedule an appointment as soon as possible for a visit      [facility]  [address] [facility] [address] [phone]  Schedule an appointment as soon as possible for a visit in 1 week(s)  lymphedema management    Tigre Short MD  [address]  [facility] [id number] [phone]    Schedule an appointment as soon as possible for a visit on 8/14/2024      Appointments for Next 30 Days 7/26/2024 - 8/25/2024        Date Arrival Time Visit Type Length Department Provider     7/29/2024 11:00 AM  [facility] FOLLOW UP [2023] 30 min [facility], [address] [place] [address] Darryl Dominguez MD    Patient Instructions:         Location Instructions:     Masks are optional for all patients and visitors, unless otherwise indicated.               8/1/2024  9:00 AM   FOLLOW UP [1694] 15 min [facility] Nicholas Gould MD    Patient Instructions:         Location Instructions:     Your appointment is scheduled at [facility]. The address is&nbsp; [address] [place] [address]. To reach Registration, park in the [facility]. Go through the entrance doors located on the ground floor. Veer left past the Information Desk and proceed to the Wound Care Center.  Masks are optional for all patients and visitors, unless otherwise  indicated.               8/8/2024  9:00 AM  [facility] FOLLOW UP [7197] 30 min [facility] Nicholas Gould MD    Patient Instructions:         Location Instructions:     Your appointment is scheduled at [facility]. The address is&nbsp; [address] [place] [address]. To reach Registration, park in the [facility]. Go through the entrance doors located on the ground floor. Veer left past the Information Desk and proceed to the Wound Care Center.  Masks are optional for all patients and visitors, unless otherwise indicated.               8/15/2024  9:00 AM   FOLLOW UP [7197] 30 min [facility] Nicholas Gould MD    Patient Instructions:         Location Instructions:     Your appointment is scheduled at [facility]. The address is&nbsp; [address] [place] [address]. To reach Registration, park in the [facility] Garage. Go through the entrance doors located on the ground floor. Veer left past the Information Desk and proceed to the Wound Care Center.  Masks are optional for all patients and visitors, unless otherwise indicated.               8/22/2024  9:00 AM   FOLLOW UP [7197] 15 min [facility] Nicholas Gould MD    Patient Instructions:         Location Instructions:     Your appointment is scheduled at [facility]. The address is&nbsp; [address] [place] [address]. To reach Registration, park in the [facility] Garage. Go through the entrance doors located on the ground floor. Veer left past the Information Desk and proceed to the Wound Care Center.  Masks are optional for all patients and visitors, unless otherwise indicated.                      Vital signs:  Temp:  [97.7 °F (36.5 °C)-99.3 °F (37.4 °C)] 97.7 °F (36.5 °C)  Pulse:  [67-97] 87  Resp:  [16-26] 24  BP: (108-135)/(45-77) 110/67  SpO2:  [95 %-99 %] 98 %    Physical Exam:    General: No acute distress   Lungs: clear to  auscultation  Cardiovascular: S1, S2  Abdomen: Soft      -----------------------------------------------------------------------------------------------  PATIENT DISCHARGE INSTRUCTIONS: See electronic chart    Dawit Malin MD    Total time spent on discharge plannin minutes     The  [employer] Cures Act makes medical notes like these available to patients in the interest of transparency. Please be advised this is a medical document. Medical documents are intended to carry relevant information, facts as evident, and the clinical opinion of the practitioner. The medical note is intended as peer to peer communication and may appear blunt or direct. It is written in medical language and may contain abbreviations or verbiage that are unfamiliar.

## 2024-07-26 NOTE — CM/SW NOTE
07/26/24 1000   CM/SW Referral Data   Referral Source Social Work (self-referral)   Reason for Referral Discharge planning   Informant Patient;EMR;Clinical Staff Member     Sw met with pt to assess for dc needs.  Discussed that pt declined HHC that was set up  after last admission.  Pt states he follows up with wound clinic weekly and states he does not do dressing changes at home. He only does dressing changes when at wound care appointments.  He continues to decline HHC.    Susanne Padilla LCSW  /Discharge Planner

## 2024-07-29 ENCOUNTER — PATIENT OUTREACH (OUTPATIENT)
Dept: CASE MANAGEMENT | Age: 59
End: 2024-07-29

## 2024-07-29 ENCOUNTER — OFFICE VISIT (OUTPATIENT)
Dept: FAMILY MEDICINE CLINIC | Facility: CLINIC | Age: 59
End: 2024-07-29
Payer: COMMERCIAL

## 2024-07-29 VITALS
WEIGHT: 253 LBS | DIASTOLIC BLOOD PRESSURE: 68 MMHG | HEART RATE: 79 BPM | TEMPERATURE: 98 F | HEIGHT: 67 IN | RESPIRATION RATE: 20 BRPM | SYSTOLIC BLOOD PRESSURE: 106 MMHG | BODY MASS INDEX: 39.71 KG/M2

## 2024-07-29 DIAGNOSIS — Z09 HOSPITAL DISCHARGE FOLLOW-UP: Primary | ICD-10-CM

## 2024-07-29 DIAGNOSIS — Z02.9 ENCOUNTERS FOR UNSPECIFIED ADMINISTRATIVE PURPOSE: Primary | ICD-10-CM

## 2024-07-29 PROCEDURE — 99214 OFFICE O/P EST MOD 30 MIN: CPT | Performed by: FAMILY MEDICINE

## 2024-07-29 PROCEDURE — 3074F SYST BP LT 130 MM HG: CPT | Performed by: FAMILY MEDICINE

## 2024-07-29 PROCEDURE — 3078F DIAST BP <80 MM HG: CPT | Performed by: FAMILY MEDICINE

## 2024-07-29 PROCEDURE — 3008F BODY MASS INDEX DOCD: CPT | Performed by: FAMILY MEDICINE

## 2024-07-29 NOTE — PROGRESS NOTES
[facility] Office Note  Chief Complaint:   Chief Complaint   Patient presents with    Hospital F/U     LLE cellulitis  Leukocytosis  Chronic wounds  Hx of hydrocephalus      TCM (Transition Of Care Management)       HPI:   This is a 59 year old male coming in for hospital follow-up.  The patient had been hospitalized due to cellulitis of his left lower extremity.  The patient states that he has been doing well since discharge.  He has not had any fevers and has not had any increase in redness of the lower extremities.  He states that he has follow-up with wound care on Thursday of this week.  He has been taking the penicillin 3 times a day as prescribed and will be completing this in October.      Past Medical History:    Cellulitis    to left leg, seeking treatment at wound care for months    Hydrocephalus (HCC)    dx'd as an adult-no shunt     No past surgical history on file.  Social History:  Social History     Socioeconomic History    Marital status: Single   Tobacco Use    Smoking status: Never    Smokeless tobacco: Never   Vaping Use    Vaping status: Never Used   Substance and Sexual Activity    Alcohol use: Yes     Comment: 2 a week    Drug use: No   Other Topics Concern    Caffeine Concern No     Comment: 2 COFFEE Q AM    Exercise Yes     Comment: WALKING OCCAS   Social History Narrative    ** Merged History Encounter **          Social Determinants of Health     Financial Resource Strain: Low Risk  (7/22/2024)    Financial Resource Strain     Difficulty of Paying Living Expenses: Not very hard     Med Affordability: No   Food Insecurity: No Food Insecurity (7/25/2024)    Food Insecurity     Food Insecurity: Never true   Transportation Needs: No Transportation Needs (7/25/2024)    Transportation Needs     Lack of Transportation: No   Housing Stability: Low Risk  (7/25/2024)    Housing Stability     Housing Instability: No     Family History:  No family history on  file.  Allergies:  No Known Allergies  Current Meds:  Current Outpatient Medications   Medication Sig Dispense Refill    penicillin v potassium 500 MG Oral Tab Take 2 tablets (1,000 mg total) by mouth 3 (three) times daily for 10 days, THEN 1 tablet (500 mg total) 2 (two) times a day. 180 tablet 0    aspirin 81 MG Oral Tab Take 1 tablet (81 mg total) by mouth daily.        Counseling given: Not Answered       REVIEW OF SYSTEMS:   Consitutional: No fevers, chills, sweats  Eye: No recent visual problems  ENMT: No ear pain nasal congestion sore throat  Respiratory: No shortness of breath, cough  Cardiovascular: No chest pain palpitations syncope  Gastrointestinal: No nausea vomiting diarrhea  Genitourinary: No hematuria  Hema/Lymph no bruising tendency, swollen lymph glands  Endocrine: Negative for excessive thirst excessive hunger  Musculoskeletal: No back pain neck pain joint pain muscle pain decreased range of motion      Medical, surgical, family, and social histories were reviewed      EXAM:   VITALS:   Vitals:    07/29/24 1108   BP: 106/68   Pulse: 79   Resp: 20   Temp: 98.1 °F (36.7 °C)      GENERAL: well developed, well nourished, in no apparent distress  SKIN: no rashes, no suspicious lesions: Cool and Dry  HEENT: atraumatic, normocephalic, ears and throat are clear    Ears: TM's clear and visible bilaterally, no excess cerumen or erythema.   EYES: Pupils equal round and reactive.  Extraocular motions intact no scleral icterus no injection or drainage  THROAT without erythema tonsillar hypertrophy or exudate.  Uvula midline airway patent  NECK: Given midline.  No JVD or lymphadenopathy supple nontender no meningeal signs   LUNGS: clear to auscultation sounds equal bilaterally no wheezes rales or rhonchi  CARDIO: Regular rate and rhythm without murmurs gallops or rubs  EXTREMITIES: no cyanosis, clubbing or edema no joint tenderness effusion or edema noted.  No calf tenderness negative Homans' sign bilaterally  2+ bilateral lower extremity swelling  Left lower extremity bandaged there is no bleeding no discharge present of the bandages right lower extremity Tubigrip in place    ASSESSMENT AND PLAN:   1. Hospital discharge follow-up  I did discuss with the patient to continue to follow-up with wound care there has been no increase in redness no discharge present there is still some 2+ lower extremity swelling bilaterally.  I did discuss with the patient to continue with the penicillin as well as to possibly add on yogurt or a probiotic to prevent diarrhea since he will be on this for an extended period of time.  He was given a note for work to be excused until next Monday he was asked to follow-up with his primary for further recommendations in terms of going back to work.    Meds & Refills for this Visit:  Requested Prescriptions      No prescriptions requested or ordered in this encounter       Health Maintenance:  Health Maintenance Due   Topic Date Due    Annual Physical  Never done    DTaP,Tdap,and Td Vaccines (1 - Tdap) Never done    Zoster Vaccines (1 of 2) Never done    PSA  08/14/2021    COVID-19 Vaccine (3 - 2023-24 season) 09/01/2023       Patient/Caregiver Education: Patient/Caregiver Education: There are no barriers to learning. Medical education done.   Outcome: Patient verbalizes understanding. Patient is notified to call with any questions, complications, allergies, or worsening or changing symptoms.  Patient is to call with any side effects or complications from the treatments as a result of today.     Problem List:  Patient Active Problem List   Diagnosis    Stasis dermatitis of both legs    Cellulitis    Fungal dermatitis    Chronic venous insufficiency    Decreased pulses in feet    Morbid obesity (HCC)    Cellulitis of left leg    Leukocytosis    Fever    Hyponatremia    Sepsis without acute organ dysfunction (HCC)    Constipation    left ankle sx  global exp 3/1/17    YUVAL (acute kidney injury) ([facility])     Cellulitis of left foot         Global exp 2-24-17 / LEFT FOOT / [facility]     Cellulitis of right leg    Lymphedema    Hydrocephalus (HCC)    Screening for malignant neoplasm of colon    Left leg cellulitis    Wound infection    Cellulitis of left lower extremity         No follow-ups on file.     Darryl Dominguez MD    Please note that portions of this note may have been completed with a voice recognition program. Efforts were made to edit the dictations but occasionally words are mis-transcribed.

## 2024-08-01 ENCOUNTER — OFFICE VISIT (OUTPATIENT)
Dept: WOUND CARE | Facility: HOSPITAL | Age: 59
End: 2024-08-01
Attending: FAMILY MEDICINE
Payer: COMMERCIAL

## 2024-08-01 VITALS
SYSTOLIC BLOOD PRESSURE: 139 MMHG | DIASTOLIC BLOOD PRESSURE: 96 MMHG | TEMPERATURE: 98 F | HEART RATE: 70 BPM | RESPIRATION RATE: 14 BRPM

## 2024-08-01 DIAGNOSIS — E66.01 CLASS 3 SEVERE OBESITY DUE TO EXCESS CALORIES WITH SERIOUS COMORBIDITY AND BODY MASS INDEX (BMI) OF 45.0 TO 49.9 IN ADULT (HCC): ICD-10-CM

## 2024-08-01 DIAGNOSIS — S81.802D OPEN WOUND OF LEFT LOWER LEG, SUBSEQUENT ENCOUNTER: ICD-10-CM

## 2024-08-01 DIAGNOSIS — I87.333 CHRONIC VENOUS HYPERTENSION (IDIOPATHIC) WITH ULCER AND INFLAMMATION OF BILATERAL LOWER EXTREMITY (HCC): Primary | ICD-10-CM

## 2024-08-01 PROCEDURE — 99214 OFFICE O/P EST MOD 30 MIN: CPT | Performed by: FAMILY MEDICINE

## 2024-08-01 NOTE — PROGRESS NOTES
Chief Complaint   Patient presents with    Wound Recheck     Pt here for follow up arrives with ace wrap and gauze to left leg and spanda to right. Pt continues oral antibiotics        HPI:     Patient here for follow-up of left lower extremity wounds and right lower extremity wound.  He is doing well and tolerating compression wrap.  No new complaints.    Lab Results   Component Value Date    BUN 14 07/26/2024    CREATSERUM 0.83 07/26/2024    ALB 3.9 07/25/2024    TP 8.0 07/25/2024    A1C 5.2 12/12/2016         Current Outpatient Medications:     penicillin v potassium 500 MG Oral Tab, Take 2 tablets (1,000 mg total) by mouth 3 (three) times daily for 10 days, THEN 1 tablet (500 mg total) 2 (two) times a day., Disp: 180 tablet, Rfl: 0    aspirin 81 MG Oral Tab, Take 1 tablet (81 mg total) by mouth daily., Disp: , Rfl:     No Known Allergies         REVIEW OF SYSTEMS:     Review of Systems (ROS)  This information was obtained from the patient, chart    A comprehensive 10 point review of systems was completed.  Pertinent positives and negatives noted in the the HPI.     Past medical, surgical, family and social history updated where appropriate.    PHYSICAL EXAM:   BP (!) 139/96   Pulse 70   Temp 97.6 °F (36.4 °C)   Resp 14    Estimated body mass index is 39.63 kg/m² as calculated from the following:    Height as of 7/29/24: 67\".    Weight as of 7/29/24: 253 lb (114.8 kg).   Vital signs reviewed.Appears stated age, well groomed.        Calf  Point of Measurement - Left Calf: 37  Point of Measurement - Right Calf: 37  Left Calf from:: Heel  Calf Left cm:: 47  Right Calf from:: Heel  Right Calf cm:: 43    Ankle  Point of Measurement - Left Ankle: 10  Point of Measurement - Right Ankle: 10  Left Ankle from:: Heel  Left Ankle cm:: 28.4     Right Ankle from:: Heel  Right Ankle cm:: 28       Foot                            Wound 01/11/24 #1 Leg Left (Active)   Date First Assessed/Time First Assessed: 01/11/24 1406     Wound Number (Wound Clinic Only): #1  Primary Wound Type: Traumatic  Location: Leg  Wound Location Orientation: Left      Assessments 1/11/2024  2:10 PM 8/1/2024  8:40 AM   Wound Image       Drainage Amount Large None   Drainage Description Yellow;Serous --   Treatments Compression --   Wound Length (cm) 10.6 cm 0.1 cm   Wound Width (cm) 9.5 cm 0.1 cm   Wound Surface Area (cm^2) 100.7 cm^2 0.01 cm^2   Wound Depth (cm) 0.2 cm 0 cm   Wound Volume (cm^3) 20.14 cm^3 0 cm^3   Wound Healing % -- 100   Margins Well-defined edges Attached edges   Non-staged Wound Description Full thickness Full thickness   Leslie-wound Assessment Edema;Hemosiderin staining Edema;Hemosiderin staining;Dry   Wound Granulation Tissue Firm;Pink --   Wound Bed Granulation (%) 40 % --   Wound Bed Slough (%) 60 % --   Wound Odor None None   Tunneling? No No   Undermining? No No   Sinus Tracts? No No       Inactive Orders   Date Order Priority Status Authorizing Provider   07/16/24 2327 Consult to Wound Ostomy Routine Completed Rica Pearson MD     - Reason for Consult:    Wound Care     - Wound Care Reason for Consult:    worsening   06/24/24 1127 Wound care Routine Discontinued Rica Pearson MD   06/06/24 0913 Debridement Traumatic Routine Completed Nicholas Gould MD   05/16/24 0901 Debridement Traumatic Routine Completed Nicholas Gould MD   05/02/24 1520 Debridement Traumatic Routine Completed Nicholas Gould MD   04/11/24 0959 Debridement Traumatic Routine Completed Nicholas Gould MD   04/04/24 0915 Debridement Traumatic Left;Lateral Leg Routine Completed Nicholas Gould MD   02/15/24 1154 Debridement Traumatic Left;Lateral Leg Routine Completed Nciholas Gould MD   01/18/24 1511 Debridement Traumatic Left;Lateral Leg Routine Completed Nicholas Gould MD   01/11/24 1710 Debridement Traumatic Left;Lateral Leg Routine Completed Nicholas Gould MD       Wound 06/20/24 #2 Leg Right (Active)   Date First Assessed/Time First Assessed:  06/20/24 0832    Wound Number (Wound Clinic Only): #2  Primary Wound Type: Venous Ulcer  Location: Leg  Wound Location Orientation: Right      Assessments 6/20/2024  8:34 AM 8/1/2024  8:44 AM   Wound Image       Drainage Amount Copious None   Drainage Description Clear --   Wound Length (cm) 0.8 cm 0.1 cm   Wound Width (cm) 1.2 cm 0.1 cm   Wound Surface Area (cm^2) 0.96 cm^2 0.01 cm^2   Wound Depth (cm) 0.1 cm 0 cm   Wound Volume (cm^3) 0.096 cm^3 0 cm^3   Wound Healing % -- 100   Margins Well-defined edges Attached edges   Non-staged Wound Description Full thickness Full thickness   Leslie-wound Assessment Hemosiderin staining;Blanchable erythema;Edema;Moist Edema;Hemosiderin staining;Dry   Wound Granulation Tissue Pink;Spongy --   Wound Bed Granulation (%) 50 % --   Wound Bed Slough (%) 50 % --   Wound Odor None None       Inactive Orders   Date Order Priority Status Authorizing Provider   06/24/24 1127 Wound care Routine Discontinued Rica Pearson MD   06/21/24 0942 Consult to Wound Ostomy Routine Completed Rica Pearson MD     - Reason for Consult:    Wound Care     - Wound Care Reason for Consult:    pt sees wound clinic for LLE wound. Now with RLE cellulitis/weeping.       Wound 07/11/24 #3 Leg Posterior;Left (Active)   Date First Assessed/Time First Assessed: 07/11/24 0852    Wound Number (Wound Clinic Only): #3  Primary Wound Type: Pressure Injury  Location: Leg  Wound Location Orientation: Posterior;Left      Assessments 7/11/2024  8:59 AM 8/1/2024  8:41 AM   Wound Image       Drainage Amount Moderate None   Drainage Description Serosanguineous --   Treatments Compression --   Wound Length (cm) 1.7 cm 0.5 cm   Wound Width (cm) 9.4 cm 4 cm   Wound Surface Area (cm^2) 15.98 cm^2 2 cm^2   Wound Depth (cm) 0.1 cm 0.1 cm   Wound Volume (cm^3) 1.598 cm^3 0.2 cm^3   Wound Healing % -- 87   Margins Well-defined edges Well-defined edges   Non-staged Wound Description Full thickness Full thickness   Leslie-wound  Assessment Moist;Edema Dry   Wound Granulation Tissue Pink;Red;Firm Pink;Firm;Pale Grey   Wound Bed Granulation (%) 80 % 70 %   Wound Bed Epithelium (%) 10 % 5 %   Wound Bed Slough (%) 10 % 25 %   Wound Odor None None   Tunneling? No No   Undermining? No No   Sinus Tracts? No No   Pressure Injury Stage Stage 2 --       No associated orders.          ASSESSMENT AND PLAN:      1. Chronic venous hypertension (idiopathic) with ulcer and inflammation of bilateral lower extremity ([facility])    2. Open wound of left lower leg, subsequent encounter    3. Class 3 severe obesity due to excess calories with serious comorbidity and body mass index (BMI) of 45.0 to 49.9 in adult ([facility])    Clinically the left lateral leg wound is completely healed now.  There are some scaly dry skin but no open wound.  May use moisturizer on this area.  He does have a linear wound on the posterior aspect of the proximal calf on the left which is superficial and has a mixture of some slough and granulation tissue.  This is due to the edge of the wrap rubbing into the skin and tissue.  Will apply Medihoney gel and border gauze dressing and change on a daily basis.  Patient instructed on how to do this and he is comfortable doing the dressing change on his own.  Will order supplies if needed.  He will follow-up with me in 2 weeks.  The right anterior leg wound is completely healed.  Patient normally wears the compression socks given to him by his primary care doctor to help control swelling in the legs.      Risks, benefits, and alternatives of current treatment plan discussed in detail.  Questions and concerns addressed. Red flags to RTC or ED reviewed.  Patient (or parent) agrees to plan.      No follow-ups on file.    Also refer to patient instructions.     I spent a total of 30 minutes with this patient's care.  This time included preparing to see the patient (eg, review notes and recent diagnostics), seeing the patient, performing a medically  appropriate examination and/or evaluation, counseling and educating the patient, and documenting in the record.          Nicholas Gould M.D.   [facility]   2024    Patient Instructions       PATIENT INSTRUCTIONS      FOR Luis M Estrada RICK 3/25/1965    DATE OF SERVICE: 2024      Apply Medihoney gel to the wound    Cover with border gauze dressing    Change on a daily basis    Wear the spandagrip stocking in the daytime and you may remove at bedtime when legs are elevated    Try to keep the legs elevated whenever possible        Follow up with Dr. Gould 2 WEEKS                      MISCELLANEOUS INSTRUCTIONS  Supplement with a daily multivitamin   Low salt diet  Intense blood sugar control - Goal Blood sugar below 180 at all times recommended.  Increase protein intake / consider protein supplements - see below  Elevate extremities at all times when sitting / laying down.  No tobacco use     DIETARY MODIFICATIONS TO HELP WITH WOUND HEALING:          Please follow any restrictions to diet given by your other doctors     Protein: meats, beans, eggs, milk and yogurt particularly Greek yogurt), tofu, soy nuts, soy protein products     Vitamin C: citrus fruits and juices, strawberries, tomatoes, tomato juice, peppers, baked potatoes, spinach, broccoli, cauliflower, [place] sprouts, cabbage     Vitamin A: dark greens, leafy vegetables, orange or yellow vegetables, cantaloupe, fortified dairy products, liver, fortified cereals     Zinc: fortified cereals, red meats, seafood     Consider Bob by [employer] (These are essential branch chain amino acids that help with tissue building and wound healing) and take 2 packets/day. You can order online at [employer] or [employer]     ADDITIONAL REMINDERS:     The treatment plan has been discussed at length with you and your provider. Follow all instructions carefully, it is very important. If you do not follow all instructions, you are at risk of  your wound not healing, infection, possible loss of limb and even loss of life.  Please call the clinic at [phone] during regular business hours ( 7:30 AM - 5:30 PM) if you notice increased redness, warmth, pain or pus like drainage or start running a fever greater than 100.3.    For after hour emergencies, please call your primary physician or go to the nearest emergency room.  If your blood pressure is elevated, follow up with your primary care doctor and/or cardiologist as soon as possible.     FOR LEG SWELLING,  LOWER EXTREMITY WOUNDS AND IF USING COMPRESSION:   Managing your edema:    Avoid prolonged standing in one place. It is better to have your calf muscles moving to pump fluid out of the legs.  Elevate leg(s) above the level of the heart when sitting or as much as possible.  Take you diuretics as directed by your physician. Do not skip doses or change doses unless instructed to do so by your physician.  Decrease salt intake, follow recommended 2 grams daily.  Do not get leg(s) with compression wrap wet. If wraps are too tight as indicated by pain, numbness/tingling or discoloration of toes remove wrap completely and call the wound center @ [phone].       The treatment plan has been discussed at length between you and your provider. Follow all instructions carefully, it is very important. If you do not follow all instructions you are at risk of your wound not healing, infection, possible loss of limb and even loss of life.    COMPRESSION WRAP PATIENT CARE INSTRUCTIONS  DO NOT get compression wrap wet. When showering, use a shower boot.   Please contact wound clinic and if not able to reach, then go to emergency room if ANY of the following occur:   Tingling or numbness in the foot or toes on leg with compression wrap.  Severe pain that cannot be relieved with elevation and any medication instructed per your doctor.  A fever of 100.4°F (38°C) or higher.  Swelling, coldness, or blue-gray  discoloration of the toes.  If the compression wrap feels too tight or too loose.  If the compression wrap is damaged or has rough edges that hurt.  If the compression wrap gets wet, you must cut wrap off.   Drainage from compression wrap that smells different than usual.

## 2024-08-01 NOTE — PATIENT INSTRUCTIONS
PATIENT INSTRUCTIONS      FOR RICK Bladnon 3/25/1965    DATE OF SERVICE: 2024      Apply Medihoney gel to the wound    Cover with border gauze dressing    Change on a daily basis    Wear the spandagrip stocking in the daytime and you may remove at bedtime when legs are elevated    Try to keep the legs elevated whenever possible        Follow up with Dr. Gould 2 WEEKS

## 2024-08-01 NOTE — PROGRESS NOTES
Weekly Wound Education Note    Teaching Provided To: Patient  Training Topics: Discharge instructions;Cleasing and general instructions;Dressing;Edema control;Compression  Training Method: Demonstration;Explain/Verbal  Training Response: Reinforcement needed        Notes: Pt here for follow up wound care visit to left lower leg and right lower leg wound, left posterior lower leg. Edema measurement decreased. Per provider right lower leg and left lower leg wounds are healed. Provider debrided left posterior lower leg wound. Provider recommending patient to dress wound with honey gel and bordered gauze dressing daily. RN provided pt with silver foam nonbordered dressing as well in case patient is unable to complete honey dressing daily. Pt states he is going to try to complete his own dressing. RN informed patient if he needs help staff can order him home health services. Pt denied home health at this time but states he will try to do this himself and if he needs help he will call the clinic to set up services. RN informed patient we would place order for supplies at visit today. Applied spandagrip (f) to BLE at visit today- educated patient to check compression garments daily to make sure bunches/kinks are not forming in stocking and if so to smooth them out. Educated pt about possible pressure injuries if not monitoring compression stocking daily. Pt states he would like to leave them on overnight as he has issues with removing and replaying them daily and he lives alone. Pt to follow up with provider in 2 weeks.

## 2024-08-08 ENCOUNTER — APPOINTMENT (OUTPATIENT)
Dept: WOUND CARE | Facility: HOSPITAL | Age: 59
End: 2024-08-08
Attending: FAMILY MEDICINE
Payer: COMMERCIAL

## 2024-08-15 ENCOUNTER — OFFICE VISIT (OUTPATIENT)
Dept: WOUND CARE | Facility: HOSPITAL | Age: 59
End: 2024-08-15
Attending: FAMILY MEDICINE
Payer: COMMERCIAL

## 2024-08-15 VITALS
DIASTOLIC BLOOD PRESSURE: 83 MMHG | HEART RATE: 75 BPM | SYSTOLIC BLOOD PRESSURE: 138 MMHG | RESPIRATION RATE: 14 BRPM | TEMPERATURE: 98 F

## 2024-08-15 DIAGNOSIS — E66.01 CLASS 3 SEVERE OBESITY DUE TO EXCESS CALORIES WITH SERIOUS COMORBIDITY AND BODY MASS INDEX (BMI) OF 45.0 TO 49.9 IN ADULT (HCC): ICD-10-CM

## 2024-08-15 DIAGNOSIS — I87.2 CHRONIC VENOUS INSUFFICIENCY: ICD-10-CM

## 2024-08-15 DIAGNOSIS — M79.89 LEG SWELLING: ICD-10-CM

## 2024-08-15 DIAGNOSIS — L03.115 CELLULITIS OF RIGHT LOWER EXTREMITY: ICD-10-CM

## 2024-08-15 DIAGNOSIS — S81.802D OPEN WOUND OF LEFT LOWER LEG, SUBSEQUENT ENCOUNTER: ICD-10-CM

## 2024-08-15 DIAGNOSIS — I87.333 CHRONIC VENOUS HYPERTENSION (IDIOPATHIC) WITH ULCER AND INFLAMMATION OF BILATERAL LOWER EXTREMITY (HCC): Primary | ICD-10-CM

## 2024-08-15 PROCEDURE — 99214 OFFICE O/P EST MOD 30 MIN: CPT

## 2024-08-15 NOTE — PROGRESS NOTES
Chief Complaint   Patient presents with    Wound Care     Here for RN visit. Denies pain or concerns at this time.            Current Outpatient Medications:     penicillin v potassium 500 MG Oral Tab, Take 2 tablets (1,000 mg total) by mouth 3 (three) times daily for 10 days, THEN 1 tablet (500 mg total) 2 (two) times a day., Disp: 180 tablet, Rfl: 0    aspirin 81 MG Oral Tab, Take 1 tablet (81 mg total) by mouth daily., Disp: , Rfl:     No Known Allergies       HISTORY:     Past medical, surgical, family and social history updated where appropriate.    PHYSICAL EXAM:   /83   Pulse 75   Temp 98.2 °F (36.8 °C)   Resp 14        Vital signs reviewed.      Calf  Point of Measurement - Left Calf: 37     Left Calf from:: Heel  Calf Left cm:: 48          Ankle  Point of Measurement - Left Ankle: 10     Left Ankle from:: Heel  Left Ankle cm:: 31.2                Wound 01/11/24 #1 Leg Left (Active)   Date First Assessed/Time First Assessed: 01/11/24 1406    Wound Number (Wound Clinic Only): #1  Primary Wound Type: Traumatic  Location: Leg  Wound Location Orientation: Left      Assessments 1/11/2024  2:10 PM 8/15/2024  9:22 AM   Wound Image       Drainage Amount Large Small   Drainage Description Yellow;Serous Yellow;Serous   Treatments Compression Compression   Wound Length (cm) 10.6 cm 4.2 cm   Wound Width (cm) 9.5 cm 2.3 cm   Wound Surface Area (cm^2) 100.7 cm^2 9.66 cm^2   Wound Depth (cm) 0.2 cm 0.1 cm   Wound Volume (cm^3) 20.14 cm^3 0.966 cm^3   Wound Healing % -- 95   Margins Well-defined edges Well-defined edges   Non-staged Wound Description Full thickness Full thickness   Leslie-wound Assessment Edema;Hemosiderin staining Edema;Hemosiderin staining;Dry   Wound Granulation Tissue Firm;Pink Pink;Red;Firm   Wound Bed Granulation (%) 40 % 70 %   Wound Bed Epithelium (%) -- 10 %   Wound Bed Slough (%) 60 % 20 %   Wound Odor None None   Shape -- bridged   Tunneling? No No   Undermining? No No   Sinus Tracts? No  No       Inactive Orders   Date Order Priority Status Authorizing Provider   07/16/24 2327 Consult to Wound Ostomy Routine Completed Rica Pearson MD     - Reason for Consult:    Wound Care     - Wound Care Reason for Consult:    worsening   06/24/24 1127 Wound care Routine Discontinued Rica Pearson MD   06/06/24 0913 Debridement Traumatic Routine Completed Nicholas Gould MD   05/16/24 0901 Debridement Traumatic Routine Completed Nicholas Gould MD   05/02/24 1520 Debridement Traumatic Routine Completed Nicholas Gould MD   04/11/24 0959 Debridement Traumatic Routine Completed Nicholas Gould MD   04/04/24 0915 Debridement Traumatic Left;Lateral Leg Routine Completed Nicholas Gould MD   02/15/24 1154 Debridement Traumatic Left;Lateral Leg Routine Completed Nicholas Gould MD   01/18/24 1511 Debridement Traumatic Left;Lateral Leg Routine Completed Nicholas Gould MD   01/11/24 1710 Debridement Traumatic Left;Lateral Leg Routine Completed Nicholas Gould MD       Wound 07/11/24 #3 Leg Posterior;Left (Active)   Date First Assessed/Time First Assessed: 07/11/24 0852    Wound Number (Wound Clinic Only): #3  Primary Wound Type: Pressure Injury  Location: Leg  Wound Location Orientation: Posterior;Left      Assessments 7/11/2024  8:59 AM 8/15/2024  9:22 AM   Wound Image       Drainage Amount Moderate None   Drainage Description Serosanguineous --   Treatments Compression Compression   Wound Length (cm) 1.7 cm 0.1 cm   Wound Width (cm) 9.4 cm 0.1 cm   Wound Surface Area (cm^2) 15.98 cm^2 0.01 cm^2   Wound Depth (cm) 0.1 cm 0 cm   Wound Volume (cm^3) 1.598 cm^3 0 cm^3   Wound Healing % -- 100   Margins Well-defined edges Well-defined edges   Non-staged Wound Description Full thickness Full thickness   Leslie-wound Assessment Moist;Edema Fragile   Wound Granulation Tissue Pink;Red;Firm Pink;Firm;Pale Grey   Wound Bed Granulation (%) 80 % 100 %   Wound Bed Epithelium (%) 10 % --   Wound Bed Slough (%) 10 % --    Wound Odor None None   Tunneling? No No   Undermining? No No   Sinus Tracts? No No   Pressure Injury Stage Stage 2 --       No associated orders.          ASSESSMENT AND PLAN:        Risks, benefits, and alternatives of current treatment plan discussed in detail.  Questions and concerns addressed. Red flags to RTC or ED reviewed.  Patient (or parent) agrees to plan.      No follow-ups on file.  Weekly Wound Education Note    Teaching Provided To: Patient  Training Topics: Cleasing and general instructions;Compression;Discharge instructions;Dressing;Edema control  Training Method: Explain/Verbal  Training Response: Reinforcement needed        Notes: Here for RN visit. Visit changed to RN visit today per provider verbal order.    Here for RN visit, arrives with spandagrip to both legs. Left lateral leg wound reopened - pt did have a bordered gauze on the area. Posterior wound improving. Applied silver foam to both wounds and spandagrip F x2. Pt educated on dresisngs and confirmed he has supplies at home ordered at last visit. Advised he use his velcro compression sleeve, he seemed unsure what I was talking about. Attempted to explain the garment to patient and he states he does not think he has one. Pt scheduled for visit here 8/22/24.     Tamica ZAFAR RN   8/15/2024  10:19 AM

## 2024-08-22 ENCOUNTER — OFFICE VISIT (OUTPATIENT)
Dept: WOUND CARE | Facility: HOSPITAL | Age: 59
End: 2024-08-22
Attending: FAMILY MEDICINE
Payer: COMMERCIAL

## 2024-08-22 VITALS
SYSTOLIC BLOOD PRESSURE: 150 MMHG | TEMPERATURE: 98 F | RESPIRATION RATE: 14 BRPM | HEART RATE: 69 BPM | DIASTOLIC BLOOD PRESSURE: 85 MMHG

## 2024-08-22 DIAGNOSIS — I87.333 CHRONIC VENOUS HYPERTENSION (IDIOPATHIC) WITH ULCER AND INFLAMMATION OF BILATERAL LOWER EXTREMITY (HCC): Primary | ICD-10-CM

## 2024-08-22 DIAGNOSIS — E66.01 CLASS 3 SEVERE OBESITY DUE TO EXCESS CALORIES WITH SERIOUS COMORBIDITY AND BODY MASS INDEX (BMI) OF 45.0 TO 49.9 IN ADULT (HCC): ICD-10-CM

## 2024-08-22 PROCEDURE — 29581 APPL MULTLAYER CMPRN SYS LEG: CPT

## 2024-08-22 PROCEDURE — 99214 OFFICE O/P EST MOD 30 MIN: CPT

## 2024-08-22 NOTE — PATIENT INSTRUCTIONS
PATIENT INSTRUCTIONS      FOR Luis M Lomasd ,  3/25/1965    DATE OF SERVICE: 2024      Hydrofera Blue Transfer  Kerramax  Coflex 2 layer compression wrap to BOTH lower extremities      Follow up with Dr. Gould in 1 week      Managing your edema:    Avoid prolonged standing in one place. It is better to have your calf muscles moving to pump fluid out of the legs.  Elevate leg(s) above the level of the heart when sitting or as much as possible.  Take you diuretics as directed by your physician. Do not skip doses or change doses unless instructed to do so by your physician.  Decrease salt intake, follow recommended 2 grams daily.  Do not get leg(s) with compression wrap wet. If wraps are too tight as indicated by pain, numbness/tingling or discoloration of toes remove wrap completely and call the wound center @ [phone].       The treatment plan has been discussed at length between you and your provider. Follow all instructions carefully, it is very important. If you do not follow all instructions you are at risk of your wound not healing, infection, possible loss of limb and even loss of life.    COMPRESSION WRAP PATIENT CARE INSTRUCTIONS  DO NOT get compression wrap wet. When showering, use a shower boot.   Please contact wound clinic and if not able to reach, then go to emergency room if ANY of the following occur:   Tingling or numbness in the foot or toes on leg with compression wrap.  Severe pain that cannot be relieved with elevation and any medication instructed per your doctor.  A fever of 100.4°F (38°C) or higher.  Swelling, coldness, or blue-gray discoloration of the toes.  If the compression wrap feels too tight or too loose.  If the compression wrap is damaged or has rough edges that hurt.  If the compression wrap gets wet, you must cut wrap off.   Drainage from compression wrap that smells different than usual.

## 2024-08-22 NOTE — PROGRESS NOTES
Weekly Wound Education Note    Teaching Provided To: Patient  Training Topics: Cleasing and general instructions;Compression;Discharge instructions;Dressing;Edema control  Training Method: Explain/Verbal  Training Response: Patient responds and understands;Reinforcement needed        Notes: New wound to right leg - hydrofera transfer, ABD pad, kerlix, and tape applied. Left posterior leg healed per provider. Left lateral leg: triad paste to periwound, hydrofera transfer, kerramax, kerlix, tape. Coflex 2 layer 30-40mmhg BLE, rosidal used to help keep wraps from sliding down. Pt re-educated on compression wraps and to call if there are any concerns. Compression garments also ordered this visit.

## 2024-08-22 NOTE — PROGRESS NOTES
Chief Complaint   Patient presents with    Wound Care     Patients is here for a follow up. Patients stated no issue at this moment        HPI:     Patient here for follow-up of left lateral leg wound.  He also has a small wound on the right anterior leg.  He has been using spandagrip to the left lower extremity.    Lab Results   Component Value Date    BUN 14 07/26/2024    CREATSERUM 0.83 07/26/2024    ALB 3.9 07/25/2024    TP 8.0 07/25/2024    A1C 5.2 12/12/2016         Current Outpatient Medications:     penicillin v potassium 500 MG Oral Tab, Take 2 tablets (1,000 mg total) by mouth 3 (three) times daily for 10 days, THEN 1 tablet (500 mg total) 2 (two) times a day., Disp: 180 tablet, Rfl: 0    aspirin 81 MG Oral Tab, Take 1 tablet (81 mg total) by mouth daily., Disp: , Rfl:     No Known Allergies         REVIEW OF SYSTEMS:     Review of Systems (ROS)  This information was obtained from the patient, chart    A comprehensive 10 point review of systems was completed.  Pertinent positives and negatives noted in the the HPI.     Past medical, surgical, family and social history updated where appropriate.    PHYSICAL EXAM:   /85   Pulse 69   Temp 98.1 °F (36.7 °C)   Resp 14    Estimated body mass index is 39.63 kg/m² as calculated from the following:    Height as of 7/29/24: 67\".    Weight as of 7/29/24: 253 lb (114.8 kg).   Vital signs reviewed.Appears stated age, well groomed.        Calf  Point of Measurement - Left Calf: 37  Point of Measurement - Right Calf: 37  Left Calf from:: Heel  Calf Left cm:: 48  Right Calf from:: Heel  Right Calf cm:: 49.9    Ankle  Point of Measurement - Left Ankle: 10  Point of Measurement - Right Ankle: 10  Left Ankle from:: Heel  Left Ankle cm:: 31.1     Right Ankle from:: Heel  Right Ankle cm:: 31.6       Foot                            Wound 01/11/24 #1 Leg Left;Lateral (Active)   Date First Assessed/Time First Assessed: 01/11/24 5636    Wound Number (Wound Clinic Only):  Noted. #1  Primary Wound Type: Traumatic  Location: Leg  Wound Location Orientation: Left;Lateral      Assessments 1/11/2024  2:10 PM 8/22/2024  8:56 AM   Wound Image       Drainage Amount Large Small   Drainage Description Yellow;Serous Serosanguineous   Treatments Compression --   Wound Length (cm) 10.6 cm 3.9 cm   Wound Width (cm) 9.5 cm 2.1 cm   Wound Surface Area (cm^2) 100.7 cm^2 8.19 cm^2   Wound Depth (cm) 0.2 cm 0.1 cm   Wound Volume (cm^3) 20.14 cm^3 0.819 cm^3   Wound Healing % -- 96   Margins Well-defined edges Well-defined edges   Non-staged Wound Description Full thickness Full thickness   Leslie-wound Assessment Edema;Hemosiderin staining Moist;Edema;Blanchable erythema   Wound Granulation Tissue Firm;Pink Red;Firm   Wound Bed Granulation (%) 40 % 75 %   Wound Bed Epithelium (%) -- 10 %   Wound Bed Slough (%) 60 % 15 %   Wound Odor None None   Tunneling? No --   Undermining? No --   Sinus Tracts? No --       Inactive Orders   Date Order Priority Status Authorizing Provider   07/16/24 2327 Consult to Wound Ostomy Routine Completed Rica Pearson MD     - Reason for Consult:    Wound Care     - Wound Care Reason for Consult:    worsening   06/24/24 1127 Wound care Routine Discontinued Rica Pearson MD   06/06/24 0913 Debridement Traumatic Routine Completed Nicholas Gould MD   05/16/24 0901 Debridement Traumatic Routine Completed Nicholas Gould MD   05/02/24 1520 Debridement Traumatic Routine Completed Nicholas Gould MD   04/11/24 0959 Debridement Traumatic Routine Completed Nicholas Gould MD   04/04/24 0915 Debridement Traumatic Left;Lateral Leg Routine Completed Nicholas Gould MD   02/15/24 1154 Debridement Traumatic Left;Lateral Leg Routine Completed Nicholas Gould MD   01/18/24 1511 Debridement Traumatic Left;Lateral Leg Routine Completed Nicholas Gould MD   01/11/24 1710 Debridement Traumatic Left;Lateral Leg Routine Completed Nicholas Gould MD       Wound 07/11/24 #3 Leg Posterior;Left  (Active)   Date First Assessed/Time First Assessed: 07/11/24 0852    Wound Number (Wound Clinic Only): #3  Primary Wound Type: Pressure Injury  Location: Leg  Wound Location Orientation: Posterior;Left      Assessments 7/11/2024  8:59 AM 8/22/2024  8:58 AM   Wound Image       Drainage Amount Moderate None   Drainage Description Serosanguineous --   Treatments Compression Compression   Wound Length (cm) 1.7 cm 0 cm   Wound Width (cm) 9.4 cm 0 cm   Wound Surface Area (cm^2) 15.98 cm^2 0 cm^2   Wound Depth (cm) 0.1 cm 0 cm   Wound Volume (cm^3) 1.598 cm^3 0 cm^3   Wound Healing % -- 100   Margins Well-defined edges Well-defined edges   Non-staged Wound Description Full thickness Full thickness   Leslie-wound Assessment Moist;Edema Edema;Dry   Wound Granulation Tissue Pink;Red;Firm --   Wound Bed Granulation (%) 80 % --   Wound Bed Epithelium (%) 10 % 100 %   Wound Bed Slough (%) 10 % --   Wound Odor None None   Tunneling? No No   Undermining? No No   Sinus Tracts? No No   Pressure Injury Stage Stage 2 --       No associated orders.       Wound 08/22/24 #4 Right leg Leg Right;Anterior (Active)   Date First Assessed: 08/22/24    Wound Number (Wound Clinic Only): #4 Right leg  Primary Wound Type: Edema  Location: Leg  Wound Location Orientation: Right;Anterior      Assessments 8/22/2024  9:18 AM   Wound Image     Drainage Amount Unable to assess   Treatments Compression   Wound Length (cm) 0.2 cm   Wound Width (cm) 0.2 cm   Wound Surface Area (cm^2) 0.04 cm^2   Wound Depth (cm) 0.1 cm   Wound Volume (cm^3) 0.004 cm^3   Margins Well-defined edges   Non-staged Wound Description Full thickness   Leslie-wound Assessment Dry   Wound Granulation Tissue Pink;Firm   Wound Bed Granulation (%) 100 %   Wound Odor None   Tunneling? No   Undermining? No   Sinus Tracts? No       No associated orders.       Compression Wrap 08/22/24 Leg Anterior;Left (Active)   Placement Date: 08/22/24   Location: Leg  Wound Location Orientation:  Anterior;Left      Assessments 8/22/2024  9:41 AM   Response to Treatment Well tolerated   Compression Layers Multilayer   Compression Product Type Coflex   Dressing Applied Yes   Compression Wrap Location Toes to Knee   Compression Wrap Status Clean;Dry;Intact       No associated orders.       Compression Wrap 08/22/24 Leg Anterior;Right (Active)   Placement Date: 08/22/24   Location: Leg  Wound Location Orientation: Anterior;Right      Assessments 8/22/2024  9:42 AM   Response to Treatment Well tolerated   Compression Layers Multilayer   Compression Product Type Coflex   Dressing Applied Yes   Compression Wrap Location Toes to Knee   Compression Wrap Status Clean;Dry;Intact       No associated orders.          ASSESSMENT AND PLAN:      1. Chronic venous hypertension (idiopathic) with ulcer and inflammation of bilateral lower extremity (HCC)    2. Class 3 severe obesity due to excess calories with serious comorbidity and body mass index (BMI) of 45.0 to 49.9 in adult (HCC)    The left lateral leg wound is about the same as previous visit.  The wound was stimulated with a curette and will apply Hydrofera Blue transfer, Kerramax, Coflex 2 layer compression wrap 30 to 40 mmHg to both lower extremities.  Silver foam will be applied to the right anterior leg wound, will order new Velcro compression garments for him we had ordered this previously but it has been more than 6 months since he has had new ones.      Risks, benefits, and alternatives of current treatment plan discussed in detail.  Questions and concerns addressed. Red flags to RTC or ED reviewed.  Patient (or parent) agrees to plan.      Return in about 1 week (around 8/29/2024).    Also refer to patient instructions.     I spent a total of 40 minutes with this patient's care.  This time included preparing to see the patient (eg, review notes and recent diagnostics), seeing the patient, performing a medically appropriate examination and/or evaluation,  counseling and educating the patient, and documenting in the record.          Nicholas Gould M.D.   [facility]   2024    Patient Instructions       PATIENT INSTRUCTIONS      FOR Luis M Estrada RICK 3/25/1965    DATE OF SERVICE: 2024      Hydrofera Blue Transfer  Kerramax  Coflex 2 layer compression wrap to BOTH lower extremities      Follow up with Dr. Gould in 1 week      Managing your edema:    Avoid prolonged standing in one place. It is better to have your calf muscles moving to pump fluid out of the legs.  Elevate leg(s) above the level of the heart when sitting or as much as possible.  Take you diuretics as directed by your physician. Do not skip doses or change doses unless instructed to do so by your physician.  Decrease salt intake, follow recommended 2 grams daily.  Do not get leg(s) with compression wrap wet. If wraps are too tight as indicated by pain, numbness/tingling or discoloration of toes remove wrap completely and call the wound center @ [phone].       The treatment plan has been discussed at length between you and your provider. Follow all instructions carefully, it is very important. If you do not follow all instructions you are at risk of your wound not healing, infection, possible loss of limb and even loss of life.    COMPRESSION WRAP PATIENT CARE INSTRUCTIONS  DO NOT get compression wrap wet. When showering, use a shower boot.   Please contact wound clinic and if not able to reach, then go to emergency room if ANY of the following occur:   Tingling or numbness in the foot or toes on leg with compression wrap.  Severe pain that cannot be relieved with elevation and any medication instructed per your doctor.  A fever of 100.4°F (38°C) or higher.  Swelling, coldness, or blue-gray discoloration of the toes.  If the compression wrap feels too tight or too loose.  If the compression wrap is damaged or has rough edges that hurt.  If the compression wrap  gets wet, you must cut wrap off.   Drainage from compression wrap that smells different than usual.  MISCELLANEOUS INSTRUCTIONS  Supplement with a daily multivitamin   Low salt diet  Intense blood sugar control - Goal Blood sugar below 180 at all times recommended.  Increase protein intake / consider protein supplements - see below  Elevate extremities at all times when sitting / laying down.  No tobacco use     DIETARY MODIFICATIONS TO HELP WITH WOUND HEALING:          Please follow any restrictions to diet given by your other doctors     Protein: meats, beans, eggs, milk and yogurt particularly Greek yogurt), tofu, soy nuts, soy protein products     Vitamin C: citrus fruits and juices, strawberries, tomatoes, tomato juice, peppers, baked potatoes, spinach, broccoli, cauliflower, [place] sprouts, cabbage     Vitamin A: dark greens, leafy vegetables, orange or yellow vegetables, cantaloupe, fortified dairy products, liver, fortified cereals     Zinc: fortified cereals, red meats, seafood     Consider Bob by [employer] (These are essential branch chain amino acids that help with tissue building and wound healing) and take 2 packets/day. You can order online at [employer] or [employer]     ADDITIONAL REMINDERS:     The treatment plan has been discussed at length with you and your provider. Follow all instructions carefully, it is very important. If you do not follow all instructions, you are at risk of your wound not healing, infection, possible loss of limb and even loss of life.  Please call the clinic at [phone] during regular business hours ( 7:30 AM - 5:30 PM) if you notice increased redness, warmth, pain or pus like drainage or start running a fever greater than 100.3.    For after hour emergencies, please call your primary physician or go to the nearest emergency room.  If your blood pressure is elevated, follow up with your primary care doctor and/or cardiologist as soon as possible.     FOR LEG SWELLING,   LOWER EXTREMITY WOUNDS AND IF USING COMPRESSION:   Managing your edema:    Avoid prolonged standing in one place. It is better to have your calf muscles moving to pump fluid out of the legs.  Elevate leg(s) above the level of the heart when sitting or as much as possible.  Take you diuretics as directed by your physician. Do not skip doses or change doses unless instructed to do so by your physician.  Decrease salt intake, follow recommended 2 grams daily.  Do not get leg(s) with compression wrap wet. If wraps are too tight as indicated by pain, numbness/tingling or discoloration of toes remove wrap completely and call the wound center @ [phone].       The treatment plan has been discussed at length between you and your provider. Follow all instructions carefully, it is very important. If you do not follow all instructions you are at risk of your wound not healing, infection, possible loss of limb and even loss of life.    COMPRESSION WRAP PATIENT CARE INSTRUCTIONS  DO NOT get compression wrap wet. When showering, use a shower boot.   Please contact wound clinic and if not able to reach, then go to emergency room if ANY of the following occur:   Tingling or numbness in the foot or toes on leg with compression wrap.  Severe pain that cannot be relieved with elevation and any medication instructed per your doctor.  A fever of 100.4°F (38°C) or higher.  Swelling, coldness, or blue-gray discoloration of the toes.  If the compression wrap feels too tight or too loose.  If the compression wrap is damaged or has rough edges that hurt.  If the compression wrap gets wet, you must cut wrap off.   Drainage from compression wrap that smells different than usual.

## 2024-08-29 ENCOUNTER — APPOINTMENT (OUTPATIENT)
Dept: WOUND CARE | Facility: HOSPITAL | Age: 59
End: 2024-08-29
Attending: FAMILY MEDICINE
Payer: COMMERCIAL

## 2024-08-29 VITALS
TEMPERATURE: 98 F | HEART RATE: 64 BPM | RESPIRATION RATE: 14 BRPM | SYSTOLIC BLOOD PRESSURE: 145 MMHG | DIASTOLIC BLOOD PRESSURE: 83 MMHG

## 2024-08-29 DIAGNOSIS — I87.333 CHRONIC VENOUS HYPERTENSION (IDIOPATHIC) WITH ULCER AND INFLAMMATION OF BILATERAL LOWER EXTREMITY (HCC): Primary | ICD-10-CM

## 2024-08-29 DIAGNOSIS — S81.802D OPEN WOUND OF LEFT LOWER LEG, SUBSEQUENT ENCOUNTER: ICD-10-CM

## 2024-08-29 DIAGNOSIS — E66.01 CLASS 3 SEVERE OBESITY DUE TO EXCESS CALORIES WITH SERIOUS COMORBIDITY AND BODY MASS INDEX (BMI) OF 45.0 TO 49.9 IN ADULT (HCC): ICD-10-CM

## 2024-08-29 PROCEDURE — 99215 OFFICE O/P EST HI 40 MIN: CPT | Performed by: FAMILY MEDICINE

## 2024-08-29 PROCEDURE — 11104 PUNCH BX SKIN SINGLE LESION: CPT | Performed by: FAMILY MEDICINE

## 2024-08-29 NOTE — PROGRESS NOTES
Chief Complaint   Patient presents with    Wound Care     Follow-up for wounds to BLE, wraps tolerated well. Denies pain or concerns at this time. Currently on oral abx, tolerating well.        HPI:     Patient here for follow-up of left lateral leg wound and right anterior leg wound.  He is doing fine and has no new concerns.  Several weeks ago he had been treated in the hospital for cellulitis and had IV antibiotics and improved significantly.  He is currently on penicillin now oral until about October of this year.  He has no fever or chills and no redness noted on the leg.    Lab Results   Component Value Date    BUN 14 07/26/2024    CREATSERUM 0.83 07/26/2024    ALB 3.9 07/25/2024    TP 8.0 07/25/2024    A1C 5.2 12/12/2016         Current Outpatient Medications:     penicillin v potassium 500 MG Oral Tab, Take 2 tablets (1,000 mg total) by mouth 3 (three) times daily for 10 days, THEN 1 tablet (500 mg total) 2 (two) times a day., Disp: 180 tablet, Rfl: 0    aspirin 81 MG Oral Tab, Take 1 tablet (81 mg total) by mouth daily., Disp: , Rfl:     No Known Allergies         REVIEW OF SYSTEMS:     Review of Systems (ROS)  This information was obtained from the patient, chart    A comprehensive 10 point review of systems was completed.  Pertinent positives and negatives noted in the the HPI.     Past medical, surgical, family and social history updated where appropriate.    PHYSICAL EXAM:   /83   Pulse 64   Temp 98.3 °F (36.8 °C)   Resp 14    Estimated body mass index is 39.63 kg/m² as calculated from the following:    Height as of 7/29/24: 67\".    Weight as of 7/29/24: 253 lb (114.8 kg).   Vital signs reviewed.Appears stated age, well groomed.        Calf  Point of Measurement - Left Calf: 37  Point of Measurement - Right Calf: 37  Left Calf from:: Heel  Calf Left cm:: 45.5  Right Calf from:: Heel  Right Calf cm:: 45    Ankle  Point of Measurement - Left Ankle: 10  Point of Measurement - Right Ankle: 10  Left  Ankle from:: Heel  Left Ankle cm:: 30.2     Right Ankle from:: Heel  Right Ankle cm:: 29.8       Foot                            Wound 01/11/24 #1 Leg Left;Lateral (Active)   Date First Assessed/Time First Assessed: 01/11/24 1406    Wound Number (Wound Clinic Only): #1  Primary Wound Type: Traumatic  Location: Leg  Wound Location Orientation: Left;Lateral      Assessments 1/11/2024  2:10 PM 8/29/2024  8:51 AM   Wound Image       Drainage Amount Large Moderate   Drainage Description Yellow;Serous Serosanguineous   Treatments Compression --   Wound Length (cm) 10.6 cm 5.7 cm   Wound Width (cm) 9.5 cm 3.1 cm   Wound Surface Area (cm^2) 100.7 cm^2 17.67 cm^2   Wound Depth (cm) 0.2 cm 0.1 cm   Wound Volume (cm^3) 20.14 cm^3 1.767 cm^3   Wound Healing % -- 91   Margins Well-defined edges Well-defined edges   Non-staged Wound Description Full thickness Full thickness   Leslie-wound Assessment Edema;Hemosiderin staining Edema;Clean   Wound Granulation Tissue Firm;Pink Red;Firm   Wound Bed Granulation (%) 40 % --   Wound Bed Slough (%) 60 % --   Wound Odor None None   Shape -- hypergranulation, dusk area noted to woundbed   Tunneling? No No   Undermining? No No   Sinus Tracts? No No       Inactive Orders   Date Order Priority Status Authorizing Provider   07/16/24 2327 Consult to Wound Ostomy Routine Completed Rica Pearson MD     - Reason for Consult:    Wound Care     - Wound Care Reason for Consult:    worsening   06/24/24 1127 Wound care Routine Discontinued Rica Pearson MD   06/06/24 0913 Debridement Traumatic Routine Completed Nicholas Gould MD   05/16/24 0901 Debridement Traumatic Routine Completed Nicholas Gould MD   05/02/24 1520 Debridement Traumatic Routine Completed Nicholas Gould MD   04/11/24 0959 Debridement Traumatic Routine Completed Nicholas Gould MD   04/04/24 0915 Debridement Traumatic Left;Lateral Leg Routine Completed Nicholas Gould MD   02/15/24 1154 Debridement Traumatic Left;Lateral Leg  Routine Completed Nicholas Gould MD   01/18/24 1511 Debridement Traumatic Left;Lateral Leg Routine Completed Nicholas Gould MD   01/11/24 1710 Debridement Traumatic Left;Lateral Leg Routine Completed Nicholas oGuld MD       Wound 08/22/24 #4 Right leg Leg Right;Anterior (Active)   Date First Assessed: 08/22/24    Wound Number (Wound Clinic Only): #4 Right leg  Primary Wound Type: Edema  Location: Leg  Wound Location Orientation: Right;Anterior      Assessments 8/22/2024  9:18 AM 8/29/2024  8:52 AM   Wound Image       Drainage Amount Unable to assess Scant   Drainage Description -- Yellow;Serous   Treatments Compression --   Wound Length (cm) 0.2 cm 0.1 cm   Wound Width (cm) 0.2 cm 0.1 cm   Wound Surface Area (cm^2) 0.04 cm^2 0.01 cm^2   Wound Depth (cm) 0.1 cm 0 cm   Wound Volume (cm^3) 0.004 cm^3 0 cm^3   Wound Healing % -- 100   Margins Well-defined edges Well-defined edges   Non-staged Wound Description Full thickness Full thickness   Leslie-wound Assessment Dry Dry   Wound Granulation Tissue Pink;Firm Pink;Firm   Wound Bed Granulation (%) 100 % 100 %   Wound Odor None None   Tunneling? No No   Undermining? No No   Sinus Tracts? No No       No associated orders.       Compression Wrap 08/22/24 Leg Anterior;Left (Active)   Placement Date: 08/22/24   Location: Leg  Wound Location Orientation: Anterior;Left      Assessments 8/22/2024  9:41 AM   Response to Treatment Well tolerated   Compression Layers Multilayer   Compression Product Type Coflex   Dressing Applied Yes   Compression Wrap Location Toes to Knee   Compression Wrap Status Clean;Dry;Intact       No associated orders.       Compression Wrap 08/22/24 Leg Anterior;Right (Active)   Placement Date: 08/22/24   Location: Leg  Wound Location Orientation: Anterior;Right      Assessments 8/22/2024  9:42 AM   Response to Treatment Well tolerated   Compression Layers Multilayer   Compression Product Type Coflex   Dressing Applied Yes   Compression Wrap Location  Toes to Knee   Compression Wrap Status Clean;Dry;Intact       No associated orders.          ASSESSMENT AND PLAN:      1. Chronic venous hypertension (idiopathic) with ulcer and inflammation of bilateral lower extremity (HCC)    2. Class 3 severe obesity due to excess calories with serious comorbidity and body mass index (BMI) of 45.0 to 49.9 in adult (HCC)    The wound does pierced with slightly irregular edges and hypergranular.  This was treated with silver nitrate.  I did a wound culture also as his previous wound culture was from 6/22/2024 which did show some Pseudomonas.  I also decided to do a punch biopsy to rule out malignancy as the wound was completely healed and has not reoccurred and due to the irregularity I would like to get a biopsy done.  In the anterior margin of the wound a 2 mm punch biopsy was performed with Betadine prep and 1% lidocaine with epinephrine about 1 cc used for local anesthetic.  Tissue was put in formalin and sent to pathology.  Silver nitrate used to cauterize the punch biopsy wound.  I would like him to switch from Hydrofera Blue to silver alginate dressing to the wound and Kerramax and continue Coflex 2 layer compression wrap.  The right anterior leg is completely healed now and he may use his own compression garment on that leg.    Risks, benefits, and alternatives of current treatment plan discussed in detail.  Questions and concerns addressed. Red flags to RTC or ED reviewed.  Patient (or parent) agrees to plan.      No follow-ups on file.    Also refer to patient instructions.     I spent a total of 30 minutes with this patient's care.  This time included preparing to see the patient (eg, review notes and recent diagnostics), seeing the patient, performing a medically appropriate examination and/or evaluation, counseling and educating the patient, and documenting in the record.          Nicholas Gould M.D.   [facility]   8/29/2024    There are no  Patient Instructions on file for this visit.  MISCELLANEOUS INSTRUCTIONS  Supplement with a daily multivitamin   Low salt diet  Intense blood sugar control - Goal Blood sugar below 180 at all times recommended.  Increase protein intake / consider protein supplements - see below  Elevate extremities at all times when sitting / laying down.  No tobacco use     DIETARY MODIFICATIONS TO HELP WITH WOUND HEALING:          Please follow any restrictions to diet given by your other doctors     Protein: meats, beans, eggs, milk and yogurt particularly Greek yogurt), tofu, soy nuts, soy protein products     Vitamin C: citrus fruits and juices, strawberries, tomatoes, tomato juice, peppers, baked potatoes, spinach, broccoli, cauliflower, Bigelow sprouts, cabbage     Vitamin A: dark greens, leafy vegetables, orange or yellow vegetables, cantaloupe, fortified dairy products, liver, fortified cereals     Zinc: fortified cereals, red meats, seafood     Consider Bob by [employer] (These are essential branch chain amino acids that help with tissue building and wound healing) and take 2 packets/day. You can order online at [employer] or [employer]     ADDITIONAL REMINDERS:     The treatment plan has been discussed at length with you and your provider. Follow all instructions carefully, it is very important. If you do not follow all instructions, you are at risk of your wound not healing, infection, possible loss of limb and even loss of life.  Please call the clinic at [phone] during regular business hours ( 7:30 AM - 5:30 PM) if you notice increased redness, warmth, pain or pus like drainage or start running a fever greater than 100.3.    For after hour emergencies, please call your primary physician or go to the nearest emergency room.  If your blood pressure is elevated, follow up with your primary care doctor and/or cardiologist as soon as possible.     FOR LEG SWELLING,  LOWER EXTREMITY WOUNDS AND IF USING COMPRESSION:    Managing your edema:    Avoid prolonged standing in one place. It is better to have your calf muscles moving to pump fluid out of the legs.  Elevate leg(s) above the level of the heart when sitting or as much as possible.  Take you diuretics as directed by your physician. Do not skip doses or change doses unless instructed to do so by your physician.  Decrease salt intake, follow recommended 2 grams daily.  Do not get leg(s) with compression wrap wet. If wraps are too tight as indicated by pain, numbness/tingling or discoloration of toes remove wrap completely and call the wound center @ [phone].       The treatment plan has been discussed at length between you and your provider. Follow all instructions carefully, it is very important. If you do not follow all instructions you are at risk of your wound not healing, infection, possible loss of limb and even loss of life.    COMPRESSION WRAP PATIENT CARE INSTRUCTIONS  DO NOT get compression wrap wet. When showering, use a shower boot.   Please contact wound clinic and if not able to reach, then go to emergency room if ANY of the following occur:   Tingling or numbness in the foot or toes on leg with compression wrap.  Severe pain that cannot be relieved with elevation and any medication instructed per your doctor.  A fever of 100.4°F (38°C) or higher.  Swelling, coldness, or blue-gray discoloration of the toes.  If the compression wrap feels too tight or too loose.  If the compression wrap is damaged or has rough edges that hurt.  If the compression wrap gets wet, you must cut wrap off.   Drainage from compression wrap that smells different than usual.

## 2024-08-29 NOTE — PROGRESS NOTES
Weekly Wound Education Note    Teaching Provided To: Patient  Training Topics: Discharge instructions;Cleasing and general instructions;Dressing;Edema control;Compression  Training Method: Demonstration;Explain/Verbal  Training Response: Reinforcement needed        Notes: Pt here for follow up wound care visit right anterior lower leg and left lateral leg. Edema measurement decreased, right lower leg wound measurement decreased, left lower leg wound measurement increased. Provider cultured left lower leg wound at visit today as well as completed a tissue biopsy to left lateral leg wound. Provider silver nitrated both wounds at visit today. Provider recommending to apply nonbordered silver foam to right leg and transition to spandagrip (E) to RLE- educated patient to use is own compression garments to right leg going forward. Provider recommending silver alginate, kerramax, kerlix, tape and coflex 2 layer wrap to LLE. Pt to follow up with provider in 1 week, educated patient to also make follow up appointment with Infectious Disease doctor since he has not done so since hospital stay discharge.

## 2024-08-29 NOTE — PATIENT INSTRUCTIONS
PATIENT INSTRUCTIONS      FOR Luis M Lomasd ,  3/25/1965    DATE OF SERVICE: 2024    Silver alginate  Kerramax  Coflex 2 layer compression wrap to left lower extremity    Wear your own compression garment on the right lower extremity, you may remove at bedtime       Follow up with Dr. Gould in 1 week      Managing your edema:    Avoid prolonged standing in one place. It is better to have your calf muscles moving to pump fluid out of the legs.  Elevate leg(s) above the level of the heart when sitting or as much as possible.  Take you diuretics as directed by your physician. Do not skip doses or change doses unless instructed to do so by your physician.  Decrease salt intake, follow recommended 2 grams daily.  Do not get leg(s) with compression wrap wet. If wraps are too tight as indicated by pain, numbness/tingling or discoloration of toes remove wrap completely and call the wound center @ [phone].       The treatment plan has been discussed at length between you and your provider. Follow all instructions carefully, it is very important. If you do not follow all instructions you are at risk of your wound not healing, infection, possible loss of limb and even loss of life.    COMPRESSION WRAP PATIENT CARE INSTRUCTIONS  DO NOT get compression wrap wet. When showering, use a shower boot.   Please contact wound clinic and if not able to reach, then go to emergency room if ANY of the following occur:   Tingling or numbness in the foot or toes on leg with compression wrap.  Severe pain that cannot be relieved with elevation and any medication instructed per your doctor.  A fever of 100.4°F (38°C) or higher.  Swelling, coldness, or blue-gray discoloration of the toes.  If the compression wrap feels too tight or too loose.  If the compression wrap is damaged or has rough edges that hurt.  If the compression wrap gets wet, you must cut wrap off.   Drainage from compression wrap that smells different than  usual.

## 2024-09-05 ENCOUNTER — OFFICE VISIT (OUTPATIENT)
Dept: WOUND CARE | Facility: HOSPITAL | Age: 59
End: 2024-09-05
Attending: FAMILY MEDICINE
Payer: COMMERCIAL

## 2024-09-05 VITALS
SYSTOLIC BLOOD PRESSURE: 137 MMHG | DIASTOLIC BLOOD PRESSURE: 77 MMHG | HEART RATE: 70 BPM | RESPIRATION RATE: 16 BRPM | TEMPERATURE: 98 F

## 2024-09-05 DIAGNOSIS — L03.116 CELLULITIS OF LEFT LOWER EXTREMITY: ICD-10-CM

## 2024-09-05 DIAGNOSIS — E66.01 CLASS 3 SEVERE OBESITY DUE TO EXCESS CALORIES WITH SERIOUS COMORBIDITY AND BODY MASS INDEX (BMI) OF 45.0 TO 49.9 IN ADULT (HCC): ICD-10-CM

## 2024-09-05 DIAGNOSIS — I87.333 CHRONIC VENOUS HYPERTENSION (IDIOPATHIC) WITH ULCER AND INFLAMMATION OF BILATERAL LOWER EXTREMITY (HCC): Primary | ICD-10-CM

## 2024-09-05 PROCEDURE — 99215 OFFICE O/P EST HI 40 MIN: CPT | Performed by: FAMILY MEDICINE

## 2024-09-05 RX ORDER — LEVOFLOXACIN 500 MG/1
500 TABLET, FILM COATED ORAL DAILY
Qty: 10 TABLET | Refills: 0 | Status: SHIPPED | OUTPATIENT
Start: 2024-09-05 | End: 2024-09-15

## 2024-09-05 NOTE — PROGRESS NOTES
Weekly Wound Education Note    Teaching Provided To: Patient  Training Topics: Cleasing and general instructions;Compression;Discharge instructions;Dressing;Edema control  Training Method: Explain/Verbal  Training Response: Patient responds and understands;Reinforcement needed        Notes: Stable. Provider went over culture results and order an oral abx, awaiting biopsy results. Advised the patient to start a probiotic. Left leg: honey alginate, kerramax, kerlix, tape, coflex 2 layer 30-40mmhg, rosidal to help keep wrap from sliding down. Right leg: healed per provider, spandagrip E applied.

## 2024-09-05 NOTE — PATIENT INSTRUCTIONS
PATIENT INSTRUCTIONS      FOR Luis M RODRIGUEZ [place] , [place] 3/25/1965    DATE OF SERVICE: 2024      Start antibiotic Levaquin 500mg once a day for 10 days, I will send to your pharmacy    Honey alginate  Kerramax  Coflex 2 layer compression wrap to left lower extremity    Wear your own compression garment on the right lower extremity, you may remove at bedtime     Nurse visit in 1 week  Follow up with Dr. Gould in 2 weeks      Managing your edema:    Avoid prolonged standing in one place. It is better to have your calf muscles moving to pump fluid out of the legs.  Elevate leg(s) above the level of the heart when sitting or as much as possible.  Take you diuretics as directed by your physician. Do not skip doses or change doses unless instructed to do so by your physician.  Decrease salt intake, follow recommended 2 grams daily.  Do not get leg(s) with compression wrap wet. If wraps are too tight as indicated by pain, numbness/tingling or discoloration of toes remove wrap completely and call the wound center @ [phone].       The treatment plan has been discussed at length between you and your provider. Follow all instructions carefully, it is very important. If you do not follow all instructions you are at risk of your wound not healing, infection, possible loss of limb and even loss of life.    COMPRESSION WRAP PATIENT CARE INSTRUCTIONS  DO NOT get compression wrap wet. When showering, use a shower boot.   Please contact wound clinic and if not able to reach, then go to emergency room if ANY of the following occur:   Tingling or numbness in the foot or toes on leg with compression wrap.  Severe pain that cannot be relieved with elevation and any medication instructed per your doctor.  A fever of 100.4°F (38°C) or higher.  Swelling, coldness, or blue-gray discoloration of the toes.  If the compression wrap feels too tight or too loose.  If the compression wrap is damaged or has rough edges that hurt.  If the  compression wrap gets wet, you must cut wrap off.   Drainage from compression wrap that smells different than usual.

## 2024-09-05 NOTE — PROGRESS NOTES
Chief Complaint   Patient presents with    Wound Care     Patient is here for a wound care follow up. He denies any pain or new wound concerns.       HPI:     Patient here for follow-up bilateral lower extremity wounds.  He is doing well and tolerating compression wrap without difficulty.  He has no new concerns.  He denies any fever or chills.    Lab Results   Component Value Date    BUN 14 07/26/2024    CREATSERUM 0.83 07/26/2024    ALB 3.9 07/25/2024    TP 8.0 07/25/2024    A1C 5.2 12/12/2016         Current Outpatient Medications:     levoFLOXacin (LEVAQUIN) 500 MG Oral Tab, Take 1 tablet (500 mg total) by mouth daily for 10 days., Disp: 10 tablet, Rfl: 0    penicillin v potassium 500 MG Oral Tab, Take 2 tablets (1,000 mg total) by mouth 3 (three) times daily for 10 days, THEN 1 tablet (500 mg total) 2 (two) times a day., Disp: 180 tablet, Rfl: 0    aspirin 81 MG Oral Tab, Take 1 tablet (81 mg total) by mouth daily., Disp: , Rfl:     No Known Allergies         REVIEW OF SYSTEMS:     Review of Systems (ROS)  This information was obtained from the patient, chart    A comprehensive 10 point review of systems was completed.  Pertinent positives and negatives noted in the the HPI.     Past medical, surgical, family and social history updated where appropriate.    PHYSICAL EXAM:   /77   Pulse 70   Temp 98.3 °F (36.8 °C)   Resp 16    Estimated body mass index is 39.63 kg/m² as calculated from the following:    Height as of 7/29/24: 67\".    Weight as of 7/29/24: 253 lb (114.8 kg).   Vital signs reviewed.Appears stated age, well groomed.        Calf  Point of Measurement - Left Calf: 37  Point of Measurement - Right Calf: 37  Left Calf from:: Heel  Calf Left cm:: 50  Right Calf from:: Heel  Right Calf cm:: 47    Ankle  Point of Measurement - Left Ankle: 10  Point of Measurement - Right Ankle: 10  Left Ankle from:: Heel  Left Ankle cm:: 30.4     Right Ankle from:: Heel  Right Ankle cm:: 33.2       Foot                             Wound 01/11/24 #1 Leg Left;Lateral (Active)   Date First Assessed/Time First Assessed: 01/11/24 1406    Wound Number (Wound Clinic Only): #1  Primary Wound Type: Traumatic  Location: Leg  Wound Location Orientation: Left;Lateral      Assessments 1/11/2024  2:10 PM 9/5/2024  8:48 AM   Wound Image       Drainage Amount Large Moderate   Drainage Description Yellow;Serous Serosanguineous   Treatments Compression --   Wound Length (cm) 10.6 cm 5.4 cm   Wound Width (cm) 9.5 cm 3.1 cm   Wound Surface Area (cm^2) 100.7 cm^2 16.74 cm^2   Wound Depth (cm) 0.2 cm --   Wound Volume (cm^3) 20.14 cm^3 --   Margins Well-defined edges Well-defined edges   Non-staged Wound Description Full thickness Full thickness   Leslie-wound Assessment Edema;Hemosiderin staining Edema;Dry;Moist;Maceration   Wound Granulation Tissue Firm;Pink --   Wound Bed Granulation (%) 40 % 75 %   Wound Bed Slough (%) 60 % 25 %   Wound Odor None None   Shape -- dusky area to wound bed, slightly hypergranular part of wound bed   Tunneling? No No   Undermining? No No   Sinus Tracts? No No       Inactive Orders   Date Order Priority Status Authorizing Provider   07/16/24 2327 Consult to Wound Ostomy Routine Completed Rica Pearson MD     - Reason for Consult:    Wound Care     - Wound Care Reason for Consult:    worsening   06/24/24 1127 Wound care Routine Discontinued Rica Pearson MD   06/06/24 0913 Debridement Traumatic Routine Completed Nicholas Gould MD   05/16/24 0901 Debridement Traumatic Routine Completed Nicholas Gould MD   05/02/24 1520 Debridement Traumatic Routine Completed Nicholas Gould MD   04/11/24 0959 Debridement Traumatic Routine Completed Nicholas Gould MD   04/04/24 0915 Debridement Traumatic Left;Lateral Leg Routine Completed Nicholas Gould MD   02/15/24 1154 Debridement Traumatic Left;Lateral Leg Routine Completed Nicholas Gould MD   01/18/24 1511 Debridement Traumatic Left;Lateral Leg Routine Completed Luis Fernando  MD Nicholas   01/11/24 1710 Debridement Traumatic Left;Lateral Leg Routine Completed Nicholas Gould MD       Wound 08/22/24 #4 Right leg Leg Right;Anterior (Active)   Date First Assessed: 08/22/24    Wound Number (Wound Clinic Only): #4 Right leg  Primary Wound Type: Edema  Location: Leg  Wound Location Orientation: Right;Anterior      Assessments 8/22/2024  9:18 AM 9/5/2024  8:49 AM   Wound Image       Drainage Amount Unable to assess None   Treatments Compression --   Wound Length (cm) 0.2 cm 0.1 cm   Wound Width (cm) 0.2 cm 0.1 cm   Wound Surface Area (cm^2) 0.04 cm^2 0.01 cm^2   Wound Depth (cm) 0.1 cm 0.1 cm   Wound Volume (cm^3) 0.004 cm^3 0.001 cm^3   Wound Healing % -- 75   Margins Well-defined edges Well-defined edges   Non-staged Wound Description Full thickness Full thickness   Leslie-wound Assessment Dry Dry   Wound Granulation Tissue Pink;Firm --   Wound Bed Granulation (%) 100 % --   Wound Odor None None   Shape -- 100% scab   Tunneling? No No   Undermining? No No   Sinus Tracts? No No       No associated orders.       Compression Wrap 08/22/24 Leg Anterior;Left (Active)   Placement Date: 08/22/24   Location: Leg  Wound Location Orientation: Anterior;Left      Assessments 8/22/2024  9:41 AM 8/29/2024  8:51 AM   Response to Treatment Well tolerated Well tolerated   Compression Layers Multilayer Multilayer   Compression Product Type Coflex Coflex   Dressing Applied Yes Yes   Compression Wrap Location Toes to Knee Toes to Knee   Compression Wrap Status Clean;Dry;Intact Clean;Dry;Intact       No associated orders.          ASSESSMENT AND PLAN:      1. Chronic venous hypertension (idiopathic) with ulcer and inflammation of bilateral lower extremity (HCC)    2. Class 3 severe obesity due to excess calories with serious comorbidity and body mass index (BMI) of 45.0 to 49.9 in adult (HCC)    Clinically the wound is slightly smaller on the left lateral leg.  Will switch to honey alginate dressing.  The latest  wound culture was positive for Pseudomonas will have patient take Levaquin 500 milligram daily for 10 days.  He is also currently on penicillin per infectious disease.  Will continue with Coflex 2 layer compression wrap to left lower extremity.  The right lower extremity wound is completely healed and no dressing needed.  Counseled regarding weight reduction.      Risks, benefits, and alternatives of current treatment plan discussed in detail.  Questions and concerns addressed. Red flags to RTC or ED reviewed.  Patient (or parent) agrees to plan.      No follow-ups on file.    Also refer to patient instructions.     I spent a total of 40 minutes with this patient's care.  This time included preparing to see the patient (eg, review notes and recent diagnostics), seeing the patient, performing a medically appropriate examination and/or evaluation, counseling and educating the patient, and documenting in the record.          Nicholas Gould M.D.   [facility]   2024    Patient Instructions       PATIENT INSTRUCTIONS      FOR Luis M RICK Peña 3/25/1965    DATE OF SERVICE: 2024      Start antibiotic Levaquin 500mg once a day for 10 days, I will send to your pharmacy    Honey alginate  Kerramax  Coflex 2 layer compression wrap to left lower extremity    Wear your own compression garment on the right lower extremity, you may remove at bedtime     Nurse visit in 1 week  Follow up with Dr. Gould in 2 weeks      Managing your edema:    Avoid prolonged standing in one place. It is better to have your calf muscles moving to pump fluid out of the legs.  Elevate leg(s) above the level of the heart when sitting or as much as possible.  Take you diuretics as directed by your physician. Do not skip doses or change doses unless instructed to do so by your physician.  Decrease salt intake, follow recommended 2 grams daily.  Do not get leg(s) with compression wrap wet. If wraps are too tight as  indicated by pain, numbness/tingling or discoloration of toes remove wrap completely and call the wound center @ [phone].       The treatment plan has been discussed at length between you and your provider. Follow all instructions carefully, it is very important. If you do not follow all instructions you are at risk of your wound not healing, infection, possible loss of limb and even loss of life.    COMPRESSION WRAP PATIENT CARE INSTRUCTIONS  DO NOT get compression wrap wet. When showering, use a shower boot.   Please contact wound clinic and if not able to reach, then go to emergency room if ANY of the following occur:   Tingling or numbness in the foot or toes on leg with compression wrap.  Severe pain that cannot be relieved with elevation and any medication instructed per your doctor.  A fever of 100.4°F (38°C) or higher.  Swelling, coldness, or blue-gray discoloration of the toes.  If the compression wrap feels too tight or too loose.  If the compression wrap is damaged or has rough edges that hurt.  If the compression wrap gets wet, you must cut wrap off.   Drainage from compression wrap that smells different than usual.  MISCELLANEOUS INSTRUCTIONS  Supplement with a daily multivitamin   Low salt diet  Intense blood sugar control - Goal Blood sugar below 180 at all times recommended.  Increase protein intake / consider protein supplements - see below  Elevate extremities at all times when sitting / laying down.  No tobacco use     DIETARY MODIFICATIONS TO HELP WITH WOUND HEALING:          Please follow any restrictions to diet given by your other doctors     Protein: meats, beans, eggs, milk and yogurt particularly Greek yogurt), tofu, soy nuts, soy protein products     Vitamin C: citrus fruits and juices, strawberries, tomatoes, tomato juice, peppers, baked potatoes, spinach, broccoli, cauliflower, [place] sprouts, cabbage     Vitamin A: dark greens, leafy vegetables, orange or yellow vegetables,  cantaloupe, fortified dairy products, liver, fortified cereals     Zinc: fortified cereals, red meats, seafood     Consider Bob by [employer] (These are essential branch chain amino acids that help with tissue building and wound healing) and take 2 packets/day. You can order online at [employer] or [employer]     ADDITIONAL REMINDERS:     The treatment plan has been discussed at length with you and your provider. Follow all instructions carefully, it is very important. If you do not follow all instructions, you are at risk of your wound not healing, infection, possible loss of limb and even loss of life.  Please call the clinic at [phone] during regular business hours ( 7:30 AM - 5:30 PM) if you notice increased redness, warmth, pain or pus like drainage or start running a fever greater than 100.3.    For after hour emergencies, please call your primary physician or go to the nearest emergency room.  If your blood pressure is elevated, follow up with your primary care doctor and/or cardiologist as soon as possible.     FOR LEG SWELLING,  LOWER EXTREMITY WOUNDS AND IF USING COMPRESSION:   Managing your edema:    Avoid prolonged standing in one place. It is better to have your calf muscles moving to pump fluid out of the legs.  Elevate leg(s) above the level of the heart when sitting or as much as possible.  Take you diuretics as directed by your physician. Do not skip doses or change doses unless instructed to do so by your physician.  Decrease salt intake, follow recommended 2 grams daily.  Do not get leg(s) with compression wrap wet. If wraps are too tight as indicated by pain, numbness/tingling or discoloration of toes remove wrap completely and call the wound center @ [phone].       The treatment plan has been discussed at length between you and your provider. Follow all instructions carefully, it is very important. If you do not follow all instructions you are at risk of your wound not healing, infection,  possible loss of limb and even loss of life.    COMPRESSION WRAP PATIENT CARE INSTRUCTIONS  DO NOT get compression wrap wet. When showering, use a shower boot.   Please contact wound clinic and if not able to reach, then go to emergency room if ANY of the following occur:   Tingling or numbness in the foot or toes on leg with compression wrap.  Severe pain that cannot be relieved with elevation and any medication instructed per your doctor.  A fever of 100.4°F (38°C) or higher.  Swelling, coldness, or blue-gray discoloration of the toes.  If the compression wrap feels too tight or too loose.  If the compression wrap is damaged or has rough edges that hurt.  If the compression wrap gets wet, you must cut wrap off.   Drainage from compression wrap that smells different than usual.

## 2024-09-12 ENCOUNTER — OFFICE VISIT (OUTPATIENT)
Dept: WOUND CARE | Facility: HOSPITAL | Age: 59
End: 2024-09-12
Attending: FAMILY MEDICINE
Payer: COMMERCIAL

## 2024-09-12 VITALS
SYSTOLIC BLOOD PRESSURE: 141 MMHG | DIASTOLIC BLOOD PRESSURE: 82 MMHG | RESPIRATION RATE: 16 BRPM | TEMPERATURE: 98 F | HEART RATE: 69 BPM

## 2024-09-12 DIAGNOSIS — I87.333 CHRONIC VENOUS HYPERTENSION (IDIOPATHIC) WITH ULCER AND INFLAMMATION OF BILATERAL LOWER EXTREMITY (HCC): Primary | ICD-10-CM

## 2024-09-12 DIAGNOSIS — M79.89 LEG SWELLING: ICD-10-CM

## 2024-09-12 PROCEDURE — 29581 APPL MULTLAYER CMPRN SYS LEG: CPT

## 2024-09-12 NOTE — PROGRESS NOTES
Chief Complaint   Patient presents with    Wound Care     Pt here for nurse visit left lateral lower leg. Pt denies any concerns or issues. Pt denies any pain in wound at this time.            Current Outpatient Medications:     levoFLOXacin (LEVAQUIN) 500 MG Oral Tab, Take 1 tablet (500 mg total) by mouth daily for 10 days., Disp: 10 tablet, Rfl: 0    penicillin v potassium 500 MG Oral Tab, Take 2 tablets (1,000 mg total) by mouth 3 (three) times daily for 10 days, THEN 1 tablet (500 mg total) 2 (two) times a day., Disp: 180 tablet, Rfl: 0    aspirin 81 MG Oral Tab, Take 1 tablet (81 mg total) by mouth daily., Disp: , Rfl:     No Known Allergies       HISTORY:     Past medical, surgical, family and social history updated where appropriate.    PHYSICAL EXAM:   /82   Pulse 69   Temp 97.8 °F (36.6 °C)   Resp 16        Vital signs reviewed.      Calf  Point of Measurement - Left Calf: 37     Left Calf from:: Heel        Right Calf cm:: 48.8    Ankle  Point of Measurement - Left Ankle: 10     Left Ankle from:: Heel           Right Ankle cm:: 30.5       Wound 01/11/24 #1 Leg Left;Lateral (Active)   Date First Assessed/Time First Assessed: 01/11/24 1406    Wound Number (Wound Clinic Only): #1  Primary Wound Type: Traumatic  Location: Leg  Wound Location Orientation: Left;Lateral      Assessments 1/11/2024  2:10 PM 9/12/2024  8:33 AM   Wound Image       Drainage Amount Large Moderate   Drainage Description Yellow;Serous Serosanguineous   Treatments Compression Compression   Wound Length (cm) 10.6 cm 5.3 cm   Wound Width (cm) 9.5 cm 2.8 cm   Wound Surface Area (cm^2) 100.7 cm^2 14.84 cm^2   Wound Depth (cm) 0.2 cm 0.1 cm   Wound Volume (cm^3) 20.14 cm^3 1.484 cm^3   Wound Healing % -- 93   Margins Well-defined edges Well-defined edges   Non-staged Wound Description Full thickness Full thickness   Leslie-wound Assessment Edema;Hemosiderin staining Edema;Dry;Moist;Hemosiderin staining   Wound Granulation Tissue  Firm;Pink Firm;Pink   Wound Bed Granulation (%) 40 % 70 %   Wound Bed Slough (%) 60 % 30 %   Wound Odor None --   Tunneling? No No   Undermining? No No   Sinus Tracts? No No       Inactive Orders   Date Order Priority Status Authorizing Provider   07/16/24 2327 Consult to Wound Ostomy Routine Completed Rica Pearson MD     - Reason for Consult:    Wound Care     - Wound Care Reason for Consult:    worsening   06/24/24 1127 Wound care Routine Discontinued Rica Pearson MD   06/06/24 0913 Debridement Traumatic Routine Completed Nicholas Gould MD   05/16/24 0901 Debridement Traumatic Routine Completed Nicholas Gould MD   05/02/24 1520 Debridement Traumatic Routine Completed Nicholas Gould MD   04/11/24 0959 Debridement Traumatic Routine Completed Nicholas Gould MD   04/04/24 0915 Debridement Traumatic Left;Lateral Leg Routine Completed Nicholas Gould MD   02/15/24 1154 Debridement Traumatic Left;Lateral Leg Routine Completed Nicholas Gould MD   01/18/24 1511 Debridement Traumatic Left;Lateral Leg Routine Completed Nicholas Gould MD   01/11/24 1710 Debridement Traumatic Left;Lateral Leg Routine Completed Nicholas Gould MD       Compression Wrap 08/22/24 Leg Anterior;Left (Active)   Placement Date: 08/22/24   Location: Leg  Wound Location Orientation: Anterior;Left      Assessments 8/22/2024  9:41 AM 9/12/2024  8:40 AM   Response to Treatment Well tolerated Well tolerated   Compression Layers Multilayer Multilayer   Compression Product Type Coflex Coflex   Dressing Applied Yes Yes   Compression Wrap Location Toes to Knee Toes to Knee   Compression Wrap Status Clean;Dry;Intact Clean;Dry;Intact       No associated orders.          ASSESSMENT AND PLAN:        Risks, benefits, and alternatives of current treatment plan discussed in detail.  Questions and concerns addressed. Red flags to RTC or ED reviewed.  Patient (or parent) agrees to plan.      No follow-ups on file.  Weekly Wound Education  Note    Teaching Provided To: Patient  Training Topics: Cleasing and general instructions;Compression;Discharge instructions;Disease prevention;Dressing;Edema control  Training Method: Explain/Verbal  Training Response: Patient responds and understands         Arrived for nurse visit in coflex compression wrap. Edema measurements stable. Wound is measuring slightly smaller. Continue Honey alginate, ABD pad, kerlix, paper tape, and coflex compression 30-40mmHg wrap. Reinforced to patient to cut off entire compression wrap and call the wound clinic if the compression wrap begins to roll or gets wet. Patient has appointment set up for next week.               Kimo MCMANUS RN   9/12/2024  8:45 AM

## 2024-09-19 ENCOUNTER — APPOINTMENT (OUTPATIENT)
Dept: WOUND CARE | Facility: HOSPITAL | Age: 59
End: 2024-09-19
Attending: FAMILY MEDICINE
Payer: COMMERCIAL

## 2024-09-19 VITALS
DIASTOLIC BLOOD PRESSURE: 82 MMHG | SYSTOLIC BLOOD PRESSURE: 129 MMHG | TEMPERATURE: 98 F | HEART RATE: 62 BPM | RESPIRATION RATE: 14 BRPM

## 2024-09-19 DIAGNOSIS — E66.01 CLASS 3 SEVERE OBESITY DUE TO EXCESS CALORIES WITH SERIOUS COMORBIDITY AND BODY MASS INDEX (BMI) OF 45.0 TO 49.9 IN ADULT (HCC): ICD-10-CM

## 2024-09-19 DIAGNOSIS — I87.332 CHRONIC VENOUS HYPERTENSION (IDIOPATHIC) WITH ULCER AND INFLAMMATION OF LEFT LOWER EXTREMITY (HCC): Primary | ICD-10-CM

## 2024-09-19 PROCEDURE — 99215 OFFICE O/P EST HI 40 MIN: CPT | Performed by: FAMILY MEDICINE

## 2024-09-19 NOTE — PATIENT INSTRUCTIONS
PATIENT INSTRUCTIONS      FOR Luis M Estrada ,  3/25/1965    DATE OF SERVICE: 2024        Honey alginate with honey gel  Kerramax  Coflex 2 layer compression wrap to left lower extremity    Wear your own compression garment on the right lower extremity, you may remove at bedtime     Follow up with Dr. Gould in 1 week      Managing your edema:    Avoid prolonged standing in one place. It is better to have your calf muscles moving to pump fluid out of the legs.  Elevate leg(s) above the level of the heart when sitting or as much as possible.  Take you diuretics as directed by your physician. Do not skip doses or change doses unless instructed to do so by your physician.  Decrease salt intake, follow recommended 2 grams daily.  Do not get leg(s) with compression wrap wet. If wraps are too tight as indicated by pain, numbness/tingling or discoloration of toes remove wrap completely and call the wound center @ [phone].       The treatment plan has been discussed at length between you and your provider. Follow all instructions carefully, it is very important. If you do not follow all instructions you are at risk of your wound not healing, infection, possible loss of limb and even loss of life.    COMPRESSION WRAP PATIENT CARE INSTRUCTIONS  DO NOT get compression wrap wet. When showering, use a shower boot.   Please contact wound clinic and if not able to reach, then go to emergency room if ANY of the following occur:   Tingling or numbness in the foot or toes on leg with compression wrap.  Severe pain that cannot be relieved with elevation and any medication instructed per your doctor.  A fever of 100.4°F (38°C) or higher.  Swelling, coldness, or blue-gray discoloration of the toes.  If the compression wrap feels too tight or too loose.  If the compression wrap is damaged or has rough edges that hurt.  If the compression wrap gets wet, you must cut wrap off.   Drainage from compression wrap that  smells different than usual.

## 2024-09-19 NOTE — PROGRESS NOTES
.Weekly Wound Education Note    Teaching Provided To: Patient  Training Topics: Dressing;Cleasing and general instructions;Compression;Discharge instructions;Edema control  Training Method: Demonstration;Explain/Verbal  Training Response: Reinforcement needed        Notes: Pt here for follow up wound care visit to left lateral leg wound. Edema measurement decreased, wound measurement decreased. Provider stimulated wound with [place] at visit. Continue with zinc to periwound, honey gel, honey alginate, ABD pad, kerlix, tape. Wrapped LLE in coflex 2 layer wrap. Pt to follow up with provider in 1 week.

## 2024-09-19 NOTE — PROGRESS NOTES
Chief Complaint   Patient presents with    Wound Recheck     Pt here for follow up arrives with compression wrap to left leg and spanda to mid calf on right.  Compression wrap to left is wet on arrival pt states shower boot must have not been sealed properly       HPI:     The patient here for follow-up of left lateral leg wound.  He is doing fine and tolerating compression wrap.  He has no new complaints.    Lab Results   Component Value Date    BUN 14 07/26/2024    CREATSERUM 0.83 07/26/2024    ALB 3.9 07/25/2024    TP 8.0 07/25/2024    A1C 5.2 12/12/2016         Current Outpatient Medications:     penicillin v potassium 500 MG Oral Tab, Take 2 tablets (1,000 mg total) by mouth 3 (three) times daily for 10 days, THEN 1 tablet (500 mg total) 2 (two) times a day., Disp: 180 tablet, Rfl: 0    aspirin 81 MG Oral Tab, Take 1 tablet (81 mg total) by mouth daily., Disp: , Rfl:     No Known Allergies         REVIEW OF SYSTEMS:     Review of Systems (ROS)  This information was obtained from the patient, chart    A comprehensive 10 point review of systems was completed.  Pertinent positives and negatives noted in the the HPI.     Past medical, surgical, family and social history updated where appropriate.    PHYSICAL EXAM:   /82   Pulse 62   Temp 98.3 °F (36.8 °C)   Resp 14    Estimated body mass index is 39.63 kg/m² as calculated from the following:    Height as of 7/29/24: 67\".    Weight as of 7/29/24: 253 lb (114.8 kg).   Vital signs reviewed.Appears stated age, well groomed.        Calf  Point of Measurement - Left Calf: 37     Left Calf from:: Heel  Calf Left cm:: 43.5          Ankle  Point of Measurement - Left Ankle: 10     Left Ankle from:: Heel  Left Ankle cm:: 30                Foot                               Wound 01/11/24 #1 Leg Left;Lateral (Active)   Date First Assessed/Time First Assessed: 01/11/24 3066    Wound Number (Wound Clinic Only): #1  Primary Wound Type: Traumatic  Location: Leg  Wound  Location Orientation: Left;Lateral      Assessments 9/19/2024  8:59 AM 9/19/2024  9:25 AM   Wound Image       Drainage Amount Moderate --   Drainage Description Serosanguineous --   Treatments Compression (coflex 2 layer wrap) --   Wound Length (cm) 4.7 cm --   Wound Width (cm) 2.4 cm --   Wound Surface Area (cm^2) 11.28 cm^2 --   Wound Depth (cm) 0.1 cm --   Wound Volume (cm^3) 1.128 cm^3 --   Wound Healing % 94 --   Margins Well-defined edges --   Non-staged Wound Description Full thickness --   Leslie-wound Assessment Edema;Dry;Hemosiderin staining --   Wound Granulation Tissue Pink;Spongy --   Wound Bed Granulation (%) 40 % --   Wound Bed Slough (%) 60 % --   Wound Odor None --   Tunneling? No --   Undermining? No --   Sinus Tracts? No --       Inactive Orders   Date Order Priority Status Authorizing Provider   07/16/24 2237 Consult to Wound Ostomy Routine Completed Rica Pearson MD     - Reason for Consult:    Wound Care     - Wound Care Reason for Consult:    worsening   06/24/24 1127 Wound care Routine Discontinued Rica Pearson MD   06/06/24 0913 Debridement Traumatic Routine Completed Nicholas Gould MD   05/16/24 0901 Debridement Traumatic Routine Completed Nicholas Gould MD   05/02/24 1520 Debridement Traumatic Routine Completed Nicholas Gould MD   04/11/24 0959 Debridement Traumatic Routine Completed Nicholas Gould MD   04/04/24 0915 Debridement Traumatic Left;Lateral Leg Routine Completed Nicholas Gould MD   02/15/24 1154 Debridement Traumatic Left;Lateral Leg Routine Completed Nichloas Gould MD   01/18/24 1511 Debridement Traumatic Left;Lateral Leg Routine Completed Nicholas Gould MD   01/11/24 1710 Debridement Traumatic Left;Lateral Leg Routine Completed Nicholas Gould MD       Compression Wrap 08/22/24 Leg Anterior;Left (Active)   Placement Date: 08/22/24   Location: Leg  Wound Location Orientation: Anterior;Left      Assessments 9/19/2024  8:59 AM   Response to Treatment Well  tolerated   Compression Layers Multilayer   Compression Product Type Coflex   Dressing Applied Yes (zinc, honey gel, honey alginate, ABD pad, kerlix, tape)   Compression Wrap Location Toes to Knee   Compression Wrap Status Clean;Dry;Intact       No associated orders.              ASSESSMENT AND PLAN:      1. Chronic venous hypertension (idiopathic) with ulcer and inflammation of left lower extremity (HCC)    2. Class 3 severe obesity due to excess calories with serious comorbidity and body mass index (BMI) of 45.0 to 49.9 in adult (HCC)    Clinically and the wound is somewhat smaller than previous visit and a little more granulated.  Wound was scraped with a curette but there is very tough fibrin on the wound base and I was not able to remove it.  Will have him continue with the honey alginate with Medihoney gel, zinc to the periwound and ABD pad and Coflex 2 layer compression wrap.  Risks, benefits, and alternatives of current treatment plan discussed in detail.  Questions and concerns addressed. Red flags to RTC or ED reviewed.  Patient (or parent) agrees to plan.      Return in about 1 week (around 2024).    Also refer to patient instructions.     I spent a total of 40 minutes with this patient's care.  This time included preparing to see the patient (eg, review notes and recent diagnostics), seeing the patient, performing a medically appropriate examination and/or evaluation, counseling and educating the patient, and documenting in the record.          Nicholas Gould M.D.   [facility]   2024    Patient Instructions       PATIENT INSTRUCTIONS      FOR Luis M Estrada ,  3/25/1965    DATE OF SERVICE: 2024        Honey alginate with honey gel  Kerramax  Coflex 2 layer compression wrap to left lower extremity    Wear your own compression garment on the right lower extremity, you may remove at bedtime     Follow up with Dr. Gould in 1 week      Managing your  edema:    Avoid prolonged standing in one place. It is better to have your calf muscles moving to pump fluid out of the legs.  Elevate leg(s) above the level of the heart when sitting or as much as possible.  Take you diuretics as directed by your physician. Do not skip doses or change doses unless instructed to do so by your physician.  Decrease salt intake, follow recommended 2 grams daily.  Do not get leg(s) with compression wrap wet. If wraps are too tight as indicated by pain, numbness/tingling or discoloration of toes remove wrap completely and call the wound center @ [phone].       The treatment plan has been discussed at length between you and your provider. Follow all instructions carefully, it is very important. If you do not follow all instructions you are at risk of your wound not healing, infection, possible loss of limb and even loss of life.    COMPRESSION WRAP PATIENT CARE INSTRUCTIONS  DO NOT get compression wrap wet. When showering, use a shower boot.   Please contact wound clinic and if not able to reach, then go to emergency room if ANY of the following occur:   Tingling or numbness in the foot or toes on leg with compression wrap.  Severe pain that cannot be relieved with elevation and any medication instructed per your doctor.  A fever of 100.4°F (38°C) or higher.  Swelling, coldness, or blue-gray discoloration of the toes.  If the compression wrap feels too tight or too loose.  If the compression wrap is damaged or has rough edges that hurt.  If the compression wrap gets wet, you must cut wrap off.   Drainage from compression wrap that smells different than usual.  MISCELLANEOUS INSTRUCTIONS  Supplement with a daily multivitamin   Low salt diet  Intense blood sugar control - Goal Blood sugar below 180 at all times recommended.  Increase protein intake / consider protein supplements - see below  Elevate extremities at all times when sitting / laying down.  No tobacco use     DIETARY  MODIFICATIONS TO HELP WITH WOUND HEALING:          Please follow any restrictions to diet given by your other doctors     Protein: meats, beans, eggs, milk and yogurt particularly Greek yogurt), tofu, soy nuts, soy protein products     Vitamin C: citrus fruits and juices, strawberries, tomatoes, tomato juice, peppers, baked potatoes, spinach, broccoli, cauliflower, [place] sprouts, cabbage     Vitamin A: dark greens, leafy vegetables, orange or yellow vegetables, cantaloupe, fortified dairy products, liver, fortified cereals     Zinc: fortified cereals, red meats, seafood     Consider Bob by [employer] (These are essential branch chain amino acids that help with tissue building and wound healing) and take 2 packets/day. You can order online at [employer] or [employer]     ADDITIONAL REMINDERS:     The treatment plan has been discussed at length with you and your provider. Follow all instructions carefully, it is very important. If you do not follow all instructions, you are at risk of your wound not healing, infection, possible loss of limb and even loss of life.  Please call the clinic at [phone] during regular business hours ( 7:30 AM - 5:30 PM) if you notice increased redness, warmth, pain or pus like drainage or start running a fever greater than 100.3.    For after hour emergencies, please call your primary physician or go to the nearest emergency room.  If your blood pressure is elevated, follow up with your primary care doctor and/or cardiologist as soon as possible.     FOR LEG SWELLING,  LOWER EXTREMITY WOUNDS AND IF USING COMPRESSION:   Managing your edema:    Avoid prolonged standing in one place. It is better to have your calf muscles moving to pump fluid out of the legs.  Elevate leg(s) above the level of the heart when sitting or as much as possible.  Take you diuretics as directed by your physician. Do not skip doses or change doses unless instructed to do so by your physician.  Decrease salt intake,  follow recommended 2 grams daily.  Do not get leg(s) with compression wrap wet. If wraps are too tight as indicated by pain, numbness/tingling or discoloration of toes remove wrap completely and call the wound center @ [phone].       The treatment plan has been discussed at length between you and your provider. Follow all instructions carefully, it is very important. If you do not follow all instructions you are at risk of your wound not healing, infection, possible loss of limb and even loss of life.    COMPRESSION WRAP PATIENT CARE INSTRUCTIONS  DO NOT get compression wrap wet. When showering, use a shower boot.   Please contact wound clinic and if not able to reach, then go to emergency room if ANY of the following occur:   Tingling or numbness in the foot or toes on leg with compression wrap.  Severe pain that cannot be relieved with elevation and any medication instructed per your doctor.  A fever of 100.4°F (38°C) or higher.  Swelling, coldness, or blue-gray discoloration of the toes.  If the compression wrap feels too tight or too loose.  If the compression wrap is damaged or has rough edges that hurt.  If the compression wrap gets wet, you must cut wrap off.   Drainage from compression wrap that smells different than usual.

## 2024-09-20 LAB — GLUCOSE BLD-MCNC: 169 MG/DL (ref 70–99)

## 2024-09-26 ENCOUNTER — APPOINTMENT (OUTPATIENT)
Dept: WOUND CARE | Facility: HOSPITAL | Age: 59
End: 2024-09-26
Attending: FAMILY MEDICINE
Payer: COMMERCIAL

## 2024-09-26 VITALS
RESPIRATION RATE: 16 BRPM | DIASTOLIC BLOOD PRESSURE: 88 MMHG | TEMPERATURE: 98 F | SYSTOLIC BLOOD PRESSURE: 147 MMHG | HEART RATE: 69 BPM

## 2024-09-26 DIAGNOSIS — E66.01 CLASS 3 SEVERE OBESITY DUE TO EXCESS CALORIES WITH SERIOUS COMORBIDITY AND BODY MASS INDEX (BMI) OF 45.0 TO 49.9 IN ADULT (HCC): ICD-10-CM

## 2024-09-26 DIAGNOSIS — I87.332 CHRONIC VENOUS HYPERTENSION (IDIOPATHIC) WITH ULCER AND INFLAMMATION OF LEFT LOWER EXTREMITY (HCC): Primary | ICD-10-CM

## 2024-09-26 PROCEDURE — 99215 OFFICE O/P EST HI 40 MIN: CPT | Performed by: FAMILY MEDICINE

## 2024-09-26 NOTE — PATIENT INSTRUCTIONS
PATIENT INSTRUCTIONS      FOR Luis M Estrada ,  3/25/1965    DATE OF SERVICE: 2024        Honey alginate with honey gel  Kerramax  Coflex 2 layer compression wrap to left lower extremity    Wear your own compression garment on the right lower extremity, you may remove at bedtime     Follow up with Dr. Gould in 1 week      Managing your edema:    Avoid prolonged standing in one place. It is better to have your calf muscles moving to pump fluid out of the legs.  Elevate leg(s) above the level of the heart when sitting or as much as possible.  Take you diuretics as directed by your physician. Do not skip doses or change doses unless instructed to do so by your physician.  Decrease salt intake, follow recommended 2 grams daily.  Do not get leg(s) with compression wrap wet. If wraps are too tight as indicated by pain, numbness/tingling or discoloration of toes remove wrap completely and call the wound center @ [phone].       The treatment plan has been discussed at length between you and your provider. Follow all instructions carefully, it is very important. If you do not follow all instructions you are at risk of your wound not healing, infection, possible loss of limb and even loss of life.    COMPRESSION WRAP PATIENT CARE INSTRUCTIONS  DO NOT get compression wrap wet. When showering, use a shower boot.   Please contact wound clinic and if not able to reach, then go to emergency room if ANY of the following occur:   Tingling or numbness in the foot or toes on leg with compression wrap.  Severe pain that cannot be relieved with elevation and any medication instructed per your doctor.  A fever of 100.4°F (38°C) or higher.  Swelling, coldness, or blue-gray discoloration of the toes.  If the compression wrap feels too tight or too loose.  If the compression wrap is damaged or has rough edges that hurt.  If the compression wrap gets wet, you must cut wrap off.   Drainage from compression wrap that  smells different than usual.

## 2024-09-26 NOTE — PROGRESS NOTES
Chief Complaint   Patient presents with    Wound Care     Patient is here for a wound care follow up. He denies any pain or new wound concerns.       HPI:     The patient here for follow-up of left lateral leg wound.  He doing well and has no new complaints.    Lab Results   Component Value Date    BUN 14 07/26/2024    CREATSERUM 0.83 07/26/2024    ALB 3.9 07/25/2024    TP 8.0 07/25/2024    A1C 5.2 12/12/2016         Current Outpatient Medications:     penicillin v potassium 500 MG Oral Tab, Take 2 tablets (1,000 mg total) by mouth 3 (three) times daily for 10 days, THEN 1 tablet (500 mg total) 2 (two) times a day., Disp: 180 tablet, Rfl: 0    aspirin 81 MG Oral Tab, Take 1 tablet (81 mg total) by mouth daily., Disp: , Rfl:     No Known Allergies         REVIEW OF SYSTEMS:     Review of Systems (ROS)  This information was obtained from the patient, chart    A comprehensive 10 point review of systems was completed.  Pertinent positives and negatives noted in the the HPI.     Past medical, surgical, family and social history updated where appropriate.    PHYSICAL EXAM:   /88   Pulse 69   Temp 98 °F (36.7 °C)   Resp 16    Estimated body mass index is 39.63 kg/m² as calculated from the following:    Height as of 7/29/24: 67\".    Weight as of 7/29/24: 253 lb (114.8 kg).   Vital signs reviewed.Appears stated age, well groomed.        Calf  Point of Measurement - Left Calf: 37     Left Calf from:: Heel  Calf Left cm:: 44.8          Ankle  Point of Measurement - Left Ankle: 10     Left Ankle from:: Heel  Left Ankle cm:: 28.6                Foot                               Wound 01/11/24 #1 Leg Left;Lateral (Active)   Date First Assessed/Time First Assessed: 01/11/24 1406    Wound Number (Wound Clinic Only): #1  Primary Wound Type: Traumatic  Location: Leg  Wound Location Orientation: Left;Lateral      Assessments 9/26/2024  8:39 AM   Wound Image     Drainage Amount Moderate   Drainage Description Serosanguineous    Treatments Compression (coflex 2 layer wrap & rosidal)   Wound Length (cm) 4.2 cm (angled)   Wound Width (cm) 2.1 cm   Wound Surface Area (cm^2) 8.82 cm^2   Wound Depth (cm) 0.1 cm   Wound Volume (cm^3) 0.882 cm^3   Wound Healing % 96   Margins Well-defined edges   Non-staged Wound Description Full thickness   Leslie-wound Assessment Edema;Dry;Hemosiderin staining   Wound Granulation Tissue Firm;Pink;Red   Wound Bed Granulation (%) 95 %   Wound Bed Slough (%) 5 %   Wound Odor None   Tunneling? No   Undermining? No   Sinus Tracts? No       Inactive Orders   Date Order Priority Status Authorizing Provider   07/16/24 2327 Consult to Wound Ostomy Routine Completed Rica Pearson MD     - Reason for Consult:    Wound Care     - Wound Care Reason for Consult:    worsening   06/24/24 1127 Wound care Routine Discontinued Rica Pearson MD   06/06/24 0913 Debridement Traumatic Routine Completed Nicholas Gould MD   05/16/24 0901 Debridement Traumatic Routine Completed Nicholas Gould MD   05/02/24 1520 Debridement Traumatic Routine Completed Nicholas Gould MD   04/11/24 0959 Debridement Traumatic Routine Completed Nicholas Gould MD   04/04/24 0915 Debridement Traumatic Left;Lateral Leg Routine Completed Nicholas Gould MD   02/15/24 1154 Debridement Traumatic Left;Lateral Leg Routine Completed Nicholas Gould MD   01/18/24 1511 Debridement Traumatic Left;Lateral Leg Routine Completed Nicholas Gould MD   01/11/24 1710 Debridement Traumatic Left;Lateral Leg Routine Completed Nicholas Gould MD       Compression Wrap 08/22/24 Leg Anterior;Left (Active)   Placement Date: 08/22/24   Location: Leg  Wound Location Orientation: Anterior;Left      Assessments 9/26/2024  8:39 AM   Response to Treatment Well tolerated   Compression Layers Multilayer   Compression Product Type Coflex (coflex 2 layer wrap & rosidal)   Dressing Applied Yes (honey gel, honey alginate, ABD pad, kerlix, tape)   Compression Wrap Location Toes to  Knee   Compression Wrap Status Clean;Dry;Intact       No associated orders.              ASSESSMENT AND PLAN:      1. Chronic venous hypertension (idiopathic) with ulcer and inflammation of left lower extremity (HCC)    2. Class 3 severe obesity due to excess calories with serious comorbidity and body mass index (BMI) of 45.0 to 49.9 in adult (HCC)    Patient tolerating compression wrap and the wound appears to be getting better.  Continue with honey alginate and honey gel to the wound base and Coflex 2 layer compression wrap to left lower extremity.  We did go over compression wrap precautions.  Risks, benefits, and alternatives of current treatment plan discussed in detail.  Questions and concerns addressed. Red flags to RTC or ED reviewed.  Patient (or parent) agrees to plan.      No follow-ups on file.    Also refer to patient instructions.     I spent a total of 40 minutes with this patient's care.  This time included preparing to see the patient (eg, review notes and recent diagnostics), seeing the patient, performing a medically appropriate examination and/or evaluation, counseling and educating the patient, and documenting in the record.          Nicholas Gould M.D.   [facility]   2024    Patient Instructions       PATIENT INSTRUCTIONS      FOR Luis M RODRIGUEZ Natalie ,  3/25/1965    DATE OF SERVICE: 2024        Honey alginate with honey gel  Kerramax  Coflex 2 layer compression wrap to left lower extremity    Wear your own compression garment on the right lower extremity, you may remove at bedtime     Follow up with Dr. Gould in 1 week      Managing your edema:    Avoid prolonged standing in one place. It is better to have your calf muscles moving to pump fluid out of the legs.  Elevate leg(s) above the level of the heart when sitting or as much as possible.  Take you diuretics as directed by your physician. Do not skip doses or change doses unless instructed to do so by  your physician.  Decrease salt intake, follow recommended 2 grams daily.  Do not get leg(s) with compression wrap wet. If wraps are too tight as indicated by pain, numbness/tingling or discoloration of toes remove wrap completely and call the wound center @ [phone].       The treatment plan has been discussed at length between you and your provider. Follow all instructions carefully, it is very important. If you do not follow all instructions you are at risk of your wound not healing, infection, possible loss of limb and even loss of life.    COMPRESSION WRAP PATIENT CARE INSTRUCTIONS  DO NOT get compression wrap wet. When showering, use a shower boot.   Please contact wound clinic and if not able to reach, then go to emergency room if ANY of the following occur:   Tingling or numbness in the foot or toes on leg with compression wrap.  Severe pain that cannot be relieved with elevation and any medication instructed per your doctor.  A fever of 100.4°F (38°C) or higher.  Swelling, coldness, or blue-gray discoloration of the toes.  If the compression wrap feels too tight or too loose.  If the compression wrap is damaged or has rough edges that hurt.  If the compression wrap gets wet, you must cut wrap off.   Drainage from compression wrap that smells different than usual.  MISCELLANEOUS INSTRUCTIONS  Supplement with a daily multivitamin   Low salt diet  Intense blood sugar control - Goal Blood sugar below 180 at all times recommended.  Increase protein intake / consider protein supplements - see below  Elevate extremities at all times when sitting / laying down.  No tobacco use     DIETARY MODIFICATIONS TO HELP WITH WOUND HEALING:          Please follow any restrictions to diet given by your other doctors     Protein: meats, beans, eggs, milk and yogurt particularly Greek yogurt), tofu, soy nuts, soy protein products     Vitamin C: citrus fruits and juices, strawberries, tomatoes, tomato juice, peppers, baked  potatoes, spinach, broccoli, cauliflower, [place] sprouts, cabbage     Vitamin A: dark greens, leafy vegetables, orange or yellow vegetables, cantaloupe, fortified dairy products, liver, fortified cereals     Zinc: fortified cereals, red meats, seafood     Consider Bob by [employer] (These are essential branch chain amino acids that help with tissue building and wound healing) and take 2 packets/day. You can order online at [employer] or [employer]     ADDITIONAL REMINDERS:     The treatment plan has been discussed at length with you and your provider. Follow all instructions carefully, it is very important. If you do not follow all instructions, you are at risk of your wound not healing, infection, possible loss of limb and even loss of life.  Please call the clinic at [phone] during regular business hours ( 7:30 AM - 5:30 PM) if you notice increased redness, warmth, pain or pus like drainage or start running a fever greater than 100.3.    For after hour emergencies, please call your primary physician or go to the nearest emergency room.  If your blood pressure is elevated, follow up with your primary care doctor and/or cardiologist as soon as possible.     FOR LEG SWELLING,  LOWER EXTREMITY WOUNDS AND IF USING COMPRESSION:   Managing your edema:    Avoid prolonged standing in one place. It is better to have your calf muscles moving to pump fluid out of the legs.  Elevate leg(s) above the level of the heart when sitting or as much as possible.  Take you diuretics as directed by your physician. Do not skip doses or change doses unless instructed to do so by your physician.  Decrease salt intake, follow recommended 2 grams daily.  Do not get leg(s) with compression wrap wet. If wraps are too tight as indicated by pain, numbness/tingling or discoloration of toes remove wrap completely and call the wound center @ [phone].       The treatment plan has been discussed at length between you and your provider. Follow  all instructions carefully, it is very important. If you do not follow all instructions you are at risk of your wound not healing, infection, possible loss of limb and even loss of life.    COMPRESSION WRAP PATIENT CARE INSTRUCTIONS  DO NOT get compression wrap wet. When showering, use a shower boot.   Please contact wound clinic and if not able to reach, then go to emergency room if ANY of the following occur:   Tingling or numbness in the foot or toes on leg with compression wrap.  Severe pain that cannot be relieved with elevation and any medication instructed per your doctor.  A fever of 100.4°F (38°C) or higher.  Swelling, coldness, or blue-gray discoloration of the toes.  If the compression wrap feels too tight or too loose.  If the compression wrap is damaged or has rough edges that hurt.  If the compression wrap gets wet, you must cut wrap off.   Drainage from compression wrap that smells different than usual.

## 2024-09-26 NOTE — PROGRESS NOTES
Weekly Wound Education Note    Teaching Provided To: Patient  Training Topics: Discharge instructions;Cleasing and general instructions;Compression;Edema control;Dressing  Training Method: Demonstration;Explain/Verbal  Training Response: Reinforcement needed        Notes: Pt here for follow up wound care visit to left lateral leg wound. Edema measurement slightly increased to calf but decreased to ankle. Wound measurement decreased. Provider stimulated wound with curette and applied silver nitrate to wound bed. Provider recommending patient to continue with honey gel, honey alginate, ABD pad, kerlix, tape. Wrapped LLE in coflex 2 layer wrap using rosidal. Pt to follow up with provider in 1 week.

## 2024-10-03 ENCOUNTER — OFFICE VISIT (OUTPATIENT)
Dept: WOUND CARE | Facility: HOSPITAL | Age: 59
End: 2024-10-03
Attending: FAMILY MEDICINE
Payer: COMMERCIAL

## 2024-10-03 VITALS
SYSTOLIC BLOOD PRESSURE: 139 MMHG | HEART RATE: 67 BPM | TEMPERATURE: 98 F | RESPIRATION RATE: 14 BRPM | DIASTOLIC BLOOD PRESSURE: 91 MMHG

## 2024-10-03 DIAGNOSIS — I87.332 CHRONIC VENOUS HYPERTENSION (IDIOPATHIC) WITH ULCER AND INFLAMMATION OF LEFT LOWER EXTREMITY (HCC): Primary | ICD-10-CM

## 2024-10-03 DIAGNOSIS — E66.01 CLASS 3 SEVERE OBESITY DUE TO EXCESS CALORIES WITH SERIOUS COMORBIDITY AND BODY MASS INDEX (BMI) OF 45.0 TO 49.9 IN ADULT (HCC): ICD-10-CM

## 2024-10-03 DIAGNOSIS — E66.813 CLASS 3 SEVERE OBESITY DUE TO EXCESS CALORIES WITH SERIOUS COMORBIDITY AND BODY MASS INDEX (BMI) OF 45.0 TO 49.9 IN ADULT (HCC): ICD-10-CM

## 2024-10-03 PROCEDURE — 99215 OFFICE O/P EST HI 40 MIN: CPT | Performed by: FAMILY MEDICINE

## 2024-10-03 NOTE — PROGRESS NOTES
Weekly Wound Education Note    Teaching Provided To: Patient  Training Topics: Discharge instructions;Cleasing and general instructions;Dressing;Compression;Edema control  Training Method: Explain/Verbal;Demonstration  Training Response: Reinforcement needed        Notes: Pt here for follow up wound care visit to left lateral leg wound. Edema meaasurement decreased in calf, slight increased in ankle. Wound measurement slight increase. Provider stimulated wound with currette at visit today applied silver nitrate. Treatment changed to hydrofera transfer, kerramax, kerlix, tape. Wrapped LLE in coflex 2 layer wrap. RN to run patient for skin sub at visit today. Pt to follow up with provider in 1 week.

## 2024-10-03 NOTE — PATIENT INSTRUCTIONS
PATIENT INSTRUCTIONS      FOR Luis M RODRIGUEZ Natalie , RICK 3/25/1965    DATE OF SERVICE: 10/3/2024        Hydrofera Blue transfer  Kerramax  Coflex 2 layer compression wrap to left lower extremity    Follow up with Dr. Gould in 1 week      Managing your edema:    Avoid prolonged standing in one place. It is better to have your calf muscles moving to pump fluid out of the legs.  Elevate leg(s) above the level of the heart when sitting or as much as possible.  Take you diuretics as directed by your physician. Do not skip doses or change doses unless instructed to do so by your physician.  Decrease salt intake, follow recommended 2 grams daily.  Do not get leg(s) with compression wrap wet. If wraps are too tight as indicated by pain, numbness/tingling or discoloration of toes remove wrap completely and call the wound center @ [phone].       The treatment plan has been discussed at length between you and your provider. Follow all instructions carefully, it is very important. If you do not follow all instructions you are at risk of your wound not healing, infection, possible loss of limb and even loss of life.    COMPRESSION WRAP PATIENT CARE INSTRUCTIONS  DO NOT get compression wrap wet. When showering, use a shower boot.   Please contact wound clinic and if not able to reach, then go to emergency room if ANY of the following occur:   Tingling or numbness in the foot or toes on leg with compression wrap.  Severe pain that cannot be relieved with elevation and any medication instructed per your doctor.  A fever of 100.4°F (38°C) or higher.  Swelling, coldness, or blue-gray discoloration of the toes.  If the compression wrap feels too tight or too loose.  If the compression wrap is damaged or has rough edges that hurt.  If the compression wrap gets wet, you must cut wrap off.   Drainage from compression wrap that smells different than usual.

## 2024-10-03 NOTE — PROGRESS NOTES
Chief Complaint   Patient presents with    Wound Care     Patient is here for a follow up visit for a left leg wound.  Patient states he is still taking Penicillin.       HPI:     Patient here for follow-up of left lateral leg wound.  Tolerating compression wrap.  He has no new complaints.    Lab Results   Component Value Date    BUN 14 07/26/2024    CREATSERUM 0.83 07/26/2024    ALB 3.9 07/25/2024    TP 8.0 07/25/2024    A1C 5.2 12/12/2016         Current Outpatient Medications:     penicillin v potassium 500 MG Oral Tab, Take 2 tablets (1,000 mg total) by mouth 3 (three) times daily for 10 days, THEN 1 tablet (500 mg total) 2 (two) times a day., Disp: 180 tablet, Rfl: 0    aspirin 81 MG Oral Tab, Take 1 tablet (81 mg total) by mouth daily., Disp: , Rfl:     No Known Allergies         REVIEW OF SYSTEMS:     Review of Systems (ROS)  This information was obtained from the patient, chart    A comprehensive 10 point review of systems was completed.  Pertinent positives and negatives noted in the the HPI.     Past medical, surgical, family and social history updated where appropriate.    PHYSICAL EXAM:   BP (!) 139/91   Pulse 67   Temp 98 °F (36.7 °C)   Resp 14    Estimated body mass index is 39.63 kg/m² as calculated from the following:    Height as of 7/29/24: 67\".    Weight as of 7/29/24: 253 lb (114.8 kg).   Vital signs reviewed.Appears stated age, well groomed.        Calf  Point of Measurement - Left Calf: 37        Calf Left cm:: 42          Ankle  Point of Measurement - Left Ankle: 10        Left Ankle cm:: 29.8                Foot                               Wound 01/11/24 #1 Leg Left;Lateral (Active)   Date First Assessed/Time First Assessed: 01/11/24 1406    Wound Number (Wound Clinic Only): #1  Primary Wound Type: Traumatic  Location: Leg  Wound Location Orientation: Left;Lateral      Assessments 1/11/2024  2:10 PM 10/3/2024  8:56 AM   Wound Image       Drainage Amount Large Large   Drainage Description  Yellow;Serous Serosanguineous   Treatments Compression (coflex 2 layer wrap) Compression (coflex 2 layer wrap)   Wound Length (cm) 10.6 cm 4.3 cm   Wound Width (cm) 9.5 cm 2.1 cm   Wound Surface Area (cm^2) 100.7 cm^2 9.03 cm^2   Wound Depth (cm) 0.2 cm 0.1 cm   Wound Volume (cm^3) 20.14 cm^3 0.903 cm^3   Wound Healing % -- 96   Margins Well-defined edges Well-defined edges   Non-staged Wound Description Full thickness --   Leslie-wound Assessment Edema;Hemosiderin staining Edema;Hemosiderin staining;Moist;Maceration   Wound Granulation Tissue Firm;Pink Pink;Red;Spongy   Wound Bed Granulation (%) 40 % 80 %   Wound Bed Slough (%) 60 % 20 %   Wound Odor None None   Tunneling? No No   Undermining? No No   Sinus Tracts? No No       Inactive Orders   Date Order Priority Status Authorizing Provider   07/16/24 2327 Consult to Wound Ostomy Routine Completed Rica Pearson MD     - Reason for Consult:    Wound Care     - Wound Care Reason for Consult:    worsening   06/24/24 1127 Wound care Routine Discontinued Rica Pearson MD   06/06/24 0913 Debridement Traumatic Routine Completed Nicholas Gould MD   05/16/24 0901 Debridement Traumatic Routine Completed Nicholas Gould MD   05/02/24 1520 Debridement Traumatic Routine Completed Nicholas Gould MD   04/11/24 0959 Debridement Traumatic Routine Completed Nicholas Gould MD   04/04/24 0915 Debridement Traumatic Left;Lateral Leg Routine Completed Nicholas Gould MD   02/15/24 1154 Debridement Traumatic Left;Lateral Leg Routine Completed Nicholas Gould MD   01/18/24 1511 Debridement Traumatic Left;Lateral Leg Routine Completed Nicholas Gould MD   01/11/24 1710 Debridement Traumatic Left;Lateral Leg Routine Completed Nicholas oGuld MD       Compression Wrap 08/22/24 Leg Anterior;Left (Active)   Placement Date: 08/22/24   Location: Leg  Wound Location Orientation: Anterior;Left      Assessments 8/22/2024  9:41 AM 10/3/2024  8:56 AM   Response to Treatment Well tolerated  Well tolerated   Compression Layers Multilayer Multilayer   Compression Product Type Coflex Coflex   Dressing Applied Yes Yes (silver nitrate, hydrofer transfer, kerramax, kerlix, tape)   Compression Wrap Location Toes to Knee Toes to Knee   Compression Wrap Status Clean;Dry;Intact Clean;Dry;Intact       No associated orders.              ASSESSMENT AND PLAN:      1. Chronic venous hypertension (idiopathic) with ulcer and inflammation of left lower extremity (HCC)    2. Class 3 severe obesity due to excess calories with serious comorbidity and body mass index (BMI) of 45.0 to 49.9 in adult (HCC)    Clinically the wound is well granulated now.  Silver nitrate applied to the wound base.  Will switch to Hydrofera Blue transfer and Kerramax or ABD pad and continue Coflex 2 layer compression wrap.      Risks, benefits, and alternatives of current treatment plan discussed in detail.  Questions and concerns addressed. Red flags to RTC or ED reviewed.  Patient (or parent) agrees to plan.      Return in about 1 week (around 10/10/2024).    Also refer to patient instructions.     I spent a total of 40 minutes with this patient's care.  This time included preparing to see the patient (eg, review notes and recent diagnostics), seeing the patient, performing a medically appropriate examination and/or evaluation, counseling and educating the patient, and documenting in the record.          Nicholas Gould M.D.   [facility]   10/3/2024    Patient Instructions       PATIENT INSTRUCTIONS      FOR Luis M JENNIFER Estrada , RICK 3/25/1965    DATE OF SERVICE: 10/3/2024        Hydrofera Blue transfer  Kerramax  Coflex 2 layer compression wrap to left lower extremity    Follow up with Dr. Gould in 1 week      Managing your edema:    Avoid prolonged standing in one place. It is better to have your calf muscles moving to pump fluid out of the legs.  Elevate leg(s) above the level of the heart when sitting or as much as  possible.  Take you diuretics as directed by your physician. Do not skip doses or change doses unless instructed to do so by your physician.  Decrease salt intake, follow recommended 2 grams daily.  Do not get leg(s) with compression wrap wet. If wraps are too tight as indicated by pain, numbness/tingling or discoloration of toes remove wrap completely and call the wound center @ [phone].       The treatment plan has been discussed at length between you and your provider. Follow all instructions carefully, it is very important. If you do not follow all instructions you are at risk of your wound not healing, infection, possible loss of limb and even loss of life.    COMPRESSION WRAP PATIENT CARE INSTRUCTIONS  DO NOT get compression wrap wet. When showering, use a shower boot.   Please contact wound clinic and if not able to reach, then go to emergency room if ANY of the following occur:   Tingling or numbness in the foot or toes on leg with compression wrap.  Severe pain that cannot be relieved with elevation and any medication instructed per your doctor.  A fever of 100.4°F (38°C) or higher.  Swelling, coldness, or blue-gray discoloration of the toes.  If the compression wrap feels too tight or too loose.  If the compression wrap is damaged or has rough edges that hurt.  If the compression wrap gets wet, you must cut wrap off.   Drainage from compression wrap that smells different than usual.  MISCELLANEOUS INSTRUCTIONS  Supplement with a daily multivitamin   Low salt diet  Intense blood sugar control - Goal Blood sugar below 180 at all times recommended.  Increase protein intake / consider protein supplements - see below  Elevate extremities at all times when sitting / laying down.  No tobacco use     DIETARY MODIFICATIONS TO HELP WITH WOUND HEALING:          Please follow any restrictions to diet given by your other doctors     Protein: meats, beans, eggs, milk and yogurt particularly Greek yogurt), tofu, soy  nuts, soy protein products     Vitamin C: citrus fruits and juices, strawberries, tomatoes, tomato juice, peppers, baked potatoes, spinach, broccoli, cauliflower, [place] sprouts, cabbage     Vitamin A: dark greens, leafy vegetables, orange or yellow vegetables, cantaloupe, fortified dairy products, liver, fortified cereals     Zinc: fortified cereals, red meats, seafood     Consider Bob by [employer] (These are essential branch chain amino acids that help with tissue building and wound healing) and take 2 packets/day. You can order online at [employer] or [employer]     ADDITIONAL REMINDERS:     The treatment plan has been discussed at length with you and your provider. Follow all instructions carefully, it is very important. If you do not follow all instructions, you are at risk of your wound not healing, infection, possible loss of limb and even loss of life.  Please call the clinic at [phone] during regular business hours ( 7:30 AM - 5:30 PM) if you notice increased redness, warmth, pain or pus like drainage or start running a fever greater than 100.3.    For after hour emergencies, please call your primary physician or go to the nearest emergency room.  If your blood pressure is elevated, follow up with your primary care doctor and/or cardiologist as soon as possible.     FOR LEG SWELLING,  LOWER EXTREMITY WOUNDS AND IF USING COMPRESSION:   Managing your edema:    Avoid prolonged standing in one place. It is better to have your calf muscles moving to pump fluid out of the legs.  Elevate leg(s) above the level of the heart when sitting or as much as possible.  Take you diuretics as directed by your physician. Do not skip doses or change doses unless instructed to do so by your physician.  Decrease salt intake, follow recommended 2 grams daily.  Do not get leg(s) with compression wrap wet. If wraps are too tight as indicated by pain, numbness/tingling or discoloration of toes remove wrap completely and call the  wound center @ [phone].       The treatment plan has been discussed at length between you and your provider. Follow all instructions carefully, it is very important. If you do not follow all instructions you are at risk of your wound not healing, infection, possible loss of limb and even loss of life.    COMPRESSION WRAP PATIENT CARE INSTRUCTIONS  DO NOT get compression wrap wet. When showering, use a shower boot.   Please contact wound clinic and if not able to reach, then go to emergency room if ANY of the following occur:   Tingling or numbness in the foot or toes on leg with compression wrap.  Severe pain that cannot be relieved with elevation and any medication instructed per your doctor.  A fever of 100.4°F (38°C) or higher.  Swelling, coldness, or blue-gray discoloration of the toes.  If the compression wrap feels too tight or too loose.  If the compression wrap is damaged or has rough edges that hurt.  If the compression wrap gets wet, you must cut wrap off.   Drainage from compression wrap that smells different than usual.

## 2024-10-10 ENCOUNTER — OFFICE VISIT (OUTPATIENT)
Dept: WOUND CARE | Facility: HOSPITAL | Age: 59
End: 2024-10-10
Attending: FAMILY MEDICINE
Payer: COMMERCIAL

## 2024-10-10 VITALS
DIASTOLIC BLOOD PRESSURE: 87 MMHG | TEMPERATURE: 98 F | HEART RATE: 73 BPM | RESPIRATION RATE: 16 BRPM | SYSTOLIC BLOOD PRESSURE: 141 MMHG

## 2024-10-10 DIAGNOSIS — I87.332 CHRONIC VENOUS HYPERTENSION (IDIOPATHIC) WITH ULCER AND INFLAMMATION OF LEFT LOWER EXTREMITY (HCC): Primary | ICD-10-CM

## 2024-10-10 DIAGNOSIS — E66.813 CLASS 3 SEVERE OBESITY DUE TO EXCESS CALORIES WITH SERIOUS COMORBIDITY AND BODY MASS INDEX (BMI) OF 45.0 TO 49.9 IN ADULT (HCC): ICD-10-CM

## 2024-10-10 DIAGNOSIS — E66.01 CLASS 3 SEVERE OBESITY DUE TO EXCESS CALORIES WITH SERIOUS COMORBIDITY AND BODY MASS INDEX (BMI) OF 45.0 TO 49.9 IN ADULT (HCC): ICD-10-CM

## 2024-10-10 PROCEDURE — 99215 OFFICE O/P EST HI 40 MIN: CPT | Performed by: FAMILY MEDICINE

## 2024-10-10 NOTE — PROGRESS NOTES
Weekly Wound Education Note    Teaching Provided To: Patient  Training Topics: Discharge instructions;Cleasing and general instructions;Dressing;Edema control;Compression  Training Method: Demonstration;Explain/Verbal  Training Response: Reinforcement needed        Notes: Pt here for follow up wound care visit to left lateral leg wound. Edema measurement decreased, wound measurement decreased. Provider applied silver nitrate to wound bed. Treatment to continue using hydrofera transfer, ABD pad, conforming gauze, tape. Applied coflex 2 layer wrap to LLE. Pt to follow up with provider in 1 week.

## 2024-10-10 NOTE — PATIENT INSTRUCTIONS
PATIENT INSTRUCTIONS      FOR Luis M RODRIGUEZ Natalie ,  3/25/1965    DATE OF SERVICE: 10/10/2024        Hydrofera Blue transfer  Kerramax  Coflex 2 layer compression wrap to left lower extremity    Follow up with Dr. Gould in 1 week      Managing your edema:    Avoid prolonged standing in one place. It is better to have your calf muscles moving to pump fluid out of the legs.  Elevate leg(s) above the level of the heart when sitting or as much as possible.  Take you diuretics as directed by your physician. Do not skip doses or change doses unless instructed to do so by your physician.  Decrease salt intake, follow recommended 2 grams daily.  Do not get leg(s) with compression wrap wet. If wraps are too tight as indicated by pain, numbness/tingling or discoloration of toes remove wrap completely and call the wound center @ [phone].       The treatment plan has been discussed at length between you and your provider. Follow all instructions carefully, it is very important. If you do not follow all instructions you are at risk of your wound not healing, infection, possible loss of limb and even loss of life.    COMPRESSION WRAP PATIENT CARE INSTRUCTIONS  DO NOT get compression wrap wet. When showering, use a shower boot.   Please contact wound clinic and if not able to reach, then go to emergency room if ANY of the following occur:   Tingling or numbness in the foot or toes on leg with compression wrap.  Severe pain that cannot be relieved with elevation and any medication instructed per your doctor.  A fever of 100.4°F (38°C) or higher.  Swelling, coldness, or blue-gray discoloration of the toes.  If the compression wrap feels too tight or too loose.  If the compression wrap is damaged or has rough edges that hurt.  If the compression wrap gets wet, you must cut wrap off.   Drainage from compression wrap that smells different than usual.

## 2024-10-10 NOTE — PROGRESS NOTES
Chief Complaint   Patient presents with    Wound Care     Patient is here for a wound care follow up. He denies any pain or new wound concerns.       HPI:     Patient here for follow-up of left lateral leg wound.  He is doing well and tolerating compression wrap.  He has no new complaints.    Lab Results   Component Value Date    BUN 14 07/26/2024    CREATSERUM 0.83 07/26/2024    ALB 3.9 07/25/2024    TP 8.0 07/25/2024    A1C 5.2 12/12/2016         Current Outpatient Medications:     aspirin 81 MG Oral Tab, Take 1 tablet (81 mg total) by mouth daily., Disp: , Rfl:     Allergies[1]         REVIEW OF SYSTEMS:     Review of Systems (ROS)  This information was obtained from the patient, chart    A comprehensive 10 point review of systems was completed.  Pertinent positives and negatives noted in the the HPI.     Past medical, surgical, family and social history updated where appropriate.    PHYSICAL EXAM:   /87   Pulse 73   Temp 98.1 °F (36.7 °C)   Resp 16    Estimated body mass index is 39.63 kg/m² as calculated from the following:    Height as of 7/29/24: 67\".    Weight as of 7/29/24: 253 lb (114.8 kg).   Vital signs reviewed.Appears stated age, well groomed.        Calf  Point of Measurement - Left Calf: 37     Left Calf from:: Heel  Calf Left cm:: 41.5          Ankle  Point of Measurement - Left Ankle: 10     Left Ankle from:: Heel  Left Ankle cm:: 28.5                Foot                               Wound 01/11/24 #1 Leg Left;Lateral (Active)   Date First Assessed/Time First Assessed: 01/11/24 1406    Wound Number (Wound Clinic Only): #1  Primary Wound Type: Traumatic  Location: Leg  Wound Location Orientation: Left;Lateral      Assessments 1/11/2024  2:10 PM 10/10/2024  8:51 AM   Wound Image       Drainage Amount Large Moderate   Drainage Description Yellow;Serous Serosanguineous   Treatments Compression (coflex 2 layer wrap) Compression (coflex 2 layer wrap)   Wound Length (cm) 10.6 cm 3.5 cm   Wound  Width (cm) 9.5 cm 1.3 cm   Wound Surface Area (cm^2) 100.7 cm^2 4.55 cm^2   Wound Depth (cm) 0.2 cm 0.1 cm   Wound Volume (cm^3) 20.14 cm^3 0.455 cm^3   Wound Healing % -- 98   Margins Well-defined edges Well-defined edges   Non-staged Wound Description Full thickness Full thickness   Leslie-wound Assessment Edema;Hemosiderin staining Edema;Hemosiderin staining   Wound Granulation Tissue Firm;Pink Firm;Pink;Red   Wound Bed Granulation (%) 40 % 100 %   Wound Bed Slough (%) 60 % --   Wound Odor None None   Tunneling? No No   Undermining? No No   Sinus Tracts? No No       Inactive Orders   Date Order Priority Status Authorizing Provider   07/16/24 2327 Consult to Wound Ostomy Routine Completed Rica Pearson MD     - Reason for Consult:    Wound Care     - Wound Care Reason for Consult:    worsening   06/24/24 1127 Wound care Routine Discontinued Rica Pearson MD   06/06/24 0913 Debridement Traumatic Routine Completed Nicholas Gould MD   05/16/24 0901 Debridement Traumatic Routine Completed Nicholas Gould MD   05/02/24 1520 Debridement Traumatic Routine Completed Nicholas Gould MD   04/11/24 0959 Debridement Traumatic Routine Completed Nicholas Gould MD   04/04/24 0915 Debridement Traumatic Left;Lateral Leg Routine Completed Nicholas Gould MD   02/15/24 1154 Debridement Traumatic Left;Lateral Leg Routine Completed Nicholas Gould MD   01/18/24 1511 Debridement Traumatic Left;Lateral Leg Routine Completed Nicholas Gould MD   01/11/24 1710 Debridement Traumatic Left;Lateral Leg Routine Completed Nicholas Gould MD       Compression Wrap 08/22/24 Leg Anterior;Left (Active)   Placement Date: 08/22/24   Location: Leg  Wound Location Orientation: Anterior;Left      Assessments 8/22/2024  9:41 AM 10/10/2024  8:51 AM   Response to Treatment Well tolerated Well tolerated   Compression Layers Multilayer Multilayer   Compression Product Type Coflex Coflex (coflex 2 layer wrap)   Dressing Applied Yes Yes (silver  nitrate, hydrofera transfer, ABD pad, conforming gauze, tape)   Compression Wrap Location Toes to Knee Toes to Knee   Compression Wrap Status Clean;Dry;Intact Clean;Dry;Intact       No associated orders.              ASSESSMENT AND PLAN:      1. Chronic venous hypertension (idiopathic) with ulcer and inflammation of left lower extremity (HCC)    2. Class 3 severe obesity due to excess calories with serious comorbidity and body mass index (BMI) of 45.0 to 49.9 in adult (HCC)    Clinically the wound is much smaller than previous visit well granulated.  Silver nitrate applied to area of hypergranulation.  Will have patient continue Hydrofera Blue transfer, Coflex 2 layer compression wrap.  He is responding well.  Follow-up in 1 week.  Risks, benefits, and alternatives of current treatment plan discussed in detail.  Questions and concerns addressed. Red flags to RTC or ED reviewed.  Patient (or parent) agrees to plan.      No follow-ups on file.    Also refer to patient instructions.     I spent a total of 40 minutes with this patient's care.  This time included preparing to see the patient (eg, review notes and recent diagnostics), seeing the patient, performing a medically appropriate examination and/or evaluation, counseling and educating the patient, and documenting in the record.          Nicholas Gould M.D.   [facility]   10/10/2024    Patient Instructions       PATIENT INSTRUCTIONS      FOR RICK Blandon 3/25/1965    DATE OF SERVICE: 10/10/2024        Hydrofera Blue transfer  Kerramax  Coflex 2 layer compression wrap to left lower extremity    Follow up with Dr. Gould in 1 week      Managing your edema:    Avoid prolonged standing in one place. It is better to have your calf muscles moving to pump fluid out of the legs.  Elevate leg(s) above the level of the heart when sitting or as much as possible.  Take you diuretics as directed by your physician. Do not skip doses or  change doses unless instructed to do so by your physician.  Decrease salt intake, follow recommended 2 grams daily.  Do not get leg(s) with compression wrap wet. If wraps are too tight as indicated by pain, numbness/tingling or discoloration of toes remove wrap completely and call the wound center @ [phone].       The treatment plan has been discussed at length between you and your provider. Follow all instructions carefully, it is very important. If you do not follow all instructions you are at risk of your wound not healing, infection, possible loss of limb and even loss of life.    COMPRESSION WRAP PATIENT CARE INSTRUCTIONS  DO NOT get compression wrap wet. When showering, use a shower boot.   Please contact wound clinic and if not able to reach, then go to emergency room if ANY of the following occur:   Tingling or numbness in the foot or toes on leg with compression wrap.  Severe pain that cannot be relieved with elevation and any medication instructed per your doctor.  A fever of 100.4°F (38°C) or higher.  Swelling, coldness, or blue-gray discoloration of the toes.  If the compression wrap feels too tight or too loose.  If the compression wrap is damaged or has rough edges that hurt.  If the compression wrap gets wet, you must cut wrap off.   Drainage from compression wrap that smells different than usual.  MISCELLANEOUS INSTRUCTIONS  Supplement with a daily multivitamin   Low salt diet  Intense blood sugar control - Goal Blood sugar below 180 at all times recommended.  Increase protein intake / consider protein supplements - see below  Elevate extremities at all times when sitting / laying down.  No tobacco use     DIETARY MODIFICATIONS TO HELP WITH WOUND HEALING:          Please follow any restrictions to diet given by your other doctors     Protein: meats, beans, eggs, milk and yogurt particularly Greek yogurt), tofu, soy nuts, soy protein products     Vitamin C: citrus fruits and juices, strawberries,  tomatoes, tomato juice, peppers, baked potatoes, spinach, broccoli, cauliflower, [place] sprouts, cabbage     Vitamin A: dark greens, leafy vegetables, orange or yellow vegetables, cantaloupe, fortified dairy products, liver, fortified cereals     Zinc: fortified cereals, red meats, seafood     Consider Bob by [email or web address] (These are essential branch chain amino acids that help with tissue building and wound healing) and take 2 packets/day. You can order online at [employer] or [email or web address]     ADDITIONAL REMINDERS:     The treatment plan has been discussed at length with you and your provider. Follow all instructions carefully, it is very important. If you do not follow all instructions, you are at risk of your wound not healing, infection, possible loss of limb and even loss of life.  Please call the clinic at [phone] during regular business hours ( 7:30 AM - 5:30 PM) if you notice increased redness, warmth, pain or pus like drainage or start running a fever greater than 100.3.    For after hour emergencies, please call your primary physician or go to the nearest emergency room.  If your blood pressure is elevated, follow up with your primary care doctor and/or cardiologist as soon as possible.     FOR LEG SWELLING,  LOWER EXTREMITY WOUNDS AND IF USING COMPRESSION:   Managing your edema:    Avoid prolonged standing in one place. It is better to have your calf muscles moving to pump fluid out of the legs.  Elevate leg(s) above the level of the heart when sitting or as much as possible.  Take you diuretics as directed by your physician. Do not skip doses or change doses unless instructed to do so by your physician.  Decrease salt intake, follow recommended 2 grams daily.  Do not get leg(s) with compression wrap wet. If wraps are too tight as indicated by pain, numbness/tingling or discoloration of toes remove wrap completely and call the wound center @ [phone].       The treatment plan has been discussed at length  between you and your provider. Follow all instructions carefully, it is very important. If you do not follow all instructions you are at risk of your wound not healing, infection, possible loss of limb and even loss of life.    COMPRESSION WRAP PATIENT CARE INSTRUCTIONS  DO NOT get compression wrap wet. When showering, use a shower boot.   Please contact wound clinic and if not able to reach, then go to emergency room if ANY of the following occur:   Tingling or numbness in the foot or toes on leg with compression wrap.  Severe pain that cannot be relieved with elevation and any medication instructed per your doctor.  A fever of 100.4°F (38°C) or higher.  Swelling, coldness, or blue-gray discoloration of the toes.  If the compression wrap feels too tight or too loose.  If the compression wrap is damaged or has rough edges that hurt.  If the compression wrap gets wet, you must cut wrap off.   Drainage from compression wrap that smells different than usual.                        [1] No Known Allergies

## 2024-10-17 ENCOUNTER — OFFICE VISIT (OUTPATIENT)
Dept: WOUND CARE | Facility: HOSPITAL | Age: 59
End: 2024-10-17
Attending: FAMILY MEDICINE
Payer: COMMERCIAL

## 2024-10-17 VITALS
SYSTOLIC BLOOD PRESSURE: 136 MMHG | DIASTOLIC BLOOD PRESSURE: 90 MMHG | TEMPERATURE: 98 F | RESPIRATION RATE: 16 BRPM | HEART RATE: 76 BPM

## 2024-10-17 DIAGNOSIS — E66.813 CLASS 3 SEVERE OBESITY DUE TO EXCESS CALORIES WITH SERIOUS COMORBIDITY AND BODY MASS INDEX (BMI) OF 45.0 TO 49.9 IN ADULT (HCC): ICD-10-CM

## 2024-10-17 DIAGNOSIS — I87.332 CHRONIC VENOUS HYPERTENSION (IDIOPATHIC) WITH ULCER AND INFLAMMATION OF LEFT LOWER EXTREMITY (HCC): Primary | ICD-10-CM

## 2024-10-17 DIAGNOSIS — E66.01 CLASS 3 SEVERE OBESITY DUE TO EXCESS CALORIES WITH SERIOUS COMORBIDITY AND BODY MASS INDEX (BMI) OF 45.0 TO 49.9 IN ADULT (HCC): ICD-10-CM

## 2024-10-17 PROCEDURE — 99215 OFFICE O/P EST HI 40 MIN: CPT | Performed by: FAMILY MEDICINE

## 2024-10-17 NOTE — PATIENT INSTRUCTIONS
PATIENT INSTRUCTIONS      FOR Luis M RODRIGUEZ Natalie ,  3/25/1965    DATE OF SERVICE: 10/17/2024        Enluxtra with backing off  ABD or Kerramax  Coflex 2 layer compression wrap to left lower extremity    Follow up with Dr. Gould in 1 week      Managing your edema:    Avoid prolonged standing in one place. It is better to have your calf muscles moving to pump fluid out of the legs.  Elevate leg(s) above the level of the heart when sitting or as much as possible.  Take you diuretics as directed by your physician. Do not skip doses or change doses unless instructed to do so by your physician.  Decrease salt intake, follow recommended 2 grams daily.  Do not get leg(s) with compression wrap wet. If wraps are too tight as indicated by pain, numbness/tingling or discoloration of toes remove wrap completely and call the wound center @ [phone].       The treatment plan has been discussed at length between you and your provider. Follow all instructions carefully, it is very important. If you do not follow all instructions you are at risk of your wound not healing, infection, possible loss of limb and even loss of life.    COMPRESSION WRAP PATIENT CARE INSTRUCTIONS  DO NOT get compression wrap wet. When showering, use a shower boot.   Please contact wound clinic and if not able to reach, then go to emergency room if ANY of the following occur:   Tingling or numbness in the foot or toes on leg with compression wrap.  Severe pain that cannot be relieved with elevation and any medication instructed per your doctor.  A fever of 100.4°F (38°C) or higher.  Swelling, coldness, or blue-gray discoloration of the toes.  If the compression wrap feels too tight or too loose.  If the compression wrap is damaged or has rough edges that hurt.  If the compression wrap gets wet, you must cut wrap off.   Drainage from compression wrap that smells different than usual.

## 2024-10-17 NOTE — PROGRESS NOTES
.Weekly Wound Education Note    Teaching Provided To: Patient  Training Topics: Discharge instructions;Cleasing and general instructions;Edema control;Compression;Dressing  Training Method: Explain/Verbal;Demonstration  Training Response: Reinforcement needed        Notes: Pt here for follow up wound care visit to left lateral leg wound. Edema measurement decreased, wound measurement decreased. Provider stimulated wound at visit today. Treatment changed to enluxtra with back off, ABD pad, conforming gauze, tape. Applied coflex 2 layer wrap to LLE. Pt to follow up with provider in 1 week.

## 2024-10-17 NOTE — PROGRESS NOTES
Chief Complaint   Patient presents with    Wound Care     Patient is here for a wound care follow up. He denies any pain or new wound concerns.       HPI:     Patient here for follow-up of left lateral leg wound.  He is doing well and tolerating compression wrap.  He has no new complaints.    Lab Results   Component Value Date    BUN 14 07/26/2024    CREATSERUM 0.83 07/26/2024    ALB 3.9 07/25/2024    TP 8.0 07/25/2024    A1C 5.2 12/12/2016         Current Outpatient Medications:     aspirin 81 MG Oral Tab, Take 1 tablet (81 mg total) by mouth daily., Disp: , Rfl:     Allergies[1]         REVIEW OF SYSTEMS:     Review of Systems (ROS)  This information was obtained from the patient, chart    A comprehensive 10 point review of systems was completed.  Pertinent positives and negatives noted in the the HPI.     Past medical, surgical, family and social history updated where appropriate.    PHYSICAL EXAM:   /90   Pulse 76   Temp 98 °F (36.7 °C)   Resp 16    Estimated body mass index is 39.63 kg/m² as calculated from the following:    Height as of 7/29/24: 67\".    Weight as of 7/29/24: 253 lb (114.8 kg).   Vital signs reviewed.Appears stated age, well groomed.        Calf  Point of Measurement - Left Calf: 37     Left Calf from:: Heel  Calf Left cm:: 40.5          Ankle  Point of Measurement - Left Ankle: 10     Left Ankle from:: Heel  Left Ankle cm:: 28.8                Foot                               Wound 01/11/24 #1 Leg Left;Lateral (Active)   Date First Assessed/Time First Assessed: 01/11/24 1406    Wound Number (Wound Clinic Only): #1  Primary Wound Type: Traumatic  Location: Leg  Wound Location Orientation: Left;Lateral      Assessments 1/11/2024  2:10 PM 10/17/2024  8:47 AM   Wound Image       Drainage Amount Large --   Drainage Description Yellow;Serous --   Treatments Compression (coflex 2 layer wrap) --   Wound Length (cm) 10.6 cm --   Wound Width (cm) 9.5 cm --   Wound Surface Area (cm^2) 100.7  cm^2 --   Wound Depth (cm) 0.2 cm --   Wound Volume (cm^3) 20.14 cm^3 --   Margins Well-defined edges --   Non-staged Wound Description Full thickness --   Leslie-wound Assessment Edema;Hemosiderin staining --   Wound Granulation Tissue Firm;Pink --   Wound Bed Granulation (%) 40 % --   Wound Bed Slough (%) 60 % --   Wound Odor None --   Tunneling? No --   Undermining? No --   Sinus Tracts? No --       Inactive Orders   Date Order Priority Status Authorizing Provider   07/16/24 2327 Consult to Wound Ostomy Routine Completed Rica Pearson MD     - Reason for Consult:    Wound Care     - Wound Care Reason for Consult:    worsening   06/24/24 1127 Wound care Routine Discontinued Rica Pearson MD   06/06/24 0913 Debridement Traumatic Routine Completed Nicholas Gould MD   05/16/24 0901 Debridement Traumatic Routine Completed Nicholas Gould MD   05/02/24 1520 Debridement Traumatic Routine Completed Nicholas Gould MD   04/11/24 0959 Debridement Traumatic Routine Completed Nicholas Gould MD   04/04/24 0915 Debridement Traumatic Left;Lateral Leg Routine Completed Nicholas Gould MD   02/15/24 1154 Debridement Traumatic Left;Lateral Leg Routine Completed Nicholas Gould MD   01/18/24 1511 Debridement Traumatic Left;Lateral Leg Routine Completed Nicholas Gould MD   01/11/24 1710 Debridement Traumatic Left;Lateral Leg Routine Completed Nicholas Gould MD       Compression Wrap 08/22/24 Leg Anterior;Left (Active)   Placement Date: 08/22/24   Location: Leg  Wound Location Orientation: Anterior;Left      Assessments 8/22/2024  9:41 AM 10/10/2024  8:51 AM   Response to Treatment Well tolerated Well tolerated   Compression Layers Multilayer Multilayer   Compression Product Type Coflex Coflex (coflex 2 layer wrap)   Dressing Applied Yes Yes (silver nitrate, hydrofera transfer, ABD pad, conforming gauze, tape)   Compression Wrap Location Toes to Knee Toes to Knee   Compression Wrap Status Clean;Dry;Intact Clean;Dry;Intact        No associated orders.              ASSESSMENT AND PLAN:      1. Chronic venous hypertension (idiopathic) with ulcer and inflammation of left lower extremity (HCC)    2. Class 3 severe obesity due to excess calories with serious comorbidity and body mass index (BMI) of 45.0 to 49.9 in adult (HCC)    Clinically the wound is slightly smaller and is getting a little bit of slough mixed in with granulation tissue.  Will switch dressing to Enluxtra with the backing off and cover with ABD pad or Kerramax.  Continue Coflex 2 layer compression wrap to left lower extremity.  He will follow-up with me in 1 week.    Risks, benefits, and alternatives of current treatment plan discussed in detail.  Questions and concerns addressed. Red flags to RTC or ED reviewed.  Patient (or parent) agrees to plan.      No follow-ups on file.    Also refer to patient instructions.     I spent a total of 40 minutes with this patient's care.  This time included preparing to see the patient (eg, review notes and recent diagnostics), seeing the patient, performing a medically appropriate examination and/or evaluation, counseling and educating the patient, and documenting in the record.          Nicholas Gould M.D.   [facility]   10/17/2024    Patient Instructions       PATIENT INSTRUCTIONS      FOR Luis M RODRIGUEZ Natalie ,  3/25/1965    DATE OF SERVICE: 10/17/2024        Enluxtra with backing off  ABD or Kerramax  Coflex 2 layer compression wrap to left lower extremity    Follow up with Dr. Gould in 1 week      Managing your edema:    Avoid prolonged standing in one place. It is better to have your calf muscles moving to pump fluid out of the legs.  Elevate leg(s) above the level of the heart when sitting or as much as possible.  Take you diuretics as directed by your physician. Do not skip doses or change doses unless instructed to do so by your physician.  Decrease salt intake, follow recommended 2 grams daily.  Do  not get leg(s) with compression wrap wet. If wraps are too tight as indicated by pain, numbness/tingling or discoloration of toes remove wrap completely and call the wound center @ [phone].       The treatment plan has been discussed at length between you and your provider. Follow all instructions carefully, it is very important. If you do not follow all instructions you are at risk of your wound not healing, infection, possible loss of limb and even loss of life.    COMPRESSION WRAP PATIENT CARE INSTRUCTIONS  DO NOT get compression wrap wet. When showering, use a shower boot.   Please contact wound clinic and if not able to reach, then go to emergency room if ANY of the following occur:   Tingling or numbness in the foot or toes on leg with compression wrap.  Severe pain that cannot be relieved with elevation and any medication instructed per your doctor.  A fever of 100.4°F (38°C) or higher.  Swelling, coldness, or blue-gray discoloration of the toes.  If the compression wrap feels too tight or too loose.  If the compression wrap is damaged or has rough edges that hurt.  If the compression wrap gets wet, you must cut wrap off.   Drainage from compression wrap that smells different than usual.  MISCELLANEOUS INSTRUCTIONS  Supplement with a daily multivitamin   Low salt diet  Intense blood sugar control - Goal Blood sugar below 180 at all times recommended.  Increase protein intake / consider protein supplements - see below  Elevate extremities at all times when sitting / laying down.  No tobacco use     DIETARY MODIFICATIONS TO HELP WITH WOUND HEALING:          Please follow any restrictions to diet given by your other doctors     Protein: meats, beans, eggs, milk and yogurt particularly Greek yogurt), tofu, soy nuts, soy protein products     Vitamin C: citrus fruits and juices, strawberries, tomatoes, tomato juice, peppers, baked potatoes, spinach, broccoli, cauliflower, [place] sprouts, cabbage     Vitamin  A: dark greens, leafy vegetables, orange or yellow vegetables, cantaloupe, fortified dairy products, liver, fortified cereals     Zinc: fortified cereals, red meats, seafood     Consider Bob by [employer] (These are essential branch chain amino acids that help with tissue building and wound healing) and take 2 packets/day. You can order online at [employer] or [employer]     ADDITIONAL REMINDERS:     The treatment plan has been discussed at length with you and your provider. Follow all instructions carefully, it is very important. If you do not follow all instructions, you are at risk of your wound not healing, infection, possible loss of limb and even loss of life.  Please call the clinic at [phone] during regular business hours ( 7:30 AM - 5:30 PM) if you notice increased redness, warmth, pain or pus like drainage or start running a fever greater than 100.3.    For after hour emergencies, please call your primary physician or go to the nearest emergency room.  If your blood pressure is elevated, follow up with your primary care doctor and/or cardiologist as soon as possible.     FOR LEG SWELLING,  LOWER EXTREMITY WOUNDS AND IF USING COMPRESSION:   Managing your edema:    Avoid prolonged standing in one place. It is better to have your calf muscles moving to pump fluid out of the legs.  Elevate leg(s) above the level of the heart when sitting or as much as possible.  Take you diuretics as directed by your physician. Do not skip doses or change doses unless instructed to do so by your physician.  Decrease salt intake, follow recommended 2 grams daily.  Do not get leg(s) with compression wrap wet. If wraps are too tight as indicated by pain, numbness/tingling or discoloration of toes remove wrap completely and call the wound center @ [phone].       The treatment plan has been discussed at length between you and your provider. Follow all instructions carefully, it is very important. If you do not follow all  instructions you are at risk of your wound not healing, infection, possible loss of limb and even loss of life.    COMPRESSION WRAP PATIENT CARE INSTRUCTIONS  DO NOT get compression wrap wet. When showering, use a shower boot.   Please contact wound clinic and if not able to reach, then go to emergency room if ANY of the following occur:   Tingling or numbness in the foot or toes on leg with compression wrap.  Severe pain that cannot be relieved with elevation and any medication instructed per your doctor.  A fever of 100.4°F (38°C) or higher.  Swelling, coldness, or blue-gray discoloration of the toes.  If the compression wrap feels too tight or too loose.  If the compression wrap is damaged or has rough edges that hurt.  If the compression wrap gets wet, you must cut wrap off.   Drainage from compression wrap that smells different than usual.                        [1] No Known Allergies

## 2024-10-24 ENCOUNTER — TELEPHONE (OUTPATIENT)
Dept: WOUND CARE | Facility: HOSPITAL | Age: 59
End: 2024-10-24

## 2024-10-24 ENCOUNTER — APPOINTMENT (OUTPATIENT)
Dept: WOUND CARE | Facility: HOSPITAL | Age: 59
End: 2024-10-24
Attending: FAMILY MEDICINE
Payer: COMMERCIAL

## 2024-10-24 NOTE — TELEPHONE ENCOUNTER
Patient left voicemail at visit stating that he is unable to make scheduled provider visit today 10/24/24. RN returned patient call, informed patient that he needs to remove his LLE wrap today as he has had in on for 1 week at this time. Pt states understanding and states he can do this. RN instructed patient to check box of supplies that had been sent to him in the past, instructed patient to remove old wrap- cleanse wound with normal saline and gauze and cover wound dressing with bordered foam dressing. Instructed patient to change foam dressing every 2-3 days unless he notices drainage and needs to change sooner. Instructed patient to cut a new spandagrip from the box that was ordered for him and apply to BLE- pt states he already uses that on his right leg. Pt states he can do this. RN informed pt if he has any concerns, questions or issues to call wound clinic.

## 2024-10-31 ENCOUNTER — OFFICE VISIT (OUTPATIENT)
Dept: WOUND CARE | Facility: HOSPITAL | Age: 59
End: 2024-10-31
Attending: FAMILY MEDICINE
Payer: COMMERCIAL

## 2024-10-31 VITALS
RESPIRATION RATE: 16 BRPM | DIASTOLIC BLOOD PRESSURE: 86 MMHG | SYSTOLIC BLOOD PRESSURE: 143 MMHG | TEMPERATURE: 98 F | HEART RATE: 67 BPM

## 2024-10-31 DIAGNOSIS — E66.01 CLASS 3 SEVERE OBESITY DUE TO EXCESS CALORIES WITH SERIOUS COMORBIDITY AND BODY MASS INDEX (BMI) OF 45.0 TO 49.9 IN ADULT (HCC): ICD-10-CM

## 2024-10-31 DIAGNOSIS — E66.813 CLASS 3 SEVERE OBESITY DUE TO EXCESS CALORIES WITH SERIOUS COMORBIDITY AND BODY MASS INDEX (BMI) OF 45.0 TO 49.9 IN ADULT (HCC): ICD-10-CM

## 2024-10-31 DIAGNOSIS — I87.333 CHRONIC VENOUS HYPERTENSION (IDIOPATHIC) WITH ULCER AND INFLAMMATION OF BILATERAL LOWER EXTREMITY (HCC): Primary | ICD-10-CM

## 2024-10-31 PROCEDURE — 99215 OFFICE O/P EST HI 40 MIN: CPT | Performed by: FAMILY MEDICINE

## 2024-10-31 NOTE — PROGRESS NOTES
Chief Complaint   Patient presents with    Wound Care     Follow-up for wound left leg. Pt states he was unable to make it to the clinic last week. States he has been changing his hick foam dressings a few times. He arrived spandagrip to both legs. New wounds noted to both legs.        HPI:     Patient here for follow-up on the left lateral leg wound.  He was not able to come in for the visit last week and thus had to take his compression wrap off.  Used foam dressing.  His legs has gotten worse he has new wounds on the left lateral leg and also small new wounds on the right anterior leg.  He denies any fever or chills.    Lab Results   Component Value Date    BUN 14 07/26/2024    CREATSERUM 0.83 07/26/2024    ALB 3.9 07/25/2024    TP 8.0 07/25/2024    A1C 5.2 12/12/2016         Current Outpatient Medications:     aspirin 81 MG Oral Tab, Take 1 tablet (81 mg total) by mouth daily., Disp: , Rfl:     Allergies[1]         REVIEW OF SYSTEMS:     Review of Systems (ROS)  This information was obtained from the patient, chart    A comprehensive 10 point review of systems was completed.  Pertinent positives and negatives noted in the the HPI.     Past medical, surgical, family and social history updated where appropriate.    PHYSICAL EXAM:   /86   Pulse 67   Temp 98.1 °F (36.7 °C)   Resp 16    Estimated body mass index is 39.63 kg/m² as calculated from the following:    Height as of 7/29/24: 67\".    Weight as of 7/29/24: 253 lb (114.8 kg).   Vital signs reviewed.Appears stated age, well groomed.        Calf  Point of Measurement - Left Calf: 37  Point of Measurement - Right Calf: 37  Left Calf from:: Heel  Calf Left cm:: 52  Right Calf from:: Heel  Right Calf cm:: 50    Ankle  Point of Measurement - Left Ankle: 10  Point of Measurement - Right Ankle: 10  Left Ankle from:: Heel  Left Ankle cm:: 32     Right Ankle from:: Heel  Right Ankle cm:: 32       Foot                               Wound 01/11/24 #1 Leg  Left;Lateral (Active)   Date First Assessed/Time First Assessed: 01/11/24 1406    Wound Number (Wound Clinic Only): #1  Primary Wound Type: Traumatic  Location: Leg  Wound Location Orientation: Left;Lateral      Assessments 1/11/2024  2:10 PM 10/31/2024  8:33 AM   Wound Image       Drainage Amount Large Small   Drainage Description Yellow;Serous Serosanguineous   Treatments Compression (coflex 2 layer wrap) --   Wound Length (cm) 10.6 cm 7.5 cm   Wound Width (cm) 9.5 cm 8 cm   Wound Surface Area (cm^2) 100.7 cm^2 60 cm^2   Wound Depth (cm) 0.2 cm 0.1 cm   Wound Volume (cm^3) 20.14 cm^3 6 cm^3   Wound Healing % -- 70   Margins Well-defined edges Well-defined edges   Non-staged Wound Description Full thickness Full thickness   Leslie-wound Assessment Edema;Hemosiderin staining Edema;Hemosiderin staining   Wound Granulation Tissue Firm;Pink Pink;Red;Firm   Wound Bed Granulation (%) 40 % 80 %   Wound Bed Epithelium (%) -- 20 %   Wound Bed Slough (%) 60 % --   Wound Odor None None   Shape -- Bridged   Tunneling? No No   Undermining? No No   Sinus Tracts? No No       Inactive Orders   Date Order Priority Status Authorizing Provider   07/16/24 2327 Consult to Wound Ostomy Routine Completed Rica Pearson MD     - Reason for Consult:    Wound Care     - Wound Care Reason for Consult:    worsening   06/24/24 1127 Wound care Routine Discontinued Rica Pearson MD   06/06/24 0913 Debridement Traumatic Routine Completed Nicholas Gould MD   05/16/24 0901 Debridement Traumatic Routine Completed Nicholas Gould MD   05/02/24 1520 Debridement Traumatic Routine Completed Nicholas Gould MD   04/11/24 0959 Debridement Traumatic Routine Completed Nicholas Gould MD   04/04/24 0915 Debridement Traumatic Left;Lateral Leg Routine Completed Nicholas Gould MD   02/15/24 1154 Debridement Traumatic Left;Lateral Leg Routine Completed Nicholas Gould MD   01/18/24 1511 Debridement Traumatic Left;Lateral Leg Routine Completed Luis Fernando  MD Nicholas   01/11/24 1710 Debridement Traumatic Left;Lateral Leg Routine Completed Nicholas Gould MD       Compression Wrap 08/22/24 Leg Anterior;Left (Active)   Placement Date: 08/22/24   Location: Leg  Wound Location Orientation: Anterior;Left      Assessments 8/22/2024  9:41 AM 10/17/2024  8:33 AM   Response to Treatment Well tolerated Well tolerated   Compression Layers Multilayer Multilayer   Compression Product Type Coflex Coflex   Dressing Applied Yes Yes (enluxtra back off, ABD pad, conforming gauze, tape)   Compression Wrap Location Toes to Knee Toes to Knee   Compression Wrap Status Clean;Dry;Intact Clean;Dry;Intact       No associated orders.       Wound 10/31/24 #2 Leg Right (Active)   Date First Assessed/Time First Assessed: 10/31/24 0825    Wound Number (Wound Clinic Only): #2  Primary Wound Type: Venous Ulcer  Location: Leg  Wound Location Orientation: Right      Assessments 10/31/2024  8:35 AM   Wound Image     Drainage Amount Unable to assess   Wound Length (cm) 7.5 cm   Wound Width (cm) 0.6 cm   Wound Surface Area (cm^2) 4.5 cm^2   Wound Depth (cm) 0.1 cm   Wound Volume (cm^3) 0.45 cm^3   Margins Well-defined edges   Non-staged Wound Description Full thickness   Leslie-wound Assessment Edema   Wound Granulation Tissue Pink;Firm   Wound Bed Granulation (%) 20 %   Wound Bed Epithelium (%) 80 %   Wound Odor None   Tunneling? No   Undermining? No   Sinus Tracts? No       No associated orders.       Wound 10/31/24 #3 Left posterior leg Leg Left;Posterior (Active)   Date First Assessed: 10/31/24    Wound Number (Wound Clinic Only): #3 Left posterior leg  Primary Wound Type: Edema  Location: Leg  Wound Location Orientation: Left;Posterior      Assessments 10/31/2024  8:32 AM   Wound Image     Drainage Amount Unable to assess   Wound Length (cm) 6 cm   Wound Width (cm) 3 cm   Wound Surface Area (cm^2) 18 cm^2   Wound Depth (cm) 0.1 cm   Wound Volume (cm^3) 1.8 cm^3   Margins Poorly defined   Non-staged  Wound Description Full thickness   Leslie-wound Assessment Hemosiderin staining;Edema   Wound Granulation Tissue Red;Firm;Pink   Wound Bed Granulation (%) 40 %   Wound Bed Epithelium (%) 60 %   Wound Odor None   Tunneling? No   Undermining? No   Sinus Tracts? No       No associated orders.              ASSESSMENT AND PLAN:      1. Chronic venous hypertension (idiopathic) with ulcer and inflammation of bilateral lower extremity (HCC)    2. Class 3 severe obesity due to excess calories with serious comorbidity and body mass index (BMI) of 45.0 to 49.9 in adult (HCC)    Clinically the wounds are worse on the left leg will use Hydrofera Blue transfer, Kerramax, Coflex 2 layer compression wrap to left lower extremity and also use Hydrofera Blue and Coflex 2 layer compression wrap to right lower extremity.  Patient told importance of wearing his own compression garments once the wounds are healed and he does have compression Velcro garments but states he has a hard time putting them on.  I told him to bring it at next visit.  He will definitely need some sort of compression garment that he can use at home for the future.  Otherwise new wounds will develop.    Patient will follow-up in 1 week.  Risks, benefits, and alternatives of current treatment plan discussed in detail.  Questions and concerns addressed. Red flags to RTC or ED reviewed.  Patient (or parent) agrees to plan.      No follow-ups on file.    Also refer to patient instructions.     I spent a total of 40 minutes with this patient's care.  This time included preparing to see the patient (eg, review notes and recent diagnostics), seeing the patient, performing a medically appropriate examination and/or evaluation, counseling and educating the patient, and documenting in the record.          Nicholas Gould M.D.   [facility]   10/31/2024    Patient Instructions       PATIENT INSTRUCTIONS      FOR Luis M Estrada ,  3/25/1965    DATE  OF SERVICE: 10/31/2024        Hydrofera Blue transfer  Kerramax  Coflex 2 layer compression wrap to both lower extremities    Follow up with Dr. Gould in 1 week      Managing your edema:    Avoid prolonged standing in one place. It is better to have your calf muscles moving to pump fluid out of the legs.  Elevate leg(s) above the level of the heart when sitting or as much as possible.  Take you diuretics as directed by your physician. Do not skip doses or change doses unless instructed to do so by your physician.  Decrease salt intake, follow recommended 2 grams daily.  Do not get leg(s) with compression wrap wet. If wraps are too tight as indicated by pain, numbness/tingling or discoloration of toes remove wrap completely and call the wound center @ [phone].       The treatment plan has been discussed at length between you and your provider. Follow all instructions carefully, it is very important. If you do not follow all instructions you are at risk of your wound not healing, infection, possible loss of limb and even loss of life.    COMPRESSION WRAP PATIENT CARE INSTRUCTIONS  DO NOT get compression wrap wet. When showering, use a shower boot.   Please contact wound clinic and if not able to reach, then go to emergency room if ANY of the following occur:   Tingling or numbness in the foot or toes on leg with compression wrap.  Severe pain that cannot be relieved with elevation and any medication instructed per your doctor.  A fever of 100.4°F (38°C) or higher.  Swelling, coldness, or blue-gray discoloration of the toes.  If the compression wrap feels too tight or too loose.  If the compression wrap is damaged or has rough edges that hurt.  If the compression wrap gets wet, you must cut wrap off.   Drainage from compression wrap that smells different than usual.  MISCELLANEOUS INSTRUCTIONS  Supplement with a daily multivitamin   Low salt diet  Intense blood sugar control - Goal Blood sugar below 180 at all  times recommended.  Increase protein intake / consider protein supplements - see below  Elevate extremities at all times when sitting / laying down.  No tobacco use     DIETARY MODIFICATIONS TO HELP WITH WOUND HEALING:          Please follow any restrictions to diet given by your other doctors     Protein: meats, beans, eggs, milk and yogurt particularly Greek yogurt), tofu, soy nuts, soy protein products     Vitamin C: citrus fruits and juices, strawberries, tomatoes, tomato juice, peppers, baked potatoes, spinach, broccoli, cauliflower, Bantam sprouts, cabbage     Vitamin A: dark greens, leafy vegetables, orange or yellow vegetables, cantaloupe, fortified dairy products, liver, fortified cereals     Zinc: fortified cereals, red meats, seafood     Consider Bob by [employer] (These are essential branch chain amino acids that help with tissue building and wound healing) and take 2 packets/day. You can order online at [employer] or [employer]     ADDITIONAL REMINDERS:     The treatment plan has been discussed at length with you and your provider. Follow all instructions carefully, it is very important. If you do not follow all instructions, you are at risk of your wound not healing, infection, possible loss of limb and even loss of life.  Please call the clinic at [phone] during regular business hours ( 7:30 AM - 5:30 PM) if you notice increased redness, warmth, pain or pus like drainage or start running a fever greater than 100.3.    For after hour emergencies, please call your primary physician or go to the nearest emergency room.  If your blood pressure is elevated, follow up with your primary care doctor and/or cardiologist as soon as possible.     FOR LEG SWELLING,  LOWER EXTREMITY WOUNDS AND IF USING COMPRESSION:   Managing your edema:    Avoid prolonged standing in one place. It is better to have your calf muscles moving to pump fluid out of the legs.  Elevate leg(s) above the level of the heart when sitting  or as much as possible.  Take you diuretics as directed by your physician. Do not skip doses or change doses unless instructed to do so by your physician.  Decrease salt intake, follow recommended 2 grams daily.  Do not get leg(s) with compression wrap wet. If wraps are too tight as indicated by pain, numbness/tingling or discoloration of toes remove wrap completely and call the wound center @ [phone].       The treatment plan has been discussed at length between you and your provider. Follow all instructions carefully, it is very important. If you do not follow all instructions you are at risk of your wound not healing, infection, possible loss of limb and even loss of life.    COMPRESSION WRAP PATIENT CARE INSTRUCTIONS  DO NOT get compression wrap wet. When showering, use a shower boot.   Please contact wound clinic and if not able to reach, then go to emergency room if ANY of the following occur:   Tingling or numbness in the foot or toes on leg with compression wrap.  Severe pain that cannot be relieved with elevation and any medication instructed per your doctor.  A fever of 100.4°F (38°C) or higher.  Swelling, coldness, or blue-gray discoloration of the toes.  If the compression wrap feels too tight or too loose.  If the compression wrap is damaged or has rough edges that hurt.  If the compression wrap gets wet, you must cut wrap off.   Drainage from compression wrap that smells different than usual.                        [1] No Known Allergies

## 2024-10-31 NOTE — PATIENT INSTRUCTIONS
PATIENT INSTRUCTIONS      FOR Luis M Lomasd ,  3/25/1965    DATE OF SERVICE: 10/31/2024        Hydrofera Blue transfer  Kerramax  Coflex 2 layer compression wrap to both lower extremities    Follow up with Dr. Gould in 1 week      Managing your edema:    Avoid prolonged standing in one place. It is better to have your calf muscles moving to pump fluid out of the legs.  Elevate leg(s) above the level of the heart when sitting or as much as possible.  Take you diuretics as directed by your physician. Do not skip doses or change doses unless instructed to do so by your physician.  Decrease salt intake, follow recommended 2 grams daily.  Do not get leg(s) with compression wrap wet. If wraps are too tight as indicated by pain, numbness/tingling or discoloration of toes remove wrap completely and call the wound center @ [phone].       The treatment plan has been discussed at length between you and your provider. Follow all instructions carefully, it is very important. If you do not follow all instructions you are at risk of your wound not healing, infection, possible loss of limb and even loss of life.    COMPRESSION WRAP PATIENT CARE INSTRUCTIONS  DO NOT get compression wrap wet. When showering, use a shower boot.   Please contact wound clinic and if not able to reach, then go to emergency room if ANY of the following occur:   Tingling or numbness in the foot or toes on leg with compression wrap.  Severe pain that cannot be relieved with elevation and any medication instructed per your doctor.  A fever of 100.4°F (38°C) or higher.  Swelling, coldness, or blue-gray discoloration of the toes.  If the compression wrap feels too tight or too loose.  If the compression wrap is damaged or has rough edges that hurt.  If the compression wrap gets wet, you must cut wrap off.   Drainage from compression wrap that smells different than usual.

## 2024-10-31 NOTE — PROGRESS NOTES
.Weekly Wound Education Note    Teaching Provided To: Patient  Training Topics: Discharge instructions;Cleasing and general instructions;Edema control;Dressing;Compression  Training Method: Demonstration;Explain/Verbal  Training Response: Reinforcement needed        Notes: Pt here for follow up wound care visit to left lateral lower leg. Patient was unable to come to last provider visit and was instructed to remove wrap and apply dressing that he had at home. Patient states he has been changing dressing with thick foam. He arrived spandagrip to both legs. New wounds noted to both legs. Edema measurement increased. Provider stimulated wounds to left lateral lower leg. Treatment to right lower leg wound - zinc, hydrofera transfer stacked, kerramax, ABD pad, kerlix, tape : left lateral and left posterior leg - zinc, hydrofera transfer, kerramax, kerlix, tape. Wrapped BLE in coflex 2 layer wraps and applied spandagrip (E) to RLE and spandagrip (F) to LLE. Pt to follow up with provider in 1 week. RN educated patient to bring in his ordered compression so staff can show him how to use it at next visit.

## 2024-11-07 ENCOUNTER — OFFICE VISIT (OUTPATIENT)
Dept: WOUND CARE | Facility: HOSPITAL | Age: 59
End: 2024-11-07
Attending: FAMILY MEDICINE
Payer: COMMERCIAL

## 2024-11-07 VITALS
DIASTOLIC BLOOD PRESSURE: 83 MMHG | TEMPERATURE: 98 F | RESPIRATION RATE: 16 BRPM | HEART RATE: 72 BPM | SYSTOLIC BLOOD PRESSURE: 151 MMHG

## 2024-11-07 DIAGNOSIS — E66.813 CLASS 3 SEVERE OBESITY DUE TO EXCESS CALORIES WITH SERIOUS COMORBIDITY AND BODY MASS INDEX (BMI) OF 45.0 TO 49.9 IN ADULT (HCC): ICD-10-CM

## 2024-11-07 DIAGNOSIS — M79.89 LEG SWELLING: ICD-10-CM

## 2024-11-07 DIAGNOSIS — E66.01 CLASS 3 SEVERE OBESITY DUE TO EXCESS CALORIES WITH SERIOUS COMORBIDITY AND BODY MASS INDEX (BMI) OF 45.0 TO 49.9 IN ADULT (HCC): ICD-10-CM

## 2024-11-07 DIAGNOSIS — I87.333 CHRONIC VENOUS HYPERTENSION (IDIOPATHIC) WITH ULCER AND INFLAMMATION OF BILATERAL LOWER EXTREMITY (HCC): Primary | ICD-10-CM

## 2024-11-07 DIAGNOSIS — I87.332 CHRONIC VENOUS HYPERTENSION (IDIOPATHIC) WITH ULCER AND INFLAMMATION OF LEFT LOWER EXTREMITY (HCC): ICD-10-CM

## 2024-11-07 DIAGNOSIS — S81.802D OPEN WOUND OF LEFT LOWER LEG, SUBSEQUENT ENCOUNTER: ICD-10-CM

## 2024-11-07 PROCEDURE — 99215 OFFICE O/P EST HI 40 MIN: CPT | Performed by: FAMILY MEDICINE

## 2024-11-07 NOTE — PATIENT INSTRUCTIONS
PATIENT INSTRUCTIONS      FOR Luis M Lomasd ,  3/25/1965    DATE OF SERVICE: 2024        Silver Alginate  Kerramax  Coflex 2 layer compression wrap to left lower extremity    Velcro compression garment to right lower extremity    Follow up with Dr. Gould in 1 week      Managing your edema:    Avoid prolonged standing in one place. It is better to have your calf muscles moving to pump fluid out of the legs.  Elevate leg(s) above the level of the heart when sitting or as much as possible.  Take you diuretics as directed by your physician. Do not skip doses or change doses unless instructed to do so by your physician.  Decrease salt intake, follow recommended 2 grams daily.  Do not get leg(s) with compression wrap wet. If wraps are too tight as indicated by pain, numbness/tingling or discoloration of toes remove wrap completely and call the wound center @ [phone].       The treatment plan has been discussed at length between you and your provider. Follow all instructions carefully, it is very important. If you do not follow all instructions you are at risk of your wound not healing, infection, possible loss of limb and even loss of life.    COMPRESSION WRAP PATIENT CARE INSTRUCTIONS  DO NOT get compression wrap wet. When showering, use a shower boot.   Please contact wound clinic and if not able to reach, then go to emergency room if ANY of the following occur:   Tingling or numbness in the foot or toes on leg with compression wrap.  Severe pain that cannot be relieved with elevation and any medication instructed per your doctor.  A fever of 100.4°F (38°C) or higher.  Swelling, coldness, or blue-gray discoloration of the toes.  If the compression wrap feels too tight or too loose.  If the compression wrap is damaged or has rough edges that hurt.  If the compression wrap gets wet, you must cut wrap off.   Drainage from compression wrap that smells different than usual.

## 2024-11-07 NOTE — PROGRESS NOTES
Chief Complaint   Patient presents with    Wound Care     Patient is here for a wound care follow up. He complains of an intermittent stabbing pain that he rates at 2/10 that began a couple of days ago.        HPI:     Patient here for follow-up of left leg wound and right leg wound.  He is doing better now and thinks wounds are improving.  He tolerated compression wrap to both lower extremities.    Lab Results   Component Value Date    BUN 14 07/26/2024    CREATSERUM 0.83 07/26/2024    ALB 3.9 07/25/2024    TP 8.0 07/25/2024    A1C 5.2 12/12/2016         Current Outpatient Medications:     aspirin 81 MG Oral Tab, Take 1 tablet (81 mg total) by mouth daily., Disp: , Rfl:     Allergies[1]         REVIEW OF SYSTEMS:     Review of Systems (ROS)  This information was obtained from the patient, chart    A comprehensive 10 point review of systems was completed.  Pertinent positives and negatives noted in the the HPI.     Past medical, surgical, family and social history updated where appropriate.    PHYSICAL EXAM:   /83   Pulse 72   Temp 98.2 °F (36.8 °C)   Resp 16    Estimated body mass index is 39.63 kg/m² as calculated from the following:    Height as of 7/29/24: 67\".    Weight as of 7/29/24: 253 lb (114.8 kg).   Vital signs reviewed.Appears stated age, well groomed.        Calf  Point of Measurement - Left Calf: 37  Point of Measurement - Right Calf: 37  Left Calf from:: Heel  Calf Left cm:: 44  Right Calf from:: Heel  Right Calf cm:: 50.5    Ankle  Point of Measurement - Left Ankle: 10  Point of Measurement - Right Ankle: 10  Left Ankle from:: Heel  Left Ankle cm:: 28.8     Right Ankle from:: Heel  Right Ankle cm:: 28.9       Foot                               Wound 01/11/24 #1 Leg Left;Lateral (Active)   Date First Assessed/Time First Assessed: 01/11/24 4996    Wound Number (Wound Clinic Only): #1  Primary Wound Type: Traumatic  Location: Leg  Wound Location Orientation: Left;Lateral      Assessments  1/11/2024  2:10 PM 11/7/2024  8:57 AM   Wound Image       Drainage Amount Large Moderate   Drainage Description Yellow;Serous Serosanguineous   Treatments Compression (coflex 2 layer wrap) Compression (copflex 2 layer wrap & spandagrip (e))   Wound Length (cm) 10.6 cm 8.3 cm   Wound Width (cm) 9.5 cm 5 cm   Wound Surface Area (cm^2) 100.7 cm^2 41.5 cm^2   Wound Depth (cm) 0.2 cm 0.1 cm   Wound Volume (cm^3) 20.14 cm^3 4.15 cm^3   Wound Healing % -- 79   Margins Well-defined edges Well-defined edges   Non-staged Wound Description Full thickness Full thickness   Leslie-wound Assessment Edema;Hemosiderin staining Edema;Hemosiderin staining   Wound Granulation Tissue Firm;Pink Firm;Pink;Red   Wound Bed Granulation (%) 40 % 80 %   Wound Bed Epithelium (%) -- 20 %   Wound Bed Slough (%) 60 % --   Wound Odor None None   Shape -- Dusky area noted to wound bed.   Tunneling? No No   Undermining? No No   Sinus Tracts? No No       Inactive Orders   Date Order Priority Status Authorizing Provider   07/16/24 2327 Consult to Wound Ostomy Routine Completed Rica Pearson MD     - Reason for Consult:    Wound Care     - Wound Care Reason for Consult:    worsening   06/24/24 1127 Wound care Routine Discontinued Rica Pearson MD   06/06/24 0913 Debridement Traumatic Routine Completed Nicholas Gould MD   05/16/24 0901 Debridement Traumatic Routine Completed Nicholas Gould MD   05/02/24 1520 Debridement Traumatic Routine Completed Nicholas Gould MD   04/11/24 0959 Debridement Traumatic Routine Completed Nicholas Gould MD   04/04/24 0915 Debridement Traumatic Left;Lateral Leg Routine Completed Nicholas Gould MD   02/15/24 1154 Debridement Traumatic Left;Lateral Leg Routine Completed Nicholas Gould MD   01/18/24 1511 Debridement Traumatic Left;Lateral Leg Routine Completed Nicholas Gould MD   01/11/24 1710 Debridement Traumatic Left;Lateral Leg Routine Completed Nicholas Gould MD       Compression Wrap 08/22/24 Leg  Anterior;Left (Active)   Placement Date: 08/22/24   Location: Leg  Wound Location Orientation: Anterior;Left      Assessments 8/22/2024  9:41 AM 11/7/2024  8:57 AM   Response to Treatment Well tolerated Well tolerated   Compression Layers Multilayer Multilayer   Compression Product Type Coflex Coflex   Dressing Applied Yes Yes (silver nitrate, silver alginate, kerramax, ABD pad, kerlix, tape)   Compression Wrap Location Toes to Knee Toes to Knee   Compression Wrap Status Clean;Dry;Intact Clean;Dry;Intact       No associated orders.              ASSESSMENT AND PLAN:      1. Chronic venous hypertension (idiopathic) with ulcer and inflammation of bilateral lower extremity (HCC)  - Aerobic Bacterial Culture    2. Open wound of left lower leg, subsequent encounter  - Aerobic Bacterial Culture    3. Class 3 severe obesity due to excess calories with serious comorbidity and body mass index (BMI) of 45.0 to 49.9 in adult (Formerly [facility])    4. Chronic venous hypertension (idiopathic) with ulcer and inflammation of left lower extremity (HCC)    5. Leg swelling    The left lateral leg wound is slightly bigger than previous visit a wound culture was done.  Will switch to silver alginate to the lateral leg wound.  Cover with Kerramax and continue Coflex 2 layer compression wrap.  The right leg wounds are almost completely healed now and will wear a Velcro compression garment which she has brought into the right leg.  Patient to follow-up in 1 week.  Risks, benefits, and alternatives of current treatment plan discussed in detail.  Questions and concerns addressed. Red flags to RTC or ED reviewed.  Patient (or parent) agrees to plan.      Return in about 1 week (around 11/14/2024).    Also refer to patient instructions.     I spent a total of 40 minutes with this patient's care.  This time included preparing to see the patient (eg, review notes and recent diagnostics), seeing the patient, performing a medically appropriate examination  and/or evaluation, counseling and educating the patient, and documenting in the record.          Nicholas Gould M.D.   [facility]   2024    Patient Instructions       PATIENT INSTRUCTIONS      FOR Luis M Estrada , RICK 3/25/1965    DATE OF SERVICE: 2024        Silver Alginate  Kerramax  Coflex 2 layer compression wrap to left lower extremity    Velcro compression garment to right lower extremity    Follow up with Dr. Gould in 1 week      Managing your edema:    Avoid prolonged standing in one place. It is better to have your calf muscles moving to pump fluid out of the legs.  Elevate leg(s) above the level of the heart when sitting or as much as possible.  Take you diuretics as directed by your physician. Do not skip doses or change doses unless instructed to do so by your physician.  Decrease salt intake, follow recommended 2 grams daily.  Do not get leg(s) with compression wrap wet. If wraps are too tight as indicated by pain, numbness/tingling or discoloration of toes remove wrap completely and call the wound center @ [phone].       The treatment plan has been discussed at length between you and your provider. Follow all instructions carefully, it is very important. If you do not follow all instructions you are at risk of your wound not healing, infection, possible loss of limb and even loss of life.    COMPRESSION WRAP PATIENT CARE INSTRUCTIONS  DO NOT get compression wrap wet. When showering, use a shower boot.   Please contact wound clinic and if not able to reach, then go to emergency room if ANY of the following occur:   Tingling or numbness in the foot or toes on leg with compression wrap.  Severe pain that cannot be relieved with elevation and any medication instructed per your doctor.  A fever of 100.4°F (38°C) or higher.  Swelling, coldness, or blue-gray discoloration of the toes.  If the compression wrap feels too tight or too loose.  If the compression wrap  is damaged or has rough edges that hurt.  If the compression wrap gets wet, you must cut wrap off.   Drainage from compression wrap that smells different than usual.  MISCELLANEOUS INSTRUCTIONS  Supplement with a daily multivitamin   Low salt diet  Intense blood sugar control - Goal Blood sugar below 180 at all times recommended.  Increase protein intake / consider protein supplements - see below  Elevate extremities at all times when sitting / laying down.  No tobacco use     DIETARY MODIFICATIONS TO HELP WITH WOUND HEALING:          Please follow any restrictions to diet given by your other doctors     Protein: meats, beans, eggs, milk and yogurt particularly Greek yogurt), tofu, soy nuts, soy protein products     Vitamin C: citrus fruits and juices, strawberries, tomatoes, tomato juice, peppers, baked potatoes, spinach, broccoli, cauliflower, Divide sprouts, cabbage     Vitamin A: dark greens, leafy vegetables, orange or yellow vegetables, cantaloupe, fortified dairy products, liver, fortified cereals     Zinc: fortified cereals, red meats, seafood     Consider Bob by [employer] (These are essential branch chain amino acids that help with tissue building and wound healing) and take 2 packets/day. You can order online at [employer] or [employer]     ADDITIONAL REMINDERS:     The treatment plan has been discussed at length with you and your provider. Follow all instructions carefully, it is very important. If you do not follow all instructions, you are at risk of your wound not healing, infection, possible loss of limb and even loss of life.  Please call the clinic at [phone] during regular business hours ( 7:30 AM - 5:30 PM) if you notice increased redness, warmth, pain or pus like drainage or start running a fever greater than 100.3.    For after hour emergencies, please call your primary physician or go to the nearest emergency room.  If your blood pressure is elevated, follow up with your primary care doctor  and/or cardiologist as soon as possible.     FOR LEG SWELLING,  LOWER EXTREMITY WOUNDS AND IF USING COMPRESSION:   Managing your edema:    Avoid prolonged standing in one place. It is better to have your calf muscles moving to pump fluid out of the legs.  Elevate leg(s) above the level of the heart when sitting or as much as possible.  Take you diuretics as directed by your physician. Do not skip doses or change doses unless instructed to do so by your physician.  Decrease salt intake, follow recommended 2 grams daily.  Do not get leg(s) with compression wrap wet. If wraps are too tight as indicated by pain, numbness/tingling or discoloration of toes remove wrap completely and call the wound center @ [phone].       The treatment plan has been discussed at length between you and your provider. Follow all instructions carefully, it is very important. If you do not follow all instructions you are at risk of your wound not healing, infection, possible loss of limb and even loss of life.    COMPRESSION WRAP PATIENT CARE INSTRUCTIONS  DO NOT get compression wrap wet. When showering, use a shower boot.   Please contact wound clinic and if not able to reach, then go to emergency room if ANY of the following occur:   Tingling or numbness in the foot or toes on leg with compression wrap.  Severe pain that cannot be relieved with elevation and any medication instructed per your doctor.  A fever of 100.4°F (38°C) or higher.  Swelling, coldness, or blue-gray discoloration of the toes.  If the compression wrap feels too tight or too loose.  If the compression wrap is damaged or has rough edges that hurt.  If the compression wrap gets wet, you must cut wrap off.   Drainage from compression wrap that smells different than usual.                        [1] No Known Allergies

## 2024-11-07 NOTE — PROGRESS NOTES
.Weekly Wound Education Note    Teaching Provided To: Patient  Training Topics: Discharge instructions;Cleasing and general instructions;Compression;Edema control;Dressing  Training Method: Explain/Verbal;Demonstration  Training Response: Reinforcement needed        Notes: Pt here for follow up wound care visit to right anteiror lower leg, left lateral lower leg and left posterior lower leg. Edema measurement decreased, right lower leg and left posterior lower leg wound measurements decreased, left lateral wound measurement increased. Culture completed at visit today. Per provider right lower leg is healed and left posterior lower leg is healed. Provider applied silver nitrate to left lateral leg wound, treatment changed to silver alginate, ABD pad, kerramax, kerlix, tape. Applied coflex 2 layer wrap and spandagrip (E) to LLE. Pt to follow up with provider in 1 week.

## 2024-11-11 RX ORDER — LEVOFLOXACIN 500 MG/1
500 TABLET, FILM COATED ORAL DAILY
Qty: 10 TABLET | Refills: 0 | Status: SHIPPED | OUTPATIENT
Start: 2024-11-11

## 2024-11-11 NOTE — PROGRESS NOTES
Called and left VM on identifiable VM with results and new order for ABT. Provided clinic number for pt to verify that he got the message and that he will  medication.

## 2024-11-14 ENCOUNTER — OFFICE VISIT (OUTPATIENT)
Dept: WOUND CARE | Facility: HOSPITAL | Age: 59
End: 2024-11-14
Attending: FAMILY MEDICINE
Payer: COMMERCIAL

## 2024-11-14 VITALS
DIASTOLIC BLOOD PRESSURE: 90 MMHG | HEART RATE: 68 BPM | SYSTOLIC BLOOD PRESSURE: 148 MMHG | RESPIRATION RATE: 16 BRPM | TEMPERATURE: 98 F

## 2024-11-14 DIAGNOSIS — I87.332 CHRONIC VENOUS HYPERTENSION (IDIOPATHIC) WITH ULCER AND INFLAMMATION OF LEFT LOWER EXTREMITY (HCC): Primary | ICD-10-CM

## 2024-11-14 DIAGNOSIS — E66.813 CLASS 3 SEVERE OBESITY DUE TO EXCESS CALORIES WITH SERIOUS COMORBIDITY AND BODY MASS INDEX (BMI) OF 45.0 TO 49.9 IN ADULT (HCC): ICD-10-CM

## 2024-11-14 DIAGNOSIS — E66.01 CLASS 3 SEVERE OBESITY DUE TO EXCESS CALORIES WITH SERIOUS COMORBIDITY AND BODY MASS INDEX (BMI) OF 45.0 TO 49.9 IN ADULT (HCC): ICD-10-CM

## 2024-11-14 PROCEDURE — 99215 OFFICE O/P EST HI 40 MIN: CPT | Performed by: FAMILY MEDICINE

## 2024-11-14 PROCEDURE — 17250 CHEM CAUT OF GRANLTJ TISSUE: CPT | Performed by: FAMILY MEDICINE

## 2024-11-14 NOTE — PATIENT INSTRUCTIONS
PATIENT INSTRUCTIONS      FOR Luis M Lomasd ,  3/25/1965    DATE OF SERVICE: 2024      Shruti collagen and Hydrofera Blue Transfer to left medial leg wound    Silver Alginate to lateral left leg wound  Kerramax  Coflex 2 layer compression wrap to left lower extremity    Velcro compression garment to right lower extremity    Follow up with Dr. Gould in 1 week      Managing your edema:    Avoid prolonged standing in one place. It is better to have your calf muscles moving to pump fluid out of the legs.  Elevate leg(s) above the level of the heart when sitting or as much as possible.  Take you diuretics as directed by your physician. Do not skip doses or change doses unless instructed to do so by your physician.  Decrease salt intake, follow recommended 2 grams daily.  Do not get leg(s) with compression wrap wet. If wraps are too tight as indicated by pain, numbness/tingling or discoloration of toes remove wrap completely and call the wound center @ [phone].       The treatment plan has been discussed at length between you and your provider. Follow all instructions carefully, it is very important. If you do not follow all instructions you are at risk of your wound not healing, infection, possible loss of limb and even loss of life.    COMPRESSION WRAP PATIENT CARE INSTRUCTIONS  DO NOT get compression wrap wet. When showering, use a shower boot.   Please contact wound clinic and if not able to reach, then go to emergency room if ANY of the following occur:   Tingling or numbness in the foot or toes on leg with compression wrap.  Severe pain that cannot be relieved with elevation and any medication instructed per your doctor.  A fever of 100.4°F (38°C) or higher.  Swelling, coldness, or blue-gray discoloration of the toes.  If the compression wrap feels too tight or too loose.  If the compression wrap is damaged or has rough edges that hurt.  If the compression wrap gets wet, you must cut wrap off.    Drainage from compression wrap that smells different than usual.

## 2024-11-14 NOTE — PROGRESS NOTES
.Weekly Wound Education Note    Teaching Provided To: Patient  Training Topics: Discharge instructions;Cleasing and general instructions;Dressing;Edema control;Compression  Training Method: Demonstration;Explain/Verbal  Training Response: Reinforcement needed        Notes: Patient here for follow up wound care visit to left lateral leg. New wound to left medial leg. The wrap has rolled down - patient states that this must have happened last night while he was sleeping. Edema measurement increased. Left lateral lower leg wound measurement decreased. Provider silver nitrated both wounds at visit today, provider recommending left medial leg- mirna, hydrofera transfer, kerramax, kerlix, tape, left lateral leg- silver alginate, kerramax, kerlix, tape. Applied coflex 2 layer wrap to LLE using rosidal. Pt continue to wear compression garment to RLE. Reeducated patient to call clinic if wrap rolls down or gets wet. Pt states understanding, Pt to follow up with provider in 1 week.

## 2024-11-14 NOTE — PROGRESS NOTES
Chief Complaint   Patient presents with    Wound Recheck     Patient arrives for wound care follow up with his left leg in a coflex wrap. The wrap has rolled down - patient states that this must have happened last night while he was sleeping.        HPI:     Patient here for follow-up of left lower extremity wound.  He has developed a new wound on the medial aspect of the leg because of the wrap had slid down a little bit.      Lab Results   Component Value Date    BUN 14 07/26/2024    CREATSERUM 0.83 07/26/2024    ALB 3.9 07/25/2024    TP 8.0 07/25/2024    A1C 5.2 12/12/2016         Current Outpatient Medications:     levoFLOXacin 500 MG Oral Tab, Take 1 tablet (500 mg total) by mouth daily., Disp: 10 tablet, Rfl: 0    aspirin 81 MG Oral Tab, Take 1 tablet (81 mg total) by mouth daily., Disp: , Rfl:     Allergies[1]         REVIEW OF SYSTEMS:     Review of Systems (ROS)  This information was obtained from the patient, chart    A comprehensive 10 point review of systems was completed.  Pertinent positives and negatives noted in the the HPI.     Past medical, surgical, family and social history updated where appropriate.    PHYSICAL EXAM:   /90   Pulse 68   Temp 98.4 °F (36.9 °C)   Resp 16    Estimated body mass index is 39.63 kg/m² as calculated from the following:    Height as of 7/29/24: 67\".    Weight as of 7/29/24: 253 lb (114.8 kg).   Vital signs reviewed.Appears stated age, well groomed.        Calf  Point of Measurement - Left Calf: 37     Left Calf from:: Heel  Calf Left cm:: 45.4          Ankle  Point of Measurement - Left Ankle: 10     Left Ankle from:: Heel  Left Ankle cm:: 29                Foot                               Wound 01/11/24 #1 Leg Left;Lateral (Active)   Date First Assessed/Time First Assessed: 01/11/24 1406    Wound Number (Wound Clinic Only): #1  Primary Wound Type: Traumatic  Location: Leg  Wound Location Orientation: Left;Lateral      Assessments 1/11/2024  2:10 PM 11/14/2024   8:40 AM   Wound Image       Drainage Amount Large Moderate   Drainage Description Yellow;Serous Serosanguineous   Treatments Compression (coflex 2 layer wrap) --   Wound Length (cm) 10.6 cm 8 cm   Wound Width (cm) 9.5 cm 4.5 cm (slightly angled)   Wound Surface Area (cm^2) 100.7 cm^2 36 cm^2   Wound Depth (cm) 0.2 cm 0.1 cm   Wound Volume (cm^3) 20.14 cm^3 3.6 cm^3   Wound Healing % -- 82   Margins Well-defined edges Well-defined edges   Non-staged Wound Description Full thickness Full thickness   Leslie-wound Assessment Edema;Hemosiderin staining Edema;Hemosiderin staining   Wound Granulation Tissue Firm;Pink Spongy;Red   Wound Bed Granulation (%) 40 % 55 %   Wound Bed Epithelium (%) -- 30 %   Wound Bed Slough (%) 60 % 15 %   Wound Odor None None   Shape -- Bridged   Tunneling? No No   Undermining? No No   Sinus Tracts? No No       Inactive Orders   Date Order Priority Status Authorizing Provider   07/16/24 2327 Consult to Wound Ostomy Routine Completed Rica Pearson MD     - Reason for Consult:    Wound Care     - Wound Care Reason for Consult:    worsening   06/24/24 1127 Wound care Routine Discontinued Rica Pearson MD   06/06/24 0913 Debridement Traumatic Routine Completed Nicholas Gould MD   05/16/24 0901 Debridement Traumatic Routine Completed Nicholas Golud MD   05/02/24 1520 Debridement Traumatic Routine Completed Nicholas Gould MD   04/11/24 0959 Debridement Traumatic Routine Completed Nicholas Gould MD   04/04/24 0915 Debridement Traumatic Left;Lateral Leg Routine Completed Nicholas Gould MD   02/15/24 1154 Debridement Traumatic Left;Lateral Leg Routine Completed Nicholas Gould MD   01/18/24 1511 Debridement Traumatic Left;Lateral Leg Routine Completed Nicholas Gould MD   01/11/24 1710 Debridement Traumatic Left;Lateral Leg Routine Completed Nicholas Gould MD       Compression Wrap 08/22/24 Leg Anterior;Left (Active)   Placement Date: 08/22/24   Location: Leg  Wound Location Orientation:  Anterior;Left      Assessments 8/22/2024  9:41 AM 11/7/2024  8:57 AM   Response to Treatment Well tolerated Well tolerated   Compression Layers Multilayer Multilayer   Compression Product Type Coflex Coflex   Dressing Applied Yes Yes (silver nitrate, silver alginate, kerramax, ABD pad, kerlix, tape)   Compression Wrap Location Toes to Knee Toes to Knee   Compression Wrap Status Clean;Dry;Intact Clean;Dry;Intact       No associated orders.       Wound 11/14/24 #4 Leg Left;Medial (Active)   Date First Assessed/Time First Assessed: 11/14/24 0839    Wound Number (Wound Clinic Only): #4  Primary Wound Type: Pressure Injury  Location: Leg  Wound Location Orientation: Left;Medial      Assessments 11/14/2024  8:41 AM   Wound Image     Drainage Amount Small   Drainage Description Sanguineous   Wound Length (cm) 1.4 cm   Wound Width (cm) 2 cm   Wound Surface Area (cm^2) 2.8 cm^2   Wound Depth (cm) 0.2 cm   Wound Volume (cm^3) 0.56 cm^3   Margins Well-defined edges   Non-staged Wound Description Full thickness   Leslie-wound Assessment Edema;Hemosiderin staining   Wound Granulation Tissue Pink;Firm;Red   Wound Bed Granulation (%) 60 %   Wound Bed Epithelium (%) 30 %   Wound Bed Slough (%) 10 %   Wound Odor None   Shape Clustered, Friable   Tunneling? No   Undermining? No   Sinus Tracts? No   Pressure Injury Stage Stage 3       No associated orders.              ASSESSMENT AND PLAN:      1. Chronic venous hypertension (idiopathic) with ulcer and inflammation of left lower extremity (HCC)    2. Class 3 severe obesity due to excess calories with serious comorbidity and body mass index (BMI) of 45.0 to 49.9 in adult (HCC)    The left lateral leg wound is significantly better than previous visit will continue silver alginate and Silver nitrate was applied for hypergranulation to that wound.  To the left medial leg wound in the area of the edge of the wrap there are 2 adjacent wounds well granulated with several millimeter depth.   Will apply Shruti collagen and Hydrofera Blue transfer to this wound area.  Will continue with Coflex 2 layer compression wrap to left lower extremity.  Patient does have Velcro compression garment on the right lower extremity in place and should continue with that.  Counseled regarding compression wrap and Velcro compression garment and symptoms to watch out for and signs to watch out for and when to seek medical attention.    Patient will follow-up with me in 1 week.  Risks, benefits, and alternatives of current treatment plan discussed in detail.  Questions and concerns addressed. Red flags to RTC or ED reviewed.  Patient (or parent) agrees to plan.      No follow-ups on file.    Also refer to patient instructions.     I spent a total of 40 minutes with this patient's care.  This time included preparing to see the patient (eg, review notes and recent diagnostics), seeing the patient, performing a medically appropriate examination and/or evaluation, counseling and educating the patient, and documenting in the record.          Nicholas Gould M.D.   [facility]   11/14/2024    There are no Patient Instructions on file for this visit.  MISCELLANEOUS INSTRUCTIONS  Supplement with a daily multivitamin   Low salt diet  Intense blood sugar control - Goal Blood sugar below 180 at all times recommended.  Increase protein intake / consider protein supplements - see below  Elevate extremities at all times when sitting / laying down.  No tobacco use     DIETARY MODIFICATIONS TO HELP WITH WOUND HEALING:          Please follow any restrictions to diet given by your other doctors     Protein: meats, beans, eggs, milk and yogurt particularly Greek yogurt), tofu, soy nuts, soy protein products     Vitamin C: citrus fruits and juices, strawberries, tomatoes, tomato juice, peppers, baked potatoes, spinach, broccoli, cauliflower, [place] sprouts, cabbage     Vitamin A: dark greens, leafy vegetables, orange  or yellow vegetables, cantaloupe, fortified dairy products, liver, fortified cereals     Zinc: fortified cereals, red meats, seafood     Consider Bob [employer] (These are essential branch chain amino acids that help with tissue building and wound healing) and take 2 packets/day. You can order online at [employer] or [employer]     ADDITIONAL REMINDERS:     The treatment plan has been discussed at length with you and your provider. Follow all instructions carefully, it is very important. If you do not follow all instructions, you are at risk of your wound not healing, infection, possible loss of limb and even loss of life.  Please call the clinic at [phone] during regular business hours ( 7:30 AM - 5:30 PM) if you notice increased redness, warmth, pain or pus like drainage or start running a fever greater than 100.3.    For after hour emergencies, please call your primary physician or go to the nearest emergency room.  If your blood pressure is elevated, follow up with your primary care doctor and/or cardiologist as soon as possible.     FOR LEG SWELLING,  LOWER EXTREMITY WOUNDS AND IF USING COMPRESSION:   Managing your edema:    Avoid prolonged standing in one place. It is better to have your calf muscles moving to pump fluid out of the legs.  Elevate leg(s) above the level of the heart when sitting or as much as possible.  Take you diuretics as directed by your physician. Do not skip doses or change doses unless instructed to do so by your physician.  Decrease salt intake, follow recommended 2 grams daily.  Do not get leg(s) with compression wrap wet. If wraps are too tight as indicated by pain, numbness/tingling or discoloration of toes remove wrap completely and call the wound center @ [phone].       The treatment plan has been discussed at length between you and your provider. Follow all instructions carefully, it is very important. If you do not follow all instructions you are at risk of your wound not  healing, infection, possible loss of limb and even loss of life.    COMPRESSION WRAP PATIENT CARE INSTRUCTIONS  DO NOT get compression wrap wet. When showering, use a shower boot.   Please contact wound clinic and if not able to reach, then go to emergency room if ANY of the following occur:   Tingling or numbness in the foot or toes on leg with compression wrap.  Severe pain that cannot be relieved with elevation and any medication instructed per your doctor.  A fever of 100.4°F (38°C) or higher.  Swelling, coldness, or blue-gray discoloration of the toes.  If the compression wrap feels too tight or too loose.  If the compression wrap is damaged or has rough edges that hurt.  If the compression wrap gets wet, you must cut wrap off.   Drainage from compression wrap that smells different than usual.                        [1] No Known Allergies

## 2024-11-21 ENCOUNTER — OFFICE VISIT (OUTPATIENT)
Dept: WOUND CARE | Facility: HOSPITAL | Age: 59
End: 2024-11-21
Attending: FAMILY MEDICINE
Payer: COMMERCIAL

## 2024-11-21 VITALS
HEART RATE: 72 BPM | RESPIRATION RATE: 16 BRPM | SYSTOLIC BLOOD PRESSURE: 137 MMHG | TEMPERATURE: 98 F | DIASTOLIC BLOOD PRESSURE: 84 MMHG

## 2024-11-21 DIAGNOSIS — I87.333 CHRONIC VENOUS HYPERTENSION (IDIOPATHIC) WITH ULCER AND INFLAMMATION OF BILATERAL LOWER EXTREMITY (HCC): ICD-10-CM

## 2024-11-21 DIAGNOSIS — T14.8XXA WOUND INFECTION: ICD-10-CM

## 2024-11-21 DIAGNOSIS — E66.01 CLASS 3 SEVERE OBESITY DUE TO EXCESS CALORIES WITH SERIOUS COMORBIDITY AND BODY MASS INDEX (BMI) OF 45.0 TO 49.9 IN ADULT (HCC): ICD-10-CM

## 2024-11-21 DIAGNOSIS — I87.332 CHRONIC VENOUS HYPERTENSION (IDIOPATHIC) WITH ULCER AND INFLAMMATION OF LEFT LOWER EXTREMITY (HCC): Primary | ICD-10-CM

## 2024-11-21 DIAGNOSIS — L08.9 WOUND INFECTION: ICD-10-CM

## 2024-11-21 DIAGNOSIS — E66.813 CLASS 3 SEVERE OBESITY DUE TO EXCESS CALORIES WITH SERIOUS COMORBIDITY AND BODY MASS INDEX (BMI) OF 45.0 TO 49.9 IN ADULT (HCC): ICD-10-CM

## 2024-11-21 PROCEDURE — 99215 OFFICE O/P EST HI 40 MIN: CPT | Performed by: FAMILY MEDICINE

## 2024-11-21 PROCEDURE — 97597 DBRDMT OPN WND 1ST 20 CM/<: CPT | Performed by: FAMILY MEDICINE

## 2024-11-21 NOTE — PROGRESS NOTES
Patient ID: Luis M Estrada is a 59 year old male.    Debridement Traumatic Left;Lateral Leg   Wound 01/11/24 #1 Leg Left;Lateral    Performed by: Nicholas Gould MD  Authorized by: Nicholas Gould MD      Consent   Consent obtained? verbal  Consent given by: patient  Risks discussed? procedural risks discussed    Debridement Details  Performed by: physician  Debridement type: conservative sharp  Pain control: lidocaine 4%  Pain control administration type: topical    Pre-debridement measurements  Length (cm): 7.9  Width (cm): 3  Depth (cm): 0.1  Surface Area (cm^2): 23.7    Post-debridement measurements  Length (cm): 7.9  Width (cm): 3  Depth (cm): 0.1  Percent debrided: 70%  Surface Area (cm^2): 23.7  Area Debrided (cm^2): 16.59  Volume (cm^3): 2.37    Devitalized tissue debrided: biofilm and slough  Instrument(s) utilized: curette  Bleeding: none  Hemostasis obtained with: not applicable  Response to treatment: procedure was tolerated well

## 2024-11-21 NOTE — PROGRESS NOTES
Weekly Wound Education Note    Teaching Provided To: Patient  Training Topics: Dressing;Cleasing and general instructions;Discharge instructions  Training Method: Demonstration;Explain/Verbal  Training Response: Reinforcement needed        Notes: Patient here for follow up wound care visit to left lateral leg and left medial lower leg. Edema measurement decreased, left lateral leg wound measurement decreased and left medial lower leg wound measurement decreased. Provider stimulated left lateral wound at visit today. Provider recommending patient to continue using mirna and hydrofera ready to medial left lower leg, left lateral leg dressing changed to enluxtra with back off, kerramax, conforming gauze, tape. Applied coflex 2 layer wrap to LLE. Pt to continue wearing his own compression to RLE. Pt to follow up Wednesday next week for nurse visit.

## 2024-11-21 NOTE — PATIENT INSTRUCTIONS
PATIENT INSTRUCTIONS      FOR Luis M Lomasd ,  3/25/1965    DATE OF SERVICE: 2024      Shruti collagen and Hydrofera Blue Transfer to left medial leg wound    Enluxtra to lateral left leg wound  Kerramax  Coflex 2 layer compression wrap to left lower extremity    Velcro compression garment to right lower extremity    Nurse visit next Wednesday    Follow up with Dr. Gould 2 weeks      Managing your edema:    Avoid prolonged standing in one place. It is better to have your calf muscles moving to pump fluid out of the legs.  Elevate leg(s) above the level of the heart when sitting or as much as possible.  Take you diuretics as directed by your physician. Do not skip doses or change doses unless instructed to do so by your physician.  Decrease salt intake, follow recommended 2 grams daily.  Do not get leg(s) with compression wrap wet. If wraps are too tight as indicated by pain, numbness/tingling or discoloration of toes remove wrap completely and call the wound center @ [phone].       The treatment plan has been discussed at length between you and your provider. Follow all instructions carefully, it is very important. If you do not follow all instructions you are at risk of your wound not healing, infection, possible loss of limb and even loss of life.    COMPRESSION WRAP PATIENT CARE INSTRUCTIONS  DO NOT get compression wrap wet. When showering, use a shower boot.   Please contact wound clinic and if not able to reach, then go to emergency room if ANY of the following occur:   Tingling or numbness in the foot or toes on leg with compression wrap.  Severe pain that cannot be relieved with elevation and any medication instructed per your doctor.  A fever of 100.4°F (38°C) or higher.  Swelling, coldness, or blue-gray discoloration of the toes.  If the compression wrap feels too tight or too loose.  If the compression wrap is damaged or has rough edges that hurt.  If the compression wrap gets wet,  you must cut wrap off.   Drainage from compression wrap that smells different than usual.

## 2024-11-21 NOTE — PROGRESS NOTES
Chief Complaint   Patient presents with    Wound Care     Patients is here for a follow up. Patients stated no issue with the wrap and no pain on the wound        HPI:     Patient here for follow-up of left lateral leg wound and left medial leg wound.  He is doing well and tolerating compression wrap.  He has no new complaints.  He did finish course of Levaquin 500 mg without side effect.    Lab Results   Component Value Date    BUN 14 07/26/2024    CREATSERUM 0.83 07/26/2024    ALB 3.9 07/25/2024    TP 8.0 07/25/2024    A1C 5.2 12/12/2016         Current Outpatient Medications:     levoFLOXacin 500 MG Oral Tab, Take 1 tablet (500 mg total) by mouth daily., Disp: 10 tablet, Rfl: 0    aspirin 81 MG Oral Tab, Take 1 tablet (81 mg total) by mouth daily., Disp: , Rfl:     Allergies[1]         REVIEW OF SYSTEMS:     Review of Systems (ROS)  This information was obtained from the patient, chart    A comprehensive 10 point review of systems was completed.  Pertinent positives and negatives noted in the the HPI.     Past medical, surgical, family and social history updated where appropriate.    PHYSICAL EXAM:   /84   Pulse 72   Temp 98.3 °F (36.8 °C)   Resp 16    Estimated body mass index is 39.63 kg/m² as calculated from the following:    Height as of 7/29/24: 67\".    Weight as of 7/29/24: 253 lb (114.8 kg).   Vital signs reviewed.Appears stated age, well groomed.        Calf  Point of Measurement - Left Calf: 37     Left Calf from:: Heel  Calf Left cm:: 42.6          Ankle  Point of Measurement - Left Ankle: 10     Left Ankle from:: Heel  Left Ankle cm:: 29.9                Foot                               Wound 01/11/24 #1 Leg Left;Lateral (Active)   Date First Assessed/Time First Assessed: 01/11/24 1406    Wound Number (Wound Clinic Only): #1  Primary Wound Type: Traumatic  Location: Leg  Wound Location Orientation: Left;Lateral      Assessments 1/11/2024  2:10 PM 11/21/2024  8:28 AM   Wound Image        Drainage Amount Large Small   Drainage Description Yellow;Serous Tan   Treatments Compression (coflex 2 layer wrap) --   Wound Length (cm) 10.6 cm 7.9 cm   Wound Width (cm) 9.5 cm 3 cm   Wound Surface Area (cm^2) 100.7 cm^2 23.7 cm^2   Wound Depth (cm) 0.2 cm 0.1 cm   Wound Volume (cm^3) 20.14 cm^3 2.37 cm^3   Wound Healing % -- 88   Margins Well-defined edges Well-defined edges   Non-staged Wound Description Full thickness Full thickness   Leslie-wound Assessment Edema;Hemosiderin staining Edema;Hemosiderin staining;Dry   Wound Granulation Tissue Firm;Pink Pink;Spongy;Red   Wound Bed Granulation (%) 40 % 40 %   Wound Bed Epithelium (%) -- 30 %   Wound Bed Slough (%) 60 % 30 %   Wound Odor None None   Shape -- Bridged   Tunneling? No --   Undermining? No --   Sinus Tracts? No --       Inactive Orders   Date Order Priority Status Authorizing Provider   07/16/24 2327 Consult to Wound Ostomy Routine Completed Rica Pearson MD     - Reason for Consult:    Wound Care     - Wound Care Reason for Consult:    worsening   06/24/24 1127 Wound care Routine Discontinued Rica Pearson MD   06/06/24 0913 Debridement Traumatic Routine Completed Nicholas Gould MD   05/16/24 0901 Debridement Traumatic Routine Completed Nicholas Gould MD   05/02/24 1520 Debridement Traumatic Routine Completed Nicholas Gould MD   04/11/24 0959 Debridement Traumatic Routine Completed Nicholas Gould MD   04/04/24 0915 Debridement Traumatic Left;Lateral Leg Routine Completed Nicholas Gould MD   02/15/24 1154 Debridement Traumatic Left;Lateral Leg Routine Completed Nicholas Gould MD   01/18/24 1511 Debridement Traumatic Left;Lateral Leg Routine Completed Nicholas Gould MD   01/11/24 1710 Debridement Traumatic Left;Lateral Leg Routine Completed Nicholas Gould MD       Compression Wrap 08/22/24 Leg Anterior;Left (Active)   Placement Date: 08/22/24   Location: Leg  Wound Location Orientation: Anterior;Left      Assessments 8/22/2024  9:41  AM 11/14/2024  8:40 AM   Response to Treatment Well tolerated Well tolerated   Compression Layers Multilayer Multilayer   Compression Product Type Coflex Coflex (coflex 2 layer wrap, rosidal)   Dressing Applied Yes Yes   Compression Wrap Location Toes to Knee Toes to Knee   Compression Wrap Status Clean;Dry;Intact Clean;Dry;Intact       No associated orders.       Wound 11/14/24 #4 Leg Left;Medial (Active)   Date First Assessed/Time First Assessed: 11/14/24 0839    Wound Number (Wound Clinic Only): #4  Primary Wound Type: Pressure Injury  Location: Leg  Wound Location Orientation: Left;Medial      Assessments 11/14/2024  8:41 AM 11/21/2024  8:30 AM   Wound Image       Drainage Amount Small Scant   Drainage Description Sanguineous Serosanguineous   Treatments Compression (coflex 2 layer wrap, rosidal) --   Wound Length (cm) 1.4 cm 0.9 cm   Wound Width (cm) 2 cm 1.7 cm   Wound Surface Area (cm^2) 2.8 cm^2 1.53 cm^2   Wound Depth (cm) 0.2 cm 0.1 cm   Wound Volume (cm^3) 0.56 cm^3 0.153 cm^3   Wound Healing % -- 73   Margins Well-defined edges Well-defined edges   Non-staged Wound Description Full thickness Full thickness   Leslie-wound Assessment Edema;Hemosiderin staining Edema;Hemosiderin staining   Wound Granulation Tissue Pink;Firm;Red Spongy;Pink   Wound Bed Granulation (%) 60 % 70 %   Wound Bed Epithelium (%) 30 % 30 %   Wound Bed Slough (%) 10 % --   Wound Odor None None   Shape Clustered, Friable Clustered   Tunneling? No --   Undermining? No --   Sinus Tracts? No --   Pressure Injury Stage Stage 3 Stage 3       No associated orders.              ASSESSMENT AND PLAN:      1. Chronic venous hypertension (idiopathic) with ulcer and inflammation of left lower extremity ([facility])    2. Class 3 severe obesity due to excess calories with serious comorbidity and body mass index (BMI) of 45.0 to 49.9 in adult ([facility])    3. Chronic venous hypertension (idiopathic) with ulcer and inflammation of bilateral lower extremity  ([facility])    Clinically the medial leg wound is doing better and filling in nicely.  Will continue Shruti collagen to the wound base and cover with Hydrofera Blue dressing.  The lateral leg wound is improving in size is mildly and has more slough in the wound base which is fibrinous and a selective debridement was done on this wound.  See debridement note.  I will switch to Enluxtra, Kerramax and will continue Coflex 2 layer compression wrap to left lower extremity.    Consult length regarding obesity and proper diet and weight reduction which would be helpful.      Risks, benefits, and alternatives of current treatment plan discussed in detail.  Questions and concerns addressed. Red flags to RTC or ED reviewed.  Patient (or parent) agrees to plan.      No follow-ups on file.    Also refer to patient instructions.     I spent a total of 40 minutes with this patient's care.  This time included preparing to see the patient (eg, review notes and recent diagnostics), seeing the patient, performing a medically appropriate examination and/or evaluation, counseling and educating the patient, and documenting in the record.          Nicholas Gould M.D.   [facility]   11/21/2024    There are no Patient Instructions on file for this visit.  MISCELLANEOUS INSTRUCTIONS  Supplement with a daily multivitamin   Low salt diet  Intense blood sugar control - Goal Blood sugar below 180 at all times recommended.  Increase protein intake / consider protein supplements - see below  Elevate extremities at all times when sitting / laying down.  No tobacco use     DIETARY MODIFICATIONS TO HELP WITH WOUND HEALING:          Please follow any restrictions to diet given by your other doctors     Protein: meats, beans, eggs, milk and yogurt particularly Greek yogurt), tofu, soy nuts, soy protein products     Vitamin C: citrus fruits and juices, strawberries, tomatoes, tomato juice, peppers, baked potatoes, spinach, broccoli,  cauliflower, [place] sprouts, cabbage     Vitamin A: dark greens, leafy vegetables, orange or yellow vegetables, cantaloupe, fortified dairy products, liver, fortified cereals     Zinc: fortified cereals, red meats, seafood     Consider Bob [employer]. (These are essential branch chain amino acids that help with tissue building and wound healing) and take 2 packets/day. You can order online at [employer] or [facility]     ADDITIONAL REMINDERS:     The treatment plan has been discussed at length with you and your provider. Follow all instructions carefully, it is very important. If you do not follow all instructions, you are at risk of your wound not healing, infection, possible loss of limb and even loss of life.  Please call the clinic at [phone] during regular business hours ( 7:30 AM - 5:30 PM) if you notice increased redness, warmth, pain or pus like drainage or start running a fever greater than 100.3.    For after hour emergencies, please call your primary physician or go to the nearest emergency room.  If your blood pressure is elevated, follow up with your primary care doctor and/or cardiologist as soon as possible.     FOR LEG SWELLING,  LOWER EXTREMITY WOUNDS AND IF USING COMPRESSION:   Managing your edema:    Avoid prolonged standing in one place. It is better to have your calf muscles moving to pump fluid out of the legs.  Elevate leg(s) above the level of the heart when sitting or as much as possible.  Take you diuretics as directed by your physician. Do not skip doses or change doses unless instructed to do so by your physician.  Decrease salt intake, follow recommended 2 grams daily.  Do not get leg(s) with compression wrap wet. If wraps are too tight as indicated by pain, numbness/tingling or discoloration of toes remove wrap completely and call the wound center @ [phone].       The treatment plan has been discussed at length between you and your provider. Follow all instructions carefully,  it is very important. If you do not follow all instructions you are at risk of your wound not healing, infection, possible loss of limb and even loss of life.    COMPRESSION WRAP PATIENT CARE INSTRUCTIONS  DO NOT get compression wrap wet. When showering, use a shower boot.   Please contact wound clinic and if not able to reach, then go to emergency room if ANY of the following occur:   Tingling or numbness in the foot or toes on leg with compression wrap.  Severe pain that cannot be relieved with elevation and any medication instructed per your doctor.  A fever of 100.4°F (38°C) or higher.  Swelling, coldness, or blue-gray discoloration of the toes.  If the compression wrap feels too tight or too loose.  If the compression wrap is damaged or has rough edges that hurt.  If the compression wrap gets wet, you must cut wrap off.   Drainage from compression wrap that smells different than usual.                        [1] No Known Allergies

## 2024-11-27 ENCOUNTER — OFFICE VISIT (OUTPATIENT)
Dept: WOUND CARE | Facility: HOSPITAL | Age: 59
End: 2024-11-27
Attending: FAMILY MEDICINE
Payer: COMMERCIAL

## 2024-11-27 VITALS
DIASTOLIC BLOOD PRESSURE: 77 MMHG | TEMPERATURE: 98 F | RESPIRATION RATE: 16 BRPM | HEART RATE: 82 BPM | SYSTOLIC BLOOD PRESSURE: 128 MMHG

## 2024-11-27 DIAGNOSIS — E66.01 CLASS 3 SEVERE OBESITY DUE TO EXCESS CALORIES WITH SERIOUS COMORBIDITY AND BODY MASS INDEX (BMI) OF 45.0 TO 49.9 IN ADULT (HCC): ICD-10-CM

## 2024-11-27 DIAGNOSIS — L08.9 WOUND INFECTION: ICD-10-CM

## 2024-11-27 DIAGNOSIS — S81.802D OPEN WOUND OF LEFT LOWER LEG, SUBSEQUENT ENCOUNTER: ICD-10-CM

## 2024-11-27 DIAGNOSIS — E66.813 CLASS 3 SEVERE OBESITY DUE TO EXCESS CALORIES WITH SERIOUS COMORBIDITY AND BODY MASS INDEX (BMI) OF 45.0 TO 49.9 IN ADULT (HCC): ICD-10-CM

## 2024-11-27 DIAGNOSIS — M79.89 LEG SWELLING: ICD-10-CM

## 2024-11-27 DIAGNOSIS — I87.332 CHRONIC VENOUS HYPERTENSION (IDIOPATHIC) WITH ULCER AND INFLAMMATION OF LEFT LOWER EXTREMITY (HCC): Primary | ICD-10-CM

## 2024-11-27 DIAGNOSIS — T14.8XXA WOUND INFECTION: ICD-10-CM

## 2024-11-27 DIAGNOSIS — I87.333 CHRONIC VENOUS HYPERTENSION (IDIOPATHIC) WITH ULCER AND INFLAMMATION OF BILATERAL LOWER EXTREMITY (HCC): ICD-10-CM

## 2024-11-27 PROCEDURE — 29581 APPL MULTLAYER CMPRN SYS LEG: CPT

## 2024-11-27 NOTE — PROGRESS NOTES
Chief Complaint   Patient presents with    Wound Care     Follow up for left leg wound. No complaint at this time.            Current Outpatient Medications:     levoFLOXacin 500 MG Oral Tab, Take 1 tablet (500 mg total) by mouth daily., Disp: 10 tablet, Rfl: 0    aspirin 81 MG Oral Tab, Take 1 tablet (81 mg total) by mouth daily., Disp: , Rfl:     Allergies[1]       HISTORY:     Past medical, surgical, family and social history updated where appropriate.    PHYSICAL EXAM:   /77   Pulse 82   Temp 97.8 °F (36.6 °C)   Resp 16        Vital signs reviewed.      Calf  Point of Measurement - Left Calf: 30     Left Calf from:: Heel  Calf Left cm:: 39.5          Ankle  Point of Measurement - Left Ankle: 10     Left Ankle from:: Heel  Left Ankle cm:: 28.2                Wound 01/11/24 #1 Leg Left;Lateral (Active)   Date First Assessed/Time First Assessed: 01/11/24 1406    Wound Number (Wound Clinic Only): #1  Primary Wound Type: Traumatic  Location: Leg  Wound Location Orientation: Left;Lateral      Assessments 1/11/2024  2:10 PM 11/27/2024  7:34 AM   Wound Image       Drainage Amount Large Moderate   Drainage Description Yellow;Serous Serosanguineous   Treatments Compression Compression   Wound Length (cm) 10.6 cm 7.2 cm   Wound Width (cm) 9.5 cm 2.7 cm   Wound Surface Area (cm^2) 100.7 cm^2 19.44 cm^2   Wound Depth (cm) 0.2 cm 0.1 cm   Wound Volume (cm^3) 20.14 cm^3 1.944 cm^3   Wound Healing % -- 90   Margins Well-defined edges Well-defined edges   Non-staged Wound Description Full thickness Full thickness   Leslie-wound Assessment Edema;Hemosiderin staining Edema;Hemosiderin staining;Dry   Wound Granulation Tissue Firm;Pink Pink;Spongy   Wound Bed Granulation (%) 40 % 45 %   Wound Bed Epithelium (%) -- 40 %   Wound Bed Slough (%) 60 % 15 %   Wound Odor None None   Shape -- Bridged   Tunneling? No No   Undermining? No No   Sinus Tracts? No No       Inactive Orders   Date Order Priority Status Authorizing Provider    11/21/24 0914 Debridement Traumatic Left;Lateral Leg Routine Completed Nicholas Gould MD   07/16/24 2327 Consult to Wound Ostomy Routine Completed Rica Pearson MD     - Reason for Consult:    Wound Care     - Wound Care Reason for Consult:    worsening   06/24/24 1127 Wound care Routine Discontinued Rica Pearson MD   06/06/24 0913 Debridement Traumatic Routine Completed Nicholas Gould MD   05/16/24 0901 Debridement Traumatic Routine Completed Nicholas Gould MD   05/02/24 1520 Debridement Traumatic Routine Completed Nicholas Gould MD   04/11/24 0959 Debridement Traumatic Routine Completed Nicholas Gould MD   04/04/24 0915 Debridement Traumatic Left;Lateral Leg Routine Completed Nicholas Gould MD   02/15/24 1154 Debridement Traumatic Left;Lateral Leg Routine Completed Nicholas Gould MD   01/18/24 1511 Debridement Traumatic Left;Lateral Leg Routine Completed Nicholas Gould MD   01/11/24 1710 Debridement Traumatic Left;Lateral Leg Routine Completed Nicholas Gould MD       Compression Wrap 08/22/24 Leg Anterior;Left (Active)   Placement Date: 08/22/24   Location: Leg  Wound Location Orientation: Anterior;Left      Assessments 8/22/2024  9:41 AM 11/27/2024  7:46 AM   Response to Treatment Well tolerated Well tolerated   Compression Layers Multilayer Multilayer   Compression Product Type Coflex Coflex   Dressing Applied Yes Yes   Compression Wrap Location Toes to Knee Toes to Knee   Compression Wrap Status Clean;Dry;Intact Clean;Dry;Intact       No associated orders.       Wound 11/14/24 #4 Leg Left;Medial (Active)   Date First Assessed/Time First Assessed: 11/14/24 0839    Wound Number (Wound Clinic Only): #4  Primary Wound Type: Pressure Injury  Location: Leg  Wound Location Orientation: Left;Medial      Assessments 11/14/2024  8:41 AM 11/27/2024  7:35 AM   Wound Image       Drainage Amount Small Small   Drainage Description Sanguineous Serosanguineous   Treatments Compression Compression   Wound  Length (cm) 1.4 cm 0.6 cm   Wound Width (cm) 2 cm 0.4 cm   Wound Surface Area (cm^2) 2.8 cm^2 0.24 cm^2   Wound Depth (cm) 0.2 cm 0.1 cm   Wound Volume (cm^3) 0.56 cm^3 0.024 cm^3   Wound Healing % -- 96   Margins Well-defined edges Well-defined edges   Non-staged Wound Description Full thickness Full thickness   Leslie-wound Assessment Edema;Hemosiderin staining Edema;Hemosiderin staining   Wound Granulation Tissue Pink;Firm;Red Pink;Firm   Wound Bed Granulation (%) 60 % 100 %   Wound Bed Epithelium (%) 30 % --   Wound Bed Slough (%) 10 % --   Wound Odor None None   Shape Clustered, Friable --   Tunneling? No No   Undermining? No No   Sinus Tracts? No No   Pressure Injury Stage Stage 3 Stage 3       No associated orders.          ASSESSMENT AND PLAN:        Risks, benefits, and alternatives of current treatment plan discussed in detail.  Questions and concerns addressed. Red flags to RTC or ED reviewed.  Patient (or parent) agrees to plan.      No follow-ups on file.  Weekly Wound Education Note    Teaching Provided To: Patient  Training Topics: Cleasing and general instructions;Compression;Discharge instructions;Dressing;Edema control  Training Method: Explain/Verbal  Training Response: Patient responds and understands;Reinforcement needed        Notes: Here for RN visit.    Here for RN visit, wounds stable. Edema measurements stable, wrap tolerated well. Left medial leg wound: Hydrofera ready. Left lateral leg wound: Vashe soak prior to dressing application, enluxtra (back removed), kerramax, conforming gauze, tape, coflex 2 layer 30-40mmhg, rosidal to help keep wrap from slidding. Patient states he is working today and tomorrow, reminded to elevate his legs when he can during work and at home afterwards. He is wearing a compression stocking to the right leg. Pt scheduled to with provide next week 12/5/24.     Tamica ZAFAR RN   11/27/2024  7:50 AM             [1] No Known Allergies

## 2024-12-05 ENCOUNTER — OFFICE VISIT (OUTPATIENT)
Dept: WOUND CARE | Facility: HOSPITAL | Age: 59
End: 2024-12-05
Attending: FAMILY MEDICINE
Payer: COMMERCIAL

## 2024-12-05 VITALS
TEMPERATURE: 98 F | DIASTOLIC BLOOD PRESSURE: 71 MMHG | HEART RATE: 79 BPM | RESPIRATION RATE: 16 BRPM | SYSTOLIC BLOOD PRESSURE: 130 MMHG

## 2024-12-05 DIAGNOSIS — E66.01 CLASS 3 SEVERE OBESITY DUE TO EXCESS CALORIES WITH SERIOUS COMORBIDITY AND BODY MASS INDEX (BMI) OF 45.0 TO 49.9 IN ADULT (HCC): ICD-10-CM

## 2024-12-05 DIAGNOSIS — I87.332 CHRONIC VENOUS HYPERTENSION (IDIOPATHIC) WITH ULCER AND INFLAMMATION OF LEFT LOWER EXTREMITY (HCC): Primary | ICD-10-CM

## 2024-12-05 DIAGNOSIS — E66.813 CLASS 3 SEVERE OBESITY DUE TO EXCESS CALORIES WITH SERIOUS COMORBIDITY AND BODY MASS INDEX (BMI) OF 45.0 TO 49.9 IN ADULT (HCC): ICD-10-CM

## 2024-12-05 PROCEDURE — 99215 OFFICE O/P EST HI 40 MIN: CPT | Performed by: FAMILY MEDICINE

## 2024-12-05 PROCEDURE — 17250 CHEM CAUT OF GRANLTJ TISSUE: CPT | Performed by: FAMILY MEDICINE

## 2024-12-05 NOTE — PROGRESS NOTES
Weekly Wound Education Note    Teaching Provided To: Patient  Training Topics: Discharge instructions;Dressing;Edema control;Compression;Cleasing and general instructions  Training Method: Demonstration;Explain/Verbal  Training Response: Reinforcement needed        Notes: Patient here for follow up wound care visit to left medial and left lateral lower leg. Both wound measurements decreased, edema measurement stable. Per provider left medial lower leg wound is healed. Provider stimulated left lateral leg wound at visit today, apply silver nitrate, enluxtra (back off), ABD pad, kerlix, tape. Applied coflex 2 layer wrap to LLE. Pt to follow up with provider in 1 week.

## 2024-12-05 NOTE — PATIENT INSTRUCTIONS
PATIENT INSTRUCTIONS      FOR Luis M Lomasd ,  3/25/1965    DATE OF SERVICE: 2024      Enluxtra to lateral left leg wound  Kerramax  Coflex 2 layer compression wrap to left lower extremity    Velcro compression garment to right lower extremity    Follow up with Dr. Gould 1 week    DO NOT GET THE WRAP WET       Managing your edema:    Avoid prolonged standing in one place. It is better to have your calf muscles moving to pump fluid out of the legs.  Elevate leg(s) above the level of the heart when sitting or as much as possible.  Take you diuretics as directed by your physician. Do not skip doses or change doses unless instructed to do so by your physician.  Decrease salt intake, follow recommended 2 grams daily.  Do not get leg(s) with compression wrap wet. If wraps are too tight as indicated by pain, numbness/tingling or discoloration of toes remove wrap completely and call the wound center @ [phone].       The treatment plan has been discussed at length between you and your provider. Follow all instructions carefully, it is very important. If you do not follow all instructions you are at risk of your wound not healing, infection, possible loss of limb and even loss of life.    COMPRESSION WRAP PATIENT CARE INSTRUCTIONS  DO NOT get compression wrap wet. When showering, use a shower boot.   Please contact wound clinic and if not able to reach, then go to emergency room if ANY of the following occur:   Tingling or numbness in the foot or toes on leg with compression wrap.  Severe pain that cannot be relieved with elevation and any medication instructed per your doctor.  A fever of 100.4°F (38°C) or higher.  Swelling, coldness, or blue-gray discoloration of the toes.  If the compression wrap feels too tight or too loose.  If the compression wrap is damaged or has rough edges that hurt.  If the compression wrap gets wet, you must cut wrap off.   Drainage from compression wrap that smells different  than usual.

## 2024-12-05 NOTE — PROGRESS NOTES
Chief Complaint   Patient presents with    Wound Care     Patient is here for a wound care follow up. He denies any pain or new wound concerns.       HPI:     Patient here for follow-up of left lower extremity wounds.  Medial and lateral leg.  He is doing well and tolerating compression wrap without difficulty.    Lab Results   Component Value Date    BUN 14 07/26/2024    CREATSERUM 0.83 07/26/2024    ALB 3.9 07/25/2024    TP 8.0 07/25/2024    A1C 5.2 12/12/2016         Current Outpatient Medications:     levoFLOXacin 500 MG Oral Tab, Take 1 tablet (500 mg total) by mouth daily., Disp: 10 tablet, Rfl: 0    aspirin 81 MG Oral Tab, Take 1 tablet (81 mg total) by mouth daily., Disp: , Rfl:     Allergies[1]         REVIEW OF SYSTEMS:     Review of Systems (ROS)  This information was obtained from the patient, chart    A comprehensive 10 point review of systems was completed.  Pertinent positives and negatives noted in the the HPI.     Past medical, surgical, family and social history updated where appropriate.    PHYSICAL EXAM:   /71   Pulse 79   Temp 98.3 °F (36.8 °C)   Resp 16    Estimated body mass index is 39.63 kg/m² as calculated from the following:    Height as of 7/29/24: 67\".    Weight as of 7/29/24: 253 lb (114.8 kg).   Vital signs reviewed.Appears stated age, well groomed.        Calf  Point of Measurement - Left Calf: 30     Left Calf from:: Heel  Calf Left cm:: 39.5          Ankle  Point of Measurement - Left Ankle: 10     Left Ankle from:: Heel  Left Ankle cm:: 28.8                Foot                               Wound 01/11/24 #1 Leg Left;Lateral (Active)   Date First Assessed/Time First Assessed: 01/11/24 1406    Wound Number (Wound Clinic Only): #1  Primary Wound Type: Traumatic  Location: Leg  Wound Location Orientation: Left;Lateral      Assessments 1/11/2024  2:10 PM 12/5/2024  9:04 AM   Wound Image       Drainage Amount Large Moderate   Drainage Description Yellow;Serous Serosanguineous    Treatments Compression (coflex 2 layer wrap) Compression (coflex 2 layer)   Wound Length (cm) 10.6 cm 4.2 cm   Wound Width (cm) 9.5 cm 2 cm   Wound Surface Area (cm^2) 100.7 cm^2 8.4 cm^2   Wound Depth (cm) 0.2 cm 0.1 cm   Wound Volume (cm^3) 20.14 cm^3 0.84 cm^3   Wound Healing % -- 96   Margins Well-defined edges Well-defined edges   Non-staged Wound Description Full thickness Full thickness   Lselie-wound Assessment Edema;Hemosiderin staining Edema;Hemosiderin staining;Dry   Wound Granulation Tissue Firm;Pink Spongy;Pink;Red   Wound Bed Granulation (%) 40 % 100 %   Wound Bed Slough (%) 60 % --   Wound Odor None None   Tunneling? No No   Undermining? No No   Sinus Tracts? No No       Inactive Orders   Date Order Priority Status Authorizing Provider   11/21/24 0914 Debridement Traumatic Left;Lateral Leg Routine Completed Nicholas Gould MD   07/16/24 2327 Consult to Wound Ostomy Routine Completed Rica Pearson MD     - Reason for Consult:    Wound Care     - Wound Care Reason for Consult:    worsening   06/24/24 1127 Wound care Routine Discontinued Rica Pearson MD   06/06/24 0913 Debridement Traumatic Routine Completed Nicholas Gould MD   05/16/24 0901 Debridement Traumatic Routine Completed Nicholas Gould MD   05/02/24 1520 Debridement Traumatic Routine Completed Nicholas Gould MD   04/11/24 0959 Debridement Traumatic Routine Completed Nicholas Gould MD   04/04/24 0915 Debridement Traumatic Left;Lateral Leg Routine Completed Nicholas Gould MD   02/15/24 1154 Debridement Traumatic Left;Lateral Leg Routine Completed Nicholas Gould MD   01/18/24 1511 Debridement Traumatic Left;Lateral Leg Routine Completed Nicholas Gould MD   01/11/24 1710 Debridement Traumatic Left;Lateral Leg Routine Completed Nicholas Gould MD       Compression Wrap 08/22/24 Leg Anterior;Left (Active)   Placement Date: 08/22/24   Location: Leg  Wound Location Orientation: Anterior;Left      Assessments 8/22/2024  9:41 AM  11/27/2024  7:46 AM   Response to Treatment Well tolerated Well tolerated   Compression Layers Multilayer Multilayer   Compression Product Type Coflex Coflex   Dressing Applied Yes Yes   Compression Wrap Location Toes to Knee Toes to Knee   Compression Wrap Status Clean;Dry;Intact Clean;Dry;Intact       No associated orders.       Wound 11/14/24 #4 Leg Left;Medial (Active)   Date First Assessed/Time First Assessed: 11/14/24 0839    Wound Number (Wound Clinic Only): #4  Primary Wound Type: Pressure Injury  Location: Leg  Wound Location Orientation: Left;Medial      Assessments 11/14/2024  8:41 AM 12/5/2024  9:05 AM   Wound Image       Drainage Amount Small None   Drainage Description Sanguineous --   Treatments Compression (coflex 2 layer wrap, rosidal) Compression (coflex 2 layer)   Wound Length (cm) 1.4 cm 0 cm   Wound Width (cm) 2 cm 0 cm   Wound Surface Area (cm^2) 2.8 cm^2 0 cm^2   Wound Depth (cm) 0.2 cm 0 cm   Wound Volume (cm^3) 0.56 cm^3 0 cm^3   Wound Healing % -- 100   Margins Well-defined edges Well-defined edges   Non-staged Wound Description Full thickness Full thickness   Leslie-wound Assessment Edema;Hemosiderin staining Edema;Hemosiderin staining;Dry   Wound Granulation Tissue Pink;Firm;Red --   Wound Bed Granulation (%) 60 % --   Wound Bed Epithelium (%) 30 % 100 %   Wound Bed Slough (%) 10 % --   Wound Odor None None   Shape Clustered, Friable --   Tunneling? No No   Undermining? No No   Sinus Tracts? No No   Pressure Injury Stage Stage 3 Stage 3       No associated orders.              ASSESSMENT AND PLAN:      1. Chronic venous hypertension (idiopathic) with ulcer and inflammation of left lower extremity (HCC)    2. Class 3 severe obesity due to excess calories with serious comorbidity and body mass index (BMI) of 45.0 to 49.9 in adult ([facility])    Clinically the medial leg wound is completely healed now.  No further dressing needed at that site.  The lateral leg wound is smaller than previous visit  and more granulated and no slough is seen now.  Enluxtra is working very well to debride the wound.  Silver nitrate applied to area of hypergranulation.  Will continue with Enluxtra, Kerramax, Coflex 2 layer compression wrap to left lower extremity.  We did discuss compression wrap precautions and written instructions given once again.  Patient uses a shower boots to prevent the wrap from getting wet which is very important.  Weight reduction also encouraged.      Risks, benefits, and alternatives of current treatment plan discussed in detail.  Questions and concerns addressed. Red flags to RTC or ED reviewed.  Patient (or parent) agrees to plan.      No follow-ups on file.    Also refer to patient instructions.     I spent a total of 40 minutes with this patient's care.  This time included preparing to see the patient (eg, review notes and recent diagnostics), seeing the patient, performing a medically appropriate examination and/or evaluation, counseling and educating the patient, and documenting in the record.          Nicholas Gould M.D.   [facility]   2024    Patient Instructions       PATIENT INSTRUCTIONS      FOR RICK Blandon 3/25/1965    DATE OF SERVICE: 2024      Enluxtra to lateral left leg wound  Kerramax  Coflex 2 layer compression wrap to left lower extremity    Velcro compression garment to right lower extremity    Follow up with Dr. Gould 1 week    DO NOT GET THE WRAP WET       Managing your edema:    Avoid prolonged standing in one place. It is better to have your calf muscles moving to pump fluid out of the legs.  Elevate leg(s) above the level of the heart when sitting or as much as possible.  Take you diuretics as directed by your physician. Do not skip doses or change doses unless instructed to do so by your physician.  Decrease salt intake, follow recommended 2 grams daily.  Do not get leg(s) with compression wrap wet. If wraps are too tight as  indicated by pain, numbness/tingling or discoloration of toes remove wrap completely and call the wound center @ [phone].       The treatment plan has been discussed at length between you and your provider. Follow all instructions carefully, it is very important. If you do not follow all instructions you are at risk of your wound not healing, infection, possible loss of limb and even loss of life.    COMPRESSION WRAP PATIENT CARE INSTRUCTIONS  DO NOT get compression wrap wet. When showering, use a shower boot.   Please contact wound clinic and if not able to reach, then go to emergency room if ANY of the following occur:   Tingling or numbness in the foot or toes on leg with compression wrap.  Severe pain that cannot be relieved with elevation and any medication instructed per your doctor.  A fever of 100.4°F (38°C) or higher.  Swelling, coldness, or blue-gray discoloration of the toes.  If the compression wrap feels too tight or too loose.  If the compression wrap is damaged or has rough edges that hurt.  If the compression wrap gets wet, you must cut wrap off.   Drainage from compression wrap that smells different than usual.  MISCELLANEOUS INSTRUCTIONS  Supplement with a daily multivitamin   Low salt diet  Intense blood sugar control - Goal Blood sugar below 180 at all times recommended.  Increase protein intake / consider protein supplements - see below  Elevate extremities at all times when sitting / laying down.  No tobacco use     DIETARY MODIFICATIONS TO HELP WITH WOUND HEALING:          Please follow any restrictions to diet given by your other doctors     Protein: meats, beans, eggs, milk and yogurt particularly Greek yogurt), tofu, soy nuts, soy protein products     Vitamin C: citrus fruits and juices, strawberries, tomatoes, tomato juice, peppers, baked potatoes, spinach, broccoli, cauliflower, [place] sprouts, cabbage     Vitamin A: dark greens, leafy vegetables, orange or yellow vegetables,  cantaloupe, fortified dairy products, liver, fortified cereals     Zinc: fortified cereals, red meats, seafood     Consider Bob by [email or web address] (These are essential branch chain amino acids that help with tissue building and wound healing) and take 2 packets/day. You can order online at [employer] or [employer]     ADDITIONAL REMINDERS:     The treatment plan has been discussed at length with you and your provider. Follow all instructions carefully, it is very important. If you do not follow all instructions, you are at risk of your wound not healing, infection, possible loss of limb and even loss of life.  Please call the clinic at [phone] during regular business hours ( 7:30 AM - 5:30 PM) if you notice increased redness, warmth, pain or pus like drainage or start running a fever greater than 100.3.    For after hour emergencies, please call your primary physician or go to the nearest emergency room.  If your blood pressure is elevated, follow up with your primary care doctor and/or cardiologist as soon as possible.     FOR LEG SWELLING,  LOWER EXTREMITY WOUNDS AND IF USING COMPRESSION:   Managing your edema:    Avoid prolonged standing in one place. It is better to have your calf muscles moving to pump fluid out of the legs.  Elevate leg(s) above the level of the heart when sitting or as much as possible.  Take you diuretics as directed by your physician. Do not skip doses or change doses unless instructed to do so by your physician.  Decrease salt intake, follow recommended 2 grams daily.  Do not get leg(s) with compression wrap wet. If wraps are too tight as indicated by pain, numbness/tingling or discoloration of toes remove wrap completely and call the wound center @ [phone].       The treatment plan has been discussed at length between you and your provider. Follow all instructions carefully, it is very important. If you do not follow all instructions you are at risk of your wound not healing, infection,  possible loss of limb and even loss of life.    COMPRESSION WRAP PATIENT CARE INSTRUCTIONS  DO NOT get compression wrap wet. When showering, use a shower boot.   Please contact wound clinic and if not able to reach, then go to emergency room if ANY of the following occur:   Tingling or numbness in the foot or toes on leg with compression wrap.  Severe pain that cannot be relieved with elevation and any medication instructed per your doctor.  A fever of 100.4°F (38°C) or higher.  Swelling, coldness, or blue-gray discoloration of the toes.  If the compression wrap feels too tight or too loose.  If the compression wrap is damaged or has rough edges that hurt.  If the compression wrap gets wet, you must cut wrap off.   Drainage from compression wrap that smells different than usual.                        [1] No Known Allergies

## 2024-12-12 ENCOUNTER — OFFICE VISIT (OUTPATIENT)
Dept: WOUND CARE | Facility: HOSPITAL | Age: 59
End: 2024-12-12
Attending: FAMILY MEDICINE
Payer: COMMERCIAL

## 2024-12-12 VITALS
DIASTOLIC BLOOD PRESSURE: 84 MMHG | RESPIRATION RATE: 16 BRPM | SYSTOLIC BLOOD PRESSURE: 135 MMHG | HEART RATE: 69 BPM | TEMPERATURE: 98 F

## 2024-12-12 DIAGNOSIS — I87.332 CHRONIC VENOUS HYPERTENSION (IDIOPATHIC) WITH ULCER AND INFLAMMATION OF LEFT LOWER EXTREMITY (HCC): Primary | ICD-10-CM

## 2024-12-12 DIAGNOSIS — E66.813 CLASS 3 SEVERE OBESITY DUE TO EXCESS CALORIES WITH SERIOUS COMORBIDITY AND BODY MASS INDEX (BMI) OF 45.0 TO 49.9 IN ADULT (HCC): ICD-10-CM

## 2024-12-12 DIAGNOSIS — E66.01 CLASS 3 SEVERE OBESITY DUE TO EXCESS CALORIES WITH SERIOUS COMORBIDITY AND BODY MASS INDEX (BMI) OF 45.0 TO 49.9 IN ADULT (HCC): ICD-10-CM

## 2024-12-12 PROCEDURE — 99215 OFFICE O/P EST HI 40 MIN: CPT | Performed by: FAMILY MEDICINE

## 2024-12-12 NOTE — PROGRESS NOTES
Chief Complaint   Patient presents with    Wound Care     Patient is here for a wound care follow up. He denies any pain or new wound concerns.       HPI:     Patient here for follow-up of left lateral leg wound.  He is doing well and has no new complaints.  He is tolerating compression wrap.    Lab Results   Component Value Date    BUN 14 07/26/2024    CREATSERUM 0.83 07/26/2024    ALB 3.9 07/25/2024    TP 8.0 07/25/2024    A1C 5.2 12/12/2016         Current Outpatient Medications:     levoFLOXacin 500 MG Oral Tab, Take 1 tablet (500 mg total) by mouth daily., Disp: 10 tablet, Rfl: 0    aspirin 81 MG Oral Tab, Take 1 tablet (81 mg total) by mouth daily., Disp: , Rfl:     Allergies[1]         REVIEW OF SYSTEMS:     Review of Systems (ROS)  This information was obtained from the patient, chart    A comprehensive 10 point review of systems was completed.  Pertinent positives and negatives noted in the the HPI.     Past medical, surgical, family and social history updated where appropriate.    PHYSICAL EXAM:   /84   Pulse 69   Temp 97.9 °F (36.6 °C)   Resp 16    Estimated body mass index is 39.63 kg/m² as calculated from the following:    Height as of 7/29/24: 67\".    Weight as of 7/29/24: 253 lb (114.8 kg).   Vital signs reviewed.Appears stated age, well groomed.        Calf  Point of Measurement - Left Calf: 30     Left Calf from:: Heel  Calf Left cm:: 39          Ankle  Point of Measurement - Left Ankle: 10     Left Ankle from:: Heel  Left Ankle cm:: 29                Foot                               Wound 01/11/24 #1 Leg Left;Lateral (Active)   Date First Assessed/Time First Assessed: 01/11/24 1406    Wound Number (Wound Clinic Only): #1  Primary Wound Type: Traumatic  Location: Leg  Wound Location Orientation: Left;Lateral      Assessments 1/11/2024  2:10 PM 12/12/2024  8:59 AM   Wound Image       Drainage Amount Large --   Drainage Description Yellow;Serous --   Treatments Compression (coflex 2 layer  wrap) --   Wound Length (cm) 10.6 cm --   Wound Width (cm) 9.5 cm --   Wound Surface Area (cm^2) 100.7 cm^2 --   Wound Depth (cm) 0.2 cm --   Wound Volume (cm^3) 20.14 cm^3 --   Margins Well-defined edges --   Non-staged Wound Description Full thickness --   Leslie-wound Assessment Edema;Hemosiderin staining --   Wound Granulation Tissue Firm;Pink --   Wound Bed Granulation (%) 40 % --   Wound Bed Slough (%) 60 % --   Wound Odor None --   Tunneling? No --   Undermining? No --   Sinus Tracts? No --       Inactive Orders   Date Order Priority Status Authorizing Provider   11/21/24 0914 Debridement Traumatic Left;Lateral Leg Routine Completed Nicholas Gould MD   07/16/24 2327 Consult to Wound Ostomy Routine Completed Rica Pearson MD     - Reason for Consult:    Wound Care     - Wound Care Reason for Consult:    worsening   06/24/24 1127 Wound care Routine Discontinued Rica Pearson MD   06/06/24 0913 Debridement Traumatic Routine Completed Nicholas Gould MD   05/16/24 0901 Debridement Traumatic Routine Completed Nicholas Gould MD   05/02/24 1520 Debridement Traumatic Routine Completed Nicholas Gould MD   04/11/24 0959 Debridement Traumatic Routine Completed Nicholas Gould MD   04/04/24 0915 Debridement Traumatic Left;Lateral Leg Routine Completed Nicholas Gould MD   02/15/24 1154 Debridement Traumatic Left;Lateral Leg Routine Completed Nicholas Gould MD   01/18/24 1511 Debridement Traumatic Left;Lateral Leg Routine Completed Nicholas Gould MD   01/11/24 1710 Debridement Traumatic Left;Lateral Leg Routine Completed Nicholas Gould MD       Compression Wrap 08/22/24 Leg Anterior;Left (Active)   Placement Date: 08/22/24   Location: Leg  Wound Location Orientation: Anterior;Left      Assessments 8/22/2024  9:41 AM 12/12/2024  8:45 AM   Response to Treatment Well tolerated Well tolerated   Compression Layers Multilayer Multilayer   Compression Product Type Coflex Coflex   Dressing Applied Yes Yes (joel  (back off), ABD pad, coforming gauze, tape)   Compression Wrap Location Toes to Knee Toes to Knee   Compression Wrap Status Clean;Dry;Intact Clean;Dry;Intact       No associated orders.              ASSESSMENT AND PLAN:      1. Chronic venous hypertension (idiopathic) with ulcer and inflammation of left lower extremity (HCC)    2. Class 3 severe obesity due to excess calories with serious comorbidity and body mass index (BMI) of 45.0 to 49.9 in adult (HCC)      Clinically the wound is getting smaller.  Silver nitrate was applied to the wound edges.  Wound is mostly granulated now.  He is responding well to Enluxtra and will continue that we will continue Coflex 2 layer compression wrap as he is tolerating well.  We did go over compression wrap precautions and written instructions given.  Weight reduction is encouraged as well.      Risks, benefits, and alternatives of current treatment plan discussed in detail.  Questions and concerns addressed. Red flags to RTC or ED reviewed.  Patient (or parent) agrees to plan.      Return in about 1 week (around 2024).    Also refer to patient instructions.     I spent a total of 40 minutes with this patient's care.  This time included preparing to see the patient (eg, review notes and recent diagnostics), seeing the patient, performing a medically appropriate examination and/or evaluation, counseling and educating the patient, and documenting in the record.          Nicholas Gould M.D.   [facility]   2024    Patient Instructions       PATIENT INSTRUCTIONS      FOR RICK Blandon 3/25/1965    DATE OF SERVICE: 2024      Enluxtra to lateral left leg wound  Kerramax  Coflex 2 layer compression wrap to left lower extremity    Velcro compression garment to right lower extremity    Follow up with Dr. Gould 1 week    DO NOT GET THE WRAP WET       Managing your edema:    Avoid prolonged standing in one place. It is better to have  your calf muscles moving to pump fluid out of the legs.  Elevate leg(s) above the level of the heart when sitting or as much as possible.  Take you diuretics as directed by your physician. Do not skip doses or change doses unless instructed to do so by your physician.  Decrease salt intake, follow recommended 2 grams daily.  Do not get leg(s) with compression wrap wet. If wraps are too tight as indicated by pain, numbness/tingling or discoloration of toes remove wrap completely and call the wound center @ [phone].       The treatment plan has been discussed at length between you and your provider. Follow all instructions carefully, it is very important. If you do not follow all instructions you are at risk of your wound not healing, infection, possible loss of limb and even loss of life.    COMPRESSION WRAP PATIENT CARE INSTRUCTIONS  DO NOT get compression wrap wet. When showering, use a shower boot.   Please contact wound clinic and if not able to reach, then go to emergency room if ANY of the following occur:   Tingling or numbness in the foot or toes on leg with compression wrap.  Severe pain that cannot be relieved with elevation and any medication instructed per your doctor.  A fever of 100.4°F (38°C) or higher.  Swelling, coldness, or blue-gray discoloration of the toes.  If the compression wrap feels too tight or too loose.  If the compression wrap is damaged or has rough edges that hurt.  If the compression wrap gets wet, you must cut wrap off.   Drainage from compression wrap that smells different than usual.  MISCELLANEOUS INSTRUCTIONS  Supplement with a daily multivitamin   Low salt diet  Intense blood sugar control - Goal Blood sugar below 180 at all times recommended.  Increase protein intake / consider protein supplements - see below  Elevate extremities at all times when sitting / laying down.  No tobacco use     DIETARY MODIFICATIONS TO HELP WITH WOUND HEALING:          Please follow any  restrictions to diet given by your other doctors     Protein: meats, beans, eggs, milk and yogurt particularly Greek yogurt), tofu, soy nuts, soy protein products     Vitamin C: citrus fruits and juices, strawberries, tomatoes, tomato juice, peppers, baked potatoes, spinach, broccoli, cauliflower, [place] sprouts, cabbage     Vitamin A: dark greens, leafy vegetables, orange or yellow vegetables, cantaloupe, fortified dairy products, liver, fortified cereals     Zinc: fortified cereals, red meats, seafood     Consider Bob by [email or web address] (These are essential branch chain amino acids that help with tissue building and wound healing) and take 2 packets/day. You can order online at [employer] or [employer]     ADDITIONAL REMINDERS:     The treatment plan has been discussed at length with you and your provider. Follow all instructions carefully, it is very important. If you do not follow all instructions, you are at risk of your wound not healing, infection, possible loss of limb and even loss of life.  Please call the clinic at [phone] during regular business hours ( 7:30 AM - 5:30 PM) if you notice increased redness, warmth, pain or pus like drainage or start running a fever greater than 100.3.    For after hour emergencies, please call your primary physician or go to the nearest emergency room.  If your blood pressure is elevated, follow up with your primary care doctor and/or cardiologist as soon as possible.     FOR LEG SWELLING,  LOWER EXTREMITY WOUNDS AND IF USING COMPRESSION:   Managing your edema:    Avoid prolonged standing in one place. It is better to have your calf muscles moving to pump fluid out of the legs.  Elevate leg(s) above the level of the heart when sitting or as much as possible.  Take you diuretics as directed by your physician. Do not skip doses or change doses unless instructed to do so by your physician.  Decrease salt intake, follow recommended 2 grams daily.  Do not get leg(s) with  compression wrap wet. If wraps are too tight as indicated by pain, numbness/tingling or discoloration of toes remove wrap completely and call the wound center @ [phone].       The treatment plan has been discussed at length between you and your provider. Follow all instructions carefully, it is very important. If you do not follow all instructions you are at risk of your wound not healing, infection, possible loss of limb and even loss of life.    COMPRESSION WRAP PATIENT CARE INSTRUCTIONS  DO NOT get compression wrap wet. When showering, use a shower boot.   Please contact wound clinic and if not able to reach, then go to emergency room if ANY of the following occur:   Tingling or numbness in the foot or toes on leg with compression wrap.  Severe pain that cannot be relieved with elevation and any medication instructed per your doctor.  A fever of 100.4°F (38°C) or higher.  Swelling, coldness, or blue-gray discoloration of the toes.  If the compression wrap feels too tight or too loose.  If the compression wrap is damaged or has rough edges that hurt.  If the compression wrap gets wet, you must cut wrap off.   Drainage from compression wrap that smells different than usual.                        [1] No Known Allergies

## 2024-12-12 NOTE — PATIENT INSTRUCTIONS
PATIENT INSTRUCTIONS      FOR Luis M RODRIGUEZ Natalie ,  3/25/1965    DATE OF SERVICE: 2024      Enluxtra to lateral left leg wound  Kerramax  Coflex 2 layer compression wrap to left lower extremity    Velcro compression garment to right lower extremity    Follow up with Dr. Gould 1 week    DO NOT GET THE WRAP WET       Managing your edema:    Avoid prolonged standing in one place. It is better to have your calf muscles moving to pump fluid out of the legs.  Elevate leg(s) above the level of the heart when sitting or as much as possible.  Take you diuretics as directed by your physician. Do not skip doses or change doses unless instructed to do so by your physician.  Decrease salt intake, follow recommended 2 grams daily.  Do not get leg(s) with compression wrap wet. If wraps are too tight as indicated by pain, numbness/tingling or discoloration of toes remove wrap completely and call the wound center @ [phone].       The treatment plan has been discussed at length between you and your provider. Follow all instructions carefully, it is very important. If you do not follow all instructions you are at risk of your wound not healing, infection, possible loss of limb and even loss of life.    COMPRESSION WRAP PATIENT CARE INSTRUCTIONS  DO NOT get compression wrap wet. When showering, use a shower boot.   Please contact wound clinic and if not able to reach, then go to emergency room if ANY of the following occur:   Tingling or numbness in the foot or toes on leg with compression wrap.  Severe pain that cannot be relieved with elevation and any medication instructed per your doctor.  A fever of 100.4°F (38°C) or higher.  Swelling, coldness, or blue-gray discoloration of the toes.  If the compression wrap feels too tight or too loose.  If the compression wrap is damaged or has rough edges that hurt.  If the compression wrap gets wet, you must cut wrap off.   Drainage from compression wrap that smells  different than usual.

## 2024-12-12 NOTE — PROGRESS NOTES
Weekly Wound Education Note    Teaching Provided To: Patient  Training Topics: Discharge instructions;Cleasing and general instructions;Dressing  Training Method: Explain/Verbal;Demonstration  Training Response: Reinforcement needed        Notes: Patient here for follow up wound care visit to left lateral lower leg. Edema measurement slightly increased in ankle, wound measurement decreased. Provider silver nitrated wound at visit today, recommending to continue using enluxtra with back off, ABD pad, conforming gauze, tape. Applied coflex 2 layer wrap. Pt to follow up with provider in 1 week.

## 2024-12-19 ENCOUNTER — OFFICE VISIT (OUTPATIENT)
Dept: WOUND CARE | Facility: HOSPITAL | Age: 59
End: 2024-12-19
Attending: FAMILY MEDICINE
Payer: COMMERCIAL

## 2024-12-19 VITALS
HEART RATE: 67 BPM | SYSTOLIC BLOOD PRESSURE: 128 MMHG | DIASTOLIC BLOOD PRESSURE: 78 MMHG | RESPIRATION RATE: 16 BRPM | TEMPERATURE: 98 F

## 2024-12-19 DIAGNOSIS — E66.01 CLASS 3 SEVERE OBESITY DUE TO EXCESS CALORIES WITH SERIOUS COMORBIDITY AND BODY MASS INDEX (BMI) OF 45.0 TO 49.9 IN ADULT (HCC): ICD-10-CM

## 2024-12-19 DIAGNOSIS — I87.332 CHRONIC VENOUS HYPERTENSION (IDIOPATHIC) WITH ULCER AND INFLAMMATION OF LEFT LOWER EXTREMITY (HCC): Primary | ICD-10-CM

## 2024-12-19 DIAGNOSIS — E66.813 CLASS 3 SEVERE OBESITY DUE TO EXCESS CALORIES WITH SERIOUS COMORBIDITY AND BODY MASS INDEX (BMI) OF 45.0 TO 49.9 IN ADULT (HCC): ICD-10-CM

## 2024-12-19 PROCEDURE — 99215 OFFICE O/P EST HI 40 MIN: CPT | Performed by: FAMILY MEDICINE

## 2024-12-19 PROCEDURE — 17250 CHEM CAUT OF GRANLTJ TISSUE: CPT | Performed by: FAMILY MEDICINE

## 2024-12-19 NOTE — PROGRESS NOTES
Weekly Wound Education Note    Teaching Provided To: Patient  Training Topics: Dressing;Edema control;Compression;Discharge instructions;Cleasing and general instructions  Training Method: Demonstration;Explain/Verbal  Training Response: Reinforcement needed        Notes: Patient here for follow up wound care visit to left lateral lower leg. Edema measurement decreased, wound measurement decreased. Provider applies silver nitrate to wound bed, treatment changed to endoform, hydrofera transfer, ABD pad, kerlix, tape. Applied coflex 2 layer wrap, pt to follow up on 12/26/24.

## 2024-12-19 NOTE — PATIENT INSTRUCTIONS
PATIENT INSTRUCTIONS      FOR Luis M Lomasd ,  3/25/1965    DATE OF SERVICE: 2024      Endoform collagen moistened with saline to the wound base  ABD pad and gauze roll  Coflex 2 layer compression wrap to left lower extremity    Velcro compression garment to right lower extremity    Follow up with nurse visit in 1 week, see Dr. Gould in 2 weeks    DO NOT GET THE WRAP WET       Managing your edema:    Avoid prolonged standing in one place. It is better to have your calf muscles moving to pump fluid out of the legs.  Elevate leg(s) above the level of the heart when sitting or as much as possible.  Take you diuretics as directed by your physician. Do not skip doses or change doses unless instructed to do so by your physician.  Decrease salt intake, follow recommended 2 grams daily.  Do not get leg(s) with compression wrap wet. If wraps are too tight as indicated by pain, numbness/tingling or discoloration of toes remove wrap completely and call the wound center @ [phone].       The treatment plan has been discussed at length between you and your provider. Follow all instructions carefully, it is very important. If you do not follow all instructions you are at risk of your wound not healing, infection, possible loss of limb and even loss of life.    COMPRESSION WRAP PATIENT CARE INSTRUCTIONS  DO NOT get compression wrap wet. When showering, use a shower boot.   Please contact wound clinic and if not able to reach, then go to emergency room if ANY of the following occur:   Tingling or numbness in the foot or toes on leg with compression wrap.  Severe pain that cannot be relieved with elevation and any medication instructed per your doctor.  A fever of 100.4°F (38°C) or higher.  Swelling, coldness, or blue-gray discoloration of the toes.  If the compression wrap feels too tight or too loose.  If the compression wrap is damaged or has rough edges that hurt.  If the compression wrap gets wet, you must  cut wrap off.   Drainage from compression wrap that smells different than usual.

## 2024-12-19 NOTE — PROGRESS NOTES
Chief Complaint   Patient presents with    Wound Care     Patients is here for a follow up. Patients stated no issue        HPI:     Patient here for follow-up of left lateral leg wound.  He is doing well and tolerating compression wrap.  He has no new complaints today.    Lab Results   Component Value Date    BUN 14 07/26/2024    CREATSERUM 0.83 07/26/2024    ALB 3.9 07/25/2024    TP 8.0 07/25/2024    A1C 5.2 12/12/2016         Current Outpatient Medications:     levoFLOXacin 500 MG Oral Tab, Take 1 tablet (500 mg total) by mouth daily., Disp: 10 tablet, Rfl: 0    aspirin 81 MG Oral Tab, Take 1 tablet (81 mg total) by mouth daily., Disp: , Rfl:     Allergies[1]         REVIEW OF SYSTEMS:     Review of Systems (ROS)  This information was obtained from the patient, chart    A comprehensive 10 point review of systems was completed.  Pertinent positives and negatives noted in the the HPI.     Past medical, surgical, family and social history updated where appropriate.    PHYSICAL EXAM:   /78   Pulse 67   Temp 98 °F (36.7 °C)   Resp 16    Estimated body mass index is 39.63 kg/m² as calculated from the following:    Height as of 7/29/24: 67\".    Weight as of 7/29/24: 253 lb (114.8 kg).   Vital signs reviewed.Appears stated age, well groomed.        Calf  Point of Measurement - Left Calf: 30     Left Calf from:: Heel  Calf Left cm:: 38.5          Ankle  Point of Measurement - Left Ankle: 10     Left Ankle from:: Heel  Left Ankle cm:: 28.1                Foot                               Wound 01/11/24 #1 Leg Left;Lateral (Active)   Date First Assessed/Time First Assessed: 01/11/24 1406    Wound Number (Wound Clinic Only): #1  Primary Wound Type: Traumatic  Location: Leg  Wound Location Orientation: Left;Lateral      Assessments 1/11/2024  2:10 PM 12/19/2024  8:29 AM   Wound Image       Drainage Amount Large Small   Drainage Description Yellow;Serous Serosanguineous   Treatments Compression (coflex 2 layer wrap)  Compression (coflex 2 layer wrap)   Wound Length (cm) 10.6 cm 2.6 cm   Wound Width (cm) 9.5 cm 1.5 cm   Wound Surface Area (cm^2) 100.7 cm^2 3.9 cm^2   Wound Depth (cm) 0.2 cm 0.1 cm   Wound Volume (cm^3) 20.14 cm^3 0.39 cm^3   Wound Healing % -- 98   Margins Well-defined edges Well-defined edges   Non-staged Wound Description Full thickness Full thickness   Leslie-wound Assessment Edema;Hemosiderin staining Edema;Hemosiderin staining;Dry   Wound Granulation Tissue Firm;Pink Red;Spongy   Wound Bed Granulation (%) 40 % 95 %   Wound Bed Epithelium (%) -- 5 %   Wound Bed Slough (%) 60 % --   Wound Odor None None   Shape -- Bridged   Tunneling? No --   Undermining? No --   Sinus Tracts? No --       Inactive Orders   Date Order Priority Status Authorizing Provider   11/21/24 0914 Debridement Traumatic Left;Lateral Leg Routine Completed Nicholas Gould MD   07/16/24 2327 Consult to Wound Ostomy Routine Completed Rica Pearson MD     - Reason for Consult:    Wound Care     - Wound Care Reason for Consult:    worsening   06/24/24 1127 Wound care Routine Discontinued Rica Pearson MD   06/06/24 0913 Debridement Traumatic Routine Completed Nicholas Gould MD   05/16/24 0901 Debridement Traumatic Routine Completed Nicholas Gould MD   05/02/24 1520 Debridement Traumatic Routine Completed Nicholas Gould MD   04/11/24 0959 Debridement Traumatic Routine Completed Nicholas Gould MD   04/04/24 0915 Debridement Traumatic Left;Lateral Leg Routine Completed Nicholas Gould MD   02/15/24 1154 Debridement Traumatic Left;Lateral Leg Routine Completed Nicholas Gould MD   01/18/24 1511 Debridement Traumatic Left;Lateral Leg Routine Completed Nicholas Gould MD   01/11/24 1710 Debridement Traumatic Left;Lateral Leg Routine Completed Nicholas Gould MD       Compression Wrap 08/22/24 Leg Anterior;Left (Active)   Placement Date: 08/22/24   Location: Leg  Wound Location Orientation: Anterior;Left      Assessments 8/22/2024  9:41 AM  2024  8:29 AM   Response to Treatment Well tolerated Well tolerated   Compression Layers Multilayer Multilayer   Compression Product Type Coflex Coflex   Dressing Applied Yes Yes   Compression Wrap Location Toes to Knee Toes to Knee   Compression Wrap Status Clean;Dry;Intact Clean;Dry;Intact       No associated orders.              ASSESSMENT AND PLAN:      1. Chronic venous hypertension (idiopathic) with ulcer and inflammation of left lower extremity (HCC)    2. Class 3 severe obesity due to excess calories with serious comorbidity and body mass index (BMI) of 45.0 to 49.9 in adult (HCC)    Clinically the wound is getting better.  There is hypergranulation treated with silver nitrate.  The wound is smaller in size.  Will switch over now it is well granulated to endoform collagen moistened with saline and covered with Hydrofera Blue and then ABD pad and gauze roll.  Continue Coflex 2 layer compression wrap to lower extremity.  Patient will have a nurse visit in 1 week and follow-up with me in 2 weeks.  Risks, benefits, and alternatives of current treatment plan discussed in detail.  Questions and concerns addressed. Red flags to RTC or ED reviewed.  Patient (or parent) agrees to plan.      No follow-ups on file.    Also refer to patient instructions.     I spent a total of 40 minutes with this patient's care.  This time included preparing to see the patient (eg, review notes and recent diagnostics), seeing the patient, performing a medically appropriate examination and/or evaluation, counseling and educating the patient, and documenting in the record.          Nicholas Gould M.D.   [facility]   2024    Patient Instructions       PATIENT INSTRUCTIONS      FOR RICK Blandon 3/25/1965    DATE OF SERVICE: 2024      Endoform collagen moistened with saline to the wound base  ABD pad and gauze roll  Coflex 2 layer compression wrap to left lower extremity    Velcro  compression garment to right lower extremity    Follow up with nurse visit in 1 week, see Dr. Gould in 2 weeks    DO NOT GET THE WRAP WET       Managing your edema:    Avoid prolonged standing in one place. It is better to have your calf muscles moving to pump fluid out of the legs.  Elevate leg(s) above the level of the heart when sitting or as much as possible.  Take you diuretics as directed by your physician. Do not skip doses or change doses unless instructed to do so by your physician.  Decrease salt intake, follow recommended 2 grams daily.  Do not get leg(s) with compression wrap wet. If wraps are too tight as indicated by pain, numbness/tingling or discoloration of toes remove wrap completely and call the wound center @ [phone].       The treatment plan has been discussed at length between you and your provider. Follow all instructions carefully, it is very important. If you do not follow all instructions you are at risk of your wound not healing, infection, possible loss of limb and even loss of life.    COMPRESSION WRAP PATIENT CARE INSTRUCTIONS  DO NOT get compression wrap wet. When showering, use a shower boot.   Please contact wound clinic and if not able to reach, then go to emergency room if ANY of the following occur:   Tingling or numbness in the foot or toes on leg with compression wrap.  Severe pain that cannot be relieved with elevation and any medication instructed per your doctor.  A fever of 100.4°F (38°C) or higher.  Swelling, coldness, or blue-gray discoloration of the toes.  If the compression wrap feels too tight or too loose.  If the compression wrap is damaged or has rough edges that hurt.  If the compression wrap gets wet, you must cut wrap off.   Drainage from compression wrap that smells different than usual.  MISCELLANEOUS INSTRUCTIONS  Supplement with a daily multivitamin   Low salt diet  Intense blood sugar control - Goal Blood sugar below 180 at all times  recommended.  Increase protein intake / consider protein supplements - see below  Elevate extremities at all times when sitting / laying down.  No tobacco use     DIETARY MODIFICATIONS TO HELP WITH WOUND HEALING:          Please follow any restrictions to diet given by your other doctors     Protein: meats, beans, eggs, milk and yogurt particularly Greek yogurt), tofu, soy nuts, soy protein products     Vitamin C: citrus fruits and juices, strawberries, tomatoes, tomato juice, peppers, baked potatoes, spinach, broccoli, cauliflower, [place] sprouts, cabbage     Vitamin A: dark greens, leafy vegetables, orange or yellow vegetables, cantaloupe, fortified dairy products, liver, fortified cereals     Zinc: fortified cereals, red meats, seafood     Consider Bob [employer] Shoulder Tap (These are essential branch chain amino acids that help with tissue building and wound healing) and take 2 packets/day. You can order online at [employer] or [employer]     ADDITIONAL REMINDERS:     The treatment plan has been discussed at length with you and your provider. Follow all instructions carefully, it is very important. If you do not follow all instructions, you are at risk of your wound not healing, infection, possible loss of limb and even loss of life.  Please call the clinic at [phone] during regular business hours ( 7:30 AM - 5:30 PM) if you notice increased redness, warmth, pain or pus like drainage or start running a fever greater than 100.3.    For after hour emergencies, please call your primary physician or go to the nearest emergency room.  If your blood pressure is elevated, follow up with your primary care doctor and/or cardiologist as soon as possible.     FOR LEG SWELLING,  LOWER EXTREMITY WOUNDS AND IF USING COMPRESSION:   Managing your edema:    Avoid prolonged standing in one place. It is better to have your calf muscles moving to pump fluid out of the legs.  Elevate leg(s) above the level of the heart when sitting or as  much as possible.  Take you diuretics as directed by your physician. Do not skip doses or change doses unless instructed to do so by your physician.  Decrease salt intake, follow recommended 2 grams daily.  Do not get leg(s) with compression wrap wet. If wraps are too tight as indicated by pain, numbness/tingling or discoloration of toes remove wrap completely and call the wound center @ [phone].       The treatment plan has been discussed at length between you and your provider. Follow all instructions carefully, it is very important. If you do not follow all instructions you are at risk of your wound not healing, infection, possible loss of limb and even loss of life.    COMPRESSION WRAP PATIENT CARE INSTRUCTIONS  DO NOT get compression wrap wet. When showering, use a shower boot.   Please contact wound clinic and if not able to reach, then go to emergency room if ANY of the following occur:   Tingling or numbness in the foot or toes on leg with compression wrap.  Severe pain that cannot be relieved with elevation and any medication instructed per your doctor.  A fever of 100.4°F (38°C) or higher.  Swelling, coldness, or blue-gray discoloration of the toes.  If the compression wrap feels too tight or too loose.  If the compression wrap is damaged or has rough edges that hurt.  If the compression wrap gets wet, you must cut wrap off.   Drainage from compression wrap that smells different than usual.                        [1] No Known Allergies

## 2024-12-26 ENCOUNTER — APPOINTMENT (OUTPATIENT)
Dept: WOUND CARE | Facility: HOSPITAL | Age: 59
End: 2024-12-26
Attending: FAMILY MEDICINE
Payer: COMMERCIAL

## 2024-12-26 VITALS
HEART RATE: 64 BPM | RESPIRATION RATE: 14 BRPM | DIASTOLIC BLOOD PRESSURE: 85 MMHG | TEMPERATURE: 98 F | SYSTOLIC BLOOD PRESSURE: 143 MMHG

## 2024-12-26 DIAGNOSIS — I87.332 CHRONIC VENOUS HYPERTENSION (IDIOPATHIC) WITH ULCER AND INFLAMMATION OF LEFT LOWER EXTREMITY (HCC): Primary | ICD-10-CM

## 2024-12-26 PROCEDURE — 29581 APPL MULTLAYER CMPRN SYS LEG: CPT

## 2024-12-26 NOTE — PROGRESS NOTES
Weekly Wound Education Note    Teaching Provided To: Patient  Training Topics: Discharge instructions;Compression;Edema control  Training Method: Demonstration;Explain/Verbal;Written  Training Response: Patient responds and understands        Notes: Pt tolerating compression wrap however noted rolling down from knee. Continue endoform, transfer, abd and coflex 2 layer wrap with rosidal added . Reviewed compression handout information and elevation for edema control.

## 2024-12-26 NOTE — PROGRESS NOTES
Chief Complaint   Patient presents with    Wound Care     Patient is here for a nurse visit for a left leg wound.           Current Outpatient Medications:     levoFLOXacin 500 MG Oral Tab, Take 1 tablet (500 mg total) by mouth daily., Disp: 10 tablet, Rfl: 0    aspirin 81 MG Oral Tab, Take 1 tablet (81 mg total) by mouth daily., Disp: , Rfl:     Allergies[1]       HISTORY:     Past medical, surgical, family and social history updated where appropriate.    PHYSICAL EXAM:   /85   Pulse 64   Temp 97.9 °F (36.6 °C)   Resp 14        Vital signs reviewed.      Calf  Point of Measurement - Left Calf: 30     Left Calf from:: Heel  Calf Left cm:: 40          Ankle  Point of Measurement - Left Ankle: 10     Left Ankle from:: Heel  Left Ankle cm:: 27.9                Wound 01/11/24 #1 Leg Left;Lateral (Active)   Date First Assessed/Time First Assessed: 01/11/24 1406    Wound Number (Wound Clinic Only): #1  Primary Wound Type: Traumatic  Location: Leg  Wound Location Orientation: Left;Lateral      Assessments 1/11/2024  2:10 PM 12/26/2024  8:39 AM   Wound Image       Drainage Amount Large Small   Drainage Description Yellow;Serous Serosanguineous   Treatments Compression Compression   Wound Length (cm) 10.6 cm 2.5 cm   Wound Width (cm) 9.5 cm 1.1 cm   Wound Surface Area (cm^2) 100.7 cm^2 2.75 cm^2   Wound Depth (cm) 0.2 cm 0.1 cm   Wound Volume (cm^3) 20.14 cm^3 0.275 cm^3   Wound Healing % -- 99   Margins Well-defined edges Well-defined edges   Non-staged Wound Description Full thickness Full thickness   Leslie-wound Assessment Edema;Hemosiderin staining Dry;Edema;Pink   Wound Granulation Tissue Firm;Pink Pink;Spongy   Wound Bed Granulation (%) 40 % 80 %   Wound Bed Slough (%) 60 % 20 %   Wound Odor None None   Tunneling? No No   Undermining? No No   Sinus Tracts? No No       Inactive Orders   Date Order Priority Status Authorizing Provider   11/21/24 0914 Debridement Traumatic Left;Lateral Leg Routine Completed  Nicholas Gould MD   07/16/24 2327 Consult to Wound Ostomy Routine Completed Rica Pearson MD     - Reason for Consult:    Wound Care     - Wound Care Reason for Consult:    worsening   06/24/24 1127 Wound care Routine Discontinued Rica Pearson MD   06/06/24 0913 Debridement Traumatic Routine Completed Nicholas Gould MD   05/16/24 0901 Debridement Traumatic Routine Completed Nicholas Gould MD   05/02/24 1520 Debridement Traumatic Routine Completed Nicholas Gould MD   04/11/24 0959 Debridement Traumatic Routine Completed Nicholas Gould MD   04/04/24 0915 Debridement Traumatic Left;Lateral Leg Routine Completed Nicholas Gould MD   02/15/24 1154 Debridement Traumatic Left;Lateral Leg Routine Completed Nicholas Gould MD   01/18/24 1511 Debridement Traumatic Left;Lateral Leg Routine Completed Nicholas Gould MD   01/11/24 1710 Debridement Traumatic Left;Lateral Leg Routine Completed Nicholas Gould MD       Compression Wrap 08/22/24 Leg Anterior;Left (Active)   Placement Date: 08/22/24   Location: Leg  Wound Location Orientation: Anterior;Left      Assessments 8/22/2024  9:41 AM 12/26/2024  8:51 AM   Response to Treatment Well tolerated Well tolerated   Compression Layers Multilayer Multilayer   Compression Product Type Coflex Coflex   Dressing Applied Yes Yes   Compression Wrap Location Toes to Knee Toes to Knee   Compression Wrap Status Clean;Dry;Intact Clean;Dry;Intact       No associated orders.          ASSESSMENT AND PLAN:        Risks, benefits, and alternatives of current treatment plan discussed in detail.  Questions and concerns addressed. Red flags to RTC or ED reviewed.  Patient (or parent) agrees to plan.      No follow-ups on file.  Weekly Wound Education Note    Teaching Provided To: Patient  Training Topics: Discharge instructions;Compression;Edema control  Training Method: Demonstration;Explain/Verbal;Written  Training Response: Patient responds and understands        Notes: Pt  tolerating compression wrap however noted rolling down from knee. Continue endoform, transfer, abd and coflex 2 layer wrap with rosidal added . Reviewed compression handout information and elevation for edema control.               Jacqueline GRACE RN   12/26/2024  8:54 AM            [1] No Known Allergies

## 2024-12-31 ENCOUNTER — HOSPITAL ENCOUNTER (INPATIENT)
Facility: HOSPITAL | Age: 59
LOS: 3 days | Discharge: HOME | End: 2025-01-03
Attending: EMERGENCY MEDICINE | Admitting: HOSPITALIST
Payer: COMMERCIAL

## 2024-12-31 ENCOUNTER — APPOINTMENT (OUTPATIENT)
Dept: ULTRASOUND IMAGING | Facility: HOSPITAL | Age: 59
End: 2024-12-31
Attending: EMERGENCY MEDICINE
Payer: COMMERCIAL

## 2024-12-31 ENCOUNTER — APPOINTMENT (OUTPATIENT)
Dept: GENERAL RADIOLOGY | Facility: HOSPITAL | Age: 59
End: 2024-12-31
Attending: EMERGENCY MEDICINE
Payer: COMMERCIAL

## 2024-12-31 DIAGNOSIS — L03.115 CELLULITIS OF RIGHT LOWER EXTREMITY: Primary | ICD-10-CM

## 2024-12-31 LAB
ALBUMIN SERPL-MCNC: 3.9 G/DL (ref 3.2–4.8)
ALBUMIN/GLOB SERPL: 1.1 {RATIO} (ref 1–2)
ALP LIVER SERPL-CCNC: 79 U/L
ALT SERPL-CCNC: 19 U/L
ANION GAP SERPL CALC-SCNC: 6 MMOL/L (ref 0–18)
AST SERPL-CCNC: 23 U/L (ref ?–34)
BASOPHILS # BLD AUTO: 0.07 X10(3) UL (ref 0–0.2)
BASOPHILS NFR BLD AUTO: 0.9 %
BILIRUB SERPL-MCNC: 1.1 MG/DL (ref 0.3–1.2)
BILIRUB UR QL STRIP.AUTO: NEGATIVE
BUN BLD-MCNC: 18 MG/DL (ref 9–23)
CALCIUM BLD-MCNC: 8.8 MG/DL (ref 8.7–10.4)
CHLORIDE SERPL-SCNC: 107 MMOL/L (ref 98–112)
CLARITY UR REFRACT.AUTO: CLEAR
CO2 SERPL-SCNC: 23 MMOL/L (ref 21–32)
COLOR UR AUTO: COLORLESS
CREAT BLD-MCNC: 0.94 MG/DL
EGFRCR SERPLBLD CKD-EPI 2021: 93 ML/MIN/1.73M2 (ref 60–?)
EOSINOPHIL # BLD AUTO: 0.02 X10(3) UL (ref 0–0.7)
EOSINOPHIL NFR BLD AUTO: 0.2 %
ERYTHROCYTE [DISTWIDTH] IN BLOOD BY AUTOMATED COUNT: 12.8 %
GLOBULIN PLAS-MCNC: 3.6 G/DL (ref 2–3.5)
GLUCOSE BLD-MCNC: 113 MG/DL (ref 70–99)
GLUCOSE UR STRIP.AUTO-MCNC: NORMAL MG/DL
HCT VFR BLD AUTO: 39.2 %
HGB BLD-MCNC: 13.6 G/DL
IMM GRANULOCYTES # BLD AUTO: 0.11 X10(3) UL (ref 0–1)
IMM GRANULOCYTES NFR BLD: 1.4 %
KETONES UR STRIP.AUTO-MCNC: NEGATIVE MG/DL
LACTATE SERPL-SCNC: 1 MMOL/L (ref 0.5–2)
LEUKOCYTE ESTERASE UR QL STRIP.AUTO: NEGATIVE
LYMPHOCYTES # BLD AUTO: 0.73 X10(3) UL (ref 1–4)
LYMPHOCYTES NFR BLD AUTO: 9.1 %
MCH RBC QN AUTO: 32.8 PG (ref 26–34)
MCHC RBC AUTO-ENTMCNC: 34.7 G/DL (ref 31–37)
MCV RBC AUTO: 94.5 FL
MONOCYTES # BLD AUTO: 0.66 X10(3) UL (ref 0.1–1)
MONOCYTES NFR BLD AUTO: 8.2 %
NEUTROPHILS # BLD AUTO: 6.47 X10 (3) UL (ref 1.5–7.7)
NEUTROPHILS # BLD AUTO: 6.47 X10(3) UL (ref 1.5–7.7)
NEUTROPHILS NFR BLD AUTO: 80.2 %
NITRITE UR QL STRIP.AUTO: NEGATIVE
NT-PROBNP SERPL-MCNC: 387 PG/ML (ref ?–125)
OSMOLALITY SERPL CALC.SUM OF ELEC: 285 MOSM/KG (ref 275–295)
PH UR STRIP.AUTO: 6 [PH] (ref 5–8)
PLATELET # BLD AUTO: 210 10(3)UL (ref 150–450)
POTASSIUM SERPL-SCNC: 3.2 MMOL/L (ref 3.5–5.1)
PROT SERPL-MCNC: 7.5 G/DL (ref 5.7–8.2)
PROT UR STRIP.AUTO-MCNC: NEGATIVE MG/DL
RBC # BLD AUTO: 4.15 X10(6)UL
RBC UR QL AUTO: NEGATIVE
SODIUM SERPL-SCNC: 136 MMOL/L (ref 136–145)
SP GR UR STRIP.AUTO: 1.01 (ref 1–1.03)
UROBILINOGEN UR STRIP.AUTO-MCNC: NORMAL MG/DL
WBC # BLD AUTO: 8.1 X10(3) UL (ref 4–11)

## 2024-12-31 PROCEDURE — 73590 X-RAY EXAM OF LOWER LEG: CPT | Performed by: EMERGENCY MEDICINE

## 2024-12-31 PROCEDURE — 73552 X-RAY EXAM OF FEMUR 2/>: CPT | Performed by: EMERGENCY MEDICINE

## 2024-12-31 PROCEDURE — 93971 EXTREMITY STUDY: CPT | Performed by: EMERGENCY MEDICINE

## 2024-12-31 PROCEDURE — 99223 1ST HOSP IP/OBS HIGH 75: CPT | Performed by: HOSPITALIST

## 2024-12-31 RX ORDER — ACETAMINOPHEN 500 MG
1000 TABLET ORAL EVERY 4 HOURS PRN
Status: DISCONTINUED | OUTPATIENT
Start: 2024-12-31 | End: 2025-01-03

## 2024-12-31 RX ORDER — ONDANSETRON 2 MG/ML
4 INJECTION INTRAMUSCULAR; INTRAVENOUS EVERY 6 HOURS PRN
Status: DISCONTINUED | OUTPATIENT
Start: 2024-12-31 | End: 2025-01-03

## 2024-12-31 RX ORDER — SENNOSIDES 8.6 MG
17.2 TABLET ORAL NIGHTLY PRN
Status: DISCONTINUED | OUTPATIENT
Start: 2024-12-31 | End: 2025-01-03

## 2024-12-31 RX ORDER — ASPIRIN 81 MG/1
81 TABLET ORAL DAILY
Status: DISCONTINUED | OUTPATIENT
Start: 2024-12-31 | End: 2025-01-03

## 2024-12-31 RX ORDER — MULTIVIT-MIN/IRON FUM/FOLIC AC 7.5 MG-4
1 TABLET ORAL DAILY
COMMUNITY

## 2024-12-31 RX ORDER — BENZONATATE 100 MG/1
200 CAPSULE ORAL 3 TIMES DAILY PRN
Status: DISCONTINUED | OUTPATIENT
Start: 2024-12-31 | End: 2025-01-03

## 2024-12-31 RX ORDER — SODIUM PHOSPHATE, DIBASIC AND SODIUM PHOSPHATE, MONOBASIC 7; 19 G/230ML; G/230ML
1 ENEMA RECTAL ONCE AS NEEDED
Status: DISCONTINUED | OUTPATIENT
Start: 2024-12-31 | End: 2025-01-03

## 2024-12-31 RX ORDER — POTASSIUM CHLORIDE 1500 MG/1
40 TABLET, EXTENDED RELEASE ORAL ONCE
Status: COMPLETED | OUTPATIENT
Start: 2024-12-31 | End: 2024-12-31

## 2024-12-31 RX ORDER — PROCHLORPERAZINE EDISYLATE 5 MG/ML
5 INJECTION INTRAMUSCULAR; INTRAVENOUS EVERY 8 HOURS PRN
Status: DISCONTINUED | OUTPATIENT
Start: 2024-12-31 | End: 2025-01-03

## 2024-12-31 RX ORDER — POLYETHYLENE GLYCOL 3350 17 G/17G
17 POWDER, FOR SOLUTION ORAL DAILY PRN
Status: DISCONTINUED | OUTPATIENT
Start: 2024-12-31 | End: 2025-01-03

## 2024-12-31 RX ORDER — BISACODYL 10 MG
10 SUPPOSITORY, RECTAL RECTAL
Status: DISCONTINUED | OUTPATIENT
Start: 2024-12-31 | End: 2025-01-03

## 2024-12-31 RX ORDER — ONDANSETRON 2 MG/ML
4 INJECTION INTRAMUSCULAR; INTRAVENOUS EVERY 4 HOURS PRN
Status: ACTIVE | OUTPATIENT
Start: 2024-12-31 | End: 2024-12-31

## 2024-12-31 RX ORDER — ENOXAPARIN SODIUM 100 MG/ML
0.5 INJECTION SUBCUTANEOUS EVERY 12 HOURS SCHEDULED
Status: DISCONTINUED | OUTPATIENT
Start: 2024-12-31 | End: 2025-01-02

## 2024-12-31 RX ORDER — FUROSEMIDE 10 MG/ML
40 INJECTION INTRAMUSCULAR; INTRAVENOUS ONCE
Status: COMPLETED | OUTPATIENT
Start: 2024-12-31 | End: 2024-12-31

## 2024-12-31 NOTE — ED QUICK NOTES
Rounding completed.    Plan of care reviewed with patient and/or family.  Patient's vitals and pain updated; as per documentation.     Patient awaiting admission to unit.     Patient requesting food and coffee when he gets to his room. update provided. Call light within reach. Will put in transport

## 2024-12-31 NOTE — ED INITIAL ASSESSMENT (HPI)
Right lower leg and foot redness and swelling noted this early am when pt up to use bathroom. Increased pain to right foot this am. Chronic BLE edema , Denies Fever,

## 2024-12-31 NOTE — ED QUICK NOTES
Patient back from xray. Vanco started. Vitals updated. Patient aware he needs to go to US. Side rails up x 2, Call light in reach.

## 2024-12-31 NOTE — PLAN OF CARE
Patient is alert and oriented. On room air. Patient states last BM two days ago. Left lower extremity wrapped. Per patient sees wound care every Thursday. Wound care consulted. Redness noted to right lower extremity. Pictures upload in chart. No weeping. Saline locked. On regular diet. On iso for MRSA history. Potassium replaced per protocol. Two person skin assessment done with BE Warner.

## 2024-12-31 NOTE — ED QUICK NOTES
PROGRESS NOTE    Subjective:       Patient ID: Aurelia Marlow is a 67 y.o. female.    Dx [facility] type- 8/21/2019  Inguinal LN biopsy    PET with bone marrow uptake throughout appendicular and axial skeleton    Bone marrow biopsy positive for B-cell lymphoma    Stage ARELIS    Chief Complaint:  Follow-up and Results  lymphoma follow up    History of Present Illness:   Aurelia Marlow is a 67 y.o. female who presents for follow up of lymphoma    On chemotherapy.  Is feeling well and tolerated this with only fatigue.      R-CHOP:  Cycle 1: 10/28/2019  Cycle 1: 11/18/2019-due          Echo 9/30/2019 EF 60%  CMP, CBC 10/6/2019 unremarkable    Family and Social history reviewed and is unchanged from 9/27/2019      ROS:  Review of Systems   Constitutional: Negative for appetite change, fever and unexpected weight change.   Eyes: Negative for visual disturbance.   Respiratory: Negative for cough and shortness of breath.    Cardiovascular: Negative for chest pain and leg swelling.   Gastrointestinal: Negative for abdominal pain, blood in stool and diarrhea.   Genitourinary: Negative for frequency and hematuria.   Musculoskeletal: Negative for back pain.   Skin: Negative for rash.   Neurological: Negative for headaches.   Hematological: Negative for adenopathy.   Psychiatric/Behavioral: The patient is not nervous/anxious.           Current Outpatient Medications:     cholecalciferol, vitamin D3, (VITAMIN D3) 2,000 unit Tab, , Disp: , Rfl:     diphenhydrAMINE (BENADRYL) 25 mg capsule, Take 1 capsule (25 mg total) by mouth every 6 (six) hours as needed for Itching or Allergies., Disp: 20 capsule, Rfl: 0    famotidine (PEPCID) 20 MG tablet, Take 1 tablet (20 mg total) by mouth 2 (two) times daily., Disp: 20 tablet, Rfl: 0    fluticasone propionate (FLONASE) 50 mcg/actuation nasal spray, , Disp: , Rfl:     hydroxychloroquine (PLAQUENIL) 200 mg tablet, Take  RN Station report received from Myrna HOLDER RN. Patient is waiting US on  at this time. Plan of care reviewed. Pt will be admitted.   1 tablet (200 mg total) by mouth 2 (two) times daily., Disp: 180 tablet, Rfl: 1    levothyroxine (SYNTHROID) 100 MCG tablet, Take 100 mcg by mouth once daily., Disp: , Rfl:     lidocaine-prilocaine (EMLA) cream, Apply topically as needed., Disp: 25 g, Rfl: 2    naproxen (EC NAPROSYN) 500 MG EC tablet, TAKE ONE TABLET BY MOUTH TWICE DAILY AFTER MEALS AS NEEDED, Disp: 60 tablet, Rfl: 1    omeprazole 20 mg TbEC, Take 20 mg by mouth every evening. , Disp: , Rfl:     traMADol (ULTRAM) 50 mg tablet, Take 1 tablet (50 mg total) by mouth every 12 (twelve) hours as needed for Pain. Dispense generic, Disp: 60 tablet, Rfl: 1    Current Facility-Administered Medications:     lidocaine (PF) 10 mg/ml (1%) injection 10 mg, 1 mL, Intradermal, Once, Kermit Martinez MD        Objective:       Physical Examination:     BP (!) 142/77   Pulse 86   Temp 97.4 °F (36.3 °C)   Resp 18   Wt 68 kg (149 lb 14.4 oz)   BMI 24.19 kg/m²     Physical Exam   Constitutional: She appears well-developed and well-nourished.   HENT:   Head: Normocephalic and atraumatic.   Right Ear: External ear normal.   Left Ear: External ear normal.   Mouth/Throat: Oropharynx is clear and moist.   Eyes: Pupils are equal, round, and reactive to light. Conjunctivae are normal.   Neck: No tracheal deviation present. No thyromegaly present.   Cardiovascular: Normal rate, regular rhythm and normal heart sounds.   Pulmonary/Chest: Effort normal and breath sounds normal.   Abdominal: Soft. Bowel sounds are normal. She exhibits no distension and no mass. There is no tenderness.   Musculoskeletal: She exhibits no edema.   Neurological:   Neuro intact througout   Skin: No rash noted.   Psychiatric: She has a normal mood and affect. Her behavior is normal. Judgment and thought content normal.       Labs:   Recent Results (from the past 336 hour(s))   CBC auto differential    Collection Time: 11/01/19 10:53 AM   Result Value Ref Range    WBC 17.31 (H) 3.90 - 12.70  K/uL    Hemoglobin 11.2 (L) 12.0 - 16.0 g/dL    Hematocrit 33.1 (L) 37.0 - 48.5 %    Platelets 114 (L) 150 - 350 K/uL   CBC auto differential    Collection Time: 10/25/19 12:12 PM   Result Value Ref Range    WBC 6.17 3.90 - 12.70 K/uL    Hemoglobin 12.9 12.0 - 16.0 g/dL    Hematocrit 38.2 37.0 - 48.5 %    Platelets 216 150 - 350 K/uL     CMP  Sodium   Date Value Ref Range Status   11/01/2019 136 136 - 145 mmol/L Final   06/11/2019 139 134 - 144 mmol/L      Potassium   Date Value Ref Range Status   11/01/2019 3.8 3.5 - 5.1 mmol/L Final     Chloride   Date Value Ref Range Status   11/01/2019 101 95 - 110 mmol/L Final   06/11/2019 104 98 - 110 mmol/L      CO2   Date Value Ref Range Status   11/01/2019 29 23 - 29 mmol/L Final     Glucose   Date Value Ref Range Status   11/01/2019 86 70 - 110 mg/dL Final   06/11/2019 86 70 - 99 mg/dL      BUN, Bld   Date Value Ref Range Status   11/01/2019 14 8 - 23 mg/dL Final     Creatinine   Date Value Ref Range Status   11/01/2019 0.6 0.5 - 1.4 mg/dL Final   06/11/2019 0.72 0.60 - 1.40 mg/dL      Calcium   Date Value Ref Range Status   11/01/2019 8.4 (L) 8.7 - 10.5 mg/dL Final     Total Protein   Date Value Ref Range Status   11/01/2019 6.7 6.0 - 8.4 g/dL Final     Albumin   Date Value Ref Range Status   11/01/2019 3.4 (L) 3.5 - 5.2 g/dL Final   06/11/2019 3.8 3.1 - 4.7 g/dL      Total Bilirubin   Date Value Ref Range Status   11/01/2019 1.0 0.1 - 1.0 mg/dL Final     Comment:     For infants and newborns, interpretation of results should be based  on gestational age, weight and in agreement with clinical  observations.  Premature Infant recommended reference ranges:  Up to 24 hours.............<8.0 mg/dL  Up to 48 hours............<12.0 mg/dL  3-5 days..................<15.0 mg/dL  6-29 days.................<15.0 mg/dL       Alkaline Phosphatase   Date Value Ref Range Status   11/01/2019 89 55 - 135 U/L Final     AST   Date Value Ref Range Status   11/01/2019 16 10 - 40 U/L Final      ALT   Date Value Ref Range Status   11/01/2019 13 10 - 44 U/L Final     Anion Gap   Date Value Ref Range Status   11/01/2019 6 (L) 8 - 16 mmol/L Final     eGFR if    Date Value Ref Range Status   11/01/2019 >60.0 >60 mL/min/1.73 m^2 Final     eGFR if non    Date Value Ref Range Status   11/01/2019 >60.0 >60 mL/min/1.73 m^2 Final     Comment:     Calculation used to obtain the estimated glomerular filtration  rate (eGFR) is the CKD-EPI equation.        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  Counts are down a bit but there is nothing in critical zones.  Will continue therapy labs allowing.      Patient will go to [facility] for second opinion this week coming.           Elevated blood pressure, situational     Patient does not have htn and may have white coat syndrome.  Will continue to monitor this for now.  Discussed with patient.                 Discussion:     Follow up in about 4 weeks (around 12/5/2019).      Electronically signed by Pb Fry

## 2024-12-31 NOTE — ED PROVIDER NOTES
Patient Seen in: [facility] Emergency Department      History     Chief Complaint   Patient presents with    Swelling Edema     R leg concern     Cellulitis     Stated Complaint: Pt brought in due to R leg pain over 1+ month; no N/V/D, CMS+, Pulses palpable *    Subjective:   HPI      59-year-old male with past medical history of chronic venous hypertension with recurrent cellulitis presents today for right leg redness.  Patient awoke this evening from sleeping not feeling well.  He looked at his  right leg and noted redness and discomfort.  He denies any fevers or chills.  He denied any trauma.  He now presents for evaluation.    Objective:     Past Medical History:    Cellulitis    to left leg, seeking treatment at wound care for months    Hydrocephalus (HCC)    dx'd as an adult-no shunt              History reviewed. No pertinent surgical history.             Social History     Socioeconomic History    Marital status: Single   Tobacco Use    Smoking status: Never    Smokeless tobacco: Never   Vaping Use    Vaping status: Never Used   Substance and Sexual Activity    Alcohol use: Yes     Comment: 2 a week    Drug use: No   Other Topics Concern    Caffeine Concern No     Comment: 2 COFFEE Q AM    Exercise Yes     Comment: WALKING OCCAS   Social History Narrative    ** Merged History Encounter **          Social Drivers of Health     Financial Resource Strain: Low Risk  (7/22/2024)    Financial Resource Strain     Difficulty of Paying Living Expenses: Not very hard     Med Affordability: No   Food Insecurity: No Food Insecurity (7/25/2024)    Food Insecurity     Food Insecurity: Never true   Transportation Needs: No Transportation Needs (7/25/2024)    Transportation Needs     Lack of Transportation: No   Housing Stability: Low Risk  (7/25/2024)    Housing Stability     Housing Instability: No                  Physical Exam     ED Triage Vitals [12/31/24 0358]   /76   Pulse 93   Resp 20   Temp 98.7 °F  (37.1 °C)   Temp src Oral   SpO2 98 %   O2 Device None (Room air)       Current Vitals:   Vital Signs  BP: 129/75  Pulse: 86  Resp: (!) 31  Temp: 98.7 °F (37.1 °C)  Temp src: Oral  MAP (mmHg): 91    Oxygen Therapy  SpO2: 100 %  O2 Device: None (Room air)        Physical Exam  Vitals and nursing note reviewed.   Constitutional:       Appearance: Normal appearance.   HENT:      Head: Normocephalic.      Nose: Nose normal.      Mouth/Throat:      Mouth: Mucous membranes are moist.   Eyes:      Extraocular Movements: Extraocular movements intact.   Cardiovascular:      Rate and Rhythm: Normal rate.   Pulmonary:      Effort: Pulmonary effort is normal.   Abdominal:      General: Abdomen is flat.   Musculoskeletal:         General: Normal range of motion.      Comments: Right lower extremity swelling.   scattered intact blisters with clear fluid over the leg.  There is a ruptured blister over the lateral aspect of the leg.  Circumferential erythema and warmth up to the mid thigh   Skin:     General: Skin is warm.   Neurological:      General: No focal deficit present.      Mental Status: He is alert.   Psychiatric:         Mood and Affect: Mood normal.         ED Course     Labs Reviewed   COMP METABOLIC PANEL (14) - Abnormal; Notable for the following components:       Result Value    Glucose 113 (*)     Potassium 3.2 (*)     Globulin  3.6 (*)     All other components within normal limits   CBC WITH DIFFERENTIAL WITH PLATELET - Abnormal; Notable for the following components:    RBC 4.15 (*)     Lymphocyte Absolute 0.73 (*)     All other components within normal limits   LACTIC ACID, PLASMA - Normal   URINALYSIS, ROUTINE   RAINBOW DRAW LAVENDER   RAINBOW DRAW LIGHT GREEN   RAINBOW DRAW GOLD   RAINBOW DRAW BLUE   BLOOD CULTURE   BLOOD CULTURE            US VENOUS DOPPLER LEG RIGHT - DIAG IMG (CPT=93971)    Result Date: 12/31/2024  PROCEDURE:  US VENOUS DOPPLER LEG RIGHT - DIAG IMG (CPT=93971)  COMPARISON:  None.   INDICATIONS:  eval for DVT  TECHNIQUE:  Real time, grey scale, and duplex ultrasound was used to evaluate the lower extremity venous system. B-mode two-dimensional images of the vascular structures, Doppler spectral analysis, and color flow.  Doppler imaging were performed.  The following veins were imaged:  Common, deep, and superficial femoral, popliteal, sapheno-femoral junction, posterior tibial veins, and the contralateral common femoral vein.  PATIENT STATED HISTORY: (As transcribed by Technologist)     FINDINGS:  EXTREMITY EXAMINED:  Left leg SAPHENOFEMORAL JUNCTION:  No reflux. THROMBI:  None visible. COMPRESSION:  Normal compressibility, phasicity, and augmentation. OTHER:  Negative.            CONCLUSION:  No sign of DVT left leg   LOCATION:  [facility]    Dictated by (CST): Jose Zhou MD on 12/31/2024 at 10:11 AM     Finalized by (CST): Jose Zhou MD on 12/31/2024 at 10:15 AM       XR FEMUR MIN 2 VIEWS RIGHT (CPT=73552)    Result Date: 12/31/2024  PROCEDURE:  XR FEMUR MIN 2 VIEWS RIGHT (CPT=73552)  TECHNIQUE:  AP and lateral views were obtained.  COMPARISON:  None.  INDICATIONS:  Pt brought in due to R leg pain over 1+ month; no N/V/D, CMS+, Pulses palpable per ems, L leg is being treated by wound care currently.  PATIENT STATED HISTORY: (As transcribed by Technologist)  Patient states he has swelling and pain in his right leg and has a history of infection in his leg.               CONCLUSION:  No fracture dislocation.  Tissue stranding seen in the approximate distal half of the thigh all around the thigh, in the subcutaneous fat concerning for cellulitis in this patient with history of infection, edema also possible.  No gas collection foreign body.  LOCATION:  [facility]   Dictated by (CST): Jose Zhou MD on 12/31/2024 at 8:08 AM     Finalized by (CST): Jose Zhou MD on 12/31/2024 at 8:09 AM       XR TIBIA + FIBULA (2 VIEWS), RIGHT (CPT=73590)    Result Date: 12/31/2024  PROCEDURE:  XR  TIBIA + FIBULA (2 VIEWS), RIGHT (CPT=73590)  TECHNIQUE:  AP and lateral views of the tibia and fibula were obtained.  COMPARISON:  None.  INDICATIONS:  Pt brought in due to R leg pain over 1+ month; no N/V/D, CMS+, Pulses palpable per ems, L leg is being treated by wound care currently.  PATIENT STATED HISTORY: (As transcribed by Technologist)  Patient states he has swelling and pain in his right leg and has a history of infection in his leg.              CONCLUSION:    No acute fracture dislocation.  Degenerative changes in the knee.  Extensive subcutaneous stranding all around the leg upper, mid and lower, without gas collection, foreign body or calcification, concern for cellulitis, given the history of infection.  Edema also possible.   LOCATION:  [facility]   Dictated by (CST): Jose Zhou MD on 12/31/2024 at 8:07 AM     Finalized by (CST): Jose Zhou MD on 12/31/2024 at 8:08 AM             MDM      Differential Diagnosis  59-year-old male presents today for evaluation of right lower extremity swelling.  The right lower extremity is neurovascularly intact.  There is circumferential erythema and swelling up to the mid thigh.  There is some clear blistering over the leg which may be from the patient's history of venous hypertension.  There is a ruptured blister over the right lateral calf which could be the nidus for cellulitis.  Within the differential as well would be DVT.  Plan for labs, x-ray along with ultrasound.  Will give empiric IV antibiotics and reassess.    10:27 am  ultrasound does not demonstrate DVT.  Patient remains stable at this time.  Will admit to the hospital for further care.  I notified the hospitalist of need for admission.    External Chart Reviewed  I reviewed the discharge summary from hospitalizations for cellulitis from July and June    Discussions of Management  I notified the hospitalist of need for admission.    Admission disposition: 12/31/2024 10:27 AM           Medical  Decision Making      Disposition and Plan     Clinical Impression:  1. Cellulitis of right lower extremity         Disposition:  Admit  12/31/2024 10:27 am    Follow-up:  No follow-up provider specified.        Medications Prescribed:  Current Discharge Medication List              Supplementary Documentation:         Hospital Problems       Present on Admission  Date Reviewed: 7/29/2024            ICD-10-CM Noted POA    * (Principal) Cellulitis of right lower extremity L03.115 12/31/2024 Unknown

## 2024-12-31 NOTE — [facility]&P
[facility]     Luis M Estrada Patient Status:  Inpatient    3/25/1965 MRN [id number]   Location [facility] 3NW-A Attending Dexter Dick MD   Hosp Day # 0 PCP SILVER GARCIA DO     Chief Complaint: edema    Subjective:    History of Present Illness:     Luis M Estrada is a 59 year old male with left lower extremity cellulitis, hydrocephalus, morbid obesity who presents with lower extremity edema.  Has a history of chronic, recurrent cellulitis of his legs due to chronic venous stasis.  Woke up this evening not feeling well.  Noticed his right leg was more red and swollen and painful than usual.  Denies any fever, chills.  No other symptoms.    History/Other:    Past Medical History:  Past Medical History:    Cellulitis    to left leg, seeking treatment at wound care for months    Hydrocephalus (HCC)    dx'd as an adult-no shunt     Past Surgical History:   History reviewed. No pertinent surgical history.   Family History:   No family history on file.  Social History:    reports that he has never smoked. He has never used smokeless tobacco. He reports current alcohol use. He reports that he does not use drugs.     Allergies: Allergies[1]    Medications:  Medications Ordered Prior to Encounter[2]    Review of Systems:   A comprehensive review of systems was completed.    Pertinent positives and negatives noted in the HPI.    Objective:   Physical Exam:    /75   Pulse 86   Temp 98.7 °F (37.1 °C) (Oral)   Resp (!) 31   Ht 5' 7\" (1.702 m)   Wt (!) 330 lb (149.7 kg)   SpO2 100%   BMI 51.69 kg/m²   General: No acute distress, Alert  Respiratory: No rhonchi, no wheezes  Cardiovascular: S1, S2. Regular rate and rhythm  Abdomen: Soft, Non-tender, non-distended, positive bowel sounds  Neuro: No new focal deficits  Extremities: Bilateral lower extremity edema, erythema right greater than left    Results:    Labs:      Labs Last 24 Hours:    Recent Labs   Lab  12/31/24  0411   RBC 4.15*   HGB 13.6   HCT 39.2   MCV 94.5   MCH 32.8   MCHC 34.7   RDW 12.8   NEPRELIM 6.47   WBC 8.1   .0       Recent Labs   Lab 12/31/24  0411   *   BUN 18   CREATSERUM 0.94   EGFRCR 93   CA 8.8   ALB 3.9      K 3.2*      CO2 23.0   ALKPHO 79   AST 23   ALT 19   BILT 1.1   TP 7.5       No results found for: \"PT\", \"INR\"    No results for input(s): \"TROP\", \"TROPHS\", \"CK\" in the last 168 hours.    No results for input(s): \"TROP\", \"PBNP\" in the last 168 hours.    No results for input(s): \"PCT\" in the last 168 hours.    Imaging: Imaging data reviewed in Epic.    Assessment & Plan:      # Right greater than left, bilateral lower extremity edema and erythema suspected acute cellulitis  -Possible cellulitis +/- chronic venous stasis  -Empiric IV antibiotics  -Procalcitonin  -IV Lasix  -Elevate legs  -Await blood cultures    #Morbid obesity  -BMI 51    #History of hydrocephalus    #Hypokalemia        Plan of care discussed with patient, ED physician    Braxton Ledbetter MD    Supplementary Documentation:     The 21st Century Cures Act makes medical notes like these available to patients in the interest of transparency. Please be advised this is a medical document. Medical documents are intended to carry relevant information, facts as evident, and the clinical opinion of the practitioner. The medical note is intended as peer to peer communication and may appear blunt or direct. It is written in medical language and may contain abbreviations or verbiage that are unfamiliar.                                     [1] No Known Allergies  [2]   No current facility-administered medications on file prior to encounter.     Current Outpatient Medications on File Prior to Encounter   Medication Sig Dispense Refill    Multiple Vitamins-Minerals (MULTI-VITAMIN/MINERALS) Oral Tab Take 1 tablet by mouth daily.      aspirin 81 MG Oral Tab Take 1 tablet (81 mg total) by mouth daily.

## 2024-12-31 NOTE — SPIRITUAL CARE NOTE
Spiritual Care Visit Note    Patient Name: Luis M Estrada Date of Spiritual Care Visit: 24   : 3/25/1965 Primary Dx: Cellulitis of right lower extremity       Referred By:      Spiritual Care Taxonomy:    Intended Effects: Demonstrate caring and concern    Methods: Collaborate with care team member;Offer support    Interventions: Assist someone with Advance Directives;Identify supportive relationship(s);Explain  role;Prayer for healing    Visit Type/Summary:  Offered a compassionate, listening presence.  Luis M confirmed that his sister Priscilla is his PoA.   asked patient, if possible, that sometime a copy could be brought to the hospital to scan into his medical chart.  Patient understood, but indicated this might not be able to happen during this hospitalization.  Luis M explained that he does not like to miss work, but understands that he may be in the hospital for a few days.  He misses some of the relationships that he used to have at work.  He asked for prayers for these co-workers and prayer also offered for his healing.     - Spiritual Care: Consulted with RN prior to visit. Offered empathic listening and emotional support. Provided information regarding how to contact Spiritual Care and left a Spiritual Care information card. Provided support for Patient's spiritual/Congregational requests. Offered prayer.  - PoA: Other:  remains available for follow up.    Spiritual Care support can be requested via an [employer] consult. For urgent/immediate needs, please contact the On Call  at: [place]: ext [phone]

## 2025-01-01 LAB
ALBUMIN SERPL-MCNC: 3.4 G/DL (ref 3.2–4.8)
ALBUMIN/GLOB SERPL: 1 {RATIO} (ref 1–2)
ALP LIVER SERPL-CCNC: 72 U/L
ALT SERPL-CCNC: 13 U/L
ANION GAP SERPL CALC-SCNC: 11 MMOL/L (ref 0–18)
AST SERPL-CCNC: 18 U/L (ref ?–34)
BASOPHILS # BLD AUTO: 0.09 X10(3) UL (ref 0–0.2)
BASOPHILS NFR BLD AUTO: 1.3 %
BILIRUB SERPL-MCNC: 1.1 MG/DL (ref 0.3–1.2)
BUN BLD-MCNC: 16 MG/DL (ref 9–23)
CALCIUM BLD-MCNC: 8.7 MG/DL (ref 8.7–10.4)
CHLORIDE SERPL-SCNC: 108 MMOL/L (ref 98–112)
CO2 SERPL-SCNC: 24 MMOL/L (ref 21–32)
CREAT BLD-MCNC: 0.84 MG/DL
EGFRCR SERPLBLD CKD-EPI 2021: 100 ML/MIN/1.73M2 (ref 60–?)
EOSINOPHIL # BLD AUTO: 0.15 X10(3) UL (ref 0–0.7)
EOSINOPHIL NFR BLD AUTO: 2.2 %
ERYTHROCYTE [DISTWIDTH] IN BLOOD BY AUTOMATED COUNT: 13 %
GLOBULIN PLAS-MCNC: 3.4 G/DL (ref 2–3.5)
GLUCOSE BLD-MCNC: 98 MG/DL (ref 70–99)
HCT VFR BLD AUTO: 37.8 %
HGB BLD-MCNC: 13 G/DL
IMM GRANULOCYTES # BLD AUTO: 0.21 X10(3) UL (ref 0–1)
IMM GRANULOCYTES NFR BLD: 3.1 %
LYMPHOCYTES # BLD AUTO: 1.01 X10(3) UL (ref 1–4)
LYMPHOCYTES NFR BLD AUTO: 14.8 %
MCH RBC QN AUTO: 32.2 PG (ref 26–34)
MCHC RBC AUTO-ENTMCNC: 34.4 G/DL (ref 31–37)
MCV RBC AUTO: 93.6 FL
MONOCYTES # BLD AUTO: 0.71 X10(3) UL (ref 0.1–1)
MONOCYTES NFR BLD AUTO: 10.4 %
NEUTROPHILS # BLD AUTO: 4.65 X10 (3) UL (ref 1.5–7.7)
NEUTROPHILS # BLD AUTO: 4.65 X10(3) UL (ref 1.5–7.7)
NEUTROPHILS NFR BLD AUTO: 68.2 %
OSMOLALITY SERPL CALC.SUM OF ELEC: 297 MOSM/KG (ref 275–295)
PLATELET # BLD AUTO: 223 10(3)UL (ref 150–450)
POTASSIUM SERPL-SCNC: 3.4 MMOL/L (ref 3.5–5.1)
POTASSIUM SERPL-SCNC: 3.4 MMOL/L (ref 3.5–5.1)
PROT SERPL-MCNC: 6.8 G/DL (ref 5.7–8.2)
RBC # BLD AUTO: 4.04 X10(6)UL
SODIUM SERPL-SCNC: 143 MMOL/L (ref 136–145)
WBC # BLD AUTO: 6.8 X10(3) UL (ref 4–11)

## 2025-01-01 PROCEDURE — 99232 SBSQ HOSP IP/OBS MODERATE 35: CPT | Performed by: HOSPITALIST

## 2025-01-01 RX ORDER — FUROSEMIDE 10 MG/ML
40 INJECTION INTRAMUSCULAR; INTRAVENOUS ONCE
Status: COMPLETED | OUTPATIENT
Start: 2025-01-01 | End: 2025-01-01

## 2025-01-01 RX ORDER — POTASSIUM CHLORIDE 1500 MG/1
40 TABLET, EXTENDED RELEASE ORAL ONCE
Status: COMPLETED | OUTPATIENT
Start: 2025-01-01 | End: 2025-01-01

## 2025-01-01 NOTE — PLAN OF CARE
Pt vitals stable, A&O x4. Denied chest pain and shortness of breath. Denied pain at this time. Redness and swelling noted to bilateral legs. Tolerating diet without nausea. Bed locked in low position, call light in reach, rounding provided.

## 2025-01-01 NOTE — PHYSICAL THERAPY NOTE
PHYSICAL THERAPY EVALUATION - INPATIENT     Room Number: 309/309-A  Evaluation Date: 1/1/2025  Type of Evaluation: Initial  Physician Order: PT Eval and Treat    Presenting Problem: R LE increased redness, swelling, pain  Co-Morbidities : Chronic B LE edema, morbid obesity, hydrocephalus, chronic venous hypotension, recurrent cellulitis  Reason for Therapy: Mobility Dysfunction and Discharge Planning    PHYSICAL THERAPY ASSESSMENT   Patient is a 59 year old male admitted 12/31/2024 for increased redness, swelling and pain in the R LE.  Prior to admission, patient's baseline is independent gait without device.  Patient is currently functioning below baseline with bed mobility, transfers, gait, and standing prolonged periods.  Patient is requiring stand-by assist and contact guard assist as a result of the following impairments: decreased functional strength, decreased endurance/aerobic capacity, pain, impaired standing balance, decreased muscular endurance, and medical status.  Physical Therapy will continue to follow for duration of hospitalization.    Patient will benefit from continued skilled PT Services for duration of hospitalization, however, given the patient is functioning near baseline level do not anticipate skilled therapy needs at discharge .    PLAN DURING HOSPITALIZATION  Nursing Mobility Recommendation : 1 Assist  PT Device Recommendation: Rolling walker  PT Treatment Plan: Bed mobility;Patient education;Gait training;Range of motion;Strengthening;Transfer training  Rehab Potential : Good  Frequency (Obs): 3-5x/week     CURRENT GOALS    Goal #1 Patient is able to demonstrate supine - sit EOB @ level: modified independent     Goal #2 Patient is able to demonstrate transfers Sit to/from Stand at assistance level: modified independent     Goal #3 Patient is able to ambulate 200 feet with assist device: none at assistance level: independent     Goal #4    Goal #5    Goal #6    Goal Comments: Goals  established on 2025      PHYSICAL THERAPY MEDICAL/SOCIAL HISTORY  History related to current admission: Patient is a 59 year old male admitted on 2024 from home for increased swelling, redness and pain in the R LE.  Pt diagnosed with cellulitis of R LE.    HOME SITUATION  Type of Home: Condo (Basement condo with elevator access)  Home Layout: One level  Stairs to Enter : 0                  Lives With: Alone    Drives: No   Patient Regularly Uses: Glasses      Prior Level of [place]: Patient is independent gait without device, works as a  at [employer] as much as he can, walks to [employer] most of the time or gets a ride from friend/neighbor or cab in the winter.   Patient reports he has a walker at home somewhere.     SUBJECTIVE  \"My right leg feels better but it is still bothering me\"    OBJECTIVE  Precautions: None  Fall Risk: Standard fall risk    WEIGHT BEARING RESTRICTION     PAIN ASSESSMENT  Ratin  Location: LE  Management Techniques: Activity promotion    COGNITION  Overall Cognitive Status:  WFL - within functional limits    RANGE OF MOTION AND STRENGTH ASSESSMENT  Upper extremity ROM and strength are within functional limits     Lower extremity ROM is within functional limits     Lower extremity strength is within functional limits     BALANCE  Static Sitting: Fair  Dynamic Sitting: Fair -  Static Standing: Fair - (with and without RW)  Dynamic Standing: Fair - (with and without RW)    ADDITIONAL TESTS                                    ACTIVITY TOLERANCE                         O2 WALK       NEUROLOGICAL FINDINGS                        AM-PAC '6-Clicks' INPATIENT SHORT FORM - BASIC MOBILITY  How much difficulty does the patient currently have...  Patient Difficulty: Turning over in bed (including adjusting bedclothes, sheets and blankets)?: A Little   Patient Difficulty: Sitting down on and standing up from a chair with arms (e.g., wheelchair, bedside commode, etc.): A Little   Patient  Difficulty: Moving from lying on back to sitting on the side of the bed?: A Little   How much help from another person does the patient currently need...   Help from Another: Moving to and from a bed to a chair (including a wheelchair)?: A Little   Help from Another: Need to walk in hospital room?: A Little   Help from Another: Climbing 3-5 steps with a railing?: A Little     AM-PAC Score:  Raw Score: 18   Approx Degree of Impairment: 46.58%   Standardized Score (AM-PAC Scale): 43.63   CMS Modifier (G-Code): CK    FUNCTIONAL ABILITY STATUS  Gait Assessment   Functional Mobility/Gait Assessment  Gait Assistance: Contact guard assist (cga-SBA)  Distance (ft): 65, 7  Assistive Device: Rolling walker;None  Pattern:  (Decr gopi/step length/heel-toe pattern)    Skilled Therapy Provided     Bed Mobility:  Rolling: NT  Supine to sit: NT   Sit to supine: NT     Transfer Mobility:  Sit to stand: cga initially then improved to modified independent from the toilet  Stand to sit: supervision improved to modified independent  Gait = cga initially, improved to SBA with the RW; cga-SBA without the RW short distance from bathroom to the recliner chair    Therapist's Comments: RN approved session, patient is sitting in the recliner chair with LE elevated.  Patient performed transfers and gait as above using the RW in the hallway to the bathroom, patient used the toilet after gait in the hallway, patient was able to ambulate without the RW from bathroom door to the chair.  Patient stated being pleased during gait and post-gait after returning to the chair, \"It feels good to walk\".    Exercise/Education Provided:  Discussed role of PT, goals for the session, POC, LE exs, activity recommendations; patient was encouraged to use the bathroom instead of using the urinal, perform LE exs as able, elevate LE, ambulate as able to prevent more decline in strength, endurance, functional mobility; patient verbalized understanding.     Patient  End of Session: Up in chair;Needs met;Call light within reach;RN aware of session/findings;All patient questions and concerns addressed;Hospital anti-slip socks      Patient Evaluation Complexity Level:  History Moderate - 1 or 2 personal factors and/or co-morbidities   Examination of body systems Moderate - addressing a total of 3 or more elements   Clinical Presentation  Moderate - Evolving   Clinical Decision Making Low Complexity       PT Session Time: 30 minutes  Gait Training: 10 minutes  Therapeutic Activity: 8 minutes  Neuromuscular Re-education:  minutes  Therapeutic Exercise: 5 minutes

## 2025-01-01 NOTE — PROGRESS NOTES
[facility]   part of [facility]     Hospitalist Progress Note     Luis M Estrada Patient Status:  Inpatient    3/25/1965 MRN [id number]   Location [facility] 3NW-A Attending Dexter Dick MD   Hosp Day # 1 PCP SILVER GARCIA DO     Chief Complaint: RLE cellulitis    Subjective:     Patient slightly better    Objective:    Review of Systems:   A comprehensive review of systems was completed; pertinent positive and negatives stated in subjective.    Vital signs:  Temp:  [98 °F (36.7 °C)-98.2 °F (36.8 °C)] 98.2 °F (36.8 °C)  Pulse:  [68-76] 68  Resp:  [18-19] 19  BP: (127-134)/(69-74) 127/74  SpO2:  [98 %-99 %] 99 %    Physical Exam:    General: No acute distress  Respiratory: No wheezes, no rhonchi  Cardiovascular: S1, S2, regular rate and rhythm  Abdomen: Soft, Non-tender, non-distended, positive bowel sounds  Neuro: No new focal deficits.   Extremities: RLE edema and cellulitis      Diagnostic Data:    Labs:  Recent Labs   Lab 24  0411 25  0605   WBC 8.1 6.8   HGB 13.6 13.0   MCV 94.5 93.6   .0 223.0       Recent Labs   Lab 24  0411 25  0605   * 98   BUN 18 16   CREATSERUM 0.94 0.84   CA 8.8 8.7   ALB 3.9 3.4    143   K 3.2* 3.4*  3.4*    108   CO2 23.0 24.0   ALKPHO 79 72   AST 23 18   ALT 19 13   BILT 1.1 1.1   TP 7.5 6.8       Estimated Glomerular Filtration Rate: 100.5 mL/min/1.73m2 (by CKD-EPI based on SCr of 0.84 mg/dL).    No results for input(s): \"TROP\", \"TROPHS\", \"CK\" in the last 168 hours.    No results for input(s): \"PTP\", \"INR\" in the last 168 hours.               Microbiology    Hospital Encounter on 24   1. Blood Culture     Status: None (Preliminary result)    Collection Time: 24  6:32 AM    Specimen: Blood,peripheral   Result Value Ref Range    Blood Culture Result No Growth 1 Day N/A         Imaging: Reviewed in Epic.    Medications:    aspirin  81 mg Oral Daily    enoxaparin  0.5 mg/kg Subcutaneous 2 times per  day    ceFAZolin  2 g Intravenous Q8H [facility]       Assessment & Plan:      # Right greater than left, bilateral lower extremity edema and erythema suspected acute cellulitis  -Possible cellulitis +/- chronic venous stasis  -IV antibiotics, ancef  -IV Lasix, repeat today   -previous cxs PSE, MRSA but thought to be colonization  -MRSA shea  -monitor on ancef, if does not improve, will change to vanc  -Elevate legs  -blood cultures ngtd     #Morbid obesity  -BMI 51     #History of hydrocephalus     #Hypokalemia    Braxton Ledbetter MD    Supplementary Documentation:     Quality:  DVT Mechanical Prophylaxis:   SCDs,    DVT Pharmacologic Prophylaxis   Medication    enoxaparin (Lovenox) 80 MG/0.8ML SUBQ injection 70 mg         DVT Pharmacologic prophylaxis: Aspirin 81 mg      Code Status: Full Code  Cooper: No urinary catheter in place  Cooper Duration (in days):   Central line:    YAA:     Discharge is dependent on: course  At this point Mr. Estrada is expected to be discharge to: home    The 21st Century Cures Act makes medical notes like these available to patients in the interest of transparency. Please be advised this is a medical document. Medical documents are intended to carry relevant information, facts as evident, and the clinical opinion of the practitioner. The medical note is intended as peer to peer communication and may appear blunt or direct. It is written in medical language and may contain abbreviations or verbiage that are unfamiliar.

## 2025-01-02 ENCOUNTER — APPOINTMENT (OUTPATIENT)
Dept: WOUND CARE | Facility: HOSPITAL | Age: 60
End: 2025-01-02
Payer: COMMERCIAL

## 2025-01-02 LAB
ANION GAP SERPL CALC-SCNC: 10 MMOL/L (ref 0–18)
BASOPHILS # BLD: 0.06 X10(3) UL (ref 0–0.2)
BASOPHILS NFR BLD: 1 %
BUN BLD-MCNC: 15 MG/DL (ref 9–23)
CALCIUM BLD-MCNC: 8.8 MG/DL (ref 8.7–10.4)
CHLORIDE SERPL-SCNC: 108 MMOL/L (ref 98–112)
CO2 SERPL-SCNC: 25 MMOL/L (ref 21–32)
CREAT BLD-MCNC: 0.82 MG/DL
EGFRCR SERPLBLD CKD-EPI 2021: 101 ML/MIN/1.73M2 (ref 60–?)
EOSINOPHIL # BLD: 0.41 X10(3) UL (ref 0–0.7)
EOSINOPHIL NFR BLD: 7 %
ERYTHROCYTE [DISTWIDTH] IN BLOOD BY AUTOMATED COUNT: 13.2 %
GLUCOSE BLD-MCNC: 104 MG/DL (ref 70–99)
HCT VFR BLD AUTO: 37.7 %
HGB BLD-MCNC: 12.9 G/DL
LYMPHOCYTES NFR BLD: 0.75 X10(3) UL (ref 1–4)
LYMPHOCYTES NFR BLD: 13 %
MCH RBC QN AUTO: 32.7 PG (ref 26–34)
MCHC RBC AUTO-ENTMCNC: 34.2 G/DL (ref 31–37)
MCV RBC AUTO: 95.4 FL
MONOCYTES # BLD: 0.35 X10(3) UL (ref 0.1–1)
MONOCYTES NFR BLD: 6 %
MORPHOLOGY: NORMAL
NEUTROPHILS # BLD AUTO: 3.52 X10 (3) UL (ref 1.5–7.7)
NEUTROPHILS NFR BLD: 72 %
NEUTS BAND NFR BLD: 1 %
NEUTS HYPERSEG # BLD: 4.23 X10(3) UL (ref 1.5–7.7)
OSMOLALITY SERPL CALC.SUM OF ELEC: 297 MOSM/KG (ref 275–295)
PLATELET # BLD AUTO: 257 10(3)UL (ref 150–450)
PLATELET MORPHOLOGY: NORMAL
POTASSIUM SERPL-SCNC: 3.7 MMOL/L (ref 3.5–5.1)
POTASSIUM SERPL-SCNC: 3.7 MMOL/L (ref 3.5–5.1)
RBC # BLD AUTO: 3.95 X10(6)UL
SODIUM SERPL-SCNC: 143 MMOL/L (ref 136–145)
TOTAL CELLS COUNTED BLD: 100
UFH PPP CHRO-ACNC: 0.51 IU/ML
WBC # BLD AUTO: 5.8 X10(3) UL (ref 4–11)

## 2025-01-02 PROCEDURE — 99232 SBSQ HOSP IP/OBS MODERATE 35: CPT | Performed by: HOSPITALIST

## 2025-01-02 RX ORDER — ENOXAPARIN SODIUM 100 MG/ML
60 INJECTION SUBCUTANEOUS EVERY 12 HOURS SCHEDULED
Status: DISCONTINUED | OUTPATIENT
Start: 2025-01-02 | End: 2025-01-03

## 2025-01-02 NOTE — PROGRESS NOTES
Patient A&Ox4. Vital signs stable on room air. Denies pain at this time. Left leg dressed with wound care dressing. Patient refusing dressing removal at this time. Right leg warm, dry, clean, red, and tender. Skin peeling, no open wounds or weeping. Bilateral pulses weak. Regular diet being tolerated well. Patient updated on plan of care. Questions and concerns discussed.

## 2025-01-02 NOTE — PROGRESS NOTES
A&Ox4. VSS. RA.   GI: Abdomen soft, nondistended. Passing gas.  Denies nausea.  : Voids.  Pain controlled with PRN pain medications  Up with standby assist and walker   RLE cellulitis, BLE lymphedema, wound care following, see note for updated wound care information  Diet: Regular  IVF running per order. IV ancef   All appropriate safety measures in place. Patient updated on plan of care. All questions and concerns addressed.

## 2025-01-02 NOTE — PROGRESS NOTES
[facility]   part of [facility]     Hospitalist Progress Note     Luis M Estrada Patient Status:  Inpatient    3/25/1965 MRN [id number]   [facility] 3NW-A Attending Dexter Dick MD   Hosp Day # 2 PCP SILVER GARCIA DO     Chief Complaint: RLE cellulitis    Subjective:     Patient slightly better but not significantly     Objective:    Review of Systems:   A comprehensive review of systems was completed; pertinent positive and negatives stated in subjective.    Vital signs:  Temp:  [97.8 °F (36.6 °C)-98.3 °F (36.8 °C)] 98.3 °F (36.8 °C)  Pulse:  [69-85] 69  Resp:  [17-22] 19  BP: (115-134)/(68-70) 120/68  SpO2:  [96 %-100 %] 98 %    Physical Exam:    General: No acute distress  Respiratory: No wheezes, no rhonchi  Cardiovascular: S1, S2, regular rate and rhythm  Abdomen: Soft, Non-tender, non-distended, positive bowel sounds  Neuro: No new focal deficits.   Extremities: RLE edema and           Diagnostic Data:    Labs:  Recent Labs   Lab 24  0605 25  0557   WBC 8.1 6.8 5.8   HGB 13.6 13.0 12.9*   MCV 94.5 93.6 95.4   .0 223.0 257.0   BAND  --   --  1       Recent Labs   Lab 24  0605 25  0557   * 98 104*   BUN 18 16 15   CREATSERUM 0.94 0.84 0.82   CA 8.8 8.7 8.8   ALB 3.9 3.4  --     143 143   K 3.2* 3.4*  3.4* 3.7  3.7    108 108   CO2 23.0 24.0 25.0   ALKPHO 79 72  --    AST 23 18  --    ALT 19 13  --    BILT 1.1 1.1  --    TP 7.5 6.8  --        Estimated Glomerular Filtration Rate: 101.2 mL/min/1.73m2 (by CKD-EPI based on SCr of 0.82 mg/dL).    No results for input(s): \"TROP\", \"TROPHS\", \"CK\" in the last 168 hours.    No results for input(s): \"PTP\", \"INR\" in the last 168 hours.               Microbiology    Hospital Encounter on 24   1. Blood Culture     Status: None (Preliminary result)    Collection Time: 24  6:32 AM    Specimen: Blood,peripheral   Result Value Ref Range    Blood  Culture Result No Growth 2 Days N/A         Imaging: Reviewed in Epic.    Medications:    enoxaparin  60 mg Subcutaneous 2 times per day    aspirin  81 mg Oral Daily    ceFAZolin  2 g Intravenous Q8H [facility]       Assessment & Plan:      # Right greater than left, bilateral lower extremity edema and erythema suspected acute cellulitis  -Possible cellulitis +/- chronic venous stasis  -IV antibiotics, ancef. Seems to be improving  -IV Lasix, repeat again today   -previous cxs PSE, MRSA but thought to be colonization  -MRSA nares pending but is improving on ancef. Would cont even if mrsa nares +.   -Elevate legs  -blood cultures ngtd     #Morbid obesity  -BMI 51     #History of hydrocephalus     #Hypokalemia    Braxton Ledbetter MD    Supplementary Documentation:     Quality:  DVT Mechanical Prophylaxis:   SCDs,    DVT Pharmacologic Prophylaxis   Medication    enoxaparin (Lovenox) 60 MG/0.6ML SUBQ injection 60 mg         DVT Pharmacologic prophylaxis: Aspirin 81 mg      Code Status: Full Code  Cooper: No urinary catheter in place  Cooper Duration (in days):   Central line:    YAA:     Discharge is dependent on: course  At this point Mr. Estrada is expected to be discharge to: home    The 21st Century Cures Act makes medical notes like these available to patients in the interest of transparency. Please be advised this is a medical document. Medical documents are intended to carry relevant information, facts as evident, and the clinical opinion of the practitioner. The medical note is intended as peer to peer communication and may appear blunt or direct. It is written in medical language and may contain abbreviations or verbiage that are unfamiliar.

## 2025-01-02 NOTE — PHYSICAL THERAPY NOTE
PHYSICAL THERAPY TREATMENT NOTE - INPATIENT    Room Number: 309/309-A     Session: 1          Presenting Problem: R LE increased redness, swelling, pain  Co-Morbidities : Chronic B LE edema, morbid obesity, hydrocephalus, chronic venous hypotension, recurrent cellulitis    PHYSICAL THERAPY ASSESSMENT   Patient demonstrates good  progress this session, goals  updated to reflect patient performance.      Patient is requiring supervision as a result of the following impairments: decreased functional strength and decreased muscular endurance.     Patient continues to function below baseline with gait and standing prolonged periods.  Next session anticipate patient to progress gait.  Physical Therapy will continue to follow patient for duration of hospitalization.    Patient continues to benefit from continued skilled PT services: for duration of hospitalization, however, given the patient is functioning near baseline level do not anticipate skilled therapy needs at discharge .    PLAN DURING HOSPITALIZATION  Nursing Mobility Recommendation : 1 Assist  PT Device Recommendation: Rolling walker  PT Treatment Plan: Bed mobility;Patient education;Gait training;Range of motion;Strengthening;Transfer training  Frequency (Obs): 3-5x/week     CURRENT GOALS       Goal #1 Patient is able to demonstrate supine - sit EOB @ level: modified independent      Goal #2 Patient is able to demonstrate transfers Sit to/from Stand at assistance level: modified independent      Goal #3 Patient is able to ambulate 200 feet with assist device: none at assistance level: independent      Goal #4     Goal #5     Goal #6     Goal Comments: Goals established on 1/1/2025 1/2/2025 all goals ongoing   HOME SITUATION  Type of Home: Condo (Basement condo with elevator access)  Home Layout: One level  Stairs to Enter : 0                  Lives With: Alone    Drives: No   Patient Regularly Uses: Glasses      PHYSICAL THERAPY EVALUATION - INPATIENT       Room Number: 309/309-A  Evaluation Date: 1/1/2025  Type of Evaluation: Initial  Physician Order: PT Eval and Treat     Presenting Problem: R LE increased redness, swelling, pain  Co-Morbidities : Chronic B LE edema, morbid obesity, hydrocephalus, chronic venous hypotension, recurrent cellulitis  Reason for Therapy: Mobility Dysfunction and Discharge Planning     PHYSICAL THERAPY ASSESSMENT   Patient is a 59 year old male admitted 12/31/2024 for increased redness, swelling and pain in the R LE.  Prior to admission, patient's baseline is independent gait without device.  Patient is currently functioning below baseline with bed mobility, transfers, gait, and standing prolonged periods.  Patient is requiring stand-by assist and contact guard assist as a result of the following impairments: decreased functional strength, decreased endurance/aerobic capacity, pain, impaired standing balance, decreased muscular endurance, and medical status.  Physical Therapy will continue to follow for duration of hospitalization.     Patient will benefit from continued skilled PT Services for duration of hospitalization, however, given the patient is functioning near baseline level do not anticipate skilled therapy needs at discharge .     PLAN DURING HOSPITALIZATION  Nursing Mobility Recommendation : 1 Assist  PT Device Recommendation: Rolling walker  PT Treatment Plan: Bed mobility;Patient education;Gait training;Range of motion;Strengthening;Transfer training  Rehab Potential : Good  Frequency (Obs): 3-5x/week      CURRENT GOALS     Goal #1 Patient is able to demonstrate supine - sit EOB @ level: modified independent      Goal #2 Patient is able to demonstrate transfers Sit to/from Stand at assistance level: modified independent      Goal #3 Patient is able to ambulate 200 feet with assist device: none at assistance level: independent      Goal #4     Goal #5     Goal #6     Goal Comments: Goals established on 1/1/2025         PHYSICAL THERAPY MEDICAL/SOCIAL HISTORY  History related to current admission: Patient is a 59 year old male admitted on 2024 from home for increased swelling, redness and pain in the R LE.  Pt diagnosed with cellulitis of R LE.     HOME SITUATION  Type of Home: Condo (Basement condo with elevator access)  Home Layout: One level  Stairs to Enter : 0              Lives With: Alone    Drives: No   Patient Regularly Uses: Glasses       Prior Level of [place]: Patient is independent gait without device, works as a  at [employer] as much as he can, walks to [employer] most of the time or gets a ride from friend/neighbor or cab in the winter.   Patient reports he has a walker at home somewhere.        SUBJECTIVE  \"I'm on my feet a lot for work\"     OBJECTIVE  Precautions: None    WEIGHT BEARING RESTRICTION     PAIN ASSESSMENT   Ratin  Location: RLE  Management Techniques: Activity promotion;Body mechanics;Breathing techniques;Relaxation;Repositioning    BALANCE                                                                                                                       Static Sitting: Fair +  Dynamic Sitting: Fair +           Static Standing: Fair  Dynamic Standing: Fair -    ACTIVITY TOLERANCE                         O2 WALK       AM-PAC '6-Clicks' INPATIENT SHORT FORM - BASIC MOBILITY  How much difficulty does the patient currently have...  Patient Difficulty: Turning over in bed (including adjusting bedclothes, sheets and blankets)?: None   Patient Difficulty: Sitting down on and standing up from a chair with arms (e.g., wheelchair, bedside commode, etc.): A Little   Patient Difficulty: Moving from lying on back to sitting on the side of the bed?: None   How much help from another person does the patient currently need...   Help from Another: Moving to and from a bed to a chair (including a wheelchair)?: None   Help from Another: Need to walk in hospital room?: A Little   Help from Another:  Climbing 3-5 steps with a railing?: A Little     AM-PAC Score:  Raw Score: 21   Approx Degree of Impairment: 28.97%   Standardized Score (AM-PAC Scale): 50.25   CMS Modifier (G-Code): CJ    FUNCTIONAL ABILITY STATUS  Gait Assessment   Functional Mobility/Gait Assessment  Gait Assistance: Contact guard assist;Supervision  Distance (ft): 400  Assistive Device: Rolling walker  Pattern: Shuffle (decreased gopi)    Skilled Therapy Provided  Pt presents in bed   Therapist educated pt on benefits of mobility to prevent further deconditioning   Pt was gait trained c RW, pt demos very slow gopi and takes multiple standing rest breaks, however, no LOB noted   Pt left in recliner BLE elevated and on pillow     Bed Mobility:  Rolling: CGA    Supine<>Sit: CGA cues for sequencing    Sit<>Supine: nt      Transfer Mobility:  Sit<>Stand: CGA cues for hand placement    Stand<>Sit: supervision    Gait: CGA to supervision     Therapist's Comments: pt denies increase in pain c mobility           Patient End of Session: Up in chair;Needs met;Call light within reach;RN aware of session/findings;All patient questions and concerns addressed;[facility] anti-slip socks    PT Session Time: 45 minutes  Gait Trainin minutes  Therapeutic Activity: 15 minutes  Therapeutic Exercise:  minutes   Neuromuscular Re-education:  minutes

## 2025-01-02 NOTE — CONSULTS
[facility]  Report of Inpatient Wound Care Consultation    Luis M Estrada Patient Status:  Inpatient    3/25/1965 MRN [id number]   Location [facility] 3NW-A Attending Dexter Dick MD   Hosp Day # 2 PCP SILVER GARCIA DO     Reason for Consultation:  Left lateral leg wound    History of Present Illness:  Luis M Estrada is a a(n) 59 year old male. Arrived to patient's room. Patient resting in chair. Patient agreeable to allow this writer to assess, photograph, and treat wound. Coflex 2-layer compression wrap removed to be able to assess patient's wound. Patient's wrap was being worn correctly upon arrival. Patient with multiple comorbidities, with skin breakdown described below.     Subjective:     History:  Past Medical History:    Cellulitis    to left leg, seeking treatment at wound care for months    Hydrocephalus (HCC)    dx'd as an adult-no shunt     History reviewed. No pertinent surgical history.   reports that he has never smoked. He has never used smokeless tobacco. He reports current alcohol use. He reports that he does not use drugs.      Allergies:  @ALLERGY    Laboratory Data:    Recent Labs   Lab 24  0411 25  0605 25  0557   WBC 8.1 6.8 5.8   HGB 13.6 13.0 12.9*   HCT 39.2 37.8* 37.7*   .0 223.0 257.0   CREATSERUM 0.94 0.84 0.82   BUN 18 16 15   * 98 104*   CA 8.8 8.7 8.8   ALB 3.9 3.4  --    TP 7.5 6.8  --          ASSESSMENT:  Wound 24 #1 Leg Left;Lateral (Active)   Date First Assessed/Time First Assessed: 24 1406    Wound Number (Wound Clinic Only): #1  Primary Wound Type: Traumatic  Location: Leg  Wound Location Orientation: Left;Lateral      Assessments 2025  2:13 PM   Wound Image     Drainage Amount Small   Treatments Cleansed   Dressing Changed Changed   Dressing Status Dressing Changed   Wound Length (cm) 2.3 cm   Wound Width (cm) 1 cm   Wound Surface Area (cm^2) 2.3 cm^2   Wound Depth (cm) 0.1 cm   Wound Volume (cm^3)  0.23 cm^3   Wound Healing % 99   Margins Well-defined edges   Non-staged Wound Description Full thickness   Leslie-wound Assessment Dry;Edema;Hemosiderin staining   Wound Granulation Tissue Red;Pink;Firm   Wound Bed Granulation (%) 75 %   Wound Bed Slough (%) 25 %   Wound Odor None   Tunneling? No   Undermining? No   Sinus Tracts? No        Edema : +2 = Moderate pitting, indentation subsides rapidly  Pulse: Palpable faint bilateral pedal pulses  Capillary refill: < 3 seconds  Lower Extremity Temp: Warm bilaterally    Left lateral leg:  Wound Cleaning and Dressings:  Wound cleansing:  Cleanse with normal saline or wound cleanser  Wound cleaning frequency: Daily  Wound product: Medi-honey, bordered-foam  Dressing change frequency:  Change dressing daily and/or PRN    ALL WOUND CARE SUPPLIES CAN BE OBTAINED FROM [facility]       Miscellaneous/Additional Orders:  Off loading: Patient frequently ambulates and shifts weight--he is primarily independent. No need to turn patient or offload.     If patient is Diabetic: want to make sure blood sugars are within a controled range for wound healing     Protein intake: depending on providers recommendations and patients kidney functions - if kidneys are good then recommend patient to increase protein intake (Boost, Bob, Ensure, Premiere Protein)       Additional Notes:  Patient is well known to the outpatient wound clinic located inside of [facility]. The left lateral leg wound is measuring smaller than the last time it was measured in the clinic. Patient's legs left unwrapped at this time so MD's can assess and compare the right leg that has active cellulitis to the left leg upon rounding each day. Border foam placed to right posterior leg for protection as skin is fragile. This dressing can be replaced ever other day or as needed. Please re-consult or contact the wound clinic if legs begin to have increased edema or blister. Informed inpatient RN to advise  patient to place home compression garments on legs until he is seen in the wound clinic and to continue the dressings done in the hospital. If patient has questions after discharging, patient may reach out to the wound clinic at #[phone].    Recommendations:  -Patient to follow-up in outpatient wound care clinic for scheduled appointment on 1/9/25 with Dr. Gould  -Moisturize bilateral legs daily and PRN  -Elevate legs above the level of the heart and decrease salt intake    Thank you for this consultation and for allowing me to participate in the care of your patient.  Please call [phone] if you have any questions about this consultation and plan of care.     Time Spent 30 Minutes.    Thank you,  Kimo Pace RN BSN  Wound/Ostomy/Continence nurse    1/2/2025  2:51 PM

## 2025-01-02 NOTE — OCCUPATIONAL THERAPY NOTE
OCCUPATIONAL THERAPY EVALUATION - INPATIENT     Room Number: 309/309-A  Evaluation Date: 1/2/2025  Type of Evaluation: Initial  Presenting Problem: RLE cellulitis    Physician Order: IP Consult to Occupational Therapy  Reason for Therapy: ADL/IADL Dysfunction and Discharge Planning    OCCUPATIONAL THERAPY ASSESSMENT   Patient is currently functioning near baseline with toileting, lower body dressing, bed mobility, transfers, and dynamic standing balance. Prior to admission, patient's baseline is independent.  Patient is requiring supervision to CGA as a result of the following impairments: decreased functional strength, decreased endurance, and impaired standing balance. Occupational Therapy will continue to follow for duration of hospitalization.    Patient will benefit from continued skilled OT Services with no additional skilled services but increased support at home    History Related to Current Admission: Patient is a 59 year old male admitted on 12/31/2024 with Presenting Problem: RLE cellulitis. Co-Morbidities : Chronic B LE edema, morbid obesity, hydrocephalus, chronic venous hypotension, recurrent cellulitis    WEIGHT BEARING RESTRICTION       Recommendations for nursing staff:   Transfers: 1-person  Toileting location: toilet    EVALUATION SESSION:  Patient Start of Session: chair    FUNCTIONAL TRANSFER ASSESSMENT  Sit to Stand: Chair  Chair: Contact Guard Assist  Toilet Transfer: Contact Guard Assist (standard height, light use of grab bar, reports counter adjacent home)    BED MOBILITY     BALANCE ASSESSMENT     FUNCTIONAL ADL ASSESSMENT  Grooming Standing: Supervision  LB Dressing Standing: Contact Guard Assist  Toileting Seated: Supervision  Toileting Standing: Contact Guard Assist    ACTIVITY TOLERANCE:                          O2 SATURATIONS       COGNITION  Overall Cognitive Status:  WFL - within functional limits    Upper Extremity   ROM: within functional limits   Strength: within functional  limits     EDUCATION PROVIDED  Patient Education : Role of Occupational Therapy; Functional Transfer Techniques; Fall Prevention  Patient's Response to Education: Verbalized Understanding; Requires Further Education    Equipment used: RW  Demonstrates functional use, Would benefit from additional trial      Therapist comments: Patient motivated and participatory, primarily limited by fatigue this session. Patient reinforced on safety precautions and incorporation into ADLs and transfers, followed with good return demo. See Functional Assessment sections above for patient's performance on ADLs and functional transfers this session. Goals set at Mod I as patient reports will be home alone at discharge; has friends and family that can check in, but no one to stay. Will continue to follow.     Patient End of Session: Up in chair;Needs met;Call light within reach;All patient questions and concerns addressed;Hospital anti-slip socks    OCCUPATIONAL PROFILE    HOME SITUATION  Type of Home: Condo (Basement condo with elevator access)  Home Layout: One level  Lives With: Alone    Toilet and Equipment: Standard height toilet  Shower/Tub and Equipment: Tub-shower combo;Grab bar       Occupation/Status:  at [employer]     Drives: No  Patient Regularly Uses: Glasses    Prior Level of Function: Patient reports independent in all I/ADL and functional mobility without device prior to admission. 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    SUBJECTIVE   \"Even when I don't have any other health problems, my balance is still a little off\"    PAIN ASSESSMENT  Ratin  Location: denies       OBJECTIVE  Precautions: None  Fall Risk: Standard fall risk    ASSESSMENTS    AM-PAC ‘6-Clicks’ Inpatient Daily Activity Short Form  -   Putting on and taking off regular lower body clothing?: A Little  -   Bathing (including washing, rinsing, drying)?: A  Little  -   Toileting, which includes using toilet, bedpan or urinal? : A Little  -   Putting on and taking off regular upper body clothing?: None  -   Taking care of personal grooming such as brushing teeth?: None  -   Eating meals?: None    AM-PAC Score:  Score: 21  Approx Degree of Impairment: 32.79%  Standardized Score (AM-PAC Scale): 44.27    ADDITIONAL TESTS     NEUROLOGICAL FINDINGS      COGNITION ASSESSMENTS     PLAN  OT Device Recommendations: Shower chair  OT Treatment Plan: Balance activities;ADL training;Functional transfer training;Endurance training;Patient/Family education;Patient/Family training;Compensatory technique education  Rehab Potential : Good  Frequency: 3x/week  Number of Visits to Meet Established Goals: 3    ADL GOALS   Patient will perform lower body dressing with Mod I  Patient will perform toileting with Mod I    FUNCTIONAL TRANSFER GOALS   Patient will perform supine to sit with Mod I  Patient will perform sit to supine with Mod I  Patient will transfer to toilet with Mod I    Patient Evaluation Complexity Level:   Occupational Profile/Medical History LOW - Brief history including review of medical or therapy records    Specific performance deficits impacting engagement in ADL/IADL LOW  1 - 3 performance deficits    Client Assessment/Performance Deficits LOW - No comorbidities nor modifications of tasks    Clinical Decision Making LOW - Analysis of occupational profile, problem-focused assessments, limited treatment options    Overall Complexity LOW     OT Session Time: 25 minutes  Self-Care Home Management: 10 minutes

## 2025-01-02 NOTE — PROGRESS NOTES
Patient A&Ox4. Vital signs stable on room air. Denies pain, nausea/vomiting at this time. Tolerating Regular diet. Right leg is red with some skin peeling and open areas with scant bleeding. Left leg is covered with dressing. Redness to hands. Skin check done with [facility]. Plan to see wound care today for BLE dressing. IV saline locked. POC discussed with patient and call light in reach.

## 2025-01-02 NOTE — PAYOR COMM NOTE
--------------  ADMISSION REVIEW     Payor: PAM OUT OF STATE PPO  Subscriber #:  [id number]  Authorization Number: [id number]    Admit date: 12/31/24  Admit time: 11:54 AM       REVIEW DOCUMENTATION:     ED Provider Notes        ED Provider Notes signed by Nick Henriquez MD at 12/31/2024 10:54 AM        Chief Complaint   Patient presents with    Swelling Edema     R leg concern     Cellulitis     Stated Complaint: Pt brought in due to R leg pain over 1+ month; no N/V/D, CMS+, Pulses palpable *    Subjective:   HPI      59-year-old male with past medical history of chronic venous hypertension with recurrent cellulitis presents today for right leg redness.  Patient awoke this evening from sleeping not feeling well.  He looked at his  right leg and noted redness and discomfort.  He denies any fevers or chills.  He denied any trauma.  He now presents for evaluation.    Objective:     Past Medical History:    Cellulitis    to left leg, seeking treatment at wound care for months    Hydrocephalus (HCC)    dx'd as an adult-no shunt         ED Triage Vitals [12/31/24 0358]   /76   Pulse 93   Resp 20   Temp 98.7 °F (37.1 °C)   Temp src Oral   SpO2 98 %   O2 Device None (Room air)       Current Vitals:   Vital Signs  BP: 129/75  Pulse: 86  Resp: (!) 31  Temp: 98.7 °F (37.1 °C)  Temp src: Oral  MAP (mmHg): 91    Oxygen Therapy  SpO2: 100 %  O2 Device: None (Room air)        Physical Exam  Vitals and nursing note reviewed.   Constitutional:       Appearance: Normal appearance.   HENT:      Head: Normocephalic.      Nose: Nose normal.      Mouth/Throat:      Mouth: Mucous membranes are moist.   Eyes:      Extraocular Movements: Extraocular movements intact.   Cardiovascular:      Rate and Rhythm: Normal rate.   Pulmonary:      Effort: Pulmonary effort is normal.   Abdominal:      General: Abdomen is flat.   Musculoskeletal:         General: Normal range of motion.      Comments: Right lower extremity  swelling.   scattered intact blisters with clear fluid over the leg.  There is a ruptured blister over the lateral aspect of the leg.  Circumferential erythema and warmth up to the mid thigh   Skin:     General: Skin is warm.   Neurological:      General: No focal deficit present.      Mental Status: He is alert.   Psychiatric:         Mood and Affect: Mood normal.           Labs Reviewed   COMP METABOLIC PANEL (14) - Abnormal; Notable for the following components:       Result Value    Glucose 113 (*)     Potassium 3.2 (*)     Globulin  3.6 (*)     All other components within normal limits   CBC WITH DIFFERENTIAL WITH PLATELET - Abnormal; Notable for the following components:    RBC 4.15 (*)     Lymphocyte Absolute 0.73 (*)     All other components within normal limits   LACTIC ACID, PLASMA - Normal   URINALYSIS, ROUTINE   RAINBOW DRAW LAVENDER   RAINBOW DRAW LIGHT GREEN   RAINBOW DRAW GOLD   RAINBOW DRAW BLUE   BLOOD CULTURE   BLOOD CULTURE        US VENOUS DOPPLER LEG RIGHT - DIAG IMG (CPT=93971)    Result Date: 12/31/2024  PROCEDURE:  US VENOUS DOPPLER LEG RIGHT - DIAG IMG (CPT=93971)  COMPARISON:  None.  INDICATIONS:  eval for DVT  TECHNIQUE:  Real time, grey scale, and duplex ultrasound was used to evaluate the lower extremity venous system. B-mode two-dimensional images of the vascular structures, Doppler spectral analysis, and color flow.  Doppler imaging were performed.  The following veins were imaged:  Common, deep, and superficial femoral, popliteal, sapheno-femoral junction, posterior tibial veins, and the contralateral common femoral vein.  PATIENT STATED HISTORY: (As transcribed by Technologist)     FINDINGS:  EXTREMITY EXAMINED:  Left leg SAPHENOFEMORAL JUNCTION:  No reflux. THROMBI:  None visible. COMPRESSION:  Normal compressibility, phasicity, and augmentation. OTHER:  Negative.            CONCLUSION:  No sign of DVT left leg   LOCATION:  [facility]    Dictated by (CST): Jose Zhou MD on  12/31/2024 at 10:11 AM     Finalized by (CST): Jose Zhou MD on 12/31/2024 at 10:15 AM       XR FEMUR MIN 2 VIEWS RIGHT (CPT=73552)    Result Date: 12/31/2024  PROCEDURE:  XR FEMUR MIN 2 VIEWS RIGHT (CPT=73552)  TECHNIQUE:  AP and lateral views were obtained.  COMPARISON:  None.  INDICATIONS:  Pt brought in due to R leg pain over 1+ month; no N/V/D, CMS+, Pulses palpable per ems, L leg is being treated by wound care currently.  PATIENT STATED HISTORY: (As transcribed by Technologist)  Patient states he has swelling and pain in his right leg and has a history of infection in his leg.               CONCLUSION:  No fracture dislocation.  Tissue stranding seen in the approximate distal half of the thigh all around the thigh, in the subcutaneous fat concerning for cellulitis in this patient with history of infection, edema also possible.  No gas collection foreign body.  LOCATION:  [facility]   Dictated by (CST): Jose Zhou MD on 12/31/2024 at 8:08 AM     Finalized by (CST): Jose Zhou MD on 12/31/2024 at 8:09 AM       XR TIBIA + FIBULA (2 VIEWS), RIGHT (CPT=73590)    Result Date: 12/31/2024  PROCEDURE:  XR TIBIA + FIBULA (2 VIEWS), RIGHT (CPT=73590)  TECHNIQUE:  AP and lateral views of the tibia and fibula were obtained.  COMPARISON:  None.  INDICATIONS:  Pt brought in due to R leg pain over 1+ month; no N/V/D, CMS+, Pulses palpable per ems, L leg is being treated by wound care currently.  PATIENT STATED HISTORY: (As transcribed by Technologist)  Patient states he has swelling and pain in his right leg and has a history of infection in his leg.              CONCLUSION:    No acute fracture dislocation.  Degenerative changes in the knee.  Extensive subcutaneous stranding all around the leg upper, mid and lower, without gas collection, foreign body or calcification, concern for cellulitis, given the history of infection.  Edema also possible.   LOCATION:  [facility]   Dictated by (CST): Jose Zhou MD on  12/31/2024 at 8:07 AM     Finalized by (CST): Jose Zhou MD on 12/31/2024 at 8:08 AM             Differential Diagnosis  59-year-old male presents today for evaluation of right lower extremity swelling.  The right lower extremity is neurovascularly intact.  There is circumferential erythema and swelling up to the mid thigh.  There is some clear blistering over the leg which may be from the patient's history of venous hypertension.  There is a ruptured blister over the right lateral calf which could be the nidus for cellulitis.  Within the differential as well would be DVT.  Plan for labs, x-ray along with ultrasound.  Will give empiric IV antibiotics and reassess.      Clinical Impression:  1. Cellulitis of right lower extremity         Disposition:  Admit       Present on Admission  Date Reviewed: 7/29/2024            ICD-10-CM Noted POA    * (Principal) Cellulitis of right lower extremity L03.115 12/31/2024 Unknown           12/31 H&P    Chief Complaint: edema        Subjective:  History of Present Illness:      Luis M Estrada is a 59 year old male with left lower extremity cellulitis, hydrocephalus, morbid obesity who presents with lower extremity edema.  Has a history of chronic, recurrent cellulitis of his legs due to chronic venous stasis.  Woke up this evening not feeling well.  Noticed his right leg was more red and swollen and painful than usual.  Denies any fever, chills.  No other symptoms.   Chief Complaint: edema        Subjective:  History of Present Illness:      Luis M Estrada is a 59 year old male with left lower extremity cellulitis, hydrocephalus, morbid obesity who presents with lower extremity edema.  Has a history of chronic, recurrent cellulitis of his legs due to chronic venous stasis.  Woke up this evening not feeling well.  Noticed his right leg was more red and swollen and painful than usual.  Denies any fever, chills.  No other symptoms.       Assessment & Plan:  # Right  greater than left, bilateral lower extremity edema and erythema suspected acute cellulitis  -Possible cellulitis +/- chronic venous stasis  -Empiric IV antibiotics  -Procalcitonin  -IV Lasix  -Elevate legs  -Await blood cultures     #Morbid obesity  -BMI 51     #History of hydrocephalus     #Hypokalemia      1/1 IM    Chief Complaint: RLE cellulitis        Subjective:  Patient slightly better  Vital signs:  Temp:  [98 °F (36.7 °C)-98.2 °F (36.8 °C)] 98.2 °F (36.8 °C)  Pulse:  [68-76] 68  Resp:  [18-19] 19  BP: (127-134)/(69-74) 127/74  SpO2:  [98 %-99 %] 99 %     Physical Exam:    General: No acute distress  Respiratory: No wheezes, no rhonchi  Cardiovascular: S1, S2, regular rate and rhythm  Abdomen: Soft, Non-tender, non-distended, positive bowel sounds  Neuro: No new focal deficits.   Extremities: RLE edema and cellulitis          Assessment & Plan:  # Right greater than left, bilateral lower extremity edema and erythema suspected acute cellulitis  -Possible cellulitis +/- chronic venous stasis  -IV antibiotics, ancef  -IV Lasix, repeat today   -previous cxs PSE, MRSA but thought to be colonization  -MRSA shea  -monitor on ancef, if does not improve, will change to vanc  -Elevate legs  -blood cultures ngtd     #Morbid obesity  -BMI 51     #History of hydrocephalus     #Hypokalemia      1/2 IM    Chief Complaint: RLE cellulitis        Subjective:  Patient slightly better but not significantly      Vital signs:  Temp:  [97.8 °F (36.6 °C)-98.3 °F (36.8 °C)] 98.3 °F (36.8 °C)  Pulse:  [69-85] 69  Resp:  [17-22] 19  BP: (115-134)/(68-70) 120/68  SpO2:  [96 %-100 %] 98 %     Physical Exam:    General: No acute distress  Respiratory: No wheezes, no rhonchi  Cardiovascular: S1, S2, regular rate and rhythm  Abdomen: Soft, Non-tender, non-distended, positive bowel sounds  Neuro: No new focal deficits.   Extremities: RLE edema and        Assessment & Plan:  # Right greater than left, bilateral lower extremity edema and  erythema suspected acute cellulitis  -Possible cellulitis +/- chronic venous stasis  -IV antibiotics, ancef. Seems to be improving  -IV Lasix, repeat again today   -previous cxs PSE, MRSA but thought to be colonization  -MRSA nares pending but is improving on ancef. Would cont even if mrsa nares +.   -Elevate legs  -blood cultures ngtd     #Morbid obesity  -BMI 51     #History of hydrocephalus     #Hypokalemia                 MEDICATIONS ADMINISTERED IN LAST 1 DAY:  acetaminophen (Tylenol Extra Strength) tab 1,000 mg       Date Action Dose Route User    1/2/2025 0805 Given 500 mg Oral Lary Alcala RN          aspirin DR tab 81 mg       Date Action Dose Route User    1/2/2025 0804 Given 81 mg Oral Lary Alcala RN          ceFAZolin (Ancef) 2g in 10mL IV syringe premix       Date Action Dose Route User    1/2/2025 1347 New Bag 2 g Intravenous Lary Alcala RN    1/2/2025 0600 New Bag 2 g Intravenous Teresa Madden RN    1/1/2025 2111 New Bag 2 g Intravenous Teresa Madden RN          enoxaparin (Lovenox) 80 MG/0.8ML SUBQ injection 70 mg       Date Action Dose Route User    1/2/2025 0804 Given 70 mg Subcutaneous (Left Lower Abdomen) Lary Alcala RN    1/1/2025 2111 Given 70 mg Subcutaneous (Left Lower Abdomen) Teresa Madden RN          furosemide (Lasix) 10 mg/mL injection 40 mg       Date Action Dose Route User    1/1/2025 1738 Given 40 mg Intravenous Kodi Christianson RN                          furosemide (Lasix) 10 mg/mL injection 40 mg  Dose: 40 mg  Freq: Once Route: IV  Start: 12/31/24 1215 End: 12/31/24 1402           1402 KR-Given          piperacillin-tazobactam (Zosyn) 4.5 g in dextrose 5% 100 mL IVPB-ADDV  Dose: 4.5 g  Freq: Once Route: IV  Last Dose: Stopped (12/31/24 0740)  Start: 12/31/24 0644 End: 12/31/24 0740   Admin Instructions:   Incompatible with Lactated Ringers (LR)   Order specific questions:              0658 AD-New Bag     0740 BM-Stopped          potassium  chloride (Klor-Con M20) tab 40 mEq  Dose: 40 mEq  Freq: Once Route: OR  Start: 01/01/25 0800 End: 01/01/25 0859   Admin Instructions:   Do not crush            0859 CASANDRA-Given         potassium chloride (Klor-Con M20) tab 40 mEq  Dose: 40 mEq  Freq: Once Route: OR  Start: 12/31/24 1300 End: 12/31/24 1402   Admin Instructions:   Do not crush           1402 KR-Given          vancomycin (Vancocin) 2.25 g in sodium chloride 0.9% 500 mL IVPB premix  Dose: 25 mg/kg  Weight Dosing Info: 149.7 kg  Freq: Once Route: IV  Last Dose: 2,250 mg (12/31/24 0755)  Start: 12/31/24 0649 End: 12/31/24 1025   Admin Instructions:   Vancomycin concentrations >/= 5 mg/mL are incompatible with Zosyn and must be run separately   Order specific questions:              0755 BM-New Bag     1037 CN-Handoff                Vitals (last day)       Date/Time Temp Pulse Resp BP SpO2 Weight O2 Device O2 Flow Rate (L/min) [facility]    01/02/25 1222 98.3 °F (36.8 °C) 69 19 120/68 98 % -- None (Room air) --     01/02/25 0355 98 °F (36.7 °C) 77 17 115/70 96 % -- None (Room air) --     01/01/25 1920 97.8 °F (36.6 °C) 85 22 134/70 100 % -- None (Room air) --     01/01/25 1250 98.2 °F (36.8 °C) 68 19 127/74 99 % -- None (Room air) 0 L/min     01/01/25 0330 98 °F (36.7 °C) 76 18 130/71 98 % -- None (Room air) -- [place]       12/31/24 2113 98.2 °F (36.8 °C) 76 18 134/69 98 % -- None (Room air) -- [place]   12/31/24 1218 -- -- -- -- -- 330 lb (149.7 kg) Abnormal  -- -- KR   12/31/24 1209 98.5 °F (36.9 °C) 79 20 127/68 100 % -- None (Room air) -- KR   12/31/24 1030 -- 86 31 Abnormal  129/75 100 % -- None (Room air) -- CN   12/31/24 1015 -- 82 18 129/71 100 % -- None (Room air) -- CN   12/31/24 0900 -- 83 32 Abnormal  130/70 98 % -- None (Room air) -- BM   12/31/24 0757 -- 81 22 146/79 99 % -- None (Room air) -- BM   12/31/24 0623 -- -- -- -- -- -- None (Room air) -- AD   12/31/24 0615 -- 77 21 132/90 100 % -- None (Room air) -- AD   12/31/24 0610 -- 81 20 132/90 100 % --  None (Room air) -- RAOUL   12/31/24 0358 98.7 °F (37.1 °C) 93 20 142/76 98 % 330 lb (149.7 kg) Abnormal  None (Room air) -- MM

## 2025-01-03 VITALS
SYSTOLIC BLOOD PRESSURE: 139 MMHG | OXYGEN SATURATION: 100 % | RESPIRATION RATE: 18 BRPM | BODY MASS INDEX: 49.44 KG/M2 | TEMPERATURE: 98 F | DIASTOLIC BLOOD PRESSURE: 60 MMHG | HEART RATE: 78 BPM | WEIGHT: 315 LBS | HEIGHT: 67 IN

## 2025-01-03 PROCEDURE — 99239 HOSP IP/OBS DSCHRG MGMT >30: CPT | Performed by: HOSPITALIST

## 2025-01-03 RX ORDER — CEFADROXIL 500 MG/1
500 CAPSULE ORAL 2 TIMES DAILY
Qty: 22 CAPSULE | Refills: 0 | Status: SHIPPED | OUTPATIENT
Start: 2025-01-03 | End: 2025-01-14

## 2025-01-03 RX ORDER — ENOXAPARIN SODIUM 100 MG/ML
50 INJECTION SUBCUTANEOUS EVERY 12 HOURS SCHEDULED
Status: DISCONTINUED | OUTPATIENT
Start: 2025-01-03 | End: 2025-01-03

## 2025-01-03 RX ORDER — FUROSEMIDE 20 MG/1
20 TABLET ORAL DAILY
Qty: 30 TABLET | Refills: 0 | Status: SHIPPED | OUTPATIENT
Start: 2025-01-03

## 2025-01-03 NOTE — DISCHARGE INSTRUCTIONS
Left lateral leg:  Wound Cleaning and Dressings:  Wound cleansing:  Cleanse with normal saline or wound cleanser  Wound cleaning frequency: Daily  Wound product: Medi-honey, bordered-foam  Dressing change frequency:  Change dressing daily and/or PRN    Follow up with wound care clinic

## 2025-01-03 NOTE — PLAN OF CARE
Pt vitals stable, A&o x4. Denied chest pain and shortness of breath. Tolerating diet without nausea. Bilateral lower legs noted with swelling, redness, and flaky. Denied pain at this time. Bed locked in low position, call light in reach, rounding provided.

## 2025-01-03 NOTE — DISCHARGE SUMMARY
[facility]PITAL[facility]  DISCHARGE SUMMARY     Luis M Estrada Patient Status:  Inpatient    3/25/1965 MRN [id number]   Location [facility] 3[facility]-A Attending No att. providers found   Hosp Day # 3 PCP SILVER GARCIA DO     Date of Admission: 2024  Date of Discharge:  1/3/2025     Discharge Disposition: Home or Self Care    Discharge Diagnosis:  # Right greater than left, bilateral lower extremity edema and erythema suspected acute cellulitis  -Possible cellulitis +/- chronic venous stasis  -IV antibiotics, ancef. Seems to be improving  -IV Lasix, cont outpt PO  -previous cxs PSE, MRSA but thought to be colonization  -MRSA nares negative  -Elevate legs  -blood cultures ngtd     #Morbid obesity  -BMI 51     #History of hydrocephalus     #Hypokalemia    History of Present Illness: Luis M Estrada is a 59 year old male with left lower extremity cellulitis, hydrocephalus, morbid obesity who presents with lower extremity edema.  Has a history of chronic, recurrent cellulitis of his legs due to chronic venous stasis.  Woke up this evening not feeling well.  Noticed his right leg was more red and swollen and painful than usual.  Denies any fever, chills.  No other symptoms.       Brief Synopsis: pt w/ recurrent RLE cellulitis. Significant swelling w/ lymphedema. Diuresed w/ lasix and cellulitis improved w/ ancef. Wound care eval'ed LLE wound, looks good. Ok to DC on PO duricef. Would cont PO lasix low dose to hopefully prevent this from recurring. Monitor labs as outpt w/ PCP who can decide to continue lasix or not chronically. Would give extended course of abx considering recurrent nature.     Lace+ Score: 56  59-90 High Risk  29-58 Medium Risk  0-28   Low Risk       TCM Follow-Up Recommendation:  LACE 29-58: Moderate Risk of readmission after discharge from the hospital.      Procedures during hospitalization:   none    Incidental or significant findings and recommendations (brief  descriptions):  none    Lab/Test results pending at Discharge:   none    Consultants:  none    Discharge Medication List:     Discharge Medications        START taking these medications        Instructions Prescription details   cefadroxil 500 MG Caps  Commonly known as: DURICEF      Take 1 capsule (500 mg total) by mouth 2 (two) times daily for 11 days.   Stop taking on: January 14, 2025  Quantity: 22 capsule  Refills: 0     furosemide 20 MG Tabs  Commonly known as: Lasix      Take 1 tablet (20 mg total) by mouth daily.   Quantity: 30 tablet  Refills: 0            CONTINUE taking these medications        Instructions Prescription details   aspirin 81 MG Tabs      Take 1 tablet (81 mg total) by mouth daily.   Refills: 0     Multi-Vitamin/Minerals Tabs      Take 1 tablet by mouth daily.   Refills: 0               Where to Get Your Medications        These medications were sent to [id number] - [place] - [address] [phone], [phone]  [address] [place] [address]      Phone: [phone]   cefadroxil 500 MG Caps  furosemide 20 MG Tabs         [place]    Follow-up appointment:   Nader Ibrahim DO  [address] [place] [address]  [facility] [id number] [phone]    Follow up      Appointments for Next 30 Days 1/3/2025 - 2/2/2025        Date Arrival Time Visit Type Length Department Provider     1/9/2025  9:00 AM   FOLLOW UP [5644] 15 min [facility] Nicholas Gould MD    Patient Instructions:         Location Instructions:     Your appointment is scheduled at [facility]. The address is&nbsp; [address]. To reach Registration, park in the [place] [facility]. Go through the entrance doors located on the ground floor. Veer left past the Information Desk and proceed to the Wound Care Center.  Masks are optional for all patients and visitors, unless otherwise indicated.               1/16/2025  9:00 AM   FOLLOW UP [7049] 15 min [place]  [facility] Nicholas Gould MD    Patient Instructions:         Location Instructions:     Your appointment is scheduled at [facility]. The address is&nbsp; [address] [place] [address]. To reach Registration, park [facility] the [facility]. Go through the entrance doors located on the ground floor. Veer left past the Information Desk and proceed to the Wound Care Center.  Masks are optional for all patients and visitors, unless otherwise indicated.               1/23/2025  9:00 AM   FOLLOW UP [7197] 15 min [facility] Nicholas Gould MD    Patient Instructions:         Location Instructions:     Your appointment is scheduled at [facility]. The address is&nbsp; [address]. To reach Registration, park in [facility]. Go through the entrance doors located on the ground floor. Veer left past the Information Desk and proceed to the Wound Care Center.  Masks are optional for all patients and visitors, unless otherwise indicated.               1/30/2025  9:00 AM   FOLLOW UP [7197] 15 min [facility] Nicholas Gould MD    Patient Instructions:         Location Instructions:     Your appointment is scheduled at [facility]. The address is&nbsp; [address]. To reach Registration, park in [facility]. Go through the entrance doors located on the ground floor. Veer left past the Information Desk and proceed to the Wound Care Center.  Masks are optional for all patients and visitors, unless otherwise indicated.                      Vital signs:  Temp:  [98 °F (36.7 °C)] 98 °F (36.7 °C)  Pulse:  [66-78] 78  Resp:  [18] 18  BP: (131-142)/(60-78) 139/60  SpO2:  [97 %-100 %] 100 %    Physical Exam:    General: No acute distress   Lungs: clear to auscultation  Cardiovascular: S1, S2  Abdomen:  Soft      -----------------------------------------------------------------------------------------------  PATIENT DISCHARGE INSTRUCTIONS: See electronic chart    Braxton Ledbetter MD    Total time spent on discharge plannin minutes     The  [employer] C[employer] Act makes medical notes like these available to patients in the interest of transparency. Please be advised this is a medical document. Medical documents are intended to carry relevant information, facts as evident, and the clinical opinion of the practitioner. The medical note is intended as peer to peer communication and may appear blunt or direct. It is written in medical language and may contain abbreviations or verbiage that are unfamiliar.

## 2025-01-07 ENCOUNTER — PATIENT OUTREACH (OUTPATIENT)
Dept: CASE MANAGEMENT | Age: 60
End: 2025-01-07

## 2025-01-07 NOTE — PAYOR COMM NOTE
--------------  DISCHARGE REVIEW    Payor: [facility] OUT OF STATE PPO  Subscriber #:  [id number]  Authorization Number: [id number]    Admit date: 24  Admit time:  11:54 AM  Discharge Date: 1/3/2025  1:30 PM     Admitting Physician: Dexter Jacobo MD  Attending Physician:  No att. providers found  Primary Care Physician: Nader Ibrahim DO          Discharge Summary Notes        Discharge Summary signed by Braxton Ledbetter MD at 1/3/2025  5:21 PM       Author: Braxton Ledbetter MD Specialty: HOSPITALIST, Internal Medicine Author Type: Physician    Filed: 1/3/2025  5:21 PM Date of Service: 1/3/2025  5:18 PM Status: Signed    : Braxton Ledbetter MD (Physician)           [facility]     Luis M Estrada Patient Status:  Inpatient    3/25/1965 MRN [id number]   Location [facility] 3NW-A Attending No att. providers found   Hosp Day # 3 PCP NADER IBRAHIM DO     Date of Admission: 2024  Date of Discharge:  1/3/2025     Discharge Disposition: Home or Self Care    Discharge Diagnosis:  # Right greater than left, bilateral lower extremity edema and erythema suspected acute cellulitis  -Possible cellulitis +/- chronic venous stasis  -IV antibiotics, ancef. Seems to be improving  -IV Lasix, cont outpt PO  -previous cxs PSE, MRSA but thought to be colonization  -MRSA nares negative  -Elevate legs  -blood cultures ngtd     #Morbid obesity  -BMI 51     #History of hydrocephalus     #Hypokalemia    History of Present Illness: Luis M Estrada is a 59 year old male with left lower extremity cellulitis, hydrocephalus, morbid obesity who presents with lower extremity edema.  Has a history of chronic, recurrent cellulitis of his legs due to chronic venous stasis.  Woke up this evening not feeling well.  Noticed his right leg was more red and swollen and painful than usual.  Denies any fever, chills.  No other symptoms.       Brief Synopsis: pt w/ recurrent RLE cellulitis. Significant  swelling w/ lymphedema. Diuresed w/ lasix and cellulitis improved w/ ancef. Wound care eval'ed LLE wound, looks good. Ok to DC on PO duricef. Would cont PO lasix low dose to hopefully prevent this from recurring. Monitor labs as outpt w/ PCP who can decide to continue lasix or not chronically. Would give extended course of abx considering recurrent nature.     Lace+ Score: 56  59-90 High Risk  29-58 Medium Risk  0-28   Low Risk       TCM Follow-Up Recommendation:  LACE 29-58: Moderate Risk of readmission after discharge from the hospital.      Procedures during hospitalization:   none    Incidental or significant findings and recommendations (brief descriptions):  none    Lab/Test results pending at Discharge:   none    Consultants:  none    Discharge Medication List:     Discharge Medications        START taking these medications        Instructions Prescription details   cefadroxil 500 MG Caps  Commonly known as: DURICEF      Take 1 capsule (500 mg total) by mouth 2 (two) times daily for 11 days.   Stop taking on: January 14, 2025  Quantity: 22 capsule  Refills: 0     furosemide 20 MG Tabs  Commonly known as: Lasix      Take 1 tablet (20 mg total) by mouth daily.   Quantity: 30 tablet  Refills: 0            CONTINUE taking these medications        Instructions Prescription details   aspirin 81 MG Tabs      Take 1 tablet (81 mg total) by mouth daily.   Refills: 0     Multi-Vitamin/Minerals Tabs      Take 1 tablet by mouth daily.   Refills: 0               Where to Get Your Medications        These medications were sent to [id number] - [place], [place] - [address] [phone], [phone]  [facility] [place] [address]      Phone: [phone]   cefadroxil 500 MG Caps  furosemide 20 MG Tabs         [place]    Follow-up appointment:   Nader Ibrahim DO  [address] [facility] [address]  [facility] [address] [phone]    Follow up      Appointments for Next 30 Days 1/3/2025 - 2/2/2025        Date Arrival  Time Visit Type Length Department Provider     1/9/2025  9:00 AM  WC FOLLOW UP [7197] 15 min [facility] Nicholas Gould MD    Patient Instructions:         Location Instructions:     Your appointment is scheduled at [facility]. The address is&nbsp; [address]. To reach Registration, park in the [facility]. Go through the entrance doors located on the ground floor. Veer left past the Information Desk and proceed to the Wound Care Center.  Masks are optional for all patients and visitors, unless otherwise indicated.               1/16/2025  9:00 AM  WC FOLLOW UP [7197] 15 min [facility] Nicholas Gould MD    Patient Instructions:         Location Instructions:     Your appointment is scheduled at [facility]. The address is&nbsp; [address]. To reach Registration, park in the [facility]. Go through the entrance doors located on the ground floor. Veer left past the Information Desk and proceed to the Wound Care Center.  Masks are optional for all patients and visitors, unless otherwise indicated.               1/23/2025  9:00 AM  WC FOLLOW UP [7197] 15 min [facility] Nicholas Gould MD    Patient Instructions:         Location Instructions:     Your appointment is scheduled at [facility]. The address is&nbsp; [address]. To reach Registration, park in the [facility]. Go through the entrance doors located on the ground floor. Veer left past the Information Desk and proceed to the Wound Care Center.  Masks are optional for all patients and visitors, unless otherwise indicated.               1/30/2025  9:00 AM  WC FOLLOW UP [7197] 15 min [facility] Nicholas Gould MD    Patient Instructions:         Location Instructions:     Your appointment is scheduled at [facility]. The address is&nbsp; [address]  [facility]. To reach Registration, park in the [place] [facility]. Go through the entrance doors located on the ground floor. Veer left past the Information Desk and proceed to the Wound Care Center.  Masks are optional for all patients and visitors, unless otherwise indicated.                      Vital signs:  Temp:  [98 °F (36.7 °C)] 98 °F (36.7 °C)  Pulse:  [66-78] 78  Resp:  [18] 18  BP: (131-142)/(60-78) 139/60  SpO2:  [97 %-100 %] 100 %    Physical Exam:    General: No acute distress   Lungs: clear to auscultation  Cardiovascular: S1, S2  Abdomen: Soft      -----------------------------------------------------------------------------------------------  PATIENT DISCHARGE INSTRUCTIONS: See electronic chart    Braxton Ledbetter MD    Total time spent on discharge plannin minutes     The  [place] Cures Act makes medical notes like these available to patients in the interest of transparency. Please be advised this is a medical document. Medical documents are intended to carry relevant information, facts as evident, and the clinical opinion of the practitioner. The medical note is intended as peer to peer communication and may appear blunt or direct. It is written in medical language and may contain abbreviations or verbiage that are unfamiliar.     Electronically signed by Braxton Ledbetter MD on 1/3/2025  5:21 PM         REVIEWER COMMENTS

## 2025-01-09 ENCOUNTER — OFFICE VISIT (OUTPATIENT)
Dept: WOUND CARE | Facility: HOSPITAL | Age: 60
End: 2025-01-09
Attending: FAMILY MEDICINE
Payer: COMMERCIAL

## 2025-01-09 VITALS
BODY MASS INDEX: 49.44 KG/M2 | HEART RATE: 80 BPM | WEIGHT: 315 LBS | TEMPERATURE: 99 F | SYSTOLIC BLOOD PRESSURE: 124 MMHG | RESPIRATION RATE: 17 BRPM | HEIGHT: 67 IN | DIASTOLIC BLOOD PRESSURE: 75 MMHG

## 2025-01-09 DIAGNOSIS — E66.01 CLASS 3 SEVERE OBESITY DUE TO EXCESS CALORIES WITH SERIOUS COMORBIDITY AND BODY MASS INDEX (BMI) OF 50.0 TO 59.9 IN ADULT (HCC): ICD-10-CM

## 2025-01-09 DIAGNOSIS — I87.333 CHRONIC VENOUS HYPERTENSION (IDIOPATHIC) WITH ULCER AND INFLAMMATION OF BILATERAL LOWER EXTREMITY (HCC): Primary | ICD-10-CM

## 2025-01-09 DIAGNOSIS — E66.813 CLASS 3 SEVERE OBESITY DUE TO EXCESS CALORIES WITH SERIOUS COMORBIDITY AND BODY MASS INDEX (BMI) OF 45.0 TO 49.9 IN ADULT (HCC): ICD-10-CM

## 2025-01-09 DIAGNOSIS — I87.2 CHRONIC VENOUS INSUFFICIENCY: ICD-10-CM

## 2025-01-09 DIAGNOSIS — L03.115 CELLULITIS OF RIGHT LOWER EXTREMITY: ICD-10-CM

## 2025-01-09 DIAGNOSIS — E66.813 CLASS 3 SEVERE OBESITY DUE TO EXCESS CALORIES WITH SERIOUS COMORBIDITY AND BODY MASS INDEX (BMI) OF 50.0 TO 59.9 IN ADULT (HCC): ICD-10-CM

## 2025-01-09 DIAGNOSIS — E66.01 CLASS 3 SEVERE OBESITY DUE TO EXCESS CALORIES WITH SERIOUS COMORBIDITY AND BODY MASS INDEX (BMI) OF 45.0 TO 49.9 IN ADULT (HCC): ICD-10-CM

## 2025-01-09 PROCEDURE — 99215 OFFICE O/P EST HI 40 MIN: CPT | Performed by: FAMILY MEDICINE

## 2025-01-09 PROCEDURE — 97597 DBRDMT OPN WND 1ST 20 CM/<: CPT | Performed by: FAMILY MEDICINE

## 2025-01-09 NOTE — PATIENT INSTRUCTIONS
PATIENT INSTRUCTIONS      FOR Luis M Estrada ,  3/25/1965    DATE OF SERVICE: 2025      Left lateral leg wound: Enluxtra with backing off, kerramax  Xeroform to small left anterior leg wound  Gauze roll  Coflex 2 layer compression wrap    Right anterior leg wound:  Hydrofera Transfer, Kerramax, gauze roll  Coflex 2 layer compression wrap    Follow up with nurse visit on Mon or Tuesday of next week,   Follow up with Dr. Gould in 1 week      DO NOT GET THE WRAP WET       Managing your edema:    Avoid prolonged standing in one place. It is better to have your calf muscles moving to pump fluid out of the legs.  Elevate leg(s) above the level of the heart when sitting or as much as possible.  Take you diuretics as directed by your physician. Do not skip doses or change doses unless instructed to do so by your physician.  Decrease salt intake, follow recommended 2 grams daily.  Do not get leg(s) with compression wrap wet. If wraps are too tight as indicated by pain, numbness/tingling or discoloration of toes remove wrap completely and call the wound center @ [phone].       The treatment plan has been discussed at length between you and your provider. Follow all instructions carefully, it is very important. If you do not follow all instructions you are at risk of your wound not healing, infection, possible loss of limb and even loss of life.    COMPRESSION WRAP PATIENT CARE INSTRUCTIONS  DO NOT get compression wrap wet. When showering, use a shower boot.   Please contact wound clinic and if not able to reach, then go to emergency room if ANY of the following occur:   Tingling or numbness in the foot or toes on leg with compression wrap.  Severe pain that cannot be relieved with elevation and any medication instructed per your doctor.  A fever of 100.4°F (38°C) or higher.  Swelling, coldness, or blue-gray discoloration of the toes.  If the compression wrap feels too tight or too loose.  If the  compression wrap is damaged or has rough edges that hurt.  If the compression wrap gets wet, you must cut wrap off.   Drainage from compression wrap that smells different than usual.

## 2025-01-09 NOTE — PROGRESS NOTES
Patient ID: Luis M Estrada is a 59 year old male.    Debridement Traumatic Left;Lateral Leg   Wound 01/11/24 #1 Leg Left;Lateral    Performed by: Nicholas Gould MD  Authorized by: Nicholas Gould MD      Consent   Consent obtained? verbal  Consent given by: patient    Debridement Details  Performed by: physician  Debridement type: conservative sharp  Pain control: lidocaine 4%  Pain control administration type: topical    Pre-debridement measurements  Length (cm): 2.5  Width (cm): 1.4  Depth (cm): 0.1  Surface Area (cm^2): 3.5    Post-debridement measurements  Length (cm): 2.5  Width (cm): 1.4  Depth (cm): 0.1  Percent debrided: 100%  Surface Area (cm^2): 3.5  Area Debrided (cm^2): 3.5  Volume (cm^3): 0.35    Devitalized tissue debrided: biofilm and slough  Instrument(s) utilized: curette  Bleeding: none  Hemostasis obtained with: not applicable  Response to treatment: procedure was tolerated well

## 2025-01-09 NOTE — PROGRESS NOTES
Weekly Wound Education Note    Teaching Provided To: Patient  Training Topics: Dressing;Edema control;Compression;Cleasing and general instructions;Discharge instructions  Training Method: Explain/Verbal;Demonstration  Training Response: Reinforcement needed        Notes: Patient here for follow up wound care visit to left lateral lower leg. New wound to left anterior lower leg and right lower leg. Patient was recently hospitalized on 12/31/24-1/3/25, discharged on oral ABT cefadroxil (stop date 1/14/25). Patient has doppler completed while in hospital to [facility] on 12/31/24. Edema measurement increased. Provider stimualted left lateral leg wound at visit today, per provider treatment to left lateral leg wound- enluxtra with back off, kerramax, kerlix, tape; left anterior lower leg- folded xeroform, kerramax, kerlix, tape; Right lower leg- kerramax, ABD pad, kerlix, tape. Applied coflex to BLE, patient is to follow up Tuesday for nurse visit. At nurse visit add spandagrip over coflex wraps. Pt to follow up with provider in 1 week.

## 2025-01-09 NOTE — PROGRESS NOTES
Chief Complaint   Patient presents with    Wound Care     Follow-up for wound to left leg. Arrives with no compression on either leg. Pain 5/10, hasn't slept well, pain started yesterday. Dressing to left lateral wound - foam dressing. No dressings to rest of open area. Recently discharged from the hospital and currently on oral abx. Patient states he was told he could go back to work yesterday and he did.        HPI:     Patient here for follow-up of left lower extremity lateral wound.  Since last visit he was hospitalized for cellulitis of right lower extremity.  They had remove the wraps and is swelling and increase in both legs.  He does have some increased pain.  He is currently on oral antibiotic and did have IV antibiotic in the hospital.  His cellulitis is better.  He denies any fever or chills.  He has developed 2 small wounds 1 on the left anterior leg which is superficial and a couple very tiny wounds on the right anterior leg due to the swelling.    Lab Results   Component Value Date    BUN 15 01/02/2025    CREATSERUM 0.82 01/02/2025    ALB 3.4 01/01/2025    TP 6.8 01/01/2025    A1C 5.2 12/12/2016         Current Outpatient Medications:     cefadroxil 500 MG Oral Cap, Take 1 capsule (500 mg total) by mouth 2 (two) times daily for 11 days., Disp: 22 capsule, Rfl: 0    furosemide 20 MG Oral Tab, Take 1 tablet (20 mg total) by mouth daily., Disp: 30 tablet, Rfl: 0    Multiple Vitamins-Minerals (MULTI-VITAMIN/MINERALS) Oral Tab, Take 1 tablet by mouth daily., Disp: , Rfl:     aspirin 81 MG Oral Tab, Take 1 tablet (81 mg total) by mouth daily., Disp: , Rfl:     Allergies[1]         REVIEW OF SYSTEMS:     Review of Systems (ROS)  This information was obtained from the patient, chart    A comprehensive 10 point review of systems was completed.  Pertinent positives and negatives noted in the the HPI.     Past medical, surgical, family and social history updated where appropriate.    PHYSICAL EXAM:   /75    Pulse 80   Temp 98.5 °F (36.9 °C)   Resp 17   Ht 67\"   Wt (!) 330 lb (149.7 kg)   BMI 51.69 kg/m²    Estimated body mass index is 51.69 kg/m² as calculated from the following:    Height as of this encounter: 67\".    Weight as of this encounter: 330 lb (149.7 kg).   Vital signs reviewed.Appears stated age, well groomed.        Calf  Point of Measurement - Left Calf: 30  Point of Measurement - Right Calf: 30  Left Calf from:: Heel  Calf Left cm:: 47.9  Right Calf from:: Heel  Right Calf cm:: 48.5    Ankle  Point of Measurement - Left Ankle: 10  Point of Measurement - Right Ankle: 10  Left Ankle from:: Heel  Left Ankle cm:: 32     Right Ankle from:: Heel  Right Ankle cm:: 33.1       Foot                               Wound 01/11/24 #1 Leg Left;Lateral (Active)   Date First Assessed/Time First Assessed: 01/11/24 1406    Wound Number (Wound Clinic Only): #1  Primary Wound Type: Traumatic  Location: Leg  Wound Location Orientation: Left;Lateral      Assessments 1/11/2024  2:10 PM 1/9/2025  9:24 AM   Wound Image       Drainage Amount Large --   Drainage Description Yellow;Serous --   Treatments Compression (coflex 2 layer wrap) --   Wound Length (cm) 10.6 cm --   Wound Width (cm) 9.5 cm --   Wound Surface Area (cm^2) 100.7 cm^2 --   Wound Depth (cm) 0.2 cm --   Wound Volume (cm^3) 20.14 cm^3 --   Margins Well-defined edges --   Non-staged Wound Description Full thickness --   Leslie-wound Assessment Edema;Hemosiderin staining --   Wound Granulation Tissue Firm;Pink --   Wound Bed Granulation (%) 40 % --   Wound Bed Slough (%) 60 % --   Wound Odor None --   Tunneling? No --   Undermining? No --   Sinus Tracts? No --       Inactive Orders   Date Order Priority Status Authorizing Provider   01/02/25 1505 Wound care Routine Discontinued Braxton Ledbetter MD   01/01/25 0720 Consult to Wound Ostomy Routine Completed Dexter Dick MD     - Reason for Consult:    Wound Care     - Wound Care Reason for Consult:    wounds    11/21/24 0914 Debridement Traumatic Left;Lateral Leg Routine Completed Nicholas Gould MD   07/16/24 2327 Consult to Wound Ostomy Routine Completed Rica Pearson MD     - Reason for Consult:    Wound Care     - Wound Care Reason for Consult:    worsening   06/24/24 1127 Wound care Routine Discontinued Rica Pearson MD   06/06/24 0913 Debridement Traumatic Routine Completed Nicholas Gould MD   05/16/24 0901 Debridement Traumatic Routine Completed Nicholas Gould MD   05/02/24 1520 Debridement Traumatic Routine Completed Nicholas Gould MD   04/11/24 0959 Debridement Traumatic Routine Completed Nicholas Gould MD   04/04/24 0915 Debridement Traumatic Left;Lateral Leg Routine Completed Nicholas Gould MD   02/15/24 1154 Debridement Traumatic Left;Lateral Leg Routine Completed Nicholas Gould MD   01/18/24 1511 Debridement Traumatic Left;Lateral Leg Routine Completed Nicholas Gould MD   01/11/24 1710 Debridement Traumatic Left;Lateral Leg Routine Completed Nicholas Gould MD       Compression Wrap 08/22/24 Leg Anterior;Left (Active)   Placement Date: 08/22/24   Location: Leg  Wound Location Orientation: Anterior;Left      Assessments 8/22/2024  9:41 AM 1/2/2025  8:10 AM   Response to Treatment Well tolerated --   Compression Layers Multilayer --   Compression Product Type Coflex --   Dressing Applied Yes --   Compression Wrap Location Toes to Knee --   Compression Wrap Status Clean;Dry;Intact Intact       Inactive Orders   Date Order Priority Status Authorizing Provider   01/01/25 1007 Consult to Wound Ostomy Routine Completed Braxton Ledbetter MD     - Reason for Consult:    Wound Care     - Wound Care Reason for Consult:    L leg wound   01/01/25 0720 Consult to Wound Ostomy Routine Completed Dexter Dick MD     - Reason for Consult:    Wound Care     - Wound Care Reason for Consult:    wounds       Wound 01/09/25 #2 Right Anterior leg Leg Right;Anterior (Active)   Date First Assessed: 01/09/25    Wound  Number (Wound Clinic Only): #2 Right Anterior leg  Primary Wound Type: Venous Ulcer  Location: Leg  Wound Location Orientation: Right;Anterior      Assessments 1/9/2025  8:51 AM   Wound Image     Drainage Amount Small   Drainage Description Serous;Clear   Wound Length (cm) 5.5 cm   Wound Width (cm) 0.3 cm   Wound Surface Area (cm^2) 1.65 cm^2   Wound Depth (cm) 0.1 cm   Wound Volume (cm^3) 0.165 cm^3   Margins Poorly defined   Non-staged Wound Description Full thickness   Leslie-wound Assessment Dry;Edema;Blanchable erythema   Wound Granulation Tissue Red;Firm   Wound Bed Granulation (%) 20 %   Wound Bed Epithelium (%) 80 %   Wound Odor None   Tunneling? No   Undermining? No   Sinus Tracts? No       No associated orders.       Wound 01/09/25 #3 Left Anterior leg Leg Left;Anterior (Active)   Date First Assessed: 01/09/25    Wound Number (Wound Clinic Only): #3 Left Anterior leg  Primary Wound Type: Venous Ulcer  Location: Leg  Wound Location Orientation: Left;Anterior      Assessments 1/9/2025  8:51 AM   Wound Image     Drainage Amount Unable to assess   Wound Length (cm) 0 cm   Wound Width (cm) 0.5 cm   Wound Surface Area (cm^2) 0 cm^2   Wound Depth (cm) 0.1 cm   Wound Volume (cm^3) 0 cm^3   Margins Well-defined edges   Non-staged Wound Description Full thickness   Leslie-wound Assessment Dry;Edema;Pink   Wound Granulation Tissue Pink;Firm   Wound Bed Granulation (%) 100 %   Wound Odor Mild   Tunneling? No   Undermining? No   Sinus Tracts? No       No associated orders.              ASSESSMENT AND PLAN:      1. Chronic venous hypertension (idiopathic) with ulcer and inflammation of bilateral lower extremity ([facility])    2. Class 3 severe obesity due to excess calories with serious comorbidity and body mass index (BMI) of 45.0 to 49.9 in adult ([facility])    3. Cellulitis of right lower extremity    4. Chronic venous insufficiency    5. Class 3 severe obesity due to excess calories with serious comorbidity and body mass index  (BMI) of 50.0 to 59.9 in adult ([facility])    There is slight erythema noted of the right lower extremity from the knee to the ankle.  Patient is on oral antibiotic and has been treated for cellulitis.  He does have very significant swelling increased from previous visit.  It would be very helpful to have him do compression again.  To the right anterior leg wound will apply Hydrofera Blue and Kerramax, to the left anterior leg wound will apply Xeroform gauze.  To the lateral left leg wound it was debrided with curette see debridement note.  Patient will use Enluxtra with the backing off and then Kerramax to that area.  Will initiate Coflex 2 layer compression wrap to both lower extremities he has tolerated this in the past.  I would like him to have a nurse visit in 3 to 4 days and follow-up with me in 1 week.  If he has any worsening symptoms he should seek medical attention through the ER.    Weight reduction was discussed and is the recommended with proper diet.    Patient may work      Risks, benefits, and alternatives of current treatment plan discussed in detail.  Questions and concerns addressed. Red flags to RTC or ED reviewed.  Patient (or parent) agrees to plan.      No follow-ups on file.    Also refer to patient instructions.     I spent a total of 40 minutes with this patient's care.  This time included preparing to see the patient (eg, review notes and recent diagnostics), seeing the patient, performing a medically appropriate examination and/or evaluation, counseling and educating the patient, and documenting in the record.          Nicholas Gould M.D.   [facility]   2025    Patient Instructions       PATIENT INSTRUCTIONS      FOR RICK Blandon 3/25/1965    DATE OF SERVICE: 2025      Left lateral leg wound: Enluxtra with backing off, kerramax  Xeroform to small left anterior leg wound  Gauze roll  Coflex 2 layer compression wrap    Right anterior leg wound:   Hydrofera Transfer, Angelo, gauze roll  Coflex 2 layer compression wrap    Follow up with nurse visit on Mon or Tuesday of next week,   Follow up with Dr. Gould in 1 week      DO NOT GET THE WRAP WET       Managing your edema:    Avoid prolonged standing in one place. It is better to have your calf muscles moving to pump fluid out of the legs.  Elevate leg(s) above the level of the heart when sitting or as much as possible.  Take you diuretics as directed by your physician. Do not skip doses or change doses unless instructed to do so by your physician.  Decrease salt intake, follow recommended 2 grams daily.  Do not get leg(s) with compression wrap wet. If wraps are too tight as indicated by pain, numbness/tingling or discoloration of toes remove wrap completely and call the wound center @ [phone].       The treatment plan has been discussed at length between you and your provider. Follow all instructions carefully, it is very important. If you do not follow all instructions you are at risk of your wound not healing, infection, possible loss of limb and even loss of life.    COMPRESSION WRAP PATIENT CARE INSTRUCTIONS  DO NOT get compression wrap wet. When showering, use a shower boot.   Please contact wound clinic and if not able to reach, then go to emergency room if ANY of the following occur:   Tingling or numbness in the foot or toes on leg with compression wrap.  Severe pain that cannot be relieved with elevation and any medication instructed per your doctor.  A fever of 100.4°F (38°C) or higher.  Swelling, coldness, or blue-gray discoloration of the toes.  If the compression wrap feels too tight or too loose.  If the compression wrap is damaged or has rough edges that hurt.  If the compression wrap gets wet, you must cut wrap off.   Drainage from compression wrap that smells different than usual.  MISCELLANEOUS INSTRUCTIONS  Supplement with a daily multivitamin   Low salt diet  Intense blood sugar  control - Goal Blood sugar below 180 at all times recommended.  Increase protein intake / consider protein supplements - see below  Elevate extremities at all times when sitting / laying down.  No tobacco use     DIETARY MODIFICATIONS TO HELP WITH WOUND HEALING:          Please follow any restrictions to diet given by your other doctors     Protein: meats, beans, eggs, milk and yogurt particularly Greek yogurt), tofu, soy nuts, soy protein products     Vitamin C: citrus fruits and juices, strawberries, tomatoes, tomato juice, peppers, baked potatoes, spinach, broccoli, cauliflower, [place] sprouts, cabbage     Vitamin A: dark greens, leafy vegetables, orange or yellow vegetables, cantaloupe, fortified dairy products, liver, fortified cereals     Zinc: fortified cereals, red meats, seafood     Consider Bob by [employer] (These are essential branch chain amino acids that help with tissue building and wound healing) and take 2 packets/day. You can order online at [employer] or [employer]     ADDITIONAL REMINDERS:     The treatment plan has been discussed at length with you and your provider. Follow all instructions carefully, it is very important. If you do not follow all instructions, you are at risk of your wound not healing, infection, possible loss of limb and even loss of life.  Please call the clinic at [phone] during regular business hours ( 7:30 AM - 5:30 PM) if you notice increased redness, warmth, pain or pus like drainage or start running a fever greater than 100.3.    For after hour emergencies, please call your primary physician or go to the nearest emergency room.  If your blood pressure is elevated, follow up with your primary care doctor and/or cardiologist as soon as possible.     FOR LEG SWELLING,  LOWER EXTREMITY WOUNDS AND IF USING COMPRESSION:   Managing your edema:    Avoid prolonged standing in one place. It is better to have your calf muscles moving to pump fluid out of the legs.  Elevate  leg(s) above the level of the heart when sitting or as much as possible.  Take you diuretics as directed by your physician. Do not skip doses or change doses unless instructed to do so by your physician.  Decrease salt intake, follow recommended 2 grams daily.  Do not get leg(s) with compression wrap wet. If wraps are too tight as indicated by pain, numbness/tingling or discoloration of toes remove wrap completely and call the wound center @ [phone].       The treatment plan has been discussed at length between you and your provider. Follow all instructions carefully, it is very important. If you do not follow all instructions you are at risk of your wound not healing, infection, possible loss of limb and even loss of life.    COMPRESSION WRAP PATIENT CARE INSTRUCTIONS  DO NOT get compression wrap wet. When showering, use a shower boot.   Please contact wound clinic and if not able to reach, then go to emergency room if ANY of the following occur:   Tingling or numbness in the foot or toes on leg with compression wrap.  Severe pain that cannot be relieved with elevation and any medication instructed per your doctor.  A fever of 100.4°F (38°C) or higher.  Swelling, coldness, or blue-gray discoloration of the toes.  If the compression wrap feels too tight or too loose.  If the compression wrap is damaged or has rough edges that hurt.  If the compression wrap gets wet, you must cut wrap off.   Drainage from compression wrap that smells different than usual.                        [1] No Known Allergies

## 2025-01-14 ENCOUNTER — OFFICE VISIT (OUTPATIENT)
Dept: WOUND CARE | Facility: HOSPITAL | Age: 60
End: 2025-01-14
Attending: FAMILY MEDICINE
Payer: COMMERCIAL

## 2025-01-14 VITALS
RESPIRATION RATE: 16 BRPM | HEART RATE: 75 BPM | DIASTOLIC BLOOD PRESSURE: 85 MMHG | TEMPERATURE: 98 F | SYSTOLIC BLOOD PRESSURE: 138 MMHG

## 2025-01-14 DIAGNOSIS — S81.801D OPEN WOUND OF RIGHT LOWER LEG, SUBSEQUENT ENCOUNTER: ICD-10-CM

## 2025-01-14 DIAGNOSIS — S81.802D OPEN WOUND OF LEFT LOWER LEG, SUBSEQUENT ENCOUNTER: Primary | ICD-10-CM

## 2025-01-14 PROCEDURE — 29581 APPL MULTLAYER CMPRN SYS LEG: CPT

## 2025-01-14 NOTE — PROGRESS NOTES
Chief Complaint   Patient presents with    Wound Care     Follow up for bilateral leg wounds. No complaints at this time.            Current Outpatient Medications:     cefadroxil 500 MG Oral Cap, Take 1 capsule (500 mg total) by mouth 2 (two) times daily for 11 days., Disp: 22 capsule, Rfl: 0    furosemide 20 MG Oral Tab, Take 1 tablet (20 mg total) by mouth daily., Disp: 30 tablet, Rfl: 0    Multiple Vitamins-Minerals (MULTI-VITAMIN/MINERALS) Oral Tab, Take 1 tablet by mouth daily., Disp: , Rfl:     aspirin 81 MG Oral Tab, Take 1 tablet (81 mg total) by mouth daily., Disp: , Rfl:     Allergies[1]       HISTORY:     Past medical, surgical, family and social history updated where appropriate.    PHYSICAL EXAM:   /85   Pulse 75   Temp 97.6 °F (36.4 °C)   Resp 16        Vital signs reviewed.      Calf  Point of Measurement - Left Calf: 30  Point of Measurement - Right Calf: 30  Left Calf from:: Heel  Calf Left cm:: 43.5  Right Calf from:: Heel  Right Calf cm:: 45.4    Ankle  Point of Measurement - Left Ankle: 10  Point of Measurement - Right Ankle: 10  Left Ankle from:: Heel  Left Ankle cm:: 30.1     Right Ankle from:: Heel  Right Ankle cm:: 30       Wound 01/11/24 #1 Leg Left;Lateral (Active)   Date First Assessed/Time First Assessed: 01/11/24 1406    Wound Number (Wound Clinic Only): #1  Primary Wound Type: Traumatic  Location: Leg  Wound Location Orientation: Left;Lateral      Assessments 1/11/2024  2:10 PM 1/14/2025  8:27 AM   Wound Image       Drainage Amount Large Moderate   Drainage Description Yellow;Serous Serosanguineous   Treatments Compression Compression   Wound Length (cm) 10.6 cm 2.4 cm   Wound Width (cm) 9.5 cm 1.1 cm   Wound Surface Area (cm^2) 100.7 cm^2 2.64 cm^2   Wound Depth (cm) 0.2 cm 0.1 cm   Wound Volume (cm^3) 20.14 cm^3 0.264 cm^3   Wound Healing % -- 99   Margins Well-defined edges Well-defined edges   Non-staged Wound Description Full thickness Full thickness   Leslie-wound  Assessment Edema;Hemosiderin staining Edema;Hemosiderin staining   Wound Granulation Tissue Firm;Pink Red;Firm   Wound Bed Granulation (%) 40 % 10 %   Wound Bed Slough (%) 60 % 90 %   Wound Odor None None   Tunneling? No --   Undermining? No --   Sinus Tracts? No --       Inactive Orders   Date Order Priority Status Authorizing Provider   01/09/25 0935 Debridement Traumatic Left;Lateral Leg Routine Completed Nicholas Gould MD   01/02/25 1505 Wound care Routine Discontinued Braxton Ledbetter MD   01/01/25 0720 Consult to Wound Ostomy Routine Completed Dexter Dick MD     - Reason for Consult:    Wound Care     - Wound Care Reason for Consult:    wounds   11/21/24 0914 Debridement Traumatic Left;Lateral Leg Routine Completed Nicholas Gould MD   07/16/24 2327 Consult to Wound Ostomy Routine Completed Rica Pearson MD     - Reason for Consult:    Wound Care     - Wound Care Reason for Consult:    worsening   06/24/24 1127 Wound care Routine Discontinued Rica Pearson MD   06/06/24 0913 Debridement Traumatic Routine Completed Nicholas Gould MD   05/16/24 0901 Debridement Traumatic Routine Completed Nicholas Gould MD   05/02/24 1520 Debridement Traumatic Routine Completed Nicholas Gould MD   04/11/24 0959 Debridement Traumatic Routine Completed Nicholas Gould MD   04/04/24 0915 Debridement Traumatic Left;Lateral Leg Routine Completed Nicholas Gould MD   02/15/24 1154 Debridement Traumatic Left;Lateral Leg Routine Completed Nicholas Gould MD   01/18/24 1511 Debridement Traumatic Left;Lateral Leg Routine Completed Nicholas Gould MD   01/11/24 1710 Debridement Traumatic Left;Lateral Leg Routine Completed Nicholas Gould MD       Compression Wrap 08/22/24 Leg Anterior;Left (Active)   Placement Date: 08/22/24   Location: Leg  Wound Location Orientation: Anterior;Left      Assessments 8/22/2024  9:41 AM 1/14/2025  8:27 AM   Response to Treatment Well tolerated Well tolerated   Compression Layers Multilayer  Multilayer   Compression Product Type Coflex Coflex   Dressing Applied Yes Yes   Compression Wrap Location Toes to Knee Toes to Knee   Compression Wrap Status Clean;Dry;Intact Intact       Inactive Orders   Date Order Priority Status Authorizing Provider   01/01/25 1007 Consult to Wound Ostomy Routine Completed Braxton Ledbetter MD     - Reason for Consult:    Wound Care     - Wound Care Reason for Consult:    L leg wound   01/01/25 0720 Consult to Wound Ostomy Routine Completed Dexter Dick MD     - Reason for Consult:    Wound Care     - Wound Care Reason for Consult:    wounds       Wound 01/09/25 #2 Right Anterior leg Leg Right;Anterior (Active)   Date First Assessed: 01/09/25    Wound Number (Wound Clinic Only): #2 Right Anterior leg  Primary Wound Type: Venous Ulcer  Location: Leg  Wound Location Orientation: Right;Anterior      Assessments 1/9/2025  8:51 AM 1/14/2025  8:28 AM   Wound Image       Drainage Amount Small Scant   Drainage Description Serous;Clear Serous;Yellow   Treatments Compression Compression   Wound Length (cm) 5.5 cm 0.1 cm   Wound Width (cm) 0.3 cm 0.1 cm   Wound Surface Area (cm^2) 1.65 cm^2 0.01 cm^2   Wound Depth (cm) 0.1 cm 0.1 cm   Wound Volume (cm^3) 0.165 cm^3 0.001 cm^3   Wound Healing % -- 99   Margins Poorly defined Well-defined edges   Non-staged Wound Description Full thickness Full thickness   Leslie-wound Assessment Dry;Edema;Blanchable erythema Dry;Edema;Blanchable erythema   Wound Granulation Tissue Red;Firm Pink;Firm   Wound Bed Granulation (%) 20 % 100 %   Wound Bed Epithelium (%) 80 % --   Wound Odor None None   Tunneling? No --   Undermining? No --   Sinus Tracts? No --       No associated orders.       Wound 01/09/25 #3 Left Anterior leg Leg Left;Anterior (Active)   Date First Assessed: 01/09/25    Wound Number (Wound Clinic Only): #3 Left Anterior leg  Primary Wound Type: Venous Ulcer  Location: Leg  Wound Location Orientation: Left;Anterior      Assessments 1/9/2025   8:51 AM 1/14/2025  8:27 AM   Wound Image       Drainage Amount Unable to assess Scant   Drainage Description -- Serosanguineous   Treatments Compression Compression   Wound Length (cm) 0 cm 0.1 cm   Wound Width (cm) 0.5 cm 0.1 cm   Wound Surface Area (cm^2) 0 cm^2 0.01 cm^2   Wound Depth (cm) 0.1 cm 0.1 cm   Wound Volume (cm^3) 0 cm^3 0.001 cm^3   Margins Well-defined edges Well-defined edges   Non-staged Wound Description Full thickness Full thickness   Leslie-wound Assessment Dry;Edema;Pink Dry;Edema;Pink   Wound Granulation Tissue Pink;Firm Pink;Firm   Wound Bed Granulation (%) 100 % 100 %   Wound Odor Mild None   Tunneling? No --   Undermining? No --   Sinus Tracts? No --       No associated orders.          ASSESSMENT AND PLAN:        Risks, benefits, and alternatives of current treatment plan discussed in detail.  Questions and concerns addressed. Red flags to RTC or ED reviewed.  Patient (or parent) agrees to plan.      No follow-ups on file.  Weekly Wound Education Note    Teaching Provided To: Patient  Training Topics: Dressing;Edema control;Cleasing and general instructions;Compression;Discharge instructions  Training Method: Explain/Verbal;Written  Training Response: Patient responds and understands        Notes: Wounds improving. Pt tolerated compression wraps well, edema decreased significantly. Continue folded xeroform to left anterior leg. Continue enluxtra with back off, and kerramax to left lateral leg. No drainage noted on right leg. Both legs wrapped in Coflex 2 layer wrap.               Marie NUGENT RN   1/14/2025  8:39 AM             [1] No Known Allergies

## 2025-01-16 ENCOUNTER — OFFICE VISIT (OUTPATIENT)
Dept: WOUND CARE | Facility: HOSPITAL | Age: 60
End: 2025-01-16
Attending: FAMILY MEDICINE
Payer: COMMERCIAL

## 2025-01-16 VITALS
RESPIRATION RATE: 16 BRPM | DIASTOLIC BLOOD PRESSURE: 80 MMHG | SYSTOLIC BLOOD PRESSURE: 131 MMHG | TEMPERATURE: 98 F | HEART RATE: 69 BPM

## 2025-01-16 DIAGNOSIS — I87.333 CHRONIC VENOUS HYPERTENSION (IDIOPATHIC) WITH ULCER AND INFLAMMATION OF BILATERAL LOWER EXTREMITY (HCC): ICD-10-CM

## 2025-01-16 DIAGNOSIS — E66.813 CLASS 3 SEVERE OBESITY DUE TO EXCESS CALORIES WITH SERIOUS COMORBIDITY AND BODY MASS INDEX (BMI) OF 45.0 TO 49.9 IN ADULT (HCC): ICD-10-CM

## 2025-01-16 DIAGNOSIS — L03.115 CELLULITIS OF RIGHT LOWER EXTREMITY: ICD-10-CM

## 2025-01-16 DIAGNOSIS — S81.801D OPEN WOUND OF RIGHT LOWER LEG, SUBSEQUENT ENCOUNTER: ICD-10-CM

## 2025-01-16 DIAGNOSIS — E66.01 CLASS 3 SEVERE OBESITY DUE TO EXCESS CALORIES WITH SERIOUS COMORBIDITY AND BODY MASS INDEX (BMI) OF 45.0 TO 49.9 IN ADULT (HCC): ICD-10-CM

## 2025-01-16 DIAGNOSIS — S81.802D OPEN WOUND OF LEFT LOWER LEG, SUBSEQUENT ENCOUNTER: Primary | ICD-10-CM

## 2025-01-16 PROCEDURE — 99214 OFFICE O/P EST MOD 30 MIN: CPT | Performed by: FAMILY MEDICINE

## 2025-01-16 PROCEDURE — 97597 DBRDMT OPN WND 1ST 20 CM/<: CPT | Performed by: FAMILY MEDICINE

## 2025-01-16 NOTE — PROGRESS NOTES
Chief Complaint   Patient presents with    Wound Care     Patients is here for a follow up. Patients stated no issue at this moment and wrap stayed        HPI:     Patient here for follow-up of bilateral lower extremity wounds.  He is doing well and tolerating compression wrap bilateral.  No new symptoms.    Lab Results   Component Value Date    BUN 15 01/02/2025    CREATSERUM 0.82 01/02/2025    ALB 3.4 01/01/2025    TP 6.8 01/01/2025    A1C 5.2 12/12/2016         Current Outpatient Medications:     furosemide 20 MG Oral Tab, Take 1 tablet (20 mg total) by mouth daily., Disp: 30 tablet, Rfl: 0    Multiple Vitamins-Minerals (MULTI-VITAMIN/MINERALS) Oral Tab, Take 1 tablet by mouth daily., Disp: , Rfl:     aspirin 81 MG Oral Tab, Take 1 tablet (81 mg total) by mouth daily., Disp: , Rfl:     Allergies[1]         REVIEW OF SYSTEMS:     Review of Systems (ROS)  This information was obtained from the patient, chart    A comprehensive 10 point review of systems was completed.  Pertinent positives and negatives noted in the the HPI.     Past medical, surgical, family and social history updated where appropriate.    PHYSICAL EXAM:   /80   Pulse 69   Temp 97.8 °F (36.6 °C)   Resp 16    Estimated body mass index is 51.69 kg/m² as calculated from the following:    Height as of 1/9/25: 67\".    Weight as of 1/9/25: 330 lb (149.7 kg).   Vital signs reviewed.Appears stated age, well groomed.        Calf  Point of Measurement - Left Calf: 30  Point of Measurement - Right Calf: 30  Left Calf from:: Heel  Calf Left cm:: 39.5  Right Calf from:: Heel  Right Calf cm:: 41.4    Ankle  Point of Measurement - Left Ankle: 10  Point of Measurement - Right Ankle: 10  Left Ankle from:: Heel  Left Ankle cm:: 28     Right Ankle from:: Heel  Right Ankle cm:: 28.9       Foot                               Wound 01/11/24 #1 Leg Left;Lateral (Active)   Date First Assessed/Time First Assessed: 01/11/24 1406    Wound Number (Wound Clinic Only):  #1  Primary Wound Type: Traumatic  Location: Leg  Wound Location Orientation: Left;Lateral      Assessments 1/11/2024  2:10 PM 1/16/2025  8:35 AM   Wound Image        Drainage Amount Large --   Drainage Description Yellow;Serous --   Treatments Compression (coflex 2 layer wrap) --   Wound Length (cm) 10.6 cm 2.2 cm   Wound Width (cm) 9.5 cm 1.5 cm   Wound Surface Area (cm^2) 100.7 cm^2 3.3 cm^2   Wound Depth (cm) 0.2 cm 0.1 cm   Wound Volume (cm^3) 20.14 cm^3 0.33 cm^3   Wound Healing % -- 98   Margins Well-defined edges --   Non-staged Wound Description Full thickness --   Leslie-wound Assessment Edema;Hemosiderin staining --   Wound Granulation Tissue Firm;Pink --   Wound Bed Granulation (%) 40 % --   Wound Bed Slough (%) 60 % --   Wound Odor None --   Tunneling? No --   Undermining? No --   Sinus Tracts? No --       Inactive Orders   Date Order Priority Status Authorizing Provider   01/16/25 0857 Debridement Traumatic Left;Lateral Leg Routine Completed Nicholas Gould MD   01/09/25 0935 Debridement Traumatic Left;Lateral Leg Routine Completed Nicholas Gould MD   01/02/25 1505 Wound care Routine Discontinued Braxton Ledbetter MD   01/01/25 0720 Consult to Wound Ostomy Routine Completed Dexter Dick MD     - Reason for Consult:    Wound Care     - Wound Care Reason for Consult:    wounds   11/21/24 0914 Debridement Traumatic Left;Lateral Leg Routine Completed Nicholas Gould MD   07/16/24 2327 Consult to Wound Ostomy Routine Completed Rica Pearson MD     - Reason for Consult:    Wound Care     - Wound Care Reason for Consult:    worsening   06/24/24 1127 Wound care Routine Discontinued Rica Pearson MD   06/06/24 0913 Debridement Traumatic Routine Completed Nicholas Gould MD   05/16/24 0901 Debridement Traumatic Routine Completed Nicholas Gould MD   05/02/24 1520 Debridement Traumatic Routine Completed Nicholas Gould MD   04/11/24 0959 Debridement Traumatic Routine Completed Nicholas Gould MD    04/04/24 0915 Debridement Traumatic Left;Lateral Leg Routine Completed Nicholas Gould MD   02/15/24 1154 Debridement Traumatic Left;Lateral Leg Routine Completed Nicholas Gould MD   01/18/24 1511 Debridement Traumatic Left;Lateral Leg Routine Completed Nicholas Gould MD   01/11/24 1710 Debridement Traumatic Left;Lateral Leg Routine Completed Nicholas Gould MD       Compression Wrap 08/22/24 Leg Anterior;Left (Active)   Placement Date: 08/22/24   Location: Leg  Wound Location Orientation: Anterior;Left      Assessments 8/22/2024  9:41 AM 1/16/2025  8:35 AM   Response to Treatment Well tolerated Well tolerated   Compression Layers Multilayer Multilayer   Compression Product Type Coflex Coflex   Dressing Applied Yes Yes   Compression Wrap Location Toes to Knee Toes to Knee   Compression Wrap Status Clean;Dry;Intact Intact       Inactive Orders   Date Order Priority Status Authorizing Provider   01/01/25 1007 Consult to Wound Ostomy Routine Completed Braxton Ledbetter MD     - Reason for Consult:    Wound Care     - Wound Care Reason for Consult:    L leg wound   01/01/25 0720 Consult to Wound Ostomy Routine Completed Dexter Dick MD     - Reason for Consult:    Wound Care     - Wound Care Reason for Consult:    wounds              ASSESSMENT AND PLAN:      1. Open wound of left lower leg, subsequent encounter    2. Open wound of right lower leg, subsequent encounter    3. Chronic venous hypertension (idiopathic) with ulcer and inflammation of bilateral lower extremity (HCC)    4. Class 3 severe obesity due to excess calories with serious comorbidity and body mass index (BMI) of 45.0 to 49.9 in adult ([facility])    5. Cellulitis of right lower extremity    Clinically the right leg wounds are now completely healed.  Will apply spandagrip patient should use his Velcro compression garment to the right lower extremity.  No signs of infection or cellulitis.  The left lateral leg wound is also better and well granulated  stimulated and debrided with curette.  See debridement note.  It is smaller than previous visit.  Will continue with Enluxtra with the backing off and Kerramax and Coflex 2 layer compression wrap.  Patient tolerating compression wrap well.  Risks, benefits, and alternatives of current treatment plan discussed in detail.  Questions and concerns addressed. Red flags to RTC or ED reviewed.  Patient (or parent) agrees to plan.      Return in about 1 week (around 2025).    Also refer to patient instructions.     I spent a total of 30 minutes with this patient's care.  This time included preparing to see the patient (eg, review notes and recent diagnostics), seeing the patient, performing a medically appropriate examination and/or evaluation, counseling and educating the patient, and documenting in the record.          Nicholas Gould M.D.   [facility]   2025    Patient Instructions       PATIENT INSTRUCTIONS      FOR Luis M RICK Peña 3/25/1965    DATE OF SERVICE: 2025      Left lateral leg wound: Enluxtra with backing off, kerramax  Coflex 2 layer compression wrap    Right anterior leg wound:  wear your velcro compression     Follow up with Dr. Gould in 1 week      DO NOT GET THE WRAP WET       Managing your edema:    Avoid prolonged standing in one place. It is better to have your calf muscles moving to pump fluid out of the legs.  Elevate leg(s) above the level of the heart when sitting or as much as possible.  Take you diuretics as directed by your physician. Do not skip doses or change doses unless instructed to do so by your physician.  Decrease salt intake, follow recommended 2 grams daily.  Do not get leg(s) with compression wrap wet. If wraps are too tight as indicated by pain, numbness/tingling or discoloration of toes remove wrap completely and call the wound center @ [phone].       The treatment plan has been discussed at length between you and your  provider. Follow all instructions carefully, it is very important. If you do not follow all instructions you are at risk of your wound not healing, infection, possible loss of limb and even loss of life.    COMPRESSION WRAP PATIENT CARE INSTRUCTIONS  DO NOT get compression wrap wet. When showering, use a shower boot.   Please contact wound clinic and if not able to reach, then go to emergency room if ANY of the following occur:   Tingling or numbness in the foot or toes on leg with compression wrap.  Severe pain that cannot be relieved with elevation and any medication instructed per your doctor.  A fever of 100.4°F (38°C) or higher.  Swelling, coldness, or blue-gray discoloration of the toes.  If the compression wrap feels too tight or too loose.  If the compression wrap is damaged or has rough edges that hurt.  If the compression wrap gets wet, you must cut wrap off.   Drainage from compression wrap that smells different than usual.  MISCELLANEOUS INSTRUCTIONS  Supplement with a daily multivitamin   Low salt diet  Intense blood sugar control - Goal Blood sugar below 180 at all times recommended.  Increase protein intake / consider protein supplements - see below  Elevate extremities at all times when sitting / laying down.  No tobacco use     DIETARY MODIFICATIONS TO HELP WITH WOUND HEALING:          Please follow any restrictions to diet given by your other doctors     Protein: meats, beans, eggs, milk and yogurt particularly Greek yogurt), tofu, soy nuts, soy protein products     Vitamin C: citrus fruits and juices, strawberries, tomatoes, tomato juice, peppers, baked potatoes, spinach, broccoli, cauliflower, [place] sprouts, cabbage     Vitamin A: dark greens, leafy vegetables, orange or yellow vegetables, cantaloupe, fortified dairy products, liver, fortified cereals     Zinc: fortified cereals, red meats, seafood     Consider Bob by [employer] (These are essential branch chain amino acids that help with  tissue building and wound healing) and take 2 packets/day. You can order online at [employer] or [employer]     ADDITIONAL REMINDERS:     The treatment plan has been discussed at length with you and your provider. Follow all instructions carefully, it is very important. If you do not follow all instructions, you are at risk of your wound not healing, infection, possible loss of limb and even loss of life.  Please call the clinic at [phone] during regular business hours ( 7:30 AM - 5:30 PM) if you notice increased redness, warmth, pain or pus like drainage or start running a fever greater than 100.3.    For after hour emergencies, please call your primary physician or go to the nearest emergency room.  If your blood pressure is elevated, follow up with your primary care doctor and/or cardiologist as soon as possible.     FOR LEG SWELLING,  LOWER EXTREMITY WOUNDS AND IF USING COMPRESSION:   Managing your edema:    Avoid prolonged standing in one place. It is better to have your calf muscles moving to pump fluid out of the legs.  Elevate leg(s) above the level of the heart when sitting or as much as possible.  Take you diuretics as directed by your physician. Do not skip doses or change doses unless instructed to do so by your physician.  Decrease salt intake, follow recommended 2 grams daily.  Do not get leg(s) with compression wrap wet. If wraps are too tight as indicated by pain, numbness/tingling or discoloration of toes remove wrap completely and call the wound center @ [phone].       The treatment plan has been discussed at length between you and your provider. Follow all instructions carefully, it is very important. If you do not follow all instructions you are at risk of your wound not healing, infection, possible loss of limb and even loss of life.    COMPRESSION WRAP PATIENT CARE INSTRUCTIONS  DO NOT get compression wrap wet. When showering, use a shower boot.   Please contact wound clinic and if not able  to reach, then go to emergency room if ANY of the following occur:   Tingling or numbness in the foot or toes on leg with compression wrap.  Severe pain that cannot be relieved with elevation and any medication instructed per your doctor.  A fever of 100.4°F (38°C) or higher.  Swelling, coldness, or blue-gray discoloration of the toes.  If the compression wrap feels too tight or too loose.  If the compression wrap is damaged or has rough edges that hurt.  If the compression wrap gets wet, you must cut wrap off.   Drainage from compression wrap that smells different than usual.                        [1] No Known Allergies

## 2025-01-16 NOTE — PATIENT INSTRUCTIONS
PATIENT INSTRUCTIONS      FOR Luis M Lomasd ,  3/25/1965    DATE OF SERVICE: 2025      Left lateral leg wound: Enluxtra with backing off, kerramax  Coflex 2 layer compression wrap    Right anterior leg wound:  wear your velcro compression     Follow up with Dr. Gould in 1 week      DO NOT GET THE WRAP WET       Managing your edema:    Avoid prolonged standing in one place. It is better to have your calf muscles moving to pump fluid out of the legs.  Elevate leg(s) above the level of the heart when sitting or as much as possible.  Take you diuretics as directed by your physician. Do not skip doses or change doses unless instructed to do so by your physician.  Decrease salt intake, follow recommended 2 grams daily.  Do not get leg(s) with compression wrap wet. If wraps are too tight as indicated by pain, numbness/tingling or discoloration of toes remove wrap completely and call the wound center @ [phone].       The treatment plan has been discussed at length between you and your provider. Follow all instructions carefully, it is very important. If you do not follow all instructions you are at risk of your wound not healing, infection, possible loss of limb and even loss of life.    COMPRESSION WRAP PATIENT CARE INSTRUCTIONS  DO NOT get compression wrap wet. When showering, use a shower boot.   Please contact wound clinic and if not able to reach, then go to emergency room if ANY of the following occur:   Tingling or numbness in the foot or toes on leg with compression wrap.  Severe pain that cannot be relieved with elevation and any medication instructed per your doctor.  A fever of 100.4°F (38°C) or higher.  Swelling, coldness, or blue-gray discoloration of the toes.  If the compression wrap feels too tight or too loose.  If the compression wrap is damaged or has rough edges that hurt.  If the compression wrap gets wet, you must cut wrap off.   Drainage from compression wrap that smells  different than usual.

## 2025-01-16 NOTE — PROGRESS NOTES
Patient ID: Luis M Estrada is a 59 year old male.    Debridement Traumatic Left;Lateral Leg   Wound 01/11/24 #1 Leg Left;Lateral    Performed by: Nicholas Gould MD  Authorized by: Nicholas Gould MD      Consent   Consent obtained? verbal  Consent given by: patient  Risks discussed? procedural risks discussed    Debridement Details  Performed by: physician  Debridement type: conservative sharp  Pain control: lidocaine 4%  Pain control administration type: topical    Pre-debridement measurements  Length (cm): 2.2  Width (cm): 1.5  Depth (cm): 0.1  Surface Area (cm^2): 3.3    Post-debridement measurements  Length (cm): 2.2  Width (cm): 1.5  Depth (cm): 0.1  Percent debrided: 100%  Surface Area (cm^2): 3.3  Area Debrided (cm^2): 3.3  Volume (cm^3): 0.33    Devitalized tissue debrided: biofilm and slough  Instrument(s) utilized: curette  Bleeding: small  Hemostasis obtained with: pressure  Response to treatment: procedure was tolerated well

## 2025-01-16 NOTE — PROGRESS NOTES
Weekly Wound Education Note    Teaching Provided To: Patient  Training Topics: Discharge instructions;Cleasing and general instructions;Dressing;Edema control;Compression  Training Method: Explain/Verbal;Demonstration  Training Response: Reinforcement needed        Notes: Patient here for follow up wound care visit to right anterior lower leg, left anterior lower leg, and left lateral lower leg wounds. Edema measurement decreased, right anterior and left anterior lower leg wound measurements decreased, left lateral lower leg wound measurement stable. Per provider right anterior and left anterior lower leg wound are healed. Provider stimulated left lateral leg wound at visit today, treatment to continue using enluxtra with back off , ABD pad, conforming gauze, tape. Applied coflex 2 layer wrap to LLE. Pt to follow up with provider in 1 week.

## 2025-01-23 ENCOUNTER — OFFICE VISIT (OUTPATIENT)
Dept: WOUND CARE | Facility: HOSPITAL | Age: 60
End: 2025-01-23
Attending: FAMILY MEDICINE
Payer: COMMERCIAL

## 2025-01-23 VITALS
HEART RATE: 83 BPM | TEMPERATURE: 98 F | SYSTOLIC BLOOD PRESSURE: 137 MMHG | DIASTOLIC BLOOD PRESSURE: 89 MMHG | RESPIRATION RATE: 16 BRPM

## 2025-01-23 DIAGNOSIS — E66.813 CLASS 3 SEVERE OBESITY DUE TO EXCESS CALORIES WITH SERIOUS COMORBIDITY AND BODY MASS INDEX (BMI) OF 50.0 TO 59.9 IN ADULT (HCC): ICD-10-CM

## 2025-01-23 DIAGNOSIS — E66.01 CLASS 3 SEVERE OBESITY DUE TO EXCESS CALORIES WITH SERIOUS COMORBIDITY AND BODY MASS INDEX (BMI) OF 50.0 TO 59.9 IN ADULT (HCC): ICD-10-CM

## 2025-01-23 DIAGNOSIS — S81.802D OPEN WOUND OF LEFT LOWER LEG, SUBSEQUENT ENCOUNTER: Primary | ICD-10-CM

## 2025-01-23 DIAGNOSIS — E66.01 CLASS 3 SEVERE OBESITY DUE TO EXCESS CALORIES WITH SERIOUS COMORBIDITY AND BODY MASS INDEX (BMI) OF 45.0 TO 49.9 IN ADULT (HCC): ICD-10-CM

## 2025-01-23 DIAGNOSIS — I87.333 CHRONIC VENOUS HYPERTENSION (IDIOPATHIC) WITH ULCER AND INFLAMMATION OF BILATERAL LOWER EXTREMITY (HCC): ICD-10-CM

## 2025-01-23 DIAGNOSIS — I87.2 CHRONIC VENOUS INSUFFICIENCY: ICD-10-CM

## 2025-01-23 DIAGNOSIS — E66.813 CLASS 3 SEVERE OBESITY DUE TO EXCESS CALORIES WITH SERIOUS COMORBIDITY AND BODY MASS INDEX (BMI) OF 45.0 TO 49.9 IN ADULT (HCC): ICD-10-CM

## 2025-01-23 PROCEDURE — 29581 APPL MULTLAYER CMPRN SYS LEG: CPT

## 2025-01-23 NOTE — PROGRESS NOTES
Chief Complaint   Patient presents with    Wound Care     Patients is here for a nurse visit. Patients stated no issue at this moment. Wrap sled down a little bit            Current Outpatient Medications:     furosemide 20 MG Oral Tab, Take 1 tablet (20 mg total) by mouth daily., Disp: 30 tablet, Rfl: 0    Multiple Vitamins-Minerals (MULTI-VITAMIN/MINERALS) Oral Tab, Take 1 tablet by mouth daily., Disp: , Rfl:     aspirin 81 MG Oral Tab, Take 1 tablet (81 mg total) by mouth daily., Disp: , Rfl:     Allergies[1]       HISTORY:     Past medical, surgical, family and social history updated where appropriate.    PHYSICAL EXAM:   /89   Pulse 83   Temp 98 °F (36.7 °C)   Resp 16        Vital signs reviewed.      Calf  Point of Measurement - Left Calf: 30  Point of Measurement - Right Calf: 30  Left Calf from:: Heel  Calf Left cm:: 39.5  Right Calf from:: Heel  Right Calf cm:: 44    Ankle  Point of Measurement - Left Ankle: 10  Point of Measurement - Right Ankle: 10  Left Ankle from:: Heel  Left Ankle cm:: 27.9     Right Ankle from:: Heel  Right Ankle cm:: 30.2       Wound 01/11/24 #1 Leg Left;Lateral (Active)   Date First Assessed/Time First Assessed: 01/11/24 1406    Wound Number (Wound Clinic Only): #1  Primary Wound Type: Traumatic  Location: Leg  Wound Location Orientation: Left;Lateral      Assessments 1/11/2024  2:10 PM 1/23/2025  8:34 AM   Wound Image       Drainage Amount Large Small   Drainage Description Yellow;Serous Serosanguineous   Treatments Compression Compression   Wound Length (cm) 10.6 cm 2 cm   Wound Width (cm) 9.5 cm 1 cm   Wound Surface Area (cm^2) 100.7 cm^2 2 cm^2   Wound Depth (cm) 0.2 cm 0.2 cm   Wound Volume (cm^3) 20.14 cm^3 0.4 cm^3   Wound Healing % -- 98   Margins Well-defined edges Well-defined edges   Non-staged Wound Description Full thickness Full thickness   Leslie-wound Assessment Edema;Hemosiderin staining Edema;Hemosiderin staining;Dry   Wound Granulation Tissue Firm;Pink  Firm;Pink   Wound Bed Granulation (%) 40 % 90 %   Wound Bed Slough (%) 60 % 10 %   Wound Odor None None   Tunneling? No No   Undermining? No No   Sinus Tracts? No No       Inactive Orders   Date Order Priority Status Authorizing Provider   01/16/25 0857 Debridement Traumatic Left;Lateral Leg Routine Completed Nicholas Gould MD   01/09/25 0935 Debridement Traumatic Left;Lateral Leg Routine Completed Nicholas Gould MD   01/02/25 1505 Wound care Routine Discontinued Braxton Ledbetter MD   01/01/25 0720 Consult to Wound Ostomy Routine Completed Dexter Dick MD     - Reason for Consult:    Wound Care     - Wound Care Reason for Consult:    wounds   11/21/24 0914 Debridement Traumatic Left;Lateral Leg Routine Completed Nicholas Gould MD   07/16/24 2327 Consult to Wound Ostomy Routine Completed Rica Pearson MD     - Reason for Consult:    Wound Care     - Wound Care Reason for Consult:    worsening   06/24/24 1127 Wound care Routine Discontinued Rica Pearson MD   06/06/24 0913 Debridement Traumatic Routine Completed Nicholas Gould MD   05/16/24 0901 Debridement Traumatic Routine Completed Nicholas Gould MD   05/02/24 1520 Debridement Traumatic Routine Completed Nicholas Gould MD   04/11/24 0959 Debridement Traumatic Routine Completed Nicholas Gould MD   04/04/24 0915 Debridement Traumatic Left;Lateral Leg Routine Completed Nicholas Gould MD   02/15/24 1154 Debridement Traumatic Left;Lateral Leg Routine Completed Nicholas Gould MD   01/18/24 1511 Debridement Traumatic Left;Lateral Leg Routine Completed Nicholas Gould MD   01/11/24 1710 Debridement Traumatic Left;Lateral Leg Routine Completed Nicholas Gould MD       Compression Wrap 08/22/24 Leg Anterior;Left (Active)   Placement Date: 08/22/24   Location: Leg  Wound Location Orientation: Anterior;Left      Assessments 8/22/2024  9:41 AM 1/23/2025  8:44 AM   Response to Treatment Well tolerated Well tolerated   Compression Layers Multilayer  Multilayer   Compression Product Type Coflex Coflex   Dressing Applied Yes Yes   Compression Wrap Location Toes to Knee Toes to Knee   Compression Wrap Status Clean;Dry;Intact Intact;Dry;Clean       Inactive Orders   Date Order Priority Status Authorizing Provider   01/01/25 1007 Consult to Wound Ostomy Routine Completed Braxton Ledbetter MD     - Reason for Consult:    Wound Care     - Wound Care Reason for Consult:    L leg wound   01/01/25 0720 Consult to Wound Ostomy Routine Completed Dexter Dick MD     - Reason for Consult:    Wound Care     - Wound Care Reason for Consult:    wounds          ASSESSMENT AND PLAN:        Risks, benefits, and alternatives of current treatment plan discussed in detail.  Questions and concerns addressed. Red flags to RTC or ED reviewed.  Patient (or parent) agrees to plan.      No follow-ups on file.  Weekly Wound Education Note    Teaching Provided To: Patient  Training Topics: Cleasing and general instructions;Compression;Discharge instructions;Dressing;Edema control  Training Method: Explain/Verbal  Training Response: Patient responds and understands;Reinforcement needed        Notes: Here for RN visit.    Here for RN visit, wound slightly improved. Edema measurements stable - BLE. Wrap noted to have slid down - ABD pad and rosidal used to help keep wrap up. Left leg: Betamethasone to leg, enluxtra (backing off), ABD pad, coflex 2 30-40mmhg. Right leg: Spandagrip E, new one provided - remains healed. Scheduled to return 1/30/25.    Tamica ZAFAR RN   1/23/2025  8:45 AM             [1] No Known Allergies

## 2025-01-30 ENCOUNTER — OFFICE VISIT (OUTPATIENT)
Dept: WOUND CARE | Facility: HOSPITAL | Age: 60
End: 2025-01-30
Attending: FAMILY MEDICINE
Payer: COMMERCIAL

## 2025-01-30 VITALS
RESPIRATION RATE: 16 BRPM | SYSTOLIC BLOOD PRESSURE: 144 MMHG | TEMPERATURE: 98 F | DIASTOLIC BLOOD PRESSURE: 85 MMHG | HEART RATE: 68 BPM

## 2025-01-30 DIAGNOSIS — I87.2 CHRONIC VENOUS INSUFFICIENCY: ICD-10-CM

## 2025-01-30 DIAGNOSIS — E66.01 CLASS 3 SEVERE OBESITY DUE TO EXCESS CALORIES WITH SERIOUS COMORBIDITY AND BODY MASS INDEX (BMI) OF 45.0 TO 49.9 IN ADULT (HCC): ICD-10-CM

## 2025-01-30 DIAGNOSIS — I87.333 CHRONIC VENOUS HYPERTENSION (IDIOPATHIC) WITH ULCER AND INFLAMMATION OF BILATERAL LOWER EXTREMITY (HCC): ICD-10-CM

## 2025-01-30 DIAGNOSIS — S81.802D OPEN WOUND OF LEFT LOWER LEG, SUBSEQUENT ENCOUNTER: Primary | ICD-10-CM

## 2025-01-30 DIAGNOSIS — E66.813 CLASS 3 SEVERE OBESITY DUE TO EXCESS CALORIES WITH SERIOUS COMORBIDITY AND BODY MASS INDEX (BMI) OF 45.0 TO 49.9 IN ADULT (HCC): ICD-10-CM

## 2025-01-30 DIAGNOSIS — E66.01 CLASS 3 SEVERE OBESITY DUE TO EXCESS CALORIES WITH SERIOUS COMORBIDITY AND BODY MASS INDEX (BMI) OF 50.0 TO 59.9 IN ADULT (HCC): ICD-10-CM

## 2025-01-30 DIAGNOSIS — E66.813 CLASS 3 SEVERE OBESITY DUE TO EXCESS CALORIES WITH SERIOUS COMORBIDITY AND BODY MASS INDEX (BMI) OF 50.0 TO 59.9 IN ADULT (HCC): ICD-10-CM

## 2025-01-30 PROCEDURE — 99215 OFFICE O/P EST HI 40 MIN: CPT | Performed by: FAMILY MEDICINE

## 2025-01-30 NOTE — PROGRESS NOTES
Chief Complaint   Patient presents with    Wound Care     Patients is here for a follow up. Patients stated no issue at this moment        HPI:     Patient here for follow-up of left lateral leg wound and right leg wound.  He doing well on the right leg.  Appears to be healed.  He is tolerating compression wrap on the left lower extremity.    Lab Results   Component Value Date    BUN 15 01/02/2025    CREATSERUM 0.82 01/02/2025    ALB 3.4 01/01/2025    TP 6.8 01/01/2025    A1C 5.2 12/12/2016         Current Outpatient Medications:     furosemide 20 MG Oral Tab, Take 1 tablet (20 mg total) by mouth daily., Disp: 30 tablet, Rfl: 0    Multiple Vitamins-Minerals (MULTI-VITAMIN/MINERALS) Oral Tab, Take 1 tablet by mouth daily., Disp: , Rfl:     aspirin 81 MG Oral Tab, Take 1 tablet (81 mg total) by mouth daily., Disp: , Rfl:     Allergies[1]         REVIEW OF SYSTEMS:     Review of Systems (ROS)  This information was obtained from the patient, chart    A comprehensive 10 point review of systems was completed.  Pertinent positives and negatives noted in the the HPI.     Past medical, surgical, family and social history updated where appropriate.    PHYSICAL EXAM:   /85   Pulse 68   Temp 97.8 °F (36.6 °C)   Resp 16    Estimated body mass index is 51.69 kg/m² as calculated from the following:    Height as of 1/9/25: 67\".    Weight as of 1/9/25: 330 lb (149.7 kg).   Vital signs reviewed.Appears stated age, well groomed.        Calf  Point of Measurement - Left Calf: 30     Left Calf from:: Heel  Calf Left cm:: 38          Ankle  Point of Measurement - Left Ankle: 10     Left Ankle from:: Heel  Left Ankle cm:: 27.5                Foot                               Wound 01/11/24 #1 Leg Left;Lateral (Active)   Date First Assessed/Time First Assessed: 01/11/24 1406    Wound Number (Wound Clinic Only): #1  Primary Wound Type: Traumatic  Location: Leg  Wound Location Orientation: Left;Lateral      Assessments 1/11/2024   2:10 PM 1/30/2025  8:30 AM   Wound Image       Drainage Amount Large Small   Drainage Description Yellow;Serous Serosanguineous   Treatments Compression (coflex 2 layer wrap) --   Wound Length (cm) 10.6 cm 1.4 cm   Wound Width (cm) 9.5 cm 1 cm   Wound Surface Area (cm^2) 100.7 cm^2 1.4 cm^2   Wound Depth (cm) 0.2 cm 0.1 cm   Wound Volume (cm^3) 20.14 cm^3 0.14 cm^3   Wound Healing % -- 99   Margins Well-defined edges Well-defined edges   Non-staged Wound Description Full thickness Full thickness   Leslie-wound Assessment Edema;Hemosiderin staining Edema;Hemosiderin staining;Dry   Wound Granulation Tissue Firm;Pink Firm;Red   Wound Bed Granulation (%) 40 % 100 %   Wound Bed Slough (%) 60 % --   Wound Odor None None   Tunneling? No --   Undermining? No --   Sinus Tracts? No --       Inactive Orders   Date Order Priority Status Authorizing Provider   01/16/25 0857 Debridement Traumatic Left;Lateral Leg Routine Completed Nicholas Gould MD   01/09/25 0935 Debridement Traumatic Left;Lateral Leg Routine Completed Nicholas Gould MD   01/02/25 1505 Wound care Routine Discontinued Braxton Ledbetter MD   01/01/25 0720 Consult to Wound Ostomy Routine Completed Dexter Dick MD     - Reason for Consult:    Wound Care     - Wound Care Reason for Consult:    wounds   11/21/24 0914 Debridement Traumatic Left;Lateral Leg Routine Completed Nicholas Gould MD   07/16/24 2327 Consult to Wound Ostomy Routine Completed Rica Pearson MD     - Reason for Consult:    Wound Care     - Wound Care Reason for Consult:    worsening   06/24/24 1127 Wound care Routine Discontinued Rica Pearson MD   06/06/24 0913 Debridement Traumatic Routine Completed Nicholas Gould MD   05/16/24 0901 Debridement Traumatic Routine Completed Nicholas Gould MD   05/02/24 1520 Debridement Traumatic Routine Completed Nicholas Gould MD   04/11/24 0959 Debridement Traumatic Routine Completed Nicholas Gould MD   04/04/24 0915 Debridement Traumatic  Left;Lateral Leg Routine Completed Nicholas Gould MD   02/15/24 1154 Debridement Traumatic Left;Lateral Leg Routine Completed Nicholas Gould MD   01/18/24 1511 Debridement Traumatic Left;Lateral Leg Routine Completed Nicholas Gould MD   01/11/24 1710 Debridement Traumatic Left;Lateral Leg Routine Completed Nicholas Gould MD       Compression Wrap 08/22/24 Leg Anterior;Left (Active)   Placement Date: 08/22/24   Location: Leg  Wound Location Orientation: Anterior;Left      Assessments 8/22/2024  9:41 AM 1/23/2025  8:44 AM   Response to Treatment Well tolerated Well tolerated   Compression Layers Multilayer Multilayer   Compression Product Type Coflex Coflex   Dressing Applied Yes Yes   Compression Wrap Location Toes to Knee Toes to Knee   Compression Wrap Status Clean;Dry;Intact Intact;Dry;Clean       Inactive Orders   Date Order Priority Status Authorizing Provider   01/01/25 1007 Consult to Wound Ostomy Routine Completed Braxton Ledbetter MD     - Reason for Consult:    Wound Care     - Wound Care Reason for Consult:    L leg wound   01/01/25 0720 Consult to Wound Ostomy Routine Completed Dexter Dick MD     - Reason for Consult:    Wound Care     - Wound Care Reason for Consult:    wounds              ASSESSMENT AND PLAN:      1. Open wound of left lower leg, subsequent encounter    2. Chronic venous insufficiency    3. Class 3 severe obesity due to excess calories with serious comorbidity and body mass index (BMI) of 50.0 to 59.9 in adult ([facility])    4. Chronic venous hypertension (idiopathic) with ulcer and inflammation of bilateral lower extremity ([facility])    5. Class 3 severe obesity due to excess calories with serious comorbidity and body mass index (BMI) of 45.0 to 49.9 in adult ([facility])    Clinically the right leg wound is completely healed.  The left leg wound also is smaller than good granulation tissue noted on the hypergranulation.  This is treated with silver nitrate.  Patient responding well.  Will  continue with Enluxtra as there is minimal drainage now.  Continue Coflex 2 layer compression wrap.  He continues to do well.  Risks, benefits, and alternatives of current treatment plan discussed in detail.  Questions and concerns addressed. Red flags to RTC or ED reviewed.  Patient (or parent) agrees to plan.      Return in about 1 week (around 2025).    Also refer to patient instructions.     I spent a total of 40 minutes with this patient's care.  This time included preparing to see the patient (eg, review notes and recent diagnostics), seeing the patient, performing a medically appropriate examination and/or evaluation, counseling and educating the patient, and documenting in the record.          Nicholas Gould M.D.   [facility]   2025    Patient Instructions       PATIENT INSTRUCTIONS      FOR Luis M RICK Peña 3/25/1965    DATE OF SERVICE: 2025      Left lateral leg wound: Enluxtra   Coflex 2 layer compression wrap      Follow up with Dr. Gould in 1 week      DO NOT GET THE WRAP WET       Managing your edema:    Avoid prolonged standing in one place. It is better to have your calf muscles moving to pump fluid out of the legs.  Elevate leg(s) above the level of the heart when sitting or as much as possible.  Take you diuretics as directed by your physician. Do not skip doses or change doses unless instructed to do so by your physician.  Decrease salt intake, follow recommended 2 grams daily.  Do not get leg(s) with compression wrap wet. If wraps are too tight as indicated by pain, numbness/tingling or discoloration of toes remove wrap completely and call the wound center @ [phone].       The treatment plan has been discussed at length between you and your provider. Follow all instructions carefully, it is very important. If you do not follow all instructions you are at risk of your wound not healing, infection, possible loss of limb and even loss of  life.    COMPRESSION WRAP PATIENT CARE INSTRUCTIONS  DO NOT get compression wrap wet. When showering, use a shower boot.   Please contact wound clinic and if not able to reach, then go to emergency room if ANY of the following occur:   Tingling or numbness in the foot or toes on leg with compression wrap.  Severe pain that cannot be relieved with elevation and any medication instructed per your doctor.  A fever of 100.4°F (38°C) or higher.  Swelling, coldness, or blue-gray discoloration of the toes.  If the compression wrap feels too tight or too loose.  If the compression wrap is damaged or has rough edges that hurt.  If the compression wrap gets wet, you must cut wrap off.   Drainage from compression wrap that smells different than usual.  MISCELLANEOUS INSTRUCTIONS  Supplement with a daily multivitamin   Low salt diet  Intense blood sugar control - Goal Blood sugar below 180 at all times recommended.  Increase protein intake / consider protein supplements - see below  Elevate extremities at all times when sitting / laying down.  No tobacco use     DIETARY MODIFICATIONS TO HELP WITH WOUND HEALING:          Please follow any restrictions to diet given by your other doctors     Protein: meats, beans, eggs, milk and yogurt particularly Greek yogurt), tofu, soy nuts, soy protein products     Vitamin C: citrus fruits and juices, strawberries, tomatoes, tomato juice, peppers, baked potatoes, spinach, broccoli, cauliflower, [place] sprouts, cabbage     Vitamin A: dark greens, leafy vegetables, orange or yellow vegetables, cantaloupe, fortified dairy products, liver, fortified cereals     Zinc: fortified cereals, red meats, seafood     Consider Bob by [employer] (These are essential branch chain amino acids that help with tissue building and wound healing) and take 2 packets/day. You can order online at [employer] or [employer]     ADDITIONAL REMINDERS:     The treatment plan has been discussed at length with you and your  provider. Follow all instructions carefully, it is very important. If you do not follow all instructions, you are at risk of your wound not healing, infection, possible loss of limb and even loss of life.  Please call the clinic at [phone] during regular business hours ( 7:30 AM - 5:30 PM) if you notice increased redness, warmth, pain or pus like drainage or start running a fever greater than 100.3.    For after hour emergencies, please call your primary physician or go to the nearest emergency room.  If your blood pressure is elevated, follow up with your primary care doctor and/or cardiologist as soon as possible.     FOR LEG SWELLING,  LOWER EXTREMITY WOUNDS AND IF USING COMPRESSION:   Managing your edema:    Avoid prolonged standing in one place. It is better to have your calf muscles moving to pump fluid out of the legs.  Elevate leg(s) above the level of the heart when sitting or as much as possible.  Take you diuretics as directed by your physician. Do not skip doses or change doses unless instructed to do so by your physician.  Decrease salt intake, follow recommended 2 grams daily.  Do not get leg(s) with compression wrap wet. If wraps are too tight as indicated by pain, numbness/tingling or discoloration of toes remove wrap completely and call the wound center @ [phone].       The treatment plan has been discussed at length between you and your provider. Follow all instructions carefully, it is very important. If you do not follow all instructions you are at risk of your wound not healing, infection, possible loss of limb and even loss of life.    COMPRESSION WRAP PATIENT CARE INSTRUCTIONS  DO NOT get compression wrap wet. When showering, use a shower boot.   Please contact wound clinic and if not able to reach, then go to emergency room if ANY of the following occur:   Tingling or numbness in the foot or toes on leg with compression wrap.  Severe pain that cannot be relieved with elevation and  any medication instructed per your doctor.  A fever of 100.4°F (38°C) or higher.  Swelling, coldness, or blue-gray discoloration of the toes.  If the compression wrap feels too tight or too loose.  If the compression wrap is damaged or has rough edges that hurt.  If the compression wrap gets wet, you must cut wrap off.   Drainage from compression wrap that smells different than usual.                        [1] No Known Allergies

## 2025-01-30 NOTE — PROGRESS NOTES
Weekly Wound Education Note    Teaching Provided To: Patient  Training Topics: Cleasing and general instructions;Compression;Discharge instructions;Dressing;Edema control  Training Method: Explain/Verbal  Training Response: Patient responds and understands;Reinforcement needed        Notes: Stable. Provider used silver nitrate at today's visit. Continue enluxtra and coflex 2 30-40mmhg. New spandagrip E provided for right leg - remains healed.

## 2025-01-30 NOTE — PATIENT INSTRUCTIONS
PATIENT INSTRUCTIONS      FOR Luis M RODRIGUEZ Natalie ,  3/25/1965    DATE OF SERVICE: 2025      Left lateral leg wound: Enluxtra   Coflex 2 layer compression wrap      Follow up with Dr. Gould in 1 week      DO NOT GET THE WRAP WET       Managing your edema:    Avoid prolonged standing in one place. It is better to have your calf muscles moving to pump fluid out of the legs.  Elevate leg(s) above the level of the heart when sitting or as much as possible.  Take you diuretics as directed by your physician. Do not skip doses or change doses unless instructed to do so by your physician.  Decrease salt intake, follow recommended 2 grams daily.  Do not get leg(s) with compression wrap wet. If wraps are too tight as indicated by pain, numbness/tingling or discoloration of toes remove wrap completely and call the wound center @ [phone].       The treatment plan has been discussed at length between you and your provider. Follow all instructions carefully, it is very important. If you do not follow all instructions you are at risk of your wound not healing, infection, possible loss of limb and even loss of life.    COMPRESSION WRAP PATIENT CARE INSTRUCTIONS  DO NOT get compression wrap wet. When showering, use a shower boot.   Please contact wound clinic and if not able to reach, then go to emergency room if ANY of the following occur:   Tingling or numbness in the foot or toes on leg with compression wrap.  Severe pain that cannot be relieved with elevation and any medication instructed per your doctor.  A fever of 100.4°F (38°C) or higher.  Swelling, coldness, or blue-gray discoloration of the toes.  If the compression wrap feels too tight or too loose.  If the compression wrap is damaged or has rough edges that hurt.  If the compression wrap gets wet, you must cut wrap off.   Drainage from compression wrap that smells different than usual.

## 2025-02-06 ENCOUNTER — APPOINTMENT (OUTPATIENT)
Dept: WOUND CARE | Facility: HOSPITAL | Age: 60
End: 2025-02-06
Payer: COMMERCIAL

## 2025-02-06 ENCOUNTER — TELEPHONE (OUTPATIENT)
Dept: WOUND CARE | Facility: HOSPITAL | Age: 60
End: 2025-02-06

## 2025-02-06 NOTE — TELEPHONE ENCOUNTER
RN was notified by  that patient had to cancel appointment today due to transportation issues. RN called and reached out to patient to ask if patient feels comfortable removing wrap and cleansing wound and replacing new dressing. Pt states he can do this. RN instructed patient to remove entire wrap, cleanse wound with normal saline, and check his supplies at home and apply new dressing. RN also instructed patient to apply his own compression garment to control edema- instructed to wear daily. Patient states understanding, pt is scheduled for follow up provider visit on 2/13/25.

## 2025-02-13 ENCOUNTER — OFFICE VISIT (OUTPATIENT)
Dept: WOUND CARE | Facility: HOSPITAL | Age: 60
End: 2025-02-13
Attending: FAMILY MEDICINE
Payer: COMMERCIAL

## 2025-02-13 VITALS
RESPIRATION RATE: 16 BRPM | DIASTOLIC BLOOD PRESSURE: 88 MMHG | TEMPERATURE: 98 F | HEART RATE: 74 BPM | SYSTOLIC BLOOD PRESSURE: 146 MMHG

## 2025-02-13 DIAGNOSIS — E66.01 CLASS 3 SEVERE OBESITY DUE TO EXCESS CALORIES WITH SERIOUS COMORBIDITY AND BODY MASS INDEX (BMI) OF 50.0 TO 59.9 IN ADULT (HCC): ICD-10-CM

## 2025-02-13 DIAGNOSIS — E66.813 CLASS 3 SEVERE OBESITY DUE TO EXCESS CALORIES WITH SERIOUS COMORBIDITY AND BODY MASS INDEX (BMI) OF 50.0 TO 59.9 IN ADULT (HCC): ICD-10-CM

## 2025-02-13 DIAGNOSIS — S81.802D OPEN WOUND OF LEFT LOWER LEG, SUBSEQUENT ENCOUNTER: Primary | ICD-10-CM

## 2025-02-13 DIAGNOSIS — I87.2 CHRONIC VENOUS INSUFFICIENCY: ICD-10-CM

## 2025-02-13 PROCEDURE — 99215 OFFICE O/P EST HI 40 MIN: CPT | Performed by: FAMILY MEDICINE

## 2025-02-13 NOTE — PATIENT INSTRUCTIONS
PATIENT INSTRUCTIONS      FOR Luis M Lomasd ,  3/25/1965    DATE OF SERVICE: 2025      Left lateral leg wound: Enluxtra   Coflex 2 layer compression wrap    Follow up with Dr. Gould in 1 week      DO NOT GET THE WRAP WET       Managing your edema:    Avoid prolonged standing in one place. It is better to have your calf muscles moving to pump fluid out of the legs.  Elevate leg(s) above the level of the heart when sitting or as much as possible.  Take you diuretics as directed by your physician. Do not skip doses or change doses unless instructed to do so by your physician.  Decrease salt intake, follow recommended 2 grams daily.  Do not get leg(s) with compression wrap wet. If wraps are too tight as indicated by pain, numbness/tingling or discoloration of toes remove wrap completely and call the wound center @ [phone].       The treatment plan has been discussed at length between you and your provider. Follow all instructions carefully, it is very important. If you do not follow all instructions you are at risk of your wound not healing, infection, possible loss of limb and even loss of life.    COMPRESSION WRAP PATIENT CARE INSTRUCTIONS  DO NOT get compression wrap wet. When showering, use a shower boot.   Please contact wound clinic and if not able to reach, then go to emergency room if ANY of the following occur:   Tingling or numbness in the foot or toes on leg with compression wrap.  Severe pain that cannot be relieved with elevation and any medication instructed per your doctor.  A fever of 100.4°F (38°C) or higher.  Swelling, coldness, or blue-gray discoloration of the toes.  If the compression wrap feels too tight or too loose.  If the compression wrap is damaged or has rough edges that hurt.  If the compression wrap gets wet, you must cut wrap off.   Drainage from compression wrap that smells different than usual.

## 2025-02-13 NOTE — PROGRESS NOTES
Weekly Wound Education Note    Teaching Provided To: Patient  Training Topics: Dressing;Cleasing and general instructions;Discharge instructions;Edema control;Compression  Training Method: Demonstration;Explain/Verbal  Training Response: Reinforcement needed        Notes: Patient here for follow up wound care visit to left lateral lower leg. Last provider visit was 1/30/25. Patient was notified by clinic staff to remove his compression wrap and place new dressing to the wound and apply his own compression garment on 2/6/25 due to canceled appointment issues with transportation. Patient arrived with bordered foam to wound today. Edema measurement increased, wound measurement stable. Provider stimulated wound at visit today. Treatment to continue using enluxtra with back off, kerramax, conforming gauze, tape. Wrapped LLE in coflex 2 layer wrap, RN applied doubled spandagrip (F) to RLE. RN educated pt to bring in his compression garments so that staff can educate and demonstrate to patient how to apply properly as patient states he has some trouble using them. Pt to follow up with provider in 1 week.

## 2025-02-13 NOTE — PROGRESS NOTES
Chief Complaint   Patient presents with    Wound Recheck     Pt here for follow up arrives with foam dressing to left leg. He removed compression wrap Tuesday        HPI:     Patient here for follow-up of left lateral leg wound.  He was not able to come to the clinic last week and thus had to take his wrap off and has not wearing the wrap since the past 1 week.  His leg is more swollen.    Lab Results   Component Value Date    BUN 15 01/02/2025    CREATSERUM 0.82 01/02/2025    ALB 3.4 01/01/2025    TP 6.8 01/01/2025    A1C 5.2 12/12/2016         Current Outpatient Medications:     furosemide 20 MG Oral Tab, Take 1 tablet (20 mg total) by mouth daily., Disp: 30 tablet, Rfl: 0    Multiple Vitamins-Minerals (MULTI-VITAMIN/MINERALS) Oral Tab, Take 1 tablet by mouth daily., Disp: , Rfl:     aspirin 81 MG Oral Tab, Take 1 tablet (81 mg total) by mouth daily., Disp: , Rfl:     Allergies[1]         REVIEW OF SYSTEMS:     Review of Systems (ROS)  This information was obtained from the patient, chart    A comprehensive 10 point review of systems was completed.  Pertinent positives and negatives noted in the the HPI.     Past medical, surgical, family and social history updated where appropriate.    PHYSICAL EXAM:   /88   Pulse 74   Temp 97.8 °F (36.6 °C)   Resp 16    Estimated body mass index is 51.69 kg/m² as calculated from the following:    Height as of 1/9/25: 67\".    Weight as of 1/9/25: 330 lb (149.7 kg).   Vital signs reviewed.Appears stated age, well groomed.        Calf  Point of Measurement - Left Calf: 30     Left Calf from:: Heel  Calf Left cm:: 48.5          Ankle  Point of Measurement - Left Ankle: 10     Left Ankle from:: Heel  Left Ankle cm:: 32                Foot                               Wound 01/11/24 #1 Leg Left;Lateral (Active)   Date First Assessed/Time First Assessed: 01/11/24 1406    Wound Number (Wound Clinic Only): #1  Primary Wound Type: Traumatic  Location: Leg  Wound Location  Orientation: Left;Lateral      Assessments 1/11/2024  2:10 PM 2/13/2025  8:47 AM   Wound Image       Drainage Amount Large --   Drainage Description Yellow;Serous --   Treatments Compression (coflex 2 layer wrap) --   Wound Length (cm) 10.6 cm --   Wound Width (cm) 9.5 cm --   Wound Surface Area (cm^2) 100.7 cm^2 --   Wound Depth (cm) 0.2 cm --   Wound Volume (cm^3) 20.14 cm^3 --   Margins Well-defined edges --   Non-staged Wound Description Full thickness --   Leslie-wound Assessment Edema;Hemosiderin staining --   Wound Granulation Tissue Firm;Pink --   Wound Bed Granulation (%) 40 % --   Wound Bed Slough (%) 60 % --   Wound Odor None --   Tunneling? No --   Undermining? No --   Sinus Tracts? No --       Inactive Orders   Date Order Priority Status Authorizing Provider   01/16/25 0857 Debridement Traumatic Left;Lateral Leg Routine Completed Nicholas Gould MD   01/09/25 0935 Debridement Traumatic Left;Lateral Leg Routine Completed Nicholas Gould MD   01/02/25 1505 Wound care Routine Discontinued Braxton Ledbetter MD   01/01/25 0720 Consult to Wound Ostomy Routine Completed Dexter Dick MD     - Reason for Consult:    Wound Care     - Wound Care Reason for Consult:    wounds   11/21/24 0914 Debridement Traumatic Left;Lateral Leg Routine Completed Nicholas Gould MD   07/16/24 2327 Consult to Wound Ostomy Routine Completed Rica Pearson MD     - Reason for Consult:    Wound Care     - Wound Care Reason for Consult:    worsening   06/24/24 1127 Wound care Routine Discontinued Rica Pearson MD   06/06/24 0913 Debridement Traumatic Routine Completed Nicholas Gould MD   05/16/24 0901 Debridement Traumatic Routine Completed Nicholas Gould MD   05/02/24 1520 Debridement Traumatic Routine Completed Nicholas Gould MD   04/11/24 0959 Debridement Traumatic Routine Completed Nicholas Gould MD   04/04/24 0915 Debridement Traumatic Left;Lateral Leg Routine Completed Nicholas Gould MD   02/15/24 1154 Debridement  Traumatic Left;Lateral Leg Routine Completed Nicholas Gould MD   01/18/24 1511 Debridement Traumatic Left;Lateral Leg Routine Completed Nicholas Gould MD   01/11/24 1710 Debridement Traumatic Left;Lateral Leg Routine Completed Nicholas Gould MD       Compression Wrap 08/22/24 Leg Anterior;Left (Active)   Placement Date: 08/22/24   Location: Leg  Wound Location Orientation: Anterior;Left      Assessments 8/22/2024  9:41 AM 1/30/2025  9:38 AM   Response to Treatment Well tolerated Well tolerated   Compression Layers Multilayer Multilayer   Compression Product Type Coflex Coflex (30-40mmhg)   Dressing Applied Yes Yes   Compression Wrap Location Toes to Knee Toes to Knee   Compression Wrap Status Clean;Dry;Intact Intact;Dry;Clean       Inactive Orders   Date Order Priority Status Authorizing Provider   01/01/25 1007 Consult to Wound Ostomy Routine Completed Braxton Ledbetter MD     - Reason for Consult:    Wound Care     - Wound Care Reason for Consult:    L leg wound   01/01/25 0720 Consult to Wound Ostomy Routine Completed Dexter Dick MD     - Reason for Consult:    Wound Care     - Wound Care Reason for Consult:    wounds              ASSESSMENT AND PLAN:      1. Open wound of left lower leg, subsequent encounter    2. Chronic venous insufficiency    3. Class 3 severe obesity due to excess calories with serious comorbidity and body mass index (BMI) of 50.0 to 59.9 in adult (HCC)    The wound is about the same as patient has not been in the compression wrap over the past 1 week.  The wound was stimulated with a curette and there was some bleeding controlled with gauze pressure.  Will continue with Enluxtra and Coflex 2 layer compression wrap to left lower extremity.  Patient tolerated well in the past.  He will follow-up with me in 1 week.  Encouraged weight reduction as well.      Risks, benefits, and alternatives of current treatment plan discussed in detail.  Questions and concerns addressed. Red flags to  RTC or ED reviewed.  Patient (or parent) agrees to plan.      No follow-ups on file.    Also refer to patient instructions.     I spent a total of 40 minutes with this patient's care.  This time included preparing to see the patient (eg, review notes and recent diagnostics), seeing the patient, performing a medically appropriate examination and/or evaluation, counseling and educating the patient, and documenting in the record.          Nicholas Gould M.D.   [facility]   2025    Patient Instructions       PATIENT INSTRUCTIONS      FOR Luis M Estrada RICK 3/25/1965    DATE OF SERVICE: 2025      Left lateral leg wound: Enluxtra   Coflex 2 layer compression wrap    Follow up with Dr. Gould in 1 week      DO NOT GET THE WRAP WET       Managing your edema:    Avoid prolonged standing in one place. It is better to have your calf muscles moving to pump fluid out of the legs.  Elevate leg(s) above the level of the heart when sitting or as much as possible.  Take you diuretics as directed by your physician. Do not skip doses or change doses unless instructed to do so by your physician.  Decrease salt intake, follow recommended 2 grams daily.  Do not get leg(s) with compression wrap wet. If wraps are too tight as indicated by pain, numbness/tingling or discoloration of toes remove wrap completely and call the wound center @ [phone].       The treatment plan has been discussed at length between you and your provider. Follow all instructions carefully, it is very important. If you do not follow all instructions you are at risk of your wound not healing, infection, possible loss of limb and even loss of life.    COMPRESSION WRAP PATIENT CARE INSTRUCTIONS  DO NOT get compression wrap wet. When showering, use a shower boot.   Please contact wound clinic and if not able to reach, then go to emergency room if ANY of the following occur:   Tingling or numbness in the foot or toes on leg  with compression wrap.  Severe pain that cannot be relieved with elevation and any medication instructed per your doctor.  A fever of 100.4°F (38°C) or higher.  Swelling, coldness, or blue-gray discoloration of the toes.  If the compression wrap feels too tight or too loose.  If the compression wrap is damaged or has rough edges that hurt.  If the compression wrap gets wet, you must cut wrap off.   Drainage from compression wrap that smells different than usual.  MISCELLANEOUS INSTRUCTIONS  Supplement with a daily multivitamin   Low salt diet  Intense blood sugar control - Goal Blood sugar below 180 at all times recommended.  Increase protein intake / consider protein supplements - see below  Elevate extremities at all times when sitting / laying down.  No tobacco use     DIETARY MODIFICATIONS TO HELP WITH WOUND HEALING:          Please follow any restrictions to diet given by your other doctors     Protein: meats, beans, eggs, milk and yogurt particularly Greek yogurt), tofu, soy nuts, soy protein products     Vitamin C: citrus fruits and juices, strawberries, tomatoes, tomato juice, peppers, baked potatoes, spinach, broccoli, cauliflower, [place] sprouts, cabbage     Vitamin A: dark greens, leafy vegetables, orange or yellow vegetables, cantaloupe, fortified dairy products, liver, fortified cereals     Zinc: fortified cereals, red meats, seafood     Consider Bob by [employer] (These are essential branch chain amino acids that help with tissue building and wound healing) and take 2 packets/day. You can order online at [employer] or [employer]     ADDITIONAL REMINDERS:     The treatment plan has been discussed at length with you and your provider. Follow all instructions carefully, it is very important. If you do not follow all instructions, you are at risk of your wound not healing, infection, possible loss of limb and even loss of life.  Please call the clinic at [phone] during regular business hours ( 7:30 AM -  5:30 PM) if you notice increased redness, warmth, pain or pus like drainage or start running a fever greater than 100.3.    For after hour emergencies, please call your primary physician or go to the nearest emergency room.  If your blood pressure is elevated, follow up with your primary care doctor and/or cardiologist as soon as possible.     FOR LEG SWELLING,  LOWER EXTREMITY WOUNDS AND IF USING COMPRESSION:   Managing your edema:    Avoid prolonged standing in one place. It is better to have your calf muscles moving to pump fluid out of the legs.  Elevate leg(s) above the level of the heart when sitting or as much as possible.  Take you diuretics as directed by your physician. Do not skip doses or change doses unless instructed to do so by your physician.  Decrease salt intake, follow recommended 2 grams daily.  Do not get leg(s) with compression wrap wet. If wraps are too tight as indicated by pain, numbness/tingling or discoloration of toes remove wrap completely and call the wound center @ [phone].       The treatment plan has been discussed at length between you and your provider. Follow all instructions carefully, it is very important. If you do not follow all instructions you are at risk of your wound not healing, infection, possible loss of limb and even loss of life.    COMPRESSION WRAP PATIENT CARE INSTRUCTIONS  DO NOT get compression wrap wet. When showering, use a shower boot.   Please contact wound clinic and if not able to reach, then go to emergency room if ANY of the following occur:   Tingling or numbness in the foot or toes on leg with compression wrap.  Severe pain that cannot be relieved with elevation and any medication instructed per your doctor.  A fever of 100.4°F (38°C) or higher.  Swelling, coldness, or blue-gray discoloration of the toes.  If the compression wrap feels too tight or too loose.  If the compression wrap is damaged or has rough edges that hurt.  If the compression wrap  gets wet, you must cut wrap off.   Drainage from compression wrap that smells different than usual.                        [1] No Known Allergies     details… detailed exam nose/mouth

## 2025-02-20 ENCOUNTER — OFFICE VISIT (OUTPATIENT)
Dept: WOUND CARE | Facility: HOSPITAL | Age: 60
End: 2025-02-20
Attending: FAMILY MEDICINE
Payer: COMMERCIAL

## 2025-02-20 VITALS
DIASTOLIC BLOOD PRESSURE: 87 MMHG | RESPIRATION RATE: 16 BRPM | SYSTOLIC BLOOD PRESSURE: 137 MMHG | TEMPERATURE: 98 F | HEART RATE: 68 BPM

## 2025-02-20 DIAGNOSIS — E66.01 CLASS 3 SEVERE OBESITY DUE TO EXCESS CALORIES WITH SERIOUS COMORBIDITY AND BODY MASS INDEX (BMI) OF 50.0 TO 59.9 IN ADULT (HCC): ICD-10-CM

## 2025-02-20 DIAGNOSIS — S81.802D OPEN WOUND OF LEFT LOWER LEG, SUBSEQUENT ENCOUNTER: Primary | ICD-10-CM

## 2025-02-20 DIAGNOSIS — E66.813 CLASS 3 SEVERE OBESITY DUE TO EXCESS CALORIES WITH SERIOUS COMORBIDITY AND BODY MASS INDEX (BMI) OF 50.0 TO 59.9 IN ADULT (HCC): ICD-10-CM

## 2025-02-20 DIAGNOSIS — I87.2 CHRONIC VENOUS INSUFFICIENCY: ICD-10-CM

## 2025-02-20 PROCEDURE — 99215 OFFICE O/P EST HI 40 MIN: CPT | Performed by: FAMILY MEDICINE

## 2025-02-20 NOTE — PROGRESS NOTES
Weekly Wound Education Note    Teaching Provided To: Patient  Training Topics: Dressing;Cleasing and general instructions;Compression;Discharge instructions;Edema control  Training Method: Demonstration;Explain/Verbal  Training Response: Reinforcement needed        Notes: Patient here for follow up wound care visit to left lateral lower leg. Edema measurement decreased, wound measurement increased.Provider stimulated wound at visit today and applied silver nitrate. Provider changed treatment to endoform, hydrofera transfer, ABD pad. Applied coflex 2 layer wrap to LLE with spandagrip (E). Pt to follow up with provider in 1 week.

## 2025-02-20 NOTE — PROGRESS NOTES
Chief Complaint   Patient presents with    Wound Care     Patients is here for a follow up. Patients stated no issue at this moment        HPI:     Patient here for follow-up of left lateral leg wound.  He is doing well and has no new complaint.  He is tolerating compression wrap.    Lab Results   Component Value Date    BUN 15 01/02/2025    CREATSERUM 0.82 01/02/2025    ALB 3.4 01/01/2025    TP 6.8 01/01/2025    A1C 5.2 12/12/2016         Current Outpatient Medications:     furosemide 20 MG Oral Tab, Take 1 tablet (20 mg total) by mouth daily., Disp: 30 tablet, Rfl: 0    Multiple Vitamins-Minerals (MULTI-VITAMIN/MINERALS) Oral Tab, Take 1 tablet by mouth daily., Disp: , Rfl:     aspirin 81 MG Oral Tab, Take 1 tablet (81 mg total) by mouth daily., Disp: , Rfl:     Allergies[1]         REVIEW OF SYSTEMS:     Review of Systems (ROS)  This information was obtained from the patient, chart    A comprehensive 10 point review of systems was completed.  Pertinent positives and negatives noted in the the HPI.     Past medical, surgical, family and social history updated where appropriate.    PHYSICAL EXAM:   /87   Pulse 68   Temp 98 °F (36.7 °C)   Resp 16    Estimated body mass index is 51.69 kg/m² as calculated from the following:    Height as of 1/9/25: 67\".    Weight as of 1/9/25: 330 lb (149.7 kg).   Vital signs reviewed.Appears stated age, well groomed.        Calf  Point of Measurement - Left Calf: 30     Left Calf from:: Heel  Calf Left cm:: 44          Ankle  Point of Measurement - Left Ankle: 10     Left Ankle from:: Heel  Left Ankle cm:: 29.4                Foot                               Wound 01/11/24 #1 Leg Left;Lateral (Active)   Date First Assessed/Time First Assessed: 01/11/24 1406    Wound Number (Wound Clinic Only): #1  Primary Wound Type: Traumatic  Location: Leg  Wound Location Orientation: Left;Lateral      Assessments 1/11/2024  2:10 PM 2/20/2025  8:48 AM   Wound Image       Drainage Amount  Large --   Drainage Description Yellow;Serous --   Treatments Compression (coflex 2 layer wrap) --   Wound Length (cm) 10.6 cm --   Wound Width (cm) 9.5 cm --   Wound Surface Area (cm^2) 100.7 cm^2 --   Wound Depth (cm) 0.2 cm --   Wound Volume (cm^3) 20.14 cm^3 --   Margins Well-defined edges --   Non-staged Wound Description Full thickness --   Leslie-wound Assessment Edema;Hemosiderin staining --   Wound Granulation Tissue Firm;Pink --   Wound Bed Granulation (%) 40 % --   Wound Bed Slough (%) 60 % --   Wound Odor None --   Tunneling? No --   Undermining? No --   Sinus Tracts? No --       Inactive Orders   Date Order Priority Status Authorizing Provider   01/16/25 0857 Debridement Traumatic Left;Lateral Leg Routine Completed Nicholas Gould MD   01/09/25 0935 Debridement Traumatic Left;Lateral Leg Routine Completed Nicholas Gould MD   01/02/25 1505 Wound care Routine Discontinued Braxton Ledbetter MD   01/01/25 0720 Consult to Wound Ostomy Routine Completed Dexter Dick MD     - Reason for Consult:    Wound Care     - Wound Care Reason for Consult:    wounds   11/21/24 0914 Debridement Traumatic Left;Lateral Leg Routine Completed Nicholas Gould MD   07/16/24 2327 Consult to Wound Ostomy Routine Completed Rica Pearson MD     - Reason for Consult:    Wound Care     - Wound Care Reason for Consult:    worsening   06/24/24 1127 Wound care Routine Discontinued Rica Pearson MD   06/06/24 0913 Debridement Traumatic Routine Completed Nicholas Gould MD   05/16/24 0901 Debridement Traumatic Routine Completed Nicholas Gould MD   05/02/24 1520 Debridement Traumatic Routine Completed Nicholas Gould MD   04/11/24 0959 Debridement Traumatic Routine Completed Nicholas Gould MD   04/04/24 0915 Debridement Traumatic Left;Lateral Leg Routine Completed Nicholas Gould MD   02/15/24 1154 Debridement Traumatic Left;Lateral Leg Routine Completed Nicholas Gould MD   01/18/24 1511 Debridement Traumatic Left;Lateral Leg  Routine Completed Nicholas Gould MD   01/11/24 1710 Debridement Traumatic Left;Lateral Leg Routine Completed Nicholas Gould MD       Compression Wrap 08/22/24 Leg Anterior;Left (Active)   Placement Date: 08/22/24   Location: Leg  Wound Location Orientation: Anterior;Left      Assessments 8/22/2024  9:41 AM 2/20/2025  8:37 AM   Response to Treatment Well tolerated Well tolerated   Compression Layers Multilayer Multilayer   Compression Product Type Coflex Coflex   Dressing Applied Yes Yes (endoform, hydrofera transfer, ABD pad)   Compression Wrap Location Toes to Knee Toes to Knee   Compression Wrap Status Clean;Dry;Intact Intact;Dry;Clean       Inactive Orders   Date Order Priority Status Authorizing Provider   01/01/25 1007 Consult to Wound Ostomy Routine Completed Braxton Ledbetter MD     - Reason for Consult:    Wound Care     - Wound Care Reason for Consult:    L leg wound   01/01/25 0720 Consult to Wound Ostomy Routine Completed Dexter Dick MD     - Reason for Consult:    Wound Care     - Wound Care Reason for Consult:    wounds              ASSESSMENT AND PLAN:      1. Open wound of left lower leg, subsequent encounter    2. Chronic venous insufficiency    3. Class 3 severe obesity due to excess calories with serious comorbidity and body mass index (BMI) of 50.0 to 59.9 in adult (HCC)    Clinically the wound is better than previous visit.  I did stimulate the wound with a curette and applied silver nitrate to control bleeding.  I would like him to use endoform collagen and Hydrofera Blue transfer and ABD pad and continue Coflex 2 layer compression wrap to left lower extremity.  He is tolerating compression wrap well and we did go over compression wrap precautions.      Risks, benefits, and alternatives of current treatment plan discussed in detail.  Questions and concerns addressed. Red flags to RTC or ED reviewed.  Patient (or parent) agrees to plan.      Return in about 1 week (around 2/27/2025).    Also  refer to patient instructions.     I spent a total of 40 minutes with this patient's care.  This time included preparing to see the patient (eg, review notes and recent diagnostics), seeing the patient, performing a medically appropriate examination and/or evaluation, counseling and educating the patient, and documenting in the record.          Nicholas Gould M.D.   [facility]   2025    Patient Instructions       PATIENT INSTRUCTIONS      FOR Luis M Estrada , RICK 3/25/1965    DATE OF SERVICE: 2025    Endoform collagen  Hydrofera Blue transfer  ABD pad or Kerramax    Coflex 2 layer compression wrap    Follow up with Dr. Gould in 1 week      DO NOT GET THE WRAP WET       Managing your edema:    Avoid prolonged standing in one place. It is better to have your calf muscles moving to pump fluid out of the legs.  Elevate leg(s) above the level of the heart when sitting or as much as possible.  Take you diuretics as directed by your physician. Do not skip doses or change doses unless instructed to do so by your physician.  Decrease salt intake, follow recommended 2 grams daily.  Do not get leg(s) with compression wrap wet. If wraps are too tight as indicated by pain, numbness/tingling or discoloration of toes remove wrap completely and call the wound center @ [phone].       The treatment plan has been discussed at length between you and your provider. Follow all instructions carefully, it is very important. If you do not follow all instructions you are at risk of your wound not healing, infection, possible loss of limb and even loss of life.    COMPRESSION WRAP PATIENT CARE INSTRUCTIONS  DO NOT get compression wrap wet. When showering, use a shower boot.   Please contact wound clinic and if not able to reach, then go to emergency room if ANY of the following occur:   Tingling or numbness in the foot or toes on leg with compression wrap.  Severe pain that cannot be relieved with  elevation and any medication instructed per your doctor.  A fever of 100.4°F (38°C) or higher.  Swelling, coldness, or blue-gray discoloration of the toes.  If the compression wrap feels too tight or too loose.  If the compression wrap is damaged or has rough edges that hurt.  If the compression wrap gets wet, you must cut wrap off.   Drainage from compression wrap that smells different than usual.  MISCELLANEOUS INSTRUCTIONS  Supplement with a daily multivitamin   Low salt diet  Intense blood sugar control - Goal Blood sugar below 180 at all times recommended.  Increase protein intake / consider protein supplements - see below  Elevate extremities at all times when sitting / laying down.  No tobacco use     DIETARY MODIFICATIONS TO HELP WITH WOUND HEALING:          Please follow any restrictions to diet given by your other doctors     Protein: meats, beans, eggs, milk and yogurt particularly Greek yogurt), tofu, soy nuts, soy protein products     Vitamin C: citrus fruits and juices, strawberries, tomatoes, tomato juice, peppers, baked potatoes, spinach, broccoli, cauliflower, [place] sprouts, cabbage     Vitamin A: dark greens, leafy vegetables, orange or yellow vegetables, cantaloupe, fortified dairy products, liver, fortified cereals     Zinc: fortified cereals, red meats, seafood     Consider Bob [employer] (These are essential branch chain amino acids that help with tissue building and wound healing) and take 2 packets/day. You can order online at [employer] or [email or web address]     ADDITIONAL REMINDERS:     The treatment plan has been discussed at length with you and your provider. Follow all instructions carefully, it is very important. If you do not follow all instructions, you are at risk of your wound not healing, infection, possible loss of limb and even loss of life.  Please call the clinic at [phone] during regular business hours ( 7:30 AM - 5:30 PM) if you notice increased redness, warmth, pain or pus  like drainage or start running a fever greater than 100.3.    For after hour emergencies, please call your primary physician or go to the nearest emergency room.  If your blood pressure is elevated, follow up with your primary care doctor and/or cardiologist as soon as possible.     FOR LEG SWELLING,  LOWER EXTREMITY WOUNDS AND IF USING COMPRESSION:   Managing your edema:    Avoid prolonged standing in one place. It is better to have your calf muscles moving to pump fluid out of the legs.  Elevate leg(s) above the level of the heart when sitting or as much as possible.  Take you diuretics as directed by your physician. Do not skip doses or change doses unless instructed to do so by your physician.  Decrease salt intake, follow recommended 2 grams daily.  Do not get leg(s) with compression wrap wet. If wraps are too tight as indicated by pain, numbness/tingling or discoloration of toes remove wrap completely and call the wound center @ [phone].       The treatment plan has been discussed at length between you and your provider. Follow all instructions carefully, it is very important. If you do not follow all instructions you are at risk of your wound not healing, infection, possible loss of limb and even loss of life.    COMPRESSION WRAP PATIENT CARE INSTRUCTIONS  DO NOT get compression wrap wet. When showering, use a shower boot.   Please contact wound clinic and if not able to reach, then go to emergency room if ANY of the following occur:   Tingling or numbness in the foot or toes on leg with compression wrap.  Severe pain that cannot be relieved with elevation and any medication instructed per your doctor.  A fever of 100.4°F (38°C) or higher.  Swelling, coldness, or blue-gray discoloration of the toes.  If the compression wrap feels too tight or too loose.  If the compression wrap is damaged or has rough edges that hurt.  If the compression wrap gets wet, you must cut wrap off.   Drainage from compression  wrap that smells different than usual.                        [1] No Known Allergies

## 2025-02-20 NOTE — PATIENT INSTRUCTIONS
PATIENT INSTRUCTIONS      FOR Luis M Estrada ,  3/25/1965    DATE OF SERVICE: 2025    Endoform collagen  Hydrofera Blue transfer  ABD pad or Kerramax    Coflex 2 layer compression wrap    Follow up with Dr. Gould in 1 week      DO NOT GET THE WRAP WET       Managing your edema:    Avoid prolonged standing in one place. It is better to have your calf muscles moving to pump fluid out of the legs.  Elevate leg(s) above the level of the heart when sitting or as much as possible.  Take you diuretics as directed by your physician. Do not skip doses or change doses unless instructed to do so by your physician.  Decrease salt intake, follow recommended 2 grams daily.  Do not get leg(s) with compression wrap wet. If wraps are too tight as indicated by pain, numbness/tingling or discoloration of toes remove wrap completely and call the wound center @ [phone].       The treatment plan has been discussed at length between you and your provider. Follow all instructions carefully, it is very important. If you do not follow all instructions you are at risk of your wound not healing, infection, possible loss of limb and even loss of life.    COMPRESSION WRAP PATIENT CARE INSTRUCTIONS  DO NOT get compression wrap wet. When showering, use a shower boot.   Please contact wound clinic and if not able to reach, then go to emergency room if ANY of the following occur:   Tingling or numbness in the foot or toes on leg with compression wrap.  Severe pain that cannot be relieved with elevation and any medication instructed per your doctor.  A fever of 100.4°F (38°C) or higher.  Swelling, coldness, or blue-gray discoloration of the toes.  If the compression wrap feels too tight or too loose.  If the compression wrap is damaged or has rough edges that hurt.  If the compression wrap gets wet, you must cut wrap off.   Drainage from compression wrap that smells different than usual.

## 2025-02-27 ENCOUNTER — OFFICE VISIT (OUTPATIENT)
Dept: WOUND CARE | Facility: HOSPITAL | Age: 60
End: 2025-02-27
Attending: FAMILY MEDICINE
Payer: COMMERCIAL

## 2025-02-27 VITALS
RESPIRATION RATE: 16 BRPM | DIASTOLIC BLOOD PRESSURE: 90 MMHG | HEART RATE: 69 BPM | SYSTOLIC BLOOD PRESSURE: 141 MMHG | TEMPERATURE: 98 F

## 2025-02-27 DIAGNOSIS — E66.813 CLASS 3 SEVERE OBESITY DUE TO EXCESS CALORIES WITH SERIOUS COMORBIDITY AND BODY MASS INDEX (BMI) OF 50.0 TO 59.9 IN ADULT (HCC): ICD-10-CM

## 2025-02-27 DIAGNOSIS — E66.01 CLASS 3 SEVERE OBESITY DUE TO EXCESS CALORIES WITH SERIOUS COMORBIDITY AND BODY MASS INDEX (BMI) OF 50.0 TO 59.9 IN ADULT (HCC): ICD-10-CM

## 2025-02-27 DIAGNOSIS — S81.802D OPEN WOUND OF LEFT LOWER LEG, SUBSEQUENT ENCOUNTER: Primary | ICD-10-CM

## 2025-02-27 DIAGNOSIS — I87.2 CHRONIC VENOUS INSUFFICIENCY: ICD-10-CM

## 2025-02-27 PROCEDURE — 99215 OFFICE O/P EST HI 40 MIN: CPT | Performed by: FAMILY MEDICINE

## 2025-02-27 NOTE — PROGRESS NOTES
Weekly Wound Education Note    Teaching Provided To: Patient  Training Topics: Cleasing and general instructions;Discharge instructions;Dressing;Compression  Training Method: Explain/Verbal;Demonstration  Training Response: Reinforcement needed        Notes: Patient here for follow up wound care visit left lateral leg wound. Edema measurement decreased, wound measurement decreased. Provider stimualted wound and silver nitrated, treatment to continue endoform to wound, hydrofera transfer, ABD pad, conforming gauze, tape. Applied coflex 2 layer wrap to LLE and spandagrip (E), provided patient with extra spandagrip (e) for the right leg. Patient to folloiw up with provider in 1 week.

## 2025-02-27 NOTE — PATIENT INSTRUCTIONS
PATIENT INSTRUCTIONS      FOR Luis M Estrada ,  3/25/1965    DATE OF SERVICE: 2025    Endoform collagen  Hydrofera Blue transfer  ABD pad or Kerramax    Coflex 2 layer compression wrap    Follow up with Dr. Gould in 1 week      DO NOT GET THE WRAP WET       Managing your edema:    Avoid prolonged standing in one place. It is better to have your calf muscles moving to pump fluid out of the legs.  Elevate leg(s) above the level of the heart when sitting or as much as possible.  Take you diuretics as directed by your physician. Do not skip doses or change doses unless instructed to do so by your physician.  Decrease salt intake, follow recommended 2 grams daily.  Do not get leg(s) with compression wrap wet. If wraps are too tight as indicated by pain, numbness/tingling or discoloration of toes remove wrap completely and call the wound center @ [phone].       The treatment plan has been discussed at length between you and your provider. Follow all instructions carefully, it is very important. If you do not follow all instructions you are at risk of your wound not healing, infection, possible loss of limb and even loss of life.    COMPRESSION WRAP PATIENT CARE INSTRUCTIONS  DO NOT get compression wrap wet. When showering, use a shower boot.   Please contact wound clinic and if not able to reach, then go to emergency room if ANY of the following occur:   Tingling or numbness in the foot or toes on leg with compression wrap.  Severe pain that cannot be relieved with elevation and any medication instructed per your doctor.  A fever of 100.4°F (38°C) or higher.  Swelling, coldness, or blue-gray discoloration of the toes.  If the compression wrap feels too tight or too loose.  If the compression wrap is damaged or has rough edges that hurt.  If the compression wrap gets wet, you must cut wrap off.   Drainage from compression wrap that smells different than usual.

## 2025-02-27 NOTE — PROGRESS NOTES
Chief Complaint   Patient presents with    Wound Care     Patient is here for a wound care follow up. He denies any pain to the wound.       HPI:     Patient here for follow-up of left lateral leg wound.  He is doing well and tolerating compression wrap without difficulty.  He has no new concerns.    Lab Results   Component Value Date    BUN 15 01/02/2025    CREATSERUM 0.82 01/02/2025    ALB 3.4 01/01/2025    TP 6.8 01/01/2025    A1C 5.2 12/12/2016         Current Outpatient Medications:     furosemide 20 MG Oral Tab, Take 1 tablet (20 mg total) by mouth daily., Disp: 30 tablet, Rfl: 0    Multiple Vitamins-Minerals (MULTI-VITAMIN/MINERALS) Oral Tab, Take 1 tablet by mouth daily., Disp: , Rfl:     aspirin 81 MG Oral Tab, Take 1 tablet (81 mg total) by mouth daily., Disp: , Rfl:     Allergies[1]         REVIEW OF SYSTEMS:     Review of Systems (ROS)  This information was obtained from the patient, chart    A comprehensive 10 point review of systems was completed.  Pertinent positives and negatives noted in the the HPI.     Past medical, surgical, family and social history updated where appropriate.    PHYSICAL EXAM:   /90   Pulse 69   Temp 97.8 °F (36.6 °C)   Resp 16    Estimated body mass index is 51.69 kg/m² as calculated from the following:    Height as of 1/9/25: 67\".    Weight as of 1/9/25: 330 lb (149.7 kg).   Vital signs reviewed.Appears stated age, well groomed.        Calf  Point of Measurement - Left Calf: 30     Left Calf from:: Heel  Calf Left cm:: 39.5          Ankle  Point of Measurement - Left Ankle: 10     Left Ankle from:: Heel  Left Ankle cm:: 28.5                Foot                               Wound 01/11/24 #1 Leg Left;Lateral (Active)   Date First Assessed/Time First Assessed: 01/11/24 1406    Wound Number (Wound Clinic Only): #1  Primary Wound Type: Traumatic  Location: Leg  Wound Location Orientation: Left;Lateral      Assessments 1/11/2024  2:10 PM 2/27/2025  9:12 AM   Wound Image        Drainage Amount Large --   Drainage Description Yellow;Serous --   Treatments Compression (coflex 2 layer wrap) --   Wound Length (cm) 10.6 cm --   Wound Width (cm) 9.5 cm --   Wound Surface Area (cm^2) 100.7 cm^2 --   Wound Depth (cm) 0.2 cm --   Wound Volume (cm^3) 20.14 cm^3 --   Margins Well-defined edges --   Non-staged Wound Description Full thickness --   Leslie-wound Assessment Edema;Hemosiderin staining --   Wound Granulation Tissue Firm;Pink --   Wound Bed Granulation (%) 40 % --   Wound Bed Slough (%) 60 % --   Wound Odor None --   Tunneling? No --   Undermining? No --   Sinus Tracts? No --       Inactive Orders   Date Order Priority Status Authorizing Provider   01/16/25 0857 Debridement Traumatic Left;Lateral Leg Routine Completed Nicholas Gould MD   01/09/25 0935 Debridement Traumatic Left;Lateral Leg Routine Completed Nicholas Gould MD   01/02/25 1505 Wound care Routine Discontinued Braxton Ledbetter MD   01/01/25 0720 Consult to Wound Ostomy Routine Completed Dexter Dick MD     - Reason for Consult:    Wound Care     - Wound Care Reason for Consult:    wounds   11/21/24 0914 Debridement Traumatic Left;Lateral Leg Routine Completed Nicholas Gould MD   07/16/24 2327 Consult to Wound Ostomy Routine Completed Rica Pearson MD     - Reason for Consult:    Wound Care     - Wound Care Reason for Consult:    worsening   06/24/24 1127 Wound care Routine Discontinued Rica Pearson MD   06/06/24 0913 Debridement Traumatic Routine Completed Nicholas Gould MD   05/16/24 0901 Debridement Traumatic Routine Completed Nicholas Gould MD   05/02/24 1520 Debridement Traumatic Routine Completed Nicholas Gould MD   04/11/24 0959 Debridement Traumatic Routine Completed Nicholas Gould MD   04/04/24 0915 Debridement Traumatic Left;Lateral Leg Routine Completed Nicholas Gould MD   02/15/24 1154 Debridement Traumatic Left;Lateral Leg Routine Completed Nicholas Gould MD   01/18/24 1511 Debridement  Traumatic Left;Lateral Leg Routine Completed Nicholas Gould MD   01/11/24 1710 Debridement Traumatic Left;Lateral Leg Routine Completed Nicholas Gould MD       Compression Wrap 08/22/24 Leg Anterior;Left (Active)   Placement Date: 08/22/24   Location: Leg  Wound Location Orientation: Anterior;Left      Assessments 8/22/2024  9:41 AM 2/27/2025  8:49 AM   Response to Treatment Well tolerated Well tolerated   Compression Layers Multilayer Multilayer   Compression Product Type Coflex Coflex   Dressing Applied Yes Yes (silver nitrate, endoform, hydrofera transfer, ABD pad, conforming gauze,tape)   Compression Wrap Location Toes to Knee Toes to Knee   Compression Wrap Status Clean;Dry;Intact Intact;Dry;Clean       Inactive Orders   Date Order Priority Status Authorizing Provider   01/01/25 1007 Consult to Wound Ostomy Routine Completed Braxton Ledbetter MD     - Reason for Consult:    Wound Care     - Wound Care Reason for Consult:    L leg wound   01/01/25 0720 Consult to Wound Ostomy Routine Completed Dexter Dick MD     - Reason for Consult:    Wound Care     - Wound Care Reason for Consult:    wounds              ASSESSMENT AND PLAN:      1. Open wound of left lower leg, subsequent encounter    2. Chronic venous insufficiency    3. Class 3 severe obesity due to excess calories with serious comorbidity and body mass index (BMI) of 50.0 to 59.9 in adult (HCC)    Clinically the wound is getting better and it was stimulated with a curette there was bleeding controlled with silver nitrate.  Will continue with endoform collagen and Hydrofera Blue transfer ABD pad and Coflex 2 layer compression wrap to left lower extremity.  Compression wrap precautions were gone over and printed instructions given.  He continues to show good improvement.      Risks, benefits, and alternatives of current treatment plan discussed in detail.  Questions and concerns addressed. Red flags to RTC or ED reviewed.  Patient (or parent) agrees to  plan.      Return in about 1 week (around 3/6/2025).    Also refer to patient instructions.     I spent a total of 40 minutes with this patient's care.  This time included preparing to see the patient (eg, review notes and recent diagnostics), seeing the patient, performing a medically appropriate examination and/or evaluation, counseling and educating the patient, and documenting in the record.          Nicholas Gould M.D.   [facility]   2025    Patient Instructions       PATIENT INSTRUCTIONS      FOR Luis M Estrada RICK 3/25/1965    DATE OF SERVICE: 2025    Endoform collagen  Hydrofera Blue transfer  ABD pad or Kerramax    Coflex 2 layer compression wrap    Follow up with Dr. Gould in 1 week      DO NOT GET THE WRAP WET       Managing your edema:    Avoid prolonged standing in one place. It is better to have your calf muscles moving to pump fluid out of the legs.  Elevate leg(s) above the level of the heart when sitting or as much as possible.  Take you diuretics as directed by your physician. Do not skip doses or change doses unless instructed to do so by your physician.  Decrease salt intake, follow recommended 2 grams daily.  Do not get leg(s) with compression wrap wet. If wraps are too tight as indicated by pain, numbness/tingling or discoloration of toes remove wrap completely and call the wound center @ [phone].       The treatment plan has been discussed at length between you and your provider. Follow all instructions carefully, it is very important. If you do not follow all instructions you are at risk of your wound not healing, infection, possible loss of limb and even loss of life.    COMPRESSION WRAP PATIENT CARE INSTRUCTIONS  DO NOT get compression wrap wet. When showering, use a shower boot.   Please contact wound clinic and if not able to reach, then go to emergency room if ANY of the following occur:   Tingling or numbness in the foot or toes on leg with  compression wrap.  Severe pain that cannot be relieved with elevation and any medication instructed per your doctor.  A fever of 100.4°F (38°C) or higher.  Swelling, coldness, or blue-gray discoloration of the toes.  If the compression wrap feels too tight or too loose.  If the compression wrap is damaged or has rough edges that hurt.  If the compression wrap gets wet, you must cut wrap off.   Drainage from compression wrap that smells different than usual.  MISCELLANEOUS INSTRUCTIONS  Supplement with a daily multivitamin   Low salt diet  Intense blood sugar control - Goal Blood sugar below 180 at all times recommended.  Increase protein intake / consider protein supplements - see below  Elevate extremities at all times when sitting / laying down.  No tobacco use     DIETARY MODIFICATIONS TO HELP WITH WOUND HEALING:          Please follow any restrictions to diet given by your other doctors     Protein: meats, beans, eggs, milk and yogurt particularly Greek yogurt), tofu, soy nuts, soy protein products     Vitamin C: citrus fruits and juices, strawberries, tomatoes, tomato juice, peppers, baked potatoes, spinach, broccoli, cauliflower, [place] sprouts, cabbage     Vitamin A: dark greens, leafy vegetables, orange or yellow vegetables, cantaloupe, fortified dairy products, liver, fortified cereals     Zinc: fortified cereals, red meats, seafood     Consider Bob by [employer] (These are essential branch chain amino acids that help with tissue building and wound healing) and take 2 packets/day. You can order online at [employer] or [employer]     ADDITIONAL REMINDERS:     The treatment plan has been discussed at length with you and your provider. Follow all instructions carefully, it is very important. If you do not follow all instructions, you are at risk of your wound not healing, infection, possible loss of limb and even loss of life.  Please call the clinic at [phone] during regular business hours ( 7:30 AM - 5:30  PM) if you notice increased redness, warmth, pain or pus like drainage or start running a fever greater than 100.3.    For after hour emergencies, please call your primary physician or go to the nearest emergency room.  If your blood pressure is elevated, follow up with your primary care doctor and/or cardiologist as soon as possible.     FOR LEG SWELLING,  LOWER EXTREMITY WOUNDS AND IF USING COMPRESSION:   Managing your edema:    Avoid prolonged standing in one place. It is better to have your calf muscles moving to pump fluid out of the legs.  Elevate leg(s) above the level of the heart when sitting or as much as possible.  Take you diuretics as directed by your physician. Do not skip doses or change doses unless instructed to do so by your physician.  Decrease salt intake, follow recommended 2 grams daily.  Do not get leg(s) with compression wrap wet. If wraps are too tight as indicated by pain, numbness/tingling or discoloration of toes remove wrap completely and call the wound center @ [phone].       The treatment plan has been discussed at length between you and your provider. Follow all instructions carefully, it is very important. If you do not follow all instructions you are at risk of your wound not healing, infection, possible loss of limb and even loss of life.    COMPRESSION WRAP PATIENT CARE INSTRUCTIONS  DO NOT get compression wrap wet. When showering, use a shower boot.   Please contact wound clinic and if not able to reach, then go to emergency room if ANY of the following occur:   Tingling or numbness in the foot or toes on leg with compression wrap.  Severe pain that cannot be relieved with elevation and any medication instructed per your doctor.  A fever of 100.4°F (38°C) or higher.  Swelling, coldness, or blue-gray discoloration of the toes.  If the compression wrap feels too tight or too loose.  If the compression wrap is damaged or has rough edges that hurt.  If the compression wrap gets  wet, you must cut wrap off.   Drainage from compression wrap that smells different than usual.                        [1] No Known Allergies

## 2025-03-06 ENCOUNTER — OFFICE VISIT (OUTPATIENT)
Dept: WOUND CARE | Facility: HOSPITAL | Age: 60
End: 2025-03-06
Attending: FAMILY MEDICINE
Payer: COMMERCIAL

## 2025-03-06 VITALS
SYSTOLIC BLOOD PRESSURE: 150 MMHG | RESPIRATION RATE: 16 BRPM | DIASTOLIC BLOOD PRESSURE: 87 MMHG | TEMPERATURE: 98 F | HEART RATE: 69 BPM

## 2025-03-06 DIAGNOSIS — S81.802D OPEN WOUND OF LEFT LOWER LEG, SUBSEQUENT ENCOUNTER: Primary | ICD-10-CM

## 2025-03-06 DIAGNOSIS — I87.2 CHRONIC VENOUS INSUFFICIENCY: ICD-10-CM

## 2025-03-06 DIAGNOSIS — E66.813 CLASS 3 SEVERE OBESITY DUE TO EXCESS CALORIES WITH SERIOUS COMORBIDITY AND BODY MASS INDEX (BMI) OF 50.0 TO 59.9 IN ADULT (HCC): ICD-10-CM

## 2025-03-06 DIAGNOSIS — E66.01 CLASS 3 SEVERE OBESITY DUE TO EXCESS CALORIES WITH SERIOUS COMORBIDITY AND BODY MASS INDEX (BMI) OF 50.0 TO 59.9 IN ADULT (HCC): ICD-10-CM

## 2025-03-06 PROCEDURE — 99215 OFFICE O/P EST HI 40 MIN: CPT | Performed by: FAMILY MEDICINE

## 2025-03-06 NOTE — PROGRESS NOTES
Weekly Wound Education Note    Teaching Provided To: Patient  Training Topics: Cleasing and general instructions, Discharge instructions, Dressing, Edema control, Compression  Training Method: Demonstration, Explain/Verbal  Training Response: Reinforcement needed        Notes: Patient here for follow up wound care visit to left lateral leg. Edema measurement stable, wound measurement decreased. Provider stimulated wound at visit today. Treatment changed to acticoat (silver to wound bed), ABD pad, conforming gauze, tape. Applied coflex 2 layer wrap to LLE. Pt to follow up with provider in 1 week.

## 2025-03-06 NOTE — PATIENT INSTRUCTIONS
PATIENT INSTRUCTIONS      FOR Luis M Lomasd ,  3/25/1965    DATE OF SERVICE: 3/6/2025    Acticoat  ABD pad or Kerramax    Coflex 2 layer compression wrap    Follow up with Dr. Gould in 1 week      DO NOT GET THE WRAP WET       Managing your edema:    Avoid prolonged standing in one place. It is better to have your calf muscles moving to pump fluid out of the legs.  Elevate leg(s) above the level of the heart when sitting or as much as possible.  Take you diuretics as directed by your physician. Do not skip doses or change doses unless instructed to do so by your physician.  Decrease salt intake, follow recommended 2 grams daily.  Do not get leg(s) with compression wrap wet. If wraps are too tight as indicated by pain, numbness/tingling or discoloration of toes remove wrap completely and call the wound center @ [phone].       The treatment plan has been discussed at length between you and your provider. Follow all instructions carefully, it is very important. If you do not follow all instructions you are at risk of your wound not healing, infection, possible loss of limb and even loss of life.    COMPRESSION WRAP PATIENT CARE INSTRUCTIONS  DO NOT get compression wrap wet. When showering, use a shower boot.   Please contact wound clinic and if not able to reach, then go to emergency room if ANY of the following occur:   Tingling or numbness in the foot or toes on leg with compression wrap.  Severe pain that cannot be relieved with elevation and any medication instructed per your doctor.  A fever of 100.4°F (38°C) or higher.  Swelling, coldness, or blue-gray discoloration of the toes.  If the compression wrap feels too tight or too loose.  If the compression wrap is damaged or has rough edges that hurt.  If the compression wrap gets wet, you must cut wrap off.   Drainage from compression wrap that smells different than usual.

## 2025-03-06 NOTE — PROGRESS NOTES
Chief Complaint   Patient presents with    Wound Care     Patient is here for a wound care follow up. He denies any pain or new wound concerns.       HPI:     Patient here for follow-up of left lateral leg wound.  He is doing well and tolerating compression wrap.  He has no new symptoms.    Lab Results   Component Value Date    BUN 15 01/02/2025    CREATSERUM 0.82 01/02/2025    ALB 3.4 01/01/2025    TP 6.8 01/01/2025    A1C 5.2 12/12/2016         Current Outpatient Medications:     furosemide 20 MG Oral Tab, Take 1 tablet (20 mg total) by mouth daily., Disp: 30 tablet, Rfl: 0    Multiple Vitamins-Minerals (MULTI-VITAMIN/MINERALS) Oral Tab, Take 1 tablet by mouth daily., Disp: , Rfl:     aspirin 81 MG Oral Tab, Take 1 tablet (81 mg total) by mouth daily., Disp: , Rfl:     Allergies[1]         REVIEW OF SYSTEMS:     Review of Systems (ROS)  This information was obtained from the patient, chart    A comprehensive 10 point review of systems was completed.  Pertinent positives and negatives noted in the the HPI.     Past medical, surgical, family and social history updated where appropriate.    PHYSICAL EXAM:   /87   Pulse 69   Temp 98.2 °F (36.8 °C)   Resp 16    Estimated body mass index is 51.69 kg/m² as calculated from the following:    Height as of 1/9/25: 67\".    Weight as of 1/9/25: 330 lb (149.7 kg).   Vital signs reviewed.Appears stated age, well groomed.        Calf  Point of Measurement - Left Calf: 30     Left Calf from:: Heel  Calf Left cm:: 39          Ankle  Point of Measurement - Left Ankle: 10     Left Ankle from:: Heel  Left Ankle cm:: 28                Foot                               Wound 01/11/24 #1 Leg Left;Lateral (Active)   Date First Assessed/Time First Assessed: 01/11/24 1406    Wound Number (Wound Clinic Only): #1  Primary Wound Type: Traumatic  Location: Leg  Wound Location Orientation: Left;Lateral      Assessments 1/11/2024  2:10 PM 3/6/2025  8:22 AM   Wound Image       Drainage  Amount Large Small   Drainage Description Yellow;Serous Serosanguineous   Treatments Compression (coflex 2 layer wrap) --   Wound Length (cm) 10.6 cm 1.7 cm   Wound Width (cm) 9.5 cm 0.9 cm   Wound Surface Area (cm^2) 100.7 cm^2 1.53 cm^2   Wound Depth (cm) 0.2 cm 0.1 cm   Wound Volume (cm^3) 20.14 cm^3 0.153 cm^3   Wound Healing % -- 99   Margins Well-defined edges Well-defined edges   Non-staged Wound Description Full thickness Full thickness   Leslie-wound Assessment Edema;Hemosiderin staining Edema;Hemosiderin staining;Dry   Wound Granulation Tissue Firm;Pink Spongy;Pale Grey;Red   Wound Bed Granulation (%) 40 % 100 %   Wound Bed Slough (%) 60 % --   Wound Odor None None   Tunneling? No No   Undermining? No No   Sinus Tracts? No No       Inactive Orders   Date Order Priority Status Authorizing Provider   01/16/25 0857 Debridement Traumatic Left;Lateral Leg Routine Completed Nicholas Gould MD   01/09/25 0935 Debridement Traumatic Left;Lateral Leg Routine Completed Nicholas Gould MD   01/02/25 1505 Wound care Routine Discontinued Braxton Ledbetter MD   01/01/25 0720 Consult to Wound Ostomy Routine Completed Dexter Dick MD     - Reason for Consult:    Wound Care     - Wound Care Reason for Consult:    wounds   11/21/24 0914 Debridement Traumatic Left;Lateral Leg Routine Completed Nicholas Gould MD   07/16/24 2327 Consult to Wound Ostomy Routine Completed Rica Pearson MD     - Reason for Consult:    Wound Care     - Wound Care Reason for Consult:    worsening   06/24/24 1127 Wound care Routine Discontinued Rica Pearson MD   06/06/24 0913 Debridement Traumatic Routine Completed Nicholas Gould MD   05/16/24 0901 Debridement Traumatic Routine Completed Nicholas Gould MD   05/02/24 1520 Debridement Traumatic Routine Completed Nicholas Gould MD   04/11/24 0959 Debridement Traumatic Routine Completed Nicholas Gould MD   04/04/24 0915 Debridement Traumatic Left;Lateral Leg Routine Completed Luis Fernando  MD Nicholas   02/15/24 1154 Debridement Traumatic Left;Lateral Leg Routine Completed Nicholas Gould MD   01/18/24 1511 Debridement Traumatic Left;Lateral Leg Routine Completed Nicholas Gould MD   01/11/24 1710 Debridement Traumatic Left;Lateral Leg Routine Completed Nicholas Gould MD       Compression Wrap 08/22/24 Leg Anterior;Left (Active)   Placement Date: 08/22/24   Location: Leg  Wound Location Orientation: Anterior;Left      Assessments 8/22/2024  9:41 AM 2/27/2025  8:49 AM   Response to Treatment Well tolerated Well tolerated   Compression Layers Multilayer Multilayer   Compression Product Type Coflex Coflex   Dressing Applied Yes Yes (silver nitrate, endoform, hydrofera transfer, ABD pad, conforming gauze,tape)   Compression Wrap Location Toes to Knee Toes to Knee   Compression Wrap Status Clean;Dry;Intact Intact;Dry;Clean       Inactive Orders   Date Order Priority Status Authorizing Provider   01/01/25 1007 Consult to Wound Ostomy Routine Completed Braxton Ledbetter MD     - Reason for Consult:    Wound Care     - Wound Care Reason for Consult:    L leg wound   01/01/25 0720 Consult to Wound Ostomy Routine Completed Dexter Dick MD     - Reason for Consult:    Wound Care     - Wound Care Reason for Consult:    wounds              ASSESSMENT AND PLAN:      1. Open wound of left lower leg, subsequent encounter    2. Chronic venous insufficiency    3. Class 3 severe obesity due to excess calories with serious comorbidity and body mass index (BMI) of 50.0 to 59.9 in adult (HCC)    The wound is slightly smaller than previous visit.  Wound was stimulated with curette and bleeding controlled with gauze pressure.  Will switch dressings to Acticoat to see if this will work better for him.  Cover this with ABD pad and continue with Coflex 2 layer compression wrap.  Compression wrap precautions given.      Risks, benefits, and alternatives of current treatment plan discussed in detail.  Questions and concerns  addressed. Red flags to RTC or ED reviewed.  Patient (or parent) agrees to plan.      No follow-ups on file.    Also refer to patient instructions.     I spent a total of 40 minutes with this patient's care.  This time included preparing to see the patient (eg, review notes and recent diagnostics), seeing the patient, performing a medically appropriate examination and/or evaluation, counseling and educating the patient, and documenting in the record.          Nicholas Gould M.D.   [facility]   3/6/2025    There are no Patient Instructions on file for this visit.  MISCELLANEOUS INSTRUCTIONS  Supplement with a daily multivitamin   Low salt diet  Intense blood sugar control - Goal Blood sugar below 180 at all times recommended.  Increase protein intake / consider protein supplements - see below  Elevate extremities at all times when sitting / laying down.  No tobacco use     DIETARY MODIFICATIONS TO HELP WITH WOUND HEALING:          Please follow any restrictions to diet given by your other doctors     Protein: meats, beans, eggs, milk and yogurt particularly Greek yogurt), tofu, soy nuts, soy protein products     Vitamin C: citrus fruits and juices, strawberries, tomatoes, tomato juice, peppers, baked potatoes, spinach, broccoli, cauliflower, [place] sprouts, cabbage     Vitamin A: dark greens, leafy vegetables, orange or yellow vegetables, cantaloupe, fortified dairy products, liver, fortified cereals     Zinc: fortified cereals, red meats, seafood     Consider Bob by [employer] (These are essential branch chain amino acids that help with tissue building and wound healing) and take 2 packets/day. You can order online at [employer] or [employer]     ADDITIONAL REMINDERS:     The treatment plan has been discussed at length with you and your provider. Follow all instructions carefully, it is very important. If you do not follow all instructions, you are at risk of your wound not healing, infection,  possible loss of limb and even loss of life.  Please call the clinic at [phone] during regular business hours ( 7:30 AM - 5:30 PM) if you notice increased redness, warmth, pain or pus like drainage or start running a fever greater than 100.3.    For after hour emergencies, please call your primary physician or go to the nearest emergency room.  If your blood pressure is elevated, follow up with your primary care doctor and/or cardiologist as soon as possible.     FOR LEG SWELLING,  LOWER EXTREMITY WOUNDS AND IF USING COMPRESSION:   Managing your edema:    Avoid prolonged standing in one place. It is better to have your calf muscles moving to pump fluid out of the legs.  Elevate leg(s) above the level of the heart when sitting or as much as possible.  Take you diuretics as directed by your physician. Do not skip doses or change doses unless instructed to do so by your physician.  Decrease salt intake, follow recommended 2 grams daily.  Do not get leg(s) with compression wrap wet. If wraps are too tight as indicated by pain, numbness/tingling or discoloration of toes remove wrap completely and call the wound center @ [phone].       The treatment plan has been discussed at length between you and your provider. Follow all instructions carefully, it is very important. If you do not follow all instructions you are at risk of your wound not healing, infection, possible loss of limb and even loss of life.    COMPRESSION WRAP PATIENT CARE INSTRUCTIONS  DO NOT get compression wrap wet. When showering, use a shower boot.   Please contact wound clinic and if not able to reach, then go to emergency room if ANY of the following occur:   Tingling or numbness in the foot or toes on leg with compression wrap.  Severe pain that cannot be relieved with elevation and any medication instructed per your doctor.  A fever of 100.4°F (38°C) or higher.  Swelling, coldness, or blue-gray discoloration of the toes.  If the compression  wrap feels too tight or too loose.  If the compression wrap is damaged or has rough edges that hurt.  If the compression wrap gets wet, you must cut wrap off.   Drainage from compression wrap that smells different than usual.                        [1] No Known Allergies

## 2025-03-13 ENCOUNTER — OFFICE VISIT (OUTPATIENT)
Dept: WOUND CARE | Facility: HOSPITAL | Age: 60
End: 2025-03-13
Attending: FAMILY MEDICINE
Payer: COMMERCIAL

## 2025-03-13 VITALS
DIASTOLIC BLOOD PRESSURE: 82 MMHG | TEMPERATURE: 98 F | RESPIRATION RATE: 18 BRPM | HEART RATE: 66 BPM | SYSTOLIC BLOOD PRESSURE: 145 MMHG

## 2025-03-13 DIAGNOSIS — S81.802D OPEN WOUND OF LEFT LOWER LEG, SUBSEQUENT ENCOUNTER: Primary | ICD-10-CM

## 2025-03-13 DIAGNOSIS — I87.2 CHRONIC VENOUS INSUFFICIENCY: ICD-10-CM

## 2025-03-13 DIAGNOSIS — E66.813 CLASS 3 SEVERE OBESITY DUE TO EXCESS CALORIES WITH SERIOUS COMORBIDITY AND BODY MASS INDEX (BMI) OF 50.0 TO 59.9 IN ADULT (HCC): ICD-10-CM

## 2025-03-13 DIAGNOSIS — E66.01 CLASS 3 SEVERE OBESITY DUE TO EXCESS CALORIES WITH SERIOUS COMORBIDITY AND BODY MASS INDEX (BMI) OF 50.0 TO 59.9 IN ADULT (HCC): ICD-10-CM

## 2025-03-13 PROCEDURE — 99215 OFFICE O/P EST HI 40 MIN: CPT | Performed by: FAMILY MEDICINE

## 2025-03-13 NOTE — PATIENT INSTRUCTIONS
PATIENT INSTRUCTIONS      FOR Luis M Lomasd ,  3/25/1965    DATE OF SERVICE: 3/13/2025    Acticoat  ABD pad or Kerramax    Coflex 2 layer compression wrap    Follow up with Dr. Gould in 1 week      DO NOT GET THE WRAP WET       Managing your edema:    Avoid prolonged standing in one place. It is better to have your calf muscles moving to pump fluid out of the legs.  Elevate leg(s) above the level of the heart when sitting or as much as possible.  Take you diuretics as directed by your physician. Do not skip doses or change doses unless instructed to do so by your physician.  Decrease salt intake, follow recommended 2 grams daily.  Do not get leg(s) with compression wrap wet. If wraps are too tight as indicated by pain, numbness/tingling or discoloration of toes remove wrap completely and call the wound center @ [phone].       The treatment plan has been discussed at length between you and your provider. Follow all instructions carefully, it is very important. If you do not follow all instructions you are at risk of your wound not healing, infection, possible loss of limb and even loss of life.    COMPRESSION WRAP PATIENT CARE INSTRUCTIONS  DO NOT get compression wrap wet. When showering, use a shower boot.   Please contact wound clinic and if not able to reach, then go to emergency room if ANY of the following occur:   Tingling or numbness in the foot or toes on leg with compression wrap.  Severe pain that cannot be relieved with elevation and any medication instructed per your doctor.  A fever of 100.4°F (38°C) or higher.  Swelling, coldness, or blue-gray discoloration of the toes.  If the compression wrap feels too tight or too loose.  If the compression wrap is damaged or has rough edges that hurt.  If the compression wrap gets wet, you must cut wrap off.   Drainage from compression wrap that smells different than usual.

## 2025-03-13 NOTE — PROGRESS NOTES
Chief Complaint   Patient presents with    Wound Care     Patients is here for a follow up. Patients stated no issue at this moment.       HPI:     Patient here for follow-up of left lateral leg wound.  He is tolerating compression wrap without any difficulties.  He has no new complaints.    Lab Results   Component Value Date    BUN 15 01/02/2025    CREATSERUM 0.82 01/02/2025    ALB 3.4 01/01/2025    TP 6.8 01/01/2025    A1C 5.2 12/12/2016         Current Outpatient Medications:     furosemide 20 MG Oral Tab, Take 1 tablet (20 mg total) by mouth daily., Disp: 30 tablet, Rfl: 0    Multiple Vitamins-Minerals (MULTI-VITAMIN/MINERALS) Oral Tab, Take 1 tablet by mouth daily., Disp: , Rfl:     aspirin 81 MG Oral Tab, Take 1 tablet (81 mg total) by mouth daily., Disp: , Rfl:     Allergies[1]         REVIEW OF SYSTEMS:     Review of Systems (ROS)  This information was obtained from the patient, chart    A comprehensive 10 point review of systems was completed.  Pertinent positives and negatives noted in the the HPI.     Past medical, surgical, family and social history updated where appropriate.    PHYSICAL EXAM:   /82   Pulse 66   Temp 98 °F (36.7 °C)   Resp 18    Estimated body mass index is 51.69 kg/m² as calculated from the following:    Height as of 1/9/25: 67\".    Weight as of 1/9/25: 330 lb (149.7 kg).   Vital signs reviewed.Appears stated age, well groomed.        Calf  Point of Measurement - Left Calf: 30     Left Calf from:: Heel  Calf Left cm:: 39.4          Ankle  Point of Measurement - Left Ankle: 10     Left Ankle from:: Heel  Left Ankle cm:: 28.5                Foot                               Wound 01/11/24 #1 Leg Left;Lateral (Active)   Date First Assessed/Time First Assessed: 01/11/24 1406    Wound Number (Wound Clinic Only): #1  Primary Wound Type: Traumatic  Location: Leg  Wound Location Orientation: Left;Lateral      Assessments 1/11/2024  2:10 PM 3/13/2025  8:52 AM   Wound Image        Drainage Amount Large Small   Drainage Description Yellow;Serous Serosanguineous   Treatments Compression (coflex 2 layer wrap) --   Wound Length (cm) 10.6 cm 1.5 cm   Wound Width (cm) 9.5 cm 0.8 cm   Wound Surface Area (cm^2) 100.7 cm^2 1.2 cm^2   Wound Depth (cm) 0.2 cm 0.1 cm   Wound Volume (cm^3) 20.14 cm^3 0.12 cm^3   Wound Healing % -- 99   Margins Well-defined edges Well-defined edges   Non-staged Wound Description Full thickness Full thickness   Leslie-wound Assessment Edema;Hemosiderin staining Edema;Hemosiderin staining;Dry   Wound Granulation Tissue Firm;Pink Red;Firm   Wound Bed Granulation (%) 40 % 100 %   Wound Bed Slough (%) 60 % --   Wound Odor None None   Tunneling? No No   Undermining? No No   Sinus Tracts? No No       Inactive Orders   Date Order Priority Status Authorizing Provider   01/16/25 0857 Debridement Traumatic Left;Lateral Leg Routine Completed Nicholas Gould MD   01/09/25 0935 Debridement Traumatic Left;Lateral Leg Routine Completed Nicholas Gould MD   01/02/25 1505 Wound care Routine Discontinued Barxton Ledbetter MD   01/01/25 0720 Consult to Wound Ostomy Routine Completed Dexter Dick MD     - Reason for Consult:    Wound Care     - Wound Care Reason for Consult:    wounds   11/21/24 0914 Debridement Traumatic Left;Lateral Leg Routine Completed Nicholas Gould MD   07/16/24 2327 Consult to Wound Ostomy Routine Completed Rica Pearson MD     - Reason for Consult:    Wound Care     - Wound Care Reason for Consult:    worsening   06/24/24 1127 Wound care Routine Discontinued Rica Pearson MD   06/06/24 0913 Debridement Traumatic Routine Completed Nicholas Gould MD   05/16/24 0901 Debridement Traumatic Routine Completed Nicholas Gould MD   05/02/24 1520 Debridement Traumatic Routine Completed Nicholas Gould MD   04/11/24 0959 Debridement Traumatic Routine Completed Nicholas Gould MD   04/04/24 0915 Debridement Traumatic Left;Lateral Leg Routine Completed Nicholas Gould  MD   02/15/24 1154 Debridement Traumatic Left;Lateral Leg Routine Completed Nicholas Gould MD   01/18/24 1511 Debridement Traumatic Left;Lateral Leg Routine Completed Nicholas Gould MD   01/11/24 1710 Debridement Traumatic Left;Lateral Leg Routine Completed Nicholas Gould MD       Compression Wrap 08/22/24 Leg Anterior;Left (Active)   Placement Date: 08/22/24   Location: Leg  Wound Location Orientation: Anterior;Left      Assessments 8/22/2024  9:41 AM 3/13/2025  9:35 AM   Response to Treatment Well tolerated Well tolerated   Compression Layers Multilayer Multilayer   Compression Product Type Coflex Coflex   Dressing Applied Yes Yes   Compression Wrap Location Toes to Knee Toes to Knee   Compression Wrap Status Clean;Dry;Intact Intact;Dry;Clean       Inactive Orders   Date Order Priority Status Authorizing Provider   01/01/25 1007 Consult to Wound Ostomy Routine Completed Braxton Ledbetter MD     - Reason for Consult:    Wound Care     - Wound Care Reason for Consult:    L leg wound   01/01/25 0720 Consult to Wound Ostomy Routine Completed Dexter Dick MD     - Reason for Consult:    Wound Care     - Wound Care Reason for Consult:    wounds              ASSESSMENT AND PLAN:      1. Open wound of left lower leg, subsequent encounter    2. Chronic venous insufficiency    3. Class 3 severe obesity due to excess calories with serious comorbidity and body mass index (BMI) of 50.0 to 59.9 in adult (HCC)    Clinically the wound is slightly smaller, silver nitrate applied to the wound edges.  Acticoat seems to be working well and will continue with that to the wound.  Cover with ABD pad gauze roll and continue Coflex 2 layer compression wrap to lower extremity.  Compression wrap precautions given.      Risks, benefits, and alternatives of current treatment plan discussed in detail.  Questions and concerns addressed. Red flags to RTC or ED reviewed.  Patient (or parent) agrees to plan.      Return in about 1 week  (around 3/20/2025).    Also refer to patient instructions.     I spent a total of 40 minutes with this patient's care.  This time included preparing to see the patient (eg, review notes and recent diagnostics), seeing the patient, performing a medically appropriate examination and/or evaluation, counseling and educating the patient, and documenting in the record.          Nicholas Gould M.D.   [facility]   3/13/2025    Patient Instructions       PATIENT INSTRUCTIONS      FOR Luis M RODRIGUEZ Natalie , DOB 3/25/1965    DATE OF SERVICE: 3/13/2025    Acticoat  ABD pad or Kerramax    Coflex 2 layer compression wrap    Follow up with Dr. Gould in 1 week      DO NOT GET THE WRAP WET       Managing your edema:    Avoid prolonged standing in one place. It is better to have your calf muscles moving to pump fluid out of the legs.  Elevate leg(s) above the level of the heart when sitting or as much as possible.  Take you diuretics as directed by your physician. Do not skip doses or change doses unless instructed to do so by your physician.  Decrease salt intake, follow recommended 2 grams daily.  Do not get leg(s) with compression wrap wet. If wraps are too tight as indicated by pain, numbness/tingling or discoloration of toes remove wrap completely and call the wound center @ [phone].       The treatment plan has been discussed at length between you and your provider. Follow all instructions carefully, it is very important. If you do not follow all instructions you are at risk of your wound not healing, infection, possible loss of limb and even loss of life.    COMPRESSION WRAP PATIENT CARE INSTRUCTIONS  DO NOT get compression wrap wet. When showering, use a shower boot.   Please contact wound clinic and if not able to reach, then go to emergency room if ANY of the following occur:   Tingling or numbness in the foot or toes on leg with compression wrap.  Severe pain that cannot be relieved with  elevation and any medication instructed per your doctor.  A fever of 100.4°F (38°C) or higher.  Swelling, coldness, or blue-gray discoloration of the toes.  If the compression wrap feels too tight or too loose.  If the compression wrap is damaged or has rough edges that hurt.  If the compression wrap gets wet, you must cut wrap off.   Drainage from compression wrap that smells different than usual.  MISCELLANEOUS INSTRUCTIONS  Supplement with a daily multivitamin   Low salt diet  Intense blood sugar control - Goal Blood sugar below 180 at all times recommended.  Increase protein intake / consider protein supplements - see below  Elevate extremities at all times when sitting / laying down.  No tobacco use     DIETARY MODIFICATIONS TO HELP WITH WOUND HEALING:          Please follow any restrictions to diet given by your other doctors     Protein: meats, beans, eggs, milk and yogurt particularly Greek yogurt), tofu, soy nuts, soy protein products     Vitamin C: citrus fruits and juices, strawberries, tomatoes, tomato juice, peppers, baked potatoes, spinach, broccoli, cauliflower, [place] sprouts, cabbage     Vitamin A: dark greens, leafy vegetables, orange or yellow vegetables, cantaloupe, fortified dairy products, liver, fortified cereals     Zinc: fortified cereals, red meats, seafood     Consider Bob by [employer] (These are essential branch chain amino acids that help with tissue building and wound healing) and take 2 packets/day. You can order online at [employer] or [employer]     ADDITIONAL REMINDERS:     The treatment plan has been discussed at length with you and your provider. Follow all instructions carefully, it is very important. If you do not follow all instructions, you are at risk of your wound not healing, infection, possible loss of limb and even loss of life.  Please call the clinic at [phone] during regular business hours ( 7:30 AM - 5:30 PM) if you notice increased redness, warmth, pain or pus  like drainage or start running a fever greater than 100.3.    For after hour emergencies, please call your primary physician or go to the nearest emergency room.  If your blood pressure is elevated, follow up with your primary care doctor and/or cardiologist as soon as possible.     FOR LEG SWELLING,  LOWER EXTREMITY WOUNDS AND IF USING COMPRESSION:   Managing your edema:    Avoid prolonged standing in one place. It is better to have your calf muscles moving to pump fluid out of the legs.  Elevate leg(s) above the level of the heart when sitting or as much as possible.  Take you diuretics as directed by your physician. Do not skip doses or change doses unless instructed to do so by your physician.  Decrease salt intake, follow recommended 2 grams daily.  Do not get leg(s) with compression wrap wet. If wraps are too tight as indicated by pain, numbness/tingling or discoloration of toes remove wrap completely and call the wound center @ [phone].       The treatment plan has been discussed at length between you and your provider. Follow all instructions carefully, it is very important. If you do not follow all instructions you are at risk of your wound not healing, infection, possible loss of limb and even loss of life.    COMPRESSION WRAP PATIENT CARE INSTRUCTIONS  DO NOT get compression wrap wet. When showering, use a shower boot.   Please contact wound clinic and if not able to reach, then go to emergency room if ANY of the following occur:   Tingling or numbness in the foot or toes on leg with compression wrap.  Severe pain that cannot be relieved with elevation and any medication instructed per your doctor.  A fever of 100.4°F (38°C) or higher.  Swelling, coldness, or blue-gray discoloration of the toes.  If the compression wrap feels too tight or too loose.  If the compression wrap is damaged or has rough edges that hurt.  If the compression wrap gets wet, you must cut wrap off.   Drainage from compression  wrap that smells different than usual.                        [1] No Known Allergies

## 2025-03-13 NOTE — PROGRESS NOTES
Weekly Wound Education Note    Teaching Provided To: Patient  Training Topics: Dressing, Edema control, Compression  Training Method: Demonstration, Explain/Verbal, Written  Training Response: Patient responds and understands, Reinforcement needed        Notes: provider applied silver nitrate to wound bed, covered with acticoat silver side against wound bed, ABD, conforming gauze, Coflex 2 layer from base of toes to just below knee. Reviewed with pt edema control with elevation.

## 2025-03-20 ENCOUNTER — OFFICE VISIT (OUTPATIENT)
Dept: WOUND CARE | Facility: HOSPITAL | Age: 60
End: 2025-03-20
Attending: FAMILY MEDICINE
Payer: COMMERCIAL

## 2025-03-20 VITALS
DIASTOLIC BLOOD PRESSURE: 91 MMHG | SYSTOLIC BLOOD PRESSURE: 142 MMHG | TEMPERATURE: 98 F | RESPIRATION RATE: 16 BRPM | HEART RATE: 68 BPM

## 2025-03-20 DIAGNOSIS — E66.813 CLASS 3 SEVERE OBESITY DUE TO EXCESS CALORIES WITH SERIOUS COMORBIDITY AND BODY MASS INDEX (BMI) OF 50.0 TO 59.9 IN ADULT (HCC): ICD-10-CM

## 2025-03-20 DIAGNOSIS — E66.01 CLASS 3 SEVERE OBESITY DUE TO EXCESS CALORIES WITH SERIOUS COMORBIDITY AND BODY MASS INDEX (BMI) OF 50.0 TO 59.9 IN ADULT (HCC): ICD-10-CM

## 2025-03-20 DIAGNOSIS — S81.802D OPEN WOUND OF LEFT LOWER LEG, SUBSEQUENT ENCOUNTER: Primary | ICD-10-CM

## 2025-03-20 DIAGNOSIS — I87.2 CHRONIC VENOUS INSUFFICIENCY: ICD-10-CM

## 2025-03-20 PROCEDURE — 99215 OFFICE O/P EST HI 40 MIN: CPT | Performed by: FAMILY MEDICINE

## 2025-03-20 NOTE — PROGRESS NOTES
Weekly Wound Education Note    Teaching Provided To: Patient  Training Topics: Discharge instructions, Dressing, Edema control, Compression, Cleasing and general instructions  Training Method: Explain/Verbal, Demonstration  Training Response: Reinforcement needed        Notes: Patient here for follow up wound care visit to left lateral lower leg. Edema measurement increased, wound measurement slightly decreased. Provider recommending treatment change to hydrofera transfer, ABD pad, conforming gauze, tape. Applied coflex 2 layer wrap to LLE. Pt to follow up in 1 and 2 weeks for nurse visits and provider visit in 3 weeks.

## 2025-03-20 NOTE — PATIENT INSTRUCTIONS
PATIENT INSTRUCTIONS      FOR Luis M Lomasd ,  3/25/1965    DATE OF SERVICE: 3/20/2025    Hydrofera Blue transfer  ABD pad     Coflex 2 layer compression wrap    Nurse visit weekly x 2 weeks    Follow up with Dr. Gould in 3 weeks      DO NOT GET THE WRAP WET       Managing your edema:    Avoid prolonged standing in one place. It is better to have your calf muscles moving to pump fluid out of the legs.  Elevate leg(s) above the level of the heart when sitting or as much as possible.  Take you diuretics as directed by your physician. Do not skip doses or change doses unless instructed to do so by your physician.  Decrease salt intake, follow recommended 2 grams daily.  Do not get leg(s) with compression wrap wet. If wraps are too tight as indicated by pain, numbness/tingling or discoloration of toes remove wrap completely and call the wound center @ [phone].       The treatment plan has been discussed at length between you and your provider. Follow all instructions carefully, it is very important. If you do not follow all instructions you are at risk of your wound not healing, infection, possible loss of limb and even loss of life.    COMPRESSION WRAP PATIENT CARE INSTRUCTIONS  DO NOT get compression wrap wet. When showering, use a shower boot.   Please contact wound clinic and if not able to reach, then go to emergency room if ANY of the following occur:   Tingling or numbness in the foot or toes on leg with compression wrap.  Severe pain that cannot be relieved with elevation and any medication instructed per your doctor.  A fever of 100.4°F (38°C) or higher.  Swelling, coldness, or blue-gray discoloration of the toes.  If the compression wrap feels too tight or too loose.  If the compression wrap is damaged or has rough edges that hurt.  If the compression wrap gets wet, you must cut wrap off.   Drainage from compression wrap that smells different than usual.

## 2025-03-20 NOTE — PROGRESS NOTES
Chief Complaint   Patient presents with    Wound Care     Arrives for follow-up. Coflex compression wrap in place.       HPI:     Patient here for follow-up of left lateral leg wound.  He is doing well and tolerating compression wrap without difficulty.    Lab Results   Component Value Date    BUN 15 01/02/2025    CREATSERUM 0.82 01/02/2025    ALB 3.4 01/01/2025    TP 6.8 01/01/2025    A1C 5.2 12/12/2016         Current Outpatient Medications:     furosemide 20 MG Oral Tab, Take 1 tablet (20 mg total) by mouth daily., Disp: 30 tablet, Rfl: 0    Multiple Vitamins-Minerals (MULTI-VITAMIN/MINERALS) Oral Tab, Take 1 tablet by mouth daily., Disp: , Rfl:     aspirin 81 MG Oral Tab, Take 1 tablet (81 mg total) by mouth daily., Disp: , Rfl:     Allergies[1]         REVIEW OF SYSTEMS:     Review of Systems (ROS)  This information was obtained from the patient, chart    A comprehensive 10 point review of systems was completed.  Pertinent positives and negatives noted in the the HPI.     Past medical, surgical, family and social history updated where appropriate.    PHYSICAL EXAM:   BP (!) 142/91   Pulse 68   Temp 98.3 °F (36.8 °C)   Resp 16    Estimated body mass index is 51.69 kg/m² as calculated from the following:    Height as of 1/9/25: 67\".    Weight as of 1/9/25: 330 lb (149.7 kg).   Vital signs reviewed.Appears stated age, well groomed.        Calf  Point of Measurement - Left Calf: 30     Left Calf from:: Heel  Calf Left cm:: 40.5          Ankle  Point of Measurement - Left Ankle: 10     Left Ankle from:: Heel  Left Ankle cm:: 30                Foot                               Wound 01/11/24 #1 Leg Left;Lateral (Active)   Date First Assessed/Time First Assessed: 01/11/24 1406    Wound Number (Wound Clinic Only): #1  Primary Wound Type: Traumatic  Location: Leg  Wound Location Orientation: Left;Lateral      Assessments 1/11/2024  2:10 PM 3/20/2025  8:50 AM   Wound Image       Drainage Amount Large Small   Drainage  Description Yellow;Serous Serosanguineous   Treatments Compression (coflex 2 layer wrap) Compression (coflex 2 layer wrap)   Wound Length (cm) 10.6 cm 1.6 cm   Wound Width (cm) 9.5 cm 0.5 cm   Wound Surface Area (cm^2) 100.7 cm^2 0.8 cm^2   Wound Depth (cm) 0.2 cm 0.1 cm   Wound Volume (cm^3) 20.14 cm^3 0.08 cm^3   Wound Healing % -- 100   Margins Well-defined edges Well-defined edges   Non-staged Wound Description Full thickness Full thickness   Leslie-wound Assessment Edema;Hemosiderin staining Edema;Hemosiderin staining;Dry   Wound Granulation Tissue Firm;Pink Red;Pink;Firm   Wound Bed Granulation (%) 40 % 90 %   Wound Bed Slough (%) 60 % 10 %   Wound Odor None None   Tunneling? No No   Undermining? No No   Sinus Tracts? No No       Inactive Orders   Date Order Priority Status Authorizing Provider   01/16/25 0857 Debridement Traumatic Left;Lateral Leg Routine Completed Nicholas Gould MD   01/09/25 0935 Debridement Traumatic Left;Lateral Leg Routine Completed Nicholas Gould MD   01/02/25 1505 Wound care Routine Discontinued Braxton Ledbetter MD   01/01/25 0720 Consult to Wound Ostomy Routine Completed Dexter Dick MD     - Reason for Consult:    Wound Care     - Wound Care Reason for Consult:    wounds   11/21/24 0914 Debridement Traumatic Left;Lateral Leg Routine Completed Nicholas Gould MD   07/16/24 2327 Consult to Wound Ostomy Routine Completed Rica Pearson MD     - Reason for Consult:    Wound Care     - Wound Care Reason for Consult:    worsening   06/24/24 1127 Wound care Routine Discontinued Rica Pearson MD   06/06/24 0913 Debridement Traumatic Routine Completed Nicholas Gould MD   05/16/24 0901 Debridement Traumatic Routine Completed Nicholas Gould MD   05/02/24 1520 Debridement Traumatic Routine Completed Nicholas Gould MD   04/11/24 0959 Debridement Traumatic Routine Completed Nicholas Gould MD   04/04/24 0915 Debridement Traumatic Left;Lateral Leg Routine Completed Nicholas Gould MD    02/15/24 1154 Debridement Traumatic Left;Lateral Leg Routine Completed Nicholas Gould MD   01/18/24 1511 Debridement Traumatic Left;Lateral Leg Routine Completed Nicholas Gould MD   01/11/24 1710 Debridement Traumatic Left;Lateral Leg Routine Completed Nicholas Gould MD       Compression Wrap 08/22/24 Leg Anterior;Left (Active)   Placement Date: 08/22/24   Location: Leg  Wound Location Orientation: Anterior;Left      Assessments 8/22/2024  9:41 AM 3/20/2025  8:50 AM   Response to Treatment Well tolerated Well tolerated   Compression Layers Multilayer Multilayer   Compression Product Type Coflex Coflex   Dressing Applied Yes Yes (hydrofera transfer, ABD pad, conforming gauze, tape)   Compression Wrap Location Toes to Knee Toes to Knee   Compression Wrap Status Clean;Dry;Intact Intact;Dry;Clean       Inactive Orders   Date Order Priority Status Authorizing Provider   01/01/25 1007 Consult to Wound Ostomy Routine Completed Braxton Ledbetter MD     - Reason for Consult:    Wound Care     - Wound Care Reason for Consult:    L leg wound   01/01/25 0720 Consult to Wound Ostomy Routine Completed Dexter Dick MD     - Reason for Consult:    Wound Care     - Wound Care Reason for Consult:    wounds              ASSESSMENT AND PLAN:      1. Open wound of left lower leg, subsequent encounter    2. Chronic venous insufficiency    3. Class 3 severe obesity due to excess calories with serious comorbidity and body mass index (BMI) of 50.0 to 59.9 in adult (HCC)    Clinically the wound is about the same over the last couple of weeks with Acticoat.  Will switch the dressing to Hydrofera Blue transfer, ABD pad, continue Coflex 2 layer compression wrap 30 to 40 mmHg to left lower extremity.  Same precautions regarding compression wrap as per previous visits.  Patient will have a nurse visit weekly for the next 2 weeks and follow-up with me in 3 weeks.      Risks, benefits, and alternatives of current treatment plan discussed in  detail.  Questions and concerns addressed. Red flags to RTC or ED reviewed.  Patient (or parent) agrees to plan.      No follow-ups on file.    Also refer to patient instructions.     I spent a total of 40 minutes with this patient's care.  This time included preparing to see the patient (eg, review notes and recent diagnostics), seeing the patient, performing a medically appropriate examination and/or evaluation, counseling and educating the patient, and documenting in the record.          Nicholas Gould M.D.   [facility]   3/20/2025    Patient Instructions       PATIENT INSTRUCTIONS      FOR Luis M Estrada RICK 3/25/1965    DATE OF SERVICE: 3/20/2025    Hydrofera Blue transfer  ABD pad     Coflex 2 layer compression wrap    Nurse visit weekly x 2 weeks    Follow up with Dr. Gould in 3 weeks      DO NOT GET THE WRAP WET       Managing your edema:    Avoid prolonged standing in one place. It is better to have your calf muscles moving to pump fluid out of the legs.  Elevate leg(s) above the level of the heart when sitting or as much as possible.  Take you diuretics as directed by your physician. Do not skip doses or change doses unless instructed to do so by your physician.  Decrease salt intake, follow recommended 2 grams daily.  Do not get leg(s) with compression wrap wet. If wraps are too tight as indicated by pain, numbness/tingling or discoloration of toes remove wrap completely and call the wound center @ [phone].       The treatment plan has been discussed at length between you and your provider. Follow all instructions carefully, it is very important. If you do not follow all instructions you are at risk of your wound not healing, infection, possible loss of limb and even loss of life.    COMPRESSION WRAP PATIENT CARE INSTRUCTIONS  DO NOT get compression wrap wet. When showering, use a shower boot.   Please contact wound clinic and if not able to reach, then go to emergency  room if ANY of the following occur:   Tingling or numbness in the foot or toes on leg with compression wrap.  Severe pain that cannot be relieved with elevation and any medication instructed per your doctor.  A fever of 100.4°F (38°C) or higher.  Swelling, coldness, or blue-gray discoloration of the toes.  If the compression wrap feels too tight or too loose.  If the compression wrap is damaged or has rough edges that hurt.  If the compression wrap gets wet, you must cut wrap off.   Drainage from compression wrap that smells different than usual.  MISCELLANEOUS INSTRUCTIONS  Supplement with a daily multivitamin   Low salt diet  Intense blood sugar control - Goal Blood sugar below 180 at all times recommended.  Increase protein intake / consider protein supplements - see below  Elevate extremities at all times when sitting / laying down.  No tobacco use     DIETARY MODIFICATIONS TO HELP WITH WOUND HEALING:          Please follow any restrictions to diet given by your other doctors     Protein: meats, beans, eggs, milk and yogurt particularly Greek yogurt), tofu, soy nuts, soy protein products     Vitamin C: citrus fruits and juices, strawberries, tomatoes, tomato juice, peppers, baked potatoes, spinach, broccoli, cauliflower, [place] sprouts, cabbage     Vitamin A: dark greens, leafy vegetables, orange or yellow vegetables, cantaloupe, fortified dairy products, liver, fortified cereals     Zinc: fortified cereals, red meats, seafood     Consider Bob by [employer] (These are essential branch chain amino acids that help with tissue building and wound healing) and take 2 packets/day. You can order online at [employer] or [employer]     ADDITIONAL REMINDERS:     The treatment plan has been discussed at length with you and your provider. Follow all instructions carefully, it is very important. If you do not follow all instructions, you are at risk of your wound not healing, infection, possible loss of limb and even loss of  life.  Please call the clinic at [phone] during regular business hours ( 7:30 AM - 5:30 PM) if you notice increased redness, warmth, pain or pus like drainage or start running a fever greater than 100.3.    For after hour emergencies, please call your primary physician or go to the nearest emergency room.  If your blood pressure is elevated, follow up with your primary care doctor and/or cardiologist as soon as possible.     FOR LEG SWELLING,  LOWER EXTREMITY WOUNDS AND IF USING COMPRESSION:   Managing your edema:    Avoid prolonged standing in one place. It is better to have your calf muscles moving to pump fluid out of the legs.  Elevate leg(s) above the level of the heart when sitting or as much as possible.  Take you diuretics as directed by your physician. Do not skip doses or change doses unless instructed to do so by your physician.  Decrease salt intake, follow recommended 2 grams daily.  Do not get leg(s) with compression wrap wet. If wraps are too tight as indicated by pain, numbness/tingling or discoloration of toes remove wrap completely and call the wound center @ [phone].       The treatment plan has been discussed at length between you and your provider. Follow all instructions carefully, it is very important. If you do not follow all instructions you are at risk of your wound not healing, infection, possible loss of limb and even loss of life.    COMPRESSION WRAP PATIENT CARE INSTRUCTIONS  DO NOT get compression wrap wet. When showering, use a shower boot.   Please contact wound clinic and if not able to reach, then go to emergency room if ANY of the following occur:   Tingling or numbness in the foot or toes on leg with compression wrap.  Severe pain that cannot be relieved with elevation and any medication instructed per your doctor.  A fever of 100.4°F (38°C) or higher.  Swelling, coldness, or blue-gray discoloration of the toes.  If the compression wrap feels too tight or too loose.  If  the compression wrap is damaged or has rough edges that hurt.  If the compression wrap gets wet, you must cut wrap off.   Drainage from compression wrap that smells different than usual.                        [1] No Known Allergies

## 2025-03-27 ENCOUNTER — APPOINTMENT (OUTPATIENT)
Dept: WOUND CARE | Facility: HOSPITAL | Age: 60
End: 2025-03-27
Attending: FAMILY MEDICINE
Payer: COMMERCIAL

## 2025-03-27 VITALS
HEART RATE: 76 BPM | DIASTOLIC BLOOD PRESSURE: 83 MMHG | TEMPERATURE: 99 F | SYSTOLIC BLOOD PRESSURE: 142 MMHG | RESPIRATION RATE: 16 BRPM

## 2025-03-27 DIAGNOSIS — S81.802D OPEN WOUND OF LEFT LOWER LEG, SUBSEQUENT ENCOUNTER: Primary | ICD-10-CM

## 2025-03-27 PROCEDURE — 29581 APPL MULTLAYER CMPRN SYS LEG: CPT

## 2025-03-27 NOTE — PROGRESS NOTES
Chief Complaint   Patient presents with    Wound Care     Patient here for nurse visit to left lateral lower leg. Wrap to LLE rolled down slightly. Pt denies any concerns or issues, denies any pain in wound at this time.            Current Outpatient Medications:     furosemide 20 MG Oral Tab, Take 1 tablet (20 mg total) by mouth daily., Disp: 30 tablet, Rfl: 0    Multiple Vitamins-Minerals (MULTI-VITAMIN/MINERALS) Oral Tab, Take 1 tablet by mouth daily., Disp: , Rfl:     aspirin 81 MG Oral Tab, Take 1 tablet (81 mg total) by mouth daily., Disp: , Rfl:     Allergies[1]       HISTORY:     Past medical, surgical, family and social history updated where appropriate.    PHYSICAL EXAM:   /83   Pulse 76   Temp 98.7 °F (37.1 °C)   Resp 16        Vital signs reviewed.      Calf  Point of Measurement - Left Calf: 30     Left Calf from:: Heel  Calf Left cm:: 39.5          Ankle  Point of Measurement - Left Ankle: 10     Left Ankle from:: Heel  Left Ankle cm:: 28.9                Wound 01/11/24 #1 Leg Left;Lateral (Active)   Date First Assessed/Time First Assessed: 01/11/24 1406    Wound Number (Wound Clinic Only): #1  Primary Wound Type: Traumatic  Location: Leg  Wound Location Orientation: Left;Lateral      Assessments 1/11/2024  2:10 PM 3/27/2025  8:43 AM   Wound Image       Drainage Amount Large Scant   Drainage Description Yellow;Serous Serosanguineous   Treatments Compression Compression   Wound Length (cm) 10.6 cm 1.4 cm   Wound Width (cm) 9.5 cm 0.4 cm   Wound Surface Area (cm^2) 100.7 cm^2 0.56 cm^2   Wound Depth (cm) 0.2 cm 0.1 cm   Wound Volume (cm^3) 20.14 cm^3 0.056 cm^3   Wound Healing % -- 100   Margins Well-defined edges Well-defined edges   Non-staged Wound Description Full thickness Full thickness   Leslie-wound Assessment Edema;Hemosiderin staining Edema;Hemosiderin staining;Dry   Wound Granulation Tissue Firm;Pink Red;Pink;Firm   Wound Bed Granulation (%) 40 % 100 %   Wound Bed Slough (%) 60 % --    Wound Odor None None   Tunneling? No No   Undermining? No No   Sinus Tracts? No No       Inactive Orders   Date Order Priority Status Authorizing Provider   01/16/25 0857 Debridement Traumatic Left;Lateral Leg Routine Completed Nicholas Gould MD   01/09/25 0935 Debridement Traumatic Left;Lateral Leg Routine Completed Nicholas Gould MD   01/02/25 1505 Wound care Routine Discontinued Braxton Ledbetter MD   01/01/25 0720 Consult to Wound Ostomy Routine Completed Dexter Dick MD     - Reason for Consult:    Wound Care     - Wound Care Reason for Consult:    wounds   11/21/24 0914 Debridement Traumatic Left;Lateral Leg Routine Completed Nicholas Gould MD   07/16/24 2327 Consult to Wound Ostomy Routine Completed Rica Pearson MD     - Reason for Consult:    Wound Care     - Wound Care Reason for Consult:    worsening   06/24/24 1127 Wound care Routine Discontinued Rica Pearson MD   06/06/24 0913 Debridement Traumatic Routine Completed Nicholas Gould MD   05/16/24 0901 Debridement Traumatic Routine Completed Nicholas Gould MD   05/02/24 1520 Debridement Traumatic Routine Completed Nicholas Gould MD   04/11/24 0959 Debridement Traumatic Routine Completed Nicholas Gould MD   04/04/24 0915 Debridement Traumatic Left;Lateral Leg Routine Completed Nicholas Gould MD   02/15/24 1154 Debridement Traumatic Left;Lateral Leg Routine Completed Nicholas Gould MD   01/18/24 1511 Debridement Traumatic Left;Lateral Leg Routine Completed Nicholas Gould MD   01/11/24 1710 Debridement Traumatic Left;Lateral Leg Routine Completed Nicholas Gould MD       Compression Wrap 08/22/24 Leg Anterior;Left (Active)   Placement Date: 08/22/24   Location: Leg  Wound Location Orientation: Anterior;Left      Assessments 8/22/2024  9:41 AM 3/27/2025  8:43 AM   Response to Treatment Well tolerated Well tolerated   Compression Layers Multilayer Multilayer   Compression Product Type Coflex Coflex   Dressing Applied Yes Yes    Compression Wrap Location Toes to Knee Toes to Knee   Compression Wrap Status Clean;Dry;Intact Intact;Dry;Clean       Inactive Orders   Date Order Priority Status Authorizing Provider   01/01/25 1007 Consult to Wound Ostomy Routine Completed Braxton Ledbetter MD     - Reason for Consult:    Wound Care     - Wound Care Reason for Consult:    L leg wound   01/01/25 0720 Consult to Wound Ostomy Routine Completed Dexter Dick MD     - Reason for Consult:    Wound Care     - Wound Care Reason for Consult:    wounds          ASSESSMENT AND PLAN:        Risks, benefits, and alternatives of current treatment plan discussed in detail.  Questions and concerns addressed. Red flags to RTC or ED reviewed.  Patient (or parent) agrees to plan.      No follow-ups on file.  Weekly Wound Education Note    Teaching Provided To: Patient  Training Topics: Discharge instructions, Cleasing and general instructions, Compression, Edema control, Dressing  Training Method: Demonstration, Explain/Verbal  Training Response: Reinforcement needed        Notes: Patient here for nurse visit to left lateral lower leg. Wrap to LLE rolled down slightly. Pt denies any concerns or issues, denies any pain in wound at this time. Edema measurement slightly decreased, wound measurement decreased. Continued wih betamethasone to lower leg, hydrofera ready (changed to ready due to decreased drainage), conforming gauze, tape. Applied coflex 2 layer wrap. Pt to follow up for nurse visit on 4/3/25 and provider visit on 4/10/25. Applied new spandagrip (E) to RLE.      Shawn HOWE RN   3/27/2025  8:46 AM              [1] No Known Allergies

## 2025-04-03 ENCOUNTER — OFFICE VISIT (OUTPATIENT)
Dept: WOUND CARE | Facility: HOSPITAL | Age: 60
End: 2025-04-03
Attending: FAMILY MEDICINE
Payer: COMMERCIAL

## 2025-04-03 VITALS
DIASTOLIC BLOOD PRESSURE: 88 MMHG | HEART RATE: 93 BPM | SYSTOLIC BLOOD PRESSURE: 150 MMHG | RESPIRATION RATE: 16 BRPM | TEMPERATURE: 98 F

## 2025-04-03 DIAGNOSIS — S81.802D OPEN WOUND OF LEFT LOWER LEG, SUBSEQUENT ENCOUNTER: Primary | ICD-10-CM

## 2025-04-03 PROCEDURE — 29581 APPL MULTLAYER CMPRN SYS LEG: CPT

## 2025-04-03 NOTE — PROGRESS NOTES
Chief Complaint   Patient presents with    Wound Care     Patient is here for a RN visit. He denies any pain or new wound concerns.           Current Outpatient Medications:     furosemide 20 MG Oral Tab, Take 1 tablet (20 mg total) by mouth daily., Disp: 30 tablet, Rfl: 0    Multiple Vitamins-Minerals (MULTI-VITAMIN/MINERALS) Oral Tab, Take 1 tablet by mouth daily., Disp: , Rfl:     aspirin 81 MG Oral Tab, Take 1 tablet (81 mg total) by mouth daily., Disp: , Rfl:     Allergies[1]       HISTORY:     Past medical, surgical, family and social history updated where appropriate.    PHYSICAL EXAM:   /88   Pulse 93   Temp 98.1 °F (36.7 °C)   Resp 16        Vital signs reviewed.      Calf  Point of Measurement - Left Calf: 30     Left Calf from:: Heel  Calf Left cm:: 40          Ankle  Point of Measurement - Left Ankle: 10     Left Ankle from:: Heel  Left Ankle cm:: 29.5                Wound 01/11/24 #1 Leg Left;Lateral (Active)   Date First Assessed/Time First Assessed: 01/11/24 1406    Wound Number (Wound Clinic Only): #1  Primary Wound Type: Traumatic  Location: Leg  Wound Location Orientation: Left;Lateral      Assessments 1/11/2024  2:10 PM 4/3/2025  8:29 AM   Wound Image       Drainage Amount Large Small   Drainage Description Yellow;Serous Serosanguineous   Treatments Compression Compression   Wound Length (cm) 10.6 cm 0.5 cm   Wound Width (cm) 9.5 cm 0.3 cm   Wound Surface Area (cm^2) 100.7 cm^2 0.15 cm^2   Wound Depth (cm) 0.2 cm 0.1 cm   Wound Volume (cm^3) 20.14 cm^3 0.015 cm^3   Wound Healing % -- 100   Margins Well-defined edges Well-defined edges   Non-staged Wound Description Full thickness Full thickness   Leslie-wound Assessment Edema;Hemosiderin staining Edema;Hemosiderin staining;Dry   Wound Granulation Tissue Firm;Pink Firm;Pink   Wound Bed Granulation (%) 40 % 100 %   Wound Bed Slough (%) 60 % --   Wound Odor None None   Tunneling? No No   Undermining? No No   Sinus Tracts? No No       Inactive  Orders   Date Order Priority Status Authorizing Provider   01/16/25 0857 Debridement Traumatic Left;Lateral Leg Routine Completed Nicholas Gould MD   01/09/25 0935 Debridement Traumatic Left;Lateral Leg Routine Completed Nicholas Gould MD   01/02/25 1505 Wound care Routine Discontinued Braxton Ledbetter MD   01/01/25 0720 Consult to Wound Ostomy Routine Completed Dexter Dick MD     - Reason for Consult:    Wound Care     - Wound Care Reason for Consult:    wounds   11/21/24 0914 Debridement Traumatic Left;Lateral Leg Routine Completed Nicholas Gould MD   07/16/24 2327 Consult to Wound Ostomy Routine Completed Rica Pearson MD     - Reason for Consult:    Wound Care     - Wound Care Reason for Consult:    worsening   06/24/24 1127 Wound care Routine Discontinued Rica Pearson MD   06/06/24 0913 Debridement Traumatic Routine Completed Nicholas Gould MD   05/16/24 0901 Debridement Traumatic Routine Completed Nicholas Gould MD   05/02/24 1520 Debridement Traumatic Routine Completed Nicholas Gould MD   04/11/24 0959 Debridement Traumatic Routine Completed Nicholas Gould MD   04/04/24 0915 Debridement Traumatic Left;Lateral Leg Routine Completed Nicholas Gould MD   02/15/24 1154 Debridement Traumatic Left;Lateral Leg Routine Completed Nicholas Gould MD   01/18/24 1511 Debridement Traumatic Left;Lateral Leg Routine Completed Nicholas Gould MD   01/11/24 1710 Debridement Traumatic Left;Lateral Leg Routine Completed Nicholas Gould MD       Compression Wrap 08/22/24 Leg Anterior;Left (Active)   Placement Date: 08/22/24   Location: Leg  Wound Location Orientation: Anterior;Left      Assessments 8/22/2024  9:41 AM 4/3/2025  8:29 AM   Response to Treatment Well tolerated Well tolerated   Compression Layers Multilayer Multilayer   Compression Product Type Coflex Coflex   Dressing Applied Yes Yes   Compression Wrap Location Toes to Knee Toes to Knee   Compression Wrap Status Clean;Dry;Intact  Clean;Dry;Intact       Inactive Orders   Date Order Priority Status Authorizing Provider   01/01/25 1007 Consult to Wound Ostomy Routine Completed Braxton Ledbetter MD     - Reason for Consult:    Wound Care     - Wound Care Reason for Consult:    L leg wound   01/01/25 0720 Consult to Wound Ostomy Routine Completed Dexter Dick MD     - Reason for Consult:    Wound Care     - Wound Care Reason for Consult:    wounds          ASSESSMENT AND PLAN:        Risks, benefits, and alternatives of current treatment plan discussed in detail.  Questions and concerns addressed. Red flags to RTC or ED reviewed.  Patient (or parent) agrees to plan.      No follow-ups on file.  Weekly Wound Education Note    Teaching Provided To: Patient  Training Topics: Cleasing and general instructions, Compression, Discharge instructions, Dressing, Edema control  Training Method: Explain/Verbal, Demonstration  Training Response: Reinforcement needed        Notes: Patient here for nurse visit to left lateral leg. Pt arrived in coflex to LLE - noted wrap rolled slighty. Pt denies any concerns or issues, pt denies any pain. Edema measurement slighlty increased. Wound measurement decreased. Continued with betamethasone to lower leg, hydrofera ready to wound, conforming gauze, tape. Applied coflex 2 layer wrap, used another blue layer from coflex box to help build up the top portion of the wrap so it wont slide down. RN provided patient with spandagrip (F) to RLE. Pt to follow up with with provider in 1 week.    Shawn HOWE RN   4/3/2025  8:50 AM            [1] No Known Allergies

## 2025-04-10 ENCOUNTER — OFFICE VISIT (OUTPATIENT)
Dept: WOUND CARE | Facility: HOSPITAL | Age: 60
End: 2025-04-10
Attending: FAMILY MEDICINE
Payer: COMMERCIAL

## 2025-04-10 VITALS
RESPIRATION RATE: 15 BRPM | TEMPERATURE: 98 F | DIASTOLIC BLOOD PRESSURE: 93 MMHG | HEART RATE: 74 BPM | SYSTOLIC BLOOD PRESSURE: 135 MMHG

## 2025-04-10 DIAGNOSIS — S81.802D OPEN WOUND OF LEFT LOWER LEG, SUBSEQUENT ENCOUNTER: Primary | ICD-10-CM

## 2025-04-10 DIAGNOSIS — E66.813 CLASS 3 SEVERE OBESITY DUE TO EXCESS CALORIES WITH SERIOUS COMORBIDITY AND BODY MASS INDEX (BMI) OF 50.0 TO 59.9 IN ADULT (HCC): ICD-10-CM

## 2025-04-10 DIAGNOSIS — E66.01 CLASS 3 SEVERE OBESITY DUE TO EXCESS CALORIES WITH SERIOUS COMORBIDITY AND BODY MASS INDEX (BMI) OF 50.0 TO 59.9 IN ADULT (HCC): ICD-10-CM

## 2025-04-10 DIAGNOSIS — I87.2 CHRONIC VENOUS INSUFFICIENCY: ICD-10-CM

## 2025-04-10 PROCEDURE — 99215 OFFICE O/P EST HI 40 MIN: CPT | Performed by: FAMILY MEDICINE

## 2025-04-10 NOTE — PROGRESS NOTES
Chief Complaint   Patient presents with    Wound Care     Patient arrives for a wound care follow up appointment. Patient arrives with an unna wrap to the left leg. Wrap stayed up well.        HPI:     Patient here for follow-up of left lateral leg wound.  Tolerating compression wrap without difficulty.  He has no new complaints today.    Lab Results   Component Value Date    BUN 15 01/02/2025    CREATSERUM 0.82 01/02/2025    ALB 3.4 01/01/2025    TP 6.8 01/01/2025    A1C 5.2 12/12/2016       Medications - Current[1]    Allergies[2]         REVIEW OF SYSTEMS:     Review of Systems (ROS)  This information was obtained from the patient, chart    A comprehensive 10 point review of systems was completed.  Pertinent positives and negatives noted in the the HPI.     Past medical, surgical, family and social history updated where appropriate.    PHYSICAL EXAM:   BP (!) 135/93   Pulse 74   Temp 98.1 °F (36.7 °C)   Resp 15    Estimated body mass index is 51.69 kg/m² as calculated from the following:    Height as of 1/9/25: 67\".    Weight as of 1/9/25: 330 lb (149.7 kg).   Vital signs reviewed.Appears stated age, well groomed.        Calf  Point of Measurement - Left Calf: 30     Left Calf from:: Heel  Calf Left cm:: 39          Ankle  Point of Measurement - Left Ankle: 10     Left Ankle from:: Heel  Left Ankle cm:: 29.4                Foot                               Wound 01/11/24 #1 Leg Left;Lateral (Active)   Date First Assessed/Time First Assessed: 01/11/24 1406    Wound Number (Wound Clinic Only): #1  Primary Wound Type: Traumatic  Location: Leg  Wound Location Orientation: Left;Lateral      Assessments 1/11/2024  2:10 PM 4/10/2025  8:44 AM   Wound Image       Drainage Amount Large Scant   Drainage Description Yellow;Serous Serosanguineous   Treatments Compression (coflex 2 layer wrap) Compression (coflex 2 layer wrap)   Wound Length (cm) 10.6 cm 0.6 cm   Wound Width (cm) 9.5 cm 0.5 cm   Wound Surface Area (cm^2)  100.7 cm^2 0.24 cm^2   Wound Depth (cm) 0.2 cm 0.1 cm   Wound Volume (cm^3) 20.14 cm^3 0.016 cm^3   Wound Healing % -- 100   Margins Well-defined edges Well-defined edges   Non-staged Wound Description Full thickness Full thickness   Leslie-wound Assessment Edema;Hemosiderin staining Edema;Hemosiderin staining;Dry   Wound Granulation Tissue Firm;Pink Pink;Firm   Wound Bed Granulation (%) 40 % 100 %   Wound Bed Slough (%) 60 % --   Wound Odor None None   Tunneling? No No   Undermining? No No   Sinus Tracts? No No       Inactive Orders   Date Order Priority Status Authorizing Provider   01/16/25 0857 Debridement Traumatic Left;Lateral Leg Routine Completed Nicholas Gould MD   01/09/25 0935 Debridement Traumatic Left;Lateral Leg Routine Completed Nicholas Gould MD   01/02/25 1505 Wound care Routine Discontinued Braxton Ledbetter MD   01/01/25 0720 Consult to Wound Ostomy Routine Completed Dexter Dick MD     - Reason for Consult:    Wound Care     - Wound Care Reason for Consult:    wounds   11/21/24 0914 Debridement Traumatic Left;Lateral Leg Routine Completed Nicholas Gould MD   07/16/24 2327 Consult to Wound Ostomy Routine Completed Rica Pearson MD     - Reason for Consult:    Wound Care     - Wound Care Reason for Consult:    worsening   06/24/24 1127 Wound care Routine Discontinued Rica Pearson MD   06/06/24 0913 Debridement Traumatic Routine Completed Nicholas Gould MD   05/16/24 0901 Debridement Traumatic Routine Completed Nicholas Gould MD   05/02/24 1520 Debridement Traumatic Routine Completed Nicholas Gould MD   04/11/24 0959 Debridement Traumatic Routine Completed Nicholas Gould MD   04/04/24 0915 Debridement Traumatic Left;Lateral Leg Routine Completed Nicholas Gould MD   02/15/24 1154 Debridement Traumatic Left;Lateral Leg Routine Completed Nicholas Gould MD   01/18/24 1511 Debridement Traumatic Left;Lateral Leg Routine Completed Nicholas Gould MD   01/11/24 1710 Debridement Traumatic  Left;Lateral Leg Routine Completed Nicholas Gould MD       Compression Wrap 08/22/24 Leg Anterior;Left (Active)   Placement Date: 08/22/24   Location: Leg  Wound Location Orientation: Anterior;Left      Assessments 8/22/2024  9:41 AM 4/10/2025  8:44 AM   Response to Treatment Well tolerated Well tolerated   Compression Layers Multilayer Multilayer   Compression Product Type Coflex Coflex   Dressing Applied Yes Yes (betamethasone, zinc, hydrofera ready, conforming gauze,tape)   Compression Wrap Location Toes to Knee Toes to Knee   Compression Wrap Status Clean;Dry;Intact Clean;Dry;Intact       Inactive Orders   Date Order Priority Status Authorizing Provider   01/01/25 1007 Consult to Wound Ostomy Routine Completed Braxton Ledbetter MD     - Reason for Consult:    Wound Care     - Wound Care Reason for Consult:    L leg wound   01/01/25 0720 Consult to Wound Ostomy Routine Completed Dexter Dick MD     - Reason for Consult:    Wound Care     - Wound Care Reason for Consult:    wounds              ASSESSMENT AND PLAN:      1. Open wound of left lower leg, subsequent encounter    2. Chronic venous insufficiency    3. Class 3 severe obesity due to excess calories with serious comorbidity and body mass index (BMI) of 50.0 to 59.9 in adult (HCC)    Clinically the wound is measuring about the same as previous it is well granulated.  Silver nitrate was applied.  Will continue with Hydrofera Blue, Coflex 2 layer compression wrap to right lower extremity.  Patient tolerating well to go over compression wrap precautions.      Risks, benefits, and alternatives of current treatment plan discussed in detail.  Questions and concerns addressed. Red flags to RTC or ED reviewed.  Patient (or parent) agrees to plan.      Return in about 1 week (around 4/17/2025).    Also refer to patient instructions.     I spent a total of 40 minutes with this patient's care.  This time included preparing to see the patient (eg, review notes and  recent diagnostics), seeing the patient, performing a medically appropriate examination and/or evaluation, counseling and educating the patient, and documenting in the record.          Nicholas Gould M.D.   [facility]   4/10/2025    Patient Instructions       PATIENT INSTRUCTIONS      FOR Luis M Estrada , RICK 3/25/1965    DATE OF SERVICE: 4/10/2025    Hydrofera Blue transfer  ABD pad     Coflex 2 layer compression wrap    Follow up with Dr. Gould in 1 week      DO NOT GET THE WRAP WET       Managing your edema:    Avoid prolonged standing in one place. It is better to have your calf muscles moving to pump fluid out of the legs.  Elevate leg(s) above the level of the heart when sitting or as much as possible.  Take you diuretics as directed by your physician. Do not skip doses or change doses unless instructed to do so by your physician.  Decrease salt intake, follow recommended 2 grams daily.  Do not get leg(s) with compression wrap wet. If wraps are too tight as indicated by pain, numbness/tingling or discoloration of toes remove wrap completely and call the wound center @ [phone].       The treatment plan has been discussed at length between you and your provider. Follow all instructions carefully, it is very important. If you do not follow all instructions you are at risk of your wound not healing, infection, possible loss of limb and even loss of life.    COMPRESSION WRAP PATIENT CARE INSTRUCTIONS  DO NOT get compression wrap wet. When showering, use a shower boot.   Please contact wound clinic and if not able to reach, then go to emergency room if ANY of the following occur:   Tingling or numbness in the foot or toes on leg with compression wrap.  Severe pain that cannot be relieved with elevation and any medication instructed per your doctor.  A fever of 100.4°F (38°C) or higher.  Swelling, coldness, or blue-gray discoloration of the toes.  If the compression wrap feels too  tight or too loose.  If the compression wrap is damaged or has rough edges that hurt.  If the compression wrap gets wet, you must cut wrap off.   Drainage from compression wrap that smells different than usual.  MISCELLANEOUS INSTRUCTIONS  Supplement with a daily multivitamin   Low salt diet  Intense blood sugar control - Goal Blood sugar below 180 at all times recommended.  Increase protein intake / consider protein supplements - see below  Elevate extremities at all times when sitting / laying down.  No tobacco use     DIETARY MODIFICATIONS TO HELP WITH WOUND HEALING:          Please follow any restrictions to diet given by your other doctors     Protein: meats, beans, eggs, milk and yogurt particularly Greek yogurt), tofu, soy nuts, soy protein products     Vitamin C: citrus fruits and juices, strawberries, tomatoes, tomato juice, peppers, baked potatoes, spinach, broccoli, cauliflower, [place] sprouts, cabbage     Vitamin A: dark greens, leafy vegetables, orange or yellow vegetables, cantaloupe, fortified dairy products, liver, fortified cereals     Zinc: fortified cereals, red meats, seafood     Consider Bob by [employer] (These are essential branch chain amino acids that help with tissue building and wound healing) and take 2 packets/day. You can order online at [employer] or [employer]     ADDITIONAL REMINDERS:     The treatment plan has been discussed at length with you and your provider. Follow all instructions carefully, it is very important. If you do not follow all instructions, you are at risk of your wound not healing, infection, possible loss of limb and even loss of life.  Please call the clinic at [phone] during regular business hours ( 7:30 AM - 5:30 PM) if you notice increased redness, warmth, pain or pus like drainage or start running a fever greater than 100.3.    For after hour emergencies, please call your primary physician or go to the nearest emergency room.  If your blood pressure is  elevated, follow up with your primary care doctor and/or cardiologist as soon as possible.     FOR LEG SWELLING,  LOWER EXTREMITY WOUNDS AND IF USING COMPRESSION:   Managing your edema:    Avoid prolonged standing in one place. It is better to have your calf muscles moving to pump fluid out of the legs.  Elevate leg(s) above the level of the heart when sitting or as much as possible.  Take you diuretics as directed by your physician. Do not skip doses or change doses unless instructed to do so by your physician.  Decrease salt intake, follow recommended 2 grams daily.  Do not get leg(s) with compression wrap wet. If wraps are too tight as indicated by pain, numbness/tingling or discoloration of toes remove wrap completely and call the wound center @ [phone].       The treatment plan has been discussed at length between you and your provider. Follow all instructions carefully, it is very important. If you do not follow all instructions you are at risk of your wound not healing, infection, possible loss of limb and even loss of life.    COMPRESSION WRAP PATIENT CARE INSTRUCTIONS  DO NOT get compression wrap wet. When showering, use a shower boot.   Please contact wound clinic and if not able to reach, then go to emergency room if ANY of the following occur:   Tingling or numbness in the foot or toes on leg with compression wrap.  Severe pain that cannot be relieved with elevation and any medication instructed per your doctor.  A fever of 100.4°F (38°C) or higher.  Swelling, coldness, or blue-gray discoloration of the toes.  If the compression wrap feels too tight or too loose.  If the compression wrap is damaged or has rough edges that hurt.  If the compression wrap gets wet, you must cut wrap off.   Drainage from compression wrap that smells different than usual.                        [1]   Current Outpatient Medications:     furosemide 20 MG Oral Tab, Take 1 tablet (20 mg total) by mouth daily., Disp: 30  tablet, Rfl: 0    Multiple Vitamins-Minerals (MULTI-VITAMIN/MINERALS) Oral Tab, Take 1 tablet by mouth daily., Disp: , Rfl:     aspirin 81 MG Oral Tab, Take 1 tablet (81 mg total) by mouth daily., Disp: , Rfl:   [2] No Known Allergies

## 2025-04-10 NOTE — PROGRESS NOTES
Weekly Wound Education Note    Teaching Provided To: Patient  Training Topics: Dressing, Edema control, Cleasing and general instructions, Compression, Discharge instructions  Training Method: Demonstration, Explain/Verbal  Training Response: Reinforcement needed        Notes: Patient here for follow up wound care visit to left lateral leg. Edema measurement decreased, wound measurement increased. Provider applied silver nitrate to wound , recommending to conitnue with betamethasone,zinc, hydrofera ready, conforming gauze, tape. Applied coflex 2 layer wrap to LLE and spandagrip (F) to RLE.  Pt to follow up with provider in 1 week.

## 2025-04-10 NOTE — PATIENT INSTRUCTIONS
PATIENT INSTRUCTIONS      FOR Luis M Lomasd ,  3/25/1965    DATE OF SERVICE: 4/10/2025    Hydrofera Blue transfer  ABD pad     Coflex 2 layer compression wrap    Follow up with Dr. Gould in 1 week      DO NOT GET THE WRAP WET       Managing your edema:    Avoid prolonged standing in one place. It is better to have your calf muscles moving to pump fluid out of the legs.  Elevate leg(s) above the level of the heart when sitting or as much as possible.  Take you diuretics as directed by your physician. Do not skip doses or change doses unless instructed to do so by your physician.  Decrease salt intake, follow recommended 2 grams daily.  Do not get leg(s) with compression wrap wet. If wraps are too tight as indicated by pain, numbness/tingling or discoloration of toes remove wrap completely and call the wound center @ [phone].       The treatment plan has been discussed at length between you and your provider. Follow all instructions carefully, it is very important. If you do not follow all instructions you are at risk of your wound not healing, infection, possible loss of limb and even loss of life.    COMPRESSION WRAP PATIENT CARE INSTRUCTIONS  DO NOT get compression wrap wet. When showering, use a shower boot.   Please contact wound clinic and if not able to reach, then go to emergency room if ANY of the following occur:   Tingling or numbness in the foot or toes on leg with compression wrap.  Severe pain that cannot be relieved with elevation and any medication instructed per your doctor.  A fever of 100.4°F (38°C) or higher.  Swelling, coldness, or blue-gray discoloration of the toes.  If the compression wrap feels too tight or too loose.  If the compression wrap is damaged or has rough edges that hurt.  If the compression wrap gets wet, you must cut wrap off.   Drainage from compression wrap that smells different than usual.

## 2025-04-17 ENCOUNTER — OFFICE VISIT (OUTPATIENT)
Dept: WOUND CARE | Facility: HOSPITAL | Age: 60
End: 2025-04-17
Attending: FAMILY MEDICINE
Payer: COMMERCIAL

## 2025-04-17 ENCOUNTER — APPOINTMENT (OUTPATIENT)
Dept: WOUND CARE | Facility: HOSPITAL | Age: 60
End: 2025-04-17
Payer: COMMERCIAL

## 2025-04-17 VITALS
HEART RATE: 74 BPM | DIASTOLIC BLOOD PRESSURE: 81 MMHG | RESPIRATION RATE: 16 BRPM | SYSTOLIC BLOOD PRESSURE: 138 MMHG | TEMPERATURE: 99 F

## 2025-04-17 DIAGNOSIS — I87.332 CHRONIC VENOUS HYPERTENSION (IDIOPATHIC) WITH ULCER AND INFLAMMATION OF LEFT LOWER EXTREMITY (HCC): Primary | ICD-10-CM

## 2025-04-17 DIAGNOSIS — E66.813 CLASS 3 SEVERE OBESITY DUE TO EXCESS CALORIES WITH SERIOUS COMORBIDITY AND BODY MASS INDEX (BMI) OF 50.0 TO 59.9 IN ADULT (HCC): ICD-10-CM

## 2025-04-17 DIAGNOSIS — E66.01 CLASS 3 SEVERE OBESITY DUE TO EXCESS CALORIES WITH SERIOUS COMORBIDITY AND BODY MASS INDEX (BMI) OF 50.0 TO 59.9 IN ADULT (HCC): ICD-10-CM

## 2025-04-17 PROCEDURE — 99215 OFFICE O/P EST HI 40 MIN: CPT | Performed by: FAMILY MEDICINE

## 2025-04-17 NOTE — PROGRESS NOTES
Weekly Wound Education Note    Teaching Provided To: Patient  Training Topics: Dressing, Cleasing and general instructions, Discharge instructions, Off-loading  Training Method: Explain/Verbal, Demonstration  Training Response: Reinforcement needed        Notes: Patient here for follow up wound care visit to left lateral leg wound. Edema measurement increased, wound measurement increased. Provider stimulated wound at visit today and applied silver nitrate. Treatment changed to betamethasone, zinc to periwound, silver alginate, ABD pad, conforming gauze, tape. Applied coflex 2 layer wrap to LLE. Pt to follow up in 1 week for nurse visit and 2 weeks provider visit.

## 2025-04-17 NOTE — PROGRESS NOTES
Chief Complaint   Patient presents with    Wound Care     Arrives for follow up. Denies new concerns. Coflex compression wrap to left leg and Tubigrip to right leg.       HPI:     Patient here for follow-up of left lateral leg wounds.  Tolerating compression wrap and has no new complaints.    Lab Results   Component Value Date    BUN 15 01/02/2025    CREATSERUM 0.82 01/02/2025    ALB 3.4 01/01/2025    TP 6.8 01/01/2025    A1C 5.2 12/12/2016       Medications - Current[1]    Allergies[2]         REVIEW OF SYSTEMS:     Review of Systems (ROS)  This information was obtained from the patient, chart    A comprehensive 10 point review of systems was completed.  Pertinent positives and negatives noted in the the HPI.     Past medical, surgical, family and social history updated where appropriate.    PHYSICAL EXAM:   /81   Pulse 74   Temp 98.8 °F (37.1 °C)   Resp 16    Estimated body mass index is 51.69 kg/m² as calculated from the following:    Height as of 1/9/25: 67\".    Weight as of 1/9/25: 330 lb (149.7 kg).   Vital signs reviewed.Appears stated age, well groomed.        Calf  Point of Measurement - Left Calf: 30     Left Calf from:: Heel  Calf Left cm:: 42          Ankle  Point of Measurement - Left Ankle: 10     Left Ankle from:: Heel  Left Ankle cm:: 29.4                Foot                               Wound 01/11/24 #1 Leg Left;Lateral (Active)   Date First Assessed/Time First Assessed: 01/11/24 1406    Wound Number (Wound Clinic Only): #1  Primary Wound Type: Traumatic  Location: Leg  Wound Location Orientation: Left;Lateral      Assessments 1/11/2024  2:10 PM 4/17/2025  9:29 AM   Wound Image       Drainage Amount Large --   Drainage Description Yellow;Serous --   Treatments Compression (coflex 2 layer wrap) --   Wound Length (cm) 10.6 cm --   Wound Width (cm) 9.5 cm --   Wound Surface Area (cm^2) 100.7 cm^2 --   Wound Depth (cm) 0.2 cm --   Wound Volume (cm^3) 20.14 cm^3 --   Margins Well-defined edges  --   Non-staged Wound Description Full thickness --   Leslie-wound Assessment Edema;Hemosiderin staining --   Wound Granulation Tissue Firm;Pink --   Wound Bed Granulation (%) 40 % --   Wound Bed Slough (%) 60 % --   Wound Odor None --   Tunneling? No --   Undermining? No --   Sinus Tracts? No --       Inactive Orders   Date Order Priority Status Authorizing Provider   01/16/25 0857 Debridement Traumatic Left;Lateral Leg Routine Completed Nicholas Gould MD   01/09/25 0935 Debridement Traumatic Left;Lateral Leg Routine Completed Nicholas Gould MD   01/02/25 1505 Wound care Routine Discontinued Braxton Ledbetter MD   01/01/25 0720 Consult to Wound Ostomy Routine Completed Dexter Dick MD     - Reason for Consult:    Wound Care     - Wound Care Reason for Consult:    wounds   11/21/24 0914 Debridement Traumatic Left;Lateral Leg Routine Completed Nicholas Gould MD   07/16/24 2327 Consult to Wound Ostomy Routine Completed Rica Pearson MD     - Reason for Consult:    Wound Care     - Wound Care Reason for Consult:    worsening   06/24/24 1127 Wound care Routine Discontinued Rica Pearson MD   06/06/24 0913 Debridement Traumatic Routine Completed Nicholas Gould MD   05/16/24 0901 Debridement Traumatic Routine Completed Nicholas Gould MD   05/02/24 1520 Debridement Traumatic Routine Completed Nicholas Gould MD   04/11/24 0959 Debridement Traumatic Routine Completed Nicholas Gould MD   04/04/24 0915 Debridement Traumatic Left;Lateral Leg Routine Completed Nicholas Gould MD   02/15/24 1154 Debridement Traumatic Left;Lateral Leg Routine Completed Nicholas Gould MD   01/18/24 1511 Debridement Traumatic Left;Lateral Leg Routine Completed Nicholas Gould MD   01/11/24 1710 Debridement Traumatic Left;Lateral Leg Routine Completed Nicholas Gould MD       Compression Wrap 08/22/24 Leg Anterior;Left (Active)   Placement Date: 08/22/24   Location: Leg  Wound Location Orientation: Anterior;Left      Assessments  8/22/2024  9:41 AM 4/17/2025  9:03 AM   Response to Treatment Well tolerated Well tolerated   Compression Layers Multilayer Multilayer   Compression Product Type Coflex Coflex   Dressing Applied Yes Yes (betamethasone, zinc, silver alginate, ABd pad, conforming gauze, tape)   Compression Wrap Location Toes to Knee Toes to Knee   Compression Wrap Status Clean;Dry;Intact Clean;Dry;Intact       Inactive Orders   Date Order Priority Status Authorizing Provider   01/01/25 1007 Consult to Wound Ostomy Routine Completed Braxton Ledbetter MD     - Reason for Consult:    Wound Care     - Wound Care Reason for Consult:    L leg wound   01/01/25 0720 Consult to Wound Ostomy Routine Completed Dexter Dick MD     - Reason for Consult:    Wound Care     - Wound Care Reason for Consult:    wounds              ASSESSMENT AND PLAN:      1. Chronic venous hypertension (idiopathic) with ulcer and inflammation of left lower extremity (HCC)    2. Class 3 severe obesity due to excess calories with serious comorbidity and body mass index (BMI) of 50.0 to 59.9 in adult (HCC)    There is a new wound adjacent to the original wound which is smaller.  It appears to be at the edge of the dressing where there may have been a blister.  It is well granulated however.  Silver nitrate applied to both wounds.  I will have him switch to silver alginate and ABD pad and continue Coflex 30 to 40 mmHg compression wrap to left lower extremity.  Patient to follow-up with me in 2 weeks and see a nurse in 1 week.  Risks, benefits, and alternatives of current treatment plan discussed in detail.  Questions and concerns addressed. Red flags to RTC or ED reviewed.  Patient (or parent) agrees to plan.      Return in about 1 week (around 4/24/2025).    Also refer to patient instructions.     I spent a total of 40 minutes with this patient's care.  This time included preparing to see the patient (eg, review notes and recent diagnostics), seeing the patient,  performing a medically appropriate examination and/or evaluation, counseling and educating the patient, and documenting in the record.          Nicholas Gould M.D.   [facility]   2025    Patient Instructions       PATIENT INSTRUCTIONS      FOR Luis M Estrada RICK 3/25/1965    DATE OF SERVICE: 2025    Silver alginate  ABD pad   Coflex 2 layer compression wrap    Nurse visit in 1 week    Follow up with Dr. Gould in 2 weeks      DO NOT GET THE WRAP WET       Managing your edema:    Avoid prolonged standing in one place. It is better to have your calf muscles moving to pump fluid out of the legs.  Elevate leg(s) above the level of the heart when sitting or as much as possible.  Take you diuretics as directed by your physician. Do not skip doses or change doses unless instructed to do so by your physician.  Decrease salt intake, follow recommended 2 grams daily.  Do not get leg(s) with compression wrap wet. If wraps are too tight as indicated by pain, numbness/tingling or discoloration of toes remove wrap completely and call the wound center @ [phone].       The treatment plan has been discussed at length between you and your provider. Follow all instructions carefully, it is very important. If you do not follow all instructions you are at risk of your wound not healing, infection, possible loss of limb and even loss of life.    COMPRESSION WRAP PATIENT CARE INSTRUCTIONS  DO NOT get compression wrap wet. When showering, use a shower boot.   Please contact wound clinic and if not able to reach, then go to emergency room if ANY of the following occur:   Tingling or numbness in the foot or toes on leg with compression wrap.  Severe pain that cannot be relieved with elevation and any medication instructed per your doctor.  A fever of 100.4°F (38°C) or higher.  Swelling, coldness, or blue-gray discoloration of the toes.  If the compression wrap feels too tight or too loose.  If  the compression wrap is damaged or has rough edges that hurt.  If the compression wrap gets wet, you must cut wrap off.   Drainage from compression wrap that smells different than usual.  MISCELLANEOUS INSTRUCTIONS  Supplement with a daily multivitamin   Low salt diet  Intense blood sugar control - Goal Blood sugar below 180 at all times recommended.  Increase protein intake / consider protein supplements - see below  Elevate extremities at all times when sitting / laying down.  No tobacco use     DIETARY MODIFICATIONS TO HELP WITH WOUND HEALING:          Please follow any restrictions to diet given by your other doctors     Protein: meats, beans, eggs, milk and yogurt particularly Greek yogurt), tofu, soy nuts, soy protein products     Vitamin C: citrus fruits and juices, strawberries, tomatoes, tomato juice, peppers, baked potatoes, spinach, broccoli, cauliflower, [place] sprouts, cabbage     Vitamin A: dark greens, leafy vegetables, orange or yellow vegetables, cantaloupe, fortified dairy products, liver, fortified cereals     Zinc: fortified cereals, red meats, seafood     Consider Bob by [employer] (These are essential branch chain amino acids that help with tissue building and wound healing) and take 2 packets/day. You can order online at [employer] or [employer]     ADDITIONAL REMINDERS:     The treatment plan has been discussed at length with you and your provider. Follow all instructions carefully, it is very important. If you do not follow all instructions, you are at risk of your wound not healing, infection, possible loss of limb and even loss of life.  Please call the clinic at [phone] during regular business hours ( 7:30 AM - 5:30 PM) if you notice increased redness, warmth, pain or pus like drainage or start running a fever greater than 100.3.    For after hour emergencies, please call your primary physician or go to the nearest emergency room.  If your blood pressure is elevated, follow up with your  primary care doctor and/or cardiologist as soon as possible.     FOR LEG SWELLING,  LOWER EXTREMITY WOUNDS AND IF USING COMPRESSION:   Managing your edema:    Avoid prolonged standing in one place. It is better to have your calf muscles moving to pump fluid out of the legs.  Elevate leg(s) above the level of the heart when sitting or as much as possible.  Take you diuretics as directed by your physician. Do not skip doses or change doses unless instructed to do so by your physician.  Decrease salt intake, follow recommended 2 grams daily.  Do not get leg(s) with compression wrap wet. If wraps are too tight as indicated by pain, numbness/tingling or discoloration of toes remove wrap completely and call the wound center @ [phone].       The treatment plan has been discussed at length between you and your provider. Follow all instructions carefully, it is very important. If you do not follow all instructions you are at risk of your wound not healing, infection, possible loss of limb and even loss of life.    COMPRESSION WRAP PATIENT CARE INSTRUCTIONS  DO NOT get compression wrap wet. When showering, use a shower boot.   Please contact wound clinic and if not able to reach, then go to emergency room if ANY of the following occur:   Tingling or numbness in the foot or toes on leg with compression wrap.  Severe pain that cannot be relieved with elevation and any medication instructed per your doctor.  A fever of 100.4°F (38°C) or higher.  Swelling, coldness, or blue-gray discoloration of the toes.  If the compression wrap feels too tight or too loose.  If the compression wrap is damaged or has rough edges that hurt.  If the compression wrap gets wet, you must cut wrap off.   Drainage from compression wrap that smells different than usual.                        [1]   Current Outpatient Medications:     furosemide 20 MG Oral Tab, Take 1 tablet (20 mg total) by mouth daily., Disp: 30 tablet, Rfl: 0    Multiple  Vitamins-Minerals (MULTI-VITAMIN/MINERALS) Oral Tab, Take 1 tablet by mouth daily., Disp: , Rfl:     aspirin 81 MG Oral Tab, Take 1 tablet (81 mg total) by mouth daily., Disp: , Rfl:   [2] No Known Allergies

## 2025-04-17 NOTE — PATIENT INSTRUCTIONS
PATIENT INSTRUCTIONS      FOR Luis M RODRIGUEZ Natalie ,  3/25/1965    DATE OF SERVICE: 2025    Silver alginate  ABD pad   Coflex 2 layer compression wrap    Nurse visit in 1 week    Follow up with Dr. Gould in 2 weeks      DO NOT GET THE WRAP WET       Managing your edema:    Avoid prolonged standing in one place. It is better to have your calf muscles moving to pump fluid out of the legs.  Elevate leg(s) above the level of the heart when sitting or as much as possible.  Take you diuretics as directed by your physician. Do not skip doses or change doses unless instructed to do so by your physician.  Decrease salt intake, follow recommended 2 grams daily.  Do not get leg(s) with compression wrap wet. If wraps are too tight as indicated by pain, numbness/tingling or discoloration of toes remove wrap completely and call the wound center @ [phone].       The treatment plan has been discussed at length between you and your provider. Follow all instructions carefully, it is very important. If you do not follow all instructions you are at risk of your wound not healing, infection, possible loss of limb and even loss of life.    COMPRESSION WRAP PATIENT CARE INSTRUCTIONS  DO NOT get compression wrap wet. When showering, use a shower boot.   Please contact wound clinic and if not able to reach, then go to emergency room if ANY of the following occur:   Tingling or numbness in the foot or toes on leg with compression wrap.  Severe pain that cannot be relieved with elevation and any medication instructed per your doctor.  A fever of 100.4°F (38°C) or higher.  Swelling, coldness, or blue-gray discoloration of the toes.  If the compression wrap feels too tight or too loose.  If the compression wrap is damaged or has rough edges that hurt.  If the compression wrap gets wet, you must cut wrap off.   Drainage from compression wrap that smells different than usual.

## 2025-04-24 ENCOUNTER — APPOINTMENT (OUTPATIENT)
Dept: WOUND CARE | Facility: HOSPITAL | Age: 60
End: 2025-04-24
Payer: COMMERCIAL

## 2025-04-24 ENCOUNTER — OFFICE VISIT (OUTPATIENT)
Dept: WOUND CARE | Facility: HOSPITAL | Age: 60
End: 2025-04-24
Attending: FAMILY MEDICINE
Payer: COMMERCIAL

## 2025-04-24 VITALS
DIASTOLIC BLOOD PRESSURE: 89 MMHG | HEART RATE: 67 BPM | RESPIRATION RATE: 16 BRPM | SYSTOLIC BLOOD PRESSURE: 148 MMHG | TEMPERATURE: 98 F

## 2025-04-24 DIAGNOSIS — I87.332 CHRONIC VENOUS HYPERTENSION (IDIOPATHIC) WITH ULCER AND INFLAMMATION OF LEFT LOWER EXTREMITY (HCC): Primary | ICD-10-CM

## 2025-04-24 PROCEDURE — 29581 APPL MULTLAYER CMPRN SYS LEG: CPT

## 2025-04-24 NOTE — PROGRESS NOTES
Chief Complaint   Patient presents with    Wound Care     Patients is here for a nurse visit. Patients came in with his compression wrap and rolled a little on the foot area          Medications - Current[1]    Allergies[2]       HISTORY:     Past medical, surgical, family and social history updated where appropriate.    PHYSICAL EXAM:   /89   Pulse 67   Temp 98.3 °F (36.8 °C)   Resp 16        Vital signs reviewed.      Calf  Point of Measurement - Left Calf: 30  Point of Measurement - Right Calf: 30  Left Calf from:: Heel  Calf Left cm:: 39.6  Right Calf from:: Heel  Right Calf cm:: 47.1    Ankle  Point of Measurement - Left Ankle: 10  Point of Measurement - Right Ankle: 10  Left Ankle from:: Heel  Left Ankle cm:: 29.5     Right Ankle from:: Heel  Right Ankle cm:: 31.4       Wound 01/11/24 #1 Leg Left;Lateral (Active)   Date First Assessed/Time First Assessed: 01/11/24 1406    Wound Number (Wound Clinic Only): #1  Primary Wound Type: Traumatic  Location: Leg  Wound Location Orientation: Left;Lateral      Assessments 1/11/2024  2:10 PM 4/24/2025  8:47 AM   Wound Image       Drainage Amount Large Small   Drainage Description Yellow;Serous Serosanguineous   Treatments Compression Compression   Wound Length (cm) 10.6 cm 4.1 cm   Wound Width (cm) 9.5 cm 1.9 cm   Wound Surface Area (cm^2) 100.7 cm^2 6.12 cm^2   Wound Depth (cm) 0.2 cm 0.1 cm   Wound Volume (cm^3) 20.14 cm^3 0.408 cm^3   Wound Healing % -- 98   Margins Well-defined edges Well-defined edges   Non-staged Wound Description Full thickness Full thickness   Leslie-wound Assessment Edema;Hemosiderin staining Edema;Hemosiderin staining;Pink   Wound Granulation Tissue Firm;Pink --   Wound Bed Granulation (%) 40 % 10 %   Wound Bed Epithelium (%) -- 40 %   Wound Bed Slough (%) 60 % 50 %   Wound Odor None None   Shape -- clulstered   Tunneling? No --   Undermining? No --   Sinus Tracts? No --       Inactive Orders   Date Order Priority Status Authorizing  Provider   01/16/25 0857 Debridement Traumatic Left;Lateral Leg Routine Completed Nicholas Gould MD   01/09/25 0935 Debridement Traumatic Left;Lateral Leg Routine Completed Nicholas Gould MD   01/02/25 1505 Wound care Routine Discontinued Braxton Ledbetter MD   01/01/25 0720 Consult to Wound Ostomy Routine Completed Dexter Dick MD     - Reason for Consult:    Wound Care     - Wound Care Reason for Consult:    wounds   11/21/24 0914 Debridement Traumatic Left;Lateral Leg Routine Completed Nicholas Gould MD   07/16/24 2327 Consult to Wound Ostomy Routine Completed Rica Pearson MD     - Reason for Consult:    Wound Care     - Wound Care Reason for Consult:    worsening   06/24/24 1127 Wound care Routine Discontinued Rica Pearson MD   06/06/24 0913 Debridement Traumatic Routine Completed Nicholas Gould MD   05/16/24 0901 Debridement Traumatic Routine Completed Nicholas Gould MD   05/02/24 1520 Debridement Traumatic Routine Completed Nicholas Gould MD   04/11/24 0959 Debridement Traumatic Routine Completed Nicholas Gould MD   04/04/24 0915 Debridement Traumatic Left;Lateral Leg Routine Completed Nicholas Gould MD   02/15/24 1154 Debridement Traumatic Left;Lateral Leg Routine Completed Nicholas Gould MD   01/18/24 1511 Debridement Traumatic Left;Lateral Leg Routine Completed Nicholas Gould MD   01/11/24 1710 Debridement Traumatic Left;Lateral Leg Routine Completed Nicholas Gould MD       Compression Wrap 08/22/24 Leg Anterior;Left (Active)   Placement Date: 08/22/24   Location: Leg  Wound Location Orientation: Anterior;Left      Assessments 8/22/2024  9:41 AM 4/24/2025  8:47 AM   Response to Treatment Well tolerated Well tolerated   Compression Layers Multilayer Multilayer   Compression Product Type Coflex Coflex   Dressing Applied Yes Yes   Compression Wrap Location Toes to Knee Toes to Knee   Compression Wrap Status Clean;Dry;Intact Clean;Dry;Intact       Inactive Orders   Date Order Priority  Status Authorizing Provider   01/01/25 1007 Consult to Wound Ostomy Routine Completed Braxton Ledbetter MD     - Reason for Consult:    Wound Care     - Wound Care Reason for Consult:    L leg wound   01/01/25 0720 Consult to Wound Ostomy Routine Completed Dexter Dick MD     - Reason for Consult:    Wound Care     - Wound Care Reason for Consult:    wounds          ASSESSMENT AND PLAN:        Risks, benefits, and alternatives of current treatment plan discussed in detail.  Questions and concerns addressed. Red flags to RTC or ED reviewed.  Patient (or parent) agrees to plan.      Return in about 1 week (around 5/1/2025).  Weekly Wound Education Note    Teaching Provided To: Patient  Training Topics: Dressing, Edema control, Cleasing and general instructions, Compression, Discharge instructions  Training Method: Demonstration, Explain/Verbal  Training Response: Reinforcement needed        Notes: Patient here for nurse visit to left lateral leg. Wrap rolled a bit by patients foot. Edema measurement decreased, wound measurement decreased. Continued with betamethasone to periwound, silver alginate to wound bed covered with ABD pad, conforming gauze, tape. Applied coflex 2 layer wrap to LLE and spandagrip (f) to RLE. Pt to follow up with provider in 1 week.          Shawn HOWE RN   4/24/2025  8:54 AM              [1]   Current Outpatient Medications:     furosemide 20 MG Oral Tab, Take 1 tablet (20 mg total) by mouth daily., Disp: 30 tablet, Rfl: 0    Multiple Vitamins-Minerals (MULTI-VITAMIN/MINERALS) Oral Tab, Take 1 tablet by mouth daily., Disp: , Rfl:     aspirin 81 MG Oral Tab, Take 1 tablet (81 mg total) by mouth daily., Disp: , Rfl:   [2] No Known Allergies

## 2025-05-01 ENCOUNTER — OFFICE VISIT (OUTPATIENT)
Dept: WOUND CARE | Facility: HOSPITAL | Age: 60
End: 2025-05-01
Attending: FAMILY MEDICINE
Payer: OTHER MISCELLANEOUS

## 2025-05-01 VITALS
DIASTOLIC BLOOD PRESSURE: 84 MMHG | RESPIRATION RATE: 16 BRPM | TEMPERATURE: 98 F | SYSTOLIC BLOOD PRESSURE: 137 MMHG | HEART RATE: 66 BPM

## 2025-05-01 DIAGNOSIS — E66.813 CLASS 3 SEVERE OBESITY DUE TO EXCESS CALORIES WITH SERIOUS COMORBIDITY AND BODY MASS INDEX (BMI) OF 50.0 TO 59.9 IN ADULT: ICD-10-CM

## 2025-05-01 DIAGNOSIS — I87.332 CHRONIC VENOUS HYPERTENSION (IDIOPATHIC) WITH ULCER AND INFLAMMATION OF LEFT LOWER EXTREMITY (HCC): Primary | ICD-10-CM

## 2025-05-01 PROCEDURE — 99215 OFFICE O/P EST HI 40 MIN: CPT | Performed by: FAMILY MEDICINE

## 2025-05-01 NOTE — PATIENT INSTRUCTIONS
PATIENT INSTRUCTIONS      FOR Luis M Lomasd ,  3/25/1965    DATE OF SERVICE: 2025    Shruti collagen  Hydrofera Blue transfer  ABD pad   Coflex 2 layer compression wrap    Follow up with Dr. Gould in 1 week      DO NOT GET THE WRAP WET       Managing your edema:    Avoid prolonged standing in one place. It is better to have your calf muscles moving to pump fluid out of the legs.  Elevate leg(s) above the level of the heart when sitting or as much as possible.  Take you diuretics as directed by your physician. Do not skip doses or change doses unless instructed to do so by your physician.  Decrease salt intake, follow recommended 2 grams daily.  Do not get leg(s) with compression wrap wet. If wraps are too tight as indicated by pain, numbness/tingling or discoloration of toes remove wrap completely and call the wound center @ [phone].       The treatment plan has been discussed at length between you and your provider. Follow all instructions carefully, it is very important. If you do not follow all instructions you are at risk of your wound not healing, infection, possible loss of limb and even loss of life.    COMPRESSION WRAP PATIENT CARE INSTRUCTIONS  DO NOT get compression wrap wet. When showering, use a shower boot.   Please contact wound clinic and if not able to reach, then go to emergency room if ANY of the following occur:   Tingling or numbness in the foot or toes on leg with compression wrap.  Severe pain that cannot be relieved with elevation and any medication instructed per your doctor.  A fever of 100.4°F (38°C) or higher.  Swelling, coldness, or blue-gray discoloration of the toes.  If the compression wrap feels too tight or too loose.  If the compression wrap is damaged or has rough edges that hurt.  If the compression wrap gets wet, you must cut wrap off.   Drainage from compression wrap that smells different than usual.

## 2025-05-01 NOTE — PROGRESS NOTES
Chief Complaint   Patient presents with    Wound Care     Patient is here for a wound care follow up. He denies any pain or new wound concerns.       HPI:     Patient here for follow-up of left lateral leg wound.  Tolerating compression wrap.  He has no new complaints.    Lab Results   Component Value Date    BUN 15 01/02/2025    CREATSERUM 0.82 01/02/2025    ALB 3.4 01/01/2025    TP 6.8 01/01/2025    A1C 5.2 12/12/2016       Medications - Current[1]    Allergies[2]         REVIEW OF SYSTEMS:     Review of Systems (ROS)  This information was obtained from the patient, chart    A comprehensive 10 point review of systems was completed.  Pertinent positives and negatives noted in the the HPI.     Past medical, surgical, family and social history updated where appropriate.    PHYSICAL EXAM:   /84   Pulse 66   Temp 98.1 °F (36.7 °C)   Resp 16    Estimated body mass index is 51.69 kg/m² as calculated from the following:    Height as of 1/9/25: 67\".    Weight as of 1/9/25: 330 lb (149.7 kg).   Vital signs reviewed.Appears stated age, well groomed.        Calf  Point of Measurement - Left Calf: 30     Left Calf from:: Heel  Calf Left cm:: 37.8          Ankle  Point of Measurement - Left Ankle: 10     Left Ankle from:: Heel  Left Ankle cm:: 29                Foot                               Wound 01/11/24 #1 Leg Left;Lateral (Active)   Date First Assessed/Time First Assessed: 01/11/24 1406    Wound Number (Wound Clinic Only): #1  Primary Wound Type: Traumatic  Location: Leg  Wound Location Orientation: Left;Lateral      Assessments 1/11/2024  2:10 PM 5/1/2025  8:27 AM   Wound Image       Drainage Amount Large --   Drainage Description Yellow;Serous --   Treatments Compression (coflex 2 layer wrap) --   Wound Length (cm) 10.6 cm --   Wound Width (cm) 9.5 cm --   Wound Surface Area (cm^2) 100.7 cm^2 --   Wound Depth (cm) 0.2 cm --   Wound Volume (cm^3) 20.14 cm^3 --   Margins Well-defined edges --   Non-staged Wound  Description Full thickness --   Leslie-wound Assessment Edema;Hemosiderin staining --   Wound Granulation Tissue Firm;Pink --   Wound Bed Granulation (%) 40 % --   Wound Bed Slough (%) 60 % --   Wound Odor None --   Tunneling? No --   Undermining? No --   Sinus Tracts? No --       Inactive Orders   Date Order Priority Status Authorizing Provider   01/16/25 0857 Debridement Traumatic Left;Lateral Leg Routine Completed Nicholas Gould MD   01/09/25 0935 Debridement Traumatic Left;Lateral Leg Routine Completed Nicholas Gould MD   01/02/25 1505 Wound care Routine Discontinued Braxton Ledbetter MD   01/01/25 0720 Consult to Wound Ostomy Routine Completed Dexter Dick MD     - Reason for Consult:    Wound Care     - Wound Care Reason for Consult:    wounds   11/21/24 0914 Debridement Traumatic Left;Lateral Leg Routine Completed Nicholas Gould MD   07/16/24 2327 Consult to Wound Ostomy Routine Completed Rica Pearson MD     - Reason for Consult:    Wound Care     - Wound Care Reason for Consult:    worsening   06/24/24 1127 Wound care Routine Discontinued Rica Pearson MD   06/06/24 0913 Debridement Traumatic Routine Completed Nicholas Gould MD   05/16/24 0901 Debridement Traumatic Routine Completed Nicholas Gould MD   05/02/24 1520 Debridement Traumatic Routine Completed Nicholas Gould MD   04/11/24 0959 Debridement Traumatic Routine Completed Nicholas Gould MD   04/04/24 0915 Debridement Traumatic Left;Lateral Leg Routine Completed Nicholas Gould MD   02/15/24 1154 Debridement Traumatic Left;Lateral Leg Routine Completed Nicholas Gould MD   01/18/24 1511 Debridement Traumatic Left;Lateral Leg Routine Completed Nicholas Gould MD   01/11/24 1710 Debridement Traumatic Left;Lateral Leg Routine Completed Nicholas Gould MD       Compression Wrap 08/22/24 Leg Anterior;Left (Active)   Placement Date: 08/22/24   Location: Leg  Wound Location Orientation: Anterior;Left      Assessments 8/22/2024  9:41 AM  5/1/2025  8:12 AM   Response to Treatment Well tolerated Well tolerated   Compression Layers Multilayer Multilayer   Compression Product Type Coflex Coflex   Dressing Applied Yes Yes (mirna, hydrofera transfer, ABD pad, conforming gauze, tape)   Compression Wrap Location Toes to Knee Toes to Knee   Compression Wrap Status Clean;Dry;Intact Clean;Dry;Intact       Inactive Orders   Date Order Priority Status Authorizing Provider   01/01/25 1007 Consult to Wound Ostomy Routine Completed Braxton Ledbetter MD     - Reason for Consult:    Wound Care     - Wound Care Reason for Consult:    L leg wound   01/01/25 0720 Consult to Wound Ostomy Routine Completed Dexter Dick MD     - Reason for Consult:    Wound Care     - Wound Care Reason for Consult:    wounds              ASSESSMENT AND PLAN:      1. Chronic venous hypertension (idiopathic) with ulcer and inflammation of left lower extremity (HCC)    2. Class 3 severe obesity due to excess calories with serious comorbidity and body mass index (BMI) of 50.0 to 59.9 in adult    Clinically the wound is about the same.  It was stimulated with a curette there was some bleeding controlled with the gauze.  Will switch to Mirna collagen and Hydrofera Blue and ABD pad and continue with Coflex 2 layer compression wrap to left lower extremity.      Risks, benefits, and alternatives of current treatment plan discussed in detail.  Questions and concerns addressed. Red flags to RTC or ED reviewed.  Patient (or parent) agrees to plan.      Return in about 1 week (around 5/8/2025).    Also refer to patient instructions.     I spent a total of 40 minutes with this patient's care.  This time included preparing to see the patient (eg, review notes and recent diagnostics), seeing the patient, performing a medically appropriate examination and/or evaluation, counseling and educating the patient, and documenting in the record.          Nicholas Gould M.D.   [facility]    2025    Patient Instructions       PATIENT INSTRUCTIONS      FOR Luis M Estrada , RICK 3/25/1965    DATE OF SERVICE: 2025    Shruti collagen  Hydrofera Blue transfer  ABD pad   Coflex 2 layer compression wrap    Follow up with Dr. Gould in 1 week      DO NOT GET THE WRAP WET       Managing your edema:    Avoid prolonged standing in one place. It is better to have your calf muscles moving to pump fluid out of the legs.  Elevate leg(s) above the level of the heart when sitting or as much as possible.  Take you diuretics as directed by your physician. Do not skip doses or change doses unless instructed to do so by your physician.  Decrease salt intake, follow recommended 2 grams daily.  Do not get leg(s) with compression wrap wet. If wraps are too tight as indicated by pain, numbness/tingling or discoloration of toes remove wrap completely and call the wound center @ [phone].       The treatment plan has been discussed at length between you and your provider. Follow all instructions carefully, it is very important. If you do not follow all instructions you are at risk of your wound not healing, infection, possible loss of limb and even loss of life.    COMPRESSION WRAP PATIENT CARE INSTRUCTIONS  DO NOT get compression wrap wet. When showering, use a shower boot.   Please contact wound clinic and if not able to reach, then go to emergency room if ANY of the following occur:   Tingling or numbness in the foot or toes on leg with compression wrap.  Severe pain that cannot be relieved with elevation and any medication instructed per your doctor.  A fever of 100.4°F (38°C) or higher.  Swelling, coldness, or blue-gray discoloration of the toes.  If the compression wrap feels too tight or too loose.  If the compression wrap is damaged or has rough edges that hurt.  If the compression wrap gets wet, you must cut wrap off.   Drainage from compression wrap that smells different than usual.  MISCELLANEOUS  INSTRUCTIONS  Supplement with a daily multivitamin   Low salt diet  Intense blood sugar control - Goal Blood sugar below 180 at all times recommended.  Increase protein intake / consider protein supplements - see below  Elevate extremities at all times when sitting / laying down.  No tobacco use     DIETARY MODIFICATIONS TO HELP WITH WOUND HEALING:          Please follow any restrictions to diet given by your other doctors     Protein: meats, beans, eggs, milk and yogurt particularly Greek yogurt), tofu, soy nuts, soy protein products     Vitamin C: citrus fruits and juices, strawberries, tomatoes, tomato juice, peppers, baked potatoes, spinach, broccoli, cauliflower, [place] sprouts, cabbage     Vitamin A: dark greens, leafy vegetables, orange or yellow vegetables, cantaloupe, fortified dairy products, liver, fortified cereals     Zinc: fortified cereals, red meats, seafood     Consider Bob by [employer] (These are essential branch chain amino acids that help with tissue building and wound healing) and take 2 packets/day. You can order online at [employer] or [employer]     ADDITIONAL REMINDERS:     The treatment plan has been discussed at length with you and your provider. Follow all instructions carefully, it is very important. If you do not follow all instructions, you are at risk of your wound not healing, infection, possible loss of limb and even loss of life.  Please call the clinic at [phone] during regular business hours ( 7:30 AM - 5:30 PM) if you notice increased redness, warmth, pain or pus like drainage or start running a fever greater than 100.3.    For after hour emergencies, please call your primary physician or go to the nearest emergency room.  If your blood pressure is elevated, follow up with your primary care doctor and/or cardiologist as soon as possible.     FOR LEG SWELLING,  LOWER EXTREMITY WOUNDS AND IF USING COMPRESSION:   Managing your edema:    Avoid prolonged standing in one place. It  is better to have your calf muscles moving to pump fluid out of the legs.  Elevate leg(s) above the level of the heart when sitting or as much as possible.  Take you diuretics as directed by your physician. Do not skip doses or change doses unless instructed to do so by your physician.  Decrease salt intake, follow recommended 2 grams daily.  Do not get leg(s) with compression wrap wet. If wraps are too tight as indicated by pain, numbness/tingling or discoloration of toes remove wrap completely and call the wound center @ [phone].       The treatment plan has been discussed at length between you and your provider. Follow all instructions carefully, it is very important. If you do not follow all instructions you are at risk of your wound not healing, infection, possible loss of limb and even loss of life.    COMPRESSION WRAP PATIENT CARE INSTRUCTIONS  DO NOT get compression wrap wet. When showering, use a shower boot.   Please contact wound clinic and if not able to reach, then go to emergency room if ANY of the following occur:   Tingling or numbness in the foot or toes on leg with compression wrap.  Severe pain that cannot be relieved with elevation and any medication instructed per your doctor.  A fever of 100.4°F (38°C) or higher.  Swelling, coldness, or blue-gray discoloration of the toes.  If the compression wrap feels too tight or too loose.  If the compression wrap is damaged or has rough edges that hurt.  If the compression wrap gets wet, you must cut wrap off.   Drainage from compression wrap that smells different than usual.                        [1]   Current Outpatient Medications:     furosemide 20 MG Oral Tab, Take 1 tablet (20 mg total) by mouth daily., Disp: 30 tablet, Rfl: 0    Multiple Vitamins-Minerals (MULTI-VITAMIN/MINERALS) Oral Tab, Take 1 tablet by mouth daily., Disp: , Rfl:     aspirin 81 MG Oral Tab, Take 1 tablet (81 mg total) by mouth daily., Disp: , Rfl:   [2] No Known  Allergies

## 2025-05-01 NOTE — PROGRESS NOTES
Weekly Wound Education Note    Teaching Provided To: Patient  Training Topics: Cleasing and general instructions, Discharge instructions, Dressing  Training Method: Explain/Verbal, Demonstration  Training Response: Reinforcement needed        Notes: Patient here for follow up wound care visit to left lateral leg. Edema measurement decreased, Wound measurement decreased. Provider stimulated wound at visit today, treatment changed to mirna, hydrofera transfer, ABD pad, conforming gauze, tape. Applied coflex 2 layer wrap to LLE. Patient to follow up with provider in 1 week.

## 2025-05-08 ENCOUNTER — APPOINTMENT (OUTPATIENT)
Dept: WOUND CARE | Facility: HOSPITAL | Age: 60
End: 2025-05-08
Attending: FAMILY MEDICINE
Payer: OTHER MISCELLANEOUS

## 2025-05-08 ENCOUNTER — TELEPHONE (OUTPATIENT)
Dept: WOUND CARE | Facility: HOSPITAL | Age: 60
End: 2025-05-08

## 2025-05-08 NOTE — TELEPHONE ENCOUNTER
RN called and spoke with patient, informed patient that he needs to remove his entire compression wrap, cleanse wounds and apply new dressing that he has at home. RN questioned if patient has dressing supplies at home, patient states he does. RN informed patient that staff would also recommend patient to apply compression garments that were ordered in the past to LLE to control swelling. Educated patient he can apply in morning and remove at night- if to difficult he can leave on for a couple days but after 2-3 days needs to recheck his legs to make sure no issues. Patient verbalized and repeated back what directions he needs to follow, states understanding. Patient to follow up with provider in 1 week for follow up visit.

## 2025-05-15 ENCOUNTER — APPOINTMENT (OUTPATIENT)
Dept: ULTRASOUND IMAGING | Facility: HOSPITAL | Age: 60
End: 2025-05-15
Attending: EMERGENCY MEDICINE
Payer: COMMERCIAL

## 2025-05-15 ENCOUNTER — HOSPITAL ENCOUNTER (INPATIENT)
Facility: HOSPITAL | Age: 60
LOS: 3 days | Discharge: HOME | End: 2025-05-18
Attending: EMERGENCY MEDICINE | Admitting: HOSPITALIST
Payer: COMMERCIAL

## 2025-05-15 ENCOUNTER — APPOINTMENT (OUTPATIENT)
Dept: GENERAL RADIOLOGY | Facility: HOSPITAL | Age: 60
End: 2025-05-15
Attending: EMERGENCY MEDICINE
Payer: COMMERCIAL

## 2025-05-15 ENCOUNTER — APPOINTMENT (OUTPATIENT)
Dept: WOUND CARE | Facility: HOSPITAL | Age: 60
End: 2025-05-15
Payer: OTHER MISCELLANEOUS

## 2025-05-15 ENCOUNTER — OFFICE VISIT (OUTPATIENT)
Dept: WOUND CARE | Facility: HOSPITAL | Age: 60
End: 2025-05-15
Attending: FAMILY MEDICINE
Payer: OTHER MISCELLANEOUS

## 2025-05-15 VITALS
TEMPERATURE: 98 F | SYSTOLIC BLOOD PRESSURE: 140 MMHG | RESPIRATION RATE: 16 BRPM | DIASTOLIC BLOOD PRESSURE: 84 MMHG | HEART RATE: 69 BPM

## 2025-05-15 DIAGNOSIS — I87.332 CHRONIC VENOUS HYPERTENSION (IDIOPATHIC) WITH ULCER AND INFLAMMATION OF LEFT LOWER EXTREMITY (HCC): Primary | ICD-10-CM

## 2025-05-15 DIAGNOSIS — E66.813 CLASS 3 SEVERE OBESITY DUE TO EXCESS CALORIES WITH SERIOUS COMORBIDITY AND BODY MASS INDEX (BMI) OF 50.0 TO 59.9 IN ADULT: ICD-10-CM

## 2025-05-15 DIAGNOSIS — R09.89 PULMONARY VENOUS CONGESTION: ICD-10-CM

## 2025-05-15 DIAGNOSIS — L03.116 CELLULITIS OF LEG, LEFT: ICD-10-CM

## 2025-05-15 DIAGNOSIS — E66.813 CLASS 3 SEVERE OBESITY DUE TO EXCESS CALORIES WITH SERIOUS COMORBIDITY AND BODY MASS INDEX (BMI) OF 45.0 TO 49.9 IN ADULT: ICD-10-CM

## 2025-05-15 DIAGNOSIS — L03.116 CELLULITIS OF LEFT LOWER EXTREMITY: Primary | ICD-10-CM

## 2025-05-15 PROBLEM — I87.8 CHRONIC VENOUS STASIS: Status: ACTIVE | Noted: 2025-05-15

## 2025-05-15 LAB
ALBUMIN SERPL-MCNC: 3.9 G/DL (ref 3.2–4.8)
ALBUMIN/GLOB SERPL: 1.4 {RATIO} (ref 1–2)
ALP LIVER SERPL-CCNC: 58 U/L (ref 45–117)
ALT SERPL-CCNC: 10 U/L (ref 10–49)
ANION GAP SERPL CALC-SCNC: 7 MMOL/L (ref 0–18)
AST SERPL-CCNC: 17 U/L (ref ?–34)
ATRIAL RATE: 69 BPM
BASOPHILS # BLD AUTO: 0.06 X10(3) UL (ref 0–0.2)
BASOPHILS NFR BLD AUTO: 1.4 %
BILIRUB SERPL-MCNC: 1.3 MG/DL (ref 0.2–1.1)
BUN BLD-MCNC: 15 MG/DL (ref 9–23)
CALCIUM BLD-MCNC: 8.6 MG/DL (ref 8.7–10.6)
CHLORIDE SERPL-SCNC: 107 MMOL/L (ref 98–112)
CO2 SERPL-SCNC: 26 MMOL/L (ref 21–32)
CREAT BLD-MCNC: 0.95 MG/DL (ref 0.7–1.3)
EGFRCR SERPLBLD CKD-EPI 2021: 92 ML/MIN/1.73M2 (ref 60–?)
EOSINOPHIL # BLD AUTO: 0.04 X10(3) UL (ref 0–0.7)
EOSINOPHIL NFR BLD AUTO: 0.9 %
ERYTHROCYTE [DISTWIDTH] IN BLOOD BY AUTOMATED COUNT: 12.6 %
EST. AVERAGE GLUCOSE BLD GHB EST-MCNC: 108 MG/DL (ref 68–126)
GLOBULIN PLAS-MCNC: 2.8 G/DL (ref 2–3.5)
GLUCOSE BLD-MCNC: 93 MG/DL (ref 70–99)
HBA1C MFR BLD: 5.4 % (ref ?–5.7)
HCT VFR BLD AUTO: 43.2 % (ref 39–53)
HGB BLD-MCNC: 15.2 G/DL (ref 13–17.5)
IMM GRANULOCYTES # BLD AUTO: 0.01 X10(3) UL (ref 0–1)
IMM GRANULOCYTES NFR BLD: 0.2 %
LYMPHOCYTES # BLD AUTO: 0.97 X10(3) UL (ref 1–4)
LYMPHOCYTES NFR BLD AUTO: 22 %
MCH RBC QN AUTO: 32.5 PG (ref 26–34)
MCHC RBC AUTO-ENTMCNC: 35.2 G/DL (ref 31–37)
MCV RBC AUTO: 92.5 FL (ref 80–100)
MONOCYTES # BLD AUTO: 0.34 X10(3) UL (ref 0.1–1)
MONOCYTES NFR BLD AUTO: 7.7 %
NEUTROPHILS # BLD AUTO: 2.99 X10 (3) UL (ref 1.5–7.7)
NEUTROPHILS # BLD AUTO: 2.99 X10(3) UL (ref 1.5–7.7)
NEUTROPHILS NFR BLD AUTO: 67.8 %
NT-PROBNP SERPL-MCNC: 234 PG/ML (ref ?–125)
OSMOLALITY SERPL CALC.SUM OF ELEC: 291 MOSM/KG (ref 275–295)
P AXIS: 44 DEGREES
P-R INTERVAL: 148 MS
PLATELET # BLD AUTO: 254 10(3)UL (ref 150–450)
POTASSIUM SERPL-SCNC: 4 MMOL/L (ref 3.5–5.1)
PROT SERPL-MCNC: 6.7 G/DL (ref 5.7–8.2)
Q-T INTERVAL: 398 MS
QRS DURATION: 80 MS
QTC CALCULATION (BEZET): 426 MS
R AXIS: 9 DEGREES
RBC # BLD AUTO: 4.67 X10(6)UL (ref 4.3–5.7)
SODIUM SERPL-SCNC: 140 MMOL/L (ref 136–145)
T AXIS: 20 DEGREES
TROPONIN I SERPL HS-MCNC: 3 NG/L (ref ?–53)
VENTRICULAR RATE: 69 BPM
WBC # BLD AUTO: 4.4 X10(3) UL (ref 4–11)

## 2025-05-15 PROCEDURE — 71045 X-RAY EXAM CHEST 1 VIEW: CPT | Performed by: EMERGENCY MEDICINE

## 2025-05-15 PROCEDURE — 93971 EXTREMITY STUDY: CPT | Performed by: EMERGENCY MEDICINE

## 2025-05-15 PROCEDURE — 99223 1ST HOSP IP/OBS HIGH 75: CPT | Performed by: STUDENT IN AN ORGANIZED HEALTH CARE EDUCATION/TRAINING PROGRAM

## 2025-05-15 PROCEDURE — 73590 X-RAY EXAM OF LOWER LEG: CPT | Performed by: EMERGENCY MEDICINE

## 2025-05-15 PROCEDURE — 99215 OFFICE O/P EST HI 40 MIN: CPT | Performed by: FAMILY MEDICINE

## 2025-05-15 RX ORDER — ONDANSETRON 2 MG/ML
4 INJECTION INTRAMUSCULAR; INTRAVENOUS EVERY 6 HOURS PRN
Status: DISCONTINUED | OUTPATIENT
Start: 2025-05-15 | End: 2025-05-18

## 2025-05-15 RX ORDER — PROCHLORPERAZINE EDISYLATE 5 MG/ML
5 INJECTION INTRAMUSCULAR; INTRAVENOUS EVERY 8 HOURS PRN
Status: DISCONTINUED | OUTPATIENT
Start: 2025-05-15 | End: 2025-05-18

## 2025-05-15 RX ORDER — VIT A/VIT C/VIT E/ZINC/COPPER 4296-226
1 CAPSULE ORAL EVERY MORNING
COMMUNITY

## 2025-05-15 RX ORDER — HYDROCODONE BITARTRATE AND ACETAMINOPHEN 5; 325 MG/1; MG/1
2 TABLET ORAL EVERY 4 HOURS PRN
Status: DISCONTINUED | OUTPATIENT
Start: 2025-05-15 | End: 2025-05-18

## 2025-05-15 RX ORDER — SENNOSIDES 8.6 MG
17.2 TABLET ORAL NIGHTLY PRN
Status: DISCONTINUED | OUTPATIENT
Start: 2025-05-15 | End: 2025-05-18

## 2025-05-15 RX ORDER — ENOXAPARIN SODIUM 100 MG/ML
0.5 INJECTION SUBCUTANEOUS EVERY 12 HOURS
Status: DISCONTINUED | OUTPATIENT
Start: 2025-05-15 | End: 2025-05-17

## 2025-05-15 RX ORDER — HYDROCODONE BITARTRATE AND ACETAMINOPHEN 5; 325 MG/1; MG/1
1 TABLET ORAL EVERY 4 HOURS PRN
Status: DISCONTINUED | OUTPATIENT
Start: 2025-05-15 | End: 2025-05-18

## 2025-05-15 RX ORDER — POLYETHYLENE GLYCOL 3350 17 G/17G
17 POWDER, FOR SOLUTION ORAL DAILY PRN
Status: DISCONTINUED | OUTPATIENT
Start: 2025-05-15 | End: 2025-05-18

## 2025-05-15 RX ORDER — BENZONATATE 100 MG/1
200 CAPSULE ORAL 3 TIMES DAILY PRN
Status: DISCONTINUED | OUTPATIENT
Start: 2025-05-15 | End: 2025-05-18

## 2025-05-15 RX ORDER — ACETAMINOPHEN 325 MG/1
650 TABLET ORAL EVERY 4 HOURS PRN
Status: DISCONTINUED | OUTPATIENT
Start: 2025-05-15 | End: 2025-05-18

## 2025-05-15 RX ORDER — BISACODYL 10 MG
10 SUPPOSITORY, RECTAL RECTAL
Status: DISCONTINUED | OUTPATIENT
Start: 2025-05-15 | End: 2025-05-18

## 2025-05-15 RX ORDER — ECHINACEA PURPUREA EXTRACT 125 MG
1 TABLET ORAL
Status: DISCONTINUED | OUTPATIENT
Start: 2025-05-15 | End: 2025-05-18

## 2025-05-15 RX ORDER — FUROSEMIDE 10 MG/ML
40 INJECTION INTRAMUSCULAR; INTRAVENOUS ONCE
Status: COMPLETED | OUTPATIENT
Start: 2025-05-15 | End: 2025-05-15
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
## 2025-05-15 NOTE — PROGRESS NOTES
Chief Complaint   Patient presents with    Wound Care     Patient is here for a wound care follow up. He presents with no compression on. He denies any pain.        HPI:     Patient here for follow-up of left lower extremity wounds.  He did not wear his compression wrap and had missed the last visit so he was not able to keep it on today his leg is much more swollen and red and he has new wounds on the front part and back part of the leg as well.  He denies any pain or any fever or chills.    Lab Results   Component Value Date    BUN 15 01/02/2025    CREATSERUM 0.82 01/02/2025    ALB 3.4 01/01/2025    TP 6.8 01/01/2025    A1C 5.2 12/12/2016       Medications - Current[1]    Allergies[2]         REVIEW OF SYSTEMS:     Review of Systems (ROS)  This information was obtained from the patient, chart    A comprehensive 10 point review of systems was completed.  Pertinent positives and negatives noted in the the HPI.     Past medical, surgical, family and social history updated where appropriate.    PHYSICAL EXAM:   /84   Pulse 69   Temp 98.3 °F (36.8 °C)   Resp 16    Estimated body mass index is 51.69 kg/m² as calculated from the following:    Height as of 1/9/25: 67\".    Weight as of 1/9/25: 330 lb (149.7 kg).   Vital signs reviewed.Appears stated age, well groomed.        Calf  Point of Measurement - Left Calf: 30     Left Calf from:: Heel  Calf Left cm:: 52.5          Ankle  Point of Measurement - Left Ankle: 10     Left Ankle from:: Heel  Left Ankle cm:: 33                Foot                               Wound 01/11/24 #1 Leg Left;Lateral (Active)   Date First Assessed/Time First Assessed: 01/11/24 1406    Wound Number (Wound Clinic Only): #1  Primary Wound Type: Traumatic  Location: Leg  Wound Location Orientation: Left;Lateral      Assessments 1/11/2024  2:10 PM 5/15/2025  8:31 AM   Wound Image        Drainage Amount Large Small   Drainage Description Yellow;Serous Serosanguineous   Treatments Compression  (coflex 2 layer wrap) --   Wound Length (cm) 10.6 cm 14.5 cm   Wound Width (cm) 9.5 cm 4 cm   Wound Surface Area (cm^2) 100.7 cm^2 45.55 cm^2   Wound Depth (cm) 0.2 cm 0.1 cm   Wound Volume (cm^3) 20.14 cm^3 3.037 cm^3   Wound Healing % -- 85   Margins Well-defined edges Well-defined edges   Non-staged Wound Description Full thickness Full thickness   Leslie-wound Assessment Edema;Hemosiderin staining Blanchable erythema;Edema   Wound Granulation Tissue Firm;Pink Red;Pink;Firm   Wound Bed Granulation (%) 40 % 25 %   Wound Bed Epithelium (%) -- 50 %   Wound Bed Slough (%) 60 % 25 %   Wound Odor None None   Tunneling? No --   Undermining? No --   Sinus Tracts? No --       Inactive Orders   Date Order Priority Status Authorizing Provider   01/16/25 0857 Debridement Traumatic Left;Lateral Leg Routine Completed Nicholas Gould MD   01/09/25 0935 Debridement Traumatic Left;Lateral Leg Routine Completed Nicholas Gould MD   01/02/25 1505 Wound care Routine Discontinued Braxton Ledbetter MD   01/01/25 0720 Consult to Wound Ostomy Routine Completed Dexter Dick MD     - Reason for Consult:    Wound Care     - Wound Care Reason for Consult:    wounds   11/21/24 0914 Debridement Traumatic Left;Lateral Leg Routine Completed Nicholas Gould MD   07/16/24 2327 Consult to Wound Ostomy Routine Completed Rica Pearson MD     - Reason for Consult:    Wound Care     - Wound Care Reason for Consult:    worsening   06/24/24 1127 Wound care Routine Discontinued Rica eParson MD   06/06/24 0913 Debridement Traumatic Routine Completed Nicholas Gould MD   05/16/24 0901 Debridement Traumatic Routine Completed Nicholas Gould MD   05/02/24 1520 Debridement Traumatic Routine Completed Nicholas Gould MD   04/11/24 0959 Debridement Traumatic Routine Completed Nicholas Gould MD   04/04/24 0915 Debridement Traumatic Left;Lateral Leg Routine Completed Nicholas Gould MD   02/15/24 1154 Debridement Traumatic Left;Lateral Leg Routine  Completed Nicholas Gould MD   01/18/24 1511 Debridement Traumatic Left;Lateral Leg Routine Completed Nicholas Gould MD   01/11/24 1710 Debridement Traumatic Left;Lateral Leg Routine Completed Nicholas Gould MD       Compression Wrap 08/22/24 Leg Anterior;Left (Active)   Placement Date: 08/22/24   Location: Leg  Wound Location Orientation: Anterior;Left      Assessments 8/22/2024  9:41 AM 5/1/2025  8:12 AM   Response to Treatment Well tolerated Well tolerated   Compression Layers Multilayer Multilayer   Compression Product Type Coflex Coflex   Dressing Applied Yes Yes (mirna, hydrofera transfer, ABD pad, conforming gauze, tape)   Compression Wrap Location Toes to Knee Toes to Knee   Compression Wrap Status Clean;Dry;Intact Clean;Dry;Intact       Inactive Orders   Date Order Priority Status Authorizing Provider   01/01/25 1007 Consult to Wound Ostomy Routine Completed Braxton Ledbetter MD     - Reason for Consult:    Wound Care     - Wound Care Reason for Consult:    L leg wound   01/01/25 0720 Consult to Wound Ostomy Routine Completed Dexter Dick MD     - Reason for Consult:    Wound Care     - Wound Care Reason for Consult:    wounds       Wound 05/15/25 #1 Leg Left (Active)   Date First Assessed/Time First Assessed: 05/15/25 0821    Wound Number (Wound Clinic Only): #1  Primary Wound Type: Edema  Location: Leg  Wound Location Orientation: Left      Assessments 5/15/2025  8:30 AM   Wound Image      Drainage Amount Large   Drainage Description Yellow;Serous   Wound Length (cm) 16 cm   Wound Width (cm) 7 cm   Wound Surface Area (cm^2) 87.96 cm^2   Wound Depth (cm) 0.1 cm   Wound Volume (cm^3) 5.864 cm^3   Wound Healing % 71   Margins Well-defined edges   Non-staged Wound Description Full thickness   Leslie-wound Assessment Edema;Blanchable erythema   Wound Granulation Tissue Pink;Firm   Wound Bed Granulation (%) 20 %   Wound Bed Epithelium (%) 20 %   Wound Bed Slough (%) 60 %   Wound Odor None       No  associated orders.              ASSESSMENT AND PLAN:      1. Chronic venous hypertension (idiopathic) with ulcer and inflammation of left lower extremity (HCC)    2. Class 3 severe obesity due to excess calories with serious comorbidity and body mass index (BMI) of 50.0 to 59.9 in adult    3. Class 3 severe obesity due to excess calories with serious comorbidity and body mass index (BMI) of 45.0 to 49.9 in adult    4. Cellulitis of leg, left    There is us severe cellulitis of the left lower extremity with multiple new wounds in the anterior leg lateral and posterior leg.  I would recommend that he go to the emergency room at this time as he will need IV antibiotic and admission to the hospital.  As far as wound dressings are concerned we will recommend consulting inpatient wound care but I would recommend Enluxtra, ABD pad gauze roll and Coflex 30 to 40 mmHg compression wrap.  Once hospitalization is over he can follow-up with me hopefully next week.  ER was notified that patient will be coming down there and was transferred from here to the ER in stable condition.      Risks, benefits, and alternatives of current treatment plan discussed in detail.  Questions and concerns addressed. Red flags to RTC or ED reviewed.  Patient (or parent) agrees to plan.      No follow-ups on file.    Also refer to patient instructions.     I spent a total of 40 minutes with this patient's care.  This time included preparing to see the patient (eg, review notes and recent diagnostics), seeing the patient, performing a medically appropriate examination and/or evaluation, counseling and educating the patient, and documenting in the record.          Nicholas Gould M.D.   [facility]   5/15/2025    Patient Instructions       PATIENT INSTRUCTIONS      FOR RICK Blandon 3/25/1965    DATE OF SERVICE: 5/15/2025        Please proceed to the emergency room for cellulitis of the left lower extremity which  should be treated with IV antibiotics and inpatient admission.    As far as wound dressings are concerned I would recommend using Enluxtra, ABD pad, gauze roll and Coflex 2 layer compression wrap 30 to 40 mmHg.    This should be changed at least once every 1 to 2 days to be able to assess the cellulitis      Follow up with Dr. Gould next week after hospitalization.                     MISCELLANEOUS INSTRUCTIONS  Supplement with a daily multivitamin   Low salt diet  Intense blood sugar control - Goal Blood sugar below 180 at all times recommended.  Increase protein intake / consider protein supplements - see below  Elevate extremities at all times when sitting / laying down.  No tobacco use     DIETARY MODIFICATIONS TO HELP WITH WOUND HEALING:          Please follow any restrictions to diet given by your other doctors     Protein: meats, beans, eggs, milk and yogurt particularly Greek yogurt), tofu, soy nuts, soy protein products     Vitamin C: citrus fruits and juices, strawberries, tomatoes, tomato juice, peppers, baked potatoes, spinach, broccoli, cauliflower, [place] sprouts, cabbage     Vitamin A: dark greens, leafy vegetables, orange or yellow vegetables, cantaloupe, fortified dairy products, liver, fortified cereals     Zinc: fortified cereals, red meats, seafood     Consider Bob by [employer] (These are essential branch chain amino acids that help with tissue building and wound healing) and take 2 packets/day. You can order online at [employer] or [employer]     ADDITIONAL REMINDERS:     The treatment plan has been discussed at length with you and your provider. Follow all instructions carefully, it is very important. If you do not follow all instructions, you are at risk of your wound not healing, infection, possible loss of limb and even loss of life.  Please call the clinic at [phone] during regular business hours ( 7:30 AM - 5:30 PM) if you notice increased redness, warmth, pain or pus like drainage or  start running a fever greater than 100.3.    For after hour emergencies, please call your primary physician or go to the nearest emergency room.  If your blood pressure is elevated, follow up with your primary care doctor and/or cardiologist as soon as possible.     FOR LEG SWELLING,  LOWER EXTREMITY WOUNDS AND IF USING COMPRESSION:   Managing your edema:    Avoid prolonged standing in one place. It is better to have your calf muscles moving to pump fluid out of the legs.  Elevate leg(s) above the level of the heart when sitting or as much as possible.  Take you diuretics as directed by your physician. Do not skip doses or change doses unless instructed to do so by your physician.  Decrease salt intake, follow recommended 2 grams daily.  Do not get leg(s) with compression wrap wet. If wraps are too tight as indicated by pain, numbness/tingling or discoloration of toes remove wrap completely and call the wound center @ [phone].       The treatment plan has been discussed at length between you and your provider. Follow all instructions carefully, it is very important. If you do not follow all instructions you are at risk of your wound not healing, infection, possible loss of limb and even loss of life.    COMPRESSION WRAP PATIENT CARE INSTRUCTIONS  DO NOT get compression wrap wet. When showering, use a shower boot.   Please contact wound clinic and if not able to reach, then go to emergency room if ANY of the following occur:   Tingling or numbness in the foot or toes on leg with compression wrap.  Severe pain that cannot be relieved with elevation and any medication instructed per your doctor.  A fever of 100.4°F (38°C) or higher.  Swelling, coldness, or blue-gray discoloration of the toes.  If the compression wrap feels too tight or too loose.  If the compression wrap is damaged or has rough edges that hurt.  If the compression wrap gets wet, you must cut wrap off.   Drainage from compression wrap that smells  different than usual.                        [1]   Current Outpatient Medications:     furosemide 20 MG Oral Tab, Take 1 tablet (20 mg total) by mouth daily., Disp: 30 tablet, Rfl: 0    Multiple Vitamins-Minerals (MULTI-VITAMIN/MINERALS) Oral Tab, Take 1 tablet by mouth daily., Disp: , Rfl:     aspirin 81 MG Oral Tab, Take 1 tablet (81 mg total) by mouth daily., Disp: , Rfl:   [2] No Known Allergies

## 2025-05-15 NOTE — CONSULTS
INFECTIOUS DISEASE CONSULTATION    Luis M Estrada Patient Status:  Inpatient    3/25/1965 MRN [id number]   [facility] 3SW-A Attending Presley, Rafael Zamudio, *   Hosp Day # 0 PCP SILVER GARCIA,        Requested by Dr. Sherwood    Reason for Consultation:  Recurrent left leg cellulitis    History of Present Illness:  Luis M Estrada is a a(n) 60 year old male who has been following with wound care weekly for leg ulcerations.  Upon examination today was found to have a marked worsening in swelling and new onset erythema of the left leg.   He reports the redness had started in the last few days.  He has not had fever.      He has been hospitalized several times for recurrent cellulitis, last episode in 2024.  He had completed an extended course of PCN VK after that, until 10/2024    History:  Past Medical History[1]  Past Surgical History[2]  Family History[3]   reports that he has never smoked. He has never used smokeless tobacco. He reports current alcohol use. He reports that he does not use drugs.      Allergies:  Allergies[4]    Medications:  Current Hospital Medications[5]  Medications Ordered Prior to Encounter[6]    Review of Systems:    A comprehensive 10 point review of systems was completed.  Pertinent positives and negatives noted in the the HPI.      Physical Exam:    General: No acute distress. Alert and oriented x 3.  Vital signs: Temp:  [97.6 °F (36.4 °C)-98.5 °F (36.9 °C)] 98.5 °F (36.9 °C)  Pulse:  [67-74] 74  Resp:  [16-26] 20  BP: (127-149)/(84-95) 127/87  SpO2:  [98 %-100 %] 98 %  Body mass index is 50.9 kg/m².  HEENT: Moist mucous membranes. Extraocular muscles are intact.  Neck: No swelling, no masses  Respiratory: Non labored, symmetric exursion  Cardiovascular: No irregularities in rhythm  Abdomen: Soft, nontender, nondistended.    Musculoskeletal: Left leg edema and erythema, no fluctuance no tenderness.  Leg  ulcerations noted  Foot edema, no erythema foot  onychomychosis    Laboratory Data:  Laboratory data reviewed      Recent Labs   Lab 05/15/25  0935   RBC 4.67   HGB 15.2   HCT 43.2   MCV 92.5   MCH 32.5   MCHC 35.2   RDW 12.6   NEPRELIM 2.99   WBC 4.4   .0       Recent Labs   Lab 05/15/25  1012   GLU 93   BUN 15   CREATSERUM 0.95   CA 8.6*   ALB 3.9      K 4.0      CO2 26.0   ALKPHO 58   AST 17   ALT 10   BILT 1.3*   TP 6.7              Established Problem list:  Problem List[7]    ASSESSMENT/PLAN:  1. Left leg cellulitis  Underlying chronic ulcerations, and lymphedema    Has had multiple episodes of cellulitis, last in 7/2204  CW acute strep cellulitis  Blood cultures sent in ED    Continue Cefazolin IV  Transition to PO when improving and ready for discharged, consider extending on prolonged course of PCN VK            Tigre Short MD, MD [facility]  [phone]         [1]   Past Medical History:   Cellulitis    to left leg, seeking treatment at wound care for months    Hydrocephalus (HCC)    dx'd as an adult-no shunt   [2] History reviewed. No pertinent surgical history.  [3] No family history on file.  [4] No Known Allergies  [5]   Current Facility-Administered Medications:     ceFAZolin (Ancef) 2g in 10mL IV syringe premix, 2 g, Intravenous, Q8H    melatonin tab 3 mg, 3 mg, Oral, Nightly PRN    polyethylene glycol (PEG 3350) (Miralax) 17 g oral packet 17 g, 17 g, Oral, Daily PRN    sennosides (Senokot) tab 17.2 mg, 17.2 mg, Oral, Nightly PRN    bisacodyl (Dulcolax) 10 MG rectal suppository 10 mg, 10 mg, Rectal, Daily PRN    ondansetron (Zofran) 4 MG/2ML injection 4 mg, 4 mg, Intravenous, Q6H PRN    prochlorperazine (Compazine) 10 MG/2ML injection 5 mg, 5 mg, Intravenous, Q8H PRN    benzonatate (Tessalon) cap 200 mg, 200 mg, Oral, TID PRN    guaiFENesin (Robitussin) 100 MG/5 ML oral liquid 200 mg, 200 mg, Oral, Q4H PRN    glycerin-hypromellose-  (Artificial Tears) 0.2-0.2-1 % ophthalmic solution 1 drop, 1 drop, Both Eyes, QID PRN    sodium chloride (Saline Mist) 0.65 % nasal solution 1 spray, 1 spray, Each Nare, Q3H PRN    enoxaparin (Lovenox) 40 MG/0.4ML SUBQ injection 40 mg, 40 mg, Subcutaneous, Daily    acetaminophen (Tylenol) tab 650 mg, 650 mg, Oral, Q4H PRN **OR** HYDROcodone-acetaminophen (Norco) 5-325 MG per tab 1 tablet, 1 tablet, Oral, Q4H PRN **OR** HYDROcodone-acetaminophen (Norco) 5-325 MG per tab 2 tablet, 2 tablet, Oral, Q4H PRN  [6]   No current facility-administered medications on file prior to encounter.     Current Outpatient Medications on File Prior to Encounter   Medication Sig Dispense Refill    Multiple Vitamins-Minerals (PRESERVISION AREDS) Oral Cap Take 1 capsule by mouth every morning.      Multiple Vitamins-Minerals (MULTI-VITAMIN/MINERALS) Oral Tab Take 1 tablet by mouth in the morning.     [7]   Patient Active Problem List  Diagnosis    Stasis dermatitis of both legs    Cellulitis    Fungal dermatitis    Chronic venous insufficiency    Decreased pulses in feet    Morbid obesity (HCC)    Cellulitis of left leg    Leukocytosis    Fever    Hyponatremia    Sepsis without acute organ dysfunction (HCC)    Constipation    left ankle sx  global exp 3/1/17    YUVAL (acute kidney injury)    Cellulitis of left foot         Global exp 2-24-17 / LEFT FOOT / RF[facility]     Cellulitis of right leg    Lymphedema    Hydrocephalus (HCC)    Screening for malignant neoplasm of colon    Left leg cellulitis    Wound infection    Cellulitis of left lower extremity    Cellulitis of right lower extremity    Chronic venous stasis    Pulmonary venous congestion

## 2025-05-15 NOTE — ED QUICK NOTES
Orders for admission, patient is aware of plan and ready to go upstairs. Any questions, please call ED RN Jeni at extension [phone].     Patient Covid vaccination status: Fully vaccinated     COVID Test Ordered in ED: None    COVID Suspicion at Admission: N/A    Running Infusions: Medication Infusions[1] None    Mental Status/LOC at time of transport: A&Ox4    Other pertinent information:   CIWA score: N/A   NIH score:  N/A             [1]

## 2025-05-15 NOTE — ED PROVIDER NOTES
Patient Seen in: [facility] Emergency Department      History     Chief Complaint   Patient presents with    Cellulitis     Sent from wound care for increased redness, swelling to LLE, new open wounds possible antibiotics and admission needed.      Stated Complaint: wound care    Subjective:   HPI  History of Present Illness            This is a 60-year-old male presents to the emergency room for evaluation of bilateral lower extremity swelling and increasing redness to the left lower leg.  Patient reports he has had swelling and redness to the left lower leg the last few months, started after he was injured by shopping cart.  Has been under the care of the wound clinic, had worsening symptoms and was sent to the ER today.  Patient denies chest pain or shortness of breath.  Denies fevers or chills.      Objective:     Past Medical History:    Cellulitis    to left leg, seeking treatment at wound care for months    Hydrocephalus (HCC)    dx'd as an adult-no shunt              History reviewed. No pertinent surgical history.             Social History     Socioeconomic History    Marital status: Single   Tobacco Use    Smoking status: Never    Smokeless tobacco: Never   Vaping Use    Vaping status: Never Used   Substance and Sexual Activity    Alcohol use: Yes     Comment: 2 a week    Drug use: No   Other Topics Concern    Caffeine Concern No     Comment: 2 COFFEE Q AM    Exercise Yes     Comment: WALKING OCCAS   Social History Narrative    ** Merged History Encounter **          Social Drivers of Health     Food Insecurity: No Food Insecurity (5/15/2025)    NCSS - Food Insecurity     Worried About Running Out of Food in the Last Year: No     Ran Out of Food in the Last Year: No   Transportation Needs: No Transportation Needs (5/15/2025)    NCSS - Transportation     Lack of Transportation: No   Housing Stability: Not At Risk (5/15/2025)    NCSS - Housing/Utilities     Has Housing: Yes     Worried About Losing  Housing: No     Unable to Get Utilities: No                                Physical Exam     ED Triage Vitals [05/15/25 0919]   /85   Pulse 68   Resp 17   Temp 97.6 °F (36.4 °C)   Temp src Oral   SpO2 100 %   O2 Device None (Room air)       Current Vitals:   Vital Signs  BP: (!) 139/95  Pulse: 70  Resp: 21  Temp: 97.6 °F (36.4 °C)  Temp src: Oral  MAP (mmHg): (!) 110    Oxygen Therapy  SpO2: 100 %  O2 Device: None (Room air)          Physical Exam  GENERAL: Patient is awake, alert,  in no acute distress.  HEENT: no scleral icterus.  Mucous membranes are moist, oropharynx is clear  HEART: Regular rate and rhythm, no murmurs.  LUNGS: Clear to auscultation bilaterally.  No Rales, no rhonchi, no wheezing, no stridor.  ABDOMEN: Soft, nondistended,non tender  EXTREMITIES: Bilateral lower extremity edema, there is erythema and warmth with ulcers to the left lower leg, there is no crepitus, no calf tenderness, dorsal pedal pulses present and equal bilaterally.          ED Course     Labs Reviewed   CBC WITH DIFFERENTIAL WITH PLATELET - Abnormal; Notable for the following components:       Result Value    Lymphocyte Absolute 0.97 (*)     All other components within normal limits   COMP METABOLIC PANEL (14) - Abnormal; Notable for the following components:    Calcium, Total 8.6 (*)     Bilirubin, Total 1.3 (*)     All other components within normal limits   PRO BETA NATRIURETIC PEPTIDE - Abnormal; Notable for the following components:    Pro-Beta Natriuretic Peptide 234 (*)     All other components within normal limits   TROPONIN I HIGH SENSITIVITY - Normal   RAINBOW DRAW LAVENDER   RAINBOW DRAW LIGHT GREEN   RAINBOW DRAW BLUE   BLOOD CULTURE   BLOOD CULTURE     EKG    Rate, intervals and axes as noted on EKG Report.  Rate: 69  Rhythm: Sinus Rhythm  Reading: Normal sinus rhythm.  No ST elevation              Results                                MDM        Differential diagnosis before testing includes but not limited  to DVT, cellulitis, electrolyte abnormality, CHF/pulmonary edema, which is a medical condition that poses a threat to life/function    Radiographic images  I personally reviewed the radiographs and my individual interpretation shows chest x-ray, no pneumothorax infiltrate  I also reviewed the official reports that showed chest x-ray mild pulmonary venous congestion, x-ray tib-fib edema, no fracture or aggressive cortical destructive process, ultrasound was negative for DVT      Discussion of management (consult/physicians, social work, pharmacy,ect) Jamie Isidroists Dr. Sherwood      Course of Events during Emergency Room Visit include IV established, blood work obtained.  CBC and chemistry were unremarkable.  BNP was elevated to 34.  Troponin 3.  Ultrasound negative for DVT, chest x-ray with venous congestion.  Was given IV Lasix and dose of IV antibiotic.  Discussed with hospitalist, will admit for further evaluation treatment.  Patient agrees with plan.    Shared decision making was utilized           Disposition:    Admission  I have discussed with the patient the results of test, differential diagnosis, and treatment plan. They expressed clear understanding of these instructions and agrees to the plan provided.       Note to patient: The 21st Century Cures Act makes medical notes like these available to patients in the interest of transparency. However, this is a medical document intended as peer to peer communication. It is written in medical language and may contain abbreviations or verbiage that are unfamiliar. It may appear blunt or direct. Medical documents are intended to carry relevant information, facts as evident, and the clinical opinion of the practitioner.             Admission disposition: 5/15/2025  1:59 PM           Medical Decision Making      Disposition and Plan     Clinical Impression:  1. Cellulitis of left lower extremity    2. Pulmonary venous congestion          Disposition:  Admit  5/15/2025  1:59 pm    Follow-up:  No follow-up provider specified.        Medications Prescribed:  Current Discharge Medication List                Supplementary Documentation:         Hospital Problems       Present on Admission  Date Reviewed: 7/29/2024          ICD-10-CM Noted POA    * (Principal) Cellulitis of left lower extremity L03.116 7/25/2024 Unknown    Chronic venous stasis I87.8 5/15/2025 Unknown

## 2025-05-15 NOTE — H&P
[facility]     Luis M Estrada Patient Status:  Emergency    3/25/1965 MRN [id number]   Location [facility] EMERGENCY DEPARTMENT Attending Alexi Zaidi DO   Hosp Day # 0 PCP SILVER GARCIA DO     Chief Complaint: LE wounds    History of Present Illness: Luis M Estrada is a 60 year old male with PMHx chronicLLE wounds, who presents for worsening LE wounds.     Patient notes he has been having issues with chronic left lower extremity wound for the past 5 months, has been followed by wound care.  However notes for the past week he has been having worsening redness, swelling of extremity.  Was seen by wound care physician today in clinic and advised to present to the ER for admission given worsening appearance.  Patient denies any associated fevers, chills, chest pain, shortness of breath, abdominal pain, nausea, vomiting.  Denies any drainage from the wound.  No known prior history of diabetes or peripheral arterial disease.    Past Medical History:Past Medical History[1]     Past Surgical History: Past Surgical History[2]    Social History:  reports that he has never smoked. He has never used smokeless tobacco. He reports current alcohol use. He reports that he does not use drugs.    Family History: Family History[3]    Allergies: Allergies[4]    Medications:  Medications Ordered Prior to Encounter[5]    Review of Systems:   A comprehensive 14 point review of systems was completed.    Pertinent positives and negatives noted in the HPI.    Physical Exam:    BP (!) 139/95   Pulse 70   Temp 97.6 °F (36.4 °C) (Oral)   Resp 21   Ht 5' 7\" (1.702 m)   Wt (!) 325 lb (147.4 kg)   SpO2 100%   BMI 50.90 kg/m²   General: No acute distress. Alert and oriented x 3.  HEENT: Normocephalic atraumatic. Moist mucous membranes. EOM-I. PERRLA. Anicteric.  Neck: No lymphadenopathy. No JVD. No carotid bruits.  Respiratory: Clear to auscultation bilaterally. No wheezes. No  rhonchi.  Cardiovascular: S1, S2. Regular rate and rhythm. No murmurs, rubs or gallops. Equal pulses.   Chest and Back: No tenderness or deformity.  Abdomen: Soft, nontender, nondistended.  Positive bowel sounds. No rebound, guarding or organomegaly.  Neurologic: No focal neurological deficits. CNII-XII grossly intact.  Musculoskeletal: Moves all extremities.  Extremities: Significant bilateral nonpitting edema, left lower extremity with circumferential erythema up to knee with no crepitus noted.  No tenderness to palpation over left lower extremity.  Integument: No rashes or lesions.   Psychiatric: Appropriate mood and affect.    Diagnostic Data:      Labs:  Recent Labs   Lab 05/15/25  0935   WBC 4.4   HGB 15.2   MCV 92.5   .0       Recent Labs   Lab 05/15/25  1012   GLU 93   BUN 15   CREATSERUM 0.95   CA 8.6*   ALB 3.9      K 4.0      CO2 26.0   ALKPHO 58   AST 17   ALT 10   BILT 1.3*   TP 6.7       Estimated Creatinine Clearance: 77.3 mL/min (based on SCr of 0.95 mg/dL).    No results for input(s): \"PTP\", \"INR\" in the last 168 hours.    No results for input(s): \"TROP\", \"CK\" in the last 168 hours.    Imaging: Imaging data reviewed in [facility].  XR CHEST AP PORTABLE  (CPT=71045)  Result Date: 5/15/2025  CONCLUSION:  Patient is slightly rotated accentuating heart size.  There is mild pulmonary venous congestion.  Slight bibasilar atelectasis.   LOCATION:  [id number]      Dictated by (CST): Afia Jimenez MD on 5/15/2025 at 1:48 PM     Finalized by (CST): Afia Jimenez MD on 5/15/2025 at 1:49 PM       XR TIBIA + FIBULA (2 VIEWS), LEFT (CPT=73590)  Result Date: 5/15/2025  CONCLUSION:  Edematous changes involving the left calf.  No acute fracture or aggressive cortical destructive process involving the tibia or fibula.   LOCATION:  [id number]   Dictated by (CST): Afia Jimenez MD on 5/15/2025 at 1:47 PM     Finalized by (CST): Afia Jimenez MD on 5/15/2025 at 1:48 PM       US VENOUS DOPPLER LEG LEFT - DIAG  IMG (CPT=93971)  Result Date: 5/15/2025  CONCLUSION:  No evidence of DVT in the left lower extremity.   LOCATION:  [employer] [facility]    Dictated by (CST): Presley Urbina MD on 5/15/2025 at 11:36 AM     Finalized by (CST): Presley Urbina MD on 5/15/2025 at 11:37 AM           ASSESSMENT / PLAN:     # LLE cellulitis   # Chronic LLE wounds   - IV abx continued, f/u Bcx   - US duplex without DVT in LLE    - US arterial duplex ordered   - A1c ordered    - XR Tib/fib without evidence of osteomyelitis   - wound care MD, ID consulted   - Prior TTE 7/17/24 with EF 60-65%, no RWMA, no significant valvular abnormalities; repeat TTE ordered to r/o CHF    # Morbid Obesity - Body mass index is 50.9 kg/m².  # Hydrocephalus       Code Status: Full Code    Plan of care discussed with patient, ED physician    Rafael Sherwood MD  5/15/2025                Supplementary Documentation:      MDM : Patient's ER labs, imaging reviewed.  AM labs ordered.  ER management discussed with ED physician, decision made for patient to be admitted to the hospital for further medical management.                  [1]   Past Medical History:   Cellulitis    to left leg, seeking treatment at wound care for months    Hydrocephalus (HCC)    dx'd as an adult-no shunt   [2] History reviewed. No pertinent surgical history.  [3] No family history on file.  [4] No Known Allergies  [5]   No current facility-administered medications on file prior to encounter.     Current Outpatient Medications on File Prior to Encounter   Medication Sig Dispense Refill    Multiple Vitamins-Minerals (MULTI-VITAMIN/MINERALS) Oral Tab Take 1 tablet by mouth in the morning.      aspirin 81 MG Oral Tab Take 1 tablet (81 mg total) by mouth in the morning.      furosemide 20 MG Oral Tab Take 1 tablet (20 mg total) by mouth daily. (Patient not taking: Reported on 5/15/2025) 30 tablet 0

## 2025-05-15 NOTE — PATIENT INSTRUCTIONS
PATIENT INSTRUCTIONS      FOR Luis M RODRIGUEZ Natalie , RICK 3/25/1965    DATE OF SERVICE: 5/15/2025        Please proceed to the emergency room for cellulitis of the left lower extremity which should be treated with IV antibiotics and inpatient admission.    As far as wound dressings are concerned I would recommend using Enluxtra, ABD pad, gauze roll and Coflex 2 layer compression wrap 30 to 40 mmHg.    This should be changed at least once every 1 to 2 days to be able to assess the cellulitis      Follow up with Dr. Gould next week after hospitalization.

## 2025-05-15 NOTE — PROGRESS NOTES
.Weekly Wound Education Note    Teaching Provided To: Patient  Training Topics: Dressing, Cleasing and general instructions, Discharge instructions, Compression, Edema control  Training Method: Explain/Verbal, Demonstration  Training Response: Reinforcement needed        Notes: Patient here for follow up wound care visit to left lateral lower leg. Patient arrrived to appointment with no compression garment on to LLE. Last provider visit was 5/1/25, pt had to cancel 5/8/25- pt was informed at that time to remove wraps and apply new dressing and compression. Edema measurement increased, New wound to anterior lower left leg. Provider recommending patient to go to ED at this time due to increased swelling and redness to LLE. Patient agreeable, staff transported patient down to ED after visit. [facility] RN asked patient if he would bring in his compression garments to next provider visit. Per provider applied ABD pads and kerlix to LLE at visit today. Per provider recommending enluxtra (back off), kerramax, coflex wrap once redness and swelling is controlled. Applied spandagrip (f) to RLE.RN called and notified ED of patients transport.

## 2025-05-16 ENCOUNTER — APPOINTMENT (OUTPATIENT)
Dept: CV DIAGNOSTICS | Facility: HOSPITAL | Age: 60
End: 2025-05-16
Attending: STUDENT IN AN ORGANIZED HEALTH CARE EDUCATION/TRAINING PROGRAM
Payer: COMMERCIAL

## 2025-05-16 ENCOUNTER — APPOINTMENT (OUTPATIENT)
Dept: ULTRASOUND IMAGING | Facility: HOSPITAL | Age: 60
End: 2025-05-16
Attending: STUDENT IN AN ORGANIZED HEALTH CARE EDUCATION/TRAINING PROGRAM
Payer: COMMERCIAL

## 2025-05-16 LAB
ALBUMIN SERPL-MCNC: 4 G/DL (ref 3.2–4.8)
ALBUMIN/GLOB SERPL: 1.4 {RATIO} (ref 1–2)
ALP LIVER SERPL-CCNC: 60 U/L (ref 45–117)
ALT SERPL-CCNC: 8 U/L (ref 10–49)
ANION GAP SERPL CALC-SCNC: 8 MMOL/L (ref 0–18)
AST SERPL-CCNC: 16 U/L (ref ?–34)
BASOPHILS # BLD AUTO: 0.07 X10(3) UL (ref 0–0.2)
BASOPHILS NFR BLD AUTO: 1.2 %
BILIRUB SERPL-MCNC: 0.8 MG/DL (ref 0.2–1.1)
BUN BLD-MCNC: 15 MG/DL (ref 9–23)
CALCIUM BLD-MCNC: 9 MG/DL (ref 8.7–10.6)
CHLORIDE SERPL-SCNC: 107 MMOL/L (ref 98–112)
CO2 SERPL-SCNC: 26 MMOL/L (ref 21–32)
CREAT BLD-MCNC: 0.91 MG/DL (ref 0.7–1.3)
EGFRCR SERPLBLD CKD-EPI 2021: 96 ML/MIN/1.73M2 (ref 60–?)
EOSINOPHIL # BLD AUTO: 0.1 X10(3) UL (ref 0–0.7)
EOSINOPHIL NFR BLD AUTO: 1.7 %
ERYTHROCYTE [DISTWIDTH] IN BLOOD BY AUTOMATED COUNT: 12.7 %
GLOBULIN PLAS-MCNC: 2.9 G/DL (ref 2–3.5)
GLUCOSE BLD-MCNC: 94 MG/DL (ref 70–99)
HCT VFR BLD AUTO: 41.8 % (ref 39–53)
HGB BLD-MCNC: 14.6 G/DL (ref 13–17.5)
IMM GRANULOCYTES # BLD AUTO: 0.01 X10(3) UL (ref 0–1)
IMM GRANULOCYTES NFR BLD: 0.2 %
INR BLD: 1.35 (ref 0.8–1.2)
LYMPHOCYTES # BLD AUTO: 1.3 X10(3) UL (ref 1–4)
LYMPHOCYTES NFR BLD AUTO: 22.3 %
MAGNESIUM SERPL-MCNC: 2.1 MG/DL (ref 1.6–2.6)
MCH RBC QN AUTO: 32.7 PG (ref 26–34)
MCHC RBC AUTO-ENTMCNC: 34.9 G/DL (ref 31–37)
MCV RBC AUTO: 93.5 FL (ref 80–100)
MONOCYTES # BLD AUTO: 0.57 X10(3) UL (ref 0.1–1)
MONOCYTES NFR BLD AUTO: 9.8 %
NEUTROPHILS # BLD AUTO: 3.78 X10 (3) UL (ref 1.5–7.7)
NEUTROPHILS # BLD AUTO: 3.78 X10(3) UL (ref 1.5–7.7)
NEUTROPHILS NFR BLD AUTO: 64.8 %
OSMOLALITY SERPL CALC.SUM OF ELEC: 293 MOSM/KG (ref 275–295)
PHOSPHATE SERPL-MCNC: 3.6 MG/DL (ref 2.4–5.1)
PLATELET # BLD AUTO: 235 10(3)UL (ref 150–450)
POTASSIUM SERPL-SCNC: 3.9 MMOL/L (ref 3.5–5.1)
PROT SERPL-MCNC: 6.9 G/DL (ref 5.7–8.2)
PROTHROMBIN TIME: 16.8 SECONDS (ref 11.6–14.8)
RBC # BLD AUTO: 4.47 X10(6)UL (ref 4.3–5.7)
SODIUM SERPL-SCNC: 141 MMOL/L (ref 136–145)
WBC # BLD AUTO: 5.8 X10(3) UL (ref 4–11)

## 2025-05-16 PROCEDURE — 93306 TTE W/DOPPLER COMPLETE: CPT | Performed by: STUDENT IN AN ORGANIZED HEALTH CARE EDUCATION/TRAINING PROGRAM

## 2025-05-16 PROCEDURE — 99232 SBSQ HOSP IP/OBS MODERATE 35: CPT | Performed by: STUDENT IN AN ORGANIZED HEALTH CARE EDUCATION/TRAINING PROGRAM

## 2025-05-16 PROCEDURE — 93925 LOWER EXTREMITY STUDY: CPT | Performed by: STUDENT IN AN ORGANIZED HEALTH CARE EDUCATION/TRAINING PROGRAM

## 2025-05-16 NOTE — PAYOR COMM NOTE
--------------  CONTINUED STAY REVIEW  5/16  Payor: [employer] PPO  Subscriber #:  [id number]  Authorization Number: [id number]    Admit date: 5/15/25  Admit time:  4:14 PM    REVIEW DOCUMENTATION:    Hospitalist Progress Note     Chief Complaint: LE wounds   states LLE discomfort improving       Extremities: LLE with improved eyrthema, ongoing wound in anterolateral calf with opaque drainage noted      Diagnostic Data:    Labs:       Recent Labs   Lab 05/15/25  0935 05/16/25  0507   WBC 4.4 5.8   HGB 15.2 14.6   MCV 92.5 93.5   .0 235.0   INR  --  1.35*              Recent Labs   Lab 05/15/25  1012 05/16/25  0507   GLU 93 94   BUN 15 15   CREATSERUM 0.95 0.91   CA 8.6* 9.0   ALB 3.9 4.0    141   K 4.0 3.9    107   CO2 26.0 26.0   ALKPHO 58 60   AST 17 16   ALT 10 8*   BILT 1.3* 0.8   TP 6.7 6.9       Lab 05/16/25  0507   PTP 16.8*   INR 1.35*      # LLE cellulitis   # Chronic LLE wounds   - IV abx continued, f/u Bcx   - US duplex without DVT in LLE    - US arterial duplex ordered   - A1c 5.4    - XR Tib/fib without evidence of osteomyelitis   - wound care MD, ID consulted   - Prior TTE 7/17/24 with EF 60-65%, no RWMA, no significant valvular abnormalities; repeat TTE ordered to r/o CHF     # Morbid Obesity - Body mass index is 50.9 kg/m².  # Hydrocephalus             5/16 ID  Antibiotics: cefazolin #1     ASSESSMENT/PLAN:  1. Left leg cellulitis  Underlying chronic ulcerations, and lymphedema     Has had multiple episodes of cellulitis, last in 7/2204  CW acute strep cellulitis  Blood cultures sent in ED     Swelling improved, on iv cefazolin         MEDICATIONS ADMINISTERED IN LAST 1 DAY:  ceFAZolin (Ancef) 2g in 10mL IV syringe premix       Date Action Dose Route User    5/16/2025 0818 New Bag 2 g Intravenous Melina Bhatti RN    5/16/2025 0244 New Bag 2 g Intravenous Brent Burkett, RN    5/15/2025 3359 New Bag 2 g Intravenous [place] Granados, JAYLEN          enoxaparin (Lovenox) 80  MG/0.8ML SUBQ injection 70 mg       Date Action Dose Route User    5/16/2025 0818 Given 70 mg Subcutaneous (Left Lower Abdomen) Melina Bhatti, RN    5/15/2025 2041 Given 70 mg Subcutaneous (Left Lower Abdomen) Brent Burkett, JAYLEN          furosemide (Lasix) 10 mg/mL injection 40 mg       Date Action Dose Route User    5/15/2025 1401 Given 40 mg Intravenous Jeni Sanchez, RN            Vitals (last day)       Date/Time Temp Pulse Resp BP SpO2 Weight O2 Device O2 Flow Rate (L/min) [facility]    05/16/25 1200 98.5 °F (36.9 °C) 73 18 115/72 96 % -- None (Room air) --     05/16/25 0803 97.9 °F (36.6 °C) 81 20 134/89 95 % -- None (Room air) --     05/16/25 0400 98.1 °F (36.7 °C) 75 18 124/57 94 % -- None (Room air) -- [facility]    05/16/25 0010 98.2 °F (36.8 °C) 74 18 130/74 96 % -- None (Room air) --     05/15/25 1952 98.5 °F (36.9 °C) 72 18 120/65 99 % -- None (Room air) 0 L/min CM    05/15/25 1755 -- -- -- -- -- 325 lb (147.4 kg) -- -- JA    05/15/25 1621 -- 74 -- -- 98 % -- -- --     05/15/25 1620 98.5 °F (36.9 °C) 74 20 127/87 98 % -- None (Room air) --     05/15/25 1400 -- 70 21 139/95 100 % -- None (Room air) --     05/15/25 1245 -- 70 19 143/95 100 % -- -- --     05/15/25 1200 -- 69 26 138/95 100 % -- None (Room air) --     05/15/25 1040 -- -- -- -- -- -- None (Room air) --     05/15/25 1015 -- 67 22 143/89 100 % -- -- -- TW    05/15/25 0919 97.6 °F (36.4 °C) 68 17 149/85 100 % 325 lb (147.4 kg) None (Room air) -- TW          CIWA Scores (since admission)       None

## 2025-05-16 NOTE — CM/SW NOTE
05/16/25 1500   CM/SW Referral Data   Referral Source Social Work (self-referral)   Reason for Referral Discharge planning   Informant EMR;Clinical Staff Member   Patient Info   Patient's Current Mental Status at Time of Assessment Alert;Oriented   Patient lives with Alone   Discharge Needs   Anticipated D/C needs To be determined;No anticipated discharge needs       Order received for discharge planning. Pt is a 61 y/o man with history of chronic LE wound currently admitted for LE cellulitis. Noted ID anticipating that pt will be able to discharge on oral antibiotics. Pt normally goes to wound clinic for wound care. He has previously declined [facility] services. OT indicating no therapy needs at this time. Currently no discharge needs identified. / to remain available for support and/or discharge planning.     Lee Ann Dwyer, [facility]  Discharge Planner  [phone]

## 2025-05-16 NOTE — PHYSICAL THERAPY NOTE
PHYSICAL THERAPY EVALUATION - INPATIENT     Room Number: 365/365-A  Evaluation Date: 5/16/2025  Type of Evaluation: Initial  Physician Order: PT Eval and Treat    Presenting Problem: Cellulits of the LLE  Co-Morbidities : chronic LLE wounds  Reason for Therapy: Mobility Dysfunction and Discharge Planning    PHYSICAL THERAPY ASSESSMENT   Patient is a 60 year old male admitted 5/15/2025 for  Cellulits of the LLE.  Prior to admission, patient's baseline is Mod I.  Patient is currently functioning near baseline with bed mobility, transfers, and gait.  At this time pt is near baseline and does not have IP PT needs.    Patient will benefit from continued skilled PT Services for duration of hospitalization, however, given the patient is functioning near baseline level do not anticipate skilled therapy needs at discharge .    PLAN DURING HOSPITALIZATION  Nursing Mobility Recommendation : 1 Assist  PT Device Recommendation: Rolling walker  PT Treatment Plan: Bed mobility, Body mechanics, Gait training, Strengthening, Stair training, Transfer training, Balance training  Rehab Potential : Good  Frequency (Obs): 3-5x/week     CURRENT GOALS    Patient has met all skilled IPPT goals at this time. Patient will be discharged from Physical Therapy services.  Please re-order if a new functional limitation presents during this admission.     History related to current admission: Patient is a 60 year old male admitted on 5/15/2025 from Home for  Cellulits of the LLE.    HOME SITUATION  Type of Home: Condo  Home Layout: One level, Elevator                     Lives With: Alone    Drives: No   Patient Regularly Uses: Glasses      Prior Level of [place]: Pt lives at home, alone. Pt reports that he is IND with all his ADLs. Pt states that he typically does not use an assisted device but lately has been using a SPC.    SUBJECTIVE  \"Thank you\"    OBJECTIVE  Precautions: Bed/chair alarm  Fall Risk: Standard fall risk    WEIGHT BEARING  RESTRICTION     PAIN ASSESSMENT  Ratin  Location: pt reports no pain  Management Techniques: Activity promotion    COGNITION  Overall Cognitive Status:  WFL - within functional limits    RANGE OF MOTION AND STRENGTH ASSESSMENT  Upper extremity ROM and strength are within functional limits     Lower extremity ROM is within functional limits     Lower extremity strength is within functional limits     BALANCE  Static Sitting: Fair +  Dynamic Sitting: Fair +  Static Standing: Fair  Dynamic Standing: Fair -    ADDITIONAL TESTS                                    ACTIVITY TOLERANCE                         O2 WALK       NEUROLOGICAL FINDINGS                        AM-PAC '6-Clicks' INPATIENT SHORT FORM - BASIC MOBILITY  How much difficulty does the patient currently have...  Patient Difficulty: Turning over in bed (including adjusting bedclothes, sheets and blankets)?: A Little   Patient Difficulty: Sitting down on and standing up from a chair with arms (e.g., wheelchair, bedside commode, etc.): A Little   Patient Difficulty: Moving from lying on back to sitting on the side of the bed?: A Little   How much help from another person does the patient currently need...   Help from Another: Moving to and from a bed to a chair (including a wheelchair)?: A Little   Help from Another: Need to walk in hospital room?: A Little   Help from Another: Climbing 3-5 steps with a railing?: A Little     AM-PAC Score:  Raw Score: 18   Approx Degree of Impairment: 46.58%   Standardized Score (AM-PAC Scale): 43.63   CMS Modifier (G-Code): CK    FUNCTIONAL ABILITY STATUS  Gait Assessment   Functional Mobility/Gait Assessment  Gait Assistance: Supervision  Distance (ft): 120  Assistive Device: Rolling walker  Pattern: Shuffle    Skilled Therapy Provided     Bed Mobility:  Rolling: NT  Supine to sit: Mod I   Sit to supine: NT     Transfer Mobility:  Sit to stand: Mod I   Stand to sit: Mod I  Gait = Sup 120ft using RW    Therapist's Comments:  Pt demonstrated a slow gopi when ambulating. Pt was observed with no LOB. Educated pt on the use of the RW vs SPC at home if pt felt that he was unsteady.     Exercise/Education Provided:  Bed mobility    Patient End of Session: Up in chair, Needs met, Call light within reach, RN aware of session/findings, All patient questions and concerns addressed, Alarm set      Patient Evaluation Complexity Level:  History Moderate - 1 or 2 personal factors and/or co-morbidities   Examination of body systems Low -  addressing 1-2 elements   Clinical Presentation Low- Stable   Clinical Decision Making Low Complexity       PT Session Time: 23 minutes  Therapeutic Activity: 15 minutes

## 2025-05-16 NOTE — PAYOR COMM NOTE
--------------  ADMISSION REVIEW   5/15  Payor: [employer] OUT OF STATE PPO  Subscriber #:  [id number]  Authorization Number: [id number]    Admit date: 5/15/25  Admit time:  4:14 PM       REVIEW DOCUMENTATION:     ED Provider Notes        ED Provider Notes signed by Alexi Zaidi DO at 5/15/2025  4:02 PM       Author: Alexi Zaidi DO Service: -- Author Type: Physician    Filed: 5/15/2025  4:02 PM Date of Service: 5/15/2025  3:58 PM Status: Signed    : Alexi Zaidi DO (Physician)           Patient Seen in: [facility] Emergency Department      History     Chief Complaint   Patient presents with    Cellulitis     Sent from wound care for increased redness, swelling to LLE, new open wounds possible antibiotics and admission needed.      Stated Complaint: wound care    Subjective:   HPI  History of Present Illness            This is a 60-year-old male presents to the emergency room for evaluation of bilateral lower extremity swelling and increasing redness to the left lower leg.  Patient reports he has had swelling and redness to the left lower leg the last few months, started after he was injured by shopping cart.  Has been under the care of the wound clinic, had worsening symptoms and was sent to the ER today.  Patient denies chest pain or shortness of breath.  Denies fevers or chills.      Objective:     Past Medical History:    Cellulitis    to left leg, seeking treatment at wound care for months    Hydrocephalus (HCC)    dx'd as an adult-no shunt     Physical Exam     ED Triage Vitals [05/15/25 0919]   /85   Pulse 68   Resp 17   Temp 97.6 °F (36.4 °C)   Temp src Oral   SpO2 100 %   O2 Device None (Room air)       Current Vitals:   Vital Signs  BP: (!) 139/95  Pulse: 70  Resp: 21  Temp: 97.6 °F (36.4 °C)  Temp src: Oral  MAP (mmHg): (!) 110    Oxygen Therapy  SpO2: 100 %  O2 Device: None (Room air)    Physical Exam  GENERAL: Patient is awake, alert,  in no acute distress.  HEENT: no scleral icterus.   Mucous membranes are moist, oropharynx is clear  HEART: Regular rate and rhythm, no murmurs.  LUNGS: Clear to auscultation bilaterally.  No Rales, no rhonchi, no wheezing, no stridor.  ABDOMEN: Soft, nondistended,non tender  EXTREMITIES: Bilateral lower extremity edema, there is erythema and warmth with ulcers to the left lower leg, there is no crepitus, no calf tenderness, dorsal pedal pulses present and equal bilaterally.    ED Course     Labs Reviewed   CBC WITH DIFFERENTIAL WITH PLATELET - Abnormal; Notable for the following components:       Result Value    Lymphocyte Absolute 0.97 (*)     All other components within normal limits   COMP METABOLIC PANEL (14) - Abnormal; Notable for the following components:    Calcium, Total 8.6 (*)     Bilirubin, Total 1.3 (*)     All other components within normal limits   PRO BETA NATRIURETIC PEPTIDE - Abnormal; Notable for the following components:    Pro-Beta Natriuretic Peptide 234 (*)     All other components within normal limits   TROPONIN I HIGH SENSITIVITY - Normal   RAINBOW DRAW LAVENDER   RAINBOW DRAW LIGHT GREEN   RAINBOW DRAW BLUE   BLOOD CULTURE   BLOOD CULTURE   EKG    Rate, intervals and axes as noted on EKG Report.  Rate: 69  Rhythm: Sinus Rhythm  Reading: Normal sinus rhythm.  No ST elevation      Differential diagnosis before testing includes but not limited to DVT, cellulitis, electrolyte abnormality, CHF/pulmonary edema, which is a medical condition that poses a threat to life/function    Radiographic images  I personally reviewed the radiographs and my individual interpretation shows chest x-ray, no pneumothorax infiltrate  I also reviewed the official reports that showed chest x-ray mild pulmonary venous congestion, x-ray tib-fib edema, no fracture or aggressive cortical destructive process, ultrasound was negative for DVT      Discussion of management (consult/physicians, social work, pharmacy,ect) [facility]      Course of Events  during Emergency Room Visit include IV established, blood work obtained.  CBC and chemistry were unremarkable.  BNP was elevated to 34.  Troponin 3.  Ultrasound negative for DVT, chest x-ray with venous congestion.  Was given IV Lasix and dose of IV antibiotic.  Discussed with hospitalist, will admit for further evaluation treatment.  Patient agrees with plan.    Shared decision making was utilized     Disposition:    Admission  I have discussed with the patient the results of test, differential diagnosis, and treatment plan. They expressed clear understanding of these instructions and agrees to the plan provided.       Note to patient: The 21st Century Cures Act makes medical notes like these available to patients in the interest of transparency. However, this is a medical document intended as peer to peer communication. It is written in medical language and may contain abbreviations or verbiage that are unfamiliar. It may appear blunt or direct. Medical documents are intended to carry relevant information, facts as evident, and the clinical opinion of the practitioner.         Admission disposition: 5/15/2025  1:59 PM      Disposition and Plan     Clinical Impression:  1. Cellulitis of left lower extremity    2. Pulmonary venous congestion         Disposition:  Admit  5/15/2025  1:59 pm     Hospital Problems       Present on Admission  Date Reviewed: 7/29/2024          ICD-10-CM Noted POA    * (Principal) Cellulitis of left lower extremity L03.116 7/25/2024 Unknown    Chronic venous stasis I87.8 5/15/2025 Unknown                 5/15 H&P    Chief Complaint: LE wounds     History of Present Illness: Luis M Estrada is a 60 year old male with PMHx chronicLLE wounds, who presents for worsening LE wounds.      Patient notes he has been having issues with chronic left lower extremity wound for the past 5 months, has been followed by wound care.  However notes for the past week he has been having worsening redness,  swelling of extremity.  Was seen by wound care physician today in clinic and advised to present to the ER for admission given worsening appearance.  Patient denies any associated fevers, chills, chest pain, shortness of breath, abdominal pain, nausea, vomiting.  Denies any drainage from the wound.  No known prior history of diabetes or peripheral arterial disease.    BP (!) 139/95   Pulse 70   Temp 97.6 °F (36.4 °C) (Oral)   Resp 21   Ht 5' 7\" (1.702 m)   Wt (!) 325 lb (147.4 kg)   SpO2 100%   BMI 50.90 kg/m²   General: No acute distress. Alert and oriented x 3.  HEENT: Normocephalic atraumatic. Moist mucous membranes. EOM-I. PERRLA. Anicteric.  Neck: No lymphadenopathy. No JVD. No carotid bruits.  Respiratory: Clear to auscultation bilaterally. No wheezes. No rhonchi.  Cardiovascular: S1, S2. Regular rate and rhythm. No murmurs, rubs or gallops. Equal pulses.   Chest and Back: No tenderness or deformity.  Abdomen: Soft, nontender, nondistended.  Positive bowel sounds. No rebound, guarding or organomegaly.  Neurologic: No focal neurological deficits. CNII-XII grossly intact.  Musculoskeletal: Moves all extremities.  Extremities: Significant bilateral nonpitting edema, left lower extremity with circumferential erythema up to knee with no crepitus noted.  No tenderness to palpation over left lower extremity.  Integument: No rashes or lesions.   Psychiatric: Appropriate mood and affect.      # LLE cellulitis   # Chronic LLE wounds   - IV abx continued, f/u Bcx   - US duplex without DVT in LLE    - US arterial duplex ordered   - A1c ordered    - XR Tib/fib without evidence of osteomyelitis   - wound care MD, ID consulted   - Prior TTE 7/17/24 with EF 60-65%, no RWMA, no significant valvular abnormalities; repeat TTE ordered to r/o CHF     # Morbid Obesity - Body mass index is 50.9 kg/m².  # Hydrocephalus        Code Status: Full Code          5/15 ID    Reason for Consultation:  Recurrent left leg cellulitis      History of Present Illness:  Luis M Estrada is a a(n) 60 year old male who has been following with wound care weekly for leg ulcerations.  Upon examination today was found to have a marked worsening in swelling and new onset erythema of the left leg.   He reports the redness had started in the last few days.  He has not had fever.       He has been hospitalized several times for recurrent cellulitis, last episode in 7/2024.  He had completed an extended course of PCN VK after that, until 10/2024    ASSESSMENT/PLAN:  1. Left leg cellulitis  Underlying chronic ulcerations, and lymphedema     Has had multiple episodes of cellulitis, last in 7/2204  CW acute strep cellulitis  Blood cultures sent in ED     Continue Cefazolin IV          MEDICATIONS ADMINISTERED IN LAST 1 DAY:  ceFAZolin (Ancef) 2g in 10mL IV syringe premix       Date Action Dose Route User    5/15/2025 1005 New Bag 2 g Intravenous Jeni Sanchez RN          ceFAZolin (Ancef) 2g in 10mL IV syringe premix       Date Action Dose Route User    5/16/2025 0818 New Bag 2 g Intravenous Melina Bhatti RN    5/16/2025 0244 New Bag 2 g Intravenous Brent Burkett RN    5/15/2025 1752 New Bag 2 g Intravenous Michoacano Granados RN          enoxaparin (Lovenox) 80 MG/0.8ML SUBQ injection 70 mg       Date Action Dose Route User    5/16/2025 0818 Given 70 mg Subcutaneous (Left Lower Abdomen) Melina Bhatti RN    5/15/2025 2041 Given 70 mg Subcutaneous (Left Lower Abdomen) Brent Burkett RN          furosemide (Lasix) 10 mg/mL injection 40 mg       Date Action Dose Route User    5/15/2025 1401 Given 40 mg Intravenous Jeni Sanchez RN            Vitals (last day)       Date/Time Temp Pulse Resp BP SpO2 Weight O2 Device O2 Flow Rate (L/min) Who    05/16/25 0803 97.9 °F (36.6 °C) 81 20 134/89 95 % -- None (Room air) -- LW    05/16/25 0400 98.1 °F (36.7 °C) 75 18 124/57 94 % -- None (Room air) -- VA    05/16/25 0010 98.2 °F (36.8 °C) 74 18 130/74 96 % -- None (Room  air) -- LK    05/15/25 1952 98.5 °F (36.9 °C) 72 18 120/65 99 % -- None (Room air) 0 L/min CM    05/15/25 1755 -- -- -- -- -- 325 lb (147.4 kg) -- -- JA    05/15/25 1621 -- 74 -- -- 98 % -- -- -- ES    05/15/25 1620 98.5 °F (36.9 °C) 74 20 127/87 98 % -- None (Room air) -- LW    05/15/25 1400 -- 70 21 139/95 100 % -- None (Room air) -- TW    05/15/25 1245 -- 70 19 143/95 100 % -- -- -- TW    05/15/25 1200 -- 69 26 138/95 100 % -- None (Room air) -- TW    05/15/25 1040 -- -- -- -- -- -- None (Room air) -- TW    05/15/25 1015 -- 67 22 143/89 100 % -- -- -- TW    05/15/25 0919 97.6 °F (36.4 °C) 68 17 149/85 100 % 325 lb (147.4 kg) None (Room air) -- TW          CIWA Scores (since admission)       None

## 2025-05-16 NOTE — PLAN OF CARE
A&Ox4. VSS on RA. /IS. Tele. Ankle pumps encouraged. Tolerating diet, last BM 5/15. Voiding freely. Pain mananged with current medications. Dressing to LLE C/D/I. Up ad zoya. IV abx, blood cx pending. Plan for echo tmrw, PT/OT/wound care to see. Patient updated on POC, verbalized agreement. Safety precautions in place, pt instructed to call for assistance, call light within reach.

## 2025-05-16 NOTE — OCCUPATIONAL THERAPY NOTE
OCCUPATIONAL THERAPY EVALUATION - INPATIENT    Room Number: 365/365-A  Evaluation Date: 5/16/2025     Type of Evaluation: Initial  Presenting Problem: Cellulitis of L lower extremity    Physician Order: IP Consult to Occupational Therapy  Reason for Therapy:  ADL/IADL Dysfunction and Discharge Planning    OCCUPATIONAL THERAPY ASSESSMENT   Patient is a 60 year old male admitted on 5/15/2025 with Presenting Problem: Cellulitis of L lower extremity. Co-Morbidities : chronic LLE wounds  Patient is currently functioning near baseline with toileting, upper body dressing, lower body dressing, bed mobility, transfers, static sitting balance, dynamic sitting balance, static standing balance, dynamic standing balance, maintaining seated position, functional standing tolerance, energy conservation strategies, and aerobic capacity.  Prior to admission, patient's baseline is Mohsen with ADLs/IADLs.  Patient met all OT goals at supervision level.  Patient reports no further questions/concerns at this time.     Patient will be discharged from acute inpatient OT services at this time.    Recommendations for nursing staff:   Transfers: up x 1, RW, supervision-SBA  Toileting location: Toilet    EVALUATION SESSION:  Patient at start of session: semi supine in bed    FUNCTIONAL TRANSFER ASSESSMENT  Sit to Stand: Edge of Bed  Edge of Bed: Supervision    BED MOBILITY  Supine to Sit : Supervision  Sit to Supine (OT): Not Tested  Scooting: Supervision    BALANCE ASSESSMENT  Static Sitting: Supervision  Static Standing: Supervision    FUNCTIONAL ADL ASSESSMENT  LB Dressing Seated: Supervision (socks)    ACTIVITY TOLERANCE: WFL                         O2 SATURATIONS       COGNITION  Overall Cognitive Status:  WFL - within functional limits    COGNITION ASSESSMENTS     Upper Extremity:   ROM: within functional limits  Strength: is within functional limits    EDUCATION PROVIDED  Patient Education : Role of Occupational Therapy; Plan of Care;  Discharge Recommendations; DME Recommendations; Functional Transfer Techniques; Fall Prevention; Posture/Positioning; Energy Conservation; Proper Body Mechanics  Patient's Response to Education: Verbalized Understanding; Returned Demonstration    Equipment used: RW  Demonstrates functional use    Therapist comments: RN cleared pt for session, received semi supine in bed. Pt assisted to EOB for LB dressing task, observed to don socks with no difficulties. Pt reports he has been up to the bathroom IND, reports no concerns with toileting at this time. Pt educated on use of walking stick/RW as needed for additional stability once home, pt verbalized understanding. Pt reports no other ADL concerns at end of session.    Patient End of Session: Up in chair, Needs met, RN aware of session/findings, Call light within reach, All patient questions and concerns addressed, Hospital anti-slip socks, Alarm set    OCCUPATIONAL PROFILE    HOME SITUATION  Type of Home: Condo  Home Layout: One level, Elevator  Lives With: Alone    Toilet and Equipment: Standard height toilet  Shower/Tub and Equipment: Tub-shower combo  Other Equipment:  (walking stick (normally does not use), RW)    Occupation/Status: Working- at [employer]  Hand Dominance: Right  Drives: No  Patient Regularly Uses: Glasses    Prior Level of Function: Mohsen with ADLs/IADLs, uses a walking stick occasionally. Works as a  at [employer]. Pt typically walks to and from work, will occasionally get rides from friends or calls a cab.    SUBJECTIVE  \"I've been there 30 years\"    PAIN ASSESSMENT  Ratin          OBJECTIVE  Precautions: Bed/chair alarm  Fall Risk: Standard fall risk    WEIGHT BEARING RESTRICTION       AM-PAC ‘6-Clicks’ Inpatient Daily Activity Short Form  -   Putting on and taking off regular lower body clothing?: None  -   Bathing (including washing, rinsing, drying)?: None  -   Toileting, which includes using toilet, bedpan or urinal? : None  -    Putting on and taking off regular upper body clothing?: None  -   Taking care of personal grooming such as brushing teeth?: None  -   Eating meals?: None    AM-PAC Score:  Score: 24  Approx Degree of Impairment: 0%  Standardized Score (AM-PAC Scale): 57.54    ADDITIONAL TESTS     NEUROLOGICAL FINDINGS      PLAN   Patient has been evaluated and presents with no skilled Occupational Therapy needs at this time.  Patient discharged from Occupational Therapy services.  Please re-order if a new functional limitation presents during this admission.         Patient Evaluation Complexity Level:   Occupational Profile/Medical History LOW - Brief history including review of medical or therapy records    Specific performance deficits impacting engagement in ADL/IADL LOW  1 - 3 performance deficits    Client Assessment/Performance Deficits LOW - No comorbidities nor modifications of tasks    Clinical Decision Making LOW - Analysis of occupational profile, problem-focused assessments, limited treatment options    Overall Complexity LOW     OT Session Time: 20 minutes  Self-Care Home Management: 12 minutes

## 2025-05-16 NOTE — PROGRESS NOTES
INFECTIOUS DISEASE  PROGRESS NOTE            [facility]    Luis M Estrada Patient Status:  Inpatient    3/25/1965 MRN [id number]   Location [facility] 3SW-A Attending Rafael Sherwood, *   Hosp Day # 1 PCP SILVER GARCIA, DO     Antibiotics: cefazolin #1    Subjective:  : comfortable    Objective:  Temp:  [97.9 °F (36.6 °C)-98.5 °F (36.9 °C)] 97.9 °F (36.6 °C)  Pulse:  [69-81] 81  Resp:  [18-26] 20  BP: (120-143)/(57-95) 134/89  SpO2:  [94 %-100 %] 95 %    General: awake alert  Vital signs:Temp:  [97.9 °F (36.6 °C)-98.5 °F (36.9 °C)] 97.9 °F (36.6 °C)  Pulse:  [69-81] 81  Resp:  [18-26] 20  BP: (120-143)/(57-95) 134/89  SpO2:  [94 %-100 %] 95 %  HEENT: Moist mucous membranes. Extraocular muscles are intact.  Neck: supple no masses  Respiratory: Non labored, symmetric excursion, normal respirations  Cardiovascular: no irregularities in rhythm  Abdomen: Soft, nontender, nondistended.   Musculoskeletal left leg swelling and redness, swelling improved  Labs:       Recent Labs   Lab 25  0507   RBC 4.47   HGB 14.6   HCT 41.8   MCV 93.5   MCH 32.7   MCHC 34.9   RDW 12.7   NEPRELIM 3.78   WBC 5.8   .0         Recent Labs   Lab 05/15/25  1012 25  0507   GLU 93 94   BUN 15 15   CREATSERUM 0.95 0.91   CA 8.6* 9.0   ALB 3.9 4.0    141   K 4.0 3.9    107   CO2 26.0 26.0   ALKPHO 58 60   AST 17 16   ALT 10 8*   BILT 1.3* 0.8   TP 6.7 6.9       Vancomycin Trough (ug/mL)   Date Value   2024 22.2 (H)         Problem list reviewed:  Problem List[1]          ASSESSMENT/PLAN:  1. Left leg cellulitis  Underlying chronic ulcerations, and lymphedema     Has had multiple episodes of cellulitis, last in 2204  CW acute strep cellulitis  Blood cultures sent in ED     Swelling improved, on iv cefazolin    Transition to PO when improving and ready for discharged, consider extending on prolonged course of PCN VK      Tigre Short MD, MD [facility]  [phone]         [1]   Patient Active Problem List  Diagnosis    Stasis dermatitis of both legs    Cellulitis    Fungal dermatitis    Chronic venous insufficiency    Decreased pulses in feet    Morbid obesity (HCC)    Cellulitis of left leg    Leukocytosis    Fever    Hyponatremia    Sepsis without acute organ dysfunction (HCC)    Constipation    left ankle sx  global exp 3/1/17    YUVAL (acute kidney injury)    Cellulitis of left foot         Global exp 2-24-17 / LEFT FOOT / [facility]     Cellulitis of right leg    Lymphedema    Hydrocephalus (HCC)    Screening for malignant neoplasm of colon    Left leg cellulitis    Wound infection    Cellulitis of left lower extremity    Cellulitis of right lower extremity    Chronic venous stasis    Pulmonary venous congestion

## 2025-05-16 NOTE — PROGRESS NOTES
[facility]  Inpatient Wound Care Contact Note    Luis M Estrada Patient Status:  Inpatient    3/25/1965 MRN [id number]   Location [facility] 3SW-A Attending Rafael Sherwood, *   Hosp Day # 1 PCP SILVER GARCIA, DO     Attempted to see patient for follow up wound care assessment/session. Patient is currently unavailable secondary to being in ultrasound.     We will continue to follow this patient while in-house and assist with wound care discharge planning.    Please call [phone] if any questions.      Thank you,    Kelby Salamanca, BSN   [facility]  [address] [address] [phone]

## 2025-05-16 NOTE — OCCUPATIONAL THERAPY NOTE
OT orders received, pt chart reviewed. Per discussion with PT, pt with no ADL or functional mobility concerns at this time, has been up to the bathroom IND. OT will sign off. Please re-order if a new functional limitation presents this admission.

## 2025-05-16 NOTE — PROGRESS NOTES
[facility]   part of [facility]     Hospitalist Progress Note     Luis M Estrada Patient Status:  Inpatient    3/25/1965 MRN [id number]   Location [facility] 3SW-A Attending Presley, Rafael Zamudio, *   Hosp Day # 1 PCP SILVER GARCIA, DO     Chief Complaint: LE wounds    Subjective:      - Pt feeling well, denies acute complaints, states LLE discomfort improving   - Denies new f/c, cp, sob, abd pain, n/v    Objective:    Review of Systems:   A comprehensive review of systems was completed; pertinent positive and negatives stated in subjective.    Vital signs:  Temp:  [97.6 °F (36.4 °C)-98.5 °F (36.9 °C)] 98.1 °F (36.7 °C)  Pulse:  [67-75] 75  Resp:  [16-26] 18  BP: (120-149)/(57-95) 124/57  SpO2:  [94 %-100 %] 94 %    Physical Exam:    General: No acute distress  Respiratory: no wheezes, no rhonchi  Cardiovascular: S1, S2, regular rate and rhythm  Abdomen: Soft, Non-tender, non-distended, positive bowel sounds  Neuro: No new focal deficits.   Extremities: LLE with improved eyrthema, ongoing wound in anterolateral calf with opaque drainage noted     Diagnostic Data:    Labs:  Recent Labs   Lab 05/15/25  0935 25  0507   WBC 4.4 5.8   HGB 15.2 14.6   MCV 92.5 93.5   .0 235.0   INR  --  1.35*       Recent Labs   Lab 05/15/25  1012 25  0507   GLU 93 94   BUN 15 15   CREATSERUM 0.95 0.91   CA 8.6* 9.0   ALB 3.9 4.0    141   K 4.0 3.9    107   CO2 26.0 26.0   ALKPHO 58 60   AST 17 16   ALT 10 8*   BILT 1.3* 0.8   TP 6.7 6.9       Estimated Creatinine Clearance: 80.7 mL/min (based on SCr of 0.91 mg/dL).    Recent Labs   Lab 05/15/25  1012   TROPHS 3       Recent Labs   Lab 25  0507   PTP 16.8*   INR 1.35*                  Microbiology    No results found for this visit on 05/15/25.      Imaging: Reviewed in Epic.    Medications: Scheduled Medications[1]    Assessment & Plan:      # LLE cellulitis   # Chronic LLE wounds   - IV abx continued, f/u Bcx   - US duplex  without DVT in LLE    - US arterial duplex ordered   - A1c 5.4    - XR Tib/fib without evidence of osteomyelitis   - wound care MD, ID consulted   - Prior TTE 7/17/24 with EF 60-65%, no RWMA, no significant valvular abnormalities; repeat TTE ordered to r/o CHF     # Morbid Obesity - Body mass index is 50.9 kg/m².  # Hydrocephalus     Rafael Robb Sherwood MD    Supplementary Documentation:     Quality:  DVT Mechanical Prophylaxis:   SCDs, Early ambuation  DVT Pharmacologic Prophylaxis   Medication    enoxaparin (Lovenox) 80 MG/0.8ML SUBQ injection 70 mg                Code Status: Full Code  Cooper: No urinary catheter in place  Cooper Duration (in days):   Central line:    YAA:     The 21st Century Cures Act makes medical notes like these available to patients in the interest of transparency. Please be advised this is a medical document. Medical documents are intended to carry relevant information, facts as evident, and the clinical opinion of the practitioner. The medical note is intended as peer to peer communication and may appear blunt or direct. It is written in medical language and may contain abbreviations or verbiage that are unfamiliar.                       [1]    ceFAZolin  2 g Intravenous Q8H    enoxaparin  0.5 mg/kg Subcutaneous q12h

## 2025-05-16 NOTE — PLAN OF CARE
Patient AO x 4. Ambulates to bathroom, voiding without difficulty.  Medications given as ordered.  Denies pain. ECHO and BLE dopplers completed. PT/OT eval-no needs. Plan for IV antibx and wound care consult.

## 2025-05-17 LAB — UFH PPP CHRO-ACNC: 0.71 IU/ML

## 2025-05-17 PROCEDURE — 99232 SBSQ HOSP IP/OBS MODERATE 35: CPT | Performed by: INTERNAL MEDICINE

## 2025-05-17 RX ORDER — CEPHALEXIN 500 MG/1
500 CAPSULE ORAL 4 TIMES DAILY
Qty: 28 CAPSULE | Refills: 0 | Status: SHIPPED | OUTPATIENT
Start: 2025-05-17 | End: 2025-05-24

## 2025-05-17 RX ORDER — SODIUM HYPOCHLORITE 2.5 MG/ML
SOLUTION TOPICAL AS NEEDED
Status: DISCONTINUED | OUTPATIENT
Start: 2025-05-17 | End: 2025-05-18

## 2025-05-17 RX ORDER — ENOXAPARIN SODIUM 100 MG/ML
0.35 INJECTION SUBCUTANEOUS EVERY 12 HOURS
Status: DISCONTINUED | OUTPATIENT
Start: 2025-05-17 | End: 2025-05-18

## 2025-05-17 RX ORDER — SODIUM HYPOCHLORITE 2.5 MG/ML
SOLUTION TOPICAL
Qty: 473 ML | Refills: 0 | Status: SHIPPED | OUTPATIENT
Start: 2025-05-17

## 2025-05-17 NOTE — PROGRESS NOTES
INFECTIOUS DISEASE  PROGRESS NOTE            [facility]    Luis M Estrada Patient Status:  Inpatient    3/25/1965 MRN [id number]   Location [facility] 3SW-A Attending Rafael Sherwood, *   Hosp Day # 2 PCP SILVER GARCIA, DO     Antibiotics: cefazolin #3    Subjective:  Afebrile. Blood cultures NG x 1 day. Patient reports improvement in leg swelling and color, pain is also improved.     Objective:  Temp:  [97.8 °F (36.6 °C)-98.5 °F (36.9 °C)] 97.8 °F (36.6 °C)  Pulse:  [] 100  Resp:  [16-20] 18  BP: (113-149)/(66-89) 113/66  SpO2:  [91 %-98 %] 97 %    General: awake alert  Vital signs:Temp:  [97.8 °F (36.6 °C)-98.5 °F (36.9 °C)] 97.8 °F (36.6 °C)  Pulse:  [] 100  Resp:  [16-20] 18  BP: (113-149)/(66-89) 113/66  SpO2:  [91 %-98 %] 97 %  HEENT: Moist mucous membranes. Extraocular muscles are intact.  Neck: supple no masses  Respiratory: Non labored, symmetric excursion, normal respirations  Cardiovascular: no irregularities in rhythm  Abdomen: Soft, nontender, nondistended.   Musculoskeletal left leg as pictured below - improved from prior based on photos      Labs:       Recent Labs   Lab 25  0507   RBC 4.47   HGB 14.6   HCT 41.8   MCV 93.5   MCH 32.7   MCHC 34.9   RDW 12.7   NEPRELIM 3.78   WBC 5.8   .0         Recent Labs   Lab 05/15/25  1012 25  0507   GLU 93 94   BUN 15 15   CREATSERUM 0.95 0.91   CA 8.6* 9.0   ALB 3.9 4.0    141   K 4.0 3.9    107   CO2 26.0 26.0   ALKPHO 58 60   AST 17 16   ALT 10 8*   BILT 1.3* 0.8   TP 6.7 6.9       Vancomycin Trough (ug/mL)   Date Value   2024 22.2 (H)         Problem list reviewed:  Problem List[1]          ASSESSMENT/PLAN:  1. Left leg cellulitis  Underlying chronic ulcerations, and lymphedema  Has had multiple episodes of cellulitis, last in 2024  CW acute strep cellulitis  Blood cultures sent in ED, NG x 1 day thus far  Swelling, redness and pain improved, on iv cefazolin    - From ID  standpoint OK for discharge tomorrow solong as remains afebrile and blood cultures remain negative x 48hours. PO keflex ordered for discharge x 7 days.       [facility]            [1]   Patient Active Problem List  Diagnosis    Stasis dermatitis of both legs    Cellulitis    Fungal dermatitis    Chronic venous insufficiency    Decreased pulses in feet    Morbid obesity (HCC)    Cellulitis of left leg    Leukocytosis    Fever    Hyponatremia    Sepsis without acute organ dysfunction (HCC)    Constipation    left ankle sx  global exp 3/1/17    YUVAL (acute kidney injury)    Cellulitis of left foot         Global exp 2-24-17 / LEFT FOOT / [facility]     Cellulitis of right leg    Lymphedema    Hydrocephalus (HCC)    Screening for malignant neoplasm of colon    Left leg cellulitis    Wound infection    Cellulitis of left lower extremity    Cellulitis of right lower extremity    Chronic venous stasis    Pulmonary venous congestion

## 2025-05-17 NOTE — PROGRESS NOTES
Pharmacy Progress Note:  Anticoagulation Dose Adjustment     Luis M Estrada is a 60 year old male on enoxaparin for VTE prophylaxis.  Anti-factor Xa levels are being monitored due to the patient's high risk status of obesity.    Relevant labs:    Body mass index is 50.9 kg/m².    Wt Readings from Last 1 Encounters:   05/15/25 (!) 147.4 kg (325 lb)       Lab Results   Component Value Date    CREATSERUM 0.91 05/16/2025       Heparin Anti Xa Assay   Date Value Ref Range Status   05/17/2025 0.71 IU/mL Final       Anti Xa level being assessed:  0.71 units/mL (Peak drawn almost 5 hours after dose).  Goal Peak Anti-Xa level:  (Enoxaparin prophylaxis: 0.2-0.4 unit/mL).  Goal Trough Anti-Xa level: </=0.5 unit/mL  (when applicable)    Based on the above, enoxaparin dose will be adjusted to 50 mg, to begin tonight at 2100. Next Anti-factor Xa peak will be ordered to be checked after 4th new dose on 5/19 following morning dose.    Thank you,  Hetal Neves, PharmD  5/17/2025 6:14 PM

## 2025-05-17 NOTE — PROGRESS NOTES
[facility]   part of [facility]     Hospitalist Progress Note     Luis M Estrada Patient Status:  Inpatient    3/25/1965 MRN [id number]   Location [facility] 3SW-A Attending Kenna Juárez MD   Hosp Day # 2 PCP SILVER GARCIA DO     Chief Complaint: LE wounds    Subjective:     No acute complaints    Objective:    Review of Systems:   A comprehensive review of systems was completed; pertinent positive and negatives stated in subjective.    Vital signs:  Temp:  [97.8 °F (36.6 °C)-98.5 °F (36.9 °C)] 97.8 °F (36.6 °C)  Pulse:  [] 100  Resp:  [16-20] 18  BP: (113-149)/(66-89) 113/66  SpO2:  [91 %-98 %] 97 %    Physical Exam:    General: No acute distress  Respiratory: No wheezes, no rhonchi  Cardiovascular: S1, S2, regular rate and rhythm  Abdomen: Soft, Non-tender, non-distended, positive bowel sounds  Neuro: No new focal deficits.   Extremities: LLE wounds, swelling, erythema; improving      Diagnostic Data:    Labs:  Recent Labs   Lab 05/15/25  0935 25  0507   WBC 4.4 5.8   HGB 15.2 14.6   MCV 92.5 93.5   .0 235.0   INR  --  1.35*       Recent Labs   Lab 05/15/25  1012 25  0507   GLU 93 94   BUN 15 15   CREATSERUM 0.95 0.91   CA 8.6* 9.0   ALB 3.9 4.0    141   K 4.0 3.9    107   CO2 26.0 26.0   ALKPHO 58 60   AST 17 16   ALT 10 8*   BILT 1.3* 0.8   TP 6.7 6.9       Estimated Glomerular Filtration Rate: 96 mL/min/1.73m2 (result from lab).    Recent Labs   Lab 05/15/25  1012   TROPHS 3       Recent Labs   Lab 25  0507   PTP 16.8*   INR 1.35*                  Microbiology    Hospital Encounter on 05/15/25   1. Blood Culture     Status: None (Preliminary result)    Collection Time: 05/15/25 10:12 AM    Specimen: Blood,peripheral   Result Value Ref Range    Blood Culture Result No Growth 1 Day N/A         Imaging: Reviewed in Epic.    Medications: Scheduled Medications[1]    Assessment & Plan:      #Nonhealing LLE cellulitis   #Chronic LLE  ulcerations/lymphedema   Presented with marked worsening in swelling and new onset erythema of left leg. DVT ruled out. Arterial dplx with patent vessels. A1c 5.4. XR tib/fib negative for OM. Repeat TTE with EF 55-60%, no RWMA.    -wound care MD, ID following  -Discharge planning; dc 5/18 AM     # Morbid Obesity - Body mass index is 50.9 kg/m².  #Hydrocephalus     Kenna Juárez MD    Supplementary Documentation:     Quality:  DVT Mechanical Prophylaxis:   SCDs, Early ambuation  DVT Pharmacologic Prophylaxis   Medication    enoxaparin (Lovenox) 80 MG/0.8ML SUBQ injection 70 mg                Code Status: Full Code  Cooper: No urinary catheter in place  Cooper Duration (in days):   Central line:    YAA:     Discharge is dependent on: course  At this point Mr. Estrada is expected to be discharge to: home    The 21st Century Cures Act makes medical notes like these available to patients in the interest of transparency. Please be advised this is a medical document. Medical documents are intended to carry relevant information, facts as evident, and the clinical opinion of the practitioner. The medical note is intended as peer to peer communication and may appear blunt or direct. It is written in medical language and may contain abbreviations or verbiage that are unfamiliar.                         [1]    ceFAZolin  2 g Intravenous Q8H    enoxaparin  0.5 mg/kg Subcutaneous q12h

## 2025-05-17 NOTE — PLAN OF CARE
A&Ox4. VSS on RA. /IS. Tele. SCDs, ankle pumps encouraged. Tolerating diet, last BM 5/15. Voiding freely. Pain mananged with current medications. Dressing to LLE C/D/I. Up ad zoya w/ walker. IV abx, cx pending. Patient updated on POC, verbalized agreement. Safety precautions in place, pt instructed to call for assistance, call light within reach.

## 2025-05-17 NOTE — DISCHARGE INSTRUCTIONS
Dressing changes:  Cleanse wound with saline.   Apply dakins soaked gauze  on wound bed.   Cover with foam dressing / abd pad   Secure with conforming gauze / gauze roll.   Compresison : ace wrap.      ELEVATE LOWER EXTREMITIES AS MUCH AS POSSIBLE UNTIL COMPRESSION WRAPS CAN BE APPLIED IN WOUND CLINIC ON MONDAY.                   LOW SALT DIET.         [facility]  Subsidized housing for individuals, families & veterans who have been homeless and meet eligibility.  Intake hours are Monday - Friday from 8am-8pm, Saturday (and holidays) from 8am - 4pm   Walk-ups are seen at our offices at [facility] [address] [place], [place].   If you cannot make it to our location, please call [phone], option 1.        [facility] [address] [address]  # [phone]    [address] [address]  [phone]    [facility] for homeless [facility]:  [address] [place] [address]  #[phone]    [place] [address] [facility] ([place], [place])  # [phone]    [facility] ( 2 locations)  1) [address] [place] [address]       # [phone]  2) [address] [facility] [id number]       # [phone]

## 2025-05-17 NOTE — PLAN OF CARE
Patient alert and oriented x4. VSS on RA. Tele NSR. Pt reports pain controlled. Denies n/t. Redness/pink/moist to LLE wounds. Wound care per orders. Ankle pumps encouraged, lovenox. Up with standby and walker. Voiding in bathroom. Tolerating diet no c/o n/v.     Plan: discharge when cleared by all services, abx, cx pending

## 2025-05-17 NOTE — PROGRESS NOTES
[facility]   part of [facility]    Progress Note    Luis M Estrada Patient Status:  Inpatient    3/25/1965 MRN [id number]   Location [facility] 3SW-A Attending Kenna Juárez MD   Hosp Day # 2 PCP SILVER GARCIA, DO       PT NOT SEEN TODAY    CHART REVIEW / DW NURSE -   WOUND PICTURES FROM THIS MORNING REVIEWED    Contacted by RN for discharge clearance.       OBJECTIVE:  Vital signs in last 24 hours:  /85 (BP Location: Left arm)   Pulse 69   Temp 98.5 °F (36.9 °C) (Oral)   Resp 18   Ht 67\"   Wt (!) 325 lb (147.4 kg)   SpO2 99%   BMI 50.90 kg/m²     Intake/Output:    Intake/Output Summary (Last 24 hours) at 2025 0929  Last data filed at 2025 0545  Gross per 24 hour   Intake 240 ml   Output 350 ml   Net -110 ml       Wt Readings from Last 3 Encounters:   05/15/25 (!) 325 lb (147.4 kg)   25 (!) 330 lb (149.7 kg)   24 (!) 330 lb (149.7 kg)          Imaging:  US ARTERIAL DUPLEX LOWER EXTREMITY BILATERAL (CPT=93925)  Result Date: 2025  CONCLUSION:  The visualized lower extremity arterial vasculature appears patent.   LOCATION:  [place]   Dictated by (CST): Sal Ambriz MD on 2025 at 4:24 PM     Finalized by (CST): aSl Ambriz MD on 2025 at 4:27 PM       XR CHEST AP PORTABLE  (CPT=71045)  Result Date: 5/15/2025  CONCLUSION:  Patient is slightly rotated accentuating heart size.  There is mild pulmonary venous congestion.  Slight bibasilar atelectasis.   LOCATION:  [id number]      Dictated by (CST): Afia Jimenez MD on 5/15/2025 at 1:48 PM     Finalized by (CST): Afia Jimenez MD on 5/15/2025 at 1:49 PM       XR TIBIA + FIBULA (2 VIEWS), LEFT (CPT=73590)  Result Date: 5/15/2025  CONCLUSION:  Edematous changes involving the left calf.  No acute fracture or aggressive cortical destructive process involving the tibia or fibula.   LOCATION:  [id number]   Dictated by (CST): Afia Jimenez MD on 5/15/2025 at 1:47 PM     Finalized by (CST): Afia Jimenez MD on  5/15/2025 at 1:48 PM       US VENOUS DOPPLER LEG LEFT - DIAG IMG (CPT=93971)  Result Date: 5/15/2025  CONCLUSION:  No evidence of DVT in the left lower extremity.   LOCATION:  [place]    Dictated by (CST): Presley Urbina MD on 5/15/2025 at 11:36 AM     Finalized by (CST): Presley Urbina MD on 5/15/2025 at 11:37 AM           Meds:   Current Hospital Medications[1]      Assessment      INFECTED WOUNDS LEFT LOWER EXTREMITY  CELLULITIS LEFT LOWER EXTREMITY  CHRONIC NONHEALING VENOUS LEG ULCER LEFT LEG WITH SUPERIMPOSED INFECTION.   CHRONIC VENOUS HYPERTENSION BILATERAL LOWER EXTREMITIES.   CHRONIC PEDAL EDEMA.   OBESITY CLASS 3  HYDROCEPHALUS.     Problem List[2]    Plan:     Ok to discharge home on oral antibiotics.     Dressing changes:  Cleanse wound with saline.   Apply dakins soaked gauze  on wound bed.   Cover with foam dressing / abd pad   Secure with conforming gauze / gauze roll.   Compresison : ace wrap.     ELEVATE LOWER EXTREMITIES AS MUCH AS POSSIBLE UNTIL COMPRESSION WRAPS CAN BE APPLIED IN WOUND CLINIC ON MONDAY.      LOW SALT DIET.     Questions/concerns were discussed NURSE     Total Time spent with patient and coordinating care:  25 minutes    Lamonte Rivera MD  5/17/2025  9:29 AM         [1]   Current Facility-Administered Medications   Medication Dose Route Frequency    sodium hypochlorite (Dakin's) 0.25 % external solution   Topical PRN    ceFAZolin (Ancef) 2g in 10mL IV syringe premix  2 g Intravenous Q8H    melatonin tab 3 mg  3 mg Oral Nightly PRN    polyethylene glycol (PEG 3350) (Miralax) 17 g oral packet 17 g  17 g Oral Daily PRN    sennosides (Senokot) tab 17.2 mg  17.2 mg Oral Nightly PRN    bisacodyl (Dulcolax) 10 MG rectal suppository 10 mg  10 mg Rectal Daily PRN    ondansetron (Zofran) 4 MG/2ML injection 4 mg  4 mg Intravenous Q6H PRN    prochlorperazine (Compazine) 10 MG/2ML injection 5 mg  5 mg Intravenous Q8H PRN    benzonatate (Tessalon) cap 200 mg  200 mg Oral TID PRN     guaiFENesin (Robitussin) 100 MG/5 ML oral liquid 200 mg  200 mg Oral Q4H PRN    glycerin-hypromellose- (Artificial Tears) 0.2-0.2-1 % ophthalmic solution 1 drop  1 drop Both Eyes QID PRN    sodium chloride (Saline Mist) 0.65 % nasal solution 1 spray  1 spray Each Nare Q3H PRN    enoxaparin (Lovenox) 80 MG/0.8ML SUBQ injection 70 mg  0.5 mg/kg Subcutaneous q12h    acetaminophen (Tylenol) tab 650 mg  650 mg Oral Q4H PRN    Or    HYDROcodone-acetaminophen (Norco) 5-325 MG per tab 1 tablet  1 tablet Oral Q4H PRN    Or    HYDROcodone-acetaminophen (Norco) 5-325 MG per tab 2 tablet  2 tablet Oral Q4H PRN   [2]   Patient Active Problem List  Diagnosis    Stasis dermatitis of both legs    Cellulitis    Fungal dermatitis    Chronic venous insufficiency    Decreased pulses in feet    Morbid obesity (HCC)    Cellulitis of left leg    Leukocytosis    Fever    Hyponatremia    Sepsis without acute organ dysfunction (HCC)    Constipation    left ankle sx  global exp 3/1/17    YUVAL (acute kidney injury)    Cellulitis of left foot         Global exp 2-24-17 / LEFT FOOT / [facility]     Cellulitis of right leg    Lymphedema    Hydrocephalus (HCC)    Screening for malignant neoplasm of colon    Left leg cellulitis    Wound infection    Cellulitis of left lower extremity    Cellulitis of right lower extremity    Chronic venous stasis    Pulmonary venous congestion

## 2025-05-18 VITALS
RESPIRATION RATE: 18 BRPM | HEIGHT: 67 IN | SYSTOLIC BLOOD PRESSURE: 129 MMHG | TEMPERATURE: 98 F | OXYGEN SATURATION: 96 % | DIASTOLIC BLOOD PRESSURE: 69 MMHG | WEIGHT: 315 LBS | HEART RATE: 67 BPM | BODY MASS INDEX: 49.44 KG/M2

## 2025-05-18 PROCEDURE — 99239 HOSP IP/OBS DSCHRG MGMT >30: CPT | Performed by: INTERNAL MEDICINE

## 2025-05-18 NOTE — PROGRESS NOTES
NURSING DISCHARGE NOTE    Discharged Home via Wheelchair.  Accompanied by Support staff  Belongings Taken by patient/family.    Patient cleared for discharge by hospitalist, ID, and wound care. IV removed. Patient educated on all AVS discharge information. Educated on daily wound care dressing changes, all questions answered. Supplies given. Instructed to make follow up appointments. Instructed to  medications from pharmacy. Patient brought down to emergency entrance by floor staff and left with all belongings and paperwork to be driven home by friend.

## 2025-05-18 NOTE — PLAN OF CARE
Patient alert and oriented x4. VSS on RA. Tele NSR. Denies pain, denies n/t. Dressing to LLE wound, CDI, changed this am per orders. Declining SCDs, ankle pumps encouraged, lovenox. Up standby with walker. Voiding in bathroom. Tolerating diet, no c/o n/v.     Plan: discharge home on oral abx this morning

## 2025-05-18 NOTE — PROGRESS NOTES
[facility]   part of [facility]     Hospitalist Progress Note     Luis M Estrada Patient Status:  Inpatient    3/25/1965 MRN [id number]   Location [facility] 3SW-A Attending Kenna Juárez MD   Hosp Day # 3 PCP SILVER GARCIA DO     Chief Complaint: LE wounds    Subjective:     No acute complaints    Objective:    Review of Systems:   A comprehensive review of systems was completed; pertinent positive and negatives stated in subjective.    Vital signs:  Temp:  [97.7 °F (36.5 °C)-98.5 °F (36.9 °C)] 97.9 °F (36.6 °C)  Pulse:  [60-93] 67  Resp:  [18] 18  BP: (107-131)/(55-80) 129/69  SpO2:  [85 %-98 %] 96 %    Physical Exam:    General: No acute distress  Respiratory: No wheezes, no rhonchi  Cardiovascular: S1, S2, regular rate and rhythm  Abdomen: Soft, Non-tender, non-distended, positive bowel sounds  Neuro: No new focal deficits.   Extremities: LLE wounds, swelling, erythema; improving    Diagnostic Data:    Labs:  Recent Labs   Lab 05/15/25  0935 25  0507   WBC 4.4 5.8   HGB 15.2 14.6   MCV 92.5 93.5   .0 235.0   INR  --  1.35*       Recent Labs   Lab 05/15/25  1012 25  0507   GLU 93 94   BUN 15 15   CREATSERUM 0.95 0.91   CA 8.6* 9.0   ALB 3.9 4.0    141   K 4.0 3.9    107   CO2 26.0 26.0   ALKPHO 58 60   AST 17 16   ALT 10 8*   BILT 1.3* 0.8   TP 6.7 6.9       Estimated Glomerular Filtration Rate: 96 mL/min/1.73m2 (result from lab).    Recent Labs   Lab 05/15/25  1012   TROPHS 3       Recent Labs   Lab 25  0507   PTP 16.8*   INR 1.35*                  Microbiology    Hospital Encounter on 05/15/25   1. Blood Culture     Status: None (Preliminary result)    Collection Time: 05/15/25 10:12 AM    Specimen: Blood,peripheral   Result Value Ref Range    Blood Culture Result No Growth 2 Days N/A         Imaging: Reviewed in Epic.    Medications: Scheduled Medications[1]    Assessment & Plan:      #Nonhealing LLE cellulitis   #Chronic LLE  ulcerations/lymphedema   Presented with marked worsening in swelling and new onset erythema of left leg. DVT ruled out. Arterial dplx with patent vessels. A1c 5.4. XR tib/fib negative for OM. Repeat TTE with EF 55-60%, no RWMA.    -wound care MD, ID following  -Discharge planning; dc 5/18      # Morbid Obesity - Body mass index is 50.9 kg/m².  #Hydrocephalus     Kenna Juárez MD    Supplementary Documentation:     Quality:  DVT Mechanical Prophylaxis:   SCDs, Early ambuation  DVT Pharmacologic Prophylaxis   Medication    enoxaparin (Lovenox) 60 MG/0.6ML SUBQ injection 50 mg                Code Status: Full Code  Cooper: No urinary catheter in place  Cooper Duration (in days):   Central line:    YAA:     Discharge is dependent on: course  At this point Mr. Estrada is expected to be discharge to: home    The 21st Century Cures Act makes medical notes like these available to patients in the interest of transparency. Please be advised this is a medical document. Medical documents are intended to carry relevant information, facts as evident, and the clinical opinion of the practitioner. The medical note is intended as peer to peer communication and may appear blunt or direct. It is written in medical language and may contain abbreviations or verbiage that are unfamiliar.                         [1]    enoxaparin  0.35 mg/kg Subcutaneous q12h    ceFAZolin  2 g Intravenous Q8H

## 2025-05-18 NOTE — PLAN OF CARE
Dressing clean dry and intact. BLE elevated on pillows. pt Denies pain or nausea at this time. Given iv abx as ordered. Plan home tomorrow on po abx. Call light within reach.

## 2025-05-19 ENCOUNTER — PATIENT OUTREACH (OUTPATIENT)
Dept: CASE MANAGEMENT | Age: 60
End: 2025-05-19

## 2025-05-19 ENCOUNTER — TELEPHONE (OUTPATIENT)
Dept: WOUND CARE | Facility: HOSPITAL | Age: 60
End: 2025-05-19

## 2025-05-19 ENCOUNTER — OFFICE VISIT (OUTPATIENT)
Dept: WOUND CARE | Facility: HOSPITAL | Age: 60
End: 2025-05-19
Attending: FAMILY MEDICINE
Payer: OTHER MISCELLANEOUS

## 2025-05-19 VITALS
RESPIRATION RATE: 16 BRPM | TEMPERATURE: 99 F | HEART RATE: 81 BPM | DIASTOLIC BLOOD PRESSURE: 85 MMHG | SYSTOLIC BLOOD PRESSURE: 138 MMHG

## 2025-05-19 DIAGNOSIS — S81.802D OPEN WOUND OF LEFT LOWER LEG, SUBSEQUENT ENCOUNTER: ICD-10-CM

## 2025-05-19 DIAGNOSIS — S81.802D LEG WOUND, LEFT, SUBSEQUENT ENCOUNTER: Primary | ICD-10-CM

## 2025-05-19 PROCEDURE — 29581 APPL MULTLAYER CMPRN SYS LEG: CPT

## 2025-05-19 NOTE — TELEPHONE ENCOUNTER
Patient came in to clinic today to be rewrapped in compression wrap (coflex). Patient was discharged from hospital on 5/18/25. At nurse visit today patient was requesting to receive a note written from provider stating : why he was sent to ER, stating patient was admitted (5/15/25-5/18/25) so that he can provide this note to his work. RN informed pt she would reach out to provider to ask if provider can write this or not. Pt states understanding.

## 2025-05-19 NOTE — DISCHARGE SUMMARY
[facility]     Luis M Estrada Patient Status:  Inpatient    3/25/1965 MRN [id number]   Location [facility] 3SW-A Attending No att. providers found   Hosp Day # 3 PCP SILVER GARCIA DO     Date of Admission: 5/15/2025  Date of Discharge:  2025     Discharge Disposition: Home or Self Care    Discharge Diagnosis:  Nonhealing LLE ulcerations with overlying cellulitis   Lymphedema  Morbid obesity  Hydrocephalus    History of Present Illness:   60 year old male with PMHx chronicLLE wounds, who presents for worsening LE wounds.      Patient notes he has been having issues with chronic left lower extremity wound for the past 5 months, has been followed by wound care.  However notes for the past week he has been having worsening redness, swelling of extremity.  Was seen by wound care physician today in clinic and advised to present to the ER for admission given worsening appearance.  Patient denies any associated fevers, chills, chest pain, shortness of breath, abdominal pain, nausea, vomiting.  Denies any drainage from the wound.  No known prior history of diabetes or peripheral arterial disease.    Brief Synopsis:   #Nonhealing LLE cellulitis   #Chronic LLE ulcerations/lymphedema   Presented with marked worsening in swelling and new onset erythema of left leg. DVT ruled out. Arterial dplx with patent vessels. A1c 5.4. XR tib/fib negative for OM. Repeat TTE with EF 55-60%, no RWMA. He was discharged with course of keflex. Wound care follow up.     Lace+ Score: 53  59-90 High Risk  29-58 Medium Risk  0-28   Low Risk       TCM Follow-Up Recommendation:  LACE 29-58: Moderate Risk of readmission after discharge from the hospital.      Procedures during hospitalization:   NA    Incidental or significant findings and recommendations (brief descriptions):  NA    Lab/Test results pending at Discharge:   NA    Consultants:  ID, wound    Discharge Medication List:     Discharge  Medications        START taking these medications        Instructions Prescription details   cephALEXin 500 MG Caps  Commonly known as: Keflex      Take 1 capsule (500 mg total) by mouth 4 (four) times daily for 7 days.   Stop taking on: May 24, 2025  Quantity: 28 capsule  Refills: 0     sodium hypochlorite 0.25 % Soln  Commonly known as: Dakin's      1 application on affected areas daily.   Quantity: 473 mL  Refills: 0            CONTINUE taking these medications        Instructions Prescription details   Multi-Vitamin/Minerals Tabs      Take 1 tablet by mouth in the morning.   Refills: 0     PreserVision AREDS Caps      Take 1 capsule by mouth every morning.   Refills: 0               Where to Get Your Medications        These medications were sent to [id number] - [place], [place] - [address] [facility] [phone], [phone]  [address] [place] [address]      Phone: [phone]   cephALEXin 500 MG Caps  sodium hypochlorite 0.25 % Soln         [facility]    Follow-up appointment:   Nader Ibrahim DO  [address] [place] [address]  [phone]    Schedule an appointment as soon as possible for a visit in 1 week(s)      Tigre Short MD  [address]  [facility] [address]  [phone]    Schedule an appointment as soon as possible for a visit in 2 week(s)      Lamonte Avilez MD  [address] [facility] [address] [phone]    Follow up on 5/19/2025      [facility]  [address] [facility] [address]  [phone]  Follow up  Follow up at wound clinic on Monday Dr. Rivera    Appointments for Next 30 Days 5/19/2025 - 6/18/2025        Date Arrival Time Visit Type Length Department Provider     5/22/2025  9:00 AM   FOLLOW UP [7197] 15 min [facility] Nicholas Gould MD    Patient Instructions:         Location Instructions:     Your appointment is scheduled at [facility]. The address is&nbsp; [address]  [facility]. To reach Registration, park in the [place] [facility]. Go through the entrance doors located on the ground floor. Veer left past the Information Desk and proceed to the Wound Care Center.  Masks are optional for all patients and visitors, unless otherwise indicated.               5/29/2025  9:00 AM  WC FOLLOW UP [7197] 15 min [facility] Nicholas Gould MD    Patient Instructions:         Location Instructions:     Your appointment is scheduled at [facility]. The address is&nbsp; [address] [facility]. To reach Registration, park in the [facility]. Go through the entrance doors located on the ground floor. Veer left past the Information Desk and proceed to the Wound Care Center.  Masks are optional for all patients and visitors, unless otherwise indicated.               6/5/2025  9:00 AM  WC FOLLOW UP [7197] 15 min [facility] Nicholas Gould MD    Patient Instructions:         Location Instructions:     Your appointment is scheduled at [facility]. The address is&nbsp; [address] [place] [address]. To reach Registration, park in the [facility]. Go through the entrance doors located on the ground floor. Veer left past the Information Desk and proceed to the Wound Care Center.  Masks are optional for all patients and visitors, unless otherwise indicated.               6/12/2025  9:00 AM  WC FOLLOW UP [7197] 15 min [facility] Nicholas Gould MD    Patient Instructions:         Location Instructions:     Your appointment is scheduled at [facility]. The address is&nbsp; [address] [facility]. To reach Registration, park in the [facility]. Go through the entrance doors located on the ground floor. Veer left past the Information Desk and proceed to the Wound Care Center.  Masks are optional for all patients and visitors, unless otherwise  indicated.                      Vital signs:  Temp:  [97.9 °F (36.6 °C)] 97.9 °F (36.6 °C)  Pulse:  [67] 67  Resp:  [18] 18  BP: (129)/(69) 129/69  SpO2:  [96 %] 96 %    Physical Exam:    General: No acute distress   Lungs: clear to auscultation  Cardiovascular: S1, S2  Abdomen: Soft    -----------------------------------------------------------------------------------------------  PATIENT DISCHARGE INSTRUCTIONS: See electronic chart    Kenna Juárez MD    Total time spent on discharge plannin minutes     The  Cures Act makes medical notes like these available to patients in the interest of transparency. Please be advised this is a medical document. Medical documents are intended to carry relevant information, facts as evident, and the clinical opinion of the practitioner. The medical note is intended as peer to peer communication and may appear blunt or direct. It is written in medical language and may contain abbreviations or verbiage that are unfamiliar.      0

## 2025-05-19 NOTE — PAYOR COMM NOTE
--------------  CONTINUED STAY REVIEW  5/17-5/18  Payor: [employer] OUT OF STATE PPO  Subscriber #:  [id number]  Authorization Number: [id number]    Admit date: 5/15/25  Admit time:  4:14 PM    REVIEW DOCUMENTATION:    5/17    General: No acute distress  Respiratory: No wheezes, no rhonchi  Cardiovascular: S1, S2, regular rate and rhythm  Abdomen: Soft, Non-tender, non-distended, positive bowel sounds  Neuro: No new focal deficits.   Extremities: LLE wounds, swelling, erythema; improving      Assessment & Plan:  #Nonhealing LLE cellulitis   #Chronic LLE ulcerations/lymphedema   Presented with marked worsening in swelling and new onset erythema of left leg. DVT ruled out. Arterial dplx with patent vessels. A1c 5.4. XR tib/fib negative for OM. Repeat TTE with EF 55-60%, no RWMA.    -wound care MD, ID following  -Discharge planning; dc 5/18 AM     # Morbid Obesity - Body mass index is 50.9 kg/m².  #Hydrocephalus           MEDICATIONS ADMINISTERED IN LAST 1 DAY:  ceFAZolin (Ancef) 2g in 10mL IV syringe premix       Date Action Dose Route User    Discharged on 5/18/2025 5/18/2025 0813 New Bag 2 g Intravenous Ale Barr, JAYLEN          enoxaparin (Lovenox) 60 MG/0.6ML SUBQ injection 50 mg       Date Action Dose Route User    Discharged on 5/18/2025 5/18/2025 0812 Given 50 mg Subcutaneous (Left Lower Abdomen) Ale Barr, RN            Vitals (last day) before discharge       Date/Time Temp Pulse Resp BP SpO2 Weight O2 Device O2 Flow Rate (L/min) Who    05/18/25 0752 97.9 °F (36.6 °C) 67 18 129/69 96 % -- None (Room air) -- EF    05/18/25 0400 98 °F (36.7 °C) 60 18 107/55 91 % -- None (Room air) 0 L/min     05/18/25 0054 97.7 °F (36.5 °C) -- -- 111/62 -- -- -- -- CC    05/18/25 0052 -- 73 18 -- -- -- -- --     05/17/25 2018 98.4 °F (36.9 °C) 77 18 128/76 98 % -- None (Room air) -- CC    05/17/25 1630 98 °F (36.7 °C) 69 18 131/77 -- -- None (Room air) -- [place]    05/17/25 1145 98.5 °F (36.9 °C) 78 18 119/80 --  -- None (Room air) -- [place]    25 1000 -- 93 -- -- 85 % -- -- -- [place]    25 0800 98.5 °F (36.9 °C) 69 18 130/85 99 % -- None (Room air) -- [place]    25 0545 -- 100 -- -- 97 % -- -- -- AE    25 0430 97.8 °F (36.6 °C) 76 18 113/66 92 % -- None (Room air) -- AE             --------------  DISCHARGE REVIEW    Payor: [facility] OUT OF STATE PPO  Subscriber #:  [id number]  Authorization Number: [id number]    Admit date: 5/15/25  Admit time:   4:14 PM  Discharge Date: 2025 10:10 AM     Admitting Physician: Frank Kinsey MD  Attending Physician:  No att. providers found  Primary Care Physician: Nader Ibrahim DO          Discharge Summary Notes        Discharge Summary signed by Kenna Juárez MD at 2025  7:15 AM       Author: Kenna Juárez MD Specialty: HOSPITALIST, Internal Medicine Author Type: Physician    Filed: 2025  7:15 AM Date of Service: 2025  6:00 PM Status: Addendum    : Kenna Juárez MD (Physician)    Related Notes: Original Note by Kenna Juárez MD (Physician) filed at 2025  7:15 AM           [facility]     Luis M Estrada Patient Status:  Inpatient    3/25/1965 MRN [id number]   Location [facility] 3SW-A Attending No att. providers found   Hosp Day # 3 PCP NADER IBRAHIM DO     Date of Admission: 5/15/2025  Date of Discharge:  2025     Discharge Disposition: Home or Self Care    Discharge Diagnosis:  Nonhealing LLE ulcerations with overlying cellulitis   Lymphedema  Morbid obesity  Hydrocephalus    History of Present Illness:   60 year old male with PMHx chronicLLE wounds, who presents for worsening LE wounds.      Patient notes he has been having issues with chronic left lower extremity wound for the past 5 months, has been followed by wound care.  However notes for the past week he has been having worsening redness, swelling of extremity.  Was seen by wound care physician today in clinic and advised to  present to the ER for admission given worsening appearance.  Patient denies any associated fevers, chills, chest pain, shortness of breath, abdominal pain, nausea, vomiting.  Denies any drainage from the wound.  No known prior history of diabetes or peripheral arterial disease.    Brief Synopsis:   #Nonhealing LLE cellulitis   #Chronic LLE ulcerations/lymphedema   Presented with marked worsening in swelling and new onset erythema of left leg. DVT ruled out. Arterial dplx with patent vessels. A1c 5.4. XR tib/fib negative for OM. Repeat TTE with EF 55-60%, no RWMA. He was discharged with course of keflex. Wound care follow up.     Lace+ Score: 53  59-90 High Risk  29-58 Medium Risk  0-28   Low Risk       TCM Follow-Up Recommendation:  LACE 29-58: Moderate Risk of readmission after discharge from the hospital.      Procedures during hospitalization:   NA    Incidental or significant findings and recommendations (brief descriptions):  NA    Lab/Test results pending at Discharge:   NA    Consultants:  ID, wound    Discharge Medication List:     Discharge Medications        START taking these medications        Instructions Prescription details   cephALEXin 500 MG Caps  Commonly known as: Keflex      Take 1 capsule (500 mg total) by mouth 4 (four) times daily for 7 days.   Stop taking on: May 24, 2025  Quantity: 28 capsule  Refills: 0     sodium hypochlorite 0.25 % Soln  Commonly known as: Dakin's      1 application on affected areas daily.   Quantity: 473 mL  Refills: 0            CONTINUE taking these medications        Instructions Prescription details   Multi-Vitamin/Minerals Tabs      Take 1 tablet by mouth in the morning.   Refills: 0     PreserVision AREDS Caps      Take 1 capsule by mouth every morning.   Refills: 0               Where to Get Your Medications        These medications were sent to [id number] - [place] - [address] [phone], [phone]  [facility] [place] [address]      Phone:  [phone]   cephALEXin 500 MG Caps  sodium hypochlorite 0.25 % Soln         [facility]    Follow-up appointment:   Nader Ibrahim DO  [address] [facility] [address]  [facility] [address]  [phone]    Schedule an appointment as soon as possible for a visit in 1 week(s)      Tigre Short MD  [address] [facility] [address]  [facility] [address]  [phone]    Schedule an appointment as soon as possible for a visit in 2 week(s)      Lamonte Avilez MD  [address] [facility] [address]  [phone]    Follow up on 5/19/2025      [facility]  [address] [place] [address]  [phone]  Follow up  Follow up at wound clinic on Monday Dr. Rivera    Appointments for Next 30 Days 5/19/2025 - 6/18/2025        Date Arrival Time Visit Type Length Department Provider     5/22/2025  9:00 AM   FOLLOW UP [6097] 15 min [facility] Nicholas Gould MD    Patient Instructions:         Location Instructions:     Your appointment is scheduled at [facility]. The address is&nbsp; [address] [place] [address]. To reach Registration, park in [facility]. Go through the entrance doors located on the ground floor. Veer left past the Information Desk and proceed to the Wound Care Center.  Masks are optional for all patients and visitors, unless otherwise indicated.               5/29/2025  9:00 AM   FOLLOW UP [7197] 15 min [facility] Nicholas Gould MD    Patient Instructions:         Location Instructions:     Your appointment is scheduled at [facility]. The address is&nbsp; [address] [place] [address]. To reach Registration, park in the [facility]. Go through the entrance doors located on the ground floor. Veer left past the Information Desk and proceed to the Wound Care Center.  Masks are optional for all patients and visitors, unless otherwise indicated.               6/5/2025   9:00 AM   FOLLOW UP [7197] 15 min [facility] Nicholas Gould MD    Patient Instructions:         Location Instructions:     Your appointment is scheduled at [facility]. The address is&nbsp; [address] [facility]. To reach Registration, park in the [facility]. Go through the entrance doors located on the ground floor. Veer left past the Information Desk and proceed to the Wound Care Center.  Masks are optional for all patients and visitors, unless otherwise indicated.               2025  9:00 AM   FOLLOW UP [7197] 15 min [facility] Nicholas Gould MD    Patient Instructions:         Location Instructions:     Your appointment is scheduled at [facility]. The address is&nbsp; [address] [facility]. To reach Registration, park in the [facility]. Go through the entrance doors located on the ground floor. Veer left past the Information Desk and proceed to the Wound Care Center.  Masks are optional for all patients and visitors, unless otherwise indicated.                      Vital signs:  Temp:  [97.9 °F (36.6 °C)] 97.9 °F (36.6 °C)  Pulse:  [67] 67  Resp:  [18] 18  BP: (129)/(69) 129/69  SpO2:  [96 %] 96 %    Physical Exam:    General: No acute distress   Lungs: clear to auscultation  Cardiovascular: S1, S2  Abdomen: Soft    -----------------------------------------------------------------------------------------------  PATIENT DISCHARGE INSTRUCTIONS: See electronic chart    Kenna Juárez MD    Total time spent on discharge plannin minutes     The  Cures Act makes medical notes like these available to patients in the interest of transparency. Please be advised this is a medical document. Medical documents are intended to carry relevant information, facts as evident, and the clinical opinion of the practitioner. The medical note is intended as peer to peer communication and may appear  blunt or direct. It is written in medical language and may contain abbreviations or verbiage that are unfamiliar.       Electronically signed by Kenna Juárez MD on 5/19/2025  7:15 AM         REVIEWER COMMENTS

## 2025-05-19 NOTE — PROGRESS NOTES
Transitional Care Management   Discharge Date: 25  Contact Date: 2025    Assessment:  TCM Initial Assessment    General:  Assessment completed with: Patient  Patient Subjective: Pt says he had some wound issues and was able to be seen today.  Chief Complaint: Cellulitis of left lower extremity  Verify patient name and  with patient/ caregiver: Yes    Hospital Stay/Discharge:  Prior to leaving the hospital were your Discharge Instructions reviewed with you?: Yes  Did you receive a copy of your written Discharge Instructions?: Yes  Do you feel better or worse since you left the hospital or emergency department?: Same    Follow - Up Appointment:  Do you have a follow-up appointment?: No  Are there any barriers to getting to your follow-up appointment?: No    Home Health/DME:  Prior to leaving the hospital was Home Health (HH) arranged for you?: No     Prior to leaving the hospital or emergency department was Durable Medical Equipment (DME), medical supplies, or infusions arranged for you?: No  Are DME/medical supply/infusions needs identified by staff during this assessment?: No     Medications/Diet:       Were you given a different diet per your Discharge Instructions?: No     Questions/Concerns:  Do you have any questions or concerns that have not been discussed?: No         Follow-up Appointments:  Your appointments       Date & Time Appointment Department (Center)    May 19, 2025 11:15 AM CDT Wound Care Nurse Visit  with WOUND CARE NURSE [facility] ([facility])        May 22, 2025 9:00 AM CDT Wound Care Follow up with Nicholas Gould MD [facility] ([facility])        May 29, 2025 9:00 AM CDT Wound Care Follow up with Nicholas Gould MD [facility] ([facility])        2025 9:00 AM CDT Wound Care Follow up with Nicholas Gould MD [facility] ([facility])        Jun 12, 2025 9:00 AM CDT Wound Care Follow up with Nicholas Gould MD [facility] ([facility])        Jun 19, 2025 9:00 AM CDT Wound Care Follow up with Nicholas Gould MD [facility] ([facility])        Jun 26, 2025 9:00 AM CDT Wound Care Follow up with Nicholas Gould MD [facility] ([facility])              [facility]  [address] [address]  [phone]            Transitional Care Clinic  Was TCC Ordered: No    Primary Care Provider (If no TCC appointment)  Does patient already have a PCP appointment scheduled? No  Care Manager Attempted to schedule PCP office TCM appointment with patient   -If no appointment scheduled: Explain pt says he will call back to schedule.     Specialist  Does the patient have any other follow-up appointment(s) that need to be scheduled? Yes   -If yes: Care Manager reviewed upcoming specialist appointments with patient: Yes   -Does the patient need assistance scheduling appointment(s): No      Book By Date: 6/1/25

## 2025-05-22 ENCOUNTER — OFFICE VISIT (OUTPATIENT)
Dept: WOUND CARE | Facility: HOSPITAL | Age: 60
End: 2025-05-22
Attending: FAMILY MEDICINE
Payer: OTHER MISCELLANEOUS

## 2025-05-22 ENCOUNTER — APPOINTMENT (OUTPATIENT)
Dept: WOUND CARE | Facility: HOSPITAL | Age: 60
End: 2025-05-22
Payer: OTHER MISCELLANEOUS

## 2025-05-22 VITALS
HEART RATE: 69 BPM | DIASTOLIC BLOOD PRESSURE: 77 MMHG | SYSTOLIC BLOOD PRESSURE: 127 MMHG | RESPIRATION RATE: 14 BRPM | TEMPERATURE: 98 F

## 2025-05-22 DIAGNOSIS — S81.802D LEG WOUND, LEFT, SUBSEQUENT ENCOUNTER: Primary | ICD-10-CM

## 2025-05-22 DIAGNOSIS — S81.802D OPEN WOUND OF LEFT LOWER LEG, SUBSEQUENT ENCOUNTER: ICD-10-CM

## 2025-05-22 PROCEDURE — 99215 OFFICE O/P EST HI 40 MIN: CPT | Performed by: FAMILY MEDICINE

## 2025-05-22 NOTE — PROGRESS NOTES
Weekly Wound Education Note    Teaching Provided To: Patient  Training Topics: Discharge instructions, Dressing, Cleasing and general instructions, Compression, Edema control  Training Method: Explain/Verbal, Demonstration  Training Response: Reinforcement needed        Notes: Patient here for follow up wound care visit to left anterior and left lateral lower leg wounds. Edema measurement slightly increased in ankle, decreased in calf. Both wound measurements decreased.Provider cultured left anterior and lateral lower leg wound at visit today. Provider applied silver nitrate to both wounds, recommending zinc, endoform, hydrofera transfer, ABD pad, conforming gauze, tape. Increased compression to comprilan wrap 8cm, 10cm, 12cm, cellona (x2), coban. Educated and demonstrated to patient how to apply his own compression garments to RLE at visit today. Pt to follow up with provider in 1 week.

## 2025-05-22 NOTE — PATIENT INSTRUCTIONS
PATIENT INSTRUCTIONS      FOR Luis M Lomasd ,  3/25/1965    DATE OF SERVICE: 2025      Endoform collagen to the wounds  Hydrofera Blue transfer  ABD or Kerramax and gauze roll  Comprilan 3 layer compression wrap to left lower extremity    See work letter    Follow up with Dr. Gould 1 WEEK      Managing your edema:    Avoid prolonged standing in one place. It is better to have your calf muscles moving to pump fluid out of the legs.  Elevate leg(s) above the level of the heart when sitting or as much as possible.  Take you diuretics as directed by your physician. Do not skip doses or change doses unless instructed to do so by your physician.  Decrease salt intake, follow recommended 2 grams daily.  Do not get leg(s) with compression wrap wet. If wraps are too tight as indicated by pain, numbness/tingling or discoloration of toes remove wrap completely and call the wound center @ [phone].       The treatment plan has been discussed at length between you and your provider. Follow all instructions carefully, it is very important. If you do not follow all instructions you are at risk of your wound not healing, infection, possible loss of limb and even loss of life.    COMPRESSION WRAP PATIENT CARE INSTRUCTIONS  DO NOT get compression wrap wet. When showering, use a shower boot.   Please contact wound clinic and if not able to reach, then go to emergency room if ANY of the following occur:   Tingling or numbness in the foot or toes on leg with compression wrap.  Severe pain that cannot be relieved with elevation and any medication instructed per your doctor.  A fever of 100.4°F (38°C) or higher.  Swelling, coldness, or blue-gray discoloration of the toes.  If the compression wrap feels too tight or too loose.  If the compression wrap is damaged or has rough edges that hurt.  If the compression wrap gets wet, you must cut wrap off.   Drainage from compression wrap that smells different than  usual.

## 2025-05-22 NOTE — PROGRESS NOTES
Chief Complaint   Patient presents with    Wound Recheck     Patient arrives for wound care follow-up with LLE in coflex compression. Denies pain to wounds today. Patient is hoping to get a work note from MD today.       HPI:     Patient here for follow-up of left lower extremity wounds.  From last visit patient was sent to the ER for cellulitis severe of the left lower extremity and was treated in the hospital from 5/15/2025 through 5/18/2025.  He received IV antibiotics and now is on oral Keflex and doing much better.  There is no further redness on the leg he denies any fever or chills or any pain.  He is using Shruti collagen with Hydrofera Blue transfer and Coflex.  Tolerating compression wrap.    Lab Results   Component Value Date    BUN 15 05/16/2025    CREATSERUM 0.91 05/16/2025    ALB 4.0 05/16/2025    TP 6.9 05/16/2025    A1C 5.4 05/15/2025       Medications - Current[1]    Allergies[2]         REVIEW OF SYSTEMS:     Review of Systems (ROS)  This information was obtained from the patient, chart    A comprehensive 10 point review of systems was completed.  Pertinent positives and negatives noted in the the HPI.     Past medical, surgical, family and social history updated where appropriate.    PHYSICAL EXAM:   /77   Pulse 69   Temp 97.8 °F (36.6 °C)   Resp 14    Estimated body mass index is 50.9 kg/m² as calculated from the following:    Height as of 5/15/25: 67\".    Weight as of 5/15/25: 325 lb (147.4 kg).   Vital signs reviewed.Appears stated age, well groomed.        Calf  Point of Measurement - Left Calf: 30     Left Calf from:: Heel  Calf Left cm:: 39.8          Ankle  Point of Measurement - Left Ankle: 10     Left Ankle from:: Heel  Left Ankle cm:: 30.8                Foot                               Wound 01/11/24 #1 Leg Left;Lateral (Active)   Date First Assessed/Time First Assessed: 01/11/24 4225    Wound Number (Wound Clinic Only): #1  Primary Wound Type: Traumatic  Location: Leg  Wound  Location Orientation: Left;Lateral      Assessments 1/11/2024  2:10 PM 5/22/2025  8:31 AM   Wound Image       Drainage Amount Large Small   Drainage Description Yellow;Serous Serosanguineous   Treatments Compression (coflex 2 layer wrap) Compression (comprilian 8cm, 10cm, 12cm, cellona (x2), coban)   Wound Length (cm) 10.6 cm 2.4 cm   Wound Width (cm) 9.5 cm 1.1 cm   Wound Surface Area (cm^2) 100.7 cm^2 2.07 cm^2   Wound Depth (cm) 0.2 cm 0.1 cm   Wound Volume (cm^3) 20.14 cm^3 0.138 cm^3   Wound Healing % -- 99   Margins Well-defined edges Well-defined edges   Non-staged Wound Description Full thickness Full thickness   Leslie-wound Assessment Edema;Hemosiderin staining Edema;Hemosiderin staining;Moist   Wound Granulation Tissue Firm;Pink Pale Grey;Red;Firm   Wound Bed Granulation (%) 40 % 100 %   Wound Bed Slough (%) 60 % --   Wound Odor None None   Tunneling? No No   Undermining? No No   Sinus Tracts? No No       Inactive Orders   Date Order Priority Status Authorizing Provider   05/17/25 0948 Wound dressing Routine Discontinued Lamonte Avilez MD   01/16/25 0857 Debridement Traumatic Left;Lateral Leg Routine Completed Nicholas Gould MD   01/09/25 0935 Debridement Traumatic Left;Lateral Leg Routine Completed Nicholas Gould MD   01/02/25 1505 Wound care Routine Discontinued Braxton Ledbetter MD   01/01/25 0720 Consult to Wound Ostomy Routine Completed Dexter Dick MD     - Reason for Consult:    Wound Care     - Wound Care Reason for Consult:    wounds   11/21/24 0914 Debridement Traumatic Left;Lateral Leg Routine Completed Nicholas Gould MD   07/16/24 2327 Consult to Wound Ostomy Routine Completed Rica Pearson MD     - Reason for Consult:    Wound Care     - Wound Care Reason for Consult:    worsening   06/24/24 1127 Wound care Routine Discontinued Rica Pearson MD   06/06/24 0913 Debridement Traumatic Routine Completed Nicholas Gould MD   05/16/24 0901 Debridement Traumatic Routine Completed  Nicholas Gould MD   05/02/24 1520 Debridement Traumatic Routine Completed Nicholas Gould MD   04/11/24 0959 Debridement Traumatic Routine Completed Nicholas Gould MD   04/04/24 0915 Debridement Traumatic Left;Lateral Leg Routine Completed Nicholas Gould MD   02/15/24 1154 Debridement Traumatic Left;Lateral Leg Routine Completed Nicholas Gould MD   01/18/24 1511 Debridement Traumatic Left;Lateral Leg Routine Completed Nicholas Gould MD   01/11/24 1710 Debridement Traumatic Left;Lateral Leg Routine Completed Nicholas Gould MD       Wound 05/15/25 #2 Leg Left (Active)   Date First Assessed/Time First Assessed: 05/15/25 0821    Wound Number (Wound Clinic Only): #2  Primary Wound Type: Edema  Location: Leg  Wound Location Orientation: Left      Assessments 5/15/2025  8:30 AM 5/22/2025  8:31 AM   Wound Image        Drainage Amount Large Small   Drainage Description Yellow;Serous Serosanguineous   Treatments -- Compression (comprilian 8cm, 10cm, 12cm, cellona (x2), coban)   Wound Length (cm) 16 cm 9 cm   Wound Width (cm) 7 cm 2.4 cm   Wound Surface Area (cm^2) 87.96 cm^2 16.96 cm^2   Wound Depth (cm) 0.1 cm 0.1 cm   Wound Volume (cm^3) 5.864 cm^3 1.131 cm^3   Wound Healing % 71 81   Margins Well-defined edges Well-defined edges   Non-staged Wound Description Full thickness Full thickness   Leslie-wound Assessment Edema;Blanchable erythema Edema;Hemosiderin staining;Blanchable erythema   Wound Granulation Tissue Pink;Firm Pink;Red;Spongy   Wound Bed Granulation (%) 20 % 75 %   Wound Bed Epithelium (%) 20 % 25 %   Wound Bed Slough (%) 60 % --   Wound Odor None None   Tunneling? -- No   Undermining? -- No   Sinus Tracts? -- No       Inactive Orders   Date Order Priority Status Authorizing Provider   05/17/25 0948 Wound dressing Routine Discontinued Lamonte Avilez MD       Compression Wrap 05/22/25 Leg Anterior;Left (Active)   Placement Date/Time: 05/22/25 0902   Location: Leg  Wound Location Orientation:  Anterior;Left      Assessments 2025  8:31 AM   Response to Treatment Well tolerated   Compression Layers Multilayer   Compression Product Type Comprilan 8cm;Comprilan 10cm;Comprilan 12cm;Stockinette 4in;Coban   Dressing Applied Yes (silver nitrate, endoform, hydrofera transfer, ABD pad, conforming gauze, tape)   Compression Wrap Location Toes to Knee   Compression Wrap Status Clean;Dry;Intact       No associated orders.              ASSESSMENT AND PLAN:      1. Leg wound, left, subsequent encounter  - Aerobic Bacterial Culture    2. Open wound of left lower leg, subsequent encounter  - Aerobic Bacterial Culture    Clinically no signs of cellulitis on the left lower extremity the wounds are superficial well granulated silver nitrate applied.  Will switch to endoform collagen and continue Hydrofera Blue transfer and ABD pad or Kerramax.  Will increase the compression wrap to a Comprilan 3 layer compression wrap patient has tolerated Coflex very well and has positive pulses.  We did go over compression wrap precautions and written instruction given.  He will follow-up with me in 1 week.  I also did a wound culture today as patient had previous history of wound cultures with Pseudomonas.  Risks, benefits, and alternatives of current treatment plan discussed in detail.  Questions and concerns addressed. Red flags to RTC or ED reviewed.  Patient (or parent) agrees to plan.      No follow-ups on file.    Also refer to patient instructions.     I spent a total of 40 minutes with this patient's care.  This time included preparing to see the patient (eg, review notes and recent diagnostics), seeing the patient, performing a medically appropriate examination and/or evaluation, counseling and educating the patient, and documenting in the record.          Nicholas Gould M.D.   [facility]   2025    Patient Instructions       PATIENT INSTRUCTIONS      FOR RICK Blandon  3/25/1965    DATE OF SERVICE: 5/22/2025      Endoform collagen to the wounds  Hydrofera Blue transfer  ABD or Kerramax and gauze roll  Comprilan 3 layer compression wrap to left lower extremity    See work letter    Follow up with Dr. Gould 1 WEEK      Managing your edema:    Avoid prolonged standing in one place. It is better to have your calf muscles moving to pump fluid out of the legs.  Elevate leg(s) above the level of the heart when sitting or as much as possible.  Take you diuretics as directed by your physician. Do not skip doses or change doses unless instructed to do so by your physician.  Decrease salt intake, follow recommended 2 grams daily.  Do not get leg(s) with compression wrap wet. If wraps are too tight as indicated by pain, numbness/tingling or discoloration of toes remove wrap completely and call the wound center @ [phone].       The treatment plan has been discussed at length between you and your provider. Follow all instructions carefully, it is very important. If you do not follow all instructions you are at risk of your wound not healing, infection, possible loss of limb and even loss of life.    COMPRESSION WRAP PATIENT CARE INSTRUCTIONS  DO NOT get compression wrap wet. When showering, use a shower boot.   Please contact wound clinic and if not able to reach, then go to emergency room if ANY of the following occur:   Tingling or numbness in the foot or toes on leg with compression wrap.  Severe pain that cannot be relieved with elevation and any medication instructed per your doctor.  A fever of 100.4°F (38°C) or higher.  Swelling, coldness, or blue-gray discoloration of the toes.  If the compression wrap feels too tight or too loose.  If the compression wrap is damaged or has rough edges that hurt.  If the compression wrap gets wet, you must cut wrap off.   Drainage from compression wrap that smells different than usual.                  MISCELLANEOUS INSTRUCTIONS  Supplement with a  daily multivitamin   Low salt diet  Intense blood sugar control - Goal Blood sugar below 180 at all times recommended.  Increase protein intake / consider protein supplements - see below  Elevate extremities at all times when sitting / laying down.  No tobacco use     DIETARY MODIFICATIONS TO HELP WITH WOUND HEALING:          Please follow any restrictions to diet given by your other doctors     Protein: meats, beans, eggs, milk and yogurt particularly Greek yogurt), tofu, soy nuts, soy protein products     Vitamin C: citrus fruits and juices, strawberries, tomatoes, tomato juice, peppers, baked potatoes, spinach, broccoli, cauliflower, [place] sprouts, cabbage     Vitamin A: dark greens, leafy vegetables, orange or yellow vegetables, cantaloupe, fortified dairy products, liver, fortified cereals     Zinc: fortified cereals, red meats, seafood     Consider Bob by [employer] (These are essential branch chain amino acids that help with tissue building and wound healing) and take 2 packets/day. You can order online at [employer] or [employer]     ADDITIONAL REMINDERS:     The treatment plan has been discussed at length with you and your provider. Follow all instructions carefully, it is very important. If you do not follow all instructions, you are at risk of your wound not healing, infection, possible loss of limb and even loss of life.  Please call the clinic at [phone] during regular business hours ( 7:30 AM - 5:30 PM) if you notice increased redness, warmth, pain or pus like drainage or start running a fever greater than 100.3.    For after hour emergencies, please call your primary physician or go to the nearest emergency room.  If your blood pressure is elevated, follow up with your primary care doctor and/or cardiologist as soon as possible.     FOR LEG SWELLING,  LOWER EXTREMITY WOUNDS AND IF USING COMPRESSION:   Managing your edema:    Avoid prolonged standing in one place. It is better to have your calf  muscles moving to pump fluid out of the legs.  Elevate leg(s) above the level of the heart when sitting or as much as possible.  Take you diuretics as directed by your physician. Do not skip doses or change doses unless instructed to do so by your physician.  Decrease salt intake, follow recommended 2 grams daily.  Do not get leg(s) with compression wrap wet. If wraps are too tight as indicated by pain, numbness/tingling or discoloration of toes remove wrap completely and call the wound center @ [phone].       The treatment plan has been discussed at length between you and your provider. Follow all instructions carefully, it is very important. If you do not follow all instructions you are at risk of your wound not healing, infection, possible loss of limb and even loss of life.    COMPRESSION WRAP PATIENT CARE INSTRUCTIONS  DO NOT get compression wrap wet. When showering, use a shower boot.   Please contact wound clinic and if not able to reach, then go to emergency room if ANY of the following occur:   Tingling or numbness in the foot or toes on leg with compression wrap.  Severe pain that cannot be relieved with elevation and any medication instructed per your doctor.  A fever of 100.4°F (38°C) or higher.  Swelling, coldness, or blue-gray discoloration of the toes.  If the compression wrap feels too tight or too loose.  If the compression wrap is damaged or has rough edges that hurt.  If the compression wrap gets wet, you must cut wrap off.   Drainage from compression wrap that smells different than usual.                        [1]   Current Outpatient Medications:     sodium hypochlorite 0.25 % External Solution, 1 application on affected areas daily., Disp: 473 mL, Rfl: 0    cephALEXin 500 MG Oral Cap, Take 1 capsule (500 mg total) by mouth 4 (four) times daily for 7 days., Disp: 28 capsule, Rfl: 0    Multiple Vitamins-Minerals (PRESERVISION AREDS) Oral Cap, Take 1 capsule by mouth every morning., Disp: ,  Rfl:     Multiple Vitamins-Minerals (MULTI-VITAMIN/MINERALS) Oral Tab, Take 1 tablet by mouth in the morning., Disp: , Rfl:   [2] No Known Allergies

## 2025-05-29 ENCOUNTER — APPOINTMENT (OUTPATIENT)
Dept: WOUND CARE | Facility: HOSPITAL | Age: 60
End: 2025-05-29
Attending: FAMILY MEDICINE
Payer: OTHER MISCELLANEOUS

## 2025-05-29 VITALS
TEMPERATURE: 98 F | SYSTOLIC BLOOD PRESSURE: 143 MMHG | HEART RATE: 67 BPM | RESPIRATION RATE: 16 BRPM | DIASTOLIC BLOOD PRESSURE: 89 MMHG

## 2025-05-29 DIAGNOSIS — E66.813 CLASS 3 SEVERE OBESITY DUE TO EXCESS CALORIES WITH SERIOUS COMORBIDITY AND BODY MASS INDEX (BMI) OF 45.0 TO 49.9 IN ADULT: ICD-10-CM

## 2025-05-29 DIAGNOSIS — I87.333 CHRONIC VENOUS HYPERTENSION (IDIOPATHIC) WITH ULCER AND INFLAMMATION OF BILATERAL LOWER EXTREMITY (HCC): Primary | ICD-10-CM

## 2025-05-29 PROCEDURE — 99215 OFFICE O/P EST HI 40 MIN: CPT | Performed by: FAMILY MEDICINE

## 2025-05-29 NOTE — PATIENT INSTRUCTIONS
PATIENT INSTRUCTIONS      FOR Luis M Lomasd ,  3/25/1965    DATE OF SERVICE: 2025    RIGHT LEG:  Hydrofera Blue Transfer  ABD  Comprilan 3 layer compression wrap     LEFT LEG:  Endoform collagen to the wounds  Hydrofera Blue transfer  ABD or Kerramax and gauze roll  Comprilan 3 layer compression wrap         Follow up with Dr. Gould 1 WEEK      Managing your edema:    Avoid prolonged standing in one place. It is better to have your calf muscles moving to pump fluid out of the legs.  Elevate leg(s) above the level of the heart when sitting or as much as possible.  Take you diuretics as directed by your physician. Do not skip doses or change doses unless instructed to do so by your physician.  Decrease salt intake, follow recommended 2 grams daily.  Do not get leg(s) with compression wrap wet. If wraps are too tight as indicated by pain, numbness/tingling or discoloration of toes remove wrap completely and call the wound center @ [phone].       The treatment plan has been discussed at length between you and your provider. Follow all instructions carefully, it is very important. If you do not follow all instructions you are at risk of your wound not healing, infection, possible loss of limb and even loss of life.    COMPRESSION WRAP PATIENT CARE INSTRUCTIONS  DO NOT get compression wrap wet. When showering, use a shower boot.   Please contact wound clinic and if not able to reach, then go to emergency room if ANY of the following occur:   Tingling or numbness in the foot or toes on leg with compression wrap.  Severe pain that cannot be relieved with elevation and any medication instructed per your doctor.  A fever of 100.4°F (38°C) or higher.  Swelling, coldness, or blue-gray discoloration of the toes.  If the compression wrap feels too tight or too loose.  If the compression wrap is damaged or has rough edges that hurt.  If the compression wrap gets wet, you must cut wrap off.   Drainage from  compression wrap that smells different than usual.

## 2025-05-29 NOTE — PROGRESS NOTES
Weekly Wound Education Note    Teaching Provided To: Patient  Training Topics: Dressing, Edema control, Compression, Cleasing and general instructions, Discharge instructions  Training Method: Explain/Verbal, Demonstration  Training Response: Reinforcement needed        Notes: Patient here for follow up wound care visit to left anterior lower leg and left lateral lower leg. New wound to right lower leg. Edema measurement decreased,  left anterior and left lateral lower leg wound measurements decreased. 5/22/25 culture was completed to wounds- provider recommending patient to see ID at this time- [facility] referred. Provider informed patient that he did not need to follow up with infectious disease moving forward as provider thinks wound looks alright, provider also stated he spoke face to face with Han as they discussed no need for ABT therapy at this time. Provider applied silver nitrate to left lower leg wounds, recommending endoform, hydrofer transfer, kerramax, ABD pad, conforming gauze, tape to all wounds.  Right lower leg- hydrofera transfer, kerramax, conforming gauze, tape. Applied BLE comprilian 8cm, 10cm, 12cm, cellona (x2), rosidal to RLE, coban. Patient to follow up with provider in 1 week.

## 2025-05-29 NOTE — PROGRESS NOTES
Chief Complaint   Patient presents with    Wound Care     Patient is here for a wound care follow up. He denies any pain to the wounds.        HPI:     Patient here for follow-up of left lower extremity wound.  He has developed a new wound on the right anterior leg.  He is tolerating Comprilan compression wrap which he applied for the first time last visit.  No complaints.  He is not sure how the right leg wound came about.  He denies any trauma.    Lab Results   Component Value Date    BUN 15 05/16/2025    CREATSERUM 0.91 05/16/2025    ALB 4.0 05/16/2025    TP 6.9 05/16/2025    A1C 5.4 05/15/2025       Medications - Current[1]    Allergies[2]         REVIEW OF SYSTEMS:     Review of Systems (ROS)  This information was obtained from the patient, chart    A comprehensive 10 point review of systems was completed.  Pertinent positives and negatives noted in the the HPI.     Past medical, surgical, family and social history updated where appropriate.    PHYSICAL EXAM:   /89   Pulse 67   Temp 98.1 °F (36.7 °C)   Resp 16    Estimated body mass index is 50.9 kg/m² as calculated from the following:    Height as of 5/15/25: 67\".    Weight as of 5/15/25: 325 lb (147.4 kg).   Vital signs reviewed.Appears stated age, well groomed.        Calf  Point of Measurement - Left Calf: 30  Point of Measurement - Right Calf: 30  Left Calf from:: Heel  Calf Left cm:: 36.7  Right Calf from:: Heel  Right Calf cm:: 45.2    Ankle  Point of Measurement - Left Ankle: 10  Point of Measurement - Right Ankle: 10  Left Ankle from:: Heel  Left Ankle cm:: 27.8     Right Ankle from:: Heel  Right Ankle cm:: 30.6       Foot                               Wound 01/11/24 #1 Leg Left;Lateral (Active)   Date First Assessed/Time First Assessed: 01/11/24 1406    Wound Number (Wound Clinic Only): #1  Primary Wound Type: Traumatic  Location: Leg  Wound Location Orientation: Left;Lateral      Assessments 1/11/2024  2:10 PM 5/29/2025  8:38 AM   Wound  Image       Drainage Amount Large Small   Drainage Description Yellow;Serous Serosanguineous   Treatments Compression (coflex 2 layer wrap) Compression (comprilian 8cm, 10cm, 12cm , cellona (x2), rosidal, coban)   Wound Length (cm) 10.6 cm 0.6 cm   Wound Width (cm) 9.5 cm 0.7 cm   Wound Surface Area (cm^2) 100.7 cm^2 0.33 cm^2   Wound Depth (cm) 0.2 cm 0.1 cm   Wound Volume (cm^3) 20.14 cm^3 0.022 cm^3   Wound Healing % -- 100   Margins Well-defined edges Well-defined edges   Non-staged Wound Description Full thickness Full thickness   Leslie-wound Assessment Edema;Hemosiderin staining Edema;Hemosiderin staining;Moist   Wound Granulation Tissue Firm;Pink Firm;Pink;Pale Grey   Wound Bed Granulation (%) 40 % 100 %   Wound Bed Slough (%) 60 % --   Wound Odor None None   Tunneling? No No   Undermining? No No   Sinus Tracts? No No       Inactive Orders   Date Order Priority Status Authorizing Provider   05/17/25 0948 Wound dressing Routine Discontinued Lamonte Avilez MD   01/16/25 0857 Debridement Traumatic Left;Lateral Leg Routine Completed Nicholas Gould MD   01/09/25 0935 Debridement Traumatic Left;Lateral Leg Routine Completed Nicholas Gould MD   01/02/25 1505 Wound care Routine Discontinued Braxton Ledbetter MD   01/01/25 0720 Consult to Wound Ostomy Routine Completed Dexter Dick MD     - Reason for Consult:    Wound Care     - Wound Care Reason for Consult:    wounds   11/21/24 0914 Debridement Traumatic Left;Lateral Leg Routine Completed Nicholas Gould MD   07/16/24 2327 Consult to Wound Ostomy Routine Completed Rica Pearson MD     - Reason for Consult:    Wound Care     - Wound Care Reason for Consult:    worsening   06/24/24 1127 Wound care Routine Discontinued Rica Pearson MD   06/06/24 0913 Debridement Traumatic Routine Completed Nicholas Gould MD   05/16/24 0901 Debridement Traumatic Routine Completed Nicholas Gould MD   05/02/24 1520 Debridement Traumatic Routine Completed Nicholas Gould  MD   04/11/24 0959 Debridement Traumatic Routine Completed Nicholas Gould MD   04/04/24 0915 Debridement Traumatic Left;Lateral Leg Routine Completed Nicholas Gould MD   02/15/24 1154 Debridement Traumatic Left;Lateral Leg Routine Completed Nicholas Gould MD   01/18/24 1511 Debridement Traumatic Left;Lateral Leg Routine Completed Nicholas Gould MD   01/11/24 1710 Debridement Traumatic Left;Lateral Leg Routine Completed Nicholas Gould MD       Wound 05/15/25 #2 Leg Left (Active)   Date First Assessed/Time First Assessed: 05/15/25 0821    Wound Number (Wound Clinic Only): #2  Primary Wound Type: Edema  Location: Leg  Wound Location Orientation: Left      Assessments 5/15/2025  8:30 AM 5/29/2025  8:36 AM   Wound Image        Drainage Amount Large Large   Drainage Description Yellow;Serous Sanguineous   Treatments -- Compression (comprilian 8cm, 10cm, 12cm , cellona (x2), rosidal, coban)   Wound Length (cm) 16 cm 2.5 cm   Wound Width (cm) 7 cm 2.1 cm   Wound Surface Area (cm^2) 87.96 cm^2 4.12 cm^2   Wound Depth (cm) 0.1 cm 0.1 cm   Wound Volume (cm^3) 5.864 cm^3 0.275 cm^3   Wound Healing % 71 95   Margins Well-defined edges Well-defined edges   Non-staged Wound Description Full thickness Full thickness   Leslie-wound Assessment Edema;Blanchable erythema Edema;Hemosiderin staining;Moist (friable)   Wound Granulation Tissue Pink;Firm Spongy;Pink;Red   Wound Bed Granulation (%) 20 % 100 %   Wound Bed Epithelium (%) 20 % --   Wound Bed Slough (%) 60 % --   Wound Odor None None   Tunneling? -- No   Undermining? -- No   Sinus Tracts? -- No       Inactive Orders   Date Order Priority Status Authorizing Provider   05/17/25 0948 Wound dressing Routine Discontinued Lamonte Avilez MD       Compression Wrap 05/22/25 Leg Anterior;Left (Active)   Placement Date/Time: 05/22/25 0902   Location: Leg  Wound Location Orientation: Anterior;Left      Assessments 5/22/2025  8:31 AM 5/29/2025  8:38 AM   Response to Treatment  Well tolerated Well tolerated   Compression Layers Multilayer Multilayer   Compression Product Type Comprilan 8cm;Comprilan 10cm;Comprilan 12cm;Stockinette 4in;Coban Comprilan 8cm;Comprilan 10cm;Comprilan 12cm;Stockinette 4in;Coban (comprilian 8cm, 10cm, 12cm , cellona (x2), coban)   Dressing Applied Yes (silver nitrate, endoform, hydrofera transfer, ABD pad, conforming gauze, tape) Yes   Compression Wrap Location Toes to Knee Toes to Knee   Compression Wrap Status Clean;Dry;Intact Clean;Dry;Intact       No associated orders.       Wound 05/29/25 #3 Right anterior lower leg Leg Right;Anterior (Active)   Date First Assessed/Time First Assessed: 05/29/25 0826    Wound Number (Wound Clinic Only): #3 Right anterior lower leg  Primary Wound Type: Venous Ulcer  Location: Leg  Wound Location Orientation: Right;Anterior      Assessments 5/29/2025  8:32 AM   Wound Image     Drainage Amount Copious   Drainage Description Serous;Clear   Treatments Compression (comprilian 8cm, 10cm, 12cm , cellona (x2), coban)   Wound Length (cm) 0.2 cm   Wound Width (cm) 0.2 cm   Wound Surface Area (cm^2) 0.03 cm^2   Wound Depth (cm) 0.1 cm   Wound Volume (cm^3) 0.002 cm^3   Margins Well-defined edges   Non-staged Wound Description Full thickness   Leslie-wound Assessment Edema;Hemosiderin staining   Wound Granulation Tissue Spongy;Pink   Wound Bed Granulation (%) 100 %   Wound Odor None   Tunneling? No   Undermining? No   Sinus Tracts? No       No associated orders.       Compression Wrap 05/29/25 Leg Anterior;Right (Active)   Placement Date/Time: 05/29/25 0853   Location: Leg  Wound Location Orientation: Anterior;Right      Assessments 5/29/2025  8:38 AM   Response to Treatment Well tolerated   Compression Layers Multilayer   Compression Product Type Comprilan 8cm;Comprilan 10cm;Comprilan 12cm;Coban;Stockinette 4in   Dressing Applied Yes   Compression Wrap Location Toes to Knee   Compression Wrap Status Clean;Dry;Intact       No associated  orders.              ASSESSMENT AND PLAN:      1. Chronic venous hypertension (idiopathic) with ulcer and inflammation of bilateral lower extremity (HCC)    2. Class 3 severe obesity due to excess calories with serious comorbidity and body mass index (BMI) of 45.0 to 49.9 in adult    There is a very small superficial wound about 2 to 3 mm on the right anterior leg which is draining clear fluid.  No pus drainage no surrounding erythema.  He does have significant swelling of the upper leg on the right.  Will apply Hydrofera Blue transfer and Comprilan 3 layer compression wrap to the right lower extremity.  The left leg wounds anterior and lateral are all doing much better than previous visit.  Silver nitrate applied to these wounds.  He did have a previous recent wound culture from the left leg which showed bacterial growth resistant to multiple antibiotics, I did discuss this with infectious disease Dr. Short and clinically the wound looks good and thus no further antibiotics are needed at this time.  Will monitor the wound clinically.  Will continue with endoform collagen, Hydrofera Blue transfer, Comprilan 3 layer compression wrap to left lower extremity.  Continue compression wrap precautions as per previous visits.  Follow-up with me in about 1 week.  Risks, benefits, and alternatives of current treatment plan discussed in detail.  Questions and concerns addressed. Red flags to RTC or ED reviewed.  Patient (or parent) agrees to plan.      Return in about 1 week (around 6/5/2025).    Also refer to patient instructions.     I spent a total of 40 minutes with this patient's care.  This time included preparing to see the patient (eg, review notes and recent diagnostics), seeing the patient, performing a medically appropriate examination and/or evaluation, counseling and educating the patient, and documenting in the record.          Nicholas Gould M.D.   [facility]   5/29/2025    Patient  Instructions       PATIENT INSTRUCTIONS      FOR Luis M Estrada ,  3/25/1965    DATE OF SERVICE: 2025    RIGHT LEG:  Hydrofera Blue Transfer  ABD  Comprilan 3 layer compression wrap     LEFT LEG:  Endoform collagen to the wounds  Hydrofera Blue transfer  ABD or Kerramax and gauze roll  Comprilan 3 layer compression wrap         Follow up with Dr. Gould 1 WEEK      Managing your edema:    Avoid prolonged standing in one place. It is better to have your calf muscles moving to pump fluid out of the legs.  Elevate leg(s) above the level of the heart when sitting or as much as possible.  Take you diuretics as directed by your physician. Do not skip doses or change doses unless instructed to do so by your physician.  Decrease salt intake, follow recommended 2 grams daily.  Do not get leg(s) with compression wrap wet. If wraps are too tight as indicated by pain, numbness/tingling or discoloration of toes remove wrap completely and call the wound center @ [phone].       The treatment plan has been discussed at length between you and your provider. Follow all instructions carefully, it is very important. If you do not follow all instructions you are at risk of your wound not healing, infection, possible loss of limb and even loss of life.    COMPRESSION WRAP PATIENT CARE INSTRUCTIONS  DO NOT get compression wrap wet. When showering, use a shower boot.   Please contact wound clinic and if not able to reach, then go to emergency room if ANY of the following occur:   Tingling or numbness in the foot or toes on leg with compression wrap.  Severe pain that cannot be relieved with elevation and any medication instructed per your doctor.  A fever of 100.4°F (38°C) or higher.  Swelling, coldness, or blue-gray discoloration of the toes.  If the compression wrap feels too tight or too loose.  If the compression wrap is damaged or has rough edges that hurt.  If the compression wrap gets wet, you must cut wrap off.    Drainage from compression wrap that smells different than usual.                MISCELLANEOUS INSTRUCTIONS  Supplement with a daily multivitamin   Low salt diet  Intense blood sugar control - Goal Blood sugar below 180 at all times recommended.  Increase protein intake / consider protein supplements - see below  Elevate extremities at all times when sitting / laying down.  No tobacco use     DIETARY MODIFICATIONS TO HELP WITH WOUND HEALING:          Please follow any restrictions to diet given by your other doctors     Protein: meats, beans, eggs, milk and yogurt particularly Greek yogurt), tofu, soy nuts, soy protein products     Vitamin C: citrus fruits and juices, strawberries, tomatoes, tomato juice, peppers, baked potatoes, spinach, broccoli, cauliflower, [place] sprouts, cabbage     Vitamin A: dark greens, leafy vegetables, orange or yellow vegetables, cantaloupe, fortified dairy products, liver, fortified cereals     Zinc: fortified cereals, red meats, seafood     Consider Bob [employer] (These are essential branch chain amino acids that help with tissue building and wound healing) and take 2 packets/day. You can order online at [employer] or [employer]     ADDITIONAL REMINDERS:     The treatment plan has been discussed at length with you and your provider. Follow all instructions carefully, it is very important. If you do not follow all instructions, you are at risk of your wound not healing, infection, possible loss of limb and even loss of life.  Please call the clinic at [phone] during regular business hours ( 7:30 AM - 5:30 PM) if you notice increased redness, warmth, pain or pus like drainage or start running a fever greater than 100.3.    For after hour emergencies, please call your primary physician or go to the nearest emergency room.  If your blood pressure is elevated, follow up with your primary care doctor and/or cardiologist as soon as possible.     FOR LEG SWELLING,  LOWER EXTREMITY  WOUNDS AND IF USING COMPRESSION:   Managing your edema:    Avoid prolonged standing in one place. It is better to have your calf muscles moving to pump fluid out of the legs.  Elevate leg(s) above the level of the heart when sitting or as much as possible.  Take you diuretics as directed by your physician. Do not skip doses or change doses unless instructed to do so by your physician.  Decrease salt intake, follow recommended 2 grams daily.  Do not get leg(s) with compression wrap wet. If wraps are too tight as indicated by pain, numbness/tingling or discoloration of toes remove wrap completely and call the wound center @ [phone].       The treatment plan has been discussed at length between you and your provider. Follow all instructions carefully, it is very important. If you do not follow all instructions you are at risk of your wound not healing, infection, possible loss of limb and even loss of life.    COMPRESSION WRAP PATIENT CARE INSTRUCTIONS  DO NOT get compression wrap wet. When showering, use a shower boot.   Please contact wound clinic and if not able to reach, then go to emergency room if ANY of the following occur:   Tingling or numbness in the foot or toes on leg with compression wrap.  Severe pain that cannot be relieved with elevation and any medication instructed per your doctor.  A fever of 100.4°F (38°C) or higher.  Swelling, coldness, or blue-gray discoloration of the toes.  If the compression wrap feels too tight or too loose.  If the compression wrap is damaged or has rough edges that hurt.  If the compression wrap gets wet, you must cut wrap off.   Drainage from compression wrap that smells different than usual.                        [1]   Current Outpatient Medications:     sodium hypochlorite 0.25 % External Solution, 1 application on affected areas daily., Disp: 473 mL, Rfl: 0    Multiple Vitamins-Minerals (PRESERVISION AREDS) Oral Cap, Take 1 capsule by mouth every morning., Disp: ,  Rfl:     Multiple Vitamins-Minerals (MULTI-VITAMIN/MINERALS) Oral Tab, Take 1 tablet by mouth in the morning., Disp: , Rfl:   [2] No Known Allergies

## 2025-06-05 ENCOUNTER — OFFICE VISIT (OUTPATIENT)
Dept: WOUND CARE | Facility: HOSPITAL | Age: 60
End: 2025-06-05
Attending: FAMILY MEDICINE
Payer: COMMERCIAL

## 2025-06-05 VITALS
RESPIRATION RATE: 16 BRPM | DIASTOLIC BLOOD PRESSURE: 92 MMHG | SYSTOLIC BLOOD PRESSURE: 148 MMHG | TEMPERATURE: 98 F | HEART RATE: 66 BPM

## 2025-06-05 DIAGNOSIS — E66.813 CLASS 3 SEVERE OBESITY DUE TO EXCESS CALORIES WITH SERIOUS COMORBIDITY AND BODY MASS INDEX (BMI) OF 45.0 TO 49.9 IN ADULT: ICD-10-CM

## 2025-06-05 DIAGNOSIS — E66.813 CLASS 3 SEVERE OBESITY DUE TO EXCESS CALORIES WITH SERIOUS COMORBIDITY AND BODY MASS INDEX (BMI) OF 50.0 TO 59.9 IN ADULT: ICD-10-CM

## 2025-06-05 DIAGNOSIS — I87.333 CHRONIC VENOUS HYPERTENSION (IDIOPATHIC) WITH ULCER AND INFLAMMATION OF BILATERAL LOWER EXTREMITY (HCC): Primary | ICD-10-CM

## 2025-06-05 PROCEDURE — 99215 OFFICE O/P EST HI 40 MIN: CPT | Performed by: FAMILY MEDICINE

## 2025-06-05 NOTE — PROGRESS NOTES
Chief Complaint   Patient presents with    Wound Care     Patient here for follow up wound care visit to [facility]. Pt denies any concerns or issues, pt denies any pain.        HPI:     Patient here for follow-up of left lower extremity wound and right lower extremity wound.  He is doing well and tolerated Comprilan wrap without any difficulties.    Lab Results   Component Value Date    BUN 15 05/16/2025    CREATSERUM 0.91 05/16/2025    ALB 4.0 05/16/2025    TP 6.9 05/16/2025    A1C 5.4 05/15/2025       Medications - Current[1]    Allergies[2]         REVIEW OF SYSTEMS:     Review of Systems (ROS)  This information was obtained from the patient, chart    A comprehensive 10 point review of systems was completed.  Pertinent positives and negatives noted in the the HPI.     Past medical, surgical, family and social history updated where appropriate.    PHYSICAL EXAM:   BP (!) 148/92   Pulse 66   Temp 97.8 °F (36.6 °C)   Resp 16    Estimated body mass index is 50.9 kg/m² as calculated from the following:    Height as of 5/15/25: 67\".    Weight as of 5/15/25: 325 lb (147.4 kg).   Vital signs reviewed.Appears stated age, well groomed.        Calf  Point of Measurement - Left Calf: 30  Point of Measurement - Right Calf: 30  Left Calf from:: Heel  Calf Left cm:: 37.5  Right Calf from:: Heel  Right Calf cm:: 40.3    Ankle  Point of Measurement - Left Ankle: 10  Point of Measurement - Right Ankle: 10  Left Ankle from:: Heel  Left Ankle cm:: 27.3     Right Ankle from:: Heel  Right Ankle cm:: 29.3       Foot                               Wound 01/11/24 #1 Leg Left;Lateral (Active)   Date First Assessed/Time First Assessed: 01/11/24 1406    Wound Number (Wound Clinic Only): #1  Primary Wound Type: Traumatic  Location: Leg  Wound Location Orientation: Left;Lateral      Assessments 1/11/2024  2:10 PM 6/5/2025  9:22 AM   Wound Image       Drainage Amount Large --   Drainage Description Yellow;Serous --   Treatments Compression (coflex  2 layer wrap) --   Wound Length (cm) 10.6 cm --   Wound Width (cm) 9.5 cm --   Wound Surface Area (cm^2) 100.7 cm^2 --   Wound Depth (cm) 0.2 cm --   Wound Volume (cm^3) 20.14 cm^3 --   Margins Well-defined edges --   Non-staged Wound Description Full thickness --   Leslie-wound Assessment Edema;Hemosiderin staining --   Wound Granulation Tissue Firm;Pink --   Wound Bed Granulation (%) 40 % --   Wound Bed Slough (%) 60 % --   Wound Odor None --   Tunneling? No --   Undermining? No --   Sinus Tracts? No --       Inactive Orders   Date Order Priority Status Authorizing Provider   05/17/25 0948 Wound dressing Routine Discontinued Lamonte Avilez MD   01/16/25 0857 Debridement Traumatic Left;Lateral Leg Routine Completed Nicholas Gould MD   01/09/25 0935 Debridement Traumatic Left;Lateral Leg Routine Completed Nicholas Gould MD   01/02/25 1505 Wound care Routine Discontinued Braxton Ledbetter MD   01/01/25 0720 Consult to Wound Ostomy Routine Completed Dexter Dick MD     - Reason for Consult:    Wound Care     - Wound Care Reason for Consult:    wounds   11/21/24 0914 Debridement Traumatic Left;Lateral Leg Routine Completed Nicholas Gould MD   07/16/24 2327 Consult to Wound Ostomy Routine Completed Rica Pearson MD     - Reason for Consult:    Wound Care     - Wound Care Reason for Consult:    worsening   06/24/24 1127 Wound care Routine Discontinued Rica Pearson MD   06/06/24 0913 Debridement Traumatic Routine Completed Nicholas Gould MD   05/16/24 0901 Debridement Traumatic Routine Completed Nicholas Gould MD   05/02/24 1520 Debridement Traumatic Routine Completed Nicohlas Gould MD   04/11/24 0959 Debridement Traumatic Routine Completed Nicholas Gould MD   04/04/24 0915 Debridement Traumatic Left;Lateral Leg Routine Completed Nicholas Gould MD   02/15/24 1154 Debridement Traumatic Left;Lateral Leg Routine Completed Nicholas Gould MD   01/18/24 1511 Debridement Traumatic Left;Lateral Leg  Routine Completed Nicholas Gould MD   01/11/24 1710 Debridement Traumatic Left;Lateral Leg Routine Completed Nicholas Gould MD       Wound 05/15/25 #2 Leg Left (Active)   Date First Assessed/Time First Assessed: 05/15/25 0821    Wound Number (Wound Clinic Only): #2  Primary Wound Type: Edema  Location: Leg  Wound Location Orientation: Left      Assessments 5/15/2025  8:30 AM 6/5/2025  8:53 AM   Wound Image        Drainage Amount Large Moderate   Drainage Description Yellow;Serous Sanguineous   Wound Length (cm) 16 cm 1.5 cm   Wound Width (cm) 7 cm 1.4 cm   Wound Surface Area (cm^2) 87.96 cm^2 1.65 cm^2   Wound Depth (cm) 0.1 cm 0.1 cm   Wound Volume (cm^3) 5.864 cm^3 0.11 cm^3   Wound Healing % 71 98   Margins Well-defined edges Well-defined edges   Non-staged Wound Description Full thickness --   Leslie-wound Assessment Edema;Blanchable erythema Hemosiderin staining;Edema   Wound Granulation Tissue Pink;Firm --   Wound Bed Granulation (%) 20 % 100 %   Wound Bed Epithelium (%) 20 % --   Wound Bed Slough (%) 60 % --   Wound Odor None None   Tunneling? -- No   Undermining? -- No   Sinus Tracts? -- No       Inactive Orders   Date Order Priority Status Authorizing Provider   05/17/25 0948 Wound dressing Routine Discontinued Lamonte Avilez MD       Compression Wrap 05/22/25 Leg Anterior;Left (Active)   Placement Date/Time: 05/22/25 0902   Location: Leg  Wound Location Orientation: Anterior;Left      Assessments 5/22/2025  8:31 AM 5/29/2025  8:38 AM   Response to Treatment Well tolerated Well tolerated   Compression Layers Multilayer Multilayer   Compression Product Type Comprilan 8cm;Comprilan 10cm;Comprilan 12cm;Stockinette 4in;Coban Comprilan 8cm;Comprilan 10cm;Comprilan 12cm;Stockinette 4in;Coban (comprilian 8cm, 10cm, 12cm , cellona (x2), coban)   Dressing Applied Yes (silver nitrate, endoform, hydrofera transfer, ABD pad, conforming gauze, tape) Yes   Compression Wrap Location Toes to Knee Toes to Knee    Compression Wrap Status Clean;Dry;Intact Clean;Dry;Intact       No associated orders.       Wound 05/29/25 #3 Leg Right;Anterior;Lower (Active)   Date First Assessed/Time First Assessed: 05/29/25 0826    Wound Number (Wound Clinic Only): #3  Primary Wound Type: Venous Ulcer  Location: Leg  Wound Location Orientation: Right;Anterior;Lower      Assessments 5/29/2025  8:32 AM 6/5/2025  8:49 AM   Wound Image       Drainage Amount Copious None   Drainage Description Serous;Clear --   Treatments Compression (comprilian 8cm, 10cm, 12cm , cellona (x2), coban) --   Wound Length (cm) 0.2 cm 0.1 cm   Wound Width (cm) 0.2 cm 0.1 cm   Wound Surface Area (cm^2) 0.03 cm^2 0.01 cm^2   Wound Depth (cm) 0.1 cm 0 cm   Wound Volume (cm^3) 0.002 cm^3 0 cm^3   Wound Healing % -- 100   Margins Well-defined edges --   Non-staged Wound Description Full thickness Full thickness   Leslie-wound Assessment Edema;Hemosiderin staining Edema;Hemosiderin staining   Wound Granulation Tissue Spongy;Pink --   Wound Bed Granulation (%) 100 % --   Wound Odor None None   Shape -- 100% scab   Tunneling? No No   Undermining? No No   Sinus Tracts? No No       No associated orders.       Compression Wrap 05/29/25 Leg Anterior;Right (Active)   Placement Date/Time: 05/29/25 0853   Location: Leg  Wound Location Orientation: Anterior;Right      Assessments 5/29/2025  8:38 AM   Response to Treatment Well tolerated   Compression Layers Multilayer   Compression Product Type Comprilan 8cm;Comprilan 10cm;Comprilan 12cm;Coban;Stockinette 4in   Dressing Applied Yes   Compression Wrap Location Toes to Knee   Compression Wrap Status Clean;Dry;Intact       No associated orders.              ASSESSMENT AND PLAN:      1. Chronic venous hypertension (idiopathic) with ulcer and inflammation of bilateral lower extremity (HCC)    2. Class 3 severe obesity due to excess calories with serious comorbidity and body mass index (BMI) of 45.0 to 49.9 in adult    3. Class 3 severe  obesity due to excess calories with serious comorbidity and body mass index (BMI) of 50.0 to 59.9 in adult    The wound on the right anterior leg is 0.1 cm and will continue with Comprilan wrap and Hydrofera Blue transfer to the wound.  On the left leg the 2 anterior leg wounds now have resolved into 1 anterior leg wound.  The lateral left leg wound has a skin scab which was carefully removed revealing a superficial granulated wound underneath.  Will apply Hydrofera Blue transfer to the left anterior remaining leg wound and the lateral leg wound ABD pad and continue Comprilan compression wrap to lower extremity.  We did go over compression wrap precautions and written instructions given.  Patient does have compression garments that we ordered for him but he forgot to bring them today and will bring them next week so hopefully we can transition at least to the right leg to his compression garment at next visit.      Risks, benefits, and alternatives of current treatment plan discussed in detail.  Questions and concerns addressed. Red flags to RTC or ED reviewed.  Patient (or parent) agrees to plan.      No follow-ups on file.    Also refer to patient instructions.     I spent a total of 40 minutes with this patient's care.  This time included preparing to see the patient (eg, review notes and recent diagnostics), seeing the patient, performing a medically appropriate examination and/or evaluation, counseling and educating the patient, and documenting in the record.          Nicholas Gould M.D.   [facility]   2025    Patient Instructions       PATIENT INSTRUCTIONS      FOR RICK Blandon 3/25/1965    DATE OF SERVICE: 2025    RIGHT LEG:  Hydrofera Blue Transfer  ABD  Comprilan 3 layer compression wrap     LEFT LEG:  Hydrofera Blue transfer  ABD or Kerramax and gauze roll  Comprilan 3 layer compression wrap         Follow up with Dr. Gould 1 WEEK      Managing your  edema:    Avoid prolonged standing in one place. It is better to have your calf muscles moving to pump fluid out of the legs.  Elevate leg(s) above the level of the heart when sitting or as much as possible.  Take you diuretics as directed by your physician. Do not skip doses or change doses unless instructed to do so by your physician.  Decrease salt intake, follow recommended 2 grams daily.  Do not get leg(s) with compression wrap wet. If wraps are too tight as indicated by pain, numbness/tingling or discoloration of toes remove wrap completely and call the wound center @ [phone].       The treatment plan has been discussed at length between you and your provider. Follow all instructions carefully, it is very important. If you do not follow all instructions you are at risk of your wound not healing, infection, possible loss of limb and even loss of life.    COMPRESSION WRAP PATIENT CARE INSTRUCTIONS  DO NOT get compression wrap wet. When showering, use a shower boot.   Please contact wound clinic and if not able to reach, then go to emergency room if ANY of the following occur:   Tingling or numbness in the foot or toes on leg with compression wrap.  Severe pain that cannot be relieved with elevation and any medication instructed per your doctor.  A fever of 100.4°F (38°C) or higher.  Swelling, coldness, or blue-gray discoloration of the toes.  If the compression wrap feels too tight or too loose.  If the compression wrap is damaged or has rough edges that hurt.  If the compression wrap gets wet, you must cut wrap off.   Drainage from compression wrap that smells different than usual.              MISCELLANEOUS INSTRUCTIONS  Supplement with a daily multivitamin   Low salt diet  Intense blood sugar control - Goal Blood sugar below 180 at all times recommended.  Increase protein intake / consider protein supplements - see below  Elevate extremities at all times when sitting / laying down.  No tobacco use      DIETARY MODIFICATIONS TO HELP WITH WOUND HEALING:          Please follow any restrictions to diet given by your other doctors     Protein: meats, beans, eggs, milk and yogurt particularly Greek yogurt), tofu, soy nuts, soy protein products     Vitamin C: citrus fruits and juices, strawberries, tomatoes, tomato juice, peppers, baked potatoes, spinach, broccoli, cauliflower, O[place]phant sprouts, cabbage     Vitamin A: dark greens, leafy vegetables, orange or yellow vegetables, cantaloupe, fortified dairy products, liver, fortified cereals     Zinc: fortified cereals, red meats, seafood     Consider Bob by [employer] (These are essential branch chain amino acids that help with tissue building and wound healing) and take 2 packets/day. You can order online at [employer] or [employer]     ADDITIONAL REMINDERS:     The treatment plan has been discussed at length with you and your provider. Follow all instructions carefully, it is very important. If you do not follow all instructions, you are at risk of your wound not healing, infection, possible loss of limb and even loss of life.  Please call the clinic at [phone] during regular business hours ( 7:30 AM - 5:30 PM) if you notice increased redness, warmth, pain or pus like drainage or start running a fever greater than 100.3.    For after hour emergencies, please call your primary physician or go to the nearest emergency room.  If your blood pressure is elevated, follow up with your primary care doctor and/or cardiologist as soon as possible.     FOR LEG SWELLING,  LOWER EXTREMITY WOUNDS AND IF USING COMPRESSION:   Managing your edema:    Avoid prolonged standing in one place. It is better to have your calf muscles moving to pump fluid out of the legs.  Elevate leg(s) above the level of the heart when sitting or as much as possible.  Take you diuretics as directed by your physician. Do not skip doses or change doses unless instructed to do so by your physician.  Decrease  salt intake, follow recommended 2 grams daily.  Do not get leg(s) with compression wrap wet. If wraps are too tight as indicated by pain, numbness/tingling or discoloration of toes remove wrap completely and call the wound center @ [phone].       The treatment plan has been discussed at length between you and your provider. Follow all instructions carefully, it is very important. If you do not follow all instructions you are at risk of your wound not healing, infection, possible loss of limb and even loss of life.    COMPRESSION WRAP PATIENT CARE INSTRUCTIONS  DO NOT get compression wrap wet. When showering, use a shower boot.   Please contact wound clinic and if not able to reach, then go to emergency room if ANY of the following occur:   Tingling or numbness in the foot or toes on leg with compression wrap.  Severe pain that cannot be relieved with elevation and any medication instructed per your doctor.  A fever of 100.4°F (38°C) or higher.  Swelling, coldness, or blue-gray discoloration of the toes.  If the compression wrap feels too tight or too loose.  If the compression wrap is damaged or has rough edges that hurt.  If the compression wrap gets wet, you must cut wrap off.   Drainage from compression wrap that smells different than usual.                        [1]   Current Outpatient Medications:     sodium hypochlorite 0.25 % External Solution, 1 application on affected areas daily., Disp: 473 mL, Rfl: 0    Multiple Vitamins-Minerals (PRESERVISION AREDS) Oral Cap, Take 1 capsule by mouth every morning., Disp: , Rfl:     Multiple Vitamins-Minerals (MULTI-VITAMIN/MINERALS) Oral Tab, Take 1 tablet by mouth in the morning., Disp: , Rfl:   [2] No Known Allergies

## 2025-06-05 NOTE — PATIENT INSTRUCTIONS
PATIENT INSTRUCTIONS      FOR Luis M Lomasd ,  3/25/1965    DATE OF SERVICE: 2025    RIGHT LEG:  Hydrofera Blue Transfer  ABD  Comprilan 3 layer compression wrap     LEFT LEG:  Hydrofera Blue transfer  ABD or Kerramax and gauze roll  Comprilan 3 layer compression wrap         Follow up with Dr. Gould 1 WEEK      Managing your edema:    Avoid prolonged standing in one place. It is better to have your calf muscles moving to pump fluid out of the legs.  Elevate leg(s) above the level of the heart when sitting or as much as possible.  Take you diuretics as directed by your physician. Do not skip doses or change doses unless instructed to do so by your physician.  Decrease salt intake, follow recommended 2 grams daily.  Do not get leg(s) with compression wrap wet. If wraps are too tight as indicated by pain, numbness/tingling or discoloration of toes remove wrap completely and call the wound center @ [phone].       The treatment plan has been discussed at length between you and your provider. Follow all instructions carefully, it is very important. If you do not follow all instructions you are at risk of your wound not healing, infection, possible loss of limb and even loss of life.    COMPRESSION WRAP PATIENT CARE INSTRUCTIONS  DO NOT get compression wrap wet. When showering, use a shower boot.   Please contact wound clinic and if not able to reach, then go to emergency room if ANY of the following occur:   Tingling or numbness in the foot or toes on leg with compression wrap.  Severe pain that cannot be relieved with elevation and any medication instructed per your doctor.  A fever of 100.4°F (38°C) or higher.  Swelling, coldness, or blue-gray discoloration of the toes.  If the compression wrap feels too tight or too loose.  If the compression wrap is damaged or has rough edges that hurt.  If the compression wrap gets wet, you must cut wrap off.   Drainage from compression wrap that smells  different than usual.

## 2025-06-05 NOTE — PROGRESS NOTES
.Weekly Wound Education Note    Teaching Provided To: Patient  Training Topics: Dressing, Edema control, Cleasing and general instructions, Compression, Discharge instructions  Training Method: Demonstration, Explain/Verbal  Training Response: Reinforcement needed        Notes: Patient here for follow up wound care visit to right lower leg, left lateral lower leg and left anterior lower leg wounds. Edema measurement decreased in right lower leg and stable in left lower leg. Right lower leg wound measurement decreased, left lateral lower leg wound measurement increased , left anterior lower leg wound measurement decreased. Provider recommending Resta to LLE sorbact, hydrofera transfer, 1/2 ABD pad to left anterior and left lateral lower leg wounds. Applied BLE comprilian 8cm, 10cm, 12cm, cellona (x2), rosidal, coban. Educated patient to bring his newly ordered compression garments to next visit. Pt to follow up with provider in 1 week.

## 2025-06-12 ENCOUNTER — OFFICE VISIT (OUTPATIENT)
Dept: WOUND CARE | Facility: HOSPITAL | Age: 60
End: 2025-06-12
Attending: FAMILY MEDICINE
Payer: COMMERCIAL

## 2025-06-12 VITALS
TEMPERATURE: 98 F | SYSTOLIC BLOOD PRESSURE: 126 MMHG | RESPIRATION RATE: 16 BRPM | HEART RATE: 70 BPM | DIASTOLIC BLOOD PRESSURE: 77 MMHG

## 2025-06-12 DIAGNOSIS — E66.813 CLASS 3 SEVERE OBESITY DUE TO EXCESS CALORIES WITH SERIOUS COMORBIDITY AND BODY MASS INDEX (BMI) OF 45.0 TO 49.9 IN ADULT: ICD-10-CM

## 2025-06-12 DIAGNOSIS — I87.333 CHRONIC VENOUS HYPERTENSION (IDIOPATHIC) WITH ULCER AND INFLAMMATION OF BILATERAL LOWER EXTREMITY (HCC): Primary | ICD-10-CM

## 2025-06-12 PROCEDURE — 99215 OFFICE O/P EST HI 40 MIN: CPT | Performed by: FAMILY MEDICINE

## 2025-06-12 NOTE — PATIENT INSTRUCTIONS
PATIENT INSTRUCTIONS      FOR Luis M RODRIGUEZ Natalie ,  3/25/1965    DATE OF SERVICE: 2025    RIGHT LEG:  Farrow wrap to control swelling daily, may remove at bedtime when leg is elevated     LEFT LEG:  Endoform  Hydrofera Blue transfer  ABD or Kerramax and gauze roll  Comprilan 3 layer compression wrap         Follow up with Dr. Gould 1 WEEK      Managing your edema:    Avoid prolonged standing in one place. It is better to have your calf muscles moving to pump fluid out of the legs.  Elevate leg(s) above the level of the heart when sitting or as much as possible.  Take you diuretics as directed by your physician. Do not skip doses or change doses unless instructed to do so by your physician.  Decrease salt intake, follow recommended 2 grams daily.  Do not get leg(s) with compression wrap wet. If wraps are too tight as indicated by pain, numbness/tingling or discoloration of toes remove wrap completely and call the wound center @ [phone].       The treatment plan has been discussed at length between you and your provider. Follow all instructions carefully, it is very important. If you do not follow all instructions you are at risk of your wound not healing, infection, possible loss of limb and even loss of life.    COMPRESSION WRAP PATIENT CARE INSTRUCTIONS  DO NOT get compression wrap wet. When showering, use a shower boot.   Please contact wound clinic and if not able to reach, then go to emergency room if ANY of the following occur:   Tingling or numbness in the foot or toes on leg with compression wrap.  Severe pain that cannot be relieved with elevation and any medication instructed per your doctor.  A fever of 100.4°F (38°C) or higher.  Swelling, coldness, or blue-gray discoloration of the toes.  If the compression wrap feels too tight or too loose.  If the compression wrap is damaged or has rough edges that hurt.  If the compression wrap gets wet, you must cut wrap off.   Drainage from  compression wrap that smells different than usual.

## 2025-06-12 NOTE — PROGRESS NOTES
Weekly Wound Education Note    Teaching Provided To: Patient  Training Topics: Cleasing and general instructions, Compression, Discharge instructions, Dressing, Edema control  Training Method: Demonstration, Explain/Verbal  Training Response: Reinforcement needed        Notes: Patient here for follow up wound care visit to right anterior leg, left leg, and left lateral lower leg wounds. Edema measurement decreased, all wound measurements decreased. Provider applied silver nitrate to wound at visit. Per provider left anterior and right anterior lower leg wounds are healed. Provider recommending endform, hydrofera ready, conforming gauze, tape. Applied comprilain 8cm, 10cm, 12cm to LLE. Patient provided his own supplies at visit today. Educated and demonstrated to patient how to apply own compression garment (farrow wrap) to RLE. Pt to follow up with provider in 1 week.

## 2025-06-12 NOTE — PROGRESS NOTES
Chief Complaint   Patient presents with    Wound Care     Patient is here for a wound care follow up. He denies any pain or new wound concerns.       HPI:     Patient here for follow-up of bilateral lower extremity wounds.  He is doing well and tolerating compression wrap in both lower extremities without difficulties.  He has no new complaints today.    Lab Results   Component Value Date    BUN 15 05/16/2025    CREATSERUM 0.91 05/16/2025    ALB 4.0 05/16/2025    TP 6.9 05/16/2025    A1C 5.4 05/15/2025       Medications - Current[1]    Allergies[2]         REVIEW OF SYSTEMS:     Review of Systems (ROS)  This information was obtained from the patient, chart    A comprehensive 10 point review of systems was completed.  Pertinent positives and negatives noted in the the HPI.     Past medical, surgical, family and social history updated where appropriate.    PHYSICAL EXAM:   /77   Pulse 70   Temp 98.4 °F (36.9 °C)   Resp 16    Estimated body mass index is 50.9 kg/m² as calculated from the following:    Height as of 5/15/25: 67\".    Weight as of 5/15/25: 325 lb (147.4 kg).   Vital signs reviewed.Appears stated age, well groomed.        Calf  Point of Measurement - Left Calf: 30  Point of Measurement - Right Calf: 30  Left Calf from:: Heel  Calf Left cm:: 36.5  Right Calf from:: Heel  Right Calf cm:: 37.2    Ankle  Point of Measurement - Left Ankle: 10  Point of Measurement - Right Ankle: 10  Left Ankle from:: Heel  Left Ankle cm:: 27     Right Ankle from:: Heel  Right Ankle cm:: 28.3       Foot                               Wound 01/11/24 #1 Leg Left;Lateral (Active)   Date First Assessed/Time First Assessed: 01/11/24 1406    Wound Number (Wound Clinic Only): #1  Primary Wound Type: Traumatic  Location: Leg  Wound Location Orientation: Left;Lateral      Assessments 1/11/2024  2:10 PM 6/12/2025  8:46 AM   Wound Image       Drainage Amount Large Small   Drainage Description Yellow;Serous Serosanguineous    Treatments Compression (coflex 2 layer wrap) --   Wound Length (cm) 10.6 cm 2 cm   Wound Width (cm) 9.5 cm 1 cm   Wound Surface Area (cm^2) 100.7 cm^2 1.57 cm^2   Wound Depth (cm) 0.2 cm 0.1 cm   Wound Volume (cm^3) 20.14 cm^3 0.105 cm^3   Wound Healing % -- 99   Margins Well-defined edges Well-defined edges   Non-staged Wound Description Full thickness Full thickness   Leslie-wound Assessment Edema;Hemosiderin staining Edema;Hemosiderin staining;Dry   Wound Granulation Tissue Firm;Pink Firm;Pink;Red   Wound Bed Granulation (%) 40 % 100 %   Wound Bed Slough (%) 60 % --   Wound Odor None None   Tunneling? No No   Undermining? No No   Sinus Tracts? No No       Inactive Orders   Date Order Priority Status Authorizing Provider   05/17/25 0948 Wound dressing Routine Discontinued Lamonte Avilez MD   01/16/25 0857 Debridement Traumatic Left;Lateral Leg Routine Completed Nicholas Gould MD   01/09/25 0935 Debridement Traumatic Left;Lateral Leg Routine Completed Nicholas Gould MD   01/02/25 1505 Wound care Routine Discontinued Braxton Ledbetter MD   01/01/25 0720 Consult to Wound Ostomy Routine Completed Dexter Dick MD     - Reason for Consult:    Wound Care     - Wound Care Reason for Consult:    wounds   11/21/24 0914 Debridement Traumatic Left;Lateral Leg Routine Completed Nicholas Gould MD   07/16/24 2327 Consult to Wound Ostomy Routine Completed Rica Pearson MD     - Reason for Consult:    Wound Care     - Wound Care Reason for Consult:    worsening   06/24/24 1127 Wound care Routine Discontinued Rica Pearson MD   06/06/24 0913 Debridement Traumatic Routine Completed Nicholas Gould MD   05/16/24 0901 Debridement Traumatic Routine Completed Nicholas Gould MD   05/02/24 1520 Debridement Traumatic Routine Completed Nicholas Gould MD   04/11/24 0959 Debridement Traumatic Routine Completed Nicholas Gould MD   04/04/24 0915 Debridement Traumatic Left;Lateral Leg Routine Completed Nicholas Gould MD    02/15/24 1154 Debridement Traumatic Left;Lateral Leg Routine Completed Nicholas Gould MD   01/18/24 1511 Debridement Traumatic Left;Lateral Leg Routine Completed Nicholas Gould MD   01/11/24 1710 Debridement Traumatic Left;Lateral Leg Routine Completed Nicholas Gould MD       Wound 05/15/25 #2 Leg Left (Active)   Date First Assessed/Time First Assessed: 05/15/25 0821    Wound Number (Wound Clinic Only): #2  Primary Wound Type: Edema  Location: Leg  Wound Location Orientation: Left      Assessments 5/15/2025  8:30 AM 6/12/2025  8:47 AM   Wound Image        Drainage Amount Large None   Drainage Description Yellow;Serous --   Wound Length (cm) 16 cm 0.1 cm   Wound Width (cm) 7 cm 0.1 cm   Wound Surface Area (cm^2) 87.96 cm^2 0.01 cm^2   Wound Depth (cm) 0.1 cm 0 cm   Wound Volume (cm^3) 5.864 cm^3 0 cm^3   Wound Healing % 71 100   Margins Well-defined edges Well-defined edges   Non-staged Wound Description Full thickness Full thickness   Leslie-wound Assessment Edema;Blanchable erythema Hemosiderin staining;Edema   Wound Granulation Tissue Pink;Firm Firm;Pink   Wound Bed Granulation (%) 20 % 100 %   Wound Bed Epithelium (%) 20 % --   Wound Bed Slough (%) 60 % --   Wound Odor None None   Tunneling? -- No   Undermining? -- No   Sinus Tracts? -- No       Inactive Orders   Date Order Priority Status Authorizing Provider   05/17/25 0948 Wound dressing Routine Discontinued Lamonte Avilez MD       Compression Wrap 05/22/25 Leg Anterior;Left (Active)   Placement Date/Time: 05/22/25 0902   Location: Leg  Wound Location Orientation: Anterior;Left      Assessments 5/22/2025  8:31 AM 6/5/2025  8:54 AM   Response to Treatment Well tolerated Well tolerated   Compression Layers Multilayer Multilayer   Compression Product Type Comprilan 8cm;Comprilan 10cm;Comprilan 12cm;Stockinette 4in;Coban Comprilan 8cm;Comprilan 10cm;Comprilan 12cm;Stockinette 4in;Coban (comprilian 8cm, 10cm, 12cm, cellona (x2), rosidal, coban)    Dressing Applied Yes (silver nitrate, endoform, hydrofera transfer, ABD pad, conforming gauze, tape) Yes (Resta, sorbact, hydrofera transfer, 1/2 ABD pad, conforming gauze, tape)   Compression Wrap Location Toes to Knee Toes to Knee   Compression Wrap Status Clean;Dry;Intact Clean;Dry;Intact       No associated orders.       Wound 05/29/25 #3 Leg Right;Anterior;Lower (Active)   Date First Assessed/Time First Assessed: 05/29/25 0826    Wound Number (Wound Clinic Only): #3  Primary Wound Type: Venous Ulcer  Location: Leg  Wound Location Orientation: Right;Anterior;Lower      Assessments 5/29/2025  8:32 AM 6/12/2025  8:47 AM   Wound Image       Drainage Amount Copious None   Drainage Description Serous;Clear --   Treatments Compression (comprilian 8cm, 10cm, 12cm , cellona (x2), coban) --   Wound Length (cm) 0.2 cm 0.1 cm   Wound Width (cm) 0.2 cm 0.1 cm   Wound Surface Area (cm^2) 0.03 cm^2 0.01 cm^2   Wound Depth (cm) 0.1 cm 0 cm   Wound Volume (cm^3) 0.002 cm^3 0 cm^3   Wound Healing % -- 100   Margins Well-defined edges Well-defined edges   Non-staged Wound Description Full thickness Full thickness   Leslie-wound Assessment Edema;Hemosiderin staining Edema;Hemosiderin staining   Wound Granulation Tissue Spongy;Pink Firm;Pink   Wound Bed Granulation (%) 100 % 100 %   Wound Odor None None   Tunneling? No No   Undermining? No No   Sinus Tracts? No No       No associated orders.       Compression Wrap 05/29/25 Leg Anterior;Right (Active)   Placement Date/Time: 05/29/25 0853   Location: Leg  Wound Location Orientation: Anterior;Right      Assessments 5/29/2025  8:38 AM 6/5/2025  8:54 AM   Response to Treatment Well tolerated Well tolerated   Compression Layers Multilayer Multilayer   Compression Product Type Comprilan 8cm;Comprilan 10cm;Comprilan 12cm;Coban;Stockinette 4in Comprilan 8cm;Comprilan 10cm;Comprilan 12cm;Coban;Stockinette 4in (comprilian 8cm, 10cm, 12cm, cellona (x2), rosidal, coban)   Dressing Applied Yes  Yes   Compression Wrap Location Toes to Knee Toes to Knee   Compression Wrap Status Clean;Dry;Intact Clean;Dry;Intact       No associated orders.              ASSESSMENT AND PLAN:      1. Chronic venous hypertension (idiopathic) with ulcer and inflammation of bilateral lower extremity (HCC)    2. Class 3 severe obesity due to excess calories with serious comorbidity and body mass index (BMI) of 45.0 to 49.9 in adult    Clinically the wounds on the right leg are completely healed.  The left leg is improved as well and silver nitrate applied to the lateral leg wound.  Will continue with Comprilan wrap to the left lower extremity 3 layer.  Continue endoform and Hydrofera Blue ready.  To the right lower extremity no further dressing is needed he does have [place] wraps and will apply that to the right lower extremity which he should use on a regular daily basis.  This will help control leg swelling and prevent new wounds.  Patient voiced understanding.      Risks, benefits, and alternatives of current treatment plan discussed in detail.  Questions and concerns addressed. Red flags to RTC or ED reviewed.  Patient (or parent) agrees to plan.      No follow-ups on file.    Also refer to patient instructions.     I spent a total of 45 minutes with this patient's care.  This time included preparing to see the patient (eg, review notes and recent diagnostics), seeing the patient, performing a medically appropriate examination and/or evaluation, counseling and educating the patient, and documenting in the record.          Nicholas Gould M.D.   [facility]   6/12/2025    There are no Patient Instructions on file for this visit.  MISCELLANEOUS INSTRUCTIONS  Supplement with a daily multivitamin   Low salt diet  Intense blood sugar control - Goal Blood sugar below 180 at all times recommended.  Increase protein intake / consider protein supplements - see below  Elevate extremities at all times when sitting  / laying down.  No tobacco use     DIETARY MODIFICATIONS TO HELP WITH WOUND HEALING:          Please follow any restrictions to diet given by your other doctors     Protein: meats, beans, eggs, milk and yogurt particularly Greek yogurt), tofu, soy nuts, soy protein products     Vitamin C: citrus fruits and juices, strawberries, tomatoes, tomato juice, peppers, baked potatoes, spinach, broccoli, cauliflower, [place] sprouts, cabbage     Vitamin A: dark greens, leafy vegetables, orange or yellow vegetables, cantaloupe, fortified dairy products, liver, fortified cereals     Zinc: fortified cereals, red meats, seafood     Consider Bob by [employer] (These are essential branch chain amino acids that help with tissue building and wound healing) and take 2 packets/day. You can order online at [employer] or [employer]     ADDITIONAL REMINDERS:     The treatment plan has been discussed at length with you and your provider. Follow all instructions carefully, it is very important. If you do not follow all instructions, you are at risk of your wound not healing, infection, possible loss of limb and even loss of life.  Please call the clinic at [phone] during regular business hours ( 7:30 AM - 5:30 PM) if you notice increased redness, warmth, pain or pus like drainage or start running a fever greater than 100.3.    For after hour emergencies, please call your primary physician or go to the nearest emergency room.  If your blood pressure is elevated, follow up with your primary care doctor and/or cardiologist as soon as possible.     FOR LEG SWELLING,  LOWER EXTREMITY WOUNDS AND IF USING COMPRESSION:   Managing your edema:    Avoid prolonged standing in one place. It is better to have your calf muscles moving to pump fluid out of the legs.  Elevate leg(s) above the level of the heart when sitting or as much as possible.  Take you diuretics as directed by your physician. Do not skip doses or change doses unless instructed to do  so by your physician.  Decrease salt intake, follow recommended 2 grams daily.  Do not get leg(s) with compression wrap wet. If wraps are too tight as indicated by pain, numbness/tingling or discoloration of toes remove wrap completely and call the wound center @ [phone].       The treatment plan has been discussed at length between you and your provider. Follow all instructions carefully, it is very important. If you do not follow all instructions you are at risk of your wound not healing, infection, possible loss of limb and even loss of life.    COMPRESSION WRAP PATIENT CARE INSTRUCTIONS  DO NOT get compression wrap wet. When showering, use a shower boot.   Please contact wound clinic and if not able to reach, then go to emergency room if ANY of the following occur:   Tingling or numbness in the foot or toes on leg with compression wrap.  Severe pain that cannot be relieved with elevation and any medication instructed per your doctor.  A fever of 100.4°F (38°C) or higher.  Swelling, coldness, or blue-gray discoloration of the toes.  If the compression wrap feels too tight or too loose.  If the compression wrap is damaged or has rough edges that hurt.  If the compression wrap gets wet, you must cut wrap off.   Drainage from compression wrap that smells different than usual.                        [1]   Current Outpatient Medications:     sodium hypochlorite 0.25 % External Solution, 1 application on affected areas daily., Disp: 473 mL, Rfl: 0    Multiple Vitamins-Minerals (PRESERVISION AREDS) Oral Cap, Take 1 capsule by mouth every morning., Disp: , Rfl:     Multiple Vitamins-Minerals (MULTI-VITAMIN/MINERALS) Oral Tab, Take 1 tablet by mouth in the morning., Disp: , Rfl:   [2] No Known Allergies

## 2025-06-19 ENCOUNTER — OFFICE VISIT (OUTPATIENT)
Dept: WOUND CARE | Facility: HOSPITAL | Age: 60
End: 2025-06-19
Attending: FAMILY MEDICINE
Payer: COMMERCIAL

## 2025-06-19 VITALS
SYSTOLIC BLOOD PRESSURE: 121 MMHG | HEART RATE: 69 BPM | TEMPERATURE: 99 F | RESPIRATION RATE: 16 BRPM | DIASTOLIC BLOOD PRESSURE: 73 MMHG

## 2025-06-19 DIAGNOSIS — E66.813 CLASS 3 SEVERE OBESITY DUE TO EXCESS CALORIES WITH SERIOUS COMORBIDITY AND BODY MASS INDEX (BMI) OF 45.0 TO 49.9 IN ADULT: ICD-10-CM

## 2025-06-19 DIAGNOSIS — I87.333 CHRONIC VENOUS HYPERTENSION (IDIOPATHIC) WITH ULCER AND INFLAMMATION OF BILATERAL LOWER EXTREMITY (HCC): Primary | ICD-10-CM

## 2025-06-19 PROCEDURE — 99215 OFFICE O/P EST HI 40 MIN: CPT | Performed by: FAMILY MEDICINE

## 2025-06-19 NOTE — PROGRESS NOTES
Chief Complaint   Patient presents with    Wound Care     Follow-up for wounds BLE. Arrives with velcro wrap to right leg and comprilan wraps to left leg. Compression tolerated well.        HPI:     Patient here for follow-up of left lateral leg wound.  Tolerating Comprilan wrap without any difficulty.  He has no new complaints.  He is wearing his Farrow wrap on his right lower extremity.    Lab Results   Component Value Date    BUN 15 05/16/2025    CREATSERUM 0.91 05/16/2025    ALB 4.0 05/16/2025    TP 6.9 05/16/2025    A1C 5.4 05/15/2025       Medications - Current[1]    Allergies[2]         REVIEW OF SYSTEMS:     Review of Systems (ROS)  This information was obtained from the patient, chart    A comprehensive 10 point review of systems was completed.  Pertinent positives and negatives noted in the the HPI.     Past medical, surgical, family and social history updated where appropriate.    PHYSICAL EXAM:   /73   Pulse 69   Temp 98.6 °F (37 °C)   Resp 16    Estimated body mass index is 50.9 kg/m² as calculated from the following:    Height as of 5/15/25: 67\".    Weight as of 5/15/25: 325 lb (147.4 kg).   Vital signs reviewed.Appears stated age, well groomed.        Calf  Point of Measurement - Left Calf: 30  Point of Measurement - Right Calf: 30  Left Calf from:: Heel  Calf Left cm:: 35.9  Right Calf from:: Heel  Right Calf cm:: 47.8    Ankle  Point of Measurement - Left Ankle: 10  Point of Measurement - Right Ankle: 10  Left Ankle from:: Heel  Left Ankle cm:: 25.8     Right Ankle from:: Heel  Right Ankle cm:: 31.5       Foot                               Wound 01/11/24 #1 Leg Left;Lateral (Active)   Date First Assessed/Time First Assessed: 01/11/24 1406    Wound Number (Wound Clinic Only): #1  Primary Wound Type: Traumatic  Location: Leg  Wound Location Orientation: Left;Lateral      Assessments 1/11/2024  2:10 PM 6/19/2025  8:50 AM   Wound Image       Drainage Amount Large Small   Drainage Description  Yellow;Serous Serosanguineous   Treatments Compression (coflex 2 layer wrap) --   Wound Length (cm) 10.6 cm 1.9 cm   Wound Width (cm) 9.5 cm 1 cm   Wound Surface Area (cm^2) 100.7 cm^2 1.49 cm^2   Wound Depth (cm) 0.2 cm 0.1 cm   Wound Volume (cm^3) 20.14 cm^3 0.099 cm^3   Wound Healing % -- 100   Margins Well-defined edges Well-defined edges   Non-staged Wound Description Full thickness Full thickness   Leslie-wound Assessment Edema;Hemosiderin staining Edema;Hemosiderin staining;Dry   Wound Granulation Tissue Firm;Pink Firm;Pink;Red   Wound Bed Granulation (%) 40 % 100 %   Wound Bed Slough (%) 60 % --   Wound Odor None None   Shape -- Friable   Tunneling? No --   Undermining? No --   Sinus Tracts? No --       Inactive Orders   Date Order Priority Status Authorizing Provider   05/17/25 0948 Wound dressing Routine Discontinued Lamonte Avilez MD   01/16/25 0857 Debridement Traumatic Left;Lateral Leg Routine Completed Nicholas Gould MD   01/09/25 0935 Debridement Traumatic Left;Lateral Leg Routine Completed Nicholas Gould MD   01/02/25 1505 Wound care Routine Discontinued Braxton Ledbetter MD   01/01/25 0720 Consult to Wound Ostomy Routine Completed Dexter Dick MD     - Reason for Consult:    Wound Care     - Wound Care Reason for Consult:    wounds   11/21/24 0914 Debridement Traumatic Left;Lateral Leg Routine Completed Nicholas Gould MD   07/16/24 2327 Consult to Wound Ostomy Routine Completed Rica Pearson MD     - Reason for Consult:    Wound Care     - Wound Care Reason for Consult:    worsening   06/24/24 1127 Wound care Routine Discontinued Rica Pearson MD   06/06/24 0913 Debridement Traumatic Routine Completed Nicholas Gould MD   05/16/24 0901 Debridement Traumatic Routine Completed Nicholas Gould MD   05/02/24 1520 Debridement Traumatic Routine Completed Nicholas Gould MD   04/11/24 0959 Debridement Traumatic Routine Completed Nicholas Gould MD   04/04/24 0915 Debridement Traumatic  Left;Lateral Leg Routine Completed Nicholas Gould MD   02/15/24 1154 Debridement Traumatic Left;Lateral Leg Routine Completed Nicholas Gould MD   01/18/24 1511 Debridement Traumatic Left;Lateral Leg Routine Completed Nicholas Gould MD   01/11/24 1710 Debridement Traumatic Left;Lateral Leg Routine Completed Nicholas Gould MD       Compression Wrap 05/22/25 Leg Anterior;Left (Active)   Placement Date/Time: 05/22/25 0902   Location: Leg  Wound Location Orientation: Anterior;Left      Assessments 5/22/2025  8:31 AM 6/12/2025  8:46 AM   Response to Treatment Well tolerated Well tolerated   Compression Layers Multilayer Multilayer   Compression Product Type Comprilan 8cm;Comprilan 10cm;Comprilan 12cm;Stockinette 4in;Coban Comprilan 8cm;Comprilan 10cm;Comprilan 12cm;Stockinette 4in;Coban (comprilian 8cm, 10cm, 12cm, cellona (x2), coban)   Dressing Applied Yes (silver nitrate, endoform, hydrofera transfer, ABD pad, conforming gauze, tape) Yes (endoform, hydrofera ready, conforming gauze, tape)   Compression Wrap Location Toes to Knee Toes to Knee   Compression Wrap Status Clean;Dry;Intact Clean;Dry;Intact       No associated orders.              ASSESSMENT AND PLAN:      1. Chronic venous hypertension (idiopathic) with ulcer and inflammation of bilateral lower extremity (HCC)    2. Class 3 severe obesity due to excess calories with serious comorbidity and body mass index (BMI) of 45.0 to 49.9 in adult    Clinically the wound looks about the same is well granulated but has not decreased much in size.  Will switch dressing to silver alginate as we have not used that in a long time.  Cover with ABD pad or Kerramax and continue Comprilan 3 layer compression wrap.  Will also run insurance for skin substitutes as this could be an option to help with healing.  Continue the Farrow wrap on the right lower extremity as is very helpful to control swelling.  We did go over compression wrap precautions previously and those  continue moving forward.  Risks, benefits, and alternatives of current treatment plan discussed in detail.  Questions and concerns addressed. Red flags to RTC or ED reviewed.  Patient (or parent) agrees to plan.      Return in about 1 week (around 2025).    Also refer to patient instructions.     I spent a total of 40 minutes with this patient's care.  This time included preparing to see the patient (eg, review notes and recent diagnostics), seeing the patient, performing a medically appropriate examination and/or evaluation, counseling and educating the patient, and documenting in the record.          Nicholas Gould M.D.   [facility]   2025    Patient Instructions       PATIENT INSTRUCTIONS      FOR Luis M Estrada RICK 3/25/1965    DATE OF SERVICE: 2025    RIGHT LEG:  Farrow wrap to control swelling daily, may remove at bedtime when leg is elevated     LEFT LEG:  Silver alginate  ABD or Kerramax and gauze roll  Comprilan 3 layer compression wrap         Follow up with Dr. Gould 1 WEEK      Managing your edema:    Avoid prolonged standing in one place. It is better to have your calf muscles moving to pump fluid out of the legs.  Elevate leg(s) above the level of the heart when sitting or as much as possible.  Take you diuretics as directed by your physician. Do not skip doses or change doses unless instructed to do so by your physician.  Decrease salt intake, follow recommended 2 grams daily.  Do not get leg(s) with compression wrap wet. If wraps are too tight as indicated by pain, numbness/tingling or discoloration of toes remove wrap completely and call the wound center @ [phone].       The treatment plan has been discussed at length between you and your provider. Follow all instructions carefully, it is very important. If you do not follow all instructions you are at risk of your wound not healing, infection, possible loss of limb and even loss of  life.    COMPRESSION WRAP PATIENT CARE INSTRUCTIONS  DO NOT get compression wrap wet. When showering, use a shower boot.   Please contact wound clinic and if not able to reach, then go to emergency room if ANY of the following occur:   Tingling or numbness in the foot or toes on leg with compression wrap.  Severe pain that cannot be relieved with elevation and any medication instructed per your doctor.  A fever of 100.4°F (38°C) or higher.  Swelling, coldness, or blue-gray discoloration of the toes.  If the compression wrap feels too tight or too loose.  If the compression wrap is damaged or has rough edges that hurt.  If the compression wrap gets wet, you must cut wrap off.   Drainage from compression wrap that smells different than usual.          MISCELLANEOUS INSTRUCTIONS  Supplement with a daily multivitamin   Low salt diet  Intense blood sugar control - Goal Blood sugar below 180 at all times recommended.  Increase protein intake / consider protein supplements - see below  Elevate extremities at all times when sitting / laying down.  No tobacco use     DIETARY MODIFICATIONS TO HELP WITH WOUND HEALING:          Please follow any restrictions to diet given by your other doctors     Protein: meats, beans, eggs, milk and yogurt particularly Greek yogurt), tofu, soy nuts, soy protein products     Vitamin C: citrus fruits and juices, strawberries, tomatoes, tomato juice, peppers, baked potatoes, spinach, broccoli, cauliflower, [place] sprouts, cabbage     Vitamin A: dark greens, leafy vegetables, orange or yellow vegetables, cantaloupe, fortified dairy products, liver, fortified cereals     Zinc: fortified cereals, red meats, seafood     Consider Bob by "[employer] (These are essential branch chain amino acids that help with tissue building and wound healing) and take 2 packets/day. You can order online at [employer] or [employer]     ADDITIONAL REMINDERS:     The treatment plan has been discussed at length with you and  your provider. Follow all instructions carefully, it is very important. If you do not follow all instructions, you are at risk of your wound not healing, infection, possible loss of limb and even loss of life.  Please call the clinic at [phone] during regular business hours ( 7:30 AM - 5:30 PM) if you notice increased redness, warmth, pain or pus like drainage or start running a fever greater than 100.3.    For after hour emergencies, please call your primary physician or go to the nearest emergency room.  If your blood pressure is elevated, follow up with your primary care doctor and/or cardiologist as soon as possible.     FOR LEG SWELLING,  LOWER EXTREMITY WOUNDS AND IF USING COMPRESSION:   Managing your edema:    Avoid prolonged standing in one place. It is better to have your calf muscles moving to pump fluid out of the legs.  Elevate leg(s) above the level of the heart when sitting or as much as possible.  Take you diuretics as directed by your physician. Do not skip doses or change doses unless instructed to do so by your physician.  Decrease salt intake, follow recommended 2 grams daily.  Do not get leg(s) with compression wrap wet. If wraps are too tight as indicated by pain, numbness/tingling or discoloration of toes remove wrap completely and call the wound center @ [phone].       The treatment plan has been discussed at length between you and your provider. Follow all instructions carefully, it is very important. If you do not follow all instructions you are at risk of your wound not healing, infection, possible loss of limb and even loss of life.    COMPRESSION WRAP PATIENT CARE INSTRUCTIONS  DO NOT get compression wrap wet. When showering, use a shower boot.   Please contact wound clinic and if not able to reach, then go to emergency room if ANY of the following occur:   Tingling or numbness in the foot or toes on leg with compression wrap.  Severe pain that cannot be relieved with elevation  and any medication instructed per your doctor.  A fever of 100.4°F (38°C) or higher.  Swelling, coldness, or blue-gray discoloration of the toes.  If the compression wrap feels too tight or too loose.  If the compression wrap is damaged or has rough edges that hurt.  If the compression wrap gets wet, you must cut wrap off.   Drainage from compression wrap that smells different than usual.                        [1]   Current Outpatient Medications:     sodium hypochlorite 0.25 % External Solution, 1 application on affected areas daily., Disp: 473 mL, Rfl: 0    Multiple Vitamins-Minerals (PRESERVISION AREDS) Oral Cap, Take 1 capsule by mouth every morning., Disp: , Rfl:     Multiple Vitamins-Minerals (MULTI-VITAMIN/MINERALS) Oral Tab, Take 1 tablet by mouth in the morning., Disp: , Rfl:   [2] No Known Allergies

## 2025-06-19 NOTE — PATIENT INSTRUCTIONS
PATIENT INSTRUCTIONS      FOR Luis M RODRIGUEZ Natalie ,  3/25/1965    DATE OF SERVICE: 2025    RIGHT LEG:  Farrow wrap to control swelling daily, may remove at bedtime when leg is elevated     LEFT LEG:  Silver alginate  ABD or Kerramax and gauze roll  Comprilan 3 layer compression wrap         Follow up with Dr. Gould 1 WEEK      Managing your edema:    Avoid prolonged standing in one place. It is better to have your calf muscles moving to pump fluid out of the legs.  Elevate leg(s) above the level of the heart when sitting or as much as possible.  Take you diuretics as directed by your physician. Do not skip doses or change doses unless instructed to do so by your physician.  Decrease salt intake, follow recommended 2 grams daily.  Do not get leg(s) with compression wrap wet. If wraps are too tight as indicated by pain, numbness/tingling or discoloration of toes remove wrap completely and call the wound center @ [phone].       The treatment plan has been discussed at length between you and your provider. Follow all instructions carefully, it is very important. If you do not follow all instructions you are at risk of your wound not healing, infection, possible loss of limb and even loss of life.    COMPRESSION WRAP PATIENT CARE INSTRUCTIONS  DO NOT get compression wrap wet. When showering, use a shower boot.   Please contact wound clinic and if not able to reach, then go to emergency room if ANY of the following occur:   Tingling or numbness in the foot or toes on leg with compression wrap.  Severe pain that cannot be relieved with elevation and any medication instructed per your doctor.  A fever of 100.4°F (38°C) or higher.  Swelling, coldness, or blue-gray discoloration of the toes.  If the compression wrap feels too tight or too loose.  If the compression wrap is damaged or has rough edges that hurt.  If the compression wrap gets wet, you must cut wrap off.   Drainage from compression wrap that  smells different than usual.

## 2025-06-19 NOTE — PROGRESS NOTES
Weekly Wound Education Note    Teaching Provided To: Patient  Training Topics: Cleasing and general instructions, Compression, Discharge instructions, Dressing, Edema control  Training Method: Explain/Verbal, Demonstration  Training Response: Reinforcement needed        Notes: Patient here for follow up wound care visit to left lateral leg wound. Wound measurement stable, edema measurement : right lower leg increased, left lower leg measurement decreased. Patient did come to appointment with farrow wrap to RLE and comprilian to LLE. RN requested staff to run patients insurance for skin substitutes. Provider recommending Resta to lower leg, silver alginate, ABD pad, conforming gauze, tape. Applied spandagrip (F) and patients own compression to RLE, LLE comprilian 8cm, 10cm, 12cm, cellona (x1), rosidal, coban. Patient to follow up with provider in 1 week.

## 2025-06-26 ENCOUNTER — APPOINTMENT (OUTPATIENT)
Dept: WOUND CARE | Facility: HOSPITAL | Age: 60
End: 2025-06-26
Attending: FAMILY MEDICINE
Payer: COMMERCIAL

## 2025-06-26 VITALS
SYSTOLIC BLOOD PRESSURE: 136 MMHG | RESPIRATION RATE: 16 BRPM | TEMPERATURE: 98 F | DIASTOLIC BLOOD PRESSURE: 88 MMHG | HEART RATE: 66 BPM

## 2025-06-26 DIAGNOSIS — I87.333 CHRONIC VENOUS HYPERTENSION (IDIOPATHIC) WITH ULCER AND INFLAMMATION OF BILATERAL LOWER EXTREMITY (HCC): ICD-10-CM

## 2025-06-26 DIAGNOSIS — E66.813 CLASS 3 SEVERE OBESITY DUE TO EXCESS CALORIES WITH SERIOUS COMORBIDITY AND BODY MASS INDEX (BMI) OF 45.0 TO 49.9 IN ADULT: ICD-10-CM

## 2025-06-26 DIAGNOSIS — S81.802D LEG WOUND, LEFT, SUBSEQUENT ENCOUNTER: Primary | ICD-10-CM

## 2025-06-26 PROCEDURE — 99214 OFFICE O/P EST MOD 30 MIN: CPT | Performed by: FAMILY MEDICINE

## 2025-06-26 NOTE — PROGRESS NOTES
Chief Complaint   Patient presents with    Wound Care     Patient is here for a wound care follow up. He denies any current pain.        HPI:     Patient here for follow-up of left lateral leg wound.  He is doing well and has no new complaints today.  He is tolerating compression wrap.    Lab Results   Component Value Date    BUN 15 05/16/2025    CREATSERUM 0.91 05/16/2025    ALB 4.0 05/16/2025    TP 6.9 05/16/2025    A1C 5.4 05/15/2025       Medications - Current[1]    Allergies[2]           REVIEW OF SYSTEMS:     Review of Systems (ROS)  This information was obtained from the patient, chart    A comprehensive 10 point review of systems was completed.  Pertinent positives and negatives noted in the the HPI.     Past medical, surgical, family and social history updated where appropriate.    PHYSICAL EXAM:   /88   Pulse 66   Temp 98.2 °F (36.8 °C)   Resp 16    Estimated body mass index is 50.9 kg/m² as calculated from the following:    Height as of 5/15/25: 67\".    Weight as of 5/15/25: 325 lb (147.4 kg).   Vital signs reviewed.Appears stated age, well groomed.        Calf  Point of Measurement - Left Calf: 30     Left Calf from:: Heel  Calf Left cm:: 38          Ankle  Point of Measurement - Left Ankle: 10     Left Ankle from:: Heel  Left Ankle cm:: 26.8                Foot                               Wound 01/11/24 #1 Leg Left;Lateral (Active)   Date First Assessed/Time First Assessed: 01/11/24 1406    Wound Number (Wound Clinic Only): #1  Primary Wound Type: Traumatic  Location: Leg  Wound Location Orientation: Left;Lateral      Assessments 1/11/2024  2:10 PM 6/26/2025  8:39 AM   Wound Image       Drainage Amount Large Small   Drainage Description Yellow;Serous Serosanguineous   Treatments Compression (coflex 2 layer wrap) Compression (comprilian 8cm, 10cm, 12cm, cellona (x2), coban)   Wound Length (cm) 10.6 cm 1.5 cm   Wound Width (cm) 9.5 cm 0.5 cm   Wound Surface Area (cm^2) 100.7 cm^2 0.59 cm^2    Wound Depth (cm) 0.2 cm 0.1 cm   Wound Volume (cm^3) 20.14 cm^3 0.039 cm^3   Wound Healing % -- 100   Margins Well-defined edges Well-defined edges   Non-staged Wound Description Full thickness Full thickness   Leslie-wound Assessment Edema;Hemosiderin staining Edema;Hemosiderin staining;Dry;Blanchable erythema   Wound Granulation Tissue Firm;Pink Firm;Pink;Red   Wound Bed Granulation (%) 40 % 100 %   Wound Bed Slough (%) 60 % --   Wound Odor None None   Tunneling? No No   Undermining? No No   Sinus Tracts? No No       Inactive Orders   Date Order Priority Status Authorizing Provider   05/17/25 0948 Wound dressing Routine Discontinued Lamonte Avilez MD   01/16/25 0857 Debridement Traumatic Left;Lateral Leg Routine Completed Nicholas Gould MD   01/09/25 0935 Debridement Traumatic Left;Lateral Leg Routine Completed Nicholas Gould MD   01/02/25 1505 Wound care Routine Discontinued Braxton Ledbetter MD   01/01/25 0720 Consult to Wound Ostomy Routine Completed Dexter Dick MD     - Reason for Consult:    Wound Care     - Wound Care Reason for Consult:    wounds   11/21/24 0914 Debridement Traumatic Left;Lateral Leg Routine Completed Nicholas Gould MD   07/16/24 2327 Consult to Wound Ostomy Routine Completed Rica Pearson MD     - Reason for Consult:    Wound Care     - Wound Care Reason for Consult:    worsening   06/24/24 1127 Wound care Routine Discontinued Rica Pearson MD   06/06/24 0913 Debridement Traumatic Routine Completed Nicholas Gould MD   05/16/24 0901 Debridement Traumatic Routine Completed Nicholas Gould MD   05/02/24 1520 Debridement Traumatic Routine Completed Nicholas Gould MD   04/11/24 0959 Debridement Traumatic Routine Completed Nicholas Gould MD   04/04/24 0915 Debridement Traumatic Left;Lateral Leg Routine Completed Nicholas Gould MD   02/15/24 1154 Debridement Traumatic Left;Lateral Leg Routine Completed Nicholas Gould MD   01/18/24 1511 Debridement Traumatic Left;Lateral  Leg Routine Completed Nicholas Gould MD   01/11/24 1710 Debridement Traumatic Left;Lateral Leg Routine Completed Nicholas Gould MD       Compression Wrap 05/22/25 Leg Anterior;Left (Active)   Placement Date/Time: 05/22/25 0902   Location: Leg  Wound Location Orientation: Anterior;Left      Assessments 5/22/2025  8:31 AM 6/26/2025  8:39 AM   Response to Treatment Well tolerated Well tolerated   Compression Layers Multilayer Multilayer   Compression Product Type Comprilan 8cm;Comprilan 10cm;Comprilan 12cm;Stockinette 4in;Coban Comprilan 8cm;Comprilan 10cm;Comprilan 12cm;Stockinette 4in;Coban (comprilian 8cm, 10cm, 12cm, cellona (x2), coban)   Dressing Applied Yes (silver nitrate, endoform, hydrofera transfer, ABD pad, conforming gauze, tape) Yes   Compression Wrap Location Toes to Knee Toes to Knee   Compression Wrap Status Clean;Dry;Intact Clean;Dry;Intact       No associated orders.              ASSESSMENT AND PLAN:      1. Leg wound, left, subsequent encounter    2. Chronic venous hypertension (idiopathic) with ulcer and inflammation of bilateral lower extremity (HCC)    3. Class 3 severe obesity due to excess calories with serious comorbidity and body mass index (BMI) of 45.0 to 49.9 in adult    Clinically the wound is measuring smaller as well granulated was stimulated with a curette and there was some pinpoint bleeding controlled with silver nitrate.  Wound edges appear healthy there is no periwound erythema.  Leg swelling is under control.  Will continue with silver alginate dressing and ABD pad or Kerramax and then Comprilan 3 layer compression wrap.  Continue compression wrap precautions as per previous visit.  Patient to follow-up with me in 1 week.    Risks, benefits, and alternatives of current treatment plan discussed in detail.  Questions and concerns addressed. Red flags to RTC or ED reviewed.  Patient (or parent) agrees to plan.      Return in about 1 week (around 7/3/2025).    Also refer to  patient instructions.     I spent a total of 30 minutes with this patient's care.  This time included preparing to see the patient (eg, review notes and recent diagnostics), seeing the patient, performing a medically appropriate examination and/or evaluation, counseling and educating the patient, and documenting in the record.          Nicholas Gould M.D.   [facility]   2025    Patient Instructions       PATIENT INSTRUCTIONS      FOR Luis M Estrada , RICK 3/25/1965    DATE OF SERVICE: 2025    RIGHT LEG:  Farrow wrap to control swelling daily, may remove at bedtime when leg is elevated     LEFT LEG:  Silver alginate  ABD or Kerramax and gauze roll  Comprilan 3 layer compression wrap         Follow up with Dr. Gould 1 WEEK      Managing your edema:    Avoid prolonged standing in one place. It is better to have your calf muscles moving to pump fluid out of the legs.  Elevate leg(s) above the level of the heart when sitting or as much as possible.  Take you diuretics as directed by your physician. Do not skip doses or change doses unless instructed to do so by your physician.  Decrease salt intake, follow recommended 2 grams daily.  Do not get leg(s) with compression wrap wet. If wraps are too tight as indicated by pain, numbness/tingling or discoloration of toes remove wrap completely and call the wound center @ [phone].       The treatment plan has been discussed at length between you and your provider. Follow all instructions carefully, it is very important. If you do not follow all instructions you are at risk of your wound not healing, infection, possible loss of limb and even loss of life.    COMPRESSION WRAP PATIENT CARE INSTRUCTIONS  DO NOT get compression wrap wet. When showering, use a shower boot.   Please contact wound clinic and if not able to reach, then go to emergency room if ANY of the following occur:   Tingling or numbness in the foot or toes on leg with  compression wrap.  Severe pain that cannot be relieved with elevation and any medication instructed per your doctor.  A fever of 100.4°F (38°C) or higher.  Swelling, coldness, or blue-gray discoloration of the toes.  If the compression wrap feels too tight or too loose.  If the compression wrap is damaged or has rough edges that hurt.  If the compression wrap gets wet, you must cut wrap off.   Drainage from compression wrap that smells different than usual.        MISCELLANEOUS INSTRUCTIONS  Supplement with a daily multivitamin   Low salt diet  Intense blood sugar control - Goal Blood sugar below 180 at all times recommended.  Increase protein intake / consider protein supplements - see below  Elevate extremities at all times when sitting / laying down.  No tobacco use     DIETARY MODIFICATIONS TO HELP WITH WOUND HEALING:          Please follow any restrictions to diet given by your other doctors     Protein: meats, beans, eggs, milk and yogurt particularly Greek yogurt), tofu, soy nuts, soy protein products     Vitamin C: citrus fruits and juices, strawberries, tomatoes, tomato juice, peppers, baked potatoes, spinach, broccoli, cauliflower, [place] sprouts, cabbage     Vitamin A: dark greens, leafy vegetables, orange or yellow vegetables, cantaloupe, fortified dairy products, liver, fortified cereals     Zinc: fortified cereals, red meats, seafood     Consider Bob by [employer] (These are essential branch chain amino acids that help with tissue building and wound healing) and take 2 packets/day. You can order online at [employer] or [employer]     ADDITIONAL REMINDERS:     The treatment plan has been discussed at length with you and your provider. Follow all instructions carefully, it is very important. If you do not follow all instructions, you are at risk of your wound not healing, infection, possible loss of limb and even loss of life.  Please call the clinic at [phone] during regular business hours ( 7:30 AM -  5:30 PM) if you notice increased redness, warmth, pain or pus like drainage or start running a fever greater than 100.3.    For after hour emergencies, please call your primary physician or go to the nearest emergency room.  If your blood pressure is elevated, follow up with your primary care doctor and/or cardiologist as soon as possible.     FOR LEG SWELLING,  LOWER EXTREMITY WOUNDS AND IF USING COMPRESSION:   Managing your edema:    Avoid prolonged standing in one place. It is better to have your calf muscles moving to pump fluid out of the legs.  Elevate leg(s) above the level of the heart when sitting or as much as possible.  Take you diuretics as directed by your physician. Do not skip doses or change doses unless instructed to do so by your physician.  Decrease salt intake, follow recommended 2 grams daily.  Do not get leg(s) with compression wrap wet. If wraps are too tight as indicated by pain, numbness/tingling or discoloration of toes remove wrap completely and call the wound center @ [phone].       The treatment plan has been discussed at length between you and your provider. Follow all instructions carefully, it is very important. If you do not follow all instructions you are at risk of your wound not healing, infection, possible loss of limb and even loss of life.    COMPRESSION WRAP PATIENT CARE INSTRUCTIONS  DO NOT get compression wrap wet. When showering, use a shower boot.   Please contact wound clinic and if not able to reach, then go to emergency room if ANY of the following occur:   Tingling or numbness in the foot or toes on leg with compression wrap.  Severe pain that cannot be relieved with elevation and any medication instructed per your doctor.  A fever of 100.4°F (38°C) or higher.  Swelling, coldness, or blue-gray discoloration of the toes.  If the compression wrap feels too tight or too loose.  If the compression wrap is damaged or has rough edges that hurt.  If the compression wrap  gets wet, you must cut wrap off.   Drainage from compression wrap that smells different than usual.                        [1]   Current Outpatient Medications:     sodium hypochlorite 0.25 % External Solution, 1 application on affected areas daily., Disp: 473 mL, Rfl: 0    Multiple Vitamins-Minerals (PRESERVISION AREDS) Oral Cap, Take 1 capsule by mouth every morning., Disp: , Rfl:     Multiple Vitamins-Minerals (MULTI-VITAMIN/MINERALS) Oral Tab, Take 1 tablet by mouth in the morning., Disp: , Rfl:   [2] No Known Allergies

## 2025-06-26 NOTE — PROGRESS NOTES
Weekly Wound Education Note    Teaching Provided To: Patient  Training Topics: Cleasing and general instructions, Discharge instructions, Edema control, Dressing  Training Method: Explain/Verbal, Demonstration  Training Response: Reinforcement needed        Notes: Patient here for follolow up wound care visit to left lateral lower leg wound. Edema measurement increased, wound measurement decreased. Provider stimulated wound at visit today, treatment to continue using silver alginate, ABD pad, conforming gauze, tape. Applied comprilian 8cm, 10cm, 12cm , cellona (x2), coban. Patient educated to continue using his own compression to RLE. Patient to follow up with provider in 1 week.

## 2025-06-26 NOTE — PATIENT INSTRUCTIONS
PATIENT INSTRUCTIONS      FOR Luis M RODRIGUEZ Natalie ,  3/25/1965    DATE OF SERVICE: 2025    RIGHT LEG:  Farrow wrap to control swelling daily, may remove at bedtime when leg is elevated     LEFT LEG:  Silver alginate  ABD or Kerramax and gauze roll  Comprilan 3 layer compression wrap         Follow up with Dr. Gould 1 WEEK      Managing your edema:    Avoid prolonged standing in one place. It is better to have your calf muscles moving to pump fluid out of the legs.  Elevate leg(s) above the level of the heart when sitting or as much as possible.  Take you diuretics as directed by your physician. Do not skip doses or change doses unless instructed to do so by your physician.  Decrease salt intake, follow recommended 2 grams daily.  Do not get leg(s) with compression wrap wet. If wraps are too tight as indicated by pain, numbness/tingling or discoloration of toes remove wrap completely and call the wound center @ [phone].       The treatment plan has been discussed at length between you and your provider. Follow all instructions carefully, it is very important. If you do not follow all instructions you are at risk of your wound not healing, infection, possible loss of limb and even loss of life.    COMPRESSION WRAP PATIENT CARE INSTRUCTIONS  DO NOT get compression wrap wet. When showering, use a shower boot.   Please contact wound clinic and if not able to reach, then go to emergency room if ANY of the following occur:   Tingling or numbness in the foot or toes on leg with compression wrap.  Severe pain that cannot be relieved with elevation and any medication instructed per your doctor.  A fever of 100.4°F (38°C) or higher.  Swelling, coldness, or blue-gray discoloration of the toes.  If the compression wrap feels too tight or too loose.  If the compression wrap is damaged or has rough edges that hurt.  If the compression wrap gets wet, you must cut wrap off.   Drainage from compression wrap that  smells different than usual.

## 2025-07-03 ENCOUNTER — OFFICE VISIT (OUTPATIENT)
Dept: WOUND CARE | Facility: HOSPITAL | Age: 60
End: 2025-07-03
Attending: FAMILY MEDICINE
Payer: COMMERCIAL

## 2025-07-03 VITALS
TEMPERATURE: 98 F | RESPIRATION RATE: 16 BRPM | DIASTOLIC BLOOD PRESSURE: 86 MMHG | HEART RATE: 78 BPM | SYSTOLIC BLOOD PRESSURE: 137 MMHG

## 2025-07-03 DIAGNOSIS — I87.332 CHRONIC VENOUS HYPERTENSION (IDIOPATHIC) WITH ULCER AND INFLAMMATION OF LEFT LOWER EXTREMITY (HCC): Primary | ICD-10-CM

## 2025-07-03 DIAGNOSIS — E66.813 CLASS 3 SEVERE OBESITY DUE TO EXCESS CALORIES WITH SERIOUS COMORBIDITY AND BODY MASS INDEX (BMI) OF 45.0 TO 49.9 IN ADULT: ICD-10-CM

## 2025-07-03 PROCEDURE — 99214 OFFICE O/P EST MOD 30 MIN: CPT | Performed by: FAMILY MEDICINE

## 2025-07-03 NOTE — PATIENT INSTRUCTIONS
PATIENT INSTRUCTIONS      FOR Luis M RODRIGUEZ Natalie ,  3/25/1965    DATE OF SERVICE: 7/3/2025    RIGHT LEG:  Farrow wrap to control swelling daily, may remove at bedtime when leg is elevated     LEFT LEG:  Acticoat  ABD or Kerramax and gauze roll  Comprilan 3 layer compression wrap         Follow up with Dr. Gould 1 WEEK      Managing your edema:    Avoid prolonged standing in one place. It is better to have your calf muscles moving to pump fluid out of the legs.  Elevate leg(s) above the level of the heart when sitting or as much as possible.  Take you diuretics as directed by your physician. Do not skip doses or change doses unless instructed to do so by your physician.  Decrease salt intake, follow recommended 2 grams daily.  Do not get leg(s) with compression wrap wet. If wraps are too tight as indicated by pain, numbness/tingling or discoloration of toes remove wrap completely and call the wound center @ [phone].       The treatment plan has been discussed at length between you and your provider. Follow all instructions carefully, it is very important. If you do not follow all instructions you are at risk of your wound not healing, infection, possible loss of limb and even loss of life.    COMPRESSION WRAP PATIENT CARE INSTRUCTIONS  DO NOT get compression wrap wet. When showering, use a shower boot.   Please contact wound clinic and if not able to reach, then go to emergency room if ANY of the following occur:   Tingling or numbness in the foot or toes on leg with compression wrap.  Severe pain that cannot be relieved with elevation and any medication instructed per your doctor.  A fever of 100.4°F (38°C) or higher.  Swelling, coldness, or blue-gray discoloration of the toes.  If the compression wrap feels too tight or too loose.  If the compression wrap is damaged or has rough edges that hurt.  If the compression wrap gets wet, you must cut wrap off.   Drainage from compression wrap that smells  different than usual.

## 2025-07-03 NOTE — PROGRESS NOTES
Weekly Wound Education Note    Teaching Provided To: Patient  Training Topics: Cleasing and general instructions, Compression, Discharge instructions, Edema control, Dressing  Training Method: Demonstration, Explain/Verbal  Training Response: Reinforcement needed        Notes: Patient here for follow up wound care visit to left lateral leg. Provider recommending treatment change to Resta, acticoat dressing (silver side to wound bed), kerramax, conforming gauze, tape. Applied comprilian 8cm, 10cm, 12cm, cellona (x2), coban. Patient to follow up with provider in 1 week.

## 2025-07-03 NOTE — PROGRESS NOTES
Chief Complaint   Patient presents with    Wound Care     Patient is here for a wound care follow up. He denies any pain or new wound concerns.       HPI:     This patient here for follow-up of left lateral leg wound.  He is doing well and has a prosthesis currently tolerating compression wrap.    Lab Results   Component Value Date    BUN 15 05/16/2025    CREATSERUM 0.91 05/16/2025    ALB 4.0 05/16/2025    TP 6.9 05/16/2025    A1C 5.4 05/15/2025       Medications - Current[1]    Allergies[2]         REVIEW OF SYSTEMS:     Review of Systems (ROS)  This information was obtained from the patient, chart    A comprehensive 10 point review of systems was completed.  Pertinent positives and negatives noted in the the HPI.     Past medical, surgical, family and social history updated where appropriate.  Past Medical History[3]  PHYSICAL EXAM:   /86   Pulse 78   Temp 98.3 °F (36.8 °C)   Resp 16    Estimated body mass index is 50.9 kg/m² as calculated from the following:    Height as of 5/15/25: 67\".    Weight as of 5/15/25: 325 lb (147.4 kg).   Vital signs reviewed.Appears stated age, well groomed.        Calf  Point of Measurement - Left Calf: 30     Left Calf from:: Heel  Calf Left cm:: 35.6          Ankle  Point of Measurement - Left Ankle: 10     Left Ankle from:: Heel  Left Ankle cm:: 25.4                Foot                               Wound 01/11/24 #1 Leg Left;Lateral (Active)   Date First Assessed/Time First Assessed: 01/11/24 1406    Wound Number (Wound Clinic Only): #1  Primary Wound Type: Traumatic  Location: Leg  Wound Location Orientation: Left;Lateral      Assessments 1/11/2024  2:10 PM 7/3/2025  8:12 AM   Wound Image        Drainage Amount Large Small   Drainage Description Yellow;Serous Serosanguineous   Treatments Compression (coflex 2 layer wrap) Compression (comprilian 8cm, 10cm, 12cm, cellonma (x2), coban)   Wound Length (cm) 10.6 cm 2 cm   Wound Width (cm) 9.5 cm 1 cm   Wound Surface Area  (cm^2) 100.7 cm^2 1.57 cm^2   Wound Depth (cm) 0.2 cm 0.1 cm   Wound Volume (cm^3) 20.14 cm^3 0.105 cm^3   Wound Healing % -- 99   Margins Well-defined edges Well-defined edges   Non-staged Wound Description Full thickness Full thickness   Leslie-wound Assessment Edema;Hemosiderin staining Edema;Hemosiderin staining;Dry;Blanchable erythema   Wound Granulation Tissue Firm;Pink Firm;Pink;Red   Wound Bed Granulation (%) 40 % 100 %   Wound Bed Slough (%) 60 % --   Wound Odor None None   Tunneling? No No   Undermining? No No   Sinus Tracts? No No       Inactive Orders   Date Order Priority Status Authorizing Provider   05/17/25 0948 Wound dressing Routine Discontinued Lamonte Avilez MD   01/16/25 0857 Debridement Traumatic Left;Lateral Leg Routine Completed Nicholas Gould MD   01/09/25 0935 Debridement Traumatic Left;Lateral Leg Routine Completed Nicholas Gould MD   01/02/25 1505 Wound care Routine Discontinued Braxton Ledbetter MD   01/01/25 0720 Consult to Wound Ostomy Routine Completed Dexter Dick MD     - Reason for Consult:    Wound Care     - Wound Care Reason for Consult:    wounds   11/21/24 0914 Debridement Traumatic Left;Lateral Leg Routine Completed Nicholas Gould MD   07/16/24 2327 Consult to Wound Ostomy Routine Completed Rica Pearson MD     - Reason for Consult:    Wound Care     - Wound Care Reason for Consult:    worsening   06/24/24 1127 Wound care Routine Discontinued Rica Pearson MD   06/06/24 0913 Debridement Traumatic Routine Completed Nicholas Gould MD   05/16/24 0901 Debridement Traumatic Routine Completed Nicholas Gould MD   05/02/24 1520 Debridement Traumatic Routine Completed Nicholas Gould MD   04/11/24 0959 Debridement Traumatic Routine Completed Nicholas Gould MD   04/04/24 0915 Debridement Traumatic Left;Lateral Leg Routine Completed Nicholas Gould MD   02/15/24 1154 Debridement Traumatic Left;Lateral Leg Routine Completed Nicholas Gould MD   01/18/24 1511  Debridement Traumatic Left;Lateral Leg Routine Completed Nicholas Gould MD   01/11/24 1710 Debridement Traumatic Left;Lateral Leg Routine Completed Nicholas Gould MD       Compression Wrap 05/22/25 Leg Anterior;Left (Active)   Placement Date/Time: 05/22/25 0902   Location: Leg  Wound Location Orientation: Anterior;Left      Assessments 5/22/2025  8:31 AM 7/3/2025  8:12 AM   Response to Treatment Well tolerated Well tolerated   Compression Layers Multilayer Multilayer   Compression Product Type Comprilan 8cm;Comprilan 10cm;Comprilan 12cm;Stockinette 4in;Coban Comprilan 8cm;Comprilan 10cm;Comprilan 12cm;Stockinette 4in;Coban (comprilian 8cm, 10cm, 12cm, cellonma (x2), coban)   Dressing Applied Yes (silver nitrate, endoform, hydrofera transfer, ABD pad, conforming gauze, tape) Yes (Resta,acticoat, kerramax, conforming gauze,tape)   Compression Wrap Location Toes to Knee Toes to Knee   Compression Wrap Status Clean;Dry;Intact Clean;Dry;Intact       No associated orders.              ASSESSMENT AND PLAN:      1. Chronic venous hypertension (idiopathic) with ulcer and inflammation of left lower extremity (HCC)    2. Class 3 severe obesity due to excess calories with serious comorbidity and body mass index (BMI) of 45.0 to 49.9 in adult    Clinically the wound is slightly smaller than previous visit.  However he is healing slowly.  Will try Acticoat dressing and ABD pad and continue Comprilan 3 layer compression wrap.  Patient tolerating well continue compression wrap precautions as per previous visits.       Risks, benefits, and alternatives of current treatment plan discussed in detail.  Questions and concerns addressed. Red flags to RTC or ED reviewed.  Patient (or parent) agrees to plan.      Return in about 1 week (around 7/10/2025).    Also refer to patient instructions.     I spent a total of 30 minutes with this patient's care.  This time included preparing to see the patient (eg, review notes and recent  diagnostics), seeing the patient, performing a medically appropriate examination and/or evaluation, counseling and educating the patient, and documenting in the record.          Nicholas Gould M.D.   [facility]   7/3/2025    Patient Instructions       PATIENT INSTRUCTIONS      FOR Luis M Estrada RICK 3/25/1965    DATE OF SERVICE: 7/3/2025    RIGHT LEG:  Farrow wrap to control swelling daily, may remove at bedtime when leg is elevated     LEFT LEG:  Acticoat  ABD or Kerramax and gauze roll  Comprilan 3 layer compression wrap         Follow up with Dr. Gould 1 WEEK      Managing your edema:    Avoid prolonged standing in one place. It is better to have your calf muscles moving to pump fluid out of the legs.  Elevate leg(s) above the level of the heart when sitting or as much as possible.  Take you diuretics as directed by your physician. Do not skip doses or change doses unless instructed to do so by your physician.  Decrease salt intake, follow recommended 2 grams daily.  Do not get leg(s) with compression wrap wet. If wraps are too tight as indicated by pain, numbness/tingling or discoloration of toes remove wrap completely and call the wound center @ [phone].       The treatment plan has been discussed at length between you and your provider. Follow all instructions carefully, it is very important. If you do not follow all instructions you are at risk of your wound not healing, infection, possible loss of limb and even loss of life.    COMPRESSION WRAP PATIENT CARE INSTRUCTIONS  DO NOT get compression wrap wet. When showering, use a shower boot.   Please contact wound clinic and if not able to reach, then go to emergency room if ANY of the following occur:   Tingling or numbness in the foot or toes on leg with compression wrap.  Severe pain that cannot be relieved with elevation and any medication instructed per your doctor.  A fever of 100.4°F (38°C) or higher.  Swelling,  coldness, or blue-gray discoloration of the toes.  If the compression wrap feels too tight or too loose.  If the compression wrap is damaged or has rough edges that hurt.  If the compression wrap gets wet, you must cut wrap off.   Drainage from compression wrap that smells different than usual.      MISCELLANEOUS INSTRUCTIONS  Supplement with a daily multivitamin   Low salt diet  Intense blood sugar control - Goal Blood sugar below 180 at all times recommended.  Increase protein intake / consider protein supplements - see below  Elevate extremities at all times when sitting / laying down.  No tobacco use     DIETARY MODIFICATIONS TO HELP WITH WOUND HEALING:          Please follow any restrictions to diet given by your other doctors     Protein: meats, beans, eggs, milk and yogurt particularly Greek yogurt), tofu, soy nuts, soy protein products     Vitamin C: citrus fruits and juices, strawberries, tomatoes, tomato juice, peppers, baked potatoes, spinach, broccoli, cauliflower, Abrams sprouts, cabbage     Vitamin A: dark greens, leafy vegetables, orange or yellow vegetables, cantaloupe, fortified dairy products, liver, fortified cereals     Zinc: fortified cereals, red meats, seafood     Consider Bob [employer] (These are essential branch chain amino acids that help with tissue building and wound healing) and take 2 packets/day. You can order online at [employer] or [employer]     ADDITIONAL REMINDERS:     The treatment plan has been discussed at length with you and your provider. Follow all instructions carefully, it is very important. If you do not follow all instructions, you are at risk of your wound not healing, infection, possible loss of limb and even loss of life.  Please call the clinic at [phone] during regular business hours ( 7:30 AM - 5:30 PM) if you notice increased redness, warmth, pain or pus like drainage or start running a fever greater than 100.3.    For after hour emergencies, please call  your primary physician or go to the nearest emergency room.  If your blood pressure is elevated, follow up with your primary care doctor and/or cardiologist as soon as possible.     FOR LEG SWELLING,  LOWER EXTREMITY WOUNDS AND IF USING COMPRESSION:   Managing your edema:    Avoid prolonged standing in one place. It is better to have your calf muscles moving to pump fluid out of the legs.  Elevate leg(s) above the level of the heart when sitting or as much as possible.  Take you diuretics as directed by your physician. Do not skip doses or change doses unless instructed to do so by your physician.  Decrease salt intake, follow recommended 2 grams daily.  Do not get leg(s) with compression wrap wet. If wraps are too tight as indicated by pain, numbness/tingling or discoloration of toes remove wrap completely and call the wound center @ [phone].       The treatment plan has been discussed at length between you and your provider. Follow all instructions carefully, it is very important. If you do not follow all instructions you are at risk of your wound not healing, infection, possible loss of limb and even loss of life.    COMPRESSION WRAP PATIENT CARE INSTRUCTIONS  DO NOT get compression wrap wet. When showering, use a shower boot.   Please contact wound clinic and if not able to reach, then go to emergency room if ANY of the following occur:   Tingling or numbness in the foot or toes on leg with compression wrap.  Severe pain that cannot be relieved with elevation and any medication instructed per your doctor.  A fever of 100.4°F (38°C) or higher.  Swelling, coldness, or blue-gray discoloration of the toes.  If the compression wrap feels too tight or too loose.  If the compression wrap is damaged or has rough edges that hurt.  If the compression wrap gets wet, you must cut wrap off.   Drainage from compression wrap that smells different than usual.                        [1]   Current Outpatient Medications:      sodium hypochlorite 0.25 % External Solution, 1 application on affected areas daily., Disp: 473 mL, Rfl: 0    Multiple Vitamins-Minerals (PRESERVISION AREDS) Oral Cap, Take 1 capsule by mouth every morning., Disp: , Rfl:     Multiple Vitamins-Minerals (MULTI-VITAMIN/MINERALS) Oral Tab, Take 1 tablet by mouth in the morning., Disp: , Rfl:   [2] No Known Allergies  [3]   Past Medical History:   Cellulitis    to left leg, seeking treatment at wound care for months    Hydrocephalus (HCC)    dx'd as an adult-no shunt

## 2025-07-10 ENCOUNTER — OFFICE VISIT (OUTPATIENT)
Dept: WOUND CARE | Facility: HOSPITAL | Age: 60
End: 2025-07-10
Attending: FAMILY MEDICINE
Payer: COMMERCIAL

## 2025-07-10 VITALS
HEART RATE: 62 BPM | SYSTOLIC BLOOD PRESSURE: 140 MMHG | TEMPERATURE: 98 F | DIASTOLIC BLOOD PRESSURE: 89 MMHG | RESPIRATION RATE: 16 BRPM

## 2025-07-10 DIAGNOSIS — E66.813 CLASS 3 SEVERE OBESITY DUE TO EXCESS CALORIES WITH SERIOUS COMORBIDITY AND BODY MASS INDEX (BMI) OF 45.0 TO 49.9 IN ADULT: ICD-10-CM

## 2025-07-10 DIAGNOSIS — I87.332 CHRONIC VENOUS HYPERTENSION (IDIOPATHIC) WITH ULCER AND INFLAMMATION OF LEFT LOWER EXTREMITY (HCC): Primary | ICD-10-CM

## 2025-07-10 PROCEDURE — 99215 OFFICE O/P EST HI 40 MIN: CPT | Performed by: FAMILY MEDICINE

## 2025-07-10 NOTE — PATIENT INSTRUCTIONS
PATIENT INSTRUCTIONS      FOR Luis M RODRIGUEZ Natalie ,  3/25/1965    DATE OF SERVICE: 7/10/2025    RIGHT LEG:  Farrow wrap to control swelling daily, may remove at bedtime when leg is elevated     LEFT LEG:  Hydrofera Blue transfer  ABD or Kerramax and gauze roll  Comprilan 3 layer compression wrap         Follow up with Dr. Gould 1 WEEK      Managing your edema:    Avoid prolonged standing in one place. It is better to have your calf muscles moving to pump fluid out of the legs.  Elevate leg(s) above the level of the heart when sitting or as much as possible.  Take you diuretics as directed by your physician. Do not skip doses or change doses unless instructed to do so by your physician.  Decrease salt intake, follow recommended 2 grams daily.  Do not get leg(s) with compression wrap wet. If wraps are too tight as indicated by pain, numbness/tingling or discoloration of toes remove wrap completely and call the wound center @ [phone].       The treatment plan has been discussed at length between you and your provider. Follow all instructions carefully, it is very important. If you do not follow all instructions you are at risk of your wound not healing, infection, possible loss of limb and even loss of life.    COMPRESSION WRAP PATIENT CARE INSTRUCTIONS  DO NOT get compression wrap wet. When showering, use a shower boot.   Please contact wound clinic and if not able to reach, then go to emergency room if ANY of the following occur:   Tingling or numbness in the foot or toes on leg with compression wrap.  Severe pain that cannot be relieved with elevation and any medication instructed per your doctor.  A fever of 100.4°F (38°C) or higher.  Swelling, coldness, or blue-gray discoloration of the toes.  If the compression wrap feels too tight or too loose.  If the compression wrap is damaged or has rough edges that hurt.  If the compression wrap gets wet, you must cut wrap off.   Drainage from compression wrap  that smells different than usual.

## 2025-07-10 NOTE — PROGRESS NOTES
Weekly Wound Education Note    Teaching Provided To: Patient  Training Topics: Dressing, Edema control, Cleasing and general instructions, Discharge instructions, Compression  Training Method: Demonstration, Explain/Verbal  Training Response: Reinforcement needed        Notes: Patient here for follow up wound care visit to left lateral lower leg.Provider applied silver nitrate, recommending hydrofera transfer, kerramax, conforming gauze, tape. Applied comprilian 8cm, 10cm, 12cm, cellona (x2), coban. Patient to follow up with provider in 1 week.

## 2025-07-10 NOTE — PROGRESS NOTES
Chief Complaint   Patient presents with    Wound Care     Follow-up for wound to left leg. Wrap tolerated well. Denies pain or concerns at this time.        HPI:     The patient here for follow-up of left lateral leg wound.  He is doing fine and tolerating Comprilan compression wrap without any difficulties.    Lab Results   Component Value Date    BUN 15 05/16/2025    CREATSERUM 0.91 05/16/2025    ALB 4.0 05/16/2025    TP 6.9 05/16/2025    A1C 5.4 05/15/2025       Medications - Current[1]    Allergies[2]         REVIEW OF SYSTEMS:     Review of Systems (ROS)  This information was obtained from the patient, chart    A comprehensive 10 point review of systems was completed.  Pertinent positives and negatives noted in the the HPI.     Past medical, surgical, family and social history updated where appropriate.  Past Medical History[3]  PHYSICAL EXAM:   /89   Pulse 62   Temp 98.4 °F (36.9 °C)   Resp 16    Estimated body mass index is 50.9 kg/m² as calculated from the following:    Height as of 5/15/25: 67\".    Weight as of 5/15/25: 325 lb (147.4 kg).   Vital signs reviewed.Appears stated age, well groomed.        Calf  Point of Measurement - Left Calf: 30     Left Calf from:: Heel  Calf Left cm:: 36.8          Ankle  Point of Measurement - Left Ankle: 10     Left Ankle from:: Heel  Left Ankle cm:: 26.4                Foot                               Wound 01/11/24 #1 Leg Left;Lateral (Active)   Date First Assessed/Time First Assessed: 01/11/24 1406    Wound Number (Wound Clinic Only): #1  Primary Wound Type: Traumatic  Location: Leg  Wound Location Orientation: Left;Lateral      Assessments 1/11/2024  2:10 PM 7/10/2025  8:37 AM   Wound Image       Drainage Amount Large Moderate   Drainage Description Yellow;Serous Purulent;Serosanguineous   Treatments Compression (coflex 2 layer wrap) --   Wound Length (cm) 10.6 cm 6 cm   Wound Width (cm) 9.5 cm 4.4 cm   Wound Surface Area (cm^2) 100.7 cm^2 20.73 cm^2    Wound Depth (cm) 0.2 cm 0.1 cm   Wound Volume (cm^3) 20.14 cm^3 1.382 cm^3   Wound Healing % -- 93   Margins Well-defined edges Well-defined edges   Non-staged Wound Description Full thickness Full thickness   Leslie-wound Assessment Edema;Hemosiderin staining Edema;Hemosiderin staining;Moist   Wound Granulation Tissue Firm;Pink Red;Spongy;Pink   Wound Bed Granulation (%) 40 % 90 %   Wound Bed Epithelium (%) -- 10 %   Wound Bed Slough (%) 60 % --   Wound Odor None None   Shape -- friable, hypergranulation, clustered   Tunneling? No No   Undermining? No No   Sinus Tracts? No No       Inactive Orders   Date Order Priority Status Authorizing Provider   05/17/25 0948 Wound dressing Routine Discontinued Lamonte Avilez MD   01/16/25 0857 Debridement Traumatic Left;Lateral Leg Routine Completed Nicholas Gould MD   01/09/25 0935 Debridement Traumatic Left;Lateral Leg Routine Completed Nicholas Gould MD   01/02/25 1505 Wound care Routine Discontinued Braxton Ledbetter MD   01/01/25 0720 Consult to Wound Ostomy Routine Completed Dexter Dick MD     - Reason for Consult:    Wound Care     - Wound Care Reason for Consult:    wounds   11/21/24 0914 Debridement Traumatic Left;Lateral Leg Routine Completed Nicholas Gould MD   07/16/24 2327 Consult to Wound Ostomy Routine Completed Rica Pearson MD     - Reason for Consult:    Wound Care     - Wound Care Reason for Consult:    worsening   06/24/24 1127 Wound care Routine Discontinued Rica Pearson MD   06/06/24 0913 Debridement Traumatic Routine Completed Nicholas Goudl MD   05/16/24 0901 Debridement Traumatic Routine Completed Nicholas Gould MD   05/02/24 1520 Debridement Traumatic Routine Completed Nicholas Gould MD   04/11/24 0959 Debridement Traumatic Routine Completed Nicholas Gould MD   04/04/24 0915 Debridement Traumatic Left;Lateral Leg Routine Completed Nicholas Guold MD   02/15/24 1154 Debridement Traumatic Left;Lateral Leg Routine Completed  Nicholas Gould MD   01/18/24 1511 Debridement Traumatic Left;Lateral Leg Routine Completed Nicholas Gould MD   01/11/24 1710 Debridement Traumatic Left;Lateral Leg Routine Completed Nicholas Gould MD       Compression Wrap 05/22/25 Leg Anterior;Left (Active)   Placement Date/Time: 05/22/25 0902   Location: Leg  Wound Location Orientation: Anterior;Left      Assessments 5/22/2025  8:31 AM 7/3/2025  8:12 AM   Response to Treatment Well tolerated Well tolerated   Compression Layers Multilayer Multilayer   Compression Product Type Comprilan 8cm;Comprilan 10cm;Comprilan 12cm;Stockinette 4in;Coban Comprilan 8cm;Comprilan 10cm;Comprilan 12cm;Stockinette 4in;Coban (comprilian 8cm, 10cm, 12cm, cellonma (x2), coban)   Dressing Applied Yes (silver nitrate, endoform, hydrofera transfer, ABD pad, conforming gauze, tape) Yes (Resta,acticoat, kerramax, conforming gauze,tape)   Compression Wrap Location Toes to Knee Toes to Knee   Compression Wrap Status Clean;Dry;Intact Clean;Dry;Intact       No associated orders.              ASSESSMENT AND PLAN:      1. Chronic venous hypertension (idiopathic) with ulcer and inflammation of left lower extremity (HCC)    2. Class 3 severe obesity due to excess calories with serious comorbidity and body mass index (BMI) of 45.0 to 49.9 in adult    Clinically the wound has gotten larger and has been secondary wound adjacent.  He did not respond well to Acticoat will switch back to Hydrofera Blue transfer, ABD or Kerramax and continue Comprilan 3 layer compression wrap.  I did apply silver nitrate to area of hypergranulation on both of the wounds to the leg.  There is no slough noted.      Risks, benefits, and alternatives of current treatment plan discussed in detail.  Questions and concerns addressed. Red flags to RTC or ED reviewed.  Patient (or parent) agrees to plan.      No follow-ups on file.    Also refer to patient instructions.     I spent a total of 40 minutes with this patient's  care.  This time included preparing to see the patient (eg, review notes and recent diagnostics), seeing the patient, performing a medically appropriate examination and/or evaluation, counseling and educating the patient, and documenting in the record.          Nicholas Gould M.D.   [facility]   7/10/2025    Patient Instructions       PATIENT INSTRUCTIONS      FOR Luis M Estrada , RICK 3/25/1965    DATE OF SERVICE: 7/10/2025    RIGHT LEG:  Farrow wrap to control swelling daily, may remove at bedtime when leg is elevated     LEFT LEG:  Hydrofera Blue transfer  ABD or Kerramax and gauze roll  Comprilan 3 layer compression wrap         Follow up with Dr. Gould 1 WEEK      Managing your edema:    Avoid prolonged standing in one place. It is better to have your calf muscles moving to pump fluid out of the legs.  Elevate leg(s) above the level of the heart when sitting or as much as possible.  Take you diuretics as directed by your physician. Do not skip doses or change doses unless instructed to do so by your physician.  Decrease salt intake, follow recommended 2 grams daily.  Do not get leg(s) with compression wrap wet. If wraps are too tight as indicated by pain, numbness/tingling or discoloration of toes remove wrap completely and call the wound center @ [phone].       The treatment plan has been discussed at length between you and your provider. Follow all instructions carefully, it is very important. If you do not follow all instructions you are at risk of your wound not healing, infection, possible loss of limb and even loss of life.    COMPRESSION WRAP PATIENT CARE INSTRUCTIONS  DO NOT get compression wrap wet. When showering, use a shower boot.   Please contact wound clinic and if not able to reach, then go to emergency room if ANY of the following occur:   Tingling or numbness in the foot or toes on leg with compression wrap.  Severe pain that cannot be relieved with elevation  and any medication instructed per your doctor.  A fever of 100.4°F (38°C) or higher.  Swelling, coldness, or blue-gray discoloration of the toes.  If the compression wrap feels too tight or too loose.  If the compression wrap is damaged or has rough edges that hurt.  If the compression wrap gets wet, you must cut wrap off.   Drainage from compression wrap that smells different than usual.          MISCELLANEOUS INSTRUCTIONS  Supplement with a daily multivitamin   Low salt diet  Intense blood sugar control - Goal Blood sugar below 180 at all times recommended.  Increase protein intake / consider protein supplements - see below  Elevate extremities at all times when sitting / laying down.  No tobacco use     DIETARY MODIFICATIONS TO HELP WITH WOUND HEALING:          Please follow any restrictions to diet given by your other doctors     Protein: meats, beans, eggs, milk and yogurt particularly Greek yogurt), tofu, soy nuts, soy protein products     Vitamin C: citrus fruits and juices, strawberries, tomatoes, tomato juice, peppers, baked potatoes, spinach, broccoli, cauliflower, [place] sprouts, cabbage     Vitamin A: dark greens, leafy vegetables, orange or yellow vegetables, cantaloupe, fortified dairy products, liver, fortified cereals     Zinc: fortified cereals, red meats, seafood     Consider Bob by [employer] (These are essential branch chain amino acids that help with tissue building and wound healing) and take 2 packets/day. You can order online at [employer] or [employer]     ADDITIONAL REMINDERS:     The treatment plan has been discussed at length with you and your provider. Follow all instructions carefully, it is very important. If you do not follow all instructions, you are at risk of your wound not healing, infection, possible loss of limb and even loss of life.  Please call the clinic at [phone] during regular business hours ( 7:30 AM - 5:30 PM) if you notice increased redness, warmth, pain or pus like  drainage or start running a fever greater than 100.3.    For after hour emergencies, please call your primary physician or go to the nearest emergency room.  If your blood pressure is elevated, follow up with your primary care doctor and/or cardiologist as soon as possible.     FOR LEG SWELLING,  LOWER EXTREMITY WOUNDS AND IF USING COMPRESSION:   Managing your edema:    Avoid prolonged standing in one place. It is better to have your calf muscles moving to pump fluid out of the legs.  Elevate leg(s) above the level of the heart when sitting or as much as possible.  Take you diuretics as directed by your physician. Do not skip doses or change doses unless instructed to do so by your physician.  Decrease salt intake, follow recommended 2 grams daily.  Do not get leg(s) with compression wrap wet. If wraps are too tight as indicated by pain, numbness/tingling or discoloration of toes remove wrap completely and call the wound center @ [phone].       The treatment plan has been discussed at length between you and your provider. Follow all instructions carefully, it is very important. If you do not follow all instructions you are at risk of your wound not healing, infection, possible loss of limb and even loss of life.    COMPRESSION WRAP PATIENT CARE INSTRUCTIONS  DO NOT get compression wrap wet. When showering, use a shower boot.   Please contact wound clinic and if not able to reach, then go to emergency room if ANY of the following occur:   Tingling or numbness in the foot or toes on leg with compression wrap.  Severe pain that cannot be relieved with elevation and any medication instructed per your doctor.  A fever of 100.4°F (38°C) or higher.  Swelling, coldness, or blue-gray discoloration of the toes.  If the compression wrap feels too tight or too loose.  If the compression wrap is damaged or has rough edges that hurt.  If the compression wrap gets wet, you must cut wrap off.   Drainage from compression wrap  that smells different than usual.                        [1]   Current Outpatient Medications:     sodium hypochlorite 0.25 % External Solution, 1 application on affected areas daily., Disp: 473 mL, Rfl: 0    Multiple Vitamins-Minerals (PRESERVISION AREDS) Oral Cap, Take 1 capsule by mouth every morning., Disp: , Rfl:     Multiple Vitamins-Minerals (MULTI-VITAMIN/MINERALS) Oral Tab, Take 1 tablet by mouth in the morning., Disp: , Rfl:   [2] No Known Allergies  [3]   Past Medical History:   Cellulitis    to left leg, seeking treatment at wound care for months    Hydrocephalus (HCC)    dx'd as an adult-no shunt

## 2025-07-17 ENCOUNTER — OFFICE VISIT (OUTPATIENT)
Dept: WOUND CARE | Facility: HOSPITAL | Age: 60
End: 2025-07-17
Attending: FAMILY MEDICINE
Payer: COMMERCIAL

## 2025-07-17 VITALS
TEMPERATURE: 98 F | SYSTOLIC BLOOD PRESSURE: 136 MMHG | RESPIRATION RATE: 18 BRPM | HEART RATE: 67 BPM | DIASTOLIC BLOOD PRESSURE: 95 MMHG

## 2025-07-17 DIAGNOSIS — I87.332 CHRONIC VENOUS HYPERTENSION (IDIOPATHIC) WITH ULCER AND INFLAMMATION OF LEFT LOWER EXTREMITY (HCC): Primary | ICD-10-CM

## 2025-07-17 DIAGNOSIS — E66.813 CLASS 3 SEVERE OBESITY DUE TO EXCESS CALORIES WITH SERIOUS COMORBIDITY AND BODY MASS INDEX (BMI) OF 45.0 TO 49.9 IN ADULT: ICD-10-CM

## 2025-07-17 PROCEDURE — 99214 OFFICE O/P EST MOD 30 MIN: CPT | Performed by: FAMILY MEDICINE

## 2025-07-17 NOTE — PATIENT INSTRUCTIONS
PATIENT INSTRUCTIONS      FOR Luis M RODRIGUEZ Natalie ,  3/25/1965    DATE OF SERVICE: 2025    RIGHT LEG:  Farrow wrap to control swelling daily, may remove at bedtime when leg is elevated     LEFT LEG:  Hydrofera Blue transfer  ABD or Kerramax and gauze roll  Comprilan 3 layer compression wrap       Nurse visit weekly x 3 weeks  Follow up with Dr. Golud 4 WEEKS      Managing your edema:    Avoid prolonged standing in one place. It is better to have your calf muscles moving to pump fluid out of the legs.  Elevate leg(s) above the level of the heart when sitting or as much as possible.  Take you diuretics as directed by your physician. Do not skip doses or change doses unless instructed to do so by your physician.  Decrease salt intake, follow recommended 2 grams daily.  Do not get leg(s) with compression wrap wet. If wraps are too tight as indicated by pain, numbness/tingling or discoloration of toes remove wrap completely and call the wound center @ [phone].       The treatment plan has been discussed at length between you and your provider. Follow all instructions carefully, it is very important. If you do not follow all instructions you are at risk of your wound not healing, infection, possible loss of limb and even loss of life.    COMPRESSION WRAP PATIENT CARE INSTRUCTIONS  DO NOT get compression wrap wet. When showering, use a shower boot.   Please contact wound clinic and if not able to reach, then go to emergency room if ANY of the following occur:   Tingling or numbness in the foot or toes on leg with compression wrap.  Severe pain that cannot be relieved with elevation and any medication instructed per your doctor.  A fever of 100.4°F (38°C) or higher.  Swelling, coldness, or blue-gray discoloration of the toes.  If the compression wrap feels too tight or too loose.  If the compression wrap is damaged or has rough edges that hurt.  If the compression wrap gets wet, you must cut wrap off.    Drainage from compression wrap that smells different than usual.

## 2025-07-17 NOTE — PROGRESS NOTES
Chief Complaint   Patient presents with    Wound Recheck     Patient is here for a follow-up of his left lower leg wound, denies any pain       HPI:     Patient in for follow-up of left lateral leg wound.  He is doing well and tolerating compression well.    Lab Results   Component Value Date    BUN 15 05/16/2025    CREATSERUM 0.91 05/16/2025    ALB 4.0 05/16/2025    TP 6.9 05/16/2025    A1C 5.4 05/15/2025       Medications - Current[1]    Allergies[2]         REVIEW OF SYSTEMS:     Review of Systems (ROS)  This information was obtained from the patient, chart    A comprehensive 10 point review of systems was completed.  Pertinent positives and negatives noted in the the HPI.     Past medical, surgical, family and social history updated where appropriate.  Past Medical History[3]  PHYSICAL EXAM:   BP (!) 136/95   Pulse 67   Temp 98.3 °F (36.8 °C)   Resp 18    Estimated body mass index is 50.9 kg/m² as calculated from the following:    Height as of 5/15/25: 67\".    Weight as of 5/15/25: 325 lb (147.4 kg).   Vital signs reviewed.Appears stated age, well groomed.        Calf  Point of Measurement - Left Calf: 30     Left Calf from:: Heel  Calf Left cm:: 36          Ankle  Point of Measurement - Left Ankle: 10     Left Ankle from:: Heel  Left Ankle cm:: 26.3                Foot                               Wound 01/11/24 #1 Leg Left;Lateral (Active)   Date First Assessed/Time First Assessed: 01/11/24 1406    Wound Number (Wound Clinic Only): #1  Primary Wound Type: Traumatic  Location: Leg  Wound Location Orientation: Left;Lateral      Assessments 1/11/2024  2:10 PM 7/17/2025  8:45 AM   Wound Image       Drainage Amount Large Moderate   Drainage Description Yellow;Serous Sanguineous   Treatments Compression (coflex 2 layer wrap) Compression (comprilian 8cm,10cm,12cm, cellona (x2), coban)   Wound Length (cm) 10.6 cm 5 cm (slight angled)   Wound Width (cm) 9.5 cm 3.5 cm   Wound Surface Area (cm^2) 100.7 cm^2 13.74  cm^2   Wound Depth (cm) 0.2 cm 0.1 cm   Wound Volume (cm^3) 20.14 cm^3 0.916 cm^3   Wound Healing % -- 95   Margins Well-defined edges --   Non-staged Wound Description Full thickness Full thickness   Leslie-wound Assessment Edema;Hemosiderin staining Edema;Hemosiderin staining;Dry   Wound Granulation Tissue Firm;Pink Red;Spongy   Wound Bed Granulation (%) 40 % 80 %   Wound Bed Epithelium (%) -- 20 %   Wound Bed Slough (%) 60 % --   Wound Odor None None   Shape -- friable,hypergranular,clustered   Tunneling? No No   Undermining? No No   Sinus Tracts? No No       Inactive Orders   Date Order Priority Status Authorizing Provider   05/17/25 0948 Wound dressing Routine Discontinued Lamonte Avilez MD   01/16/25 0857 Debridement Traumatic Left;Lateral Leg Routine Completed Nicholas Gould MD   01/09/25 0935 Debridement Traumatic Left;Lateral Leg Routine Completed Nicholas Gould MD   01/02/25 1505 Wound care Routine Discontinued Braxton Ledbetter MD   01/01/25 0720 Consult to Wound Ostomy Routine Completed Dexter Dick MD     - Reason for Consult:    Wound Care     - Wound Care Reason for Consult:    wounds   11/21/24 0914 Debridement Traumatic Left;Lateral Leg Routine Completed Nicholas Gould MD   07/16/24 2327 Consult to Wound Ostomy Routine Completed Rica Pearson MD     - Reason for Consult:    Wound Care     - Wound Care Reason for Consult:    worsening   06/24/24 1127 Wound care Routine Discontinued Rica Pearson MD   06/06/24 0913 Debridement Traumatic Routine Completed Nicholas Gould MD   05/16/24 0901 Debridement Traumatic Routine Completed Nicholas Gould MD   05/02/24 1520 Debridement Traumatic Routine Completed Nicholas Gould MD   04/11/24 0959 Debridement Traumatic Routine Completed Nicholas Gould MD   04/04/24 0915 Debridement Traumatic Left;Lateral Leg Routine Completed Nicholas Gould MD   02/15/24 1154 Debridement Traumatic Left;Lateral Leg Routine Completed Nicholas Gould MD    01/18/24 1511 Debridement Traumatic Left;Lateral Leg Routine Completed Nicholas Gould MD   01/11/24 1710 Debridement Traumatic Left;Lateral Leg Routine Completed Nicholas Gould MD       Compression Wrap 05/22/25 Leg Anterior;Left (Active)   Placement Date/Time: 05/22/25 0902   Location: Leg  Wound Location Orientation: Anterior;Left      Assessments 5/22/2025  8:31 AM 7/17/2025  8:45 AM   Response to Treatment Well tolerated Well tolerated   Compression Layers Multilayer Multilayer   Compression Product Type Comprilan 8cm;Comprilan 10cm;Comprilan 12cm;Stockinette 4in;Coban Comprilan 8cm;Comprilan 10cm;Comprilan 12cm;Stockinette 4in;Coban (comprilian 8cm,10cm,12cm, cellona (x2), coban)   Dressing Applied Yes (silver nitrate, endoform, hydrofera transfer, ABD pad, conforming gauze, tape) Yes (sorbact, hydrofera transfer, ABD pad, conforming gauze, tape)   Compression Wrap Location Toes to Knee Toes to Knee   Compression Wrap Status Clean;Dry;Intact Clean;Dry;Intact       No associated orders.              ASSESSMENT AND PLAN:      1. Chronic venous hypertension (idiopathic) with ulcer and inflammation of left lower extremity (HCC)    2. Class 3 severe obesity due to excess calories with serious comorbidity and body mass index (BMI) of 45.0 to 49.9 in adult    Clinically his wound is getting smaller and improved from last visit.  Silver nitrate applied.  Continue Hydrofera Blue transfer, Kerramax, Comprilan 3 layer compression wrap.  Patient to follow-up in 1 week.      Risks, benefits, and alternatives of current treatment plan discussed in detail.  Questions and concerns addressed. Red flags to RTC or ED reviewed.  Patient (or parent) agrees to plan.      Return in about 1 week (around 7/24/2025).    Also refer to patient instructions.     I spent a total of 30 minutes with this patient's care.  This time included preparing to see the patient (eg, review notes and recent diagnostics), seeing the patient,  performing a medically appropriate examination and/or evaluation, counseling and educating the patient, and documenting in the record.          Nicholas Gould M.D.   [facility]   2025    Patient Instructions       PATIENT INSTRUCTIONS      FOR Luis M Estrada RICK 3/25/1965    DATE OF SERVICE: 2025    RIGHT LEG:  Farrow wrap to control swelling daily, may remove at bedtime when leg is elevated     LEFT LEG:  Hydrofera Blue transfer  ABD or Kerramax and gauze roll  Comprilan 3 layer compression wrap       Nurse visit weekly x 3 weeks  Follow up with Dr. Gould 4 WEEKS      Managing your edema:    Avoid prolonged standing in one place. It is better to have your calf muscles moving to pump fluid out of the legs.  Elevate leg(s) above the level of the heart when sitting or as much as possible.  Take you diuretics as directed by your physician. Do not skip doses or change doses unless instructed to do so by your physician.  Decrease salt intake, follow recommended 2 grams daily.  Do not get leg(s) with compression wrap wet. If wraps are too tight as indicated by pain, numbness/tingling or discoloration of toes remove wrap completely and call the wound center @ [phone].       The treatment plan has been discussed at length between you and your provider. Follow all instructions carefully, it is very important. If you do not follow all instructions you are at risk of your wound not healing, infection, possible loss of limb and even loss of life.    COMPRESSION WRAP PATIENT CARE INSTRUCTIONS  DO NOT get compression wrap wet. When showering, use a shower boot.   Please contact wound clinic and if not able to reach, then go to emergency room if ANY of the following occur:   Tingling or numbness in the foot or toes on leg with compression wrap.  Severe pain that cannot be relieved with elevation and any medication instructed per your doctor.  A fever of 100.4°F (38°C) or  higher.  Swelling, coldness, or blue-gray discoloration of the toes.  If the compression wrap feels too tight or too loose.  If the compression wrap is damaged or has rough edges that hurt.  If the compression wrap gets wet, you must cut wrap off.   Drainage from compression wrap that smells different than usual.        MISCELLANEOUS INSTRUCTIONS  Supplement with a daily multivitamin   Low salt diet  Intense blood sugar control - Goal Blood sugar below 180 at all times recommended.  Increase protein intake / consider protein supplements - see below  Elevate extremities at all times when sitting / laying down.  No tobacco use     DIETARY MODIFICATIONS TO HELP WITH WOUND HEALING:          Please follow any restrictions to diet given by your other doctors     Protein: meats, beans, eggs, milk and yogurt particularly Greek yogurt), tofu, soy nuts, soy protein products     Vitamin C: citrus fruits and juices, strawberries, tomatoes, tomato juice, peppers, baked potatoes, spinach, broccoli, cauliflower, [place] sprouts, cabbage     Vitamin A: dark greens, leafy vegetables, orange or yellow vegetables, cantaloupe, fortified dairy products, liver, fortified cereals     Zinc: fortified cereals, red meats, seafood     Consider Bob by [employer] (These are essential branch chain amino acids that help with tissue building and wound healing) and take 2 packets/day. You can order online at [employer] or [employer]     ADDITIONAL REMINDERS:     The treatment plan has been discussed at length with you and your provider. Follow all instructions carefully, it is very important. If you do not follow all instructions, you are at risk of your wound not healing, infection, possible loss of limb and even loss of life.  Please call the clinic at [phone] during regular business hours ( 7:30 AM - 5:30 PM) if you notice increased redness, warmth, pain or pus like drainage or start running a fever greater than 100.3.    For after hour  emergencies, please call your primary physician or go to the nearest emergency room.  If your blood pressure is elevated, follow up with your primary care doctor and/or cardiologist as soon as possible.     FOR LEG SWELLING,  LOWER EXTREMITY WOUNDS AND IF USING COMPRESSION:   Managing your edema:    Avoid prolonged standing in one place. It is better to have your calf muscles moving to pump fluid out of the legs.  Elevate leg(s) above the level of the heart when sitting or as much as possible.  Take you diuretics as directed by your physician. Do not skip doses or change doses unless instructed to do so by your physician.  Decrease salt intake, follow recommended 2 grams daily.  Do not get leg(s) with compression wrap wet. If wraps are too tight as indicated by pain, numbness/tingling or discoloration of toes remove wrap completely and call the wound center @ [phone].       The treatment plan has been discussed at length between you and your provider. Follow all instructions carefully, it is very important. If you do not follow all instructions you are at risk of your wound not healing, infection, possible loss of limb and even loss of life.    COMPRESSION WRAP PATIENT CARE INSTRUCTIONS  DO NOT get compression wrap wet. When showering, use a shower boot.   Please contact wound clinic and if not able to reach, then go to emergency room if ANY of the following occur:   Tingling or numbness in the foot or toes on leg with compression wrap.  Severe pain that cannot be relieved with elevation and any medication instructed per your doctor.  A fever of 100.4°F (38°C) or higher.  Swelling, coldness, or blue-gray discoloration of the toes.  If the compression wrap feels too tight or too loose.  If the compression wrap is damaged or has rough edges that hurt.  If the compression wrap gets wet, you must cut wrap off.   Drainage from compression wrap that smells different than usual.                        [1]   Current  Outpatient Medications:     sodium hypochlorite 0.25 % External Solution, 1 application on affected areas daily., Disp: 473 mL, Rfl: 0    Multiple Vitamins-Minerals (PRESERVISION AREDS) Oral Cap, Take 1 capsule by mouth every morning., Disp: , Rfl:     Multiple Vitamins-Minerals (MULTI-VITAMIN/MINERALS) Oral Tab, Take 1 tablet by mouth in the morning., Disp: , Rfl:   [2] No Known Allergies  [3]   Past Medical History:   Cellulitis    to left leg, seeking treatment at wound care for months    Hydrocephalus (HCC)    dx'd as an adult-no shunt

## 2025-07-17 NOTE — PROGRESS NOTES
Weekly Wound Education Note    Teaching Provided To: Patient  Training Topics: Cleasing and general instructions, Compression, Discharge instructions, Edema control, Dressing  Training Method: Explain/Verbal, Demonstration  Training Response: Reinforcement needed        Notes: Patient here for follow up wound care visit to left lateral lower leg wound. Provider applied silver nitrate, continued with sorbact, hydrofera transfer, ABD pad, conforming gauze, tape. Applied comprilian 8cm, 10cm, 12cm, cellona (x2), coban. Patient to follow up in 1 week.

## 2025-07-24 ENCOUNTER — OFFICE VISIT (OUTPATIENT)
Dept: WOUND CARE | Facility: HOSPITAL | Age: 60
End: 2025-07-24
Attending: FAMILY MEDICINE
Payer: COMMERCIAL

## 2025-07-24 VITALS
HEART RATE: 66 BPM | TEMPERATURE: 98 F | RESPIRATION RATE: 14 BRPM | SYSTOLIC BLOOD PRESSURE: 138 MMHG | DIASTOLIC BLOOD PRESSURE: 78 MMHG

## 2025-07-24 DIAGNOSIS — I87.333 CHRONIC VENOUS HYPERTENSION (IDIOPATHIC) WITH ULCER AND INFLAMMATION OF BILATERAL LOWER EXTREMITY (HCC): ICD-10-CM

## 2025-07-24 DIAGNOSIS — E66.813 CLASS 3 SEVERE OBESITY DUE TO EXCESS CALORIES WITH SERIOUS COMORBIDITY AND BODY MASS INDEX (BMI) OF 45.0 TO 49.9 IN ADULT: ICD-10-CM

## 2025-07-24 DIAGNOSIS — I87.332 CHRONIC VENOUS HYPERTENSION (IDIOPATHIC) WITH ULCER AND INFLAMMATION OF LEFT LOWER EXTREMITY (HCC): Primary | ICD-10-CM

## 2025-07-24 DIAGNOSIS — S81.802D LEG WOUND, LEFT, SUBSEQUENT ENCOUNTER: ICD-10-CM

## 2025-07-24 PROCEDURE — 29581 APPL MULTLAYER CMPRN SYS LEG: CPT

## 2025-07-24 NOTE — PROGRESS NOTES
Chief Complaint   Patient presents with    Nurse Visit     Pt here for nurse visit assessment and dressing Change. He arrives with compression wrap to left leg and velcro compression to right leg         Medications - Current[1]    Allergies[2]       HISTORY:     Past medical, surgical, family and social history updated where appropriate.    PHYSICAL EXAM:   /78   Pulse 66   Temp 97.8 °F (36.6 °C)   Resp 14        Vital signs reviewed.      Calf  Point of Measurement - Left Calf: 30     Left Calf from:: Heel  Calf Left cm:: 37.8          Ankle  Point of Measurement - Left Ankle: 10     Left Ankle from:: Heel  Left Ankle cm:: 28                Wound 01/11/24 #1 Leg Left;Lateral (Active)   Date First Assessed/Time First Assessed: 01/11/24 1406    Wound Number (Wound Clinic Only): #1  Primary Wound Type: Traumatic  Location: Leg  Wound Location Orientation: Left;Lateral      Assessments 1/11/2024  2:10 PM 7/24/2025  8:15 AM   Wound Image       Drainage Amount Large Small   Drainage Description Yellow;Serous Serosanguineous   Treatments Compression Compression   Wound Length (cm) 10.6 cm 5 cm   Wound Width (cm) 9.5 cm 3 cm   Wound Surface Area (cm^2) 100.7 cm^2 11.78 cm^2   Wound Depth (cm) 0.2 cm 0.1 cm   Wound Volume (cm^3) 20.14 cm^3 0.785 cm^3   Wound Healing % -- 96   Margins Well-defined edges Well-defined edges   Non-staged Wound Description Full thickness Full thickness   Leslie-wound Assessment Edema;Hemosiderin staining Edema;Hemosiderin staining;Dry   Wound Granulation Tissue Firm;Pink Pink;Firm   Wound Bed Granulation (%) 40 % 50 %   Wound Bed Epithelium (%) -- 25 %   Wound Bed Slough (%) 60 % 25 %   Wound Odor None None   Shape -- cluster   Tunneling? No No   Undermining? No No   Sinus Tracts? No No       Inactive Orders   Date Order Priority Status Authorizing Provider   05/17/25 0948 Wound dressing Routine Discontinued Lamonte Avilez MD   01/16/25 0857 Debridement Traumatic Left;Lateral Leg  Routine Completed Nicholas Gould MD   01/09/25 0935 Debridement Traumatic Left;Lateral Leg Routine Completed Nicholas Gould MD   01/02/25 1505 Wound care Routine Discontinued Braxton Ledbetter MD   01/01/25 0720 Consult to Wound Ostomy Routine Completed Dexter Dick MD     - Reason for Consult:    Wound Care     - Wound Care Reason for Consult:    wounds   11/21/24 0914 Debridement Traumatic Left;Lateral Leg Routine Completed Nicholas Gould MD   07/16/24 2327 Consult to Wound Ostomy Routine Completed Rica Pearson MD     - Reason for Consult:    Wound Care     - Wound Care Reason for Consult:    worsening   06/24/24 1127 Wound care Routine Discontinued Rica Pearson MD   06/06/24 0913 Debridement Traumatic Routine Completed Nicholas Gould MD   05/16/24 0901 Debridement Traumatic Routine Completed Nicholas Gould MD   05/02/24 1520 Debridement Traumatic Routine Completed Nicholas Gould MD   04/11/24 0959 Debridement Traumatic Routine Completed Nicholas Gould MD   04/04/24 0915 Debridement Traumatic Left;Lateral Leg Routine Completed Nicholas Gould MD   02/15/24 1154 Debridement Traumatic Left;Lateral Leg Routine Completed Nicholas Gould MD   01/18/24 1511 Debridement Traumatic Left;Lateral Leg Routine Completed Nicholas Gould MD   01/11/24 1710 Debridement Traumatic Left;Lateral Leg Routine Completed Nicholas Gould MD       Compression Wrap 05/22/25 Leg Anterior;Left (Active)   Placement Date/Time: 05/22/25 0902   Location: Leg  Wound Location Orientation: Anterior;Left      Assessments 5/22/2025  8:31 AM 7/24/2025  8:22 AM   Response to Treatment Well tolerated Well tolerated   Compression Layers Multilayer Multilayer   Compression Product Type Comprilan 8cm;Comprilan 10cm;Comprilan 12cm;Stockinette 4in;Coban Comprilan 8cm;Comprilan 10cm;Comprilan 12cm;Stockinette 4in;Coban   Dressing Applied Yes Yes   Compression Wrap Location Toes to Knee Toes to Knee   Compression Wrap Status  Clean;Dry;Intact Clean;Dry;Intact       No associated orders.          ASSESSMENT AND PLAN:        Risks, benefits, and alternatives of current treatment plan discussed in detail.  Questions and concerns addressed. Red flags to RTC or ED reviewed.  Patient (or parent) agrees to plan.      No follow-ups on file.  Weekly Wound Education Note    Teaching Provided To: Patient  Training Topics: Cleasing and general instructions, Compression, Discharge instructions, Dressing, Edema control  Training Method: Explain/Verbal  Training Response: Patient responds and understands        Notes: Here fro RN visit.    Here for a nurse visit for wound to left leg. Wound improving. Edema measurements slightly increased in both ankle and calf, arrived with comprilan compression wraps intact - not noted to have shifted or slid down. Sorbact, hydrofera ready (used due to hydrofera transfer sticking and small drainage), conforming gauze, tape. Comprilan wraps 1x8, 1x0, 1x12, cellona x2, and stockinette 4\" used. Pt scheduled for next week.       Tamica ZAFAR RN   7/24/2025  8:24 AM           [1]   Current Outpatient Medications:     sodium hypochlorite 0.25 % External Solution, 1 application on affected areas daily., Disp: 473 mL, Rfl: 0    Multiple Vitamins-Minerals (PRESERVISION AREDS) Oral Cap, Take 1 capsule by mouth every morning., Disp: , Rfl:     Multiple Vitamins-Minerals (MULTI-VITAMIN/MINERALS) Oral Tab, Take 1 tablet by mouth in the morning., Disp: , Rfl:   [2] No Known Allergies

## 2025-07-31 ENCOUNTER — OFFICE VISIT (OUTPATIENT)
Dept: WOUND CARE | Facility: HOSPITAL | Age: 60
End: 2025-07-31
Attending: FAMILY MEDICINE
Payer: COMMERCIAL

## 2025-07-31 VITALS
HEART RATE: 69 BPM | SYSTOLIC BLOOD PRESSURE: 143 MMHG | TEMPERATURE: 98 F | DIASTOLIC BLOOD PRESSURE: 93 MMHG | RESPIRATION RATE: 16 BRPM

## 2025-07-31 DIAGNOSIS — S81.802D OPEN WOUND OF LEFT LOWER LEG, SUBSEQUENT ENCOUNTER: Primary | ICD-10-CM

## 2025-07-31 DIAGNOSIS — I87.2 CHRONIC VENOUS INSUFFICIENCY: ICD-10-CM

## 2025-07-31 PROCEDURE — 99213 OFFICE O/P EST LOW 20 MIN: CPT | Performed by: NURSE PRACTITIONER

## 2025-08-07 ENCOUNTER — OFFICE VISIT (OUTPATIENT)
Dept: WOUND CARE | Facility: HOSPITAL | Age: 60
End: 2025-08-07
Attending: FAMILY MEDICINE

## 2025-08-07 VITALS
TEMPERATURE: 98 F | RESPIRATION RATE: 16 BRPM | SYSTOLIC BLOOD PRESSURE: 146 MMHG | DIASTOLIC BLOOD PRESSURE: 92 MMHG | HEART RATE: 72 BPM

## 2025-08-07 DIAGNOSIS — S81.802D LEG WOUND, LEFT, SUBSEQUENT ENCOUNTER: ICD-10-CM

## 2025-08-07 DIAGNOSIS — I87.2 CHRONIC VENOUS INSUFFICIENCY: ICD-10-CM

## 2025-08-07 DIAGNOSIS — I87.332 CHRONIC VENOUS HYPERTENSION (IDIOPATHIC) WITH ULCER AND INFLAMMATION OF LEFT LOWER EXTREMITY (HCC): ICD-10-CM

## 2025-08-07 DIAGNOSIS — S81.802D OPEN WOUND OF LEFT LOWER LEG, SUBSEQUENT ENCOUNTER: Primary | ICD-10-CM

## 2025-08-07 PROCEDURE — 29581 APPL MULTLAYER CMPRN SYS LEG: CPT

## 2025-08-14 ENCOUNTER — OFFICE VISIT (OUTPATIENT)
Dept: WOUND CARE | Facility: HOSPITAL | Age: 60
End: 2025-08-14
Attending: FAMILY MEDICINE

## 2025-08-14 VITALS
DIASTOLIC BLOOD PRESSURE: 85 MMHG | HEART RATE: 78 BPM | TEMPERATURE: 97 F | RESPIRATION RATE: 16 BRPM | SYSTOLIC BLOOD PRESSURE: 142 MMHG

## 2025-08-14 DIAGNOSIS — I87.2 CHRONIC VENOUS INSUFFICIENCY: ICD-10-CM

## 2025-08-14 DIAGNOSIS — S81.802D OPEN WOUND OF LEFT LOWER LEG, SUBSEQUENT ENCOUNTER: Primary | ICD-10-CM

## 2025-08-14 DIAGNOSIS — E66.813 CLASS 3 SEVERE OBESITY DUE TO EXCESS CALORIES WITH SERIOUS COMORBIDITY AND BODY MASS INDEX (BMI) OF 50.0 TO 59.9 IN ADULT: ICD-10-CM

## 2025-08-14 PROCEDURE — 99214 OFFICE O/P EST MOD 30 MIN: CPT | Performed by: FAMILY MEDICINE

## 2025-08-21 ENCOUNTER — OFFICE VISIT (OUTPATIENT)
Dept: WOUND CARE | Facility: HOSPITAL | Age: 60
End: 2025-08-21
Attending: FAMILY MEDICINE

## 2025-08-21 VITALS
DIASTOLIC BLOOD PRESSURE: 84 MMHG | TEMPERATURE: 98 F | SYSTOLIC BLOOD PRESSURE: 134 MMHG | HEART RATE: 64 BPM | RESPIRATION RATE: 16 BRPM

## 2025-08-21 DIAGNOSIS — S81.802D OPEN WOUND OF LEFT LOWER LEG, SUBSEQUENT ENCOUNTER: Primary | ICD-10-CM

## 2025-08-21 DIAGNOSIS — I87.2 CHRONIC VENOUS INSUFFICIENCY: ICD-10-CM

## 2025-08-21 DIAGNOSIS — E66.813 CLASS 3 SEVERE OBESITY DUE TO EXCESS CALORIES WITH SERIOUS COMORBIDITY AND BODY MASS INDEX (BMI) OF 50.0 TO 59.9 IN ADULT: ICD-10-CM

## 2025-08-21 PROCEDURE — 99214 OFFICE O/P EST MOD 30 MIN: CPT | Performed by: FAMILY MEDICINE

## 2025-08-28 ENCOUNTER — OFFICE VISIT (OUTPATIENT)
Dept: WOUND CARE | Facility: HOSPITAL | Age: 60
End: 2025-08-28
Attending: FAMILY MEDICINE

## 2025-08-28 VITALS
DIASTOLIC BLOOD PRESSURE: 88 MMHG | TEMPERATURE: 98 F | HEART RATE: 68 BPM | SYSTOLIC BLOOD PRESSURE: 144 MMHG | RESPIRATION RATE: 16 BRPM

## 2025-08-28 DIAGNOSIS — I87.332 CHRONIC VENOUS HYPERTENSION (IDIOPATHIC) WITH ULCER AND INFLAMMATION OF LEFT LOWER EXTREMITY (HCC): Primary | ICD-10-CM

## 2025-08-28 DIAGNOSIS — E66.813 CLASS 3 SEVERE OBESITY DUE TO EXCESS CALORIES WITH SERIOUS COMORBIDITY AND BODY MASS INDEX (BMI) OF 50.0 TO 59.9 IN ADULT: ICD-10-CM

## 2025-08-28 PROCEDURE — 99214 OFFICE O/P EST MOD 30 MIN: CPT | Performed by: FAMILY MEDICINE

## (undated) NOTE — Clinical Note
ASTHMA ACTION PLAN for Luis M Estrada     : 3/25/1965     Date: 2025  Provider:  Nicholas Gould MD  Phone for doctor or clinic: [facility]  [address] [place] [id number] [phone]           You can use the colors of a traffic light to help learn about your asthma medicines.      1. Green - Go! % of Personal Best Peak Flow Use controller medicine.   Breathing is good  No cough or wheeze  Can work and play Medicine How much to take When to take it          2. Yellow - Caution. 50-79% Personal Best Peak  Flow.  Use reliever medicine to keep an asthma attack from getting bad.   Cough  Wheezing  Tight Chest  Wake up at night Medicine How much to take When to take it           Additional instructions         3. Red - Stop! Danger!  <50% Personal Best Peak  Flow. Take these medications until  Get help from a doctor   Medicine not helping  Breathing is hard and fast  Nose opens wide  Can't walk  Ribs show  Can't talk well Medicine How much to take When to take it         Additional Instructions If your symptoms do not improve and you cannot contact your doctor, [facility] or call 911 immediately!     [x] Asthma Action Plan reviewed with patient (and caregiver if necessary) and a copy of the plan was given to the patient/caregiver.   [] Asthma Action Plan reviewed with patient (and caregiver if necessary) on the phone and mailed copy to patient or submitted via [employer].     Signatures:  Provider  Nicholas Gould MD   Patient Caretaker

## (undated) NOTE — LETTER
Date: 5/29/2025  Patient name: Luis M Estrada  YOB: 1965  Medical Record Number: [id number]  Primary Coverage: Payor: BCBS OUT OF STATE / Plan: BCBS OUT OF STATE PPO / Product Type: *No Product type* /   Secondary Coverage:   Insurance ID: [id number]  Patient Address: [address] [place] [address] [address]   [place] [address]  Telephone Information:   Home Phone [phone]   Mobile [phone]         Encounter Date: 5/29/2025  Provider: Nicholas Gould MD  Diagnosis:     ICD-10-CM   1. Chronic venous hypertension (idiopathic) with ulcer and inflammation of bilateral lower extremity (HCC)  I87.333   2. Class 3 severe obesity due to excess calories with serious comorbidity and body mass index (BMI) of 45.0 to 49.9 in adult  E66.813    Z68.42       Progress Note:  Chief Complaint   Patient presents with    Wound Care     Patient is here for a wound care follow up. He denies any pain to the wounds.        HPI:     Patient here for follow-up of left lower extremity wound.  He has developed a new wound on the right anterior leg.  He is tolerating Comprilan compression wrap which he applied for the first time last visit.  No complaints.  He is not sure how the right leg wound came about.  He denies any trauma.    Lab Results   Component Value Date    BUN 15 05/16/2025    CREATSERUM 0.91 05/16/2025    ALB 4.0 05/16/2025    TP 6.9 05/16/2025    A1C 5.4 05/15/2025       Medications - Current[1]    Allergies[2]         REVIEW OF SYSTEMS:     Review of Systems (ROS)  This information was obtained from the patient, chart    A comprehensive 10 point review of systems was completed.  Pertinent positives and negatives noted in the the HPI.     Past medical, surgical, family and social history updated where appropriate.    PHYSICAL EXAM:   /89   Pulse 67   Temp 98.1 °F (36.7 °C)   Resp 16    Estimated body mass index is 50.9 kg/m² as calculated from the following:    Height as of 5/15/25: 67\".    Weight as of  5/15/25: 325 lb (147.4 kg).   Vital signs reviewed.Appears stated age, well groomed.        Calf  Point of Measurement - Left Calf: 30  Point of Measurement - Right Calf: 30  Left Calf from:: Heel  Calf Left cm:: 36.7  Right Calf from:: Heel  Right Calf cm:: 45.2    Ankle  Point of Measurement - Left Ankle: 10  Point of Measurement - Right Ankle: 10  Left Ankle from:: Heel  Left Ankle cm:: 27.8     Right Ankle from:: Heel  Right Ankle cm:: 30.6       Foot                               Wound 01/11/24 #1 Leg Left;Lateral (Active)   Date First Assessed/Time First Assessed: 01/11/24 1406    Wound Number (Wound Clinic Only): #1  Primary Wound Type: Traumatic  Location: Leg  Wound Location Orientation: Left;Lateral      Assessments 1/11/2024  2:10 PM 5/29/2025  8:38 AM   Wound Image       Drainage Amount Large Small   Drainage Description Yellow;Serous Serosanguineous   Treatments Compression (coflex 2 layer wrap) Compression (comprilian 8cm, 10cm, 12cm , cellona (x2), rosidal, coban)   Wound Length (cm) 10.6 cm 0.6 cm   Wound Width (cm) 9.5 cm 0.7 cm   Wound Surface Area (cm^2) 100.7 cm^2 0.33 cm^2   Wound Depth (cm) 0.2 cm 0.1 cm   Wound Volume (cm^3) 20.14 cm^3 0.022 cm^3   Wound Healing % -- 100   Margins Well-defined edges Well-defined edges   Non-staged Wound Description Full thickness Full thickness   Leslie-wound Assessment Edema;Hemosiderin staining Edema;Hemosiderin staining;Moist   Wound Granulation Tissue Firm;Pink Firm;Pink;Pale Grey   Wound Bed Granulation (%) 40 % 100 %   Wound Bed Slough (%) 60 % --   Wound Odor None None   Tunneling? No No   Undermining? No No   Sinus Tracts? No No       Inactive Orders   Date Order Priority Status Authorizing Provider   05/17/25 0948 Wound dressing Routine Discontinued Lamonte Avilez MD   01/16/25 0857 Debridement Traumatic Left;Lateral Leg Routine Completed Nicholas Gould MD   01/09/25 0935 Debridement Traumatic Left;Lateral Leg Routine Completed Nicholas Gould  MD   01/02/25 1505 Wound care Routine Discontinued Braxton Ledbetter MD   01/01/25 0720 Consult to Wound Ostomy Routine Completed Dexter Dick MD     - Reason for Consult:    Wound Care     - Wound Care Reason for Consult:    wounds   11/21/24 0914 Debridement Traumatic Left;Lateral Leg Routine Completed Nicholas Gould MD   07/16/24 2327 Consult to Wound Ostomy Routine Completed Rica Pearson MD     - Reason for Consult:    Wound Care     - Wound Care Reason for Consult:    worsening   06/24/24 1127 Wound care Routine Discontinued Rica Pearson MD   06/06/24 0913 Debridement Traumatic Routine Completed Nicholas Gould MD   05/16/24 0901 Debridement Traumatic Routine Completed Nicholas Gould MD   05/02/24 1520 Debridement Traumatic Routine Completed Nicholas Gould MD   04/11/24 0959 Debridement Traumatic Routine Completed Nicholas Gould MD   04/04/24 0915 Debridement Traumatic Left;Lateral Leg Routine Completed Nicholas Gould MD   02/15/24 1154 Debridement Traumatic Left;Lateral Leg Routine Completed Nicholas Gould MD   01/18/24 1511 Debridement Traumatic Left;Lateral Leg Routine Completed Nicholas Gould MD   01/11/24 1710 Debridement Traumatic Left;Lateral Leg Routine Completed Nicholas Gould MD       Wound 05/15/25 #2 Leg Left (Active)   Date First Assessed/Time First Assessed: 05/15/25 0821    Wound Number (Wound Clinic Only): #2  Primary Wound Type: Edema  Location: Leg  Wound Location Orientation: Left      Assessments 5/15/2025  8:30 AM 5/29/2025  8:36 AM   Wound Image        Drainage Amount Large Large   Drainage Description Yellow;Serous Sanguineous   Treatments -- Compression (comprilian 8cm, 10cm, 12cm , cellona (x2), rosidal, coban)   Wound Length (cm) 16 cm 2.5 cm   Wound Width (cm) 7 cm 2.1 cm   Wound Surface Area (cm^2) 87.96 cm^2 4.12 cm^2   Wound Depth (cm) 0.1 cm 0.1 cm   Wound Volume (cm^3) 5.864 cm^3 0.275 cm^3   Wound Healing % 71 95   Margins Well-defined edges Well-defined  edges   Non-staged Wound Description Full thickness Full thickness   Leslie-wound Assessment Edema;Blanchable erythema Edema;Hemosiderin staining;Moist (friable)   Wound Granulation Tissue Pink;Firm Spongy;Pink;Red   Wound Bed Granulation (%) 20 % 100 %   Wound Bed Epithelium (%) 20 % --   Wound Bed Slough (%) 60 % --   Wound Odor None None   Tunneling? -- No   Undermining? -- No   Sinus Tracts? -- No       Inactive Orders   Date Order Priority Status Authorizing Provider   05/17/25 0948 Wound dressing Routine Discontinued Lamonte Avilez MD       Compression Wrap 05/22/25 Leg Anterior;Left (Active)   Placement Date/Time: 05/22/25 0902   Location: Leg  Wound Location Orientation: Anterior;Left      Assessments 5/22/2025  8:31 AM 5/29/2025  8:38 AM   Response to Treatment Well tolerated Well tolerated   Compression Layers Multilayer Multilayer   Compression Product Type Comprilan 8cm;Comprilan 10cm;Comprilan 12cm;Stockinette 4in;Coban Comprilan 8cm;Comprilan 10cm;Comprilan 12cm;Stockinette 4in;Coban (comprilian 8cm, 10cm, 12cm , cellona (x2), coban)   Dressing Applied Yes (silver nitrate, endoform, hydrofera transfer, ABD pad, conforming gauze, tape) Yes   Compression Wrap Location Toes to Knee Toes to Knee   Compression Wrap Status Clean;Dry;Intact Clean;Dry;Intact       No associated orders.       Wound 05/29/25 #3 Right anterior lower leg Leg Right;Anterior (Active)   Date First Assessed/Time First Assessed: 05/29/25 0826    Wound Number (Wound Clinic Only): #3 Right anterior lower leg  Primary Wound Type: Venous Ulcer  Location: Leg  Wound Location Orientation: Right;Anterior      Assessments 5/29/2025  8:32 AM   Wound Image     Drainage Amount Copious   Drainage Description Serous;Clear   Treatments Compression (comprilian 8cm, 10cm, 12cm , cellona (x2), coban)   Wound Length (cm) 0.2 cm   Wound Width (cm) 0.2 cm   Wound Surface Area (cm^2) 0.03 cm^2   Wound Depth (cm) 0.1 cm   Wound Volume (cm^3) 0.002 cm^3    Margins Well-defined edges   Non-staged Wound Description Full thickness   Leslie-wound Assessment Edema;Hemosiderin staining   Wound Granulation Tissue Spongy;Pink   Wound Bed Granulation (%) 100 %   Wound Odor None   Tunneling? No   Undermining? No   Sinus Tracts? No       No associated orders.       Compression Wrap 05/29/25 Leg Anterior;Right (Active)   Placement Date/Time: 05/29/25 0853   Location: Leg  Wound Location Orientation: Anterior;Right      Assessments 5/29/2025  8:38 AM   Response to Treatment Well tolerated   Compression Layers Multilayer   Compression Product Type Comprilan 8cm;Comprilan 10cm;Comprilan 12cm;Coban;Stockinette 4in   Dressing Applied Yes   Compression Wrap Location Toes to Knee   Compression Wrap Status Clean;Dry;Intact       No associated orders.              ASSESSMENT AND PLAN:      1. Chronic venous hypertension (idiopathic) with ulcer and inflammation of bilateral lower extremity (HCC)    2. Class 3 severe obesity due to excess calories with serious comorbidity and body mass index (BMI) of 45.0 to 49.9 in adult    There is a very small superficial wound about 2 to 3 mm on the right anterior leg which is draining clear fluid.  No pus drainage no surrounding erythema.  He does have significant swelling of the upper leg on the right.  Will apply Hydrofera Blue transfer and Comprilan 3 layer compression wrap to the right lower extremity.  The left leg wounds anterior and lateral are all doing much better than previous visit.  Silver nitrate applied to these wounds.  He did have a previous recent wound culture from the left leg which showed bacterial growth resistant to multiple antibiotics, I did discuss this with infectious disease Dr. Short and clinically the wound looks good and thus no further antibiotics are needed at this time.  Will monitor the wound clinically.  Will continue with endoform collagen, Hydrofera Blue transfer, Comprilan 3 layer compression wrap to left lower  extremity.  Continue compression wrap precautions as per previous visits.  Follow-up with me in about 1 week.  Risks, benefits, and alternatives of current treatment plan discussed in detail.  Questions and concerns addressed. Red flags to RTC or ED reviewed.  Patient (or parent) agrees to plan.      Return in about 1 week (around 2025).    Also refer to patient instructions.     I spent a total of 40 minutes with this patient's care.  This time included preparing to see the patient (eg, review notes and recent diagnostics), seeing the patient, performing a medically appropriate examination and/or evaluation, counseling and educating the patient, and documenting in the record.          Nicholas Gould M.D.   [facility]   2025    Patient Instructions       PATIENT INSTRUCTIONS      FOR RICK Blandon 3/25/1965    DATE OF SERVICE: 2025    RIGHT LEG:  Hydrofera Blue Transfer  ABD  Comprilan 3 layer compression wrap     LEFT LEG:  Endoform collagen to the wounds  Hydrofera Blue transfer  ABD or Kerramax and gauze roll  Comprilan 3 layer compression wrap         Follow up with Dr. Gould 1 WEEK      Managing your edema:    Avoid prolonged standing in one place. It is better to have your calf muscles moving to pump fluid out of the legs.  Elevate leg(s) above the level of the heart when sitting or as much as possible.  Take you diuretics as directed by your physician. Do not skip doses or change doses unless instructed to do so by your physician.  Decrease salt intake, follow recommended 2 grams daily.  Do not get leg(s) with compression wrap wet. If wraps are too tight as indicated by pain, numbness/tingling or discoloration of toes remove wrap completely and call the wound center @ [phone].       The treatment plan has been discussed at length between you and your provider. Follow all instructions carefully, it is very important. If you do not follow all instructions  you are at risk of your wound not healing, infection, possible loss of limb and even loss of life.    COMPRESSION WRAP PATIENT CARE INSTRUCTIONS  DO NOT get compression wrap wet. When showering, use a shower boot.   Please contact wound clinic and if not able to reach, then go to emergency room if ANY of the following occur:   Tingling or numbness in the foot or toes on leg with compression wrap.  Severe pain that cannot be relieved with elevation and any medication instructed per your doctor.  A fever of 100.4°F (38°C) or higher.  Swelling, coldness, or blue-gray discoloration of the toes.  If the compression wrap feels too tight or too loose.  If the compression wrap is damaged or has rough edges that hurt.  If the compression wrap gets wet, you must cut wrap off.   Drainage from compression wrap that smells different than usual.                MISCELLANEOUS INSTRUCTIONS  Supplement with a daily multivitamin   Low salt diet  Intense blood sugar control - Goal Blood sugar below 180 at all times recommended.  Increase protein intake / consider protein supplements - see below  Elevate extremities at all times when sitting / laying down.  No tobacco use     DIETARY MODIFICATIONS TO HELP WITH WOUND HEALING:          Please follow any restrictions to diet given by your other doctors     Protein: meats, beans, eggs, milk and yogurt particularly Greek yogurt), tofu, soy nuts, soy protein products     Vitamin C: citrus fruits and juices, strawberries, tomatoes, tomato juice, peppers, baked potatoes, spinach, broccoli, cauliflower, Orient sprouts, cabbage     Vitamin A: dark greens, leafy vegetables, orange or yellow vegetables, cantaloupe, fortified dairy products, liver, fortified cereals     Zinc: fortified cereals, red meats, seafood     Consider Bob by [employer] (These are essential branch chain amino acids that help with tissue building and wound healing) and take 2 packets/day. You can order online at [employer] or  [employer]     ADDITIONAL REMINDERS:     The treatment plan has been discussed at length with you and your provider. Follow all instructions carefully, it is very important. If you do not follow all instructions, you are at risk of your wound not healing, infection, possible loss of limb and even loss of life.  Please call the clinic at [phone] during regular business hours ( 7:30 AM - 5:30 PM) if you notice increased redness, warmth, pain or pus like drainage or start running a fever greater than 100.3.    For after hour emergencies, please call your primary physician or go to the nearest emergency room.  If your blood pressure is elevated, follow up with your primary care doctor and/or cardiologist as soon as possible.     FOR LEG SWELLING,  LOWER EXTREMITY WOUNDS AND IF USING COMPRESSION:   Managing your edema:    Avoid prolonged standing in one place. It is better to have your calf muscles moving to pump fluid out of the legs.  Elevate leg(s) above the level of the heart when sitting or as much as possible.  Take you diuretics as directed by your physician. Do not skip doses or change doses unless instructed to do so by your physician.  Decrease salt intake, follow recommended 2 grams daily.  Do not get leg(s) with compression wrap wet. If wraps are too tight as indicated by pain, numbness/tingling or discoloration of toes remove wrap completely and call the wound center @ [phone].       The treatment plan has been discussed at length between you and your provider. Follow all instructions carefully, it is very important. If you do not follow all instructions you are at risk of your wound not healing, infection, possible loss of limb and even loss of life.    COMPRESSION WRAP PATIENT CARE INSTRUCTIONS  DO NOT get compression wrap wet. When showering, use a shower boot.   Please contact wound clinic and if not able to reach, then go to emergency room if ANY of the following occur:   Tingling or numbness in  the foot or toes on leg with compression wrap.  Severe pain that cannot be relieved with elevation and any medication instructed per your doctor.  A fever of 100.4°F (38°C) or higher.  Swelling, coldness, or blue-gray discoloration of the toes.  If the compression wrap feels too tight or too loose.  If the compression wrap is damaged or has rough edges that hurt.  If the compression wrap gets wet, you must cut wrap off.   Drainage from compression wrap that smells different than usual.                        [1]   Current Outpatient Medications:     sodium hypochlorite 0.25 % External Solution, 1 application on affected areas daily., Disp: 473 mL, Rfl: 0    Multiple Vitamins-Minerals (PRESERVISION AREDS) Oral Cap, Take 1 capsule by mouth every morning., Disp: , Rfl:     Multiple Vitamins-Minerals (MULTI-VITAMIN/MINERALS) Oral Tab, Take 1 tablet by mouth in the morning., Disp: , Rfl:   [2] No Known Allergies      Weekly Wound Education Note    Teaching Provided To: Patient  Training Topics: Dressing, Edema control, Compression, Cleasing and general instructions, Discharge instructions  Training Method: Explain/Verbal, Demonstration  Training Response: Reinforcement needed        Notes: Patient here for follow up wound care visit to left anterior lower leg and left lateral lower leg. New wound to right lower leg. Edema measurement decreased,  left anterior and left lateral lower leg wound measurements decreased. 5/22/25 culture was completed to wounds- provider recommending patient to see ID at this time- [facility] referred. Provider informed patient that he did not need to follow up with infectious disease moving forward as provider thinks wound looks alright, provider also stated he spoke face to face with Han as they discussed no need for ABT therapy at this time. Provider applied silver nitrate to left lower leg wounds, recommending hydrofer transfer, kerramax, ABD pad, conforming gauze, tape to all wounds.  Applied BLE comprilian 8cm, 10cm, 12cm, cellona (x2), rosidal to RLE, coban. Patient to follow up with provider in 1 week.        Was a Debridement performed: Yes, Debridement type: mechanical    Compression Stockings ordered: Yes   Product type: JOBST Farrow Wrap Basic leg piece with JOBST Hybrid sock   Frequency: daily        Calf  Point of Measurement - Left Calf: 30  Point of Measurement - Right Calf: 30  Left Calf from:: Heel  Calf Left cm:: 36.7  Right Calf from:: Heel  Right Calf cm:: 45.2    Ankle  Point of Measurement - Left Ankle: 10  Point of Measurement - Right Ankle: 10  Left Ankle from:: Heel  Left Ankle cm:: 27.8     Right Ankle from:: Heel  Right Ankle cm:: 30.6       Foot                        Heel to Knee 41cm BLE          Knee to Thigh                      Notes: Ordering BLE JOBST Farrow Wrap Basic leg piece with JOBST Hybrid sock Black     CALL PATIENT WITH ANY OUT OF POCKET COST: DO NOT SEND IF NOT COVERED BY PATIENTS INSURANCE     Additional wound: No

## (undated) NOTE — Clinical Note
Date: 2017    Patient Name: Santiago Hernandez                 : 1965                To Whom it may concern:     This letter has been written at the patient's request. The above patient was seen at the [facility] for treatment of

## (undated) NOTE — LETTER
Date: 7/11/2024  Patient name: Luis M Estrada  YOB: 1965  Medical Record Number: [id number]  Primary Coverage: Payor: BCBS OUT OF STATE / Plan: BCBS OUT OF STATE PPO / Product Type: *No Product type* /   Secondary Coverage:   Insurance ID: [id number]  Patient Address: [address] [place] [address] [address]   [place] [address]  Telephone Information:   Home Phone [phone]   Mobile [phone]         Encounter Date: 7/11/2024  Provider: Nicholas Gould MD  Diagnosis:     ICD-10-CM   1. Chronic venous hypertension (idiopathic) with ulcer and inflammation of bilateral lower extremity ([facility])  I87.333   2. Class 3 severe obesity due to excess calories with serious comorbidity and body mass index (BMI) of 45.0 to 49.9 in adult ([facility])  E66.01    Z68.42       Progress Note:  Chief Complaint   Patient presents with    Wound Care     Patient arrives for a wound care follow up appointment. Patient states he is having moderate pain. The wraps fell, and particularly the left wrap fell. There is a new wound on the left. He arrives in bilateral Coflex 2.        HPI:     Patient here for follow-up of left lateral leg wound and right anterior leg wound.  He is doing well with those however the wrap on the left leg had slid down somewhat and had developed a wound at the edge of the wrap on the back part of his left leg.    Lab Results   Component Value Date    BUN 14 06/21/2024    CREATSERUM 0.67 (L) 06/24/2024    ALB 3.5 06/20/2024    TP 8.4 (H) 06/20/2024    A1C 5.2 12/12/2016         Current Outpatient Medications:     aspirin 81 MG Oral Tab, Take 1 tablet (81 mg total) by mouth daily., Disp: , Rfl:     No Known Allergies         REVIEW OF SYSTEMS:     Review of Systems (ROS)  This information was obtained from the patient, chart    A comprehensive 10 point review of systems was completed.  Pertinent positives and negatives noted in the the HPI.     Past medical, surgical, family and social history updated where  appropriate.    PHYSICAL EXAM:   /82   Pulse 76   Temp 98 °F (36.7 °C)   Resp 16    Estimated body mass index is 53.25 kg/m² as calculated from the following:    Height as of 6/20/24: 67\".    Weight as of 6/20/24: 340 lb (154.2 kg).   Vital signs reviewed.Appears stated age, well groomed.        Calf  Point of Measurement - Left Calf: 37  Point of Measurement - Right Calf: 37  Left Calf from:: Heel  Calf Left cm:: 41.5  Right Calf from:: Heel  Right Calf cm:: 48.3    Ankle  Point of Measurement - Left Ankle: 10  Point of Measurement - Right Ankle: 10  Left Ankle from:: Heel  Left Ankle cm:: 29.7     Right Ankle from:: Heel  Right Ankle cm:: 30.6       Foot                            Wound 01/11/24 #1 Leg Left (Active)   Date First Assessed/Time First Assessed: 01/11/24 1406    Wound Number (Wound Clinic Only): #1  Primary Wound Type: Traumatic  Location: Leg  Wound Location Orientation: Left      Assessments 1/11/2024  2:10 PM 7/11/2024  8:56 AM   Wound Image       Drainage Amount Large Small   Drainage Description Yellow;Serous Serosanguineous   Treatments Compression --   Wound Length (cm) 10.6 cm 0.8 cm   Wound Width (cm) 9.5 cm 0.7 cm   Wound Surface Area (cm^2) 100.7 cm^2 0.56 cm^2   Wound Depth (cm) 0.2 cm 0.1 cm   Wound Volume (cm^3) 20.14 cm^3 0.056 cm^3   Wound Healing % -- 100   Margins Well-defined edges Well-defined edges   Non-staged Wound Description Full thickness Full thickness   Leslie-wound Assessment Edema;Hemosiderin staining Edema;Hemosiderin staining;Moist   Wound Granulation Tissue Firm;Pink Firm;Pink;Red   Wound Bed Granulation (%) 40 % 100 %   Wound Bed Slough (%) 60 % --   Wound Odor None None   Shape -- bridged   Tunneling? No No   Undermining? No No   Sinus Tracts? No No       Inactive Orders   Date Order Priority Status Authorizing Provider   06/24/24 1127 Wound care Routine Discontinued Rica Pearson MD   06/06/24 0913 Debridement Traumatic Routine Completed Nicholas Gould MD    05/16/24 0901 Debridement Traumatic Routine Completed Nicholas Gould MD   05/02/24 1520 Debridement Traumatic Routine Completed Nicholas Gould MD   04/11/24 0959 Debridement Traumatic Routine Completed Nicholas Gould MD   04/04/24 0915 Debridement Traumatic Left;Lateral Leg Routine Completed Nicholas Gould MD   02/15/24 1154 Debridement Traumatic Left;Lateral Leg Routine Completed Nicholas Gould MD   01/18/24 1511 Debridement Traumatic Left;Lateral Leg Routine Completed Nicholas Gould MD   01/11/24 1710 Debridement Traumatic Left;Lateral Leg Routine Completed Nicholas Gould MD       Wound 06/20/24 #2 Leg Right (Active)   Date First Assessed/Time First Assessed: 06/20/24 0832    Wound Number (Wound Clinic Only): #2  Primary Wound Type: Venous Ulcer  Location: Leg  Wound Location Orientation: Right      Assessments 6/20/2024  8:34 AM 7/11/2024  8:58 AM   Wound Image       Drainage Amount Copious Scant   Drainage Description Clear Yellow;Serous   Wound Length (cm) 0.8 cm 0.1 cm   Wound Width (cm) 1.2 cm 0.1 cm   Wound Surface Area (cm^2) 0.96 cm^2 0.01 cm^2   Wound Depth (cm) 0.1 cm 0.1 cm   Wound Volume (cm^3) 0.096 cm^3 0.001 cm^3   Wound Healing % -- 99   Margins Well-defined edges Well-defined edges   Non-staged Wound Description Full thickness Full thickness   Leslie-wound Assessment Hemosiderin staining;Blanchable erythema;Edema;Moist Edema;Hemosiderin staining   Wound Granulation Tissue Pink;Spongy Firm;Pink   Wound Bed Granulation (%) 50 % 100 %   Wound Bed Slough (%) 50 % --   Wound Odor None None   Tunneling? -- No   Undermining? -- No   Sinus Tracts? -- No       Inactive Orders   Date Order Priority Status Authorizing Provider   06/24/24 1127 Wound care Routine Discontinued Rica Pearson MD   06/21/24 0942 Consult to Wound Ostomy Routine Completed Rica Pearson MD     - Reason for Consult:    Wound Care     - Wound Care Reason for Consult:    pt sees wound clinic for LLE wound. Now with RLE  cellulitis/weeping.       Compression Wrap 06/27/24 Leg Anterior;Left (Active)   Placement Date/Time: 06/27/24 1000   Location: Leg  Wound Location Orientation: Anterior;Left      Assessments 6/27/2024  8:47 AM 7/2/2024  9:18 AM   Response to Treatment Well tolerated Well tolerated   Compression Layers Multilayer 2   Compression Product Type Coflex Coflex   Dressing Applied Yes Yes   Compression Wrap Location Toes to Knee Toes to Knee   Compression Wrap Status Clean;Dry;Intact Clean;Dry;Intact       No associated orders.       Compression Wrap 06/27/24 Leg Anterior;Right (Active)   Placement Date/Time: 06/27/24 1000   Location: Leg  Wound Location Orientation: Anterior;Right      Assessments 6/27/2024  8:47 AM 7/2/2024  9:18 AM   Response to Treatment Well tolerated Well tolerated   Compression Layers Multilayer 2   Compression Product Type Coflex Coflex   Dressing Applied Yes Yes   Compression Wrap Location Toes to Knee Toes to Knee   Compression Wrap Status Clean;Dry;Intact Clean;Dry;Intact       No associated orders.       Wound 07/11/24 #3 Left Posterior Leg Superior Leg Posterior;Right (Active)   Date First Assessed/Time First Assessed: 07/11/24 0852    Wound Number (Wound Clinic Only): #3 Left Posterior Leg Superior  Primary Wound Type: Pressure Injury  Location: Leg  Wound Location Orientation: Posterior;Right      Assessments 7/11/2024  8:59 AM   Wound Image     Drainage Amount Moderate   Drainage Description Serosanguineous   Wound Length (cm) 1.7 cm   Wound Width (cm) 9.4 cm   Wound Surface Area (cm^2) 15.98 cm^2   Wound Depth (cm) 0.1 cm   Wound Volume (cm^3) 1.598 cm^3   Margins Well-defined edges   Non-staged Wound Description Full thickness   Leslie-wound Assessment Moist;Edema   Wound Granulation Tissue Pink;Red;Firm   Wound Bed Granulation (%) 80 %   Wound Bed Epithelium (%) 10 %   Wound Bed Slough (%) 10 %   Wound Odor None   Tunneling? No   Undermining? No   Sinus Tracts? No   Pressure Injury Stage  Stage 2       No associated orders.          ASSESSMENT AND PLAN:      1. Chronic venous hypertension (idiopathic) with ulcer and inflammation of bilateral lower extremity (HCC)    2. Class 3 severe obesity due to excess calories with serious comorbidity and body mass index (BMI) of 45.0 to 49.9 in adult (HCC)    There is a large wound on the back part of the left leg which appears to be superficial and due to irritation from the edge of the wrap.  The wound on the right anterior leg was treated with silver nitrate as well as the wound on the left lateral leg which have significantly improved.  Will apply Hydrofera Blue transfer to all 3 wounds and gauze dressing and continue with Coflex 2 layer compression to both lower extremities, will secure a wrap on the left leg with Coban on the top part to hold in place.  Follow-up in 1 week.      Risks, benefits, and alternatives of current treatment plan discussed in detail.  Questions and concerns addressed. Red flags to RTC or ED reviewed.  Patient (or parent) agrees to plan.      No follow-ups on file.    Also refer to patient instructions.     I spent a total of 40 minutes with this patient's care.  This time included preparing to see the patient (eg, review notes and recent diagnostics), seeing the patient, performing a medically appropriate examination and/or evaluation, counseling and educating the patient, and documenting in the record.          Nicholas Gould M.D.   [facility]   2024    Patient Instructions       PATIENT INSTRUCTIONS      FOR Luis M Estrada , RICK 3/25/1965    DATE OF SERVICE: 2024      Hydrofera Blue Transfer  Kerramax  Coflex 2 layer compression wrap to BOTH lower extremities    Follow up with Dr. Gould in 1 week      Managing your edema:    Avoid prolonged standing in one place. It is better to have your calf muscles moving to pump fluid out of the legs.  Elevate leg(s) above the level of the heart when  sitting or as much as possible.  Take you diuretics as directed by your physician. Do not skip doses or change doses unless instructed to do so by your physician.  Decrease salt intake, follow recommended 2 grams daily.  Do not get leg(s) with compression wrap wet. If wraps are too tight as indicated by pain, numbness/tingling or discoloration of toes remove wrap completely and call the wound center @ [phone].       The treatment plan has been discussed at length between you and your provider. Follow all instructions carefully, it is very important. If you do not follow all instructions you are at risk of your wound not healing, infection, possible loss of limb and even loss of life.    COMPRESSION WRAP PATIENT CARE INSTRUCTIONS  DO NOT get compression wrap wet. When showering, use a shower boot.   Please contact wound clinic and if not able to reach, then go to emergency room if ANY of the following occur:   Tingling or numbness in the foot or toes on leg with compression wrap.  Severe pain that cannot be relieved with elevation and any medication instructed per your doctor.  A fever of 100.4°F (38°C) or higher.  Swelling, coldness, or blue-gray discoloration of the toes.  If the compression wrap feels too tight or too loose.  If the compression wrap is damaged or has rough edges that hurt.  If the compression wrap gets wet, you must cut wrap off.   Drainage from compression wrap that smells different than usual.  MISCELLANEOUS INSTRUCTIONS  Supplement with a daily multivitamin   Low salt diet  Intense blood sugar control - Goal Blood sugar below 180 at all times recommended.  Increase protein intake / consider protein supplements - see below  Elevate extremities at all times when sitting / laying down.  No tobacco use     DIETARY MODIFICATIONS TO HELP WITH WOUND HEALING:          Please follow any restrictions to diet given by your other doctors     Protein: meats, beans, eggs, milk and yogurt particularly  Greek yogurt), tofu, soy nuts, soy protein products     Vitamin C: citrus fruits and juices, strawberries, tomatoes, tomato juice, peppers, baked potatoes, spinach, broccoli, cauliflower, [place] sprouts, cabbage     Vitamin A: dark greens, leafy vegetables, orange or yellow vegetables, cantaloupe, fortified dairy products, liver, fortified cereals     Zinc: fortified cereals, red meats, seafood     Consider Bob by [employer] (These are essential branch chain amino acids that help with tissue building and wound healing) and take 2 packets/day. You can order online at [employer] or [employer]     ADDITIONAL REMINDERS:     The treatment plan has been discussed at length with you and your provider. Follow all instructions carefully, it is very important. If you do not follow all instructions, you are at risk of your wound not healing, infection, possible loss of limb and even loss of life.  Please call the clinic at [phone] during regular business hours ( 7:30 AM - 5:30 PM) if you notice increased redness, warmth, pain or pus like drainage or start running a fever greater than 100.3.    For after hour emergencies, please call your primary physician or go to the nearest emergency room.  If your blood pressure is elevated, follow up with your primary care doctor and/or cardiologist as soon as possible.     FOR LEG SWELLING,  LOWER EXTREMITY WOUNDS AND IF USING COMPRESSION:   Managing your edema:    Avoid prolonged standing in one place. It is better to have your calf muscles moving to pump fluid out of the legs.  Elevate leg(s) above the level of the heart when sitting or as much as possible.  Take you diuretics as directed by your physician. Do not skip doses or change doses unless instructed to do so by your physician.  Decrease salt intake, follow recommended 2 grams daily.  Do not get leg(s) with compression wrap wet. If wraps are too tight as indicated by pain, numbness/tingling or discoloration of toes remove wrap  completely and call the wound center @ [phone].       The treatment plan has been discussed at length between you and your provider. Follow all instructions carefully, it is very important. If you do not follow all instructions you are at risk of your wound not healing, infection, possible loss of limb and even loss of life.    COMPRESSION WRAP PATIENT CARE INSTRUCTIONS  DO NOT get compression wrap wet. When showering, use a shower boot.   Please contact wound clinic and if not able to reach, then go to emergency room if ANY of the following occur:   Tingling or numbness in the foot or toes on leg with compression wrap.  Severe pain that cannot be relieved with elevation and any medication instructed per your doctor.  A fever of 100.4°F (38°C) or higher.  Swelling, coldness, or blue-gray discoloration of the toes.  If the compression wrap feels too tight or too loose.  If the compression wrap is damaged or has rough edges that hurt.  If the compression wrap gets wet, you must cut wrap off.   Drainage from compression wrap that smells different than usual.                     Weekly Wound Education Note    Teaching Provided To: Patient  Training Topics: Cleasing and general instructions;Compression;Discharge instructions;Dressing;Edema control  Training Method: Explain/Verbal  Training Response: Patient responds and understands;Reinforcement needed        Notes: New wound to left leg, wraps has slid down - wrap were not remove and patient did not call clinic. Silver nitrate used today on right leg and lateral left leg wounds. Left posterior leg wound: hydrofera transfer, kerramax, conforming gauze, tape. Right leg and left lateral leg: Hydrofera ready. Coflex 2 BLE, rosidal was used on left leg. Reminded to call clinic if wrap slid down or cause pain. Ordered compression garments for both legs.        Wound Information/Order:  Wound Number: All wounds  Product: NO DRESSINGS NEEDED ONLY COMPRESSION.    Dispense:  90 days  Dressing Frequency:Change dressing daily and/or PRN    Was a Debridement performed: Yes, Debridement type: mechanical    Compression Stockings ordered: Yes   Product type: Juxtalite HD with liner Size: LARGE SHORT - for both legs, spandagrip E roll.  Frequency: daily  Duration: 90 days      Calf  Point of Measurement - Left Calf: 37  Point of Measurement - Right Calf: 37  Left Calf from:: Heel  Calf Left cm:: 41.5  Right Calf from:: Heel  Right Calf cm:: 48.3    Ankle  Point of Measurement - Left Ankle: 10  Point of Measurement - Right Ankle: 10  Left Ankle from:: Heel  Left Ankle cm:: 29.7     Right Ankle from:: Heel  Right Ankle cm:: 30.6       Heel to Knee  41 cm BLE        Notes: Dispense as written. Compression needed for both legs.

## (undated) NOTE — LETTER
Date: 8/22/2024  Patient name: Luis M Estrada  YOB: 1965  Medical Record Number: [id number]  Primary Coverage: Payor: BCBS OUT OF STATE / Plan: BCBS OUT OF STATE PPO / Product Type: *No Product type* /   Secondary Coverage:   Insurance ID: [id number]  Patient Address: [address] [place] [id number]  Telephone Information:   Home Phone [phone]   Mobile [phone]         Encounter Date: 8/22/2024  Provider: Nicholas Gould MD  Diagnosis:     ICD-10-CM   1. Chronic venous hypertension (idiopathic) with ulcer and inflammation of bilateral lower extremity ([facility])  I87.333   2. Class 3 severe obesity due to excess calories with serious comorbidity and body mass index (BMI) of 45.0 to 49.9 in adult ([facility])  E66.01    Z68.42       Progress Note:    Wound Information/Order:  Wound Number: All wounds  Product: NO DRESSINGS NEEDED, ONLY COMPRESSION    Was a Debridement performed: Yes, Debridement type: mechanical    Compression Stockings ordered: Yes   Product type: Juxtalite HD  Frequency: daily  Duration: 90 days      Calf  Point of Measurement - Left Calf: 37  Point of Measurement - Right Calf: 37  Left Calf from:: Heel  Calf Left cm:: 48  Right Calf from:: Heel  Right Calf cm:: 49.9    Ankle  Point of Measurement - Left Ankle: 10  Point of Measurement - Right Ankle: 10  Left Ankle from:: Heel  Left Ankle cm:: 31.1     Right Ankle from:: Heel  Right Ankle cm:: 31.6       Heel to Knee      41cm    Notes: Dispense as written. Compression garment for both legs.

## (undated) NOTE — Clinical Note
TCM call completed. Pt declined to schedule at this time. Pt has an appt with wound care on Thursday. Pt reports he is doing well, better. Pt declined HHC at this time. Thank you!

## (undated) NOTE — MR AVS SNAPSHOT
[facility] [address], [address] [facility] 104 [id number]               Thank you for choosing us for your health care visit with Milagros Macias DO.   We are glad to serve you and happy to provide you with this s Medical treatment will depend on what is causing the swelling in your legs. Your healthcare provider may prescribe water pills (diuretics) to get rid of the extra fluid.   Home care  Follow these guidelines when caring for yourself at home:  · Don't wear cl Allergies as of Jan 26, 2017     No Known Allergies                Today's Vital Signs     BP Pulse Temp Height Weight BMI    130/90 mmHg 110 97.2 °F (36.2 °C) (Oral) 67\" 241 lb 37.74 kg/m2         Current Medications          This list is accurate as of: discharge instructions in [employer] by going to Visits < Admission Summaries. If you've been to the Emergency Department or your doctor's office, you can view your past visit information in [employer] by going to Visits < Visit Summaries. [employer] questions?

## (undated) NOTE — LETTER
Date: 2/26/2024  Patient name: Luis M Estrada  YOB: 1965  Medical Record Number: [id number]  Primary Coverage: Payor: BCBS OUT OF STATE / Plan: BCBS OUT OF STATE PPO / Product Type: *No Product type* /   Secondary Coverage:   Insurance ID: [id number]  Patient Address: [address] [place] [address] Unit [facility] [address]  Telephone Information:   Home Phone [phone]   Mobile [phone]       Encounter Date: 2/22/2024  Provider: Nicholas Gould MD  Diagnosis:     ICD-10-CM   1. Open wound of left lower leg, subsequent encounter  S81.802D   2. Chronic venous insufficiency  I87.2   3. Leg swelling  M79.89   4. Class 3 severe obesity due to excess calories with serious comorbidity and body mass index (BMI) of 45.0 to 49.9 in adult ([facility])  E66.01    Z68.42         Wound 01/11/24 #1 Left lateral lower leg Leg Left;Lateral (Active)   Date First Assessed/Time First Assessed: 01/11/24 1406    Wound Number (Wound Clinic Only): #1 Left lateral lower leg  Primary Wound Type: Traumatic  Location: Leg  Wound Location Orientation: Left;Lateral      Assessments 1/11/2024  2:10 PM 2/22/2024  4:12 PM   Wound Image       Drainage Amount Large --   Drainage Description Yellow;Serous --   Treatments Compression --   Wound Length (cm) 10.6 cm --   Wound Width (cm) 9.5 cm --   Wound Surface Area (cm^2) 100.7 cm^2 --   Wound Depth (cm) 0.2 cm --   Wound Volume (cm^3) 20.14 cm^3 --   Margins Well-defined edges --   Non-staged Wound Description Full thickness --   Leslie-wound Assessment Edema;Hemosiderin staining --   Wound Granulation Tissue Firm;Pink --   Wound Bed Granulation (%) 40 % --   Wound Bed Slough (%) 60 % --   Wound Odor None --   Tunneling? No --   Undermining? No --   Sinus Tracts? No --       Inactive Orders   Date Order Priority Status Authorizing Provider   02/15/24 1154 Debridement Traumatic Left;Lateral Leg Routine Completed Nicholas Gould MD   01/18/24 1511 Debridement Traumatic Left;Lateral Leg Routine  Completed Nicholas Gould MD   01/11/24 1710 Debridement Traumatic Left;Lateral Leg Routine Completed Nicholas Gould MD       Compression Wrap 01/11/24 Leg Anterior;Left (Active)   Placement Date/Time: 01/11/24 1523   Location: Leg  Wound Location Orientation: Anterior;Left      Assessments 1/11/2024  2:11 PM 2/22/2024  3:48 PM   Response to Treatment Well tolerated Well tolerated   Compression Layers Multilayer Multilayer   Compression Product Type Coflex Coflex   Dressing Applied Yes Yes   Compression Wrap Location Toes to Knee Toes to Knee   Compression Wrap Status Clean;Dry;Intact Clean;Dry;Intact       No associated orders.         Wound Number: All wounds     Wound Cleaning and Dressings:  Showering directions: May shower with protection  Wound cleansing:  Cleanse with normal saline or wound cleanser  Wound cleaning frequency:  Monday and Thursday   Wound product: Other Cleanse wound with normal saline, apply zinc to periwound, honey alginate to wound bed (x3), covered with hydrofera transfer (x1), kerramax (x2), baby diaper, kerlix, and tape. Applied coflex 2 layer wrap to LLE & spandagrip (F) from toes to below the knee  Dressing change frequency:  Change dressing 2x per week  Enzymatic agent:  Honey     Anesthetic:  Anesthetic: 4% topical lidocaine      Compression Therapy:   Spandagrip and Coflex 2 layers     Miscellaneous/Additional Orders:  Miscellaneous orders: Home health care nurse for wound care     Care Summary:  Care Summary: Discussed Plan of Care at beside with patient. Patient verbally acknowledges understanding of all instructions and all questions were answered.             Follow Up:  Return in about 1 week (around 2/29/2024).        Additional Notes:      PLEASE CALL AND NOTIFY IF ACCEPTED OR DENIED HOME HEALTH SERVICES  [phone]    Home Health RN to assess patient once weekly for dressing changes      Patient will be seen in the clinic on Thursdays for provider visits     Patient is  homebound and has issues making provider appointment visits as well as a lot of drainage from wound bed and would benefit from RN seeing him twice weekly to control drainage     Patient does not drive and is unable to complete his own dressing changes at this time     Home Health RN to provided patient with extra dressing supplies if needed.     Additional home health DME: No

## (undated) NOTE — LETTER
Date: 1/11/2024    Patient Name: Luis M Estrada          To Whom it may concern:    This letter has been written at the patient's request. The above patient was seen at the [facility] for treatment of a medical condition.    Mr. Estrada may return to work without restrictions however he should be allowed to elevate his legs during his break periods.      I will be seeing him on a weekly basis in the wound clinic.        Sincerely,    Nicholas Gould MD

## (undated) NOTE — Clinical Note
I had the pleasure of seeing Mr. Adria Davila in my office today. Please see my attached note.     Sonja Rodríguez

## (undated) NOTE — LETTER
07/26/24    Luis M Estrada      To Whom It May Concern:    This letter has been written at the patient's request. The above patient was seen at [facility] for treatment of a medical condition from 7/25/2024 to 7/26/2024    The patient may return to work/school once seen by their primary care physician to evaluate. Keep legs elevated as much as possible.       Sincerely,        Cami ECHOLS RN  07/26/24, 11:06 AM

## (undated) NOTE — LETTER
Date: 5/22/2025    Patient Name: Luis M Estrada          To Whom it may concern:    This letter has been written at the patient's request. The above patient was seen at [facility] for treatment of a medical condition.    Luis M was admitted to [facility] from 5/15/25 - 5/18/25:    He may return to work without restrictions        Sincerely,    Nicholas Gould MD

## (undated) NOTE — LETTER
Date: 3/19/2024  Patient name: Luis M Estrada  YOB: 1965  Medical Record Number: [id number]  Primary Coverage: Payor: BCBS OUT OF STATE / Plan: BCBS OUT OF STATE PPO / Product Type: *No Product type* /   Secondary Coverage:   Insurance ID: [id number]  Patient Address: [address] [place] [address] [address]   [place] [address]  Telephone Information:   Home Phone [phone]   Mobile [phone]         Encounter Date: 3/19/2024  Provider: WOUND CARE NURSE  Diagnosis:     ICD-10-CM   1. Open wound of left lower leg, subsequent encounter  S81.802D       Progress Note:  Chief Complaint   Patient presents with    Wound Care     RN visit for wound to left leg. No pain at this time, but some times it feels like pins and needles. Changes dressing daily. Arrive with foam bordered dressing and silk tape today - no compression stocking.            Current Outpatient Medications:     aspirin 81 MG Oral Tab, Take 1 tablet (81 mg total) by mouth daily., Disp: , Rfl:     No Known Allergies       HISTORY:     Past medical, surgical, family and social history updated where appropriate.    PHYSICAL EXAM:   /83   Pulse 76   Temp 99.2 °F (37.3 °C)   Resp 16        Vital signs reviewed.      Calf  Point of Measurement - Left Calf: 32     Left Calf from:: Heel  Calf Left cm:: 50.5          Ankle  Point of Measurement - Left Ankle: 10     Left Ankle from:: Heel  Left Ankle cm:: 32                Wound 01/11/24 #1 Left lateral lower leg Leg Left;Lateral (Active)   Date First Assessed/Time First Assessed: 01/11/24 1406    Wound Number (Wound Clinic Only): #1 Left lateral lower leg  Primary Wound Type: Traumatic  Location: Leg  Wound Location Orientation: Left;Lateral      Assessments 1/11/2024  2:10 PM 3/19/2024  3:14 PM   Wound Image       Drainage Amount Large Large   Drainage Description Yellow;Serous Yellow;Serous   Treatments Compression Compression   Wound Length (cm) 10.6 cm 10.5 cm   Wound Width (cm) 9.5 cm 10 cm    Wound Surface Area (cm^2) 100.7 cm^2 105 cm^2   Wound Depth (cm) 0.2 cm 0.2 cm   Wound Volume (cm^3) 20.14 cm^3 21 cm^3   Wound Healing % -- -4   Margins Well-defined edges Well-defined edges;Epibole (Rolled edges)   Non-staged Wound Description Full thickness Full thickness   Leslie-wound Assessment Edema;Hemosiderin staining Edema;Hemosiderin staining;Moist   Wound Granulation Tissue Firm;Pink --   Wound Bed Granulation (%) 40 % 30 %   Wound Bed Epithelium (%) -- 10 %   Wound Bed Slough (%) 60 % 60 %   Wound Odor None Mild   Tunneling? No No   Undermining? No No   Sinus Tracts? No No       Inactive Orders   Date Order Priority Status Authorizing Provider   02/15/24 1154 Debridement Traumatic Left;Lateral Leg Routine Completed Nicholas Gould MD   01/18/24 1511 Debridement Traumatic Left;Lateral Leg Routine Completed Nicholas Gould MD   01/11/24 1710 Debridement Traumatic Left;Lateral Leg Routine Completed Nicholas Gould MD          ASSESSMENT AND PLAN:        Risks, benefits, and alternatives of current treatment plan discussed in detail.  Questions and concerns addressed. Red flags to RTC or ED reviewed.  Patient (or parent) agrees to plan.      No follow-ups on file.  Weekly Wound Education Note    Teaching Provided To: Patient  Training Topics: Discharge instructions;Cleasing and general instructions;Dressing;Compression;Edema control  Training Method: Demonstration;Explain/Verbal  Training Response: Reinforcement needed        Notes: Pt here for nurse visit to left lateral lower leg wound. Pt arrived with bordered foam dressing to leg, informed staff he has been changing the faom daily at this time. Edema measurement increased, wound measurement increased. RN applied zinc, honey gel, honey gauze, kerramax (sample), kerlix, tape. Spandagrip (F) Doubled and applied to LLE at visit today. RN educated and demostrated to patient with a \"dummy leg\" how to apply compression wraps that RN had ordered for patient  in the past (Hernan NEWELL). Patient states understanding on how to use wrap. Educated and demonstrated to patient how to apply dressings. Provided patient with extra supplies (from sample cabinet). RN to place order for supplies for patient from [employer]. Pt to call clinic with his next avalible date so that  can set him up for an appointment.        Shawn HOWE RN   3/19/2024  3:52 PM             Wound Information/Order:  Wound Number: All wounds  Product:Other Cleanse wound with normal saline, apply honey gel, honey gauze 6x6, covered with eclypse adherent super absorbent dressing with silicone contact bordered layer 6x6  & spandagrip (e)  Dispense: 30 days  Dressing Frequency:Change dressing daily and/or PRN    Was a Debridement performed: Yes, Debridement type: mechanical    Compression Stockings ordered: No    Notes: Please send : No substitutions    Wound cleanser  4x4 gauze  6x6 Eclypse adherent super absorbent dressing with silicone contact layer bordered edges   Honey gauze 6x6 sheets  Box of spandagrip (E)    Additional wound: No

## (undated) NOTE — LETTER
Date: 1/11/2024  Patient name: Luis M Estrada  YOB: 1965  Medical Record Number: [id number]  Primary Coverage: Payor: BCBS OUT OF STATE / Plan: BCBS OUT OF STATE PPO / Product Type: *No Product type* /   Secondary Coverage:   Insurance ID: [id number]  Patient Address: [address] [place] [address] [address]   [place] [address]  Telephone Information:   Home Phone [phone]   Work Phone [phone]   Mobile [phone]         Encounter Date: 1/11/2024  Provider: Nicholas Gould MD  Diagnosis:     ICD-10-CM   1. Open wound of left lower leg, initial encounter  S81.802A   2. Open wound of right lower leg, initial encounter  S81.801A   3. Cellulitis of left lower extremity  L03.116   4. Chronic venous insufficiency  I87.2   5. Stasis dermatitis of both legs  I87.2       Progress Note:  .Weekly Wound Education Note    Teaching Provided To: Patient  Training Topics: Discharge instructions;Cleasing and general instructions;Dressing;Edema control;Compression  Training Method: Demonstration;Explain/Verbal  Training Response: Reinforcement needed        Notes: Pt here for inital consult to lower leg wounds. Pt states he got hit by a shopping cart. Provider debrided wound at visit, silver nitrate applied to anterior right lower leg. Provider recommending hydrofera ready to right anterior lower leg, left lower lateral leg wound - enluxtra (x2) with back off, covered with kerramax, conforming gauze and tape. Wrapped RLE and LLE in coflex 2 layer wrap. Pt to follow up on Monday for nurse visit and Thursday for provider visit. RN to place order for bilateral lower leg compression garments at todays visit. Educated patient on wrap care, monitoring toes/feet, dont get wrap wet. Provided hand out information on care for wraps, patient states understanding.        Chief Complaint   Patient presents with    Wound Care     Patient is here for an initial consult. He presents with a traumatic wound to his left lower leg from  earlier this month. He does have some intermittent pain to the wound that he rates at 4/10. He did have IV antibiotics while inpatient for covid as well as an infection.        HPI:     58-year-old new patient here for evaluation of wound on the left lateral leg and also the right anterior leg.  He states he had a had bumped his leg into a shopping cart around [employer] at work at [employer].  Someone had accidentally bumped the card into his leg.  He had developed a blister there which led to an open wound.  He was hospitalized for this recently and discharged here for follow-up.  He does have compression garments that he uses at home for chronic venous insufficiency.  Patient is not diabetic.    Lab Results   Component Value Date    BUN 18 01/03/2024    CREATSERUM 0.88 01/03/2024    ALB 3.0 (L) 01/02/2024    TP 7.9 01/02/2024    A1C 5.2 12/12/2016         Current Outpatient Medications:     cephalexin 500 MG Oral Cap, Take 1 capsule (500 mg total) by mouth 3 (three) times daily for 10 days., Disp: 30 capsule, Rfl: 0    aspirin 81 MG Oral Tab, Take 1 tablet (81 mg total) by mouth daily., Disp: , Rfl:     No Known Allergies         REVIEW OF SYSTEMS:     Review of Systems (ROS)  This information was obtained from the patient, chart    A comprehensive 10 point review of systems was completed.  Pertinent positives and negatives noted in the the HPI.     Past medical, surgical, family and social history updated where appropriate.    PHYSICAL EXAM:   /84   Pulse 78   Temp 98.7 °F (37.1 °C)   Resp 16    Estimated body mass index is 45.42 kg/m² as calculated from the following:    Height as of 1/10/24: 67\".    Weight as of 1/10/24: 290 lb (131.5 kg).   Vital signs reviewed.Appears stated age, well groomed.      Awake, alert, in no acute distress  HENT:  normocephalic, atraumatic, external ear canals clear bilaterally, tympanic membranes appear opaque, non-bulging, non-erythematous, nasal turbinates are  non-inflamed and not swollen, oropharynx without erythema, swelling, or exudate, gingiva and lips without any apparent lesions.   Cardiac:  S1 S2 regular rate and rhythm, no murmur, gallop, or rub  Respiratory:  Bilaterally clear to auscultation, no chest tenderness to palpation, no wheezing, no rhonchi, no rales, air entry is adequate, no accessory respiratory muscle use, no chest asymmetry, normal excursion.  GI:  bowel sound positive in all four quadrants, abdomen is soft, non-tender, non-distended, no hepatosplenomegaly, no abnormal aortic pulsation.     Calf  Point of Measurement - Left Calf: 37  Point of Measurement - Right Calf: 37  Left Calf from:: Heel  Calf Left cm:: 49.5  Right Calf from:: Heel  Right Calf cm:: 48.5    Ankle  Point of Measurement - Left Ankle: 10  Point of Measurement - Right Ankle: 10  Left Ankle from:: Heel  Left Ankle cm:: 31     Right Ankle from:: Heel  Right Ankle cm:: 31.5       Foot           Left Heel to Knee: 41           Right Heel to Knee: 41    Wound 01/11/24 #1 Left lateral lower leg Leg Left;Lateral (Active)   Date First Assessed/Time First Assessed: 01/11/24 1406    Wound Number (Wound Clinic Only): #1 Left lateral lower leg  Primary Wound Type: Traumatic  Location: Leg  Wound Location Orientation: Left;Lateral      Assessments 1/11/2024  2:10 PM   Wound Image     Drainage Amount Large   Drainage Description Yellow;Serous   Treatments Compression   Wound Length (cm) 10.6 cm   Wound Width (cm) 9.5 cm   Wound Surface Area (cm^2) 100.7 cm^2   Wound Depth (cm) 0.2 cm   Wound Volume (cm^3) 20.14 cm^3   Margins Well-defined edges   Non-staged Wound Description Full thickness   Leslie-wound Assessment Edema;Hemosiderin staining   Wound Granulation Tissue Firm;Pink   Wound Bed Granulation (%) 40 %   Wound Bed Slough (%) 60 %   Wound Odor None   Tunneling? No   Undermining? No   Sinus Tracts? No       No associated orders.       Wound 01/11/24 #2 Right anterior lower leg Leg  Right;Lower;Anterior (Active)   Date First Assessed/Time First Assessed: 01/11/24 1406    Wound Number (Wound Clinic Only): #2 Right anterior lower leg  Primary Wound Type: Venous Ulcer  Location: Leg  Wound Location Orientation: Right;Lower;Anterior      Assessments 1/11/2024  2:11 PM   Wound Image     Drainage Amount Unable to assess   Treatments Compression   Wound Length (cm) 0.5 cm   Wound Width (cm) 0.5 cm   Wound Surface Area (cm^2) 0.25 cm^2   Wound Depth (cm) 0.1 cm   Wound Volume (cm^3) 0.025 cm^3   Margins Well-defined edges   Non-staged Wound Description Full thickness   Leslie-wound Assessment Edema;Hemosiderin staining;Dry   Wound Granulation Tissue Firm;Pink   Wound Bed Granulation (%) 100 %   Wound Odor None   Tunneling? No   Undermining? No   Sinus Tracts? No       No associated orders.       Compression Wrap 01/11/24 Leg Anterior;Left (Active)   Placement Date/Time: 01/11/24 1523   Location: Leg  Wound Location Orientation: Anterior;Left      Assessments 1/11/2024  2:11 PM   Response to Treatment Well tolerated   Compression Layers Multilayer   Compression Product Type Coflex   Dressing Applied Yes   Compression Wrap Location Toes to Knee   Compression Wrap Status Clean;Dry;Intact       No associated orders.       Compression Wrap 01/11/24 Right (Active)   Placement Date/Time: 01/11/24 1523   Wound Location Orientation: Right      Assessments 1/11/2024  2:11 PM   Response to Treatment Well tolerated   Compression Layers Multilayer   Compression Product Type Coflex   Dressing Applied Yes   Compression Wrap Location Toes to Knee   Compression Wrap Status Clean;Dry;Intact       No associated orders.          ASSESSMENT AND PLAN:      1. Open wound of left lower leg, initial encounter  - Aerobic Bacterial Culture    2. Open wound of right lower leg, initial encounter    3. Cellulitis of left lower extremity    4. Chronic venous insufficiency    5. Stasis dermatitis of both legs    There is a large wound  with a mixture of slough and granulation on the left lateral leg.  Well-circumscribed.  He does have Doppler monophasic to biphasic pulses on the left lower extremity.  He does have moderate edema to both lower extremities there is a very small wound about 1 to 2 mm on the left anterior leg.  Will apply Hydrofera Blue ready and Coflex 2 layer compression wrap to the right leg as he does have positive Doppler pulses in that leg monophasic to biphasic as well.  To the left leg wound this was debrided with a curette selective debridement was performed.  Patient tolerated well topical lidocaine used.  Some of the slough was removed.  Will start with Enluxtra dressing to the leg wound and Kerramax and Coflex 2 layer compression wrap.  We did discuss symptoms to watch out for with compression garment at great length and written instructions given.    He should have a nurse visit in 3 to 4 days and follow-up with me in 1 week.  Also encouraged weight reduction which can be very helpful.  Will also order Velcro compression garments for both lower extremities.    Risks, benefits, and alternatives of current treatment plan discussed in detail.  Questions and concerns addressed. Red flags to RTC or ED reviewed.  Patient (or parent) agrees to plan.      Return in about 4 days (around 1/15/2024) for nurse visit .    Also refer to patient instructions.     I spent a total of 50 minutes with this patient's care.  This time included preparing to see the patient (eg, review notes and recent diagnostics), seeing the patient, performing a medically appropriate examination and/or evaluation, counseling and educating the patient, and documenting in the record.          Nicholas Gould M.D.   [facility]   2024    Patient Instructions       PATIENT INSTRUCTIONS      FOR RICK Blandon 3/25/1965    DATE OF SERVICE: 2024      Managing your edema:    Avoid prolonged standing in one place. It is  better to have your calf muscles moving to pump fluid out of the legs.  Elevate leg(s) above the level of the heart when sitting or as much as possible.  Take you diuretics as directed by your physician. Do not skip doses or change doses unless instructed to do so by your physician.  Decrease salt intake, follow recommended 2 grams daily.  Do not get leg(s) with compression wrap wet. If wraps are too tight as indicated by pain, numbness/tingling or discoloration of toes remove wrap completely and call the wound center @ [phone].       The treatment plan has been discussed at length between you and your provider. Follow all instructions carefully, it is very important. If you do not follow all instructions you are at risk of your wound not healing, infection, possible loss of limb and even loss of life.    COMPRESSION WRAP PATIENT CARE INSTRUCTIONS  DO NOT get compression wrap wet. When showering, use a shower boot.   Please contact wound clinic and if not able to reach, then go to emergency room if ANY of the following occur:   Tingling or numbness in the foot or toes on leg with compression wrap.  Severe pain that cannot be relieved with elevation and any medication instructed per your doctor.  A fever of 100.4°F (38°C) or higher.  Swelling, coldness, or blue-gray discoloration of the toes.  If the compression wrap feels too tight or too loose.  If the compression wrap is damaged or has rough edges that hurt.  If the compression wrap gets wet, you must cut wrap off.   Drainage from compression wrap that smells different than usual.      Nurse visit on Monday 1/15/24    Follow up with Dr. Gould 1 WEEK                      MISCELLANEOUS INSTRUCTIONS  Supplement with a daily multivitamin   Low salt diet  Intense blood sugar control - Goal Blood sugar below 180 at all times recommended.  Increase protein intake / consider protein supplements - see below  Elevate extremities at all times when sitting / laying  down.  No tobacco use     DIETARY MODIFICATIONS TO HELP WITH WOUND HEALING:          Please follow any restrictions to diet given by your other doctors     Protein: meats, beans, eggs, milk and yogurt particularly Greek yogurt), tofu, soy nuts, soy protein products     Vitamin C: citrus fruits and juices, strawberries, tomatoes, tomato juice, peppers, baked potatoes, spinach, broccoli, cauliflower, Stringer sprouts, cabbage     Vitamin A: dark greens, leafy vegetables, orange or yellow vegetables, cantaloupe, fortified dairy products, liver, fortified cereals     Zinc: fortified cereals, red meats, seafood     Consider Bob by [employer] (These are essential branch chain amino acids that help with tissue building and wound healing) and take 2 packets/day. You can order online at [employer] or [employer]     ADDITIONAL REMINDERS:     The treatment plan has been discussed at length with you and your provider. Follow all instructions carefully, it is very important. If you do not follow all instructions, you are at risk of your wound not healing, infection, possible loss of limb and even loss of life.  Please call the clinic at [phone] during regular business hours ( 7:30 AM - 5:30 PM) if you notice increased redness, warmth, pain or pus like drainage or start running a fever greater than 100.3.    For after hour emergencies, please call your primary physician or go to the nearest emergency room.  If your blood pressure is elevated, follow up with your primary care doctor and/or cardiologist as soon as possible.     FOR LEG SWELLING,  LOWER EXTREMITY WOUNDS AND IF USING COMPRESSION:   Managing your edema:    Avoid prolonged standing in one place. It is better to have your calf muscles moving to pump fluid out of the legs.  Elevate leg(s) above the level of the heart when sitting or as much as possible.  Take you diuretics as directed by your physician. Do not skip doses or change doses unless instructed to do so by your  physician.  Decrease salt intake, follow recommended 2 grams daily.  Do not get leg(s) with compression wrap wet. If wraps are too tight as indicated by pain, numbness/tingling or discoloration of toes remove wrap completely and call the wound center @ [phone].       The treatment plan has been discussed at length between you and your provider. Follow all instructions carefully, it is very important. If you do not follow all instructions you are at risk of your wound not healing, infection, possible loss of limb and even loss of life.    COMPRESSION WRAP PATIENT CARE INSTRUCTIONS  DO NOT get compression wrap wet. When showering, use a shower boot.   Please contact wound clinic and if not able to reach, then go to emergency room if ANY of the following occur:   Tingling or numbness in the foot or toes on leg with compression wrap.  Severe pain that cannot be relieved with elevation and any medication instructed per your doctor.  A fever of 100.4°F (38°C) or higher.  Swelling, coldness, or blue-gray discoloration of the toes.  If the compression wrap feels too tight or too loose.  If the compression wrap is damaged or has rough edges that hurt.  If the compression wrap gets wet, you must cut wrap off.   Drainage from compression wrap that smells different than usual.                     Patient ID: Luis M Estrada is a 58 year old male.    Debridement Traumatic Left;Lateral Leg   Wound 01/11/24 #1 Left lateral lower leg Leg Left;Lateral    Performed by: Nicholas Gould MD  Authorized by: Nicholas Gould MD      Consent   Consent obtained? verbal  Consent given by: patient  Risks discussed? procedural risks discussed    Debridement Details  Performed by: physician  Debridement type: conservative sharp  Pain control: lidocaine 2%  Pain control administration type: topical    Pre-debridement measurements  Length (cm): 10.6  Width (cm): 9.5  Depth (cm): 0.2  Surface Area (cm^2): 100.7    Post-debridement  measurements  Length (cm): 10.7  Width (cm): 9.6  Depth (cm): 0.2  Percent debrided: 100%  Surface Area (cm^2): 102.72  Area Debrided (cm^2): 102.72  Volume (cm^3): 20.54    Tissue and other material debrided: subcutaneous tissue  Devitalized tissue debrided: biofilm and slough  Instrument(s) utilized: curette  Bleeding: none  Hemostasis obtained with: not applicable  Response to treatment: procedure was tolerated well                  Wound Treatment Orders:  No orders of the defined types were placed in this encounter.        Wound Information/Order:  Wound Number: All wounds  Product:  Dispense: Ordering compression stocking only   Dressing Frequency:    Was a Debridement performed: Yes, Debridement type: selective    Compression Stockings ordered: Yes   Product type: Juxtalite HD  Frequency: daily        Calf  Point of Measurement - Left Calf: 37  Point of Measurement - Right Calf: 37  Left Calf from:: Heel  Calf Left cm:: 49.5  Right Calf from:: Heel  Right Calf cm:: 48.5    Ankle  Point of Measurement - Left Ankle: 10  Point of Measurement - Right Ankle: 10  Left Ankle from:: Heel  Left Ankle cm:: 31     Right Ankle from:: Heel  Right Ankle cm:: 31.5       Foot                        Heel to Knee  Left Heel to Knee: 41  Right Heel to Knee: 41    Knee to Thigh                      Notes: Please send circaid Juxalite HD with easy slide on panel and  for right and left lower leg- Black color     Additional wound: No

## (undated) NOTE — LETTER
Date: 8/1/2024  Patient name: Luis M Estrada  YOB: 1965  Medical Record Number: [id number]  Primary Coverage: Payor: BCBS OUT OF STATE / Plan: BCBS OUT OF STATE PPO / Product Type: *No Product type* /   Secondary Coverage:   Insurance ID: [id number]  Patient Address: [address] [place] [address] [address]   [place] [address]  Telephone Information:   Home Phone [phone]   Mobile [phone]         Encounter Date: 8/1/2024  Provider: Nicholas Gould MD  Diagnosis:     ICD-10-CM   1. Chronic venous hypertension (idiopathic) with ulcer and inflammation of bilateral lower extremity ([facility])  I87.333   2. Open wound of left lower leg, subsequent encounter  S81.802D   3. Class 3 severe obesity due to excess calories with serious comorbidity and body mass index (BMI) of 45.0 to 49.9 in adult (Formerly [facility])  E66.01    Z68.42       Progress Note:  Chief Complaint   Patient presents with    Wound Recheck     Pt here for follow up arrives with ace wrap and gauze to left leg and spanda to right. Pt continues oral antibiotics        HPI:     Patient here for follow-up of left lower extremity wounds and right lower extremity wound.  He is doing well and tolerating compression wrap.  No new complaints.    Lab Results   Component Value Date    BUN 14 07/26/2024    CREATSERUM 0.83 07/26/2024    ALB 3.9 07/25/2024    TP 8.0 07/25/2024    A1C 5.2 12/12/2016         Current Outpatient Medications:     penicillin v potassium 500 MG Oral Tab, Take 2 tablets (1,000 mg total) by mouth 3 (three) times daily for 10 days, THEN 1 tablet (500 mg total) 2 (two) times a day., Disp: 180 tablet, Rfl: 0    aspirin 81 MG Oral Tab, Take 1 tablet (81 mg total) by mouth daily., Disp: , Rfl:     No Known Allergies         REVIEW OF SYSTEMS:     Review of Systems (ROS)  This information was obtained from the patient, chart    A comprehensive 10 point review of systems was completed.  Pertinent positives and negatives noted in the the HPI.     Past medical,  surgical, family and social history updated where appropriate.    PHYSICAL EXAM:   BP (!) 139/96   Pulse 70   Temp 97.6 °F (36.4 °C)   Resp 14    Estimated body mass index is 39.63 kg/m² as calculated from the following:    Height as of 7/29/24: 67\".    Weight as of 7/29/24: 253 lb (114.8 kg).   Vital signs reviewed.Appears stated age, well groomed.        Calf  Point of Measurement - Left Calf: 37  Point of Measurement - Right Calf: 37  Left Calf from:: Heel  Calf Left cm:: 47  Right Calf from:: Heel  Right Calf cm:: 43    Ankle  Point of Measurement - Left Ankle: 10  Point of Measurement - Right Ankle: 10  Left Ankle from:: Heel  Left Ankle cm:: 28.4     Right Ankle from:: Heel  Right Ankle cm:: 28       Foot                            Wound 01/11/24 #1 Leg Left (Active)   Date First Assessed/Time First Assessed: 01/11/24 1406    Wound Number (Wound Clinic Only): #1  Primary Wound Type: Traumatic  Location: Leg  Wound Location Orientation: Left      Assessments 1/11/2024  2:10 PM 8/1/2024  8:40 AM   Wound Image       Drainage Amount Large None   Drainage Description Yellow;Serous --   Treatments Compression --   Wound Length (cm) 10.6 cm 0.1 cm   Wound Width (cm) 9.5 cm 0.1 cm   Wound Surface Area (cm^2) 100.7 cm^2 0.01 cm^2   Wound Depth (cm) 0.2 cm 0 cm   Wound Volume (cm^3) 20.14 cm^3 0 cm^3   Wound Healing % -- 100   Margins Well-defined edges Attached edges   Non-staged Wound Description Full thickness Full thickness   Leslie-wound Assessment Edema;Hemosiderin staining Edema;Hemosiderin staining;Dry   Wound Granulation Tissue Firm;Pink --   Wound Bed Granulation (%) 40 % --   Wound Bed Slough (%) 60 % --   Wound Odor None None   Tunneling? No No   Undermining? No No   Sinus Tracts? No No       Inactive Orders   Date Order Priority Status Authorizing Provider   07/16/24 0050 Consult to Wound Ostomy Routine Completed Rica Pearson MD     - Reason for Consult:    Wound Care     - Wound Care Reason for Consult:     worsening   06/24/24 1127 Wound care Routine Discontinued Rica Pearson MD   06/06/24 0913 Debridement Traumatic Routine Completed Nicholas Gould MD   05/16/24 0901 Debridement Traumatic Routine Completed Nicholas Gould MD   05/02/24 1520 Debridement Traumatic Routine Completed Nicholas Gould MD   04/11/24 0959 Debridement Traumatic Routine Completed Nicholas Gould MD   04/04/24 0915 Debridement Traumatic Left;Lateral Leg Routine Completed Nicholas Gould MD   02/15/24 1154 Debridement Traumatic Left;Lateral Leg Routine Completed Nicholas Gould MD   01/18/24 1511 Debridement Traumatic Left;Lateral Leg Routine Completed Nicholas Gould MD   01/11/24 1710 Debridement Traumatic Left;Lateral Leg Routine Completed Nicholas Gould MD       Wound 06/20/24 #2 Leg Right (Active)   Date First Assessed/Time First Assessed: 06/20/24 0832    Wound Number (Wound Clinic Only): #2  Primary Wound Type: Venous Ulcer  Location: Leg  Wound Location Orientation: Right      Assessments 6/20/2024  8:34 AM 8/1/2024  8:44 AM   Wound Image       Drainage Amount Copious None   Drainage Description Clear --   Wound Length (cm) 0.8 cm 0.1 cm   Wound Width (cm) 1.2 cm 0.1 cm   Wound Surface Area (cm^2) 0.96 cm^2 0.01 cm^2   Wound Depth (cm) 0.1 cm 0 cm   Wound Volume (cm^3) 0.096 cm^3 0 cm^3   Wound Healing % -- 100   Margins Well-defined edges Attached edges   Non-staged Wound Description Full thickness Full thickness   Leslie-wound Assessment Hemosiderin staining;Blanchable erythema;Edema;Moist Edema;Hemosiderin staining;Dry   Wound Granulation Tissue Pink;Spongy --   Wound Bed Granulation (%) 50 % --   Wound Bed Slough (%) 50 % --   Wound Odor None None       Inactive Orders   Date Order Priority Status Authorizing Provider   06/24/24 1127 Wound care Routine Discontinued Rica Pearson MD   06/21/24 0942 Consult to Wound Ostomy Routine Completed Rica Pearson MD     - Reason for Consult:    Wound Care     - Wound Care Reason  for Consult:    pt sees wound clinic for LLE wound. Now with RLE cellulitis/weeping.       Wound 07/11/24 #3 Leg Posterior;Left (Active)   Date First Assessed/Time First Assessed: 07/11/24 0852    Wound Number (Wound Clinic Only): #3  Primary Wound Type: Pressure Injury  Location: Leg  Wound Location Orientation: Posterior;Left      Assessments 7/11/2024  8:59 AM 8/1/2024  8:41 AM   Wound Image       Drainage Amount Moderate None   Drainage Description Serosanguineous --   Treatments Compression --   Wound Length (cm) 1.7 cm 0.5 cm   Wound Width (cm) 9.4 cm 4 cm   Wound Surface Area (cm^2) 15.98 cm^2 2 cm^2   Wound Depth (cm) 0.1 cm 0.1 cm   Wound Volume (cm^3) 1.598 cm^3 0.2 cm^3   Wound Healing % -- 87   Margins Well-defined edges Well-defined edges   Non-staged Wound Description Full thickness Full thickness   Leslie-wound Assessment Moist;Edema Dry   Wound Granulation Tissue Pink;Red;Firm Pink;Firm;Pale Grey   Wound Bed Granulation (%) 80 % 70 %   Wound Bed Epithelium (%) 10 % 5 %   Wound Bed Slough (%) 10 % 25 %   Wound Odor None None   Tunneling? No No   Undermining? No No   Sinus Tracts? No No   Pressure Injury Stage Stage 2 --       No associated orders.          ASSESSMENT AND PLAN:      1. Chronic venous hypertension (idiopathic) with ulcer and inflammation of bilateral lower extremity ([facility])    2. Open wound of left lower leg, subsequent encounter    3. Class 3 severe obesity due to excess calories with serious comorbidity and body mass index (BMI) of 45.0 to 49.9 in adult ([facility])    Clinically the left lateral leg wound is completely healed now.  There are some scaly dry skin but no open wound.  May use moisturizer on this area.  He does have a linear wound on the posterior aspect of the proximal calf on the left which is superficial and has a mixture of some slough and granulation tissue.  This is due to the edge of the wrap rubbing into the skin and tissue.  Will apply Medihoney gel and border gauze  dressing and change on a daily basis.  Patient instructed on how to do this and he is comfortable doing the dressing change on his own.  Will order supplies if needed.  He will follow-up with me in 2 weeks.  The right anterior leg wound is completely healed.  Patient normally wears the compression socks given to him by his primary care doctor to help control swelling in the legs.      Risks, benefits, and alternatives of current treatment plan discussed in detail.  Questions and concerns addressed. Red flags to RTC or ED reviewed.  Patient (or parent) agrees to plan.      No follow-ups on file.    Also refer to patient instructions.     I spent a total of 30 minutes with this patient's care.  This time included preparing to see the patient (eg, review notes and recent diagnostics), seeing the patient, performing a medically appropriate examination and/or evaluation, counseling and educating the patient, and documenting in the record.          Nicholas Gould M.D.   [facility]   2024    Patient Instructions       PATIENT INSTRUCTIONS      FOR Luis M RICK Peña 3/25/1965    DATE OF SERVICE: 2024      Apply Medihoney gel to the wound    Cover with border gauze dressing    Change on a daily basis    Wear the spandagrip stocking in the daytime and you may remove at bedtime when legs are elevated    Try to keep the legs elevated whenever possible        Follow up with Dr. Gould 2 WEEKS                      MISCELLANEOUS INSTRUCTIONS  Supplement with a daily multivitamin   Low salt diet  Intense blood sugar control - Goal Blood sugar below 180 at all times recommended.  Increase protein intake / consider protein supplements - see below  Elevate extremities at all times when sitting / laying down.  No tobacco use     DIETARY MODIFICATIONS TO HELP WITH WOUND HEALING:          Please follow any restrictions to diet given by your other doctors     Protein: meats, beans, eggs, milk and  yogurt particularly Greek yogurt), tofu, soy nuts, soy protein products     Vitamin C: citrus fruits and juices, strawberries, tomatoes, tomato juice, peppers, baked potatoes, spinach, broccoli, cauliflower, [place] sprouts, cabbage     Vitamin A: dark greens, leafy vegetables, orange or yellow vegetables, cantaloupe, fortified dairy products, liver, fortified cereals     Zinc: fortified cereals, red meats, seafood     Consider Bob by [employer] (These are essential branch chain amino acids that help with tissue building and wound healing) and take 2 packets/day. You can order online at [employer] or [employer]     ADDITIONAL REMINDERS:     The treatment plan has been discussed at length with you and your provider. Follow all instructions carefully, it is very important. If you do not follow all instructions, you are at risk of your wound not healing, infection, possible loss of limb and even loss of life.  Please call the clinic at [phone] during regular business hours ( 7:30 AM - 5:30 PM) if you notice increased redness, warmth, pain or pus like drainage or start running a fever greater than 100.3.    For after hour emergencies, please call your primary physician or go to the nearest emergency room.  If your blood pressure is elevated, follow up with your primary care doctor and/or cardiologist as soon as possible.     FOR LEG SWELLING,  LOWER EXTREMITY WOUNDS AND IF USING COMPRESSION:   Managing your edema:    Avoid prolonged standing in one place. It is better to have your calf muscles moving to pump fluid out of the legs.  Elevate leg(s) above the level of the heart when sitting or as much as possible.  Take you diuretics as directed by your physician. Do not skip doses or change doses unless instructed to do so by your physician.  Decrease salt intake, follow recommended 2 grams daily.  Do not get leg(s) with compression wrap wet. If wraps are too tight as indicated by pain, numbness/tingling or discoloration  of toes remove wrap completely and call the wound center @ [phone].       The treatment plan has been discussed at length between you and your provider. Follow all instructions carefully, it is very important. If you do not follow all instructions you are at risk of your wound not healing, infection, possible loss of limb and even loss of life.    COMPRESSION WRAP PATIENT CARE INSTRUCTIONS  DO NOT get compression wrap wet. When showering, use a shower boot.   Please contact wound clinic and if not able to reach, then go to emergency room if ANY of the following occur:   Tingling or numbness in the foot or toes on leg with compression wrap.  Severe pain that cannot be relieved with elevation and any medication instructed per your doctor.  A fever of 100.4°F (38°C) or higher.  Swelling, coldness, or blue-gray discoloration of the toes.  If the compression wrap feels too tight or too loose.  If the compression wrap is damaged or has rough edges that hurt.  If the compression wrap gets wet, you must cut wrap off.   Drainage from compression wrap that smells different than usual.                     Weekly Wound Education Note    Teaching Provided To: Patient  Training Topics: Discharge instructions;Cleasing and general instructions;Dressing;Edema control;Compression  Training Method: Demonstration;Explain/Verbal  Training Response: Reinforcement needed        Notes: Pt here for follow up wound care visit to left lower leg and right lower leg wound, left posterior lower leg. Edema measurement decreased. Per provider right lower leg and left lower leg wounds are healed. Provider debrided left posterior lower leg wound. Provider recommending patient to dress wound with honey gel and bordered gauze dressing daily. RN provided pt with silver foam nonbordered dressing as well in case patient is unable to complete honey dressing daily. Pt states he is going to try to complete his own dressing. RN informed patient if he  needs help staff can order him home health services. Pt denied home health at this time but states he will try to do this himself and if he needs help he will call the clinic to set up services. RN informed patient we would place order for supplies at visit today. Applied spandagrip (f) to BLE at visit today- educated patient to check compression garments daily to make sure bunches/kinks are not forming in stocking and if so to smooth them out. Educated pt about possible pressure injuries if not monitoring compression stocking daily. Pt states he would like to leave them on overnight as he has issues with removing and replaying them daily and he lives alone. Pt to follow up with provider in 2 weeks.        Wound Information/Order:  Wound Number: All wounds  Product:Silver foam, Bordered gauze, Dry gauze, and Medipore tape (no substitution)  Dispense: 30 days  Dressing Frequency:Change dressing daily and/or PRN    Was a Debridement performed: Yes, Debridement type: mechanical    Compression Stockings ordered: No    Notes: Please send : nonbordered silver foam dressing , bordered gauze dressing 4x4 , 1 box of spandagrip (F), medipore tape, normal saline, gauze    Additional wound: No

## (undated) NOTE — LETTER
Date: 7/29/2024    Patient Name: Luis M Estrada          To Whom it may concern:    This letter has been written at the patient's request. The above patient was seen at [facility] for treatment of a medical condition.    This patient should be excused from attending work/school from 07/29/2024 through 08/05/2024.             Sincerely,    Darryl Dominguez MD

## (undated) NOTE — Clinical Note
Enrico Ibrahim, pt feeling better since discharge.  Did not schedule follow up visit with you.  Says he will call back to do so.  Thank you, Yi